# Patient Record
Sex: FEMALE | Race: WHITE | NOT HISPANIC OR LATINO | Employment: OTHER | URBAN - METROPOLITAN AREA
[De-identification: names, ages, dates, MRNs, and addresses within clinical notes are randomized per-mention and may not be internally consistent; named-entity substitution may affect disease eponyms.]

---

## 2017-01-06 DIAGNOSIS — E78.5 HYPERLIPIDEMIA, UNSPECIFIED HYPERLIPIDEMIA TYPE: ICD-10-CM

## 2017-01-06 RX ORDER — ATORVASTATIN CALCIUM 40 MG/1
TABLET, FILM COATED ORAL
Qty: 90 TABLET | Refills: 0 | Status: SHIPPED | OUTPATIENT
Start: 2017-01-06 | End: 2017-04-12 | Stop reason: SDUPTHER

## 2017-01-23 ENCOUNTER — OFFICE VISIT (OUTPATIENT)
Dept: DERMATOLOGY | Facility: CLINIC | Age: 62
End: 2017-01-23
Payer: COMMERCIAL

## 2017-01-23 DIAGNOSIS — L81.4 LENTIGO: ICD-10-CM

## 2017-01-23 DIAGNOSIS — D22.9 NEVUS: ICD-10-CM

## 2017-01-23 DIAGNOSIS — L71.9 ROSACEA: ICD-10-CM

## 2017-01-23 DIAGNOSIS — L30.4 INTERTRIGO: ICD-10-CM

## 2017-01-23 DIAGNOSIS — D48.9 NEOPLASM OF UNCERTAIN BEHAVIOR: Primary | ICD-10-CM

## 2017-01-23 DIAGNOSIS — L82.1 SK (SEBORRHEIC KERATOSIS): ICD-10-CM

## 2017-01-23 PROCEDURE — 88305 TISSUE EXAM BY PATHOLOGIST: CPT | Performed by: PATHOLOGY

## 2017-01-23 PROCEDURE — 11101 PR BIOPSY, EACH ADDED LESION: CPT | Mod: S$GLB,,, | Performed by: DERMATOLOGY

## 2017-01-23 PROCEDURE — 99999 PR PBB SHADOW E&M-EST. PATIENT-LVL III: CPT | Mod: PBBFAC,,, | Performed by: DERMATOLOGY

## 2017-01-23 PROCEDURE — 11100 PR BIOPSY OF SKIN LESION: CPT | Mod: S$GLB,,, | Performed by: DERMATOLOGY

## 2017-01-23 PROCEDURE — 99202 OFFICE O/P NEW SF 15 MIN: CPT | Mod: 25,S$GLB,, | Performed by: DERMATOLOGY

## 2017-01-23 PROCEDURE — 1159F MED LIST DOCD IN RCRD: CPT | Mod: S$GLB,,, | Performed by: DERMATOLOGY

## 2017-01-23 NOTE — PROGRESS NOTES
Subjective:       Patient ID:  Verenice iMrza is a 61 y.o. female who presents for   Chief Complaint   Patient presents with    Lesion     HPI Comments: Pt c/o rash under both breast x  Tx with antifungal powder and ketoconazole cream. Tx helps.  Pt also c/o rosacea x many years. Currently using metrogel. Tx not helping.     Lesion  - Initial  Affected locations: forehead  Duration: 1 year  Signs / symptoms: redness  Severity: mild  Timing: constant  Aggravated by: nothing  Relieving factors/Treatments tried: nothing        Review of Systems   Skin: Positive for daily sunscreen use and activity-related sunscreen use. Negative for tendency to form keloidal scars.   Hematologic/Lymphatic: Does not bruise/bleed easily.        Objective:    Physical Exam   Constitutional: She appears well-developed and well-nourished. No distress.   Neurological: She is alert and oriented to person, place, and time. She is not disoriented.   Psychiatric: She has a normal mood and affect.   Skin:   Areas Examined (abnormalities noted in diagram):   Scalp / Hair Palpated and Inspected  Head / Face Inspection Performed  Neck Inspection Performed  Chest / Axilla Inspection Performed  Abdomen Inspection Performed  Back Inspection Performed  RUE Inspected  LUE Inspection Performed  Nails and Digits Inspection Performed                           Diagram Legend     Erythematous scaling macule/papule c/w actinic keratosis       Vascular papule c/w angioma      Pigmented verrucoid papule/plaque c/w seborrheic keratosis      Yellow umbilicated papule c/w sebaceous hyperplasia      Irregularly shaped tan macule c/w lentigo     1-2 mm smooth white papules consistent with Milia      Movable subcutaneous cyst with punctum c/w epidermal inclusion cyst      Subcutaneous movable cyst c/w pilar cyst      Firm pink to brown papule c/w dermatofibroma      Pedunculated fleshy papule(s) c/w skin tag(s)      Evenly pigmented macule c/w junctional nevus      Mildly variegated pigmented, slightly irregular-bordered macule c/w mildly atypical nevus      Flesh colored to evenly pigmented papule c/w intradermal nevus       Pink pearly papule/plaque c/w basal cell carcinoma      Erythematous hyperkeratotic cursted plaque c/w SCC      Surgical scar with no sign of skin cancer recurrence      Open and closed comedones      Inflammatory papules and pustules      Verrucoid papule consistent consistent with wart     Erythematous eczematous patches and plaques     Dystrophic onycholytic nail with subungual debris c/w onychomycosis     Umbilicated papule    Erythematous-base heme-crusted tan verrucoid plaque consistent with inflamed seborrheic keratosis     Erythematous Silvery Scaling Plaque c/w Psoriasis     See annotation      Assessment / Plan:      Pathology Orders:      Normal Orders This Visit    Tissue Specimen To Pathology, Dermatology     Questions:    Directional Terms:  Other(comment)    Clinical information:  r/o basal cell carcinoma    Specific Site:  right  forehead    Tissue Specimen To Pathology, Dermatology     Questions:    Directional Terms:  Other(comment)    Clinical information:  r/o atypical nevus    Specific Site:  mid chest        Neoplasm of uncertain behavior  Shave biopsy procedure note: x 2     Shave biopsy performed after verbal consent including risk of infection, scar, recurrence, need for additional treatment of site. Area prepped with alcohol, anesthetized with approximately 1.0cc of 1% lidocaine with epinephrine. Lesional tissue shaved with razor blade. Hemostasis achieved with application of aluminum chloride followed by hyfrecation. No complications. Dressing applied. Wound care explained.    Right forehead- If biopsy positive for malignancy, refer to Dr. Pride for Mohs surgery consultation.  -     Tissue Specimen To Pathology, Dermatology  -     Tissue Specimen To Pathology, Dermatology    Rosacea  -     azelaic acid (FINACEA) 15 % Foam; Apply  thin film to face qhs  Dispense: 50 g; Refill: 6  cerave am and pm   Refer to dr price or dr chopra for laser evaluation    Intertrigo  Continue zeasorb af powder otc  Recommend white vinegar: water 1:1 compresses 2x/day followed by cool blow dry and then application of prescription medication.     Cool blow dry after showering. Once clear, use Zeasorb AF powder for maintenance.    Lentigo  This is a benign hyperpigmented sun induced lesion. Daily sun protection will reduce the number of new lesions. Treatment of these benign lesions are considered cosmetic.  The nature of sun-induced photo-aging and skin cancers is discussed.  Sun avoidance, protective clothing, and the use of 30-SPF sunscreens is advised. Observe closely for skin damage/changes, and call if such occurs.    Nevus  Discussed ABCDE's of nevi.  Monitor for new mole or moles that are becoming bigger, darker, irritated, or developing irregular borders. Brochure provided.    SK (seborrheic keratosis)  These are benign inherited growths without a malignant potential. Reassurance given to patient. No treatment is necessary.       Upper body skin examination performed today including at least 6 points as noted in physical examination. Suspicious lesions noted.           Return for prn bx report.

## 2017-03-06 ENCOUNTER — PROCEDURE VISIT (OUTPATIENT)
Dept: DERMATOLOGY | Facility: CLINIC | Age: 62
End: 2017-03-06
Payer: COMMERCIAL

## 2017-03-06 DIAGNOSIS — D23.5 DYSPLASTIC NEVUS OF TRUNK: Primary | ICD-10-CM

## 2017-03-06 PROCEDURE — 88305 TISSUE EXAM BY PATHOLOGIST: CPT | Performed by: PATHOLOGY

## 2017-03-06 PROCEDURE — 11402 EXC TR-EXT B9+MARG 1.1-2 CM: CPT | Mod: 51,S$GLB,, | Performed by: DERMATOLOGY

## 2017-03-06 PROCEDURE — 99499 UNLISTED E&M SERVICE: CPT | Mod: S$GLB,,, | Performed by: DERMATOLOGY

## 2017-03-06 PROCEDURE — 12032 INTMD RPR S/A/T/EXT 2.6-7.5: CPT | Mod: S$GLB,,, | Performed by: DERMATOLOGY

## 2017-03-06 NOTE — PROGRESS NOTES
PROCEDURE: Elliptical excision with intermediate layered repair in order to decrease tension.    ANESTHETIC: 6.0 cc 1% Xylocaine with Epinephrine 1:100,000, buffered    SURGEON: Genevieve Nascimento M.D.    ASSISTANTS: Jennifer Crespo MA    PREOPERATIVE DIAGNOSIS:  Moderately Atypical Nevus    POSTOPERATIVE DIAGNOSIS:  Same as preoperative diagnosis    PATHOLOGIC DIAGNOSIS: Pending    LOCATION: mid chest       INITIAL LESION SIZE: 0.8 cm    EXCISED DIAMETER: 1.4 cm    PREPARATION: The diagnosis, procedure, alternatives, benefits and risks, including but not limited to: infection, bleeding/bruising, drug reactions, pain, scar or cosmetic defect, local sensation disturbances, wound dehiscence (separation of wound edges after sutures removed) and/or recurrence of present condition were explained to the patient. The patient elected to proceed.  Patient's identity was verified using 2 patient identifiers and the side and site was verified.  Time out period with surgeon, assistant and patient in surgical suite was taken.    PROCEDURE: The location noted above was prepped, draped, and anesthetized in the usual sterile fashion per Ema Pena LPN. Lesional tissue was carefully marked with at least 2 mm margins of clinically normal skin in all directions. A fusiform elliptical excision was done with #15 blade carried down completely through the dermis into the deep subcutaneous tissues to the level of the non-muscle fascia, and dissection was carried out in that plane.  Electrocoagulation was used to obtain hemostasis. Blood loss was minimal. The wound was then approximated in a layered fashion with subcutaneous and intradermal sutures of 4.0 Monocryl, approximately 2 in number, and the wound was then superficially closed with simple interrupted sutures of 4.0 Prolene.    The patient tolerated the procedure well.    The area was cleaned and dressed appropriately and the patient was given wound care instructions, as well as an  appointment for follow-up evaluation.    LENGTH OF REPAIR: 3.0 cm

## 2017-03-13 ENCOUNTER — PATIENT MESSAGE (OUTPATIENT)
Dept: DERMATOLOGY | Facility: CLINIC | Age: 62
End: 2017-03-13

## 2017-03-16 ENCOUNTER — PATIENT MESSAGE (OUTPATIENT)
Dept: INTERNAL MEDICINE | Facility: CLINIC | Age: 62
End: 2017-03-16

## 2017-03-21 ENCOUNTER — PATIENT MESSAGE (OUTPATIENT)
Dept: INTERNAL MEDICINE | Facility: CLINIC | Age: 62
End: 2017-03-21

## 2017-03-29 ENCOUNTER — OFFICE VISIT (OUTPATIENT)
Dept: DERMATOLOGY | Facility: CLINIC | Age: 62
End: 2017-03-29
Payer: COMMERCIAL

## 2017-03-29 DIAGNOSIS — L91.0 HYPERTROPHIC SCAR: Primary | ICD-10-CM

## 2017-03-29 PROCEDURE — 99999 PR PBB SHADOW E&M-EST. PATIENT-LVL II: CPT | Mod: PBBFAC,,, | Performed by: DERMATOLOGY

## 2017-03-29 PROCEDURE — 99499 UNLISTED E&M SERVICE: CPT | Mod: S$GLB,,, | Performed by: DERMATOLOGY

## 2017-03-29 PROCEDURE — 11900 INJECT SKIN LESIONS </W 7: CPT | Mod: S$GLB,,, | Performed by: DERMATOLOGY

## 2017-03-29 NOTE — MR AVS SNAPSHOT
[place] [facility]   [facility]  [place] [address]  Phone: [phone]  Fax: [phone]                  Verenice Mirza   3/29/2017 1:00 PM   Office Visit    Description:  Female : 1955   Provider:  Genevieve Nascimento MD   Department:  [place] [facility]           Reason for Visit     Scar           Diagnoses this Visit        Comments    Hypertrophic scar    -  Primary            To Do List           Future Appointments        Provider Department Dept Phone    2017 1:40 PM Areli Crespo MD [facility] [phone]      Goals (5 Years of Data)     None      Follow-Up and Disposition     Return in about 6 months (around 2017).    Follow-up and Disposition History      [facility]     [facility] On Call Nurse Care Line -  Assistance  Registered nurses in the [facility] Call Center provide clinical advisement, health education, appointment booking, and other advisory services.  Call for this free service at [phone].             Medications           Message regarding Medications     Verify the changes and/or additions to your medication regime listed below are the same as discussed with your clinician today.  If any of these changes or additions are incorrect, please notify your healthcare provider.             Verify that the below list of medications is an accurate representation of the medications you are currently taking.  If none reported, the list may be blank. If incorrect, please contact your healthcare provider. Carry this list with you in case of emergency.           Current Medications     alpha lipoic acid 200 mg Cap Take by mouth once daily.     aspirin (ECOTRIN) 81 MG EC tablet Take 81 mg by mouth once daily.    atorvastatin (LIPITOR) 40 MG tablet TAKE ONE TABLET BY MOUTH ONCE DAILY    azelaic acid (FINACEA) 15 % Foam Apply thin film to face qhs    b complex vitamins tablet Take 1 tablet by mouth once daily.    biotin 5 mg  Cap Take by mouth.    bromelains 500 mg Tab Take by mouth once daily.    CINNAMON BARK (CINNAMON ORAL) Take 350 mg by mouth.    co-enzyme Q-10 30 mg capsule Take 30 mg by mouth 3 (three) times daily.    GLUCOSAMINE HCL/CHONDR TYLER A NA (OSTEO BI-FLEX ORAL) Take by mouth 2 (two) times daily.    LACTOBAC CMB #3/FOS/PANTETHINE (PROBIOTIC & ACIDOPHILUS ORAL) Take by mouth.    levothyroxine (SYNTHROID) 88 MCG tablet Take 1 tablet (88 mcg total) by mouth before breakfast.    LUTEIN ORAL Take by mouth.    magnesium oxide (MAG-OX) 400 mg tablet Take 400 mg by mouth once daily.    meloxicam (MOBIC) 7.5 MG tablet Take 1 tablet (7.5 mg total) by mouth daily as needed for Pain.    metoprolol succinate (TOPROL-XL) 50 MG 24 hr tablet Take 1 tablet (50 mg total) by mouth once daily.    MILK THISTLE ORAL Take by mouth.    omega-3 acid ethyl esters (LOVAZA) 1 gram capsule Take 2 capsules (2 g total) by mouth 2 (two) times daily.    psyllium (METAMUCIL) powder Take 1 packet by mouth once daily.     ramipril (ALTACE) 10 MG capsule Take 1 capsule (10 mg total) by mouth once daily.    tizanidine (ZANAFLEX) 4 MG tablet Take 1 tablet (4 mg total) by mouth every 8 (eight) hours as needed.    TURMERIC, BULK, MISC by Misc.(Non-Drug; Combo Route) route once daily. OTC    vitamin D 1000 units Tab Take 185 mg by mouth once daily.           Clinical Reference Information           Allergies as of 3/29/2017     No Known Allergies      Immunizations Administered on Date of Encounter - 3/29/2017     None      Language Assistance Services     ATTENTION: Language assistance services are available, free of charge. Please call [phone].      ATENCIÓN: Si kylie flood, tiene a tyler disposición servicios gratuitos de asistencia lingüística. Llame al [phone].     CHÚ Ý: N?u b?n nói Ti?ng Vi?t, có các d?ch v? h? tr? ngôn ng? mi?n phí dành cho b?n. G?i s? [phone].         [facility] complies with applicable Federal civil rights  laws and does not discriminate on the basis of race, color, national origin, age, disability, or sex.

## 2017-03-29 NOTE — PROGRESS NOTES
Subjective:       Patient ID:  Verenice Mirza is a 61 y.o. female who presents for   Chief Complaint   Patient presents with    Scar     HPI Comments: Pt presents for scar mid chest s/p surgery for dysplastic nevus 3/6/17.  Pt concerned it is becoming hypertrophic.  tx with biocorneum bid and vaseline once nightly.  Denies itching or pain.     Scar         Review of Systems   Skin: Negative for tendency to form keloidal scars.   Hematologic/Lymphatic: Does not bruise/bleed easily.        Objective:    Physical Exam   Constitutional: She appears well-developed and well-nourished. No distress.   Neurological: She is alert and oriented to person, place, and time. She is not disoriented.   Psychiatric: She has a normal mood and affect.   Skin:   Areas Examined (abnormalities noted in diagram):   Chest / Axilla Inspection Performed              Diagram Legend     Erythematous scaling macule/papule c/w actinic keratosis       Vascular papule c/w angioma      Pigmented verrucoid papule/plaque c/w seborrheic keratosis      Yellow umbilicated papule c/w sebaceous hyperplasia      Irregularly shaped tan macule c/w lentigo     1-2 mm smooth white papules consistent with Milia      Movable subcutaneous cyst with punctum c/w epidermal inclusion cyst      Subcutaneous movable cyst c/w pilar cyst      Firm pink to brown papule c/w dermatofibroma      Pedunculated fleshy papule(s) c/w skin tag(s)      Evenly pigmented macule c/w junctional nevus     Mildly variegated pigmented, slightly irregular-bordered macule c/w mildly atypical nevus      Flesh colored to evenly pigmented papule c/w intradermal nevus       Pink pearly papule/plaque c/w basal cell carcinoma      Erythematous hyperkeratotic cursted plaque c/w SCC      Surgical scar with no sign of skin cancer recurrence      Open and closed comedones      Inflammatory papules and pustules      Verrucoid papule consistent consistent with wart     Erythematous eczematous patches  and plaques     Dystrophic onycholytic nail with subungual debris c/w onychomycosis     Umbilicated papule    Erythematous-base heme-crusted tan verrucoid plaque consistent with inflamed seborrheic keratosis     Erythematous Silvery Scaling Plaque c/w Psoriasis     See annotation      Assessment / Plan:        Hypertrophic scar  Intralesional Kenalog 5.0mg/cc (0.5 cc total) injected into 1 lesions on the mid chest today after obtaining verbal consent including risk of surrounding hypopigmentation. Patient tolerated procedure well.  Continue biocorneum         Return in about 6 months (around 9/29/2017).

## 2017-04-05 LAB
ALBUMIN SERPL-MCNC: 4.1 G/DL (ref 3.6–5.1)
ALBUMIN/GLOB SERPL: 1.8 (CALC) (ref 1–2.5)
ALP SERPL-CCNC: 84 U/L (ref 33–130)
ALT SERPL-CCNC: 26 U/L (ref 6–29)
AST SERPL-CCNC: 17 U/L (ref 10–35)
BILIRUB SERPL-MCNC: 0.3 MG/DL (ref 0.2–1.2)
BUN SERPL-MCNC: 17 MG/DL (ref 7–25)
BUN/CREAT SERPL: ABNORMAL (CALC) (ref 6–22)
CALCIUM SERPL-MCNC: 9.4 MG/DL (ref 8.6–10.4)
CHLORIDE SERPL-SCNC: 107 MMOL/L (ref 98–110)
CHOLEST SERPL-MCNC: 124 MG/DL (ref 125–200)
CHOLEST/HDLC SERPL: 3 (CALC)
CO2 SERPL-SCNC: 27 MMOL/L (ref 20–31)
CREAT SERPL-MCNC: 0.69 MG/DL (ref 0.5–0.99)
GFR SERPL CREATININE-BSD FRML MDRD: 93 ML/MIN/1.73M2
GLOBULIN SER CALC-MCNC: 2.3 G/DL (CALC) (ref 1.9–3.7)
GLUCOSE SERPL-MCNC: 113 MG/DL (ref 65–99)
HDLC SERPL-MCNC: 41 MG/DL
LDLC SERPL CALC-MCNC: 67 MG/DL (CALC)
NONHDLC SERPL-MCNC: 83 MG/DL (CALC)
POTASSIUM SERPL-SCNC: 4.3 MMOL/L (ref 3.5–5.3)
PROT SERPL-MCNC: 6.4 G/DL (ref 6.1–8.1)
SODIUM SERPL-SCNC: 141 MMOL/L (ref 135–146)
TRIGL SERPL-MCNC: 80 MG/DL
TSH SERPL-ACNC: 1.21 MIU/L (ref 0.4–4.5)

## 2017-04-12 ENCOUNTER — OFFICE VISIT (OUTPATIENT)
Dept: INTERNAL MEDICINE | Facility: CLINIC | Age: 62
End: 2017-04-12
Payer: COMMERCIAL

## 2017-04-12 VITALS
SYSTOLIC BLOOD PRESSURE: 120 MMHG | WEIGHT: 227.94 LBS | HEIGHT: 62 IN | BODY MASS INDEX: 41.94 KG/M2 | DIASTOLIC BLOOD PRESSURE: 80 MMHG | HEART RATE: 72 BPM

## 2017-04-12 DIAGNOSIS — K21.9 GASTROESOPHAGEAL REFLUX DISEASE, ESOPHAGITIS PRESENCE NOT SPECIFIED: Primary | ICD-10-CM

## 2017-04-12 DIAGNOSIS — E88.819 INSULIN RESISTANCE: ICD-10-CM

## 2017-04-12 DIAGNOSIS — E03.4 HYPOTHYROIDISM DUE TO ACQUIRED ATROPHY OF THYROID: ICD-10-CM

## 2017-04-12 DIAGNOSIS — I10 ESSENTIAL HYPERTENSION: ICD-10-CM

## 2017-04-12 DIAGNOSIS — E03.9 HYPOTHYROIDISM, UNSPECIFIED TYPE: ICD-10-CM

## 2017-04-12 DIAGNOSIS — E78.5 HYPERLIPIDEMIA, UNSPECIFIED HYPERLIPIDEMIA TYPE: ICD-10-CM

## 2017-04-12 DIAGNOSIS — R92.8 ABNORMAL MAMMOGRAM: ICD-10-CM

## 2017-04-12 DIAGNOSIS — M62.838 MUSCLE SPASM: ICD-10-CM

## 2017-04-12 DIAGNOSIS — E66.01 MORBID OBESITY WITH BMI OF 40.0-44.9, ADULT: ICD-10-CM

## 2017-04-12 DIAGNOSIS — I73.9 PAD (PERIPHERAL ARTERY DISEASE): ICD-10-CM

## 2017-04-12 PROCEDURE — 99214 OFFICE O/P EST MOD 30 MIN: CPT | Mod: S$GLB,,, | Performed by: INTERNAL MEDICINE

## 2017-04-12 PROCEDURE — 3079F DIAST BP 80-89 MM HG: CPT | Mod: S$GLB,,, | Performed by: INTERNAL MEDICINE

## 2017-04-12 PROCEDURE — 1160F RVW MEDS BY RX/DR IN RCRD: CPT | Mod: S$GLB,,, | Performed by: INTERNAL MEDICINE

## 2017-04-12 PROCEDURE — 99999 PR PBB SHADOW E&M-EST. PATIENT-LVL III: CPT | Mod: PBBFAC,,, | Performed by: INTERNAL MEDICINE

## 2017-04-12 PROCEDURE — 3074F SYST BP LT 130 MM HG: CPT | Mod: S$GLB,,, | Performed by: INTERNAL MEDICINE

## 2017-04-12 RX ORDER — MELOXICAM 7.5 MG/1
7.5 TABLET ORAL DAILY PRN
Qty: 30 TABLET | Refills: 3 | Status: SHIPPED | OUTPATIENT
Start: 2017-04-12 | End: 2018-10-22 | Stop reason: SDUPTHER

## 2017-04-12 RX ORDER — METOPROLOL SUCCINATE 50 MG/1
50 TABLET, EXTENDED RELEASE ORAL DAILY
Qty: 90 TABLET | Refills: 1 | Status: SHIPPED | OUTPATIENT
Start: 2017-04-12 | End: 2017-12-28 | Stop reason: SDUPTHER

## 2017-04-12 RX ORDER — LEVOTHYROXINE SODIUM 88 UG/1
88 TABLET ORAL
Qty: 90 TABLET | Refills: 1 | Status: SHIPPED | OUTPATIENT
Start: 2017-04-12 | End: 2018-04-18 | Stop reason: SDUPTHER

## 2017-04-12 RX ORDER — RAMIPRIL 10 MG/1
10 CAPSULE ORAL DAILY
Qty: 90 CAPSULE | Refills: 3 | Status: SHIPPED | OUTPATIENT
Start: 2017-04-12 | End: 2018-03-24 | Stop reason: SDUPTHER

## 2017-04-12 RX ORDER — ATORVASTATIN CALCIUM 40 MG/1
40 TABLET, FILM COATED ORAL DAILY
Qty: 90 TABLET | Refills: 1 | Status: SHIPPED | OUTPATIENT
Start: 2017-04-12 | End: 2018-04-09

## 2017-04-12 RX ORDER — OMEGA-3-ACID ETHYL ESTERS 1 G/1
2 CAPSULE, LIQUID FILLED ORAL 2 TIMES DAILY
Qty: 360 CAPSULE | Refills: 1 | Status: SHIPPED | OUTPATIENT
Start: 2017-04-12 | End: 2018-04-09 | Stop reason: SDUPTHER

## 2017-04-12 NOTE — PROGRESS NOTES
Subjective:       Patient ID: Verenice Mirza is a 61 y.o. female.    Chief Complaint: Follow-up    HPI   61-year-old female presents to clinic today for follow-up of hypertension dyslipidemia hypothyroidism insulin resistance and PAD.  She is compliant with medication she's been on a diet recently lost around 20 pounds very motivated.  She reports having indigestion and heartburn really since she's been on this diet and drinking shakes.  She states that she would like to try to get an EGD due to refractory reflux.  She has history of using PPI therapy she stopped taking it recently due to concerns about long-term effects on the kidney.  She's been using Tums instead.  She has been having symptoms fairly frequently.  She is also due for colonoscopy.  Review of Systems  otherwise negative  Objective:      Physical Exam  General: Well-appearing, well-nourished.  No distress  HEENT: conjunctivae are normal.  Pupils are equal and reative to light.  TM's are clear and intact bilaterally.  Hearing is grossly normal.  Nasopharynx is clear.  Oropharynx is clear.  Neck: Supple.  No thyroid megaly.  No bruits.  Lymph: No cervical or supraclavicular adenopathy.  Heart: Regular rate and rhythm, without murmur, rub or gallop.  Lungs: Clear to auscultation; respiratory effort normal.  Abdomen: Soft, nontender, nondistended.  Normoactive bowel sounds.  No hepatomegaly.  No masses.  Extremities: Good distal pulses.  No edema.  Psych: Oriented to time person place.  Judgment and insight seem unimpaired.  Mood and affect are appropriate.  Assessment:       1. Gastroesophageal reflux disease, esophagitis presence not specified    2. Abnormal mammogram    3. Morbid obesity with BMI of 40.0-44.9, adult    4. Essential hypertension    5. Hyperlipidemia, unspecified hyperlipidemia type    6. Hypothyroidism, unspecified type    7. PAD (peripheral artery disease)    8. Insulin resistance    9. Hypothyroidism due to acquired atrophy of  thyroid    10. Muscle spasm        Plan:       Verenice was seen today for follow-up.    Diagnoses and all orders for this visit:    Gastroesophageal reflux disease, esophagitis presence not specified  -     Ambulatory consult to Gastroenterology  Refer to GI to get arrange for EGD and colonoscopy.  Abnormal mammogram  -     Mammo Digital Diagnostic Bilat with Tomosynthesis_CAD; Future    Morbid obesity with BMI of 40.0-44.9, adult    Essential hypertension  -     metoprolol succinate (TOPROL-XL) 50 MG 24 hr tablet; Take 1 tablet (50 mg total) by mouth once daily.  -     ramipril (ALTACE) 10 MG capsule; Take 1 capsule (10 mg total) by mouth once daily.  Controlled.  Continue current medical regimen.  Prescription refills addressed.  Followup advised. See after visit summary.  Hyperlipidemia, unspecified hyperlipidemia type  -     atorvastatin (LIPITOR) 40 MG tablet; Take 1 tablet (40 mg total) by mouth once daily.  -     omega-3 acid ethyl esters (LOVAZA) 1 gram capsule; Take 2 capsules (2 g total) by mouth 2 (two) times daily.  Controlled.  Continue current medical regimen.  Prescription refills addressed.  Followup advised. See after visit summary.  Hypothyroidism, unspecified type  Controlled.  Continue current medical regimen.  Prescription refills addressed.  Followup advised. See after visit summary.  PAD (peripheral artery disease)  Continue cholesterol medicine LDL at goal.  Insulin resistance  Continued efforts at weight loss  Hypothyroidism due to acquired atrophy of thyroid  -     levothyroxine (SYNTHROID) 88 MCG tablet; Take 1 tablet (88 mcg total) by mouth before breakfast.    Muscle spasm  -     meloxicam (MOBIC) 7.5 MG tablet; Take 1 tablet (7.5 mg total) by mouth daily as needed for Pain.

## 2017-04-12 NOTE — MR AVS SNAPSHOT
[facility]   [place]  [place] [address]  Phone: [phone]  Fax: [phone]                  Verenice Mirza   2017 1:40 PM   Office Visit    Description:  Female : 1955   Provider:  Areli Crespo MD   Department:  [facility]           Reason for Visit     Follow-up           Diagnoses this Visit        Comments    Gastroesophageal reflux disease, esophagitis presence not specified    -  Primary     Abnormal mammogram         Morbid obesity with BMI of 40.0-44.9, adult         Essential hypertension         Hyperlipidemia, unspecified hyperlipidemia type         Hypothyroidism, unspecified type         PAD (peripheral artery disease)         Insulin resistance         Hypothyroidism due to acquired atrophy of thyroid         Muscle spasm                To Do List           Future Appointments        Provider Department Dept Phone    2017 10:20 AM ZORAN Alvarado - Gastroenterology [phone]    2017 10:00 AM [facility] DX [facility] [phone]    10/18/2017 2:00 PM Areli Crespo MD [facility] [phone]      Goals (5 Years of Data)     None      Follow-Up and Disposition     Return in about 6 months (around 10/12/2017).       These Medications        Disp Refills Start End    atorvastatin (LIPITOR) 40 MG tablet 90 tablet 1 2017     Take 1 tablet (40 mg total) by mouth once daily. - Oral    Pharmacy: [address] Ph #: [phone]       levothyroxine (SYNTHROID) 88 MCG tablet 90 tablet 1 2017     Take 1 tablet (88 mcg total) by mouth before breakfast. - Oral    Pharmacy: [address] Ph #: [phone]       meloxicam (MOBIC) 7.5 MG tablet 30 tablet 3 2017     Take 1 tablet (7.5 mg total) by mouth daily as needed for Pain. - Oral    Pharmacy: [address]  [address] #: [phone]       metoprolol succinate (TOPROL-XL) 50 MG 24 hr tablet 90 tablet 1 4/12/2017     Take 1 tablet (50 mg total) by mouth once daily. - Oral    Pharmacy: [address] #: [phone]       omega-3 acid ethyl esters (LOVAZA) 1 gram capsule 360 capsule 1 4/12/2017 4/12/2018    Take 2 capsules (2 g total) by mouth 2 (two) times daily. - Oral    Pharmacy: [address] Ph #: [phone]       ramipril (ALTACE) 10 MG capsule 90 capsule 3 4/12/2017     Take 1 capsule (10 mg total) by mouth once daily. - Oral    Pharmacy: [address] Ph #: [phone]         [facility]     [facility] Nurse Care Line - 24/7 Assistance  Unless otherwise directed by your provider, please contact [facility], our nurse care line that is available for 24/7 assistance.     Registered nurses in the [facility] provide: appointment scheduling, clinical advisement, health education, and other advisory services.  Call: [phone] (toll free)               Medications           Message regarding Medications     Verify the changes and/or additions to your medication regime listed below are the same as discussed with your clinician today.  If any of these changes or additions are incorrect, please notify your healthcare provider.        CHANGE how you are taking these medications     Start Taking Instead of    atorvastatin (LIPITOR) 40 MG tablet atorvastatin (LIPITOR) 40 MG tablet    Dosage:  Take 1 tablet (40 mg total) by mouth once daily. Dosage:  TAKE ONE TABLET BY MOUTH ONCE DAILY    Reason for Change:  Reorder            Verify that the below list of medications is an accurate representation of the medications you are currently taking.  If none reported, the list may be blank. If incorrect, please contact your healthcare provider.  "Carry this list with you in case of emergency.           Current Medications     alpha lipoic acid 200 mg Cap Take by mouth once daily.     aspirin (ECOTRIN) 81 MG EC tablet Take 81 mg by mouth once daily.    atorvastatin (LIPITOR) 40 MG tablet Take 1 tablet (40 mg total) by mouth once daily.    azelaic acid (FINACEA) 15 % Foam Apply thin film to face qhs    b complex vitamins tablet Take 1 tablet by mouth once daily.    biotin 5 mg Cap Take by mouth.    bromelains 500 mg Tab Take by mouth once daily.    CINNAMON BARK (CINNAMON ORAL) Take 350 mg by mouth.    co-enzyme Q-10 30 mg capsule Take 30 mg by mouth 3 (three) times daily.    GLUCOSAMINE HCL/CHONDR BRASWELL A NA (OSTEO BI-FLEX ORAL) Take by mouth 2 (two) times daily.    LACTOBAC CMB #3/FOS/PANTETHINE (PROBIOTIC & ACIDOPHILUS ORAL) Take by mouth.    levothyroxine (SYNTHROID) 88 MCG tablet Take 1 tablet (88 mcg total) by mouth before breakfast.    LUTEIN ORAL Take by mouth.    magnesium oxide (MAG-OX) 400 mg tablet Take 400 mg by mouth once daily.    meloxicam (MOBIC) 7.5 MG tablet Take 1 tablet (7.5 mg total) by mouth daily as needed for Pain.    metoprolol succinate (TOPROL-XL) 50 MG 24 hr tablet Take 1 tablet (50 mg total) by mouth once daily.    MILK THISTLE ORAL Take by mouth.    omega-3 acid ethyl esters (LOVAZA) 1 gram capsule Take 2 capsules (2 g total) by mouth 2 (two) times daily.    psyllium (METAMUCIL) powder Take 1 packet by mouth once daily.     ramipril (ALTACE) 10 MG capsule Take 1 capsule (10 mg total) by mouth once daily.    tizanidine (ZANAFLEX) 4 MG tablet Take 1 tablet (4 mg total) by mouth every 8 (eight) hours as needed.    TURMERIC, BULK, MISC by Misc.(Non-Drug; Combo Route) route once daily. OTC    vitamin D 1000 units Tab Take 185 mg by mouth once daily.           Clinical Reference Information           Your Vitals Were     BP Pulse Height Weight BMI    120/80 72 5' 2" (1.575 m) 103.4 kg (227 lb 15.3 oz) 41.69 kg/m2      Blood Pressure       "    Most Recent Value    BP  120/80      Allergies as of 4/12/2017     No Known Allergies      Immunizations Administered on Date of Encounter - 4/12/2017     None      Orders Placed During Today's Visit      Normal Orders This Visit    Ambulatory consult to Gastroenterology     Future Labs/Procedures Expected by Expires    Mammo Digital Diagnostic Bilat with Tomosynthesis_CAD  4/12/2017 6/12/2018      Language Assistance Services     ATTENTION: Language assistance services are available, free of charge. Please call [phone].      ATENCIÓN: Si habla remigio, tiene a tyler disposición servicios gratuitos de asistencia lingüística. Llame al [phone].     CHÚ Ý: N?u b?n nói Ti?ng Vi?t, có các d?ch v? h? tr? ngôn ng? mi?n phí dành cho b?n. G?i s? [phone].         [facility] complies with applicable Federal civil rights laws and does not discriminate on the basis of race, color, national origin, age, disability, or sex.

## 2017-04-13 ENCOUNTER — INITIAL CONSULT (OUTPATIENT)
Dept: GASTROENTEROLOGY | Facility: CLINIC | Age: 62
End: 2017-04-13
Payer: COMMERCIAL

## 2017-04-13 VITALS — WEIGHT: 227.94 LBS | BODY MASS INDEX: 41.69 KG/M2

## 2017-04-13 DIAGNOSIS — Z12.11 COLON CANCER SCREENING: ICD-10-CM

## 2017-04-13 DIAGNOSIS — Z86.010 HISTORY OF COLON POLYPS: ICD-10-CM

## 2017-04-13 DIAGNOSIS — R13.10 DYSPHAGIA, UNSPECIFIED TYPE: ICD-10-CM

## 2017-04-13 DIAGNOSIS — R12 HEARTBURN: Primary | ICD-10-CM

## 2017-04-13 PROCEDURE — 99203 OFFICE O/P NEW LOW 30 MIN: CPT | Mod: S$GLB,,, | Performed by: NURSE PRACTITIONER

## 2017-04-13 PROCEDURE — 99999 PR PBB SHADOW E&M-EST. PATIENT-LVL II: CPT | Mod: PBBFAC,,, | Performed by: NURSE PRACTITIONER

## 2017-04-13 PROCEDURE — 1160F RVW MEDS BY RX/DR IN RCRD: CPT | Mod: S$GLB,,, | Performed by: NURSE PRACTITIONER

## 2017-04-13 NOTE — LETTER
April 13, 2017      Areli Crespo MD  [address] [facility]  [place] [address]           [facility]  [address] [place] [address]  [address]  [place] [address]  Phone: [phone]          Patient: Verenice Mirza   MR Number: [id number]   YOB: 1955   Date of Visit: 4/13/2017       Dear Dr. Areli Crespo:    Thank you for referring Verenice Mirza to me for evaluation. Attached you will find relevant portions of my assessment and plan of care.    If you have questions, please do not hesitate to call me. I look forward to following Verenice Mirza along with you.    Sincerely,    Farzaneh Ng, [facility]    Enclosure  CC:  No Recipients    If you would like to receive this communication electronically, please contact [email or web address] or [phone] to request more information on [employer] access.    For providers and/or their staff who would like to refer a patient to [facility], please contact us through our one-stop-shop provider referral line, [facility], at [phone].    If you feel you have received this communication in error or would no longer like to receive these types of communications, please e-mail [email or web address]

## 2017-04-13 NOTE — PROGRESS NOTES
Subjective:       Patient ID: Verenice Mirza is a 61 y.o. female.    Chief Complaint: Colonoscopy    HPI  Reports chronic heartburn for years , which had been controlled with prilosec.  She discontinued prilosec 6 months ago due to concerns for side effects with long term use.    She reports return of heartburn that has continued and become more persistent despite diet changes.  She reports that over the last 3 months she has been on a medical diet with shakes and has lost weight and continued with complaints.  Denies abdominal pain, nausea or vomiting.  Reports dysphagia at times with painful swallowing and the sensation that food lodges in the esophagus.  She reports alternating bowel habits with episodic loose stool with fecal urgency.  She has had primarily constipation in the past.  She denies blood with bowel movements or black stools.     She has a history of colon polyps and is due next month for 5 year colonoscopy.  She had EGD 3 years ago:    - Hiatus hernia.                        - Non-obstructing Schatzki ring.                        - Gastritis. Biopsied.                        - Normal examined duodenum.    Review of Systems   Constitutional: Negative.  Negative for activity change, appetite change, fatigue, fever and unexpected weight change.   HENT: Positive for trouble swallowing. Negative for sore throat.    Respiratory: Negative.  Negative for cough, choking and shortness of breath.    Cardiovascular: Negative.  Negative for chest pain.   Gastrointestinal: Positive for constipation and diarrhea. Negative for abdominal distention, abdominal pain, blood in stool, nausea and vomiting.   Genitourinary: Negative.  Negative for difficulty urinating, dysuria and hematuria.   Musculoskeletal: Positive for arthralgias and joint swelling. Negative for neck pain and neck stiffness.   Skin: Negative.    Neurological: Negative.  Negative for dizziness, syncope, weakness and light-headedness.    Psychiatric/Behavioral: Negative.        Objective:      Physical Exam   Constitutional: She is oriented to person, place, and time. She appears well-developed and well-nourished. No distress.   HENT:   Head: Normocephalic.   Eyes: No scleral icterus.   Cardiovascular: Normal rate.    Pulmonary/Chest: Effort normal. No respiratory distress.   Abdominal: Soft. Normal appearance.   Musculoskeletal: Normal range of motion.   Neurological: She is alert and oriented to person, place, and time.   Skin: Skin is warm and dry. She is not diaphoretic.   Psychiatric: She has a normal mood and affect. Her behavior is normal. Judgment and thought content normal.   Vitals reviewed.      Assessment:       1. Heartburn    2. Dysphagia, unspecified type    3. History of colon polyps    4. Colon cancer screening        Plan:         Verenice was seen today for colonoscopy.    Diagnoses and all orders for this visit:    Heartburn    Dysphagia, unspecified type    History of colon polyps    Colon cancer screening    Other orders  -     ranitidine (ZANTAC) 150 MG tablet; Take 1 tablet (150 mg total) by mouth 2 (two) times daily.  -     Case request GI: ESOPHAGOGASTRODUODENOSCOPY (EGD), COLONOSCOPY       I have explained the planned procedures to the patient.The risks, benefits and alternatives of the procedure were also explained in detail. Patient verbalized understanding, all questions were answered. The patient agrees to proceed as planned    Add fiber supplement.  Ranitidine.    Will schedule EGD and colonoscopy.

## 2017-05-17 ENCOUNTER — PATIENT MESSAGE (OUTPATIENT)
Dept: GASTROENTEROLOGY | Facility: CLINIC | Age: 62
End: 2017-05-17

## 2017-05-17 RX ORDER — SODIUM, POTASSIUM,MAG SULFATES 17.5-3.13G
1 SOLUTION, RECONSTITUTED, ORAL ORAL ONCE
Qty: 2 BOTTLE | Refills: 0 | Status: SHIPPED | OUTPATIENT
Start: 2017-05-17 | End: 2017-05-17

## 2017-05-22 ENCOUNTER — PATIENT MESSAGE (OUTPATIENT)
Dept: GASTROENTEROLOGY | Facility: CLINIC | Age: 62
End: 2017-05-22

## 2017-05-23 ENCOUNTER — PATIENT OUTREACH (OUTPATIENT)
Dept: ADMINISTRATIVE | Facility: HOSPITAL | Age: 62
End: 2017-05-23

## 2017-05-23 ENCOUNTER — PATIENT MESSAGE (OUTPATIENT)
Dept: GASTROENTEROLOGY | Facility: CLINIC | Age: 62
End: 2017-05-23

## 2017-05-24 ENCOUNTER — HOSPITAL ENCOUNTER (OUTPATIENT)
Facility: HOSPITAL | Age: 62
Discharge: HOME | End: 2017-05-24
Attending: INTERNAL MEDICINE | Admitting: INTERNAL MEDICINE
Payer: COMMERCIAL

## 2017-05-24 ENCOUNTER — ANESTHESIA (OUTPATIENT)
Dept: ENDOSCOPY | Facility: HOSPITAL | Age: 62
End: 2017-05-24
Payer: COMMERCIAL

## 2017-05-24 ENCOUNTER — ANESTHESIA EVENT (OUTPATIENT)
Dept: ENDOSCOPY | Facility: HOSPITAL | Age: 62
End: 2017-05-24
Payer: COMMERCIAL

## 2017-05-24 DIAGNOSIS — R13.10 DYSPHAGIA: ICD-10-CM

## 2017-05-24 DIAGNOSIS — R13.10 DYSPHAGIA, UNSPECIFIED TYPE: Primary | ICD-10-CM

## 2017-05-24 DIAGNOSIS — K63.5 COLON POLYP: ICD-10-CM

## 2017-05-24 PROCEDURE — 45380 COLONOSCOPY AND BIOPSY: CPT | Mod: 33,,, | Performed by: INTERNAL MEDICINE

## 2017-05-24 PROCEDURE — 25000003 PHARM REV CODE 250: Performed by: INTERNAL MEDICINE

## 2017-05-24 PROCEDURE — 37000008 HC ANESTHESIA 1ST 15 MINUTES: Performed by: INTERNAL MEDICINE

## 2017-05-24 PROCEDURE — 88305 TISSUE EXAM BY PATHOLOGIST: CPT | Performed by: PATHOLOGY

## 2017-05-24 PROCEDURE — 25000003 PHARM REV CODE 250: Performed by: NURSE ANESTHETIST, CERTIFIED REGISTERED

## 2017-05-24 PROCEDURE — 63600175 PHARM REV CODE 636 W HCPCS: Performed by: NURSE ANESTHETIST, CERTIFIED REGISTERED

## 2017-05-24 PROCEDURE — 88305 TISSUE EXAM BY PATHOLOGIST: CPT | Mod: 26,,, | Performed by: PATHOLOGY

## 2017-05-24 PROCEDURE — 43248 EGD GUIDE WIRE INSERTION: CPT | Mod: 51,,, | Performed by: INTERNAL MEDICINE

## 2017-05-24 PROCEDURE — 27201012 HC FORCEPS, HOT/COLD, DISP: Performed by: INTERNAL MEDICINE

## 2017-05-24 PROCEDURE — 43450 DILATE ESOPHAGUS 1/MULT PASS: CPT | Performed by: INTERNAL MEDICINE

## 2017-05-24 PROCEDURE — 45380 COLONOSCOPY AND BIOPSY: CPT | Performed by: INTERNAL MEDICINE

## 2017-05-24 PROCEDURE — 43248 EGD GUIDE WIRE INSERTION: CPT | Performed by: INTERNAL MEDICINE

## 2017-05-24 PROCEDURE — 37000009 HC ANESTHESIA EA ADD 15 MINS: Performed by: INTERNAL MEDICINE

## 2017-05-24 RX ORDER — PROPOFOL 10 MG/ML
VIAL (ML) INTRAVENOUS
Status: DISCONTINUED | OUTPATIENT
Start: 2017-05-24 | End: 2017-05-24

## 2017-05-24 RX ORDER — SODIUM CHLORIDE 9 MG/ML
INJECTION, SOLUTION INTRAVENOUS CONTINUOUS
Status: DISCONTINUED | OUTPATIENT
Start: 2017-05-24 | End: 2017-05-24 | Stop reason: HOSPADM

## 2017-05-24 RX ORDER — LIDOCAINE HCL/PF 100 MG/5ML
SYRINGE (ML) INTRAVENOUS
Status: DISCONTINUED | OUTPATIENT
Start: 2017-05-24 | End: 2017-05-24

## 2017-05-24 RX ORDER — PROPOFOL 10 MG/ML
VIAL (ML) INTRAVENOUS CONTINUOUS PRN
Status: DISCONTINUED | OUTPATIENT
Start: 2017-05-24 | End: 2017-05-24

## 2017-05-24 RX ADMIN — PROPOFOL 50 MG: 10 INJECTION, EMULSION INTRAVENOUS at 07:05

## 2017-05-24 RX ADMIN — LIDOCAINE HYDROCHLORIDE 75 MG: 20 INJECTION, SOLUTION INTRAVENOUS at 07:05

## 2017-05-24 RX ADMIN — SODIUM CHLORIDE: 0.9 INJECTION, SOLUTION INTRAVENOUS at 07:05

## 2017-05-24 RX ADMIN — PROPOFOL 150 MCG/KG/MIN: 10 INJECTION, EMULSION INTRAVENOUS at 07:05

## 2017-05-24 RX ADMIN — TOPICAL ANESTHETIC 1 EACH: 200 SPRAY DENTAL; PERIODONTAL at 07:05

## 2017-05-24 NOTE — TRANSFER OF CARE
"Anesthesia Transfer of Care Note    Patient: Verenice Mirza    Procedure(s) Performed: Procedure(s) (LRB):  ESOPHAGOGASTRODUODENOSCOPY (EGD) (N/A)  COLONOSCOPY (N/A)    Patient location: GI    Anesthesia Type: MAC    Transport from OR: Transported from OR on room air with adequate spontaneous ventilation    Post pain: adequate analgesia    Post assessment: no apparent anesthetic complications    Post vital signs: stable    Level of consciousness: awake    Nausea/Vomiting: no nausea/vomiting    Complications: none          Last vitals:   Visit Vitals  BP (!) 142/63 (BP Method: Automatic)   Pulse (!) 52   Temp 36.4 °C (97.5 °F) (Oral)   Resp 16   Ht 5' 2" (1.575 m)   Wt 103 kg (227 lb)   SpO2 95%   Breastfeeding? No   BMI 41.52 kg/m²     "

## 2017-05-24 NOTE — PLAN OF CARE
PT SLEEPING, EASILY AROUSED.  NO C/O PAIN, DISCOMFORT, OR N/V.  PT TO EKG MONITOR.  ALARMS ON.  PT CARE ASSUMED.

## 2017-05-24 NOTE — H&P
Chief complaints:Dysphagia    History of Presenting Illness   Patient has a known history of GERD. She is complaining of intermittent dysphagia. There is no melena or weight loss.  She also has a history of colon polyps and is due for follow up colonoscopy.    Review of Systems   Constitutional: Negative for fever and appetite change.   HENT: Negative for sore throat, trouble swallowing and neck pain.   Eyes: Negative for photophobia and visual disturbance.   Respiratory: Negative for wheezing.   Cardiovascular: Negative for chest pain and palpitations.   Gastrointestinal: See HPI for details   Musculoskeletal: Negative for joint swelling and arthralgias.   Skin: Negative for rash and wound.   Neurological: Negative for dizziness, tremors and weakness.   Hematological: Negative.   Psychiatric/Behavioral: Negative for suicidal ideas and behavioral problems.     Past Medical History:   Diagnosis Date    Dysplastic nevus of trunk 03/2017    moderately atypical    Fatty liver disease, nonalcoholic     Hyperlipidemia     Hypertension     Hypothyroid     IGT (impaired glucose tolerance)     Kidney stones     Morbid obesity     Nicotine use disorder     JORDANA on CPAP     PAD (peripheral artery disease)     PVD (peripheral vascular disease)     Renal angiomyolipoma     Rosacea     Venous insufficiency     Vitamin D deficiency disease        Past Surgical History:   Procedure Laterality Date    BLADDER SUSPENSION      CYSTOSCOPY      HYSTERECTOMY      LITHOTRIPSY      OOPHORECTOMY         Family History   Problem Relation Age of Onset    Alzheimer's disease Mother     Breast cancer Mother     Skin cancer Sister     Diabetes type II Maternal Grandfather     Melanoma Neg Hx        Social History     Social History    Marital status: Single     Spouse name: N/A    Number of children: N/A    Years of education: N/A     Occupational History    retired teacher      Retired     Social History Main  Topics    Smoking status: Former Smoker     Packs/day: 0.50     Years: 30.00     Types: Cigarettes     Quit date: 9/6/2008    Smokeless tobacco: Former User    Alcohol use No    Drug use: No    Sexual activity: No     Other Topics Concern    None     Social History Narrative    None       Current Facility-Administered Medications   Medication Dose Route Frequency Provider Last Rate Last Dose    0.9%  NaCl infusion   Intravenous Continuous Georgina Bunch MD 20 mL/hr at 05/24/17 0700         Review of patient's allergies indicates:  No Known Allergies  Objective:      Vitals:    05/24/17 0652   BP: (!) 142/63   Pulse: (!) 52   Resp: 16   Temp: 97.5 °F (36.4 °C)     Physical Exam   Constitutional: Patient is oriented to person, place, and time. Appears well-nourished.   HENT:   Mouth/Throat: Oropharynx is clear and moist.   Eyes: Pupils are equal, round, and reactive to light.   Neck: Neck supple.   Cardiovascular: Normal heart sounds.   Pulmonary/Chest: Effort normal and breath sounds normal.   Abdominal: Soft. Exhibits no mass. There is no tenderness. There is no guarding.   Musculoskeletal: Normal range of motion.   Lymphadenopathy: Has no cervical adenopathy.   Neurological:Alert and oriented to person, place, and time.   Skin: Skin is warm. No rash noted.   Psychiatric: Has a normal mood and affect.     Assessment:  Dysphagia  History of colon polyp    Plan:  EGD  Colonoscopy       I have reviewed the patient's medical history in detail and updated the computerized patient record

## 2017-05-24 NOTE — DISCHARGE INSTRUCTIONS
Discharge Summary/Instructions for EGD and Colonoscopy  Verenice Mirza  5/24/2017  Georgina Bunch MD    Restrictions on Activity:    - Do not drive car or operate machinery until the day after the procedure.  - The following day: return to full activity including work.  - For 3 days: No heavy lifting, straining or running.  - Diet: Eat and drink normally unless instructed otherwise.    Treatment for Common Side Effects:  - Mild abdominal pain and bloating or excessive gas: rest, eat lightly and use a heating pad.   -Sore Throat - treat with throat lozenges, gargle with warm salt water.    Symptoms to watch for and report to your physician:  1. Severe abdominal pain.  2. Fever within 24 hours after a procedure.  3. A large amount of rectal bleeding. (A small amount of blood from the rectum is not serious, especially if hemorrhoids are present.)  4. Because air was put into your colon during the procedure, expelling large amount of air from your rectum is normal.  5. You may not have a bowel movement for 1-3 days because of the colonoscopy prep. This is normal.  6. Go directly to the emergency room if you notice any of the following:     Chills and/orfever over 101   Persistent vomiting   Severe abdominal pain, other than gas cramps   Severe chest pain   Black, tarry stools   Any bleeding - exceeding one tablespoon    If you have any questions or problems, please call your Physician:    Georgina Bunch MD    Lab Results: Contact Physician's Office    If a complication or emergency situation arises and you are unable to reach your Physician - GO TO THE EMERGENCY ROOM.

## 2017-05-24 NOTE — ANESTHESIA PREPROCEDURE EVALUATION
05/24/2017  Verenice Mirza is a 61 y.o., female here for egd/colonoscopy for dysphaia and cancer screening.    Past Medical History:   Diagnosis Date    Dysplastic nevus of trunk 03/2017    moderately atypical    Fatty liver disease, nonalcoholic     Hyperlipidemia     Hypertension     Hypothyroid     IGT (impaired glucose tolerance)     Kidney stones     Morbid obesity     Nicotine use disorder     JORDANA on CPAP     PAD (peripheral artery disease)     PVD (peripheral vascular disease)     Renal angiomyolipoma     Rosacea     Venous insufficiency     Vitamin D deficiency disease      Past Surgical History:   Procedure Laterality Date    BLADDER SUSPENSION      CYSTOSCOPY      HYSTERECTOMY      LITHOTRIPSY      OOPHORECTOMY           Pre-op Assessment         Review of Systems  Cardiovascular:   Hypertension    Renal/:   Chronic Renal Disease    Endocrine:   Hypothyroidism        Physical Exam  General:  Obesity    Airway/Jaw/Neck:  AIRWAY FINDINGS: Normal      Eyes/Ears/Nose:  EYES/EARS/NOSE FINDINGS: Normal   Dental:  DENTAL FINDINGS: Normal   Chest/Lungs:  Chest/Lungs Clear    Heart/Vascular:  Heart Findings: Normal Heart murmur: negative Vascular Findings: Normal    Abdomen:  Abdomen Findings: Normal    Musculoskeletal:  Musculoskeletal Findings: Normal   Skin:  Skin Findings: Normal    Mental Status:  Mental Status Findings: Normal        Anesthesia Plan  Type of Anesthesia, risks & benefits discussed:  Anesthesia Type:  MAC  Patient's Preference:   Intra-op Monitoring Plan:   Intra-op Monitoring Plan Comments:   Post Op Pain Control Plan:   Post Op Pain Control Plan Comments:   Induction:   IV  Beta Blocker:         Informed Consent: Patient understands risks and agrees with Anesthesia plan.  Questions answered. Anesthesia consent signed with patient.  ASA Score: 3     Day of Surgery  Review of History & Physical:    H&P update referred to the surgeon.         Ready For Surgery From Anesthesia Perspective.

## 2017-05-24 NOTE — ANESTHESIA POSTPROCEDURE EVALUATION
"Anesthesia Post Evaluation    Patient: Verenice Mirza    Procedure(s) Performed: Procedure(s) (LRB):  ESOPHAGOGASTRODUODENOSCOPY (EGD) (N/A)  COLONOSCOPY (N/A)    Final Anesthesia Type: MAC  Patient location during evaluation: GI PACU  Patient participation: Yes- Able to Participate  Level of consciousness: awake and alert  Post-procedure vital signs: reviewed and stable  Pain management: adequate  Airway patency: patent  PONV status at discharge: No PONV  Anesthetic complications: no      Cardiovascular status: blood pressure returned to baseline and hemodynamically stable  Respiratory status: unassisted, spontaneous ventilation and room air  Hydration status: euvolemic  Follow-up not needed.        Visit Vitals  BP (!) 142/63 (BP Method: Automatic)   Pulse (!) 52   Temp 36.4 °C (97.5 °F) (Oral)   Resp 16   Ht 5' 2" (1.575 m)   Wt 103 kg (227 lb)   SpO2 95%   Breastfeeding? No   BMI 41.52 kg/m²       Pain/Marva Score: Pain Assessment Performed: Yes (5/24/2017  6:55 AM)  Presence of Pain: denies (5/24/2017  6:55 AM)      "

## 2017-05-25 VITALS
SYSTOLIC BLOOD PRESSURE: 131 MMHG | HEART RATE: 48 BPM | OXYGEN SATURATION: 97 % | RESPIRATION RATE: 17 BRPM | WEIGHT: 227 LBS | DIASTOLIC BLOOD PRESSURE: 79 MMHG | TEMPERATURE: 98 F | HEIGHT: 62 IN | BODY MASS INDEX: 41.77 KG/M2

## 2017-05-29 ENCOUNTER — PATIENT MESSAGE (OUTPATIENT)
Dept: HEMATOLOGY/ONCOLOGY | Facility: CLINIC | Age: 62
End: 2017-05-29

## 2017-06-26 ENCOUNTER — PATIENT MESSAGE (OUTPATIENT)
Dept: GASTROENTEROLOGY | Facility: CLINIC | Age: 62
End: 2017-06-26

## 2017-06-26 ENCOUNTER — HOSPITAL ENCOUNTER (OUTPATIENT)
Dept: RADIOLOGY | Facility: HOSPITAL | Age: 62
Discharge: HOME | End: 2017-06-26
Attending: INTERNAL MEDICINE
Payer: COMMERCIAL

## 2017-06-26 VITALS — WEIGHT: 227 LBS | HEIGHT: 62 IN | BODY MASS INDEX: 41.77 KG/M2

## 2017-06-26 DIAGNOSIS — R92.8 ABNORMAL MAMMOGRAM: ICD-10-CM

## 2017-06-26 PROCEDURE — 77066 DX MAMMO INCL CAD BI: CPT | Mod: 26,,, | Performed by: RADIOLOGY

## 2017-06-26 PROCEDURE — 77062 BREAST TOMOSYNTHESIS BI: CPT | Mod: 26,,, | Performed by: RADIOLOGY

## 2017-06-26 PROCEDURE — 77062 BREAST TOMOSYNTHESIS BI: CPT | Mod: TC

## 2017-07-17 ENCOUNTER — OFFICE VISIT (OUTPATIENT)
Dept: URGENT CARE | Facility: CLINIC | Age: 62
End: 2017-07-17
Payer: COMMERCIAL

## 2017-07-17 VITALS
OXYGEN SATURATION: 98 % | HEIGHT: 62 IN | RESPIRATION RATE: 14 BRPM | WEIGHT: 225 LBS | TEMPERATURE: 99 F | BODY MASS INDEX: 41.41 KG/M2 | HEART RATE: 74 BPM

## 2017-07-17 DIAGNOSIS — R05.9 COUGH: Primary | ICD-10-CM

## 2017-07-17 DIAGNOSIS — J30.2 ACUTE SEASONAL ALLERGIC RHINITIS, UNSPECIFIED TRIGGER: ICD-10-CM

## 2017-07-17 DIAGNOSIS — R06.2 WHEEZING: ICD-10-CM

## 2017-07-17 PROCEDURE — 94640 AIRWAY INHALATION TREATMENT: CPT | Mod: 59,S$GLB,, | Performed by: FAMILY MEDICINE

## 2017-07-17 PROCEDURE — 99214 OFFICE O/P EST MOD 30 MIN: CPT | Mod: 25,S$GLB,, | Performed by: FAMILY MEDICINE

## 2017-07-17 PROCEDURE — 96372 THER/PROPH/DIAG INJ SC/IM: CPT | Mod: S$GLB,,, | Performed by: FAMILY MEDICINE

## 2017-07-17 RX ORDER — BETAMETHASONE SODIUM PHOSPHATE AND BETAMETHASONE ACETATE 3; 3 MG/ML; MG/ML
6 INJECTION, SUSPENSION INTRA-ARTICULAR; INTRALESIONAL; INTRAMUSCULAR; SOFT TISSUE
Status: COMPLETED | OUTPATIENT
Start: 2017-07-17 | End: 2017-07-17

## 2017-07-17 RX ORDER — FLUTICASONE PROPIONATE 50 MCG
2 SPRAY, SUSPENSION (ML) NASAL DAILY
Qty: 1 BOTTLE | Refills: 0 | Status: SHIPPED | OUTPATIENT
Start: 2017-07-17 | End: 2018-07-17

## 2017-07-17 RX ORDER — ALBUTEROL SULFATE 0.83 MG/ML
2.5 SOLUTION RESPIRATORY (INHALATION)
Status: COMPLETED | OUTPATIENT
Start: 2017-07-17 | End: 2017-07-17

## 2017-07-17 RX ORDER — ALBUTEROL SULFATE 90 UG/1
2 AEROSOL, METERED RESPIRATORY (INHALATION) EVERY 6 HOURS PRN
Qty: 1 INHALER | Refills: 0 | Status: SHIPPED | OUTPATIENT
Start: 2017-07-17 | End: 2017-10-30

## 2017-07-17 RX ADMIN — BETAMETHASONE SODIUM PHOSPHATE AND BETAMETHASONE ACETATE 6 MG: 3; 3 INJECTION, SUSPENSION INTRA-ARTICULAR; INTRALESIONAL; INTRAMUSCULAR; SOFT TISSUE at 09:07

## 2017-07-17 RX ADMIN — ALBUTEROL SULFATE 2.5 MG: 0.83 SOLUTION RESPIRATORY (INHALATION) at 09:07

## 2017-07-17 NOTE — PATIENT INSTRUCTIONS
Allergic Rhinitis  Allergic rhinitis is an allergic reaction that affects the nose, and often the eyes. Its often known as nasal allergies. Nasal allergies are often due to things in the environment that are breathed in. Depending what you are sensitive to, nasal allergies may occur only during certain seasons. Or they may occur year round. Common indoor allergens include house dust mites, mold, cockroaches, and pet dander. Outdoor allergens include pollen from trees, grasses, and weeds.   Symptoms include a drippy, stuffy, and itchy nose. They also include sneezing and red and itchy eyes. You may feel tired more often. Severe allergies may also affect your breathing and trigger a condition called asthma.   Tests can be done to see what allergens are affecting you. You may be referred to an allergy specialist for testing and further evaluation.  Home care  The healthcare provider may prescribe medicines to help relieve allergy symptoms.   Ask the provider for advice on how to avoid substances that you are allergic to. Below are a few tips for each type of allergen.  Pet dander:  · Do not have pets with fur and feathers.  · If you cannot avoid having a pet, keep it out of your bedroom and off upholstered furniture.  Pollen:  · When pollen counts are high, keep windows of your car and home closed. If possible, use an air conditioner instead.  · Wear a filter mask when mowing or doing yard work.  House dust mites:  · Wash bedding every week in warm water and detergent and dry on a hot setting.  · Cover the mattress, box spring, and pillows with allergy covers.   · If possible, sleep in a room with no carpet, curtains, or upholstered furniture.  Cockroaches:  · Store food in sealed containers.  · Remove garbage from the home promptly.  · Fix water leaks  Mold:  · Keep humidity low by using a dehumidifier or air conditioner. Keep the dehumidifier and air conditioner clean and free of mold.  · Clean moldy areas with  bleach and water.  In general:  · Vacuum once or twice a week. If possible, use a vacuum with a high-efficiency particulate air (HEPA) filter.  · Do not smoke. Avoid cigarette smoke. Cigarette smoke is an irritant that can make symptoms worse.  Follow-up care  Follow up as advised by the health care provider or our staff. If you were referred to an allergy specialist, make this appointment promptly.  When to seek medical advice  Call your healthcare provider right away if the following occur:  · Coughing or wheezing  · Fever greater than 100.4°F (38°C)  · Continuing symptoms, new symptoms, or worsening symptoms  Call 911 right away if you have:  · Trouble breathing  · Hives (raised red bumps)  · Severe swelling of the face or severe itching of the eyes or mouth  Date Last Reviewed: 4/26/2015 © 2000-2016 [employer]. [address] [place] [address]. All rights reserved. This information is not intended as a substitute for professional medical care. Always follow your healthcare professional's instructions.        Cough, Chronic, Uncertain Cause (Adult)    Everyone has had a cough as part of the common cold, flu, or bronchitis. This kind of cough occurs along with an achy feeling, low-grade fever, nasal and sinus congestion, and a scratchy or sore throat. This usually gets better in 2 to 3 weeks. A cough that lasts longer than 3 weeks may be due to other causes.  If your cough does not improve over the next 2 weeks, further testing may be needed. Follow up with your healthcare provider as advised. Cough suppressants may be recommended. Based on your exam today, the exact cause of your cough is not certain. Below are some common causes for persistent cough.  Smokers cough  Smokers cough doesnt go away. If you continue to smoke, it only gets worse. The cough is from irritation in the air passages. Talk to your healthcare provider about quitting. Medicines or nicotine-replacement products, like  gum or the patch, may make quitting easier.  Postnasal drip  A cough that is worse at night may be due to postnasal drip. Excess mucus in the nose drains from the back of your nose to your throat. This triggers the cough reflex. Postnasal drip may be due to a sinus infection or allergy. Common allergens include dust, tobacco smoke (both inhaled and secondhand smoke), environmental pollutants, pollen, mold, pets, cleaning agents, room deodorizers, and chemical fumes. Over-the-counter antihistamines or decongestants may be helpful for allergies. A sinus infection may requires antibiotic treatment. See your healthcare provider if symptoms continue.  Medicines  Certain prescribed medicines can cause a chronic cough in some people:  · ACE inhibitors for high blood pressure. These include benazepril, captopril, enalapril, fosinopril, lisinopril, quinapril, ramipril, and others.  · Beta-blockers for high blood pressure and other conditions. These include propranolol, atenolol, metoprolol, nadolol, and others.  Let your healthcare provider know if you are taking any of these.  Asthma  Cough may be the only sign of mild asthma. You may have tests to find out if asthma is causing your cough. You may also take asthma medicine on a trial basis.  Acid reflux (heartburn, GERD)  The esophagus is the tube that carries food from the mouth to the stomach. A valve at its lower end prevents stomach acids from flowing upward. If this valve does not work properly, acid from the stomach enters the esophagus. This may cause a burning pain in the upper abdomen or lower chest, belching, or cough. Symptoms are often worse when lying flat. Avoid eating or drinking before bedtime. Try using extra pillows to raise your upper body, or place 4-inch blocks under the head of your bed. You may try an over-the-counter antacid or an acid-blocking medicine such as famotidine, cimetidine, ranitidine, esomeprazole, lansoprazole, or omeprazole. Stronger  medicines for this condition can be prescribed by your healthcare provider.  Follow-up care  Follow up with your healthcare provider, or as advised, if your cough does not improve. Further testing may be needed.  Note: If an X-ray was taken, a specialist will review it. You will be notified of any new findings that may affect your care.  When to seek medical advice  Call your healthcare provider right away if any of these occur:  · Mild wheezing or difficulty breathing  · Fever of 100.4ºF (38ºC) or higher, or as directed by your healthcare provider  · Unexpected weight loss  · Coughing up large amounts of colored sputum  · Night sweats (sheets and pajamas get soaking wet)  Call 911, or get immediate medical care  Contact emergency services right away if any of these occur:  · Coughing up blood  · Moderate to severe trouble breathing or wheezing  Date Last Reviewed: 9/13/2015  © 2720-7210 [employer]. [address] [place] [address]. All rights reserved. This information is not intended as a substitute for professional medical care. Always follow your healthcare professional's instructions.      Verenice was seen today for cough.    Diagnoses and all orders for this visit:    Cough  -     betamethasone acetate-betamethasone sodium phosphate injection 6 mg; Inject 1 mL (6 mg total) into the muscle one time.  -     albuterol nebulizer solution 2.5 mg; Take 3 mLs (2.5 mg total) by nebulization one time.  -     X-Ray Chest PA And Lateral; Future  -     albuterol 90 mcg/actuation inhaler; Inhale 2 puffs into the lungs every 6 (six) hours as needed for Wheezing or Shortness of Breath. Rescue  -     fluticasone (FLONASE) 50 mcg/actuation nasal spray; 2 sprays by Each Nare route once daily.  -     inhalation spacing device; Use as directed for inhalation.    Acute seasonal allergic rhinitis, unspecified trigger  -     albuterol 90 mcg/actuation inhaler; Inhale 2 puffs into the lungs every 6 (six) hours  as needed for Wheezing or Shortness of Breath. Rescue  -     fluticasone (FLONASE) 50 mcg/actuation nasal spray; 2 sprays by Each Nare route once daily.  -     inhalation spacing device; Use as directed for inhalation.    Wheezing  -     albuterol 90 mcg/actuation inhaler; Inhale 2 puffs into the lungs every 6 (six) hours as needed for Wheezing or Shortness of Breath. Rescue  -     fluticasone (FLONASE) 50 mcg/actuation nasal spray; 2 sprays by Each Nare route once daily.  -     inhalation spacing device; Use as directed for inhalation.            Follow Up Comments   Make sure that you follow up with your primary care doctor in the next 2-5 days if needed .  Return to the Urgent Care if signs or symptoms change and certainly if you have worsening symptoms go to the nearest emergency department for further evaluation.     Dorys Tompkins MD

## 2017-07-17 NOTE — PROGRESS NOTES
Subjective:       Patient ID: Verenice Mirza is a 62 y.o. female.    Chief Complaint: Cough    Cough   This is a new problem. The current episode started 1 to 4 weeks ago. The problem has been gradually worsening. The problem occurs every few minutes. The cough is productive of sputum. Associated symptoms include nasal congestion, postnasal drip and wheezing. Nothing aggravates the symptoms. She has tried OTC cough suppressant for the symptoms. The treatment provided no relief. Her past medical history is significant for bronchitis.     Review of Systems   HENT: Positive for congestion, hoarse voice and postnasal drip.    Respiratory: Positive for cough, sputum production and wheezing.        Objective:      Physical Exam   Constitutional: She is oriented to person, place, and time. She appears well-developed and well-nourished. She is cooperative.  Non-toxic appearance. She does not appear ill. No distress.   HENT:   Head: Normocephalic and atraumatic.   Right Ear: Hearing, tympanic membrane, external ear and ear canal normal.   Left Ear: Hearing, tympanic membrane, external ear and ear canal normal.   Nose: Nose normal. No mucosal edema, rhinorrhea or nasal deformity. No epistaxis. Right sinus exhibits no maxillary sinus tenderness and no frontal sinus tenderness. Left sinus exhibits no maxillary sinus tenderness and no frontal sinus tenderness.   Mouth/Throat: Uvula is midline, oropharynx is clear and moist and mucous membranes are normal. No trismus in the jaw. Normal dentition. No uvula swelling. No posterior oropharyngeal erythema.   Eyes: Conjunctivae and lids are normal. No scleral icterus.   Sclera clear bilat   Neck: Trachea normal, full passive range of motion without pain and phonation normal. Neck supple.   Cardiovascular: Normal rate, regular rhythm, normal heart sounds, intact distal pulses and normal pulses.    Pulmonary/Chest: Effort normal and breath sounds normal. No respiratory distress.    Abdominal: Soft. Normal appearance and bowel sounds are normal. She exhibits no distension (obese abdomen). There is no tenderness.   Musculoskeletal: Normal range of motion. She exhibits no edema or deformity.   Neurological: She is alert and oriented to person, place, and time. She exhibits normal muscle tone. Coordination normal.   Skin: Skin is warm, dry and intact. She is not diaphoretic (Patient is overweight). No pallor.   Psychiatric: She has a normal mood and affect. Her speech is normal and behavior is normal. Judgment and thought content normal. Cognition and memory are normal.   Nursing note and vitals reviewed.    Patient has dry irritated cough without wheezes, rales or rhonchi  Type of Interpretation: ED Physician (Independently Interpreted).  Interpretation: CXR shows no cardiopulmonary abnormality        .xr  Assessment:       1. Cough    2. Acute seasonal allergic rhinitis, unspecified trigger    3. Wheezing        Plan:         Cough  -     betamethasone acetate-betamethasone sodium phosphate injection 6 mg; Inject 1 mL (6 mg total) into the muscle one time.  -     albuterol nebulizer solution 2.5 mg; Take 3 mLs (2.5 mg total) by nebulization one time.  -     X-Ray Chest PA And Lateral; Future; Expected date: 07/17/2017  -     albuterol 90 mcg/actuation inhaler; Inhale 2 puffs into the lungs every 6 (six) hours as needed for Wheezing or Shortness of Breath. Rescue  Dispense: 1 Inhaler; Refill: 0  -     fluticasone (FLONASE) 50 mcg/actuation nasal spray; 2 sprays by Each Nare route once daily.  Dispense: 1 Bottle; Refill: 0  -     inhalation spacing device; Use as directed for inhalation.  Dispense: 1 Device; Refill: 0    Acute seasonal allergic rhinitis, unspecified trigger  -     albuterol 90 mcg/actuation inhaler; Inhale 2 puffs into the lungs every 6 (six) hours as needed for Wheezing or Shortness of Breath. Rescue  Dispense: 1 Inhaler; Refill: 0  -     fluticasone (FLONASE) 50 mcg/actuation  nasal spray; 2 sprays by Each Nare route once daily.  Dispense: 1 Bottle; Refill: 0  -     inhalation spacing device; Use as directed for inhalation.  Dispense: 1 Device; Refill: 0    Wheezing  -     albuterol 90 mcg/actuation inhaler; Inhale 2 puffs into the lungs every 6 (six) hours as needed for Wheezing or Shortness of Breath. Rescue  Dispense: 1 Inhaler; Refill: 0  -     fluticasone (FLONASE) 50 mcg/actuation nasal spray; 2 sprays by Each Nare route once daily.  Dispense: 1 Bottle; Refill: 0  -     inhalation spacing device; Use as directed for inhalation.  Dispense: 1 Device; Refill: 0      Verenice was seen today for cough.    Diagnoses and all orders for this visit:    Cough  -     betamethasone acetate-betamethasone sodium phosphate injection 6 mg; Inject 1 mL (6 mg total) into the muscle one time.  -     albuterol nebulizer solution 2.5 mg; Take 3 mLs (2.5 mg total) by nebulization one time.  -     X-Ray Chest PA And Lateral; Future  -     albuterol 90 mcg/actuation inhaler; Inhale 2 puffs into the lungs every 6 (six) hours as needed for Wheezing or Shortness of Breath. Rescue  -     fluticasone (FLONASE) 50 mcg/actuation nasal spray; 2 sprays by Each Nare route once daily.  -     inhalation spacing device; Use as directed for inhalation.    Acute seasonal allergic rhinitis, unspecified trigger  -     albuterol 90 mcg/actuation inhaler; Inhale 2 puffs into the lungs every 6 (six) hours as needed for Wheezing or Shortness of Breath. Rescue  -     fluticasone (FLONASE) 50 mcg/actuation nasal spray; 2 sprays by Each Nare route once daily.  -     inhalation spacing device; Use as directed for inhalation.    Wheezing  -     albuterol 90 mcg/actuation inhaler; Inhale 2 puffs into the lungs every 6 (six) hours as needed for Wheezing or Shortness of Breath. Rescue  -     fluticasone (FLONASE) 50 mcg/actuation nasal spray; 2 sprays by Each Nare route once daily.  -     inhalation spacing device; Use as directed for  inhalation.            Follow Up Comments   Make sure that you follow up with your primary care doctor in the next 2-5 days if needed .  Return to the Urgent Care if signs or symptoms change and certainly if you have worsening symptoms go to the nearest emergency department for further evaluation.     Dorys Tompkins MD

## 2017-09-23 ENCOUNTER — PATIENT MESSAGE (OUTPATIENT)
Dept: INTERNAL MEDICINE | Facility: CLINIC | Age: 62
End: 2017-09-23

## 2017-09-25 ENCOUNTER — TELEPHONE (OUTPATIENT)
Dept: FAMILY MEDICINE | Facility: CLINIC | Age: 62
End: 2017-09-25

## 2017-09-27 DIAGNOSIS — Z00.00 ROUTINE GENERAL MEDICAL EXAMINATION AT A HEALTH CARE FACILITY: Primary | ICD-10-CM

## 2017-10-05 ENCOUNTER — OFFICE VISIT (OUTPATIENT)
Dept: DERMATOLOGY | Facility: CLINIC | Age: 62
End: 2017-10-05
Payer: COMMERCIAL

## 2017-10-05 ENCOUNTER — TELEPHONE (OUTPATIENT)
Dept: INTERNAL MEDICINE | Facility: CLINIC | Age: 62
End: 2017-10-05

## 2017-10-05 DIAGNOSIS — L65.9 ALOPECIA: Primary | ICD-10-CM

## 2017-10-05 DIAGNOSIS — L60.8 SUBUNGUAL HEMORRHAGE: ICD-10-CM

## 2017-10-05 DIAGNOSIS — L71.9 ROSACEA: ICD-10-CM

## 2017-10-05 DIAGNOSIS — D69.2 PURPURA: ICD-10-CM

## 2017-10-05 PROCEDURE — 99213 OFFICE O/P EST LOW 20 MIN: CPT | Mod: S$GLB,,, | Performed by: DERMATOLOGY

## 2017-10-05 PROCEDURE — 99999 PR PBB SHADOW E&M-EST. PATIENT-LVL II: CPT | Mod: PBBFAC,,, | Performed by: DERMATOLOGY

## 2017-10-05 RX ORDER — KETOCONAZOLE 20 MG/ML
SHAMPOO, SUSPENSION TOPICAL
Qty: 120 ML | Refills: 5 | Status: SHIPPED | OUTPATIENT
Start: 2017-10-05 | End: 2018-11-29

## 2017-10-05 NOTE — PROGRESS NOTES
Subjective:       Patient ID:  Verenice Mirza is a 62 y.o. female who presents for   Chief Complaint   Patient presents with    Hair Loss    Mole     Pt presents for a couple of issues.  Has hx dysplastic nevus on chest.  Has new mole left anterior shoulder.  X unsure.  Noticed 3 weeks ago.  Denies pain or bleeding. No tx.  Pt also has hair loss scalp.  X 1 year.  tx with Biotin 5000mcg daily.  No change.    Also stubbed right great toe 3 weeks ago.  Very tender and bruised whole nail.  No tx.       Hair Loss     Mole         Review of Systems   Constitutional: Negative for fever, chills, weight loss, fatigue and malaise.   Skin: Positive for daily sunscreen use.   Hematologic/Lymphatic: Bruises/bleeds easily.        Objective:    Physical Exam   Constitutional: She appears well-developed and well-nourished. No distress.   Neurological: She is alert and oriented to person, place, and time. She is not disoriented.   Psychiatric: She has a normal mood and affect.   Skin:   Areas Examined (abnormalities noted in diagram):   Scalp / Hair Palpated and Inspected  Head / Face Inspection Performed  LUE Inspection Performed  Nails and Digits Inspection Performed                       Diagram Legend     Erythematous scaling macule/papule c/w actinic keratosis       Vascular papule c/w angioma      Pigmented verrucoid papule/plaque c/w seborrheic keratosis      Yellow umbilicated papule c/w sebaceous hyperplasia      Irregularly shaped tan macule c/w lentigo     1-2 mm smooth white papules consistent with Milia      Movable subcutaneous cyst with punctum c/w epidermal inclusion cyst      Subcutaneous movable cyst c/w pilar cyst      Firm pink to brown papule c/w dermatofibroma      Pedunculated fleshy papule(s) c/w skin tag(s)      Evenly pigmented macule c/w junctional nevus     Mildly variegated pigmented, slightly irregular-bordered macule c/w mildly atypical nevus      Flesh colored to evenly pigmented papule c/w  intradermal nevus       Pink pearly papule/plaque c/w basal cell carcinoma      Erythematous hyperkeratotic cursted plaque c/w SCC      Surgical scar with no sign of skin cancer recurrence      Open and closed comedones      Inflammatory papules and pustules      Verrucoid papule consistent consistent with wart     Erythematous eczematous patches and plaques     Dystrophic onycholytic nail with subungual debris c/w onychomycosis     Umbilicated papule    Erythematous-base heme-crusted tan verrucoid plaque consistent with inflamed seborrheic keratosis     Erythematous Silvery Scaling Plaque c/w Psoriasis     See annotation      Assessment / Plan:        Alopecia  Ketoconazole 2% shampoo  Can resume sabiha   Refer to dr diego  MVI with biotin    Purpura  Reassurance    Rosacea  finacea gel qhs    Subungual hemorrhage  Reassurance   can f/u with podiatry of needed    Other orders  -     azelaic acid (FINACEA) 15 % Foam; Apply thin film to face qhs  Dispense: 50 g; Refill: 6    Refer to Dr. Juan in plastics            Return in about 6 months (around 4/5/2018). hx of moderately atypical nevus chest

## 2017-10-05 NOTE — TELEPHONE ENCOUNTER
----- Message from Larissa Barajas sent at 10/4/2017  7:56 AM CDT -----  No. [phone]    Patient would like to  lab orders.  Please call her when they are ready to be picked up.

## 2017-10-12 LAB
ALBUMIN SERPL-MCNC: 4.3 G/DL (ref 3.6–5.1)
ALBUMIN/GLOB SERPL: 1.7 (CALC) (ref 1–2.5)
ALP SERPL-CCNC: 83 U/L (ref 33–130)
ALT SERPL-CCNC: 34 U/L (ref 6–29)
AST SERPL-CCNC: 20 U/L (ref 10–35)
BASOPHILS # BLD AUTO: 68 CELLS/UL (ref 0–200)
BASOPHILS NFR BLD AUTO: 1.1 %
BILIRUB SERPL-MCNC: 0.5 MG/DL (ref 0.2–1.2)
BUN SERPL-MCNC: 14 MG/DL (ref 7–25)
BUN/CREAT SERPL: ABNORMAL (CALC) (ref 6–22)
CALCIUM SERPL-MCNC: 9.7 MG/DL (ref 8.6–10.4)
CHLORIDE SERPL-SCNC: 110 MMOL/L (ref 98–110)
CHOLEST SERPL-MCNC: 153 MG/DL
CHOLEST/HDLC SERPL: 3.2 (CALC)
CO2 SERPL-SCNC: 23 MMOL/L (ref 20–31)
CREAT SERPL-MCNC: 0.71 MG/DL (ref 0.5–0.99)
EOSINOPHIL # BLD AUTO: 651 CELLS/UL (ref 15–500)
EOSINOPHIL NFR BLD AUTO: 10.5 %
ERYTHROCYTE [DISTWIDTH] IN BLOOD BY AUTOMATED COUNT: 13.4 % (ref 11–15)
GFR SERPL CREATININE-BSD FRML MDRD: 91 ML/MIN/1.73M2
GLOBULIN SER CALC-MCNC: 2.5 G/DL (CALC) (ref 1.9–3.7)
GLUCOSE SERPL-MCNC: 111 MG/DL (ref 65–99)
HCT VFR BLD AUTO: 42.7 % (ref 35–45)
HDLC SERPL-MCNC: 48 MG/DL
HGB BLD-MCNC: 14.1 G/DL (ref 11.7–15.5)
LDLC SERPL CALC-MCNC: 82 MG/DL (CALC)
LYMPHOCYTES # BLD AUTO: 1717 CELLS/UL (ref 850–3900)
LYMPHOCYTES NFR BLD AUTO: 27.7 %
MCH RBC QN AUTO: 28.7 PG (ref 27–33)
MCHC RBC AUTO-ENTMCNC: 33 G/DL (ref 32–36)
MCV RBC AUTO: 87 FL (ref 80–100)
MONOCYTES # BLD AUTO: 397 CELLS/UL (ref 200–950)
MONOCYTES NFR BLD AUTO: 6.4 %
NEUTROPHILS # BLD AUTO: 3367 CELLS/UL (ref 1500–7800)
NEUTROPHILS NFR BLD AUTO: 54.3 %
NONHDLC SERPL-MCNC: 105 MG/DL (CALC)
PLATELET # BLD AUTO: 268 THOUSAND/UL (ref 140–400)
PMV BLD REES-ECKER: 11 FL (ref 7.5–12.5)
POTASSIUM SERPL-SCNC: 4.2 MMOL/L (ref 3.5–5.3)
PROT SERPL-MCNC: 6.8 G/DL (ref 6.1–8.1)
RBC # BLD AUTO: 4.91 MILLION/UL (ref 3.8–5.1)
SODIUM SERPL-SCNC: 142 MMOL/L (ref 135–146)
TRIGL SERPL-MCNC: 124 MG/DL
TSH SERPL-ACNC: 0.83 MIU/L (ref 0.4–4.5)
WBC # BLD AUTO: 6.2 THOUSAND/UL (ref 3.8–10.8)

## 2017-10-17 ENCOUNTER — PATIENT MESSAGE (OUTPATIENT)
Dept: ADMINISTRATIVE | Facility: OTHER | Age: 62
End: 2017-10-17

## 2017-10-18 ENCOUNTER — OFFICE VISIT (OUTPATIENT)
Dept: INTERNAL MEDICINE | Facility: CLINIC | Age: 62
End: 2017-10-18
Payer: COMMERCIAL

## 2017-10-18 VITALS
DIASTOLIC BLOOD PRESSURE: 80 MMHG | WEIGHT: 238.13 LBS | HEART RATE: 72 BPM | HEIGHT: 62 IN | BODY MASS INDEX: 43.82 KG/M2 | SYSTOLIC BLOOD PRESSURE: 132 MMHG

## 2017-10-18 DIAGNOSIS — Z23 NEED FOR IMMUNIZATION AGAINST INFLUENZA: ICD-10-CM

## 2017-10-18 DIAGNOSIS — E78.5 HYPERLIPIDEMIA, UNSPECIFIED HYPERLIPIDEMIA TYPE: ICD-10-CM

## 2017-10-18 DIAGNOSIS — R92.30 DENSE BREAST TISSUE ON MAMMOGRAM: ICD-10-CM

## 2017-10-18 DIAGNOSIS — E66.01 MORBID OBESITY WITH BMI OF 40.0-44.9, ADULT: ICD-10-CM

## 2017-10-18 DIAGNOSIS — Z00.00 PREVENTATIVE HEALTH CARE: Primary | ICD-10-CM

## 2017-10-18 DIAGNOSIS — R06.2 WHEEZING: ICD-10-CM

## 2017-10-18 DIAGNOSIS — I10 ESSENTIAL HYPERTENSION: ICD-10-CM

## 2017-10-18 PROCEDURE — 90471 IMMUNIZATION ADMIN: CPT | Mod: S$GLB,,, | Performed by: INTERNAL MEDICINE

## 2017-10-18 PROCEDURE — 90688 IIV4 VACCINE SPLT 0.5 ML IM: CPT | Mod: S$GLB,,, | Performed by: INTERNAL MEDICINE

## 2017-10-18 PROCEDURE — 99396 PREV VISIT EST AGE 40-64: CPT | Mod: 25,S$GLB,, | Performed by: INTERNAL MEDICINE

## 2017-10-18 PROCEDURE — 99999 PR PBB SHADOW E&M-EST. PATIENT-LVL III: CPT | Mod: PBBFAC,,, | Performed by: INTERNAL MEDICINE

## 2017-10-19 ENCOUNTER — HOSPITAL ENCOUNTER (OUTPATIENT)
Dept: PULMONOLOGY | Facility: CLINIC | Age: 62
Discharge: HOME | End: 2017-10-19
Payer: COMMERCIAL

## 2017-10-19 DIAGNOSIS — R06.2 WHEEZING: ICD-10-CM

## 2017-10-19 LAB
POST FEV1 FVC: 0.77
POST FEV1: 1.75
POST FVC: 2.27
PRE FEV1 FVC: 78
PRE FEV1: 1.55
PRE FVC: 2
PREDICTED FEV1 FVC: 81
PREDICTED FEV1: 2.2
PREDICTED FVC: 2.75

## 2017-10-19 PROCEDURE — 94060 EVALUATION OF WHEEZING: CPT | Mod: S$GLB,,, | Performed by: INTERNAL MEDICINE

## 2017-10-19 PROCEDURE — 94729 DIFFUSING CAPACITY: CPT | Mod: S$GLB,,, | Performed by: INTERNAL MEDICINE

## 2017-10-19 PROCEDURE — 94727 GAS DIL/WSHOT DETER LNG VOL: CPT | Mod: 51,S$GLB,, | Performed by: INTERNAL MEDICINE

## 2017-10-30 ENCOUNTER — PATIENT MESSAGE (OUTPATIENT)
Dept: INTERNAL MEDICINE | Facility: CLINIC | Age: 62
End: 2017-10-30

## 2017-10-30 RX ORDER — ALBUTEROL SULFATE 90 UG/1
2 AEROSOL, METERED RESPIRATORY (INHALATION) EVERY 6 HOURS PRN
Qty: 1 EACH | Refills: 11 | Status: SHIPPED | OUTPATIENT
Start: 2017-10-30 | End: 2019-05-10 | Stop reason: SDUPTHER

## 2017-11-03 ENCOUNTER — HOSPITAL ENCOUNTER (OUTPATIENT)
Dept: RADIOLOGY | Facility: HOSPITAL | Age: 62
Discharge: HOME | End: 2017-11-03
Attending: INTERNAL MEDICINE
Payer: COMMERCIAL

## 2017-11-03 ENCOUNTER — PATIENT MESSAGE (OUTPATIENT)
Dept: INTERNAL MEDICINE | Facility: CLINIC | Age: 62
End: 2017-11-03

## 2017-11-03 VITALS — BODY MASS INDEX: 43.79 KG/M2 | WEIGHT: 238 LBS | HEIGHT: 62 IN

## 2017-11-03 DIAGNOSIS — R92.30 DENSE BREAST TISSUE ON MAMMOGRAM: ICD-10-CM

## 2017-11-03 PROCEDURE — 76641 ULTRASOUND BREAST COMPLETE: CPT | Mod: 26,50,, | Performed by: RADIOLOGY

## 2017-11-03 PROCEDURE — 76641 ULTRASOUND BREAST COMPLETE: CPT | Mod: TC,50

## 2017-12-07 ENCOUNTER — OFFICE VISIT (OUTPATIENT)
Dept: DERMATOLOGY | Facility: CLINIC | Age: 62
End: 2017-12-07
Payer: COMMERCIAL

## 2017-12-07 DIAGNOSIS — L71.9 ROSACEA: ICD-10-CM

## 2017-12-07 DIAGNOSIS — D48.5 NEOPLASM OF UNCERTAIN BEHAVIOR OF SKIN: Primary | ICD-10-CM

## 2017-12-07 DIAGNOSIS — L01.00 IMPETIGO: ICD-10-CM

## 2017-12-07 PROCEDURE — 99213 OFFICE O/P EST LOW 20 MIN: CPT | Mod: 25,S$GLB,, | Performed by: DERMATOLOGY

## 2017-12-07 PROCEDURE — 99999 PR PBB SHADOW E&M-EST. PATIENT-LVL II: CPT | Mod: PBBFAC,,, | Performed by: DERMATOLOGY

## 2017-12-07 PROCEDURE — 88305 TISSUE EXAM BY PATHOLOGIST: CPT | Performed by: PATHOLOGY

## 2017-12-07 PROCEDURE — 11100 PR BIOPSY OF SKIN LESION: CPT | Mod: S$GLB,,, | Performed by: DERMATOLOGY

## 2017-12-07 RX ORDER — MUPIROCIN 20 MG/G
OINTMENT TOPICAL
Qty: 30 G | Refills: 2 | Status: SHIPPED | OUTPATIENT
Start: 2017-12-07 | End: 2018-11-29

## 2017-12-07 NOTE — PROGRESS NOTES
Subjective:       Patient ID:  Verenice Mirza is a 62 y.o. female who presents for   Chief Complaint   Patient presents with    Lesion     Pt c/o lesion on scalp x a few months. No bleeding or pain. No prev tx.   Pt c/o rough and dry patch on right eyelid x a few weeks. No prev tx.       Lesion         Review of Systems   Skin: Positive for daily sunscreen use.   Hematologic/Lymphatic: Does not bruise/bleed easily.        Objective:    Physical Exam   Constitutional: She appears well-developed and well-nourished. No distress.   Neurological: She is alert and oriented to person, place, and time. She is not disoriented.   Psychiatric: She has a normal mood and affect.   Skin:   Areas Examined (abnormalities noted in diagram):   Scalp / Hair Palpated and Inspected  Head / Face Inspection Performed              Diagram Legend     Erythematous scaling macule/papule c/w actinic keratosis       Vascular papule c/w angioma      Pigmented verrucoid papule/plaque c/w seborrheic keratosis      Yellow umbilicated papule c/w sebaceous hyperplasia      Irregularly shaped tan macule c/w lentigo     1-2 mm smooth white papules consistent with Milia      Movable subcutaneous cyst with punctum c/w epidermal inclusion cyst      Subcutaneous movable cyst c/w pilar cyst      Firm pink to brown papule c/w dermatofibroma      Pedunculated fleshy papule(s) c/w skin tag(s)      Evenly pigmented macule c/w junctional nevus     Mildly variegated pigmented, slightly irregular-bordered macule c/w mildly atypical nevus      Flesh colored to evenly pigmented papule c/w intradermal nevus       Pink pearly papule/plaque c/w basal cell carcinoma      Erythematous hyperkeratotic cursted plaque c/w SCC      Surgical scar with no sign of skin cancer recurrence      Open and closed comedones      Inflammatory papules and pustules      Verrucoid papule consistent consistent with wart     Erythematous eczematous patches and plaques     Dystrophic  onycholytic nail with subungual debris c/w onychomycosis     Umbilicated papule    Erythematous-base heme-crusted tan verrucoid plaque consistent with inflamed seborrheic keratosis     Erythematous Silvery Scaling Plaque c/w Psoriasis     See annotation              Assessment / Plan:      Pathology Orders:      Normal Orders This Visit    Tissue Specimen To Pathology, Dermatology     Questions:    Directional Terms:  Other(comment)    Clinical information:  r/o BCC    Specific Site:  mid scalp        Neoplasm of uncertain behavior of skin  Shave biopsy procedure note:    Shave biopsy performed after verbal consent including risk of infection, scar, recurrence, need for additional treatment of site. Area prepped with alcohol, anesthetized with approximately 1.0cc of 1% lidocaine with epinephrine. Lesional tissue shaved with razor blade. Hemostasis achieved with application of aluminum chloride followed by hyfrecation. No complications. Dressing applied. Wound care explained.    If biopsy positive for malignancy, refer to Dr. Pride for Mohs surgery consultation.  -     Tissue Specimen To Pathology, Dermatology    Impetigo- right eyelid excoriated papule  -     mupirocin (BACTROBAN) 2 % ointment; AAA bid  Dispense: 30 g; Refill: 2    Rosacea  -     azelaic acid (FINACEA) 15 % Foam; Apply thin film to face qhs  Dispense: 50 g; Refill: 6             Return for prn bx report.

## 2017-12-14 ENCOUNTER — PATIENT MESSAGE (OUTPATIENT)
Dept: INTERNAL MEDICINE | Facility: CLINIC | Age: 62
End: 2017-12-14

## 2017-12-14 ENCOUNTER — PATIENT MESSAGE (OUTPATIENT)
Dept: DERMATOLOGY | Facility: CLINIC | Age: 62
End: 2017-12-14

## 2017-12-20 ENCOUNTER — INITIAL CONSULT (OUTPATIENT)
Dept: DERMATOLOGY | Facility: CLINIC | Age: 62
End: 2017-12-20
Payer: COMMERCIAL

## 2017-12-20 VITALS
HEART RATE: 64 BPM | DIASTOLIC BLOOD PRESSURE: 80 MMHG | BODY MASS INDEX: 43.79 KG/M2 | HEIGHT: 62 IN | WEIGHT: 238 LBS | SYSTOLIC BLOOD PRESSURE: 160 MMHG

## 2017-12-20 DIAGNOSIS — D04.4 SQUAMOUS CELL CARCINOMA IN SITU OF SCALP: Primary | ICD-10-CM

## 2017-12-20 PROCEDURE — 99214 OFFICE O/P EST MOD 30 MIN: CPT | Mod: S$GLB,,, | Performed by: DERMATOLOGY

## 2017-12-20 PROCEDURE — 99999 PR PBB SHADOW E&M-EST. PATIENT-LVL III: CPT | Mod: PBBFAC,,, | Performed by: DERMATOLOGY

## 2017-12-20 RX ORDER — FLUOROURACIL 50 MG/G
CREAM TOPICAL 2 TIMES DAILY
Qty: 60 G | Refills: 1 | Status: SHIPPED | OUTPATIENT
Start: 2017-12-20 | End: 2018-01-10

## 2017-12-20 NOTE — PROGRESS NOTES
REFERRING MD:  Genevieve Nascimento M.D.    CHIEF COMPLAINT:  New patient being consulted for Mohs' surgery evaluation.    HISTORY OF PRESENT ILLNESS:  62 y.o. female presents with a 2-3 month(s) history of growth on the mid scalp. Pt states a hair loss specialist noticed the lesion - she went to see Dr Ruiz for concerns about hair thinning and she had mentioned that she saw this area of concern so patient went back to Dr Nascimento for exam.    Positive for scabbing.  Negative for crusting.  Negative for bleeding.  Negative for itching.    Biopsy consistent with actinic keratosis with follicular involvement and focal transition to squamous cell carcinoma in situ.    No prior treatment.    Pacemaker: No  Defibrillator: No  Artificial joints: No  Artificial heart valves: No    PAST MEDICAL HISTORY:  Past Medical History:   Diagnosis Date    Dysplastic nevus of trunk 03/2017    moderately atypical    Fatty liver disease, nonalcoholic     Hyperlipidemia     Hypertension     Hypothyroid     IGT (impaired glucose tolerance)     Kidney stones     Morbid obesity     Nicotine use disorder     JORDANA on CPAP     PAD (peripheral artery disease)     PVD (peripheral vascular disease)     Renal angiomyolipoma     Rosacea     Venous insufficiency     Vitamin D deficiency disease        PAST SURGICAL HISTORY:  Past Surgical History:   Procedure Laterality Date    BLADDER SUSPENSION      COLONOSCOPY N/A 5/24/2017    Procedure: COLONOSCOPY;  Surgeon: Georgina Bunch MD;  Location: [facility];  Service: Endoscopy;  Laterality: N/A;    CYSTOSCOPY      HYSTERECTOMY      LITHOTRIPSY      OOPHORECTOMY          SOCIAL HISTORY:  Dependencies:  former smoker    PERTINENT MEDICATIONS:  See medications list.  aspirin    ALLERGIES:  Patient has no known allergies.    ROS:  Skin: See HPI  Constitutional: No fatigue, fever, malaise, weight loss, or night sweats.  Cardiovascular: No chest pain, palpitations, or edema.  Respiratory:  No coughing, wheezing, SOB, or sputum production.    Physical Exam   HENT:   Head:             General: Mood and affect normal. Alert and orient X3. Normal appearance.  Scalp: Mid scalp with a 5 x 5 mm crusted pink bx site with surrounding hair thinning located 14.5 cm superiorly from the left superior ear attachment. No nodularity.  Eyelids:  no suspicious lesions  Head/Face:  no suspicious lesions    IMPRESSION:  Biopsy proven actinic keratosis with follicular involvement and focal transition to squamous cell carcinoma in situ, Mid scalp, path# [id number].    PLAN:  The diagnosis and the pathology report were discussed in detail with the patient. Treatment options were reviewed, including Mohs Micrographic Surgery, radiation, topical therapy, and standard excision.  After careful review of patient's history and physical exam, and after discussion of treatment options, the decision was made to initially treat with 5% 5-fluorouracil cream.    Will treat the mid scalp with 5% 5-fluorouracil cream BID x 3 weeks. Discussed redness, crusting, and scabbing. May use Aquaphor as needed. Will have patient follow up in 8 weeks for re-check of biopsy site and consider Mohs pending progression. Risks, benefits, and alternatives of Mohs' surgery discussed with the patient. Discussed repair options including complex closure, skin flap, skin graft and second intention healing with the patient.      Spent 30 minutes in coordination of care and/or consultation with patient discussing diagnosis, treatment options, risks and benefits of each. All questions answered.

## 2017-12-28 DIAGNOSIS — I10 ESSENTIAL HYPERTENSION: ICD-10-CM

## 2017-12-28 RX ORDER — METOPROLOL SUCCINATE 50 MG/1
TABLET, EXTENDED RELEASE ORAL
Qty: 90 TABLET | Refills: 0 | Status: SHIPPED | OUTPATIENT
Start: 2017-12-28 | End: 2018-04-09 | Stop reason: SDUPTHER

## 2018-02-08 ENCOUNTER — OFFICE VISIT (OUTPATIENT)
Dept: DERMATOLOGY | Facility: CLINIC | Age: 63
End: 2018-02-08
Payer: COMMERCIAL

## 2018-02-08 DIAGNOSIS — L57.0 ACTINIC KERATOSIS: Primary | ICD-10-CM

## 2018-02-08 DIAGNOSIS — D48.5 NEOPLASM OF UNCERTAIN BEHAVIOR OF SKIN: ICD-10-CM

## 2018-02-08 PROCEDURE — 99213 OFFICE O/P EST LOW 20 MIN: CPT | Mod: S$GLB,,, | Performed by: DERMATOLOGY

## 2018-02-08 PROCEDURE — 3008F BODY MASS INDEX DOCD: CPT | Mod: S$GLB,,, | Performed by: DERMATOLOGY

## 2018-02-08 PROCEDURE — 99999 PR PBB SHADOW E&M-EST. PATIENT-LVL III: CPT | Mod: PBBFAC,,, | Performed by: DERMATOLOGY

## 2018-02-08 PROCEDURE — 88305 TISSUE EXAM BY PATHOLOGIST: CPT | Performed by: PATHOLOGY

## 2018-02-08 PROCEDURE — 99213 OFFICE O/P EST LOW 20 MIN: CPT | Performed by: DERMATOLOGY

## 2018-02-08 PROCEDURE — 88305 TISSUE EXAM BY PATHOLOGIST: CPT | Mod: 26,,, | Performed by: PATHOLOGY

## 2018-02-08 NOTE — PROGRESS NOTES
CHIEF COMPLAINT:   Patient came in for efudex tx..    PAIN SCALE:   {Pain:67280}; {Pain Scale:28831}.    HISTORY OF PRESENT ILLNESS: 62 y.o. female patient.  LOCATION: ***  DURATION: ***  SYMPTOMS: {Sx:62499}  SEVERITY: {DESC; MILD/MOD/SEVERE:10081}  MOD FACTOR: Problem {DESC; BETTER/WORSE:50920} with ***.  PRIOR TREATMENT: Attempts {ACTIONS; HAVE/HAVE NOT:07925} helped.    SKIN TYPE: {Guy Skin Type:00056}    PAST MEDICAL HISTORY:  Past Medical History:   Diagnosis Date    Dysplastic nevus of trunk 03/2017    moderately atypical    Fatty liver disease, nonalcoholic     Hyperlipidemia     Hypertension     Hypothyroid     IGT (impaired glucose tolerance)     Kidney stones     Morbid obesity     Nicotine use disorder     JORDANA on CPAP     PAD (peripheral artery disease)     PVD (peripheral vascular disease)     Renal angiomyolipoma     Rosacea     Venous insufficiency     Vitamin D deficiency disease        PAST SURGICAL HISTORY:  Past Surgical History:   Procedure Laterality Date    BLADDER SUSPENSION      COLONOSCOPY N/A 5/24/2017    Procedure: COLONOSCOPY;  Surgeon: Georgina Bunch MD;  Location: [facility];  Service: Endoscopy;  Laterality: N/A;    CYSTOSCOPY      HYSTERECTOMY      LITHOTRIPSY      OOPHORECTOMY         MEDICATIONS/ALLERGIES:  See Medcard/Allergy card    SOCIAL HISTORY:  Dependencies:  {Dependencies:40854}    PE:  See PE drawing(s).  Physical Exam    REVIEW OF SYSTEMS: ( ) represents system reviewed.  Positive for: {BREONNA ABEL ROS:92602}  Negative for: {BREONNA ABEL ROS:42920}    LABS:  {BREONNA ABEL LABS:87778}        IMPRESSION/PLAN:  {Derm Impression/Plan:08577}    PATIENT EDUCATION:  {Derm Patient Education:53274}    RTC:  In *** {days/wks/mos/yrs:149799} {RTC:41128}.

## 2018-02-08 NOTE — PROGRESS NOTES
CHIEF COMPLAINT:   Patient came in for follow up efudex.    PAIN SCALE:   None    HISTORY OF PRESENT ILLNESS: 62 y.o. female patient was last seen on 12/20/18 for AK with focal transition to SCCIS on mid scalp. Pt treated the are with Efudex Cream 5% BID x 3 weeks. Pt stated she received a good response with no complications.  States scab never really went away.      PAST MEDICAL HISTORY:  Past Medical History:   Diagnosis Date    Dysplastic nevus of trunk 03/2017    moderately atypical    Fatty liver disease, nonalcoholic     Hyperlipidemia     Hypertension     Hypothyroid     IGT (impaired glucose tolerance)     Kidney stones     Morbid obesity     Nicotine use disorder     JORDANA on CPAP     PAD (peripheral artery disease)     PVD (peripheral vascular disease)     Renal angiomyolipoma     Rosacea     Venous insufficiency     Vitamin D deficiency disease        PAST SURGICAL HISTORY:  Past Surgical History:   Procedure Laterality Date    BLADDER SUSPENSION      COLONOSCOPY N/A 5/24/2017    Procedure: COLONOSCOPY;  Surgeon: Georgina Bunch MD;  Location: [facility];  Service: Endoscopy;  Laterality: N/A;    CYSTOSCOPY      HYSTERECTOMY      LITHOTRIPSY      OOPHORECTOMY         MEDICATIONS/ALLERGIES:  See Medcard/Allergy card    PE:  See PE drawing(s).  Physical Exam   HENT:   Head:         Mid scalp with 7x7 mm crusted scab located 14.5 cm superiorly from left superior ear attachment.       Pathology Orders:     Normal Orders This Visit    Tissue Specimen To Pathology, Dermatology     Questions:    Directional Terms:  Other(comment)    Clinical information:  rule out residual SCC s/p Efudex treatment    Specific Site:  mid scalp          IMPRESSION/PLAN:  AK with focal transition to SCCIS mid scalp s/p Efudex treatment  Still with residual scab.  Repeat bx done today.  Shave biopsy after verbal consent. Etoh prep. 1% lidocaine with epinephrine 1:100,000, shave, light hyfrecation, alcl,  pressure bandage, no complications. Disc wound care. If (+), schedule Mohs.       RTC:  Prn pending bx results..

## 2018-02-09 ENCOUNTER — PATIENT MESSAGE (OUTPATIENT)
Dept: DERMATOLOGY | Facility: CLINIC | Age: 63
End: 2018-02-09

## 2018-02-21 ENCOUNTER — PATIENT MESSAGE (OUTPATIENT)
Dept: DERMATOLOGY | Facility: CLINIC | Age: 63
End: 2018-02-21

## 2018-03-12 ENCOUNTER — OFFICE VISIT (OUTPATIENT)
Dept: DERMATOLOGY | Facility: CLINIC | Age: 63
End: 2018-03-12
Payer: COMMERCIAL

## 2018-03-12 DIAGNOSIS — L81.4 LENTIGO: ICD-10-CM

## 2018-03-12 DIAGNOSIS — D18.00 ANGIOMA: Primary | ICD-10-CM

## 2018-03-12 DIAGNOSIS — L82.1 SK (SEBORRHEIC KERATOSIS): ICD-10-CM

## 2018-03-12 DIAGNOSIS — Z85.828 PERSONAL HISTORY OF SKIN CANCER: ICD-10-CM

## 2018-03-12 DIAGNOSIS — S90.211A SUBUNGUAL HEMATOMA OF GREAT TOE OF RIGHT FOOT, INITIAL ENCOUNTER: ICD-10-CM

## 2018-03-12 DIAGNOSIS — D22.9 NEVUS: ICD-10-CM

## 2018-03-12 DIAGNOSIS — L90.5 SCAR: ICD-10-CM

## 2018-03-12 PROCEDURE — 99999 PR PBB SHADOW E&M-EST. PATIENT-LVL II: CPT | Mod: PBBFAC,,, | Performed by: DERMATOLOGY

## 2018-03-12 PROCEDURE — 99214 OFFICE O/P EST MOD 30 MIN: CPT | Mod: S$GLB,,, | Performed by: DERMATOLOGY

## 2018-03-12 NOTE — PROGRESS NOTES
Subjective:       Patient ID:  Verenice Mirza is a 62 y.o. female who presents for   Chief Complaint   Patient presents with    Skin Check     Pt here today for a TBSE. Pt c/o discoloration on right great toe x a few months. Pt hit her toe on a door frame.  Pt has hx of ak with focal transition to scc in situ. tx with efudex cream and repeat by dr bowers indicated all clear.           Review of Systems   Skin: Positive for activity-related sunscreen use. Negative for daily sunscreen use and recent sunburn.   Hematologic/Lymphatic: Does not bruise/bleed easily.        Objective:    Physical Exam   Constitutional: She appears well-developed and well-nourished. No distress.   Neurological: She is alert and oriented to person, place, and time. She is not disoriented.   Psychiatric: She has a normal mood and affect.   Skin:   Areas Examined (abnormalities noted in diagram):   Scalp / Hair Palpated and Inspected  Head / Face Inspection Performed  Neck Inspection Performed  Chest / Axilla Inspection Performed  Abdomen Inspection Performed  Back Inspection Performed  RUE Inspected  LUE Inspection Performed  Nails and Digits Inspection Performed                       Diagram Legend     Erythematous scaling macule/papule c/w actinic keratosis       Vascular papule c/w angioma      Pigmented verrucoid papule/plaque c/w seborrheic keratosis      Yellow umbilicated papule c/w sebaceous hyperplasia      Irregularly shaped tan macule c/w lentigo     1-2 mm smooth white papules consistent with Milia      Movable subcutaneous cyst with punctum c/w epidermal inclusion cyst      Subcutaneous movable cyst c/w pilar cyst      Firm pink to brown papule c/w dermatofibroma      Pedunculated fleshy papule(s) c/w skin tag(s)      Evenly pigmented macule c/w junctional nevus     Mildly variegated pigmented, slightly irregular-bordered macule c/w mildly atypical nevus      Flesh colored to evenly pigmented papule c/w intradermal nevus        Pink pearly papule/plaque c/w basal cell carcinoma      Erythematous hyperkeratotic cursted plaque c/w SCC      Surgical scar with no sign of skin cancer recurrence      Open and closed comedones      Inflammatory papules and pustules      Verrucoid papule consistent consistent with wart     Erythematous eczematous patches and plaques     Dystrophic onycholytic nail with subungual debris c/w onychomycosis     Umbilicated papule    Erythematous-base heme-crusted tan verrucoid plaque consistent with inflamed seborrheic keratosis     Erythematous Silvery Scaling Plaque c/w Psoriasis     See annotation      Assessment / Plan:        Angioma  These are benign vascular lesions that are inherited.  Treatment is not necessary.    Nevus  Discussed ABCDE's of nevi.  Monitor for new mole or moles that are becoming bigger, darker, irritated, or developing irregular borders. Brochure provided.    Lentigo  This is a benign hyperpigmented sun induced lesion. Daily sun protection will reduce the number of new lesions. Treatment of these benign lesions are considered cosmetic.  The nature of sun-induced photo-aging and skin cancers is discussed.  Sun avoidance, protective clothing, and the use of 30-SPF sunscreens is advised. Observe closely for skin damage/changes, and call if such occurs.    SK (seborrheic keratosis)  These are benign inherited growths without a malignant potential. Reassurance given to patient. No treatment is necessary.     Personal history of skin cancer  Scar  Pt with history of non melanoma skin cancer  Total body skin examination performed today including at least 12 points as noted in physical examination. No suspicious lesions noted.    Sebaceous hyperplasia  This is a common condition representing benign enlargement of the sebaceous lobule. It typically occurs in adulthood. Reassurance given to patient.      Subungual hematoma of great toe of right foot, initial encounter             Follow-up in about 6  months (around 9/12/2018) for recheck scalp .

## 2018-03-24 DIAGNOSIS — I10 ESSENTIAL HYPERTENSION: ICD-10-CM

## 2018-03-26 RX ORDER — RAMIPRIL 10 MG/1
CAPSULE ORAL
Qty: 90 CAPSULE | Refills: 3 | Status: SHIPPED | OUTPATIENT
Start: 2018-03-26 | End: 2018-04-18 | Stop reason: SDUPTHER

## 2018-04-09 DIAGNOSIS — I10 ESSENTIAL HYPERTENSION: ICD-10-CM

## 2018-04-09 DIAGNOSIS — E78.5 HYPERLIPIDEMIA, UNSPECIFIED HYPERLIPIDEMIA TYPE: ICD-10-CM

## 2018-04-09 RX ORDER — ATORVASTATIN CALCIUM 40 MG/1
TABLET, FILM COATED ORAL
Qty: 90 TABLET | Refills: 0 | Status: SHIPPED | OUTPATIENT
Start: 2018-04-09 | End: 2018-04-18 | Stop reason: SDUPTHER

## 2018-04-09 RX ORDER — OMEGA-3-ACID ETHYL ESTERS 1 G/1
CAPSULE, LIQUID FILLED ORAL
Qty: 360 CAPSULE | Refills: 1 | Status: SHIPPED | OUTPATIENT
Start: 2018-04-09 | End: 2018-10-07 | Stop reason: SDUPTHER

## 2018-04-09 RX ORDER — METOPROLOL SUCCINATE 50 MG/1
TABLET, EXTENDED RELEASE ORAL
Qty: 90 TABLET | Refills: 0 | Status: SHIPPED | OUTPATIENT
Start: 2018-04-09 | End: 2018-04-18 | Stop reason: SDUPTHER

## 2018-04-12 LAB
ALBUMIN SERPL-MCNC: 4.3 G/DL (ref 3.6–5.1)
ALBUMIN/GLOB SERPL: 1.7 (CALC) (ref 1–2.5)
ALP SERPL-CCNC: 85 U/L (ref 33–130)
ALT SERPL-CCNC: 35 U/L (ref 6–29)
AST SERPL-CCNC: 17 U/L (ref 10–35)
BILIRUB SERPL-MCNC: 0.4 MG/DL (ref 0.2–1.2)
BUN SERPL-MCNC: 17 MG/DL (ref 7–25)
BUN/CREAT SERPL: ABNORMAL (CALC) (ref 6–22)
CALCIUM SERPL-MCNC: 9.8 MG/DL (ref 8.6–10.4)
CHLORIDE SERPL-SCNC: 107 MMOL/L (ref 98–110)
CHOLEST SERPL-MCNC: 144 MG/DL
CHOLEST/HDLC SERPL: 3 (CALC)
CO2 SERPL-SCNC: 28 MMOL/L (ref 20–31)
CREAT SERPL-MCNC: 0.81 MG/DL (ref 0.5–0.99)
GFR SERPL CREATININE-BSD FRML MDRD: 77 ML/MIN/1.73M2
GLOBULIN SER CALC-MCNC: 2.5 G/DL (CALC) (ref 1.9–3.7)
GLUCOSE SERPL-MCNC: 118 MG/DL (ref 65–99)
HDLC SERPL-MCNC: 48 MG/DL
LDLC SERPL CALC-MCNC: 74 MG/DL (CALC)
NONHDLC SERPL-MCNC: 96 MG/DL (CALC)
POTASSIUM SERPL-SCNC: 4.5 MMOL/L (ref 3.5–5.3)
PROT SERPL-MCNC: 6.8 G/DL (ref 6.1–8.1)
SODIUM SERPL-SCNC: 142 MMOL/L (ref 135–146)
TRIGL SERPL-MCNC: 138 MG/DL
TSH SERPL-ACNC: 1.47 MIU/L (ref 0.4–4.5)

## 2018-04-18 ENCOUNTER — OFFICE VISIT (OUTPATIENT)
Dept: INTERNAL MEDICINE | Facility: CLINIC | Age: 63
End: 2018-04-18
Payer: COMMERCIAL

## 2018-04-18 VITALS
SYSTOLIC BLOOD PRESSURE: 131 MMHG | HEART RATE: 60 BPM | HEIGHT: 62 IN | BODY MASS INDEX: 44.22 KG/M2 | WEIGHT: 240.31 LBS | DIASTOLIC BLOOD PRESSURE: 74 MMHG

## 2018-04-18 DIAGNOSIS — L65.8 FEMALE PATTERN BALDNESS: Primary | ICD-10-CM

## 2018-04-18 DIAGNOSIS — E03.4 HYPOTHYROIDISM DUE TO ACQUIRED ATROPHY OF THYROID: ICD-10-CM

## 2018-04-18 DIAGNOSIS — I10 ESSENTIAL HYPERTENSION: ICD-10-CM

## 2018-04-18 DIAGNOSIS — Z12.39 BREAST CANCER SCREENING: ICD-10-CM

## 2018-04-18 DIAGNOSIS — R73.9 HYPERGLYCEMIA: ICD-10-CM

## 2018-04-18 DIAGNOSIS — E78.5 HYPERLIPIDEMIA, UNSPECIFIED HYPERLIPIDEMIA TYPE: ICD-10-CM

## 2018-04-18 PROBLEM — R05.9 COUGH: Status: RESOLVED | Noted: 2017-07-17 | Resolved: 2018-04-18

## 2018-04-18 PROBLEM — R06.2 WHEEZING: Status: RESOLVED | Noted: 2017-07-17 | Resolved: 2018-04-18

## 2018-04-18 PROCEDURE — 3078F DIAST BP <80 MM HG: CPT | Mod: CPTII,S$GLB,, | Performed by: INTERNAL MEDICINE

## 2018-04-18 PROCEDURE — 3075F SYST BP GE 130 - 139MM HG: CPT | Mod: CPTII,S$GLB,, | Performed by: INTERNAL MEDICINE

## 2018-04-18 PROCEDURE — 99999 PR PBB SHADOW E&M-EST. PATIENT-LVL III: CPT | Mod: PBBFAC,,, | Performed by: INTERNAL MEDICINE

## 2018-04-18 PROCEDURE — 99214 OFFICE O/P EST MOD 30 MIN: CPT | Mod: S$GLB,,, | Performed by: INTERNAL MEDICINE

## 2018-04-18 RX ORDER — LEVOTHYROXINE SODIUM 88 UG/1
88 TABLET ORAL
Qty: 90 TABLET | Refills: 3 | Status: SHIPPED | OUTPATIENT
Start: 2018-04-18 | End: 2018-10-22 | Stop reason: SDUPTHER

## 2018-04-18 RX ORDER — METOPROLOL SUCCINATE 50 MG/1
50 TABLET, EXTENDED RELEASE ORAL DAILY
Qty: 90 TABLET | Refills: 3 | Status: SHIPPED | OUTPATIENT
Start: 2018-04-18 | End: 2018-10-22 | Stop reason: SDUPTHER

## 2018-04-18 RX ORDER — FINASTERIDE 5 MG/1
5 TABLET, FILM COATED ORAL DAILY
Qty: 90 TABLET | Refills: 3 | Status: SHIPPED | OUTPATIENT
Start: 2018-04-18 | End: 2018-10-22 | Stop reason: SDUPTHER

## 2018-04-18 RX ORDER — RAMIPRIL 10 MG/1
10 CAPSULE ORAL DAILY
Qty: 90 CAPSULE | Refills: 3 | Status: SHIPPED | OUTPATIENT
Start: 2018-04-18 | End: 2018-10-22 | Stop reason: SDUPTHER

## 2018-04-18 RX ORDER — SPIRONOLACTONE 50 MG/1
50 TABLET, FILM COATED ORAL DAILY
Qty: 90 TABLET | Refills: 3 | Status: SHIPPED | OUTPATIENT
Start: 2018-04-18 | End: 2018-10-22 | Stop reason: SDUPTHER

## 2018-04-18 RX ORDER — ATORVASTATIN CALCIUM 40 MG/1
40 TABLET, FILM COATED ORAL DAILY
Qty: 90 TABLET | Refills: 3 | Status: SHIPPED | OUTPATIENT
Start: 2018-04-18 | End: 2018-10-22 | Stop reason: SDUPTHER

## 2018-04-19 NOTE — PROGRESS NOTES
Subjective:       Patient ID: Verenice Mirza is a 62 y.o. female.    Chief Complaint: Follow-up (blood work results)    HPI62 yo female presents to clinic for follow-up.  Patient with HTN, hyperlipidemia, Hypothyroidism.  Patient compliant with meds and no side effects. Due for mammogram. Patient has borderline blood sugar. Patient interested in genetic testing per Dr. House. Will research and get back with patient.  Review of Systems  Otherwise negative  Objective:      Physical Exam   Constitutional: She is oriented to person, place, and time. She appears well-developed and well-nourished.   HENT:   Head: Normocephalic.   Right Ear: External ear normal.   Left Ear: External ear normal.   Nose: Nose normal.   Mouth/Throat: Oropharynx is clear and moist.   Eyes: Conjunctivae and EOM are normal. Pupils are equal, round, and reactive to light.   Neck: Normal range of motion. Neck supple.   Cardiovascular: Normal rate, regular rhythm, normal heart sounds and intact distal pulses.    No murmur heard.  Pulmonary/Chest: Effort normal and breath sounds normal.   Abdominal: Soft. Bowel sounds are normal.   Musculoskeletal: She exhibits no edema.   Neurological: She is alert and oriented to person, place, and time.       Assessment:       1. 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    2. Essential hypertension    3. Hypothyroidism due to acquired atrophy of thyroid    4. Hyperlipidemia, unspecified hyperlipidemia type    5. Hyperglycemia    6. Breast cancer screening        Plan:       Verenice was seen today for follow-up.    Diagnoses and all orders for this visit:    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  -     spironolactone (ALDACTONE) 50 MG tablet; Take 1 tablet (50 mg total) by mouth once daily.  -     finasteride (PROSCAR) 5 mg tablet; Take 1 tablet (5 mg total) by mouth once daily.  Taking over her derm regimen.  Doing well with improvement.  Derm Sierra Ruiz  Essential hypertension  -     ramipril (ALTACE) 10 MG capsule; Take 1 capsule (10 mg total)  by mouth once daily.  -     metoprolol succinate (TOPROL-XL) 50 MG 24 hr tablet; Take 1 tablet (50 mg total) by mouth once daily.  Controlled continue regimen  Hypothyroidism due to acquired atrophy of thyroid  -     levothyroxine (SYNTHROID) 88 MCG tablet; Take 1 tablet (88 mcg total) by mouth before breakfast.  Controlled continue regimen  Hyperlipidemia, unspecified hyperlipidemia type  -     atorvastatin (LIPITOR) 40 MG tablet; Take 1 tablet (40 mg total) by mouth once daily.  Controlled continue regimen  Hyperglycemia   recommend goal short term of 15 pounds weight loss low sugar and starch  Breast cancer screening  -     Mammo Digital Screening Bilat with CAD; Future    Other orders  -     ranitidine (ZANTAC) 150 MG tablet; Take 1 tablet (150 mg total) by mouth 2 (two) times daily.

## 2018-04-20 ENCOUNTER — PATIENT MESSAGE (OUTPATIENT)
Dept: INTERNAL MEDICINE | Facility: CLINIC | Age: 63
End: 2018-04-20

## 2018-04-20 LAB — HBA1C MFR BLD: 6.3 % OF TOTAL HGB

## 2018-04-27 ENCOUNTER — PATIENT MESSAGE (OUTPATIENT)
Dept: INTERNAL MEDICINE | Facility: CLINIC | Age: 63
End: 2018-04-27

## 2018-04-30 DIAGNOSIS — Z12.39 BREAST CANCER SCREENING: Primary | ICD-10-CM

## 2018-06-28 ENCOUNTER — HOSPITAL ENCOUNTER (OUTPATIENT)
Dept: RADIOLOGY | Facility: HOSPITAL | Age: 63
Discharge: HOME | End: 2018-06-28
Attending: INTERNAL MEDICINE
Payer: COMMERCIAL

## 2018-06-28 VITALS — HEIGHT: 62 IN | WEIGHT: 240 LBS | BODY MASS INDEX: 44.16 KG/M2

## 2018-06-28 DIAGNOSIS — Z87.898 HX OF ABNORMAL MAMMOGRAM: ICD-10-CM

## 2018-06-28 DIAGNOSIS — Z12.39 BREAST CANCER SCREENING: ICD-10-CM

## 2018-06-28 PROCEDURE — 77066 DX MAMMO INCL CAD BI: CPT | Mod: 26,,, | Performed by: RADIOLOGY

## 2018-06-28 PROCEDURE — 77066 DX MAMMO INCL CAD BI: CPT | Mod: TC,PO

## 2018-06-28 PROCEDURE — 77062 BREAST TOMOSYNTHESIS BI: CPT | Mod: 26,,, | Performed by: RADIOLOGY

## 2018-09-20 ENCOUNTER — PATIENT MESSAGE (OUTPATIENT)
Dept: INTERNAL MEDICINE | Facility: CLINIC | Age: 63
End: 2018-09-20

## 2018-09-27 DIAGNOSIS — Z00.00 ROUTINE GENERAL MEDICAL EXAMINATION AT A HEALTH CARE FACILITY: Primary | ICD-10-CM

## 2018-09-30 ENCOUNTER — PATIENT MESSAGE (OUTPATIENT)
Dept: INTERNAL MEDICINE | Facility: CLINIC | Age: 63
End: 2018-09-30

## 2018-10-07 DIAGNOSIS — E78.5 HYPERLIPIDEMIA, UNSPECIFIED HYPERLIPIDEMIA TYPE: ICD-10-CM

## 2018-10-08 RX ORDER — OMEGA-3-ACID ETHYL ESTERS 1 G/1
CAPSULE, LIQUID FILLED ORAL
Qty: 360 CAPSULE | Refills: 0 | Status: SHIPPED | OUTPATIENT
Start: 2018-10-08 | End: 2018-10-22 | Stop reason: SDUPTHER

## 2018-10-17 LAB
ALBUMIN SERPL-MCNC: 4.4 G/DL (ref 3.6–5.1)
ALBUMIN/GLOB SERPL: 1.8 (CALC) (ref 1–2.5)
ALBUMIN/GLOB SERPL: 1.8 (CALC) (ref 1–2.5)
ALP SERPL-CCNC: 93 U/L (ref 33–130)
ALP SERPL-CCNC: 93 U/L (ref 33–130)
ALT SERPL-CCNC: 58 U/L (ref 6–29)
ALT SERPL-CCNC: 58 U/L (ref 6–29)
AST SERPL-CCNC: 30 U/L (ref 10–35)
AST SERPL-CCNC: 30 U/L (ref 10–35)
BASOPHILS # BLD AUTO: 39 CELLS/UL (ref 0–200)
BASOPHILS NFR BLD AUTO: 0.8 %
BILIRUB DIRECT SERPL-MCNC: 0.1 MG/DL
BILIRUB INDIRECT SERPL-MCNC: 0.2 MG/DL (CALC) (ref 0.2–1.2)
BILIRUB SERPL-MCNC: 0.3 MG/DL (ref 0.2–1.2)
BILIRUB SERPL-MCNC: 0.3 MG/DL (ref 0.2–1.2)
BUN SERPL-MCNC: 19 MG/DL (ref 7–25)
BUN SERPL-MCNC: 19 MG/DL (ref 7–25)
BUN/CREAT SERPL: ABNORMAL (CALC) (ref 6–22)
BUN/CREAT SERPL: ABNORMAL (CALC) (ref 6–22)
CALCIUM SERPL-MCNC: 9.6 MG/DL (ref 8.6–10.4)
CALCIUM SERPL-MCNC: 9.6 MG/DL (ref 8.6–10.4)
CHLORIDE SERPL-SCNC: 104 MMOL/L (ref 98–110)
CHLORIDE SERPL-SCNC: 104 MMOL/L (ref 98–110)
CHOLEST SERPL-MCNC: 95 MG/DL
CHOLEST/HDLC SERPL: 2.9 (CALC)
CO2 SERPL-SCNC: 24 MMOL/L (ref 20–32)
CO2 SERPL-SCNC: 24 MMOL/L (ref 20–32)
CREAT SERPL-MCNC: 0.71 MG/DL (ref 0.5–0.99)
CREAT SERPL-MCNC: 0.71 MG/DL (ref 0.5–0.99)
EOSINOPHIL # BLD AUTO: 299 CELLS/UL (ref 15–500)
EOSINOPHIL NFR BLD AUTO: 6.1 %
ERYTHROCYTE [DISTWIDTH] IN BLOOD BY AUTOMATED COUNT: 13.3 % (ref 11–15)
GFR SERPL CREATININE-BSD FRML MDRD: 90 ML/MIN/1.73M2
GFR SERPL CREATININE-BSD FRML MDRD: 90 ML/MIN/1.73M2
GLOBULIN SER CALC-MCNC: 2.5 G/DL (CALC) (ref 1.9–3.7)
GLOBULIN SER CALC-MCNC: 2.5 G/DL (CALC) (ref 1.9–3.7)
GLUCOSE SERPL-MCNC: 101 MG/DL (ref 65–99)
GLUCOSE SERPL-MCNC: 101 MG/DL (ref 65–99)
HBA1C MFR BLD: 6.4 % OF TOTAL HGB
HCT VFR BLD AUTO: 43.5 % (ref 35–45)
HDLC SERPL-MCNC: 33 MG/DL
HGB BLD-MCNC: 14.4 G/DL (ref 11.7–15.5)
LDLC SERPL CALC-MCNC: 46 MG/DL (CALC)
LYMPHOCYTES # BLD AUTO: 1465 CELLS/UL (ref 850–3900)
LYMPHOCYTES NFR BLD AUTO: 29.9 %
MCH RBC QN AUTO: 29.7 PG (ref 27–33)
MCHC RBC AUTO-ENTMCNC: 33.1 G/DL (ref 32–36)
MCV RBC AUTO: 89.7 FL (ref 80–100)
MONOCYTES # BLD AUTO: 402 CELLS/UL (ref 200–950)
MONOCYTES NFR BLD AUTO: 8.2 %
NEUTROPHILS # BLD AUTO: 2695 CELLS/UL (ref 1500–7800)
NEUTROPHILS NFR BLD AUTO: 55 %
NONHDLC SERPL-MCNC: 62 MG/DL (CALC)
PHOSPHATE SERPL-MCNC: 3.3 MG/DL (ref 2.5–4.5)
PLATELET # BLD AUTO: 225 THOUSAND/UL (ref 140–400)
PMV BLD REES-ECKER: 11.5 FL (ref 7.5–12.5)
POTASSIUM SERPL-SCNC: 4.7 MMOL/L (ref 3.5–5.3)
POTASSIUM SERPL-SCNC: 4.7 MMOL/L (ref 3.5–5.3)
PROT SERPL-MCNC: 6.9 G/DL (ref 6.1–8.1)
PROT SERPL-MCNC: 6.9 G/DL (ref 6.1–8.1)
RBC # BLD AUTO: 4.85 MILLION/UL (ref 3.8–5.1)
SODIUM SERPL-SCNC: 140 MMOL/L (ref 135–146)
SODIUM SERPL-SCNC: 140 MMOL/L (ref 135–146)
TRIGL SERPL-MCNC: 81 MG/DL
TSH SERPL-ACNC: 0.47 MIU/L (ref 0.4–4.5)
WBC # BLD AUTO: 4.9 THOUSAND/UL (ref 3.8–10.8)

## 2018-10-22 ENCOUNTER — OFFICE VISIT (OUTPATIENT)
Dept: INTERNAL MEDICINE | Facility: CLINIC | Age: 63
End: 2018-10-22
Payer: COMMERCIAL

## 2018-10-22 ENCOUNTER — HOSPITAL ENCOUNTER (OUTPATIENT)
Dept: RADIOLOGY | Facility: HOSPITAL | Age: 63
Discharge: HOME | End: 2018-10-22
Attending: INTERNAL MEDICINE
Payer: COMMERCIAL

## 2018-10-22 VITALS
BODY MASS INDEX: 43.94 KG/M2 | HEIGHT: 62 IN | HEART RATE: 64 BPM | SYSTOLIC BLOOD PRESSURE: 130 MMHG | DIASTOLIC BLOOD PRESSURE: 60 MMHG | WEIGHT: 238.75 LBS

## 2018-10-22 DIAGNOSIS — E66.01 MORBID OBESITY WITH BMI OF 40.0-44.9, ADULT: ICD-10-CM

## 2018-10-22 DIAGNOSIS — M54.50 CHRONIC RIGHT-SIDED LOW BACK PAIN WITHOUT SCIATICA: ICD-10-CM

## 2018-10-22 DIAGNOSIS — L65.8 FEMALE PATTERN BALDNESS: ICD-10-CM

## 2018-10-22 DIAGNOSIS — R74.01 TRANSAMINASEMIA: ICD-10-CM

## 2018-10-22 DIAGNOSIS — G89.29 CHRONIC RIGHT-SIDED LOW BACK PAIN WITHOUT SCIATICA: ICD-10-CM

## 2018-10-22 DIAGNOSIS — K21.9 GASTROESOPHAGEAL REFLUX DISEASE WITHOUT ESOPHAGITIS: ICD-10-CM

## 2018-10-22 DIAGNOSIS — K44.9 HIATAL HERNIA: ICD-10-CM

## 2018-10-22 DIAGNOSIS — E03.4 HYPOTHYROIDISM DUE TO ACQUIRED ATROPHY OF THYROID: ICD-10-CM

## 2018-10-22 DIAGNOSIS — M62.838 MUSCLE SPASM: ICD-10-CM

## 2018-10-22 DIAGNOSIS — L71.9 ROSACEA: ICD-10-CM

## 2018-10-22 DIAGNOSIS — Z91.89 INCREASED RISK OF BREAST CANCER: ICD-10-CM

## 2018-10-22 DIAGNOSIS — I10 ESSENTIAL HYPERTENSION: ICD-10-CM

## 2018-10-22 DIAGNOSIS — Z00.00 PREVENTATIVE HEALTH CARE: Primary | ICD-10-CM

## 2018-10-22 DIAGNOSIS — E78.5 HYPERLIPIDEMIA, UNSPECIFIED HYPERLIPIDEMIA TYPE: ICD-10-CM

## 2018-10-22 PROCEDURE — 3078F DIAST BP <80 MM HG: CPT | Mod: CPTII,S$GLB,, | Performed by: INTERNAL MEDICINE

## 2018-10-22 PROCEDURE — 72100 X-RAY EXAM L-S SPINE 2/3 VWS: CPT | Mod: TC,FY,PO

## 2018-10-22 PROCEDURE — 99396 PREV VISIT EST AGE 40-64: CPT | Mod: S$GLB,,, | Performed by: INTERNAL MEDICINE

## 2018-10-22 PROCEDURE — 99999 PR PBB SHADOW E&M-EST. PATIENT-LVL V: CPT | Mod: PBBFAC,,, | Performed by: INTERNAL MEDICINE

## 2018-10-22 PROCEDURE — 99212 OFFICE O/P EST SF 10 MIN: CPT | Mod: 25,S$GLB,, | Performed by: INTERNAL MEDICINE

## 2018-10-22 PROCEDURE — 72100 X-RAY EXAM L-S SPINE 2/3 VWS: CPT | Mod: 26,,, | Performed by: RADIOLOGY

## 2018-10-22 PROCEDURE — 3008F BODY MASS INDEX DOCD: CPT | Mod: CPTII,S$GLB,, | Performed by: INTERNAL MEDICINE

## 2018-10-22 PROCEDURE — 3075F SYST BP GE 130 - 139MM HG: CPT | Mod: CPTII,S$GLB,, | Performed by: INTERNAL MEDICINE

## 2018-10-22 RX ORDER — SPIRONOLACTONE 50 MG/1
50 TABLET, FILM COATED ORAL DAILY
Qty: 90 TABLET | Refills: 3 | Status: SHIPPED | OUTPATIENT
Start: 2018-10-22 | End: 2019-05-10 | Stop reason: SDUPTHER

## 2018-10-22 RX ORDER — METOPROLOL SUCCINATE 50 MG/1
50 TABLET, EXTENDED RELEASE ORAL DAILY
Qty: 90 TABLET | Refills: 3 | Status: SHIPPED | OUTPATIENT
Start: 2018-10-22 | End: 2019-05-10 | Stop reason: SDUPTHER

## 2018-10-22 RX ORDER — ATORVASTATIN CALCIUM 40 MG/1
40 TABLET, FILM COATED ORAL DAILY
Qty: 90 TABLET | Refills: 3 | Status: SHIPPED | OUTPATIENT
Start: 2018-10-22 | End: 2019-05-10 | Stop reason: SDUPTHER

## 2018-10-22 RX ORDER — RAMIPRIL 10 MG/1
10 CAPSULE ORAL DAILY
Qty: 90 CAPSULE | Refills: 3 | Status: SHIPPED | OUTPATIENT
Start: 2018-10-22 | End: 2019-05-10 | Stop reason: SDUPTHER

## 2018-10-22 RX ORDER — LEVOTHYROXINE SODIUM 88 UG/1
88 TABLET ORAL
Qty: 90 TABLET | Refills: 3 | Status: SHIPPED | OUTPATIENT
Start: 2018-10-22 | End: 2019-05-10 | Stop reason: SDUPTHER

## 2018-10-22 RX ORDER — FINASTERIDE 5 MG/1
5 TABLET, FILM COATED ORAL DAILY
Qty: 90 TABLET | Refills: 3 | Status: SHIPPED | OUTPATIENT
Start: 2018-10-22 | End: 2019-05-10 | Stop reason: SDUPTHER

## 2018-10-22 RX ORDER — OMEGA-3-ACID ETHYL ESTERS 1 G/1
2 CAPSULE, LIQUID FILLED ORAL 2 TIMES DAILY
Qty: 360 CAPSULE | Refills: 1 | Status: SHIPPED | OUTPATIENT
Start: 2018-10-22 | End: 2019-05-10 | Stop reason: SDUPTHER

## 2018-10-22 RX ORDER — MELOXICAM 7.5 MG/1
7.5 TABLET ORAL DAILY PRN
Qty: 30 TABLET | Refills: 3 | Status: SHIPPED | OUTPATIENT
Start: 2018-10-22 | End: 2019-05-10 | Stop reason: SDUPTHER

## 2018-10-23 ENCOUNTER — PATIENT MESSAGE (OUTPATIENT)
Dept: INTERNAL MEDICINE | Facility: CLINIC | Age: 63
End: 2018-10-23

## 2018-10-23 ENCOUNTER — TELEPHONE (OUTPATIENT)
Dept: INTERNAL MEDICINE | Facility: CLINIC | Age: 63
End: 2018-10-23

## 2018-10-23 NOTE — TELEPHONE ENCOUNTER
----- Message from Bruce Reid sent at 10/23/2018  8:18 AM CDT -----  Contact: self/[phone]  Patient called to speak with you about her flu shot that was forgotten on yesterday.    Please call and advise.

## 2018-10-24 ENCOUNTER — PATIENT MESSAGE (OUTPATIENT)
Dept: INTERNAL MEDICINE | Facility: CLINIC | Age: 63
End: 2018-10-24

## 2018-10-24 NOTE — PROGRESS NOTES
Subjective:       Patient ID: Verenice Mirza is a 63 y.o. female.    Chief Complaint:  Low back pain liver enzyme elevation    HPI 63-year-old female presents to clinic today she has had a history of back arthritis she is having separate problems and pain in the right lower aspect of her back without sciatica it is really chronic in nature previous x-rays have determined arthritis their dated.  She is also has a mild elevation or transaminase level this has been intermittent really over the last 2-3 years.  She feels it may be related to medication.  Newer medications would be related to hair loss treatments  Review of Systems  see separate progress note same date of service  Objective:      Physical Exam  see separate progress note same date of service  Assessment:                               7. Muscle spasm    Verenice was seen today for annual exam.    Diagnoses and all orders for this visit:    Preventative health care    Increased risk of breast cancer  -     MRI Breast Bilateral W WO Contrast; Future    Hyperlipidemia, unspecified hyperlipidemia type  -     atorvastatin (LIPITOR) 40 MG tablet; Take 1 tablet (40 mg total) by mouth once daily.  -     omega-3 acid ethyl esters (LOVAZA) 1 gram capsule; Take 2 capsules (2 g total) by mouth 2 (two) times daily.  -     Comprehensive metabolic panel; Future  -     Lipid panel; Future  -     Comprehensive metabolic panel  -     Lipid panel    Rosacea  -     azelaic acid (FINACEA) 15 % Foam; Apply thin film to face qhs    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  -     finasteride (PROSCAR) 5 mg tablet; Take 1 tablet (5 mg total) by mouth once daily.  -     spironolactone (ALDACTONE) 50 MG tablet; Take 1 tablet (50 mg total) by mouth once daily.    Hypothyroidism due to acquired atrophy of thyroid  -     levothyroxine (SYNTHROID) 88 MCG tablet; Take 1 tablet (88 mcg total) by mouth before breakfast.  -     TSH; Future  -     TSH    Muscle spasm  -     meloxicam (MOBIC) 7.5 MG tablet;  Take 1 tablet (7.5 mg total) by mouth daily as needed for Pain.    Essential hypertension  -     metoprolol succinate (TOPROL-XL) 50 MG 24 hr tablet; Take 1 tablet (50 mg total) by mouth once daily.  -     ramipril (ALTACE) 10 MG capsule; Take 1 capsule (10 mg total) by mouth once daily.    Gastroesophageal reflux disease without esophagitis  -     ranitidine (ZANTAC) 150 MG tablet; Take 1 tablet (150 mg total) by mouth 2 (two) times daily.    Morbid obesity with BMI of 40.0-44.9, adult    Hiatal hernia  -     Ambulatory referral to General Surgery    Chronic right-sided low back pain without sciatica  -     X-Ray Lumbar Spine AP And Lateral; Future  -     Ambulatory consult to Physical Therapy    Transaminasemia                    12. Chronic right-sided low back pain without sciatica    13. Transaminasemia        Plan:       Verenice was seen today  Chronic right-sided low back pain without sciatica  -     X-Ray Lumbar Spine AP And Lateral; Future  -     Ambulatory consult to Physical Therapy  Discussed conservative treatment options of above if protracted consider seeing pain management or spine specialist.  Transaminasemia  Consider possible medication relation given there has been an intermittent up and down.  If progressive after the next follow-up consider further workup and ultrasound.  Discussed differential diagnosis ranging from fatty liver to medication-related affects to other.

## 2018-10-24 NOTE — PROGRESS NOTES
Subjective:       Patient ID: Verenice Mirza is a 63 y.o. female.    Chief Complaint: Annual Exam    HPI 63-year-old female presents to clinic today for annual physical exam and follow-up of dyslipidemia hypertension hypothyroidism.  Patient is in the process of seeing an ENT doctor and Sleep Medicine.  Patient has had persistent pain in her right lower back with known history of arthritis it is bothersome for her at this time.  This will be addressed separately  Review of Systems  otherwise negative  Objective:      Physical Exam  General: Well-appearing, well-nourished.  No distress  HEENT: conjunctivae are normal.  Pupils are equal and reative to light.  TM's are clear and intact bilaterally.  Hearing is grossly normal.  Nasopharynx is clear.  Oropharynx is clear.  Neck: Supple.  No thyroid megaly.  No bruits.  Lymph: No cervical or supraclavicular adenopathy.  Heart: Regular rate and rhythm, without murmur, rub or gallop.  Lungs: Clear to auscultation; respiratory effort normal.  Abdomen: Soft, nontender, nondistended.  Normoactive bowel sounds.  No hepatomegaly.  No masses.  Extremities: Good distal pulses.  No edema.  Psych: Oriented to time person place.  Judgment and insight seem unimpaired.  Mood and affect are appropriate.  Breasts symmetric nontender no masses or discharge  Assessment:       1. Preventative health care    2. Increased risk of breast cancer    3. Hyperlipidemia, unspecified hyperlipidemia type    4. Rosacea    5. 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 Hypothyroidism due to acquired atrophy of thyroid    7. Muscle spasm    8. Essential hypertension    9. Gastroesophageal reflux disease without esophagitis    10. Morbid obesity with BMI of 40.0-44.9, adult    11. Hiatal hernia    12. Chronic right-sided low back pain without sciatica    13. Transaminasemia        Plan:       Verenice was seen today for annual exam.    Diagnoses and all orders for this visit:    Preventative health care  Performed  reviewed and updated today  Increased risk of breast cancer  -     MRI Breast Bilateral W WO Contrast; Future    Hyperlipidemia, unspecified hyperlipidemia type  -     atorvastatin (LIPITOR) 40 MG tablet; Take 1 tablet (40 mg total) by mouth once daily.  -     omega-3 acid ethyl esters (LOVAZA) 1 gram capsule; Take 2 capsules (2 g total) by mouth 2 (two) times daily.  -     Comprehensive metabolic panel; Future  -     Lipid panel; Future  -     Comprehensive metabolic panel  -     Lipid panel  Controlled.  Continue current medical regimen.  Prescription refills addressed.  Followup advised. See after visit summary.  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  -     spironolactone (ALDACTONE) 50 MG tablet; Take 1 tablet (50 mg total) by mouth once daily.  Originally prescribed by cysts dermatologist hair specialist  Hypothyroidism due to acquired atrophy of thyroid  -     levothyroxine (SYNTHROID) 88 MCG tablet; Take 1 tablet (88 mcg total) by mouth before breakfast.  -     TSH; Future  -     TSH  Controlled.  Continue current medical regimen.  Prescription refills addressed.  Followup advised. See after visit summary.  Muscle spasm  -     meloxicam (MOBIC) 7.5 MG tablet; Take 1 tablet (7.5 mg total) by mouth daily as needed for Pain.    Essential hypertension  -     metoprolol succinate (TOPROL-XL) 50 MG 24 hr tablet; Take 1 tablet (50 mg total) by mouth once daily.  -     ramipril (ALTACE) 10 MG capsule; Take 1 capsule (10 mg total) by mouth once daily.    Gastroesophageal reflux disease without esophagitis  -     ranitidine (ZANTAC) 150 MG tablet; Take 1 tablet (150 mg total) by mouth 2 (two) times daily.    Morbid obesity with BMI of 40.0-44.9, adult  Continued efforts at weight loss  Hiatal hernia  -     Ambulatory referral to General Surgery    Chronic  right-sided low back pain without sciatica  -     X-Ray Lumbar Spine AP And Lateral; Future  -     Ambulatory consult to Physical Therapy    Transaminasemia  Very slight intermittent elevation.  Discuss associated possibly with medication versus weight.  Will continue to clinically monitor if there is any persistent elevation of progression proceed with further workup.

## 2018-10-25 ENCOUNTER — IMMUNIZATION (OUTPATIENT)
Dept: FAMILY MEDICINE | Facility: CLINIC | Age: 63
End: 2018-10-25
Payer: COMMERCIAL

## 2018-10-25 DIAGNOSIS — Z23 FLU VACCINE NEED: Primary | ICD-10-CM

## 2018-10-25 PROCEDURE — 90471 IMMUNIZATION ADMIN: CPT | Mod: S$GLB,,, | Performed by: FAMILY MEDICINE

## 2018-10-25 PROCEDURE — 90686 IIV4 VACC NO PRSV 0.5 ML IM: CPT | Mod: S$GLB,,, | Performed by: FAMILY MEDICINE

## 2018-11-05 ENCOUNTER — HOSPITAL ENCOUNTER (OUTPATIENT)
Dept: RADIOLOGY | Facility: HOSPITAL | Age: 63
Discharge: HOME | End: 2018-11-05
Attending: INTERNAL MEDICINE
Payer: COMMERCIAL

## 2018-11-05 DIAGNOSIS — Z91.89 INCREASED RISK OF BREAST CANCER: ICD-10-CM

## 2018-11-06 ENCOUNTER — PATIENT MESSAGE (OUTPATIENT)
Dept: GASTROENTEROLOGY | Facility: CLINIC | Age: 63
End: 2018-11-06

## 2018-11-06 ENCOUNTER — PATIENT MESSAGE (OUTPATIENT)
Dept: INTERNAL MEDICINE | Facility: CLINIC | Age: 63
End: 2018-11-06

## 2018-11-09 ENCOUNTER — PATIENT MESSAGE (OUTPATIENT)
Dept: INTERNAL MEDICINE | Facility: CLINIC | Age: 63
End: 2018-11-09

## 2018-11-13 ENCOUNTER — OFFICE VISIT (OUTPATIENT)
Dept: GASTROENTEROLOGY | Facility: CLINIC | Age: 63
End: 2018-11-13
Payer: COMMERCIAL

## 2018-11-13 VITALS
SYSTOLIC BLOOD PRESSURE: 130 MMHG | DIASTOLIC BLOOD PRESSURE: 69 MMHG | BODY MASS INDEX: 44.59 KG/M2 | WEIGHT: 242.31 LBS | HEIGHT: 62 IN

## 2018-11-13 DIAGNOSIS — K21.9 GASTROESOPHAGEAL REFLUX DISEASE, ESOPHAGITIS PRESENCE NOT SPECIFIED: ICD-10-CM

## 2018-11-13 DIAGNOSIS — R13.10 DYSPHAGIA, UNSPECIFIED TYPE: Primary | ICD-10-CM

## 2018-11-13 PROCEDURE — 3008F BODY MASS INDEX DOCD: CPT | Mod: CPTII,S$GLB,, | Performed by: NURSE PRACTITIONER

## 2018-11-13 PROCEDURE — 99999 PR PBB SHADOW E&M-EST. PATIENT-LVL III: CPT | Mod: PBBFAC,,, | Performed by: NURSE PRACTITIONER

## 2018-11-13 PROCEDURE — 3075F SYST BP GE 130 - 139MM HG: CPT | Mod: CPTII,S$GLB,, | Performed by: NURSE PRACTITIONER

## 2018-11-13 PROCEDURE — 99213 OFFICE O/P EST LOW 20 MIN: CPT | Mod: S$GLB,,, | Performed by: NURSE PRACTITIONER

## 2018-11-13 PROCEDURE — 3078F DIAST BP <80 MM HG: CPT | Mod: CPTII,S$GLB,, | Performed by: NURSE PRACTITIONER

## 2018-11-13 NOTE — PATIENT INSTRUCTIONS
EGD Prep Instructions    [facility] [address] Clinic Office [phone]  Endoscopy Lab [phone]    You are scheduled for an EGD with Dr. Bunch on 12-5-18 at [facility].  You will check in at Admit on the first floor of the hospital.    Nothing to eat of drink after midnight before the procedure.  You MAY brush your teeth.    You MAY take your blood pressure, heart, and seizure medication on the morning of the procedure, with a SIP of water.  Hold ALL other medications until after the procedure.    If you are on blood thinners THAT YOU HAVE BEEN INSTRUCTED TO HOLD BY YOUR DOCTOR FOR THIS PROCEDURE, then do NOT take this the morning of your EGD.  Do NOT stop these medications on your own, they must be approved to be held by your doctor.  Your EGD can NOT be done if you are on these medications.  Examples of blood thinners include: Coumadin, Aggrenox, Plavix, Pradaxa, Reapro, Pletal, Xarelto, Ticagrelor, Brilinta, Eliquis, and high dose aspirin (325 mg).  You do not have to stop baby aspirin 81 mg.    You will receive a call 2 days before your EGD to tell you the time to arrive.  If you have not received a call by the day before your procedure, call the Endoscopy Lab at [phone].

## 2018-11-13 NOTE — H&P (VIEW-ONLY)
Subjective:       Patient ID: Verenice Mirza is a 63 y.o. female.    Chief Complaint: Dysphagia    HPI  Presents with return of dysphagia.    She had EGD with dilation in 5/2017:  A hiatal hernia was present.       One mild benign-appearing, intrinsic stenosis was found at the        gastroesophageal junction. This measured less than one cm (in        length) and was traversed. A guidewire was placed and the scope was        withdrawn. Dilation was performed with a Savary dilator with mild        resistance at 16 mm.       The entire examined stomach was normal.       The examined duodenum was normal.    She had improvement in dysphagia until the last month.  She reports food will lodge in the esophagus but eventually pass though this is painful.    Using ranitidine BID with control of reflux but will have return of complaints if she does not use this.  Denies abdominal pain, nausea, vomiting, bowel changes, blood with bowel movements or black stools.     She had colonoscopy last year also.    She reports today that she is considering bariatric surgery.     Review of Systems   Constitutional: Negative for activity change, fatigue, fever and unexpected weight change.   HENT: Positive for trouble swallowing.    Respiratory: Negative for shortness of breath.    Gastrointestinal: Negative for abdominal pain, blood in stool, constipation, diarrhea, nausea and vomiting.   Skin: Negative.    Neurological: Negative.    Psychiatric/Behavioral: Negative.        Objective:      Physical Exam   Constitutional: She is oriented to person, place, and time. She appears well-developed and well-nourished. No distress.   Eyes: No scleral icterus.   Cardiovascular: Normal rate.   Pulmonary/Chest: Effort normal. No respiratory distress.   Abdominal: Soft.   Neurological: She is alert and oriented to person, place, and time.   Skin: Skin is warm and dry. She is not diaphoretic.   Psychiatric: She has a normal mood and affect. Her  behavior is normal. Judgment and thought content normal.   Vitals reviewed.      Assessment:       1. Dysphagia, unspecified type    2. Gastroesophageal reflux disease, esophagitis presence not specified        Plan:         Verenice was seen today for dysphagia.    Diagnoses and all orders for this visit:    Dysphagia, unspecified type  -     Case request GI: ESOPHAGOGASTRODUODENOSCOPY (EGD)    Gastroesophageal reflux disease, esophagitis presence not specified    I have explained the planned procedures to the patient.The risks, benefits and alternatives of the procedure were also explained in detail. Patient verbalized understanding, all questions were answered. The patient agrees to proceed as planned

## 2018-11-19 ENCOUNTER — CLINICAL SUPPORT (OUTPATIENT)
Dept: REHABILITATION | Facility: HOSPITAL | Age: 63
End: 2018-11-19
Payer: COMMERCIAL

## 2018-11-19 DIAGNOSIS — M53.86 DECREASED ROM OF LUMBAR SPINE: ICD-10-CM

## 2018-11-19 DIAGNOSIS — M54.50 CHRONIC RIGHT-SIDED LOW BACK PAIN WITHOUT SCIATICA: Primary | ICD-10-CM

## 2018-11-19 DIAGNOSIS — G89.29 CHRONIC RIGHT-SIDED LOW BACK PAIN WITHOUT SCIATICA: Primary | ICD-10-CM

## 2018-11-19 DIAGNOSIS — Z74.09 IMPAIRED MOBILITY: ICD-10-CM

## 2018-11-19 PROCEDURE — 97110 THERAPEUTIC EXERCISES: CPT | Mod: PN

## 2018-11-19 PROCEDURE — 97162 PT EVAL MOD COMPLEX 30 MIN: CPT | Mod: PN

## 2018-11-19 NOTE — PLAN OF CARE
TIME RECORD    Date: 11/19/2018    Start Time:  1417  Stop Time:  1500    PROCEDURES:    TIMED  Procedure Min.   TE 10                     UNTIMED  Procedure Min.   IE 33         Total Timed Minutes:  10  Total Timed Units:  1  Total Untimed Units:  1  Charges Billed/# of units:  MCE-1, TE-1    [facility]    PHYSICAL THERAPY EVALUATION  Onset Date: >1 year  Medical Diagnosis: M54.5,G89.29 (ICD-10-CM) - Chronic right-sided low back pain without sciatica  Treatment Diagnosis:   1. Chronic right-sided low back pain without sciatica     2. Impaired mobility     3. Decreased ROM of lumbar spine       Physician: Areli Crespo MD  Past Medical History:   Diagnosis Date    Breast cyst     Dysplastic nevus of trunk 03/2017    moderately atypical    Fatty liver disease, nonalcoholic     Fibrocystic breast     Hyperlipidemia     Hypertension     Hypothyroid     IGT (impaired glucose tolerance)     Kidney stones     Morbid obesity     Nicotine use disorder     JORDANA on CPAP     PAD (peripheral artery disease)     PVD (peripheral vascular disease)     Renal angiomyolipoma     Rosacea     Squamous cell carcinoma 12/2017    in situ mid scalp    Venous insufficiency     Vitamin D deficiency disease      Precautions: Standard  Prior Therapy: Yes  Medications: Verenice NATE Mirza has a current medication list which includes the following prescription(s): albuterol, alpha lipoic acid, aspirin, atorvastatin, azelaic acid, b complex vitamins, biotin, bromelains, cinnamon bark, co-enzyme q-10, finasteride, glucosamine/chondr tyler a sod, inhalation spacing device, ketoconazole, lactobacillus 3/fos/pantethine, levothyroxine, lutein, magnesium oxide, meloxicam, metoprolol succinate, milk thistle, mupirocin, omega-3 acid ethyl esters, psyllium, ramipril, ranitidine, spironolactone, turmeric, vitamin d, and tamsulosin.  Nutrition:  Obese  History of Present Illness: Chronic LBP  Prior Level of Function:  Independent  Social History: Retired  Place of Residence (Steps/Adaptations): No barriers  Functional Deficits Leading to Referral/Nature of Injury: Pain with standing and walking; pain with erect sitting; pain with sit>stands after prolonged sitting; slight bending when performing household tasks  Patient Therapy Goals: Decrease pain, improve walking distances    Subjective     Verenice Mirza reports chronic midline and R sided mid to low back pain. X-rays performed, revealing mild-moderate herniated disc in lower back and arthritis in lower back. Pt denies pain, numbness or tingling in either LE. Pt denies saddle paresthesia or bowel or bladder incontinence. Pt reports history of PAD which results in calf pain R>L with increased walking distances.    Pain:  Location: R side of lower back   Description: Stabbing  Activities Which Increase Pain: Slight lumbar flexion, lumbar rotation; standing > 15 minutes, walking > 15 minutes; rolling in bed; sitting erect without support  Activities Which Decrease Pain: Sitting with lumbar support; immobility  Pain Scale: 0/10 at best 0/10 now  7/10 at worst    Objective     Posture: Standing: R shoulder elevation; R scapular abduction; Dowager's hump  Palpation: TTP to B latissimus dorsi and quadratus lumborum; TTP to B lower thoracic and lumbar paraspinals; TTP to spinous processes of lumbar vertebrae  Range of Motion/Strength:      Lumbar AROM: Pain/Dysfunction with Movement:   Flexion Moderate loss, pain in midline and on both sides of lower thoracic spine   Extension Moderate loss, pain in midline and on both sides of lower thoracic spine   Right side bending Major loss, pain in R QL   Left side bending Moderate loss, no pain   Right rotation 25%, pain in R latissimus dorsi   Left rotation 50%, no pain     LE MMTs  Hip flexion: 4/5 B  Hip extension: 3+/5 B  Hip abudction: 4/5 B  Knee flexion: 5/5 B  Knee extension: 4+/5 R, 5/5 L  Ankle DF: 4+/5 B    Flexibility:  Prone knee  "bend: (-) B  9090 HS length: -10 degrees knee extension B  Impaired quadratus lumborum and latissimus dorsi flexibility B  Gait: no AD  Analysis: Decreased matt, wide RAY; B hip ER and toe out  Bed Mobility:Independent  Transfers: Independent  Special Tests: Slump (-) for concordant midline or R sided mid to lower back pain  SLR (-) for concordant midline or R sided mid to lower back pain    CMS Impairment/Limitation/Restriction for FOTO Lumbar Survey    Therapist reviewed FOTO scores for Verenice Mirza on 11/19/2018.   FOTO documents entered into [employer] - see Media section.    Limitation Score: 60%  Category: Mobility    Current : CL = least 60% but < 80% impaired, limited or restricted  Goal: CK = at least 40% but < 60% impaired, limited or restricted       TREATMENT     Time In: 1450  Time Out: 1500    PT Evaluation Completed? Yes  Discussed Plan of Care with patient: Yes    Verenice received 10 minutes of therapeutic exercise & instruction including:    Date 11/19/18   Visit  Exp 12/31/18 1   POC 1/19/19    Gcode 1/10   FOTO 1/5   Cap visit  Cap total 113.2  113.2       Stretches:    Supine QL with arms overhead 5x30" B   Seated lat x30" B   Seated trunk flexion and sidebend 5"x3 ea       Supine:    TAs Next   March Next   Bridge        Prone:    Anterior pelvic tilt with 2 pillows under lower stomach Next       Standing:    Lats Next   Pallof Press    Leg Press Next         Initials CC     Written Home Exercises Provided: See EMR in Patient Instructions for HEP given: Yes  Verenice demo good understanding of the education provided. Patient demo good return demo of skill of exercises.    Assessment     Initial Assessment (Pertinent finding, problem list and factors affecting outcome): Pt is a 64 yo female presenting to PT with signs and symptoms of mechanical stresses to musculature surrounding thoracoscapular and thoracolumbar area; possibly related to lumbar joint dysfunction. Pt would benefit from skilled PT to " address limitations and increase functional mobility.    History  Co-morbidities and personal factors that may impact the plan of care Co-morbidities:   Arthritis, Back pain, BMI over 30, Gastrointestinal Disease, High Blood Pressure,  Peripheral Vascular Disease, Sleep dysfunction    Personal Factors:   no deficits     high   Examination  Body Structures and Functions, activity limitations and participation restrictions that may impact the plan of care Body Regions:   back  lower extremities    Body Systems:    gross symmetry  ROM  strength  gross coordinated movement  gait  transfers  transitions  motor control    Participation Restrictions:   Community ambulation    Activity limitations:   Learning and applying knowledge  no deficits    General Tasks and Commands  no deficits    Communication  no deficits    Mobility  walking    Self care  no deficits    Domestic Life  shopping  cooking  doing house work (cleaning house, washing dishes, laundry)  assisting others    Interactions/Relationships  no deficits    Life Areas  no deficits    Community and Social Life  community life  recreation and leisure         high   Clinical Presentation evolving clinical presentation with changing clinical characteristics moderate   Decision Making/ Complexity Score: moderate     Rehab Potiential: good  Spiritual/Cultural Needs: none  Barriers to Rehab: comorbidities, chronicity of low back pain  Short Term Goals (3-4 Weeks):   1. Pt will be compliant with HEP to supplement PT in decreasing pain with functional mobility.  2. Pt will perform tail wags seated on Swiss ball for 2' unsupported to demonstrate improved core strength  3. Pt will improve lumbar ROM to </=mod loss in all planes to improve functional mobility.  4. Pt will improve impaired LE MMTs to >/=4/5 to improve strength for functional tasks.  4. Pt will report pain </=4/10 with walking 30 minutes to promote community ambulation.  5. Pt will report pain </=4/10 during  lumbar ROM to promote functional mobility.  Long Term Goals (8 Weeks):   1. Pt will improve FOTO score to </= 47% limited to decrease perceived limitation with maintaining/changing body position  2. Pt will perform pallof press with rotation without difficulty to demonstrate improved core strength.  3. Pt will improve impaired LE MMTs to >/=4+/5 to improve strength for functional tasks.  4. Pt will improve lumbar ROM to </=min loss in all planes to improve functional mobility.  5. Pt will report no pain with walking 30 minutes to promote community ambulation.  6. Pt will report no pain during lumbar ROM to promote functional mobility.    Plan     Certification Period: 11/19/18 to 1/19/19  Recommended Treatment Plan: 2 times per week for 8 weeks: Gait Training, Manual Therapy, Moist Heat/ Ice, Neuromuscular Re-ed, Patient Education, Therapeutic Activites and Therapeutic Exercise  Other Recommendations: modalities prn, ASTYM prn, kinesiotape prn, Functional Dry Needling prn       Kerline Hsu, PT  11/19/2018      I CERTIFY THE NEED FOR THESE SERVICES FURNISHED UNDER THIS PLAN OF TREATMENT AND WHILE UNDER MY CARE    Physician's comments: ________________________________________________________________________________________________________________________________________________      Physician's Name: ___________________________________

## 2018-11-27 ENCOUNTER — CLINICAL SUPPORT (OUTPATIENT)
Dept: REHABILITATION | Facility: HOSPITAL | Age: 63
End: 2018-11-27
Payer: COMMERCIAL

## 2018-11-27 DIAGNOSIS — Z74.09 IMPAIRED MOBILITY: ICD-10-CM

## 2018-11-27 DIAGNOSIS — G89.29 CHRONIC RIGHT-SIDED LOW BACK PAIN WITHOUT SCIATICA: ICD-10-CM

## 2018-11-27 DIAGNOSIS — M54.50 CHRONIC RIGHT-SIDED LOW BACK PAIN WITHOUT SCIATICA: ICD-10-CM

## 2018-11-27 DIAGNOSIS — M53.86 DECREASED ROM OF LUMBAR SPINE: ICD-10-CM

## 2018-11-27 PROCEDURE — 97110 THERAPEUTIC EXERCISES: CPT | Mod: PN

## 2018-11-27 NOTE — PROGRESS NOTES
"  Physical Therapy Daily Treatment Note     Name: Verenice Mirza  Clinic Number: [id number]    Therapy Diagnosis:   Encounter Diagnoses   Name Primary?    Chronic right-sided low back pain without sciatica     Impaired mobility     Decreased ROM of lumbar spine      Physician: Areli Crespo MD    Visit Date: 11/27/2018    Physician Orders: Pt eval and treat  Medical Diagnosis: M54.5,G89.29 (ICD-10-CM) - Chronic right-sided low back pain without sciatica  Evaluation Date: 11/19/18  Authorization Period Expiration: 12/31/18  Plan of Care Certification Period: 11/19/18 to 1/19/19  Visit #/Visits authorized: 2/50  FOTO: 2/5    Time In: 2:00  Time Out: 3:05  Total Billable Time: 55 minutes (TE-4)    Precautions: Standard    Subjective     Pt reports: "my whole back is sore from doing the exercises" . Pt stating it is more a sore muscle feeling then back pain.   She was compliant with home exercise program.  Response to previous treatment: musclular soreness  Functional change: none    Pain: sore, no pain stated  Location: B lumbar     Objective     Verenice received therapeutic exercises to develop strength and ROM, core stabilization, posture for 55 minutes including:  Log below     Date 11/27/18 11/19/18   Visit  Exp 12/31/18 2 1   POC 1/19/19      Gcode 2/10 1/10   FOTO 2/5 1/5   Cap visit  Cap total 121.28  234.48 113.2  113.2          Stretches:      Supine QL with arms overhead 5x30" B 5x30" B   Seated lat 3x30" B x30" B   Seated trunk flexion and sidebend 5" x 10 ea 5"x3 ea          Supine:      TAs 5" x 20 Next   March 2x10 B Next   Bridge 2x10 DL            Prone:      Anterior pelvic tilt with 2 pillows under lower stomach 2x10  Next          Standing:      Lats 2x10 YTB Next   Pallof Press      Leg Press 3.0 2x10 DL Next            Initials AC 1/6 CC     HP to lumbar spine for 10'     Home Exercises Provided and Patient Education Provided     Education provided:   - Continue HEP    Written Home Exercises " Provided: Patient instructed to cont prior HEP, addition of bridges, TA's and marching added.   Exercises were reviewed and Verenice was able to demonstrate them prior to the end of the session.  Verenice demonstrated good  understanding of the education provided.     See EMR under Patient Instructions for exercises provided prior visit.    Assessment     Pt able to perform session with addition of new there-ex per log above with no pain reported. Pt having some difficulty performing prone anterior pelvic tilts correctly, improving slightly with verbal and tactile cueing.  Pt reported decreased tightness following session.   Verenice is progressing well towards her goals.   Pt prognosis is Excellent.     Pt will continue to benefit from skilled outpatient physical therapy to address the deficits listed in the problem list box on initial evaluation, provide pt/family education and to maximize pt's level of independence in the home and community environment.   Pt's spiritual, cultural and educational needs considered and pt agreeable to plan of care and goals.    Anticipated barriers to physical therapy: none    Short Term Goals (3-4 Weeks):   1. Pt will be compliant with HEP to supplement PT in decreasing pain with functional mobility.  2. Pt will perform tail wags seated on Swiss ball for 2' unsupported to demonstrate improved core strength  3. Pt will improve lumbar ROM to </=mod loss in all planes to improve functional mobility.  4. Pt will improve impaired LE MMTs to >/=4/5 to improve strength for functional tasks.  4. Pt will report pain </=4/10 with walking 30 minutes to promote community ambulation.  5. Pt will report pain </=4/10 during lumbar ROM to promote functional mobility.  Long Term Goals (8 Weeks):   1. Pt will improve FOTO score to </= 47% limited to decrease perceived limitation with maintaining/changing body position  2. Pt will perform pallof press with rotation without difficulty to demonstrate improved  core strength.  3. Pt will improve impaired LE MMTs to >/=4+/5 to improve strength for functional tasks.  4. Pt will improve lumbar ROM to </=min loss in all planes to improve functional mobility.  5. Pt will report no pain with walking 30 minutes to promote community ambulation.  6. Pt will report no pain during lumbar ROM to promote functional mobility.       Plan     Continue POC. Progress as tolerated.    Elyssa Tovar, PTA

## 2018-11-29 ENCOUNTER — OFFICE VISIT (OUTPATIENT)
Dept: SURGERY | Facility: CLINIC | Age: 63
End: 2018-11-29
Payer: COMMERCIAL

## 2018-11-29 VITALS
DIASTOLIC BLOOD PRESSURE: 74 MMHG | HEIGHT: 62 IN | HEART RATE: 61 BPM | BODY MASS INDEX: 44.85 KG/M2 | WEIGHT: 243.75 LBS | SYSTOLIC BLOOD PRESSURE: 139 MMHG

## 2018-11-29 DIAGNOSIS — E66.01 MORBID OBESITY WITH BMI OF 40.0-44.9, ADULT: Primary | ICD-10-CM

## 2018-11-29 DIAGNOSIS — K44.9 HIATAL HERNIA: ICD-10-CM

## 2018-11-29 PROCEDURE — 99203 OFFICE O/P NEW LOW 30 MIN: CPT | Mod: S$GLB,,, | Performed by: SURGERY

## 2018-11-29 PROCEDURE — 3078F DIAST BP <80 MM HG: CPT | Mod: CPTII,S$GLB,, | Performed by: SURGERY

## 2018-11-29 PROCEDURE — 3008F BODY MASS INDEX DOCD: CPT | Mod: CPTII,S$GLB,, | Performed by: SURGERY

## 2018-11-29 PROCEDURE — 99999 PR PBB SHADOW E&M-EST. PATIENT-LVL III: CPT | Mod: PBBFAC,,, | Performed by: SURGERY

## 2018-11-29 PROCEDURE — 3075F SYST BP GE 130 - 139MM HG: CPT | Mod: CPTII,S$GLB,, | Performed by: SURGERY

## 2018-11-29 NOTE — PROGRESS NOTES
History & Physical    SUBJECTIVE:     History of Present Illness:  Patient is a 63 y.o. female presents with BMI 44.58 with gerd with hiatal hernia, pvd (legs), insulin resistance, arthritis back, clarence on cpap, essential htn and hld.  She is on bid h2 blockers without relief of heartburn.  She also has an esophageal stricture which has been dilated.    No chief complaint on file.      Review of patient's allergies indicates:  No Known Allergies    Current Outpatient Medications   Medication Sig Dispense Refill    albuterol 90 mcg/actuation inhaler Inhale 2 puffs into the lungs every 6 (six) hours as needed for Wheezing. 1 each 11    aspirin (ECOTRIN) 81 MG EC tablet Take 81 mg by mouth once daily.      atorvastatin (LIPITOR) 40 MG tablet Take 1 tablet (40 mg total) by mouth once daily. 90 tablet 3    b complex vitamins tablet Take 1 tablet by mouth once daily.      biotin 5 mg Cap Take by mouth.      co-enzyme Q-10 30 mg capsule Take 30 mg by mouth 3 (three) times daily.      finasteride (PROSCAR) 5 mg tablet Take 1 tablet (5 mg total) by mouth once daily. 90 tablet 3    GLUCOSAMINE HCL/CHONDR BRASWELL A NA (OSTEO BI-FLEX ORAL) Take by mouth 2 (two) times daily.      inhalation spacing device Use as directed for inhalation. 1 Device 0    LACTOBAC CMB #3/FOS/PANTETHINE (PROBIOTIC & ACIDOPHILUS ORAL) Take by mouth.      levothyroxine (SYNTHROID) 88 MCG tablet Take 1 tablet (88 mcg total) by mouth before breakfast. 90 tablet 3    LUTEIN ORAL Take by mouth.      magnesium oxide (MAG-OX) 400 mg tablet Take 400 mg by mouth once daily.      meloxicam (MOBIC) 7.5 MG tablet Take 1 tablet (7.5 mg total) by mouth daily as needed for Pain. 30 tablet 3    metoprolol succinate (TOPROL-XL) 50 MG 24 hr tablet Take 1 tablet (50 mg total) by mouth once daily. 90 tablet 3    omega-3 acid ethyl esters (LOVAZA) 1 gram capsule Take 2 capsules (2 g total) by mouth 2 (two) times daily. 360 capsule 1    ramipril (ALTACE) 10 MG  capsule Take 1 capsule (10 mg total) by mouth once daily. 90 capsule 3    ranitidine (ZANTAC) 150 MG tablet Take 1 tablet (150 mg total) by mouth 2 (two) times daily. 180 tablet 3    spironolactone (ALDACTONE) 50 MG tablet Take 1 tablet (50 mg total) by mouth once daily. 90 tablet 3    vitamin D 1000 units Tab Take 185 mg by mouth once daily.      azelaic acid (FINACEA) 15 % Foam Apply thin film to face qhs 50 g 6    CINNAMON BARK (CINNAMON ORAL) Take 350 mg by mouth.      psyllium (METAMUCIL) powder Take 1 packet by mouth once daily.        No current facility-administered medications for this visit.        Past Medical History:   Diagnosis Date    Breast cyst     Dysplastic nevus of trunk 03/2017    moderately atypical    Fatty liver disease, nonalcoholic     Fibrocystic breast     Hyperlipidemia     Hypertension     Hypothyroid     IGT (impaired glucose tolerance)     Kidney stones     Morbid obesity     Nicotine use disorder     JORDANA on CPAP     PAD (peripheral artery disease)     PVD (peripheral vascular disease)     Renal angiomyolipoma     Rosacea     Squamous cell carcinoma 12/2017    in situ mid scalp    Venous insufficiency     Vitamin D deficiency disease      Past Surgical History:   Procedure Laterality Date    BLADDER SUSPENSION      COLONOSCOPY N/A 5/24/2017    Procedure: COLONOSCOPY;  Surgeon: Georgina Bunch MD;  Location: [facility];  Service: Endoscopy;  Laterality: N/A;    COLONOSCOPY N/A 5/24/2017    Performed by [facility]unch [facility] at [facility] ENDO    CYSTOSCOPY      CYSTOSCOPY N/A 7/2/2013    [facility]    ESOPHAGOGASTRODUODENOSCOPY (EGD) N/A 5/24/2017    [facility] by [facility] at [facility] ENDO    ESOPHAGOGASTRODUODENOSCOPY (EGD) N/A 5/21/2014    Performed by [facility] at [facility] ENDO    HYSTERECTOMY      LITHOTRIPSY      LITHOTRIPSY, ESWL Right 7/8/2013    Performed by Vania BERNARD  "MD Chandra at [facility]    OOPHORECTOMY      PLACEMENT-STENT Right 7/2/2013    Performed by Daxa Moreira MD at [facility]    PYELOGRAM, RETROGRADE Right 7/2/2013    [facility] at [facility]    URETEROSCOPY Right 7/2/2013    Per[facility] at [facility]     Family History   Problem Relation Age of Onset    Alzheimer's disease Mother     Breast cancer Mother     Skin cancer Sister     Diabetes type II Maternal Grandfather     Breast cancer Paternal Aunt     Melanoma Neg Hx      Social History     Tobacco Use    Smoking status: Former Smoker     Packs/day: 0.50     Years: 30.00     Pack years: 15.00     Types: Cigarettes     Last attempt to quit: 9/6/2008     Years since quitting: 10.2    Smokeless tobacco: Former User   Substance Use Topics    Alcohol use: No    Drug use: No        Review of Systems:  Review of Systems    OBJECTIVE:     Vital Signs (Most Recent)  Pulse: 61 (11/29/18 1306)  BP: 139/74 (11/29/18 1306)  5' 2" (1.575 m)  110.6 kg (243 lb 11.5 oz)     Physical Exam:  Physical Exam    Laboratory  CBC: Reviewed  CMP: Reviewed    Diagnostic Results:  EGD: Reviewed    ASSESSMENT/PLAN:     Good candidate for bariatric surgery.  She is also considering antireflux surgery.    PLAN:       Will have see support group.    30 minutes discussion with patient.         "

## 2018-11-29 NOTE — LETTER
November 29, 2018      Areli Crespo MD  [address] [address]           [facility]  [address]  [place] [address]  Phone: [phone]          Patient: Verenice Mirza   MR Number: [id number]   YOB: 1955   Date of Visit: 11/29/2018       Dear Dr. Areli Crespo:    Thank you for referring Verenice Mirza to me for evaluation. Attached you will find relevant portions of my assessment and plan of care.    If you have questions, please do not hesitate to call me. I look forward to following Verenice Mirza along with you.    Sincerely,    Emory Power MD    Enclosure  CC:  No Recipients    If you would like to receive this communication electronically, please contact [email or web address] or [phone] to request more information on [employer] access.    For providers and/or their staff who would like to refer a patient to [facility], please contact us through our one-stop-shop provider referral line, Daniella Hughes, at [phone].    If you feel you have received this communication in error or would no longer like to receive these types of communications, please e-mail [email or web address]

## 2018-12-03 ENCOUNTER — TELEPHONE (OUTPATIENT)
Dept: ENDOSCOPY | Facility: HOSPITAL | Age: 63
End: 2018-12-03

## 2018-12-03 NOTE — TELEPHONE ENCOUNTER
Spoke with patient about 1000 am arrival time. Clear liquids past 7pm the day before the procedure. NPO past midnight. Pt doesn not take diabetic/BP/cardiac meds.   Leave all valuables at home. Please have a ride available the day of the procedure.

## 2018-12-04 ENCOUNTER — CLINICAL SUPPORT (OUTPATIENT)
Dept: REHABILITATION | Facility: HOSPITAL | Age: 63
End: 2018-12-04
Payer: COMMERCIAL

## 2018-12-04 DIAGNOSIS — G89.29 CHRONIC RIGHT-SIDED LOW BACK PAIN WITHOUT SCIATICA: ICD-10-CM

## 2018-12-04 DIAGNOSIS — Z74.09 IMPAIRED MOBILITY: ICD-10-CM

## 2018-12-04 DIAGNOSIS — M53.86 DECREASED ROM OF LUMBAR SPINE: ICD-10-CM

## 2018-12-04 DIAGNOSIS — M54.50 CHRONIC RIGHT-SIDED LOW BACK PAIN WITHOUT SCIATICA: ICD-10-CM

## 2018-12-04 PROCEDURE — 97110 THERAPEUTIC EXERCISES: CPT | Mod: PN

## 2018-12-04 NOTE — PROGRESS NOTES
"  Physical Therapy Daily Treatment Note     Name: Verenice Mirza  Clinic Number: [id number]    Therapy Diagnosis:   Encounter Diagnoses   Name Primary?    Chronic right-sided low back pain without sciatica     Impaired mobility     Decreased ROM of lumbar spine      Physician: Areli Crespo MD    Visit Date: 12/4/2018    Physician Orders: Pt eval and treat  Medical Diagnosis: M54.5,G89.29 (ICD-10-CM) - Chronic right-sided low back pain without sciatica  Evaluation Date: 11/19/18  Authorization Period Expiration: 12/31/18  Plan of Care Certification Period: 11/19/18 to 1/19/19  Visit #/Visits authorized: 2/50  FOTO: 3/5    Time In: 1:00  Time Out: 2:00  Total Billable Time: 30 minutes (TE-2)    Precautions: Standard    Subjective     Pt reports:    She was compliant with home exercise program.  Response to previous treatment: musclular soreness  Functional change: none    Pain: sore, no pain stated  Location: B lumbar     Objective     Verenice received therapeutic exercises to develop strength and ROM, core stabilization, posture for 55 minutes including:  Log below     Date 12/4/18 11/27/18 11/19/18   Visit  Exp 12/31/18  2 1   POC 1/19/19       Gcode 3/10 2/10 1/10   FOTO 3/5 2/5 1/5   Cap visit  Cap total 60.64  295.12 121.28  234.48 113.2  113.2           Stretches:       Supine QL with arms overhead 5x30" B 5x30" B 5x30" B   Seated lat 3x30" B 3x30" B x30" B   Seated trunk flexion and sidebend 5"x10 5" x 10 ea 5"x3 ea           Supine:       TAs 5"x20 5" x 20 Next   March 2' B 2x10 B Next   Bridge 2x10 DL w/ball 2x10 DL     LTR 2' W/Grn SB     Reverse crunches 2' w/Grn SB     Hip Iso add +Kegle's  5"x10 w/ball      Prone:       Anterior pelvic tilt with 2 pillows under lower stomach 2x10  2x10  Next           Standing:       Lats 2x10 YTB 2x10 YTB Next   Pallof Press       Leg Press 3.0 2x10 DL 3.0 2x10 DL Next             Initials MB 2/6 AC 1/6 CC     HP to lumbar spine for 10'     Home Exercises Provided and " Patient Education Provided     Education provided:   - Continue HEP    Written Home Exercises Provided: Patient instructed to cont prior HEP, addition of bridges, TA's and marching added.   Exercises were reviewed and Verenice was able to demonstrate them prior to the end of the session.  Verenice demonstrated good  understanding of the education provided.     See EMR under Patient Instructions for exercises provided prior visit.    Assessment     Verenice tolerated session w/o reporting increased pain. Pt having some difficulty performing prone anterior pelvic tilts correctly, improving slightly with verbal and tactile cueing.  Pt reported no pain and increased flexibility following.  Verenice is progressing well towards her goals.   Pt prognosis is Excellent.     Pt will continue to benefit from skilled outpatient physical therapy to address the deficits listed in the problem list box on initial evaluation, provide pt/family education and to maximize pt's level of independence in the home and community environment.   Pt's spiritual, cultural and educational needs considered and pt agreeable to plan of care and goals.    Anticipated barriers to physical therapy: none    Short Term Goals (3-4 Weeks):   1. Pt will be compliant with HEP to supplement PT in decreasing pain with functional mobility.  2. Pt will perform tail wags seated on Swiss ball for 2' unsupported to demonstrate improved core strength  3. Pt will improve lumbar ROM to </=mod loss in all planes to improve functional mobility.  4. Pt will improve impaired LE MMTs to >/=4/5 to improve strength for functional tasks.  4. Pt will report pain </=4/10 with walking 30 minutes to promote community ambulation.  5. Pt will report pain </=4/10 during lumbar ROM to promote functional mobility.  Long Term Goals (8 Weeks):   1. Pt will improve FOTO score to </= 47% limited to decrease perceived limitation with maintaining/changing body position  2. Pt will perform pallof press  with rotation without difficulty to demonstrate improved core strength.  3. Pt will improve impaired LE MMTs to >/=4+/5 to improve strength for functional tasks.  4. Pt will improve lumbar ROM to </=min loss in all planes to improve functional mobility.  5. Pt will report no pain with walking 30 minutes to promote community ambulation.  6. Pt will report no pain during lumbar ROM to promote functional mobility.       Plan     Continue POC. Progress as tolerated.    Jeancarlos Ahuja, PTA

## 2018-12-05 ENCOUNTER — ANESTHESIA EVENT (OUTPATIENT)
Dept: ENDOSCOPY | Facility: HOSPITAL | Age: 63
End: 2018-12-05
Payer: COMMERCIAL

## 2018-12-05 ENCOUNTER — ANESTHESIA (OUTPATIENT)
Dept: ENDOSCOPY | Facility: HOSPITAL | Age: 63
End: 2018-12-05
Payer: COMMERCIAL

## 2018-12-05 ENCOUNTER — HOSPITAL ENCOUNTER (OUTPATIENT)
Facility: HOSPITAL | Age: 63
Discharge: HOME | End: 2018-12-05
Attending: INTERNAL MEDICINE | Admitting: INTERNAL MEDICINE
Payer: COMMERCIAL

## 2018-12-05 VITALS
HEIGHT: 62 IN | RESPIRATION RATE: 18 BRPM | BODY MASS INDEX: 44.72 KG/M2 | OXYGEN SATURATION: 97 % | WEIGHT: 243 LBS | DIASTOLIC BLOOD PRESSURE: 76 MMHG | TEMPERATURE: 98 F | HEART RATE: 50 BPM | SYSTOLIC BLOOD PRESSURE: 136 MMHG

## 2018-12-05 DIAGNOSIS — R13.10 DYSPHAGIA: ICD-10-CM

## 2018-12-05 PROCEDURE — 43249 ESOPH EGD DILATION <30 MM: CPT | Mod: ,,, | Performed by: INTERNAL MEDICINE

## 2018-12-05 PROCEDURE — 37000009 HC ANESTHESIA EA ADD 15 MINS: Performed by: INTERNAL MEDICINE

## 2018-12-05 PROCEDURE — 88305 TISSUE EXAM BY PATHOLOGIST: CPT | Mod: 26,,, | Performed by: PATHOLOGY

## 2018-12-05 PROCEDURE — 25000003 PHARM REV CODE 250: Performed by: INTERNAL MEDICINE

## 2018-12-05 PROCEDURE — 63600175 PHARM REV CODE 636 W HCPCS: Performed by: NURSE ANESTHETIST, CERTIFIED REGISTERED

## 2018-12-05 PROCEDURE — 43239 EGD BIOPSY SINGLE/MULTIPLE: CPT | Performed by: INTERNAL MEDICINE

## 2018-12-05 PROCEDURE — C1726 CATH, BAL DIL, NON-VASCULAR: HCPCS | Performed by: INTERNAL MEDICINE

## 2018-12-05 PROCEDURE — 88305 TISSUE EXAM BY PATHOLOGIST: CPT

## 2018-12-05 PROCEDURE — 25000003 PHARM REV CODE 250: Performed by: NURSE ANESTHETIST, CERTIFIED REGISTERED

## 2018-12-05 PROCEDURE — 37000008 HC ANESTHESIA 1ST 15 MINUTES: Performed by: INTERNAL MEDICINE

## 2018-12-05 PROCEDURE — 43249 ESOPH EGD DILATION <30 MM: CPT | Performed by: INTERNAL MEDICINE

## 2018-12-05 PROCEDURE — 27201012 HC FORCEPS, HOT/COLD, DISP: Performed by: INTERNAL MEDICINE

## 2018-12-05 RX ORDER — PROPOFOL 10 MG/ML
VIAL (ML) INTRAVENOUS
Status: DISCONTINUED | OUTPATIENT
Start: 2018-12-05 | End: 2018-12-05

## 2018-12-05 RX ORDER — SODIUM CHLORIDE 9 MG/ML
INJECTION, SOLUTION INTRAVENOUS CONTINUOUS
Status: DISCONTINUED | OUTPATIENT
Start: 2018-12-05 | End: 2018-12-05 | Stop reason: HOSPADM

## 2018-12-05 RX ORDER — LIDOCAINE HCL/PF 100 MG/5ML
SYRINGE (ML) INTRAVENOUS
Status: DISCONTINUED | OUTPATIENT
Start: 2018-12-05 | End: 2018-12-05

## 2018-12-05 RX ORDER — SODIUM CHLORIDE 0.9 % (FLUSH) 0.9 %
3 SYRINGE (ML) INJECTION
Status: DISCONTINUED | OUTPATIENT
Start: 2018-12-05 | End: 2018-12-05 | Stop reason: HOSPADM

## 2018-12-05 RX ORDER — GLYCOPYRROLATE 0.2 MG/ML
INJECTION INTRAMUSCULAR; INTRAVENOUS
Status: DISCONTINUED | OUTPATIENT
Start: 2018-12-05 | End: 2018-12-05

## 2018-12-05 RX ADMIN — PROPOFOL 20 MG: 10 INJECTION, EMULSION INTRAVENOUS at 11:12

## 2018-12-05 RX ADMIN — GLYCOPYRROLATE 0.2 MG: 0.2 INJECTION, SOLUTION INTRAMUSCULAR; INTRAVENOUS at 11:12

## 2018-12-05 RX ADMIN — TOPICAL ANESTHETIC 1 EACH: 200 SPRAY DENTAL; PERIODONTAL at 11:12

## 2018-12-05 RX ADMIN — LIDOCAINE HYDROCHLORIDE 100 MG: 20 INJECTION, SOLUTION INTRAVENOUS at 11:12

## 2018-12-05 RX ADMIN — PROPOFOL 50 MG: 10 INJECTION, EMULSION INTRAVENOUS at 11:12

## 2018-12-05 RX ADMIN — SODIUM CHLORIDE: 0.9 INJECTION, SOLUTION INTRAVENOUS at 10:12

## 2018-12-05 NOTE — ANESTHESIA PREPROCEDURE EVALUATION
12/05/2018  Verenice Mirza is a 63 y.o., female here for egd/colonoscopy for dysphaia and cancer screening.    Past Medical History:   Diagnosis Date    Breast cyst     Dysplastic nevus of trunk 03/2017    moderately atypical    Fatty liver disease, nonalcoholic     Fibrocystic breast     Hyperlipidemia     Hypertension     Hypothyroid     IGT (impaired glucose tolerance)     Kidney stones     Morbid obesity     Nicotine use disorder     JORDANA on CPAP     PAD (peripheral artery disease)     PVD (peripheral vascular disease)     Renal angiomyolipoma     Rosacea     Squamous cell carcinoma 12/2017    in situ mid scalp    Venous insufficiency     Vitamin D deficiency disease      Past Surgical History:   Procedure Laterality Date    BLADDER SUSPENSION      COLONOSCOPY N/A 5/24/2017    Procedure: COLONOSCOPY;  Surgeon: Georgina Bunch MD;  Location: [facility];  Service: Endoscopy;  Laterality: N/A;    COLONOSCOPY N/A 5/24/2017    Performed by Georgina Bunch MD at [facility] ENDO    CYSTOSCOPY      CYSTOSCOPY N/A 7/2/2013    Performed by Daxa Moreira MD at [facility] OR [facility]LR    ESOPHAGOGASTRODUODENOSCOPY (EGD) N/A 5/24/2017    Performed by [facility]ina Bunch MD at [facility] ENDO    ESOPHAGOGASTRODUODENOSCOPY (EGD) N/A 5/21/2014    Performed by [facility]unch [facility] at [facility]    HYSTERECTOMY      LITHOTRIPSY      LITHOTRIPSY, ESWL Right 7/8/2013    Performed by Vania Artis MD at [facility] OR [facility] FLR    OOPHORECTOMY      PLACEMENT-STENT Right 7/2/2013    Performed by Daxa Moreira MD at [facility] OR [facility] FLR    PYELOGRAM, RETROGRADE Right 7/2/2013    Performed by Daxa Moreira MD at [facility] OR [facility] FLR    URETEROSCOPY Right 7/2/2013    Performed by Daxa Moreira MD at [facility] OR [address] [place] [address]         Pre-op Assessment         Review of Systems  Cardiovascular:    Hypertension    Renal/:   Chronic Renal Disease    Endocrine:   Hypothyroidism        Physical Exam  General:  Obesity    Airway/Jaw/Neck:  AIRWAY FINDINGS: Normal      Eyes/Ears/Nose:  EYES/EARS/NOSE FINDINGS: Normal   Dental:  DENTAL FINDINGS: Normal   Chest/Lungs:  Chest/Lungs Clear    Heart/Vascular:  Heart Findings: Normal Heart murmur: negative Vascular Findings: Normal    Abdomen:  Abdomen Findings: Normal    Musculoskeletal:  Musculoskeletal Findings: Normal   Skin:  Skin Findings: Normal    Mental Status:  Mental Status Findings: Normal        Anesthesia Plan  Type of Anesthesia, risks & benefits discussed:  Anesthesia Type:  MAC  Patient's Preference:   Intra-op Monitoring Plan:   Intra-op Monitoring Plan Comments:   Post Op Pain Control Plan:   Post Op Pain Control Plan Comments:   Induction:   IV  Beta Blocker:         Informed Consent: Patient understands risks and agrees with Anesthesia plan.  Questions answered. Anesthesia consent signed with patient.  ASA Score: 3     Day of Surgery Review of History & Physical:    H&P update referred to the surgeon.         Ready For Surgery From Anesthesia Perspective.

## 2018-12-05 NOTE — [employer] PATIENT INSTRUCTIONS
Discharge Summary/Instructions after an Endoscopic Procedure  Patient Name: Verenice Mirza  Patient MRN: [id number]  Patient YOB: 1955 Wednesday, December 05, 2018  Georgina Bunch MD  RESTRICTIONS:  During your procedure today, you received medications for sedation.  These   medications may affect your judgment, balance and coordination.  Therefore,   for 24 hours, you have the following restrictions:   - DO NOT drive a car, operate machinery, make legal/financial decisions,   sign important papers or drink alcohol.    ACTIVITY:  Today: no heavy lifting, straining or running due to procedural   sedation/anesthesia.  The following day: return to full activity including work.  DIET:  Eat and drink normally unless instructed otherwise.     TREATMENT FOR COMMON SIDE EFFECTS:  - Mild abdominal pain, nausea, belching, bloating or excessive gas:  rest,   eat lightly and use a heating pad.  - Sore Throat: treat with throat lozenges and/or gargle with warm salt   water.  - Because air was used during the procedure, expelling large amounts of air   from your rectum or belching is normal.  - If a bowel prep was taken, you may not have a bowel movement for 1-3 days.    This is normal.  SYMPTOMS TO WATCH FOR AND REPORT TO YOUR PHYSICIAN:  1. Abdominal pain or bloating, other than gas cramps.  2. Chest pain.  3. Back pain.  4. Signs of infection such as: chills or fever occurring within 24 hours   after the procedure.  5. Rectal bleeding, which would show as bright red, maroon, or black stools.   (A tablespoon of blood from the rectum is not serious, especially if   hemorrhoids are present.)  6. Vomiting.  7. Weakness or dizziness.  GO DIRECTLY TO THE NEAREST EMERGENCY ROOM IF YOU HAVE ANY OF THE FOLLOWING:      Difficulty breathing              Chills and/or fever over 101 F   Persistent vomiting and/or vomiting blood   Severe abdominal pain   Severe chest pain   Black, tarry stools   Bleeding- more than one  tablespoon   Any other symptom or condition that you feel may need urgent attention  Your doctor recommends these additional instructions:  If any biopsies were taken, your doctors clinic will contact you in 1 to 2   weeks with any results.  - Discharge patient to home.  For questions, problems or results please call your physician - Georgina Bunch MD at Work:  [phone].  EMERGENCY PHONE NUMBER: [phone],  LAB RESULTS: [phone]  IF A COMPLICATION OR EMERGENCY SITUATION ARISES AND YOU ARE UNABLE TO REACH   YOUR PHYSICIAN - GO DIRECTLY TO THE EMERGENCY ROOM.  Georgina Bunch MD  12/5/2018 11:33:20 AM  This report has been verified and signed electronically.  [employer]

## 2018-12-05 NOTE — INTERVAL H&P NOTE
The patient has been examined and the H&P has been reviewed:    I concur with the findings and no changes have occurred since H&P was written.    Anesthesia/Surgery risks, benefits and alternative options discussed and understood by patient/family.          Active Hospital Problems    Diagnosis  POA    Dysphagia [R13.10]  Yes      Resolved Hospital Problems   No resolved problems to display.

## 2018-12-05 NOTE — TRANSFER OF CARE
"Anesthesia Transfer of Care Note    Patient: Verenice Mirza    Procedure(s) Performed: Procedure(s) (LRB):  ESOPHAGOGASTRODUODENOSCOPY (EGD) (N/A)    Patient location: GI    Anesthesia Type: MAC    Transport from OR: Transported from OR on room air with adequate spontaneous ventilation    Post pain: adequate analgesia    Post assessment: no apparent anesthetic complications and tolerated procedure well    Post vital signs: stable    Level of consciousness: awake, alert and oriented    Nausea/Vomiting: no nausea/vomiting    Complications: none    Transfer of care protocol was followed      Last vitals:   Visit Vitals  BP (!) 177/78 (BP Location: Left arm, Patient Position: Lying)   Pulse (!) 55   Temp 36.5 °C (97.7 °F) (Temporal)   Resp 20   Ht 5' 2" (1.575 m)   Wt 110.2 kg (243 lb)   SpO2 98%   Breastfeeding? No   BMI 44.45 kg/m²     "

## 2018-12-05 NOTE — PLAN OF CARE
Past Medical History:   Diagnosis Date    Breast cyst     Dysplastic nevus of trunk 03/2017    moderately atypical    Fatty liver disease, nonalcoholic     Fibrocystic breast     Hyperlipidemia     Hypertension     Hypothyroid     IGT (impaired glucose tolerance)     Kidney stones     Morbid obesity     Nicotine use disorder     JORDANA on CPAP     PAD (peripheral artery disease)     PVD (peripheral vascular disease)     Renal angiomyolipoma     Rosacea     Squamous cell carcinoma 12/2017    in situ mid scalp    Venous insufficiency     Vitamin D deficiency disease      Admitted today for EGD with Dr. Bunch, alert and talkative. NPO status appropriate for exam. Comfort measures provided, side rails up x2. Call button left at side.

## 2018-12-05 NOTE — ANESTHESIA POSTPROCEDURE EVALUATION
"Anesthesia Post Evaluation    Patient: Verenice Mirza    Procedure(s) Performed: Procedure(s) (LRB):  ESOPHAGOGASTRODUODENOSCOPY (EGD) (N/A)    Final Anesthesia Type: MAC  Patient location during evaluation: GI PACU  Patient participation: Yes- Able to Participate  Level of consciousness: awake and alert and oriented  Post-procedure vital signs: reviewed and stable  Pain management: adequate  Airway patency: patent  PONV status at discharge: No PONV  Anesthetic complications: no      Cardiovascular status: blood pressure returned to baseline, hemodynamically stable and stable  Respiratory status: unassisted, spontaneous ventilation and room air  Hydration status: euvolemic  Follow-up not needed.        Visit Vitals  BP (!) 177/78 (BP Location: Left arm, Patient Position: Lying)   Pulse (!) 55   Temp 36.5 °C (97.7 °F) (Temporal)   Resp 20   Ht 5' 2" (1.575 m)   Wt 110.2 kg (243 lb)   SpO2 98%   Breastfeeding? No   BMI 44.45 kg/m²       Pain/Marva Score: Pain Assessment Performed: Yes (12/5/2018 10:03 AM)  Presence of Pain: denies (12/5/2018 10:03 AM)        "

## 2018-12-05 NOTE — PLAN OF CARE
Recovery complete. Patient recovered to baseline. Discharge instructions reviewed with patient. VSS. Waiting for Dr. Bunch to come speak to the patient about outcome of the procedure.

## 2018-12-06 ENCOUNTER — CLINICAL SUPPORT (OUTPATIENT)
Dept: REHABILITATION | Facility: HOSPITAL | Age: 63
End: 2018-12-06
Payer: COMMERCIAL

## 2018-12-06 DIAGNOSIS — M54.50 CHRONIC RIGHT-SIDED LOW BACK PAIN WITHOUT SCIATICA: ICD-10-CM

## 2018-12-06 DIAGNOSIS — M53.86 DECREASED ROM OF LUMBAR SPINE: ICD-10-CM

## 2018-12-06 DIAGNOSIS — G89.29 CHRONIC RIGHT-SIDED LOW BACK PAIN WITHOUT SCIATICA: ICD-10-CM

## 2018-12-06 DIAGNOSIS — Z74.09 IMPAIRED MOBILITY: ICD-10-CM

## 2018-12-06 PROCEDURE — 97110 THERAPEUTIC EXERCISES: CPT | Mod: PN

## 2018-12-06 NOTE — PROGRESS NOTES
"  Physical Therapy Daily Treatment Note     Name: Verenice Mirza  Clinic Number: [id number]    Therapy Diagnosis:   Encounter Diagnoses   Name Primary?    Chronic right-sided low back pain without sciatica     Impaired mobility     Decreased ROM of lumbar spine      Physician: Areli Crespo MD    Visit Date: 12/6/2018    Physician Orders: Pt eval and treat  Medical Diagnosis: M54.5,G89.29 (ICD-10-CM) - Chronic right-sided low back pain without sciatica  Evaluation Date: 11/19/18  Authorization Period Expiration: 12/31/18  Plan of Care Certification Period: 11/19/18 to 1/19/19  Visit #/Visits authorized: 3/50  FOTO: 3/5    Time In: 1:00  Time Out: 2:00  Total Billable Time: 30 minutes (TE-2)    Precautions: Standard    Subjective     Pt reports: Reports pain abolished following PT   She was compliant with home exercise program.  Response to previous treatment: musclular soreness  Functional change: none    Pain:  LB and mid back ; Reports pain abolished following PT  Location: 5/10    Objective     Verenice received therapeutic exercises to develop strength and ROM, core stabilization, posture for 30 minutes including: including 20 minutes supervised  Log below     Date 12/6/18 12/4/18 11/27/18 11/19/18   Visit  Exp 12/31/18 4  2 1   POC 1/19/19        Gcode 4/10 3/10 2/10 1/10   FOTO 4/5 3/5 2/5 1/5   Cap visit  Cap total 60.64  355.76 60.64  295.12 121.28  234.48 113.2  113.2            Stretches:        Supine QL with arms overhead 10x30" hold B 5x30" B 5x30" B 5x30" B   Seated lat  3x30" B 3x30" B x30" B   Seated trunk flexion and sidebend 5"x10 5"x10 5" x 10 ea 5"x3 ea            Supine:        TAs 5"x20 5"x20 5" x 20 Next   March 2' B 2' B 2x10 B Next   Bridge 2x10 DL  w/ball 2x10 DL w/ball 2x10 DL     LTR 2' w/Grn SB 2' W/Grn SB     Reverse crunches 2' w/Grn SB 2' w/Grn SB     Hip Iso add +Kegle's  5"x10 w/ball 5"x10 w/ball      Prone:        Anterior pelvic tilt with 2 pillows under lower stomach  2x10  2x10 "  Next            Standing:        Rows 2x10 GTB      Lats 2x10 GTB 2x10 YTB 2x10 YTB Next   Pallof Press        Leg Press 4.0 3x10 DL 3.0 2x10 DL 3.0 2x10 DL Next              Initials MB 3/6 MB 2/6 AC 1/6 CC     HP to lumbar spine for 10'     Home Exercises Provided and Patient Education Provided     Education provided:   - Continue HEP    Written Home Exercises Provided: Patient instructed to cont prior HEP, addition of bridges, TA's and marching added.   Exercises were reviewed and Verenice was able to demonstrate them prior to the end of the session.  Verenice demonstrated good  understanding of the education provided.     See EMR under Patient Instructions for exercises provided prior visit.    Assessment     Verenice tolerated session w/o reporting increased pain. Pain abolished following PT  Verenice is progressing well towards her goals.   Pt prognosis is Excellent.     Pt will continue to benefit from skilled outpatient physical therapy to address the deficits listed in the problem list box on initial evaluation, provide pt/family education and to maximize pt's level of independence in the home and community environment.   Pt's spiritual, cultural and educational needs considered and pt agreeable to plan of care and goals.    Anticipated barriers to physical therapy: none    Short Term Goals (3-4 Weeks):   1. Pt will be compliant with HEP to supplement PT in decreasing pain with functional mobility.  2. Pt will perform tail wags seated on Swiss ball for 2' unsupported to demonstrate improved core strength  3. Pt will improve lumbar ROM to </=mod loss in all planes to improve functional mobility.  4. Pt will improve impaired LE MMTs to >/=4/5 to improve strength for functional tasks.  4. Pt will report pain </=4/10 with walking 30 minutes to promote community ambulation.  5. Pt will report pain </=4/10 during lumbar ROM to promote functional mobility.  Long Term Goals (8 Weeks):   1. Pt will improve FOTO score to </=  47% limited to decrease perceived limitation with maintaining/changing body position  2. Pt will perform pallof press with rotation without difficulty to demonstrate improved core strength.  3. Pt will improve impaired LE MMTs to >/=4+/5 to improve strength for functional tasks.  4. Pt will improve lumbar ROM to </=min loss in all planes to improve functional mobility.  5. Pt will report no pain with walking 30 minutes to promote community ambulation.  6. Pt will report no pain during lumbar ROM to promote functional mobility.       Plan     Continue POC. Progress as tolerated.    Jeancarlos Ahuja, PTA

## 2018-12-10 ENCOUNTER — CLINICAL SUPPORT (OUTPATIENT)
Dept: REHABILITATION | Facility: HOSPITAL | Age: 63
End: 2018-12-10
Payer: COMMERCIAL

## 2018-12-10 DIAGNOSIS — G89.29 CHRONIC RIGHT-SIDED LOW BACK PAIN WITHOUT SCIATICA: ICD-10-CM

## 2018-12-10 DIAGNOSIS — M54.50 CHRONIC RIGHT-SIDED LOW BACK PAIN WITHOUT SCIATICA: ICD-10-CM

## 2018-12-10 DIAGNOSIS — Z74.09 IMPAIRED MOBILITY: ICD-10-CM

## 2018-12-10 DIAGNOSIS — M53.86 DECREASED ROM OF LUMBAR SPINE: ICD-10-CM

## 2018-12-10 PROCEDURE — 97110 THERAPEUTIC EXERCISES: CPT | Mod: PN

## 2018-12-10 PROCEDURE — 97140 MANUAL THERAPY 1/> REGIONS: CPT | Mod: PN

## 2018-12-10 NOTE — PROGRESS NOTES
Physical Therapy Daily Treatment Note     Name: Verenice Mirza  Clinic Number: [id number]    Therapy Diagnosis:   Encounter Diagnoses   Name Primary?    Chronic right-sided low back pain without sciatica     Impaired mobility     Decreased ROM of lumbar spine      Physician: Areli Crespo MD    Visit Date: 12/10/2018    Physician Orders: Pt eval and treat  Medical Diagnosis: M54.5,G89.29 (ICD-10-CM) - Chronic right-sided low back pain without sciatica  Evaluation Date: 11/19/18  Authorization Period Expiration: 12/31/18  Plan of Care Certification Period: 11/19/18 to 1/19/19  Visit #/Visits authorized: 5/50    Time In: 1304  Time Out: 1400  Total Billable Time: 54 minutes (MT-2, TE-2)    Precautions: Standard    Subjective     Pt reports: improving pain with PT. Pain increases with standing and walking, unsure if change in time/distance has occurred since start of care as pt has avoided activity.   She was compliant with home exercise program. Pt reports she was able to pick object from floor with hands rather than grabber device, was surprised by this.   Response to previous treatment: improved pain and tightness  Functional change: trunk rotation for functional tasks    Pain: low and mid back  Location: 0/10    Objective     Verenice received therapeutic exercises to develop strength and ROM, core stabilization, posture for 29 minutes including:    Posture: Standing: R scapular abduction; Dowager's hump  Palpation: TTP to B quadratus lumborum; TTP to B lower thoracic and lumbar paraspinals  FRSR (flexion rotation sidebent right) dysfunction to T7-10   Other: Poor TA activation with static hooklying contraction    Lumbar AROM Pain/Dysfunction with Movement:   Flexion Minimal loss Stiffness   Extension Moderate loss Stiffness   Right side bending Moderate loss Stiffness   Left side bending Moderate loss Stiffness   Right rotation Moderate loss Discomfort and stiffness   Left rotation Minimal loss Discomfort and  "stiffness     Log below:     Date 12/10/18 12/6/18 12/4/18 11/27/18 11/19/18   Visit  Exp 12/31/18 5 4  2 1   POC 1/19/19         FOTO 5/5 done 4/5 3/5 2/5 1/5             Stretches:         Supine QL with arms overhead 3x30" B 10x30" hold B 5x30" B 5x30" B 5x30" B   Seated lat OOT next  3x30" B 3x30" B x30" B   Seated trunk flexion and sidebend 5"x10 ea 5"x10 5"x10 5" x 10 ea 5"x3 ea             Supine:         DKC c/ SB OOT next       TAs 5" 2x10 c/ BP cuff 5"x20 5"x20 5" x 20 Next   March OOT next c/ BP cuff 2' B 2' B 2x10 B Next   Bridge 3x10 DL 2x10 DL  w/ball 2x10 DL w/ball 2x10 DL             Seated        TA  Next       March on SB Next       Tail wags on SB Next       Ant/post pelvic tilt on SB Next                 Standing:         Lats OOT next 2x10 GTB 2x10 YTB 2x10 YTB Next   Pallof Press         Leg Press OOT next at >/=4.0 4.0 3x10 DL 3.0 2x10 DL 3.0 2x10 DL Next               Initials CC MB 3/6 MB 2/6 AC 1/6 CC     Verenice received manual therapy 25 minutes including:  MET to correct FRSR dysfunction to T7-10 in sitting; 8" holds x 3 reps each  STM and MFR to B QL in alternate sidelying    Education provided:    Patient instructed to cont prior HEP    Written Home Exercises Provided: See EMR under Patient Instructions for exercises provided prior visit.  Exercises were reviewed and Verenice was able to demonstrate them prior to the end of the session.  Verenice demonstrated good  understanding of the education provided.     Assessment     R FRSR dysfunction to lower thoracic spine indicating barrier of movement in extension and L rotation and sidebending, likely contributing to current pain and stiffness. Initial TA activation poor, improved to poor-fair with use of BP cuff.    Verenice is progressing well towards her goals.   Pt prognosis is Excellent.   Pt will continue to benefit from skilled outpatient physical therapy to address the deficits listed in the problem list box on initial evaluation, provide " pt/family education and to maximize pt's level of independence in the home and community environment.     Pt's spiritual, cultural and educational needs considered and pt agreeable to plan of care and goals.  Anticipated barriers to physical therapy: none    Short Term Goals (3-4 Weeks):   1. Pt will be compliant with HEP to supplement PT in decreasing pain with functional mobility.  2. Pt will perform tail wags seated on Swiss ball for 2' unsupported to demonstrate improved core strength  3. Pt will improve lumbar ROM to </=mod loss in all planes to improve functional mobility. MET   4. Pt will improve impaired LE MMTs to >/=4/5 to improve strength for functional tasks.  4. Pt will report pain </=4/10 with walking 30 minutes to promote community ambulation.  5. Pt will report pain </=4/10 during lumbar ROM to promote functional mobility. MET  Long Term Goals (8 Weeks):   1. Pt will improve FOTO score to </= 47% limited to decrease perceived limitation with maintaining/changing body position  2. Pt will perform pallof press with rotation without difficulty to demonstrate improved core strength.  3. Pt will improve impaired LE MMTs to >/=4+/5 to improve strength for functional tasks.  4. Pt will improve lumbar ROM to </=min loss in all planes to improve functional mobility.  5. Pt will report no pain with walking 30 minutes to promote community ambulation.  6. Pt will report no pain during lumbar ROM to promote functional mobility.     Plan     Continue POC. Assess spinal symmetry next in sitting. Progress supine core strengthening to sitting.    Kerline Hsu, PT

## 2018-12-13 ENCOUNTER — CLINICAL SUPPORT (OUTPATIENT)
Dept: REHABILITATION | Facility: HOSPITAL | Age: 63
End: 2018-12-13
Payer: COMMERCIAL

## 2018-12-13 DIAGNOSIS — G89.29 CHRONIC RIGHT-SIDED LOW BACK PAIN WITHOUT SCIATICA: ICD-10-CM

## 2018-12-13 DIAGNOSIS — Z74.09 IMPAIRED MOBILITY: ICD-10-CM

## 2018-12-13 DIAGNOSIS — M53.86 DECREASED ROM OF LUMBAR SPINE: ICD-10-CM

## 2018-12-13 DIAGNOSIS — M54.50 CHRONIC RIGHT-SIDED LOW BACK PAIN WITHOUT SCIATICA: ICD-10-CM

## 2018-12-13 PROCEDURE — 97110 THERAPEUTIC EXERCISES: CPT | Mod: PN

## 2018-12-13 NOTE — PROGRESS NOTES
"  Physical Therapy Daily Treatment Note     Name: Verenice Mirza  Clinic Number: [id number]    Therapy Diagnosis:   Encounter Diagnoses   Name Primary?    Chronic right-sided low back pain without sciatica     Impaired mobility     Decreased ROM of lumbar spine      Physician: Areli Crespo MD    Visit Date: 12/13/2018    Physician Orders: Pt eval and treat  Medical Diagnosis: M54.5,G89.29 (ICD-10-CM) - Chronic right-sided low back pain without sciatica  Evaluation Date: 11/19/18  Authorization Period Expiration: 12/31/18  Plan of Care Certification Period: 11/19/18 to 1/19/19  Visit #/Visits authorized: 6/50    Time In: 1:00 pm  Time Out: 2:00 pm  Total Billable Time: 60 (TE-4)    Precautions: Standard    Subjective     Pt reports: doing well today with no pain currently. Pt reports pain in her mid to upper back while laying on the mat stating "it must be the hard surface because it doesn't hurt when I lay back in bed". Pt reports tenderness in her lower back while laying on BP cuff.  She was compliant with home exercise program.   Response to previous treatment: no adverse effects  Functional change: none    Pain: low and mid back  Location: 0/10    Objective     Verenice received therapeutic exercises to develop strength and ROM, core stabilization, posture for 60 minutes including: Prone quad stretch and supine butterfly stretch.      Date 12/13/18 12/10/18 12/6/18 12/4/18 11/27/18 11/19/18   Visit  Exp 12/31/18 6 5 4  2 1   POC 1/19/19          FOTO 6/10 5/5 done 4/5 3/5 2/5 1/5              Stretches:          Supine QL with arms overhead 3x30" B 3x30" B 10x30" hold B 5x30" B 5x30" B 5x30" B   Seated lat 3x30" B OOT next  3x30" B 3x30" B x30" B   Seated trunk flexion and sidebend 5" x 10 ea 5"x10 ea 5"x10 5"x10 5" x 10 ea 5"x3 ea              Supine:          DKC c/ SB 5" x 20 OOT next       TAs 5" 2x10 c/ BP cuff 5" 2x10 c/ BP cuff 5"x20 5"x20 5" x 20 Next   March 2' B c/ BP cuff OOT next c/ BP cuff 2' B 2' " "B 2x10 B Next   Bridge 3x10 DL 3x10 DL 2x10 DL  w/ball 2x10 DL w/ball 2x10 DL              Seated         TA  5" x 15 Next       March on SB x20 B Next       Tail wags on SB x20 B Next       Ant/post pelvic tilt on SB x20 B Next                  Standing:          Lats 2x10 GTB OOT next 2x10 GTB 2x10 YTB 2x10 YTB Next   Pallof Press          Leg Press 4.0 DL 3x10 OOT next at >/=4.0 4.0 3x10 DL 3.0 2x10 DL 3.0 2x10 DL Next                Initials AC 1/6 CC MB 3/6 MB 2/6 AC 1/6 CC       Education provided:    Patient instructed to cont prior HEP    Written Home Exercises Provided: See EMR under Patient Instructions for exercises provided prior visit.  Exercises were reviewed and Verenice was able to demonstrate them prior to the end of the session.  Verenice demonstrated good  understanding of the education provided.     Assessment     Pt with trouble initially performing TA's correctly, eventually able to complete correctly with BP cuff use and VCs. Pt able to complete seated core strengthening on ball with VC and TC.   Verenice is progressing well towards her goals.   Pt prognosis is Excellent.   Pt will continue to benefit from skilled outpatient physical therapy to address the deficits listed in the problem list box on initial evaluation, provide pt/family education and to maximize pt's level of independence in the home and community environment.     Pt's spiritual, cultural and educational needs considered and pt agreeable to plan of care and goals.  Anticipated barriers to physical therapy: none    Short Term Goals (3-4 Weeks):   1. Pt will be compliant with HEP to supplement PT in decreasing pain with functional mobility.  2. Pt will perform tail wags seated on Swiss ball for 2' unsupported to demonstrate improved core strength  3. Pt will improve lumbar ROM to </=mod loss in all planes to improve functional mobility. MET   4. Pt will improve impaired LE MMTs to >/=4/5 to improve strength for functional tasks.  4. Pt " will report pain </=4/10 with walking 30 minutes to promote community ambulation.  5. Pt will report pain </=4/10 during lumbar ROM to promote functional mobility. MET  Long Term Goals (8 Weeks):   1. Pt will improve FOTO score to </= 47% limited to decrease perceived limitation with maintaining/changing body position  2. Pt will perform pallof press with rotation without difficulty to demonstrate improved core strength.  3. Pt will improve impaired LE MMTs to >/=4+/5 to improve strength for functional tasks.  4. Pt will improve lumbar ROM to </=min loss in all planes to improve functional mobility.  5. Pt will report no pain with walking 30 minutes to promote community ambulation.  6. Pt will report no pain during lumbar ROM to promote functional mobility.     Plan     Continue POC. Assess spinal symmetry next in sitting. Progress supine core strengthening to sitting.    Elyssa Tovar, PTA

## 2018-12-16 ENCOUNTER — PATIENT MESSAGE (OUTPATIENT)
Dept: GASTROENTEROLOGY | Facility: HOSPITAL | Age: 63
End: 2018-12-16

## 2018-12-17 ENCOUNTER — PATIENT MESSAGE (OUTPATIENT)
Dept: SURGERY | Facility: CLINIC | Age: 63
End: 2018-12-17

## 2018-12-17 ENCOUNTER — CLINICAL SUPPORT (OUTPATIENT)
Dept: REHABILITATION | Facility: HOSPITAL | Age: 63
End: 2018-12-17
Payer: COMMERCIAL

## 2018-12-17 DIAGNOSIS — G89.29 CHRONIC RIGHT-SIDED LOW BACK PAIN WITHOUT SCIATICA: ICD-10-CM

## 2018-12-17 DIAGNOSIS — M53.86 DECREASED ROM OF LUMBAR SPINE: ICD-10-CM

## 2018-12-17 DIAGNOSIS — M54.50 CHRONIC RIGHT-SIDED LOW BACK PAIN WITHOUT SCIATICA: ICD-10-CM

## 2018-12-17 DIAGNOSIS — Z74.09 IMPAIRED MOBILITY: ICD-10-CM

## 2018-12-17 PROCEDURE — 97110 THERAPEUTIC EXERCISES: CPT | Mod: PN

## 2018-12-17 NOTE — PROGRESS NOTES
"  Physical Therapy Daily Treatment Note     Name: Verenice Mirza  Clinic Number: [id number]    Therapy Diagnosis:   No diagnosis found.  Physician: Areli Crespo MD    Visit Date: 12/17/2018    Physician Orders: Pt eval and treat  Medical Diagnosis: M54.5,G89.29 (ICD-10-CM) - Chronic right-sided low back pain without sciatica  Evaluation Date: 11/19/18  Authorization Period Expiration: 12/31/18  Plan of Care Certification Period: 11/19/18 to 1/19/19  Visit #/Visits authorized: 7/50    Time In:   Time Out:   Total Billable Time:     Precautions: Standard    Subjective     Pt reports:   She was compliant with home exercise program.   Response to previous treatment: no adverse effects  Functional change: none    Pain: low and mid back  Location: 0/10    Objective     Verenice received therapeutic exercises to develop strength and ROM, core stabilization, posture for 60 minutes including: Prone quad stretch and supine butterfly stretch.      Date 12/17/18 12/13/18 12/10/18 12/6/18 12/4/18 11/27/18 11/19/18   Visit  Exp 12/31/18  6 5 4  2 1   POC 1/19/19           FOTO  6/10 5/5 done 4/5 3/5 2/5 1/5               Stretches:           Supine QL with arms overhead  3x30" B 3x30" B 10x30" hold B 5x30" B 5x30" B 5x30" B   Seated lat  3x30" B OOT next  3x30" B 3x30" B x30" B   Seated trunk flexion and sidebend  5" x 10 ea 5"x10 ea 5"x10 5"x10 5" x 10 ea 5"x3 ea               Supine:           DKC c/ SB  5" x 20 OOT next       TAs  5" 2x10 c/ BP cuff 5" 2x10 c/ BP cuff 5"x20 5"x20 5" x 20 Next   March  2' B c/ BP cuff OOT next c/ BP cuff 2' B 2' B 2x10 B Next   Bridge  3x10 DL 3x10 DL 2x10 DL  w/ball 2x10 DL w/ball 2x10 DL               Seated          TA   5" x 15 Next       March on SB  x20 B Next       Tail wags on SB  x20 B Next       Ant/post pelvic tilt on SB  x20 B Next                   Standing:           Lats  2x10 GTB OOT next 2x10 GTB 2x10 YTB 2x10 YTB Next   Pallof Press           Leg Press  4.0 DL 3x10 OOT next at " >/=4.0 4.0 3x10 DL 3.0 2x10 DL 3.0 2x10 DL Next                 Initials  AC 1/6 CC MB 3/6 MB 2/6 AC 1/6 CC       Education provided:    Patient instructed to cont prior HEP    Written Home Exercises Provided: See EMR under Patient Instructions for exercises provided prior visit.  Exercises were reviewed and Verenice was able to demonstrate them prior to the end of the session.  Verenice demonstrated good  understanding of the education provided.     Assessment       Verenice is progressing well towards her goals.   Pt prognosis is Excellent.   Pt will continue to benefit from skilled outpatient physical therapy to address the deficits listed in the problem list box on initial evaluation, provide pt/family education and to maximize pt's level of independence in the home and community environment.     Pt's spiritual, cultural and educational needs considered and pt agreeable to plan of care and goals.  Anticipated barriers to physical therapy: none    Short Term Goals (3-4 Weeks):   1. Pt will be compliant with HEP to supplement PT in decreasing pain with functional mobility.  2. Pt will perform tail wags seated on Swiss ball for 2' unsupported to demonstrate improved core strength  3. Pt will improve lumbar ROM to </=mod loss in all planes to improve functional mobility. MET   4. Pt will improve impaired LE MMTs to >/=4/5 to improve strength for functional tasks.  4. Pt will report pain </=4/10 with walking 30 minutes to promote community ambulation.  5. Pt will report pain </=4/10 during lumbar ROM to promote functional mobility. MET  Long Term Goals (8 Weeks):   1. Pt will improve FOTO score to </= 47% limited to decrease perceived limitation with maintaining/changing body position  2. Pt will perform pallof press with rotation without difficulty to demonstrate improved core strength.  3. Pt will improve impaired LE MMTs to >/=4+/5 to improve strength for functional tasks.  4. Pt will improve lumbar ROM to </=min loss in  all planes to improve functional mobility.  5. Pt will report no pain with walking 30 minutes to promote community ambulation.  6. Pt will report no pain during lumbar ROM to promote functional mobility.     Plan     Continue POC. Assess spinal symmetry next in sitting. Progress supine core strengthening to sitting.    Elyssa Tovar, PTA

## 2018-12-19 ENCOUNTER — DOCUMENTATION ONLY (OUTPATIENT)
Dept: REHABILITATION | Facility: HOSPITAL | Age: 63
End: 2018-12-19

## 2018-12-19 DIAGNOSIS — Z74.09 IMPAIRED MOBILITY: ICD-10-CM

## 2018-12-19 DIAGNOSIS — G89.29 CHRONIC RIGHT-SIDED LOW BACK PAIN WITHOUT SCIATICA: ICD-10-CM

## 2018-12-19 DIAGNOSIS — M53.86 DECREASED ROM OF LUMBAR SPINE: ICD-10-CM

## 2018-12-19 DIAGNOSIS — M54.50 CHRONIC RIGHT-SIDED LOW BACK PAIN WITHOUT SCIATICA: ICD-10-CM

## 2018-12-19 NOTE — PROGRESS NOTES
Face to Face PTA Conference performed with Elyssa Tovar PTA, Jazmyne Morales PTA, Robin Alejandre PTA Jeancarlos Ahuja PTA regarding patient's current status, overall progress, and plan of care    Kerline Hsu, PT     Jeancarlos Ahuja PTA    Face to face meeting completed with Kerline Hsu PT regarding current status and progress of   Verenice SULLIVAN Hermes .  Jazmyne Morales PTA    Elyssa Tovar PTA  Robin Alejandre PTA

## 2018-12-20 ENCOUNTER — OFFICE VISIT (OUTPATIENT)
Dept: SURGERY | Facility: CLINIC | Age: 63
End: 2018-12-20
Payer: COMMERCIAL

## 2018-12-20 VITALS
TEMPERATURE: 98 F | HEART RATE: 58 BPM | DIASTOLIC BLOOD PRESSURE: 72 MMHG | SYSTOLIC BLOOD PRESSURE: 144 MMHG | WEIGHT: 244.81 LBS | BODY MASS INDEX: 45.05 KG/M2 | HEIGHT: 62 IN

## 2018-12-20 DIAGNOSIS — Z80.3 FAMILY HISTORY OF BREAST CANCER: Primary | ICD-10-CM

## 2018-12-20 PROCEDURE — 3077F SYST BP >= 140 MM HG: CPT | Mod: CPTII,S$GLB,, | Performed by: SURGERY

## 2018-12-20 PROCEDURE — 3078F DIAST BP <80 MM HG: CPT | Mod: CPTII,S$GLB,, | Performed by: SURGERY

## 2018-12-20 PROCEDURE — 99213 OFFICE O/P EST LOW 20 MIN: CPT | Mod: S$GLB,,, | Performed by: SURGERY

## 2018-12-20 PROCEDURE — 99999 PR PBB SHADOW E&M-EST. PATIENT-LVL III: CPT | Mod: PBBFAC,,, | Performed by: SURGERY

## 2018-12-20 PROCEDURE — 3008F BODY MASS INDEX DOCD: CPT | Mod: CPTII,S$GLB,, | Performed by: SURGERY

## 2018-12-20 NOTE — LETTER
December 25, 2018      Areli Crespo MD  [address]  [place] [address]           [facility] Breast Surgery  [address]  [facility] [address]  Phone: [phone]  Fax: [phone]          Patient: Verenice Mirza   MR Number: [id number]   YOB: 1955   Date of Visit: 12/20/2018       Dear Dr. Areli Crespo:    Thank you for referring Verenice Mirza to me for evaluation. Attached you will find relevant portions of my assessment and plan of care.    If you have questions, please do not hesitate to call me. I look forward to following Verenice Mirza along with you.    Sincerely,    Cristina Abraham MD    Enclosure  CC:  No Recipients    If you would like to receive this communication electronically, please contact [email or web address] or [phone] to request more information on [facility] access.    For providers and/or their staff who would like to refer a patient to [facility], please contact us through our one-stop-shop provider referral line, [facility], at [phone].    If you feel you have received this communication in error or would no longer like to receive these types of communications, please e-mail [email or web address]

## 2018-12-20 NOTE — PROGRESS NOTES
BREAST HIGH RISK CLINIC  [facility]  Breast Surgery      Date of Visit:  2018    Referring provider:  Areli Crespo MD  [address]  [place] [address]    PCP:  Areli Crespo MD      Verenice Mirza is a 63 y.o. postmenopausal female referred for evaluation of increased risk of breast cancer based on self-reported dense breast tissue.  Here today to discussed options of high risk screening and risk reduction. She attempted to undergo MRI screening earlier this fall, but could not tolerate the exam secondary to lower back pain with positioning.     Other breast cancer risk factors include mom with breast CA and nulliparous.     Age of menarche was unknown.  Age of menopause was 49.   Patient denies hormonal therapy.     Patient is .       Family History is significant for the following:  Mother:  Breast cancer (diagnosed age 82,  age 89 from alzheimers)  Father:  none  Sister(s):  none  Maternal aunts:  none  Paternal aunts:  Breast cancer (diagnosed in 60s)      Social History:   Smoking: smoked <1pack per week for 25 years, quit   Drinking:  Does not drink secondary to her fatty liver    PMH:   HTN, HLD, heartburn, prediabetic, hypothyroid  Uterine fibroids, endometriosis. Hysterectomy around age 49.   Colon polyps, routinely undergoes colonoscopies.   Esophageal dilation this year, biopsy benign.  Obesity - met with Dr. Power and is considering bariatric surgery next year.    PSH:   hysterectomy, bladder sling    The following portions of the patient's history were reviewed and updated as appropriate: allergies, current medications, past family history, past medical history, past social history, past surgical history and problem list.    Review of Systems  A comprehensive review of systems was negative except for: Constitutional: positive for overweight  Gastrointestinal: positive for constipation, diarrhea, dysphagia and reflux symptoms  Musculoskeletal: positive for  "arthralgias and back pain  Endocrine: positive for diabetic symptoms including prediabetes and temperature intolerance      Objective:   BP (!) 144/72 (BP Location: Right arm, Patient Position: Sitting, BP Method: Large (Automatic))   Pulse (!) 58   Temp 97.7 °F (36.5 °C) (Oral)   Ht 5' 2" (1.575 m)   Wt 111 kg (244 lb 13.1 oz)   BMI 44.78 kg/m²     Physical exam declined by patient.       Imaging  Mammogram 6/28/18:  The breasts have scattered areas of fibroglandular density. There is no evidence of suspicious masses, microcalcifications or architectural distortion.     Impression:  No mammographic evidence of malignancy.     BI-RADS Category 1: Negative        Assessment:     This is a 63 y.o. female that has a TC score of 19%, and is therefore not high risk.      Plan:   Discussed risk models can vary based on the model used.  There are several models available and we currently use TC model for our patients with family history.      We also discussed risk reduction options. Discussed that we recommend a healthy lifestyle.  Her calculated risk of developing breast cancer would be dramatically decreased if the patient were to be able to achieve a normal BMI. In addition to her meeting with Dr. Power, recommended meeting with Dr. Hernandez (bariatrician) for weight loss assistance/strategies.     Nutrition we recommend fresh fruits and vegetables and lean meats and avoidance of processed foods.    Exercise I recommend at least 30 minutes of cardiovascular exercise 4-5 times per week, even walking would have benefit.  Discussed that exercise can lower the relative risk of breast cancer by about 18-20%.  Also discussed that obesity is linked to higher risk of breast cancer, therefore exercise in important.    Discussed alcohol use and some studies suggest that increase alcohol intake may increase breast cancer risk.  Therefore, I do recommend limited alcohol intake.    Also discussed risk factors that are not " modifiable, such as age at menarche, age at menopause, age at first pregnancy, and family history.     We did discuss hormone replacement therapy as well.  In patients at increased risk, I usually do not recommend HRT for long periods of time.      The patient will follow up on a PRN basis.

## 2018-12-30 ENCOUNTER — PATIENT MESSAGE (OUTPATIENT)
Dept: SURGERY | Facility: CLINIC | Age: 63
End: 2018-12-30

## 2018-12-31 ENCOUNTER — CLINICAL SUPPORT (OUTPATIENT)
Dept: REHABILITATION | Facility: HOSPITAL | Age: 63
End: 2018-12-31
Payer: COMMERCIAL

## 2018-12-31 DIAGNOSIS — G89.29 CHRONIC RIGHT-SIDED LOW BACK PAIN WITHOUT SCIATICA: ICD-10-CM

## 2018-12-31 DIAGNOSIS — M53.86 DECREASED ROM OF LUMBAR SPINE: ICD-10-CM

## 2018-12-31 DIAGNOSIS — Z74.09 IMPAIRED MOBILITY: ICD-10-CM

## 2018-12-31 DIAGNOSIS — M54.50 CHRONIC RIGHT-SIDED LOW BACK PAIN WITHOUT SCIATICA: ICD-10-CM

## 2018-12-31 PROCEDURE — 97110 THERAPEUTIC EXERCISES: CPT | Mod: PN

## 2018-12-31 PROCEDURE — 97140 MANUAL THERAPY 1/> REGIONS: CPT | Mod: PN

## 2018-12-31 NOTE — PROGRESS NOTES
"  Physical Therapy Daily Treatment Note     Name: Verenice Mirza  Clinic Number: [id number]    Therapy Diagnosis:   Encounter Diagnoses   Name Primary?    Chronic right-sided low back pain without sciatica     Impaired mobility     Decreased ROM of lumbar spine      Physician: Areli Crespo MD    Visit Date: 12/31/2018    Physician Orders: Pt eval and treat  Medical Diagnosis: M54.5,G89.29 (ICD-10-CM) - Chronic right-sided low back pain without sciatica  Evaluation Date: 11/19/18  Authorization Period Expiration: 12/31/18  Plan of Care Certification Period: 11/19/18 to 1/19/19  Visit #/Visits authorized: 8/50    Time In: 1300  Time Out: 1604  Total Billable Time: 64 (TE-3, MT-1)    Precautions: Standard    Subjective     Pt reports: "it's [middle and lower back] always uncomfortable." Pt reports she feels it when she coughs and bumps a shoulder she will feel it in her middle and lower back.   She was compliant with home exercise program.   Response to previous treatment: no adverse effects  Functional change: none    Pain: low and mid back  Location: 0/10    Objective     Verenice received therapeutic exercises to develop strength and ROM, core stabilization, posture for 52 minutes with PT 1:1 including:   Log below     Date 12/31/18   Visit  Exp 12/31/18 8   POC 1/19/19    FOTO 8/10        Stretches:    Supine QL with arms overhead 3x30" B   Seated lat 3x30" B   Seated trunk flexion and sidebend 5"x10 ea       Supine:    DKC c/ SB 5"x20 c/ SB   TAs 10"x10   TA with LE extension 2x10   Bridge 3" 3x10 DL       Seated    TA  10"x10 on mat  10"x10 on SB   March on SB 3x10 B   Tail wags on SB 2x1'   Ant/post pelvic tilt on SB 2x1'        Standing:    Lats BTB 2x10   Pallof Press RTB x15 B   Leg Press 5.0 3x10 DL         Initials CC     Verenice received manual therapy including soft tissue mobilizations and myofascial release for 12 minutes including: static and dynamic cupping to middle and lower thoracic paraspinals, " emphasis on R side.    Education provided:    Patient instructed to cont prior HEP    Written Home Exercises Provided: See EMR under Patient Instructions for exercises provided prior visit.  Exercises were reviewed and Verenice was able to demonstrate them prior to the end of the session.  Verenice demonstrated good  understanding of the education provided.     Assessment     Significant improvement in TA activation and hold statically and dynamically. Increased soft tissue restrictions to R middle to lower thoracic paraspinals.      Verenice is progressing well towards her goals.   Pt prognosis is Excellent.   Pt will continue to benefit from skilled outpatient physical therapy to address the deficits listed in the problem list box on initial evaluation, provide pt/family education and to maximize pt's level of independence in the home and community environment.     Pt's spiritual, cultural and educational needs considered and pt agreeable to plan of care and goals.  Anticipated barriers to physical therapy: none    Short Term Goals (3-4 Weeks):   1. Pt will be compliant with HEP to supplement PT in decreasing pain with functional mobility.  2. Pt will perform tail wags seated on Swiss ball for 2' unsupported to demonstrate improved core strength  3. Pt will improve lumbar ROM to </=mod loss in all planes to improve functional mobility. MET   4. Pt will improve impaired LE MMTs to >/=4/5 to improve strength for functional tasks.  4. Pt will report pain </=4/10 with walking 30 minutes to promote community ambulation.  5. Pt will report pain </=4/10 during lumbar ROM to promote functional mobility. MET  Long Term Goals (8 Weeks):   1. Pt will improve FOTO score to </= 47% limited to decrease perceived limitation with maintaining/changing body position  2. Pt will perform pallof press with rotation without difficulty to demonstrate improved core strength.  3. Pt will improve impaired LE MMTs to >/=4+/5 to improve strength for  functional tasks.  4. Pt will improve lumbar ROM to </=min loss in all planes to improve functional mobility.  5. Pt will report no pain with walking 30 minutes to promote community ambulation.  6. Pt will report no pain during lumbar ROM to promote functional mobility.     Plan     Continue POC. Progress supine core strengthening in standing.     Kerline Hsu, PT

## 2019-01-03 ENCOUNTER — CLINICAL SUPPORT (OUTPATIENT)
Dept: REHABILITATION | Facility: HOSPITAL | Age: 64
End: 2019-01-03
Payer: COMMERCIAL

## 2019-01-03 DIAGNOSIS — M54.50 CHRONIC RIGHT-SIDED LOW BACK PAIN WITHOUT SCIATICA: ICD-10-CM

## 2019-01-03 DIAGNOSIS — Z74.09 IMPAIRED MOBILITY: ICD-10-CM

## 2019-01-03 DIAGNOSIS — M53.86 DECREASED ROM OF LUMBAR SPINE: ICD-10-CM

## 2019-01-03 DIAGNOSIS — G89.29 CHRONIC RIGHT-SIDED LOW BACK PAIN WITHOUT SCIATICA: ICD-10-CM

## 2019-01-03 PROCEDURE — 97110 THERAPEUTIC EXERCISES: CPT | Mod: PN

## 2019-01-03 NOTE — PROGRESS NOTES
"  Physical Therapy Daily Treatment Note     Name: Verenice Mirza  Clinic Number: [id number]    Therapy Diagnosis:   Encounter Diagnoses   Name Primary?    Chronic right-sided low back pain without sciatica     Impaired mobility     Decreased ROM of lumbar spine      Physician: Areli Crespo MD    Visit Date: 1/3/2019    Physician Orders: Pt eval and treat  Medical Diagnosis: M54.5,G89.29 (ICD-10-CM) - Chronic right-sided low back pain without sciatica  Evaluation Date: 11/19/18  Authorization Period Expiration: 12/31/18  Plan of Care Certification Period: 11/19/18 to 1/19/19  Visit #/Visits authorized: 9/50    Time In: 1300  Time Out: 1355  Total Billable Time: 55 (TE-4)    Precautions: Standard    Subjective     Pt reports: "I'm really doing better.    She was compliant with home exercise program.   Response to previous treatment: no adverse effects  Functional change: none    Pain: low and mid back  Location: 0/10    Objective     Verenice received therapeutic exercises to develop strength and ROM, core stabilization, posture for 55 minutes with PTA1:1 including:   Log below     Date 1//3/19 12/31/18   Visit  Exp 12/31/18 9 8   POC 1/19/19     FOTO 9/10 8/10         Stretches:     Supine QL with arms overhead 3x30" B 3x30" B   Seated lat 3x30" B 3x30" B   Seated trunk flexion and sidebend 5"10 ea. 5"x10 ea        Supine:     DKC c/ SB 2' w/SB 5"x20 c/ SB   TAs 10"x10 10"x10   TA with LE extension 2x10 2x10   Bridge 3" 3x10 DL 3" 3x10 DL        Seated     TA  10x10" on SB 10"x10 on mat  10"x10 on SB   LAQ on SB 2x10 B    March on SB 3x10 B 3x10 B   Tail wags on SB 2x1' 2x1'   Ant/post pelvic tilt on SB 2x1' 2x1'         Standing:     Lats BTB 2x10 BTB 2x10   Pallof Press RTB x 20  RTB x15 B   Leg Press 5.5 3x10 DL 5.0 3x10 DL   Calf stretch 3x30"       Initials MB 1/6 CC     Verenice received manual therapy including soft tissue mobilizations and myofascial release for 12 minutes including: static and dynamic cupping " to middle and lower thoracic paraspinals, emphasis on R side. NP today due to pain free    Education provided:    Patient instructed to cont prior HEP    Written Home Exercises Provided: See EMR under Patient Instructions for exercises provided prior visit.  Exercises were reviewed and Verenice was able to demonstrate them prior to the end of the session.  Verenice demonstrated good  understanding of the education provided.     Assessment     Significant improvement in TA activation and hold statically and dynamically and breathe normally. MT deferred due to patient pain free.      Verenice is progressing well towards her goals.   Pt prognosis is Excellent.   Pt will continue to benefit from skilled outpatient physical therapy to address the deficits listed in the problem list box on initial evaluation, provide pt/family education and to maximize pt's level of independence in the home and community environment.     Pt's spiritual, cultural and educational needs considered and pt agreeable to plan of care and goals.  Anticipated barriers to physical therapy: none    Short Term Goals (3-4 Weeks):   1. Pt will be compliant with HEP to supplement PT in decreasing pain with functional mobility.  2. Pt will perform tail wags seated on Swiss ball for 2' unsupported to demonstrate improved core strength  3. Pt will improve lumbar ROM to </=mod loss in all planes to improve functional mobility. MET   4. Pt will improve impaired LE MMTs to >/=4/5 to improve strength for functional tasks.  4. Pt will report pain </=4/10 with walking 30 minutes to promote community ambulation.  5. Pt will report pain </=4/10 during lumbar ROM to promote functional mobility. MET  Long Term Goals (8 Weeks):   1. Pt will improve FOTO score to </= 47% limited to decrease perceived limitation with maintaining/changing body position  2. Pt will perform pallof press with rotation without difficulty to demonstrate improved core strength.  3. Pt will improve  impaired LE MMTs to >/=4+/5 to improve strength for functional tasks.  4. Pt will improve lumbar ROM to </=min loss in all planes to improve functional mobility.  5. Pt will report no pain with walking 30 minutes to promote community ambulation.  6. Pt will report no pain during lumbar ROM to promote functional mobility.     Plan     Continue POC. Progress supine core strengthening in standing.     Jeancarlos Ahuja, PTA

## 2019-01-08 ENCOUNTER — CLINICAL SUPPORT (OUTPATIENT)
Dept: REHABILITATION | Facility: HOSPITAL | Age: 64
End: 2019-01-08
Payer: COMMERCIAL

## 2019-01-08 DIAGNOSIS — M54.50 CHRONIC RIGHT-SIDED LOW BACK PAIN WITHOUT SCIATICA: ICD-10-CM

## 2019-01-08 DIAGNOSIS — G89.29 CHRONIC RIGHT-SIDED LOW BACK PAIN WITHOUT SCIATICA: ICD-10-CM

## 2019-01-08 DIAGNOSIS — M53.86 DECREASED ROM OF LUMBAR SPINE: ICD-10-CM

## 2019-01-08 DIAGNOSIS — Z74.09 IMPAIRED MOBILITY: ICD-10-CM

## 2019-01-08 PROCEDURE — 97110 THERAPEUTIC EXERCISES: CPT | Mod: PN

## 2019-01-08 NOTE — PROGRESS NOTES
"  Physical Therapy Daily Treatment Note     Name: Verenice Mirza  Clinic Number: [id number]    Therapy Diagnosis:   Encounter Diagnoses   Name Primary?    Chronic right-sided low back pain without sciatica     Impaired mobility     Decreased ROM of lumbar spine      Physician: Areli Crespo MD    Visit Date: 1/8/2019    Physician Orders: Pt eval and treat  Medical Diagnosis: M54.5,G89.29 (ICD-10-CM) - Chronic right-sided low back pain without sciatica  Evaluation Date: 11/19/18  Authorization Period Expiration: 12/31/18  Plan of Care Certification Period: 11/19/18 to 1/19/19  Visit #/Visits authorized: 10/50  FOTO 10/10     Time In: 1:06 pm   Time Out: 1:53 pm   Total Billable Time: 47 minutes      Subjective     Pt reports: that she was feeling good today.   She was compliant with home exercise program.  Response to previous treatment: no adverse effects  Functional change: pt report of no pain    Pain: 0/10       Objective     Verenice received therapeutic exercises to develop strength and ROM, core stabilization, posture for 47 minutes with PT1:1 including:   Log below     Date 1/8/19 1//3/19 12/31/18   Visit  Exp 12/31/18 10 9 8   POC 1/19/19      FOTO 10/10 done 9/10 8/10          Stretches:      Supine QL with arms overhead 3x30" B 3x30" B 3x30" B   Seated lat 3x30" B 3x30" B 3x30" B   Seated trunk flexion and sidebend 5"x10 ea 5"10 ea. 5"x10 ea         Supine:      DKC c/ SB 2' w/SB 2' w/SB 5"x20 c/ SB   TAs 10"x15 10"x10 10"x10   TA with LE extension 2x12 B 2x10 2x10   Bridge 3" 3x10 DL  3" 3x10 DL 3" 3x10 DL         Seated      TA  10"x10 SB 10x10" on SB 10"x10 on mat  10"x10 on SB   LAQ on SB 2x10 B 2x10 B    March on SB  3x10 B 3x10 B   Tail wags on SB 2x1'  2x1' 2x1'   Ant/post pelvic tilt on SB 2x1'  2x1' 2x1'          Standing:      Lats BTB 2x12 BTB 2x10 BTB 2x10   Pallof Press RTB 2x10 B RTB x 20  RTB x15 B   Leg Press 5.5 3x10 DL  5.5 3x10 DL 5.0 3x10 DL   Calf stretch - 3x30"       Initials CK MB " 1/6 CC       Home Exercises Provided and Patient Education Provided     Education provided:   - Continue with HEP    Written Home Exercises Provided: Patient instructed to cont prior HEP.  Exercises were reviewed and Verenice was able to demonstrate them prior to the end of the session.  Verenice demonstrated good  understanding of the education provided.         Assessment     Pt tolerated all therex without c/o increased pain. Pt required Min A from PT to maintain balance when performing therex sitting on SB.   Verenice is progressing well towards her goals.   Pt prognosis is Good.     Pt will continue to benefit from skilled outpatient physical therapy to address the deficits listed in the problem list box on initial evaluation, provide pt/family education and to maximize pt's level of independence in the home and community environment.     Pt's spiritual, cultural and educational needs considered and pt agreeable to plan of care and goals.    Anticipated barriers to physical therapy: none    Goals:     Short Term Goals (3-4 Weeks):   1. Pt will be compliant with HEP to supplement PT in decreasing pain with functional mobility.  2. Pt will perform tail wags seated on Swiss ball for 2' unsupported to demonstrate improved core strength  3. Pt will improve lumbar ROM to </=mod loss in all planes to improve functional mobility. MET   4. Pt will improve impaired LE MMTs to >/=4/5 to improve strength for functional tasks.  4. Pt will report pain </=4/10 with walking 30 minutes to promote community ambulation.  5. Pt will report pain </=4/10 during lumbar ROM to promote functional mobility. MET  Long Term Goals (8 Weeks):   1. Pt will improve FOTO score to </= 47% limited to decrease perceived limitation with maintaining/changing body position  2. Pt will perform pallof press with rotation without difficulty to demonstrate improved core strength.  3. Pt will improve impaired LE MMTs to >/=4+/5 to improve strength for functional  tasks.  4. Pt will improve lumbar ROM to </=min loss in all planes to improve functional mobility.  5. Pt will report no pain with walking 30 minutes to promote community ambulation.  6. Pt will report no pain during lumbar ROM to promote functional mobility.       Plan     Continue per POC, progress as tolerated    Mayra Solorio, PT

## 2019-01-08 NOTE — PROGRESS NOTES
"  Physical Therapy Daily Treatment Note     Name: Verenice Mirza  Clinic Number: [id number]    Therapy Diagnosis: No diagnosis found.  Physician: No ref. provider found    Visit Date: 1/8/2019    Physician Orders: Pt eval and treat  Medical Diagnosis: M54.5,G89.29 (ICD-10-CM) - Chronic right-sided low back pain without sciatica  Evaluation Date: 11/19/18  Authorization Period Expiration: 12/31/18  Plan of Care Certification Period: 11/19/18 to 1/19/19  Visit #/Visits authorized: 10/50  FOTO: 10/10     Time In: 1300  Time Out: 1355  Total Billable Time: 55 (TE-4)     Precautions: Standard      Subjective     Pt reports: ***.  She {Actions; was/was not:36406} compliant with home exercise program.  Response to previous treatment: ***  Functional change: ***    Pain: {0-10:70954::"0"}/10  Location: {RIGHT/LEFT/BILATERAL:37320} {LOCATION ON BODY:04100}     Objective         Verenice received therapeutic exercises to develop {AMB PT PROGRESS OBJECTIVE:68249} for *** minutes including:  ***  Date 1//3/19 12/31/18   Visit  Exp 12/31/18 9 8   POC 1/19/19       FOTO 9/10 8/10           Stretches:       Supine QL with arms overhead 3x30" B 3x30" B   Seated lat 3x30" B 3x30" B   Seated trunk flexion and sidebend 5"10 ea. 5"x10 ea           Supine:       DKC c/ SB 2' w/SB 5"x20 c/ SB   TAs 10"x10 10"x10   TA with LE extension 2x10 2x10   Bridge 3" 3x10 DL 3" 3x10 DL           Seated       TA  10x10" on SB 10"x10 on mat  10"x10 on SB   LAQ on SB 2x10 B     March on SB 3x10 B 3x10 B   Tail wags on SB 2x1' 2x1'   Ant/post pelvic tilt on SB 2x1' 2x1'           Standing:       Lats BTB 2x10 BTB 2x10   Pallof Press RTB x 20  RTB x15 B   Leg Press 5.5 3x10 DL 5.0 3x10 DL   Calf stretch 3x30"        Initials MB 1/6 CC          Home Exercises Provided and Patient Education Provided     Education provided:   - ***    Written Home Exercises Provided: {Blank single:26535::"yes","Patient instructed to cont prior HEP"}.  Exercises were reviewed and " "Verenice was able to demonstrate them prior to the end of the session.  Verenice demonstrated {Desc; good/fair/poor:04463} understanding of the education provided.     See EMR under {Alexandra single:79288::"Media","Patient Instructions"} for exercises provided {Alexandra single:69301::"1/8/2019","prior visit"}.    Assessment     ***  Verenice {IS/IS NOT:19222} progressing well towards her goals.   Pt prognosis is {REHAB PROGNOSIS OHS:34111}.     Pt will continue to benefit from skilled outpatient physical therapy to address the deficits listed in the problem list box on initial evaluation, provide pt/family education and to maximize pt's level of independence in the home and community environment.     Pt's spiritual, cultural and educational needs considered and pt agreeable to plan of care and goals.    Anticipated barriers to physical therapy: none    Goals:     Short Term Goals (3-4 Weeks):   1. Pt will be compliant with HEP to supplement PT in decreasing pain with functional mobility.  2. Pt will perform tail wags seated on Swiss ball for 2' unsupported to demonstrate improved core strength  3. Pt will improve lumbar ROM to </=mod loss in all planes to improve functional mobility. MET   4. Pt will improve impaired LE MMTs to >/=4/5 to improve strength for functional tasks.  4. Pt will report pain </=4/10 with walking 30 minutes to promote community ambulation.  5. Pt will report pain </=4/10 during lumbar ROM to promote functional mobility. MET  Long Term Goals (8 Weeks):   1. Pt will improve FOTO score to </= 47% limited to decrease perceived limitation with maintaining/changing body position  2. Pt will perform pallof press with rotation without difficulty to demonstrate improved core strength.  3. Pt will improve impaired LE MMTs to >/=4+/5 to improve strength for functional tasks.  4. Pt will improve lumbar ROM to </=min loss in all planes to improve functional mobility.  5. Pt will report no pain with walking 30 minutes to " promote community ambulation.  6. Pt will report no pain during lumbar ROM to promote functional mobility.  Plan     ***    Corinne Quiles, PTA

## 2019-01-10 ENCOUNTER — CLINICAL SUPPORT (OUTPATIENT)
Dept: REHABILITATION | Facility: HOSPITAL | Age: 64
End: 2019-01-10
Payer: COMMERCIAL

## 2019-01-10 DIAGNOSIS — G89.29 CHRONIC RIGHT-SIDED LOW BACK PAIN WITHOUT SCIATICA: ICD-10-CM

## 2019-01-10 DIAGNOSIS — M53.86 DECREASED ROM OF LUMBAR SPINE: ICD-10-CM

## 2019-01-10 DIAGNOSIS — M54.50 CHRONIC RIGHT-SIDED LOW BACK PAIN WITHOUT SCIATICA: ICD-10-CM

## 2019-01-10 DIAGNOSIS — Z74.09 IMPAIRED MOBILITY: ICD-10-CM

## 2019-01-10 PROCEDURE — 97110 THERAPEUTIC EXERCISES: CPT | Mod: PN

## 2019-01-10 NOTE — PROGRESS NOTES
"  Physical Therapy Daily Treatment Note     Name: Verenice Mirza  Clinic Number: [id number]    Therapy Diagnosis:   Encounter Diagnoses   Name Primary?    Chronic right-sided low back pain without sciatica     Impaired mobility     Decreased ROM of lumbar spine      Physician: Areli Crespo MD    Visit Date: 1/10/2019    Physician Orders: Pt eval and treat  Medical Diagnosis: M54.5,G89.29 (ICD-10-CM) - Chronic right-sided low back pain without sciatica  Evaluation Date: 11/19/18  Authorization Period Expiration: 12/31/18  Plan of Care Certification Period: 11/19/18 to 1/19/19  Visit #/Visits authorized: 11/50  FOTO 11/20     Time In: 1:00  pm   Time Out: 1:55 pm   Total Billable Time: 30 minutes      Subjective     Pt reports: that she was feeling good today.   She was compliant with home exercise program.  Response to previous treatment: no adverse effects  Functional change: pt report of no pain    Pain: 0/10       Objective     Verenice received therapeutic exercises to develop strength and ROM, core stabilization, posture for 47 minutes with PT1:1 including:   Log below     Date 1/10/19 1/8/19 1//3/19 12/31/18   Visit  Exp 12/31/18 11 10 9 8   POC 1/19/19       FOTO 11/20 10/10 done 9/10 8/10           Stretches:       Supine QL with arms overhead 3x30" B 3x30" B 3x30" B 3x30" B   Seated lat  3x30" B 3x30" B 3x30" B   Seated trunk flexion and sidebend 5"x10 ea 3 way 5"x10 ea 5"10 ea. 5"x10 ea          Supine:       DKC c/ SB 2' w/SB 2' w/SB 2' w/SB 5"x20 c/ SB   TAs 10"x15 10"x15 10"x10 10"x10   TA with LE extension 2x12 B 2x12 B 2x10 2x10   Bridge 3" 3x10 DL 3" 3x10 DL  3" 3x10 DL 3" 3x10 DL   HSS 2x30" w/strap      Seated       TA  10"x10 SB 10"x10 SB 10x10" on SB 10"x10 on mat  10"x10 on SB   LAQ on SB 2x10 B  2x10 B 2x10 B    March on SB 2x10 B  3x10 B 3x10 B   Tail wags on SB 2x1' 2x1'  2x1' 2x1'   Ant/post pelvic tilt on SB 2x1' 2x1'  2x1' 2x1'           Standing:       Lats BTB 2x12 BTB 2x12 BTB 2x10 BTB " "2x10   Pallof Press RTB 2x10 B RTB 2x10 B RTB x 20  RTB x15 B   Leg Press 6.0# 3x10 DL 5.5 3x10 DL  5.5 3x10 DL 5.0 3x10 DL   Calf stretch  - 3x30"       Initials MB 1/6 CK MB 1/6 CC       Home Exercises Provided and Patient Education Provided     Education provided:   - Continue with HEP    Written Home Exercises Provided: Patient instructed to cont prior HEP.  Exercises were reviewed and Verenice was able to demonstrate them prior to the end of the session.  Verenice demonstrated good  understanding of the education provided.         Assessment     Pt tolerated all therex without c/o increased pain. Pt required no assistance but benefited from demonstration to maintain balance when performing therex sitting on SB.   Verenice is progressing well towards her goals.   Pt prognosis is Good.     Pt will continue to benefit from skilled outpatient physical therapy to address the deficits listed in the problem list box on initial evaluation, provide pt/family education and to maximize pt's level of independence in the home and community environment.     Pt's spiritual, cultural and educational needs considered and pt agreeable to plan of care and goals.    Anticipated barriers to physical therapy: none    Goals:     Short Term Goals (3-4 Weeks):   1. Pt will be compliant with HEP to supplement PT in decreasing pain with functional mobility.  2. Pt will perform tail wags seated on Swiss ball for 2' unsupported to demonstrate improved core strength  3. Pt will improve lumbar ROM to </=mod loss in all planes to improve functional mobility. MET   4. Pt will improve impaired LE MMTs to >/=4/5 to improve strength for functional tasks.  4. Pt will report pain </=4/10 with walking 30 minutes to promote community ambulation.  5. Pt will report pain </=4/10 during lumbar ROM to promote functional mobility. MET  Long Term Goals (8 Weeks):   1. Pt will improve FOTO score to </= 47% limited to decrease perceived limitation with " maintaining/changing body position  2. Pt will perform pallof press with rotation without difficulty to demonstrate improved core strength.  3. Pt will improve impaired LE MMTs to >/=4+/5 to improve strength for functional tasks.  4. Pt will improve lumbar ROM to </=min loss in all planes to improve functional mobility.  5. Pt will report no pain with walking 30 minutes to promote community ambulation.  6. Pt will report no pain during lumbar ROM to promote functional mobility.       Plan     Continue per POC, progress as tolerated    Jeancarlos Ahuja, JODIE

## 2019-01-15 ENCOUNTER — CLINICAL SUPPORT (OUTPATIENT)
Dept: REHABILITATION | Facility: HOSPITAL | Age: 64
End: 2019-01-15
Payer: COMMERCIAL

## 2019-01-15 DIAGNOSIS — M54.50 CHRONIC RIGHT-SIDED LOW BACK PAIN WITHOUT SCIATICA: ICD-10-CM

## 2019-01-15 DIAGNOSIS — M53.86 DECREASED ROM OF LUMBAR SPINE: ICD-10-CM

## 2019-01-15 DIAGNOSIS — Z74.09 IMPAIRED MOBILITY: ICD-10-CM

## 2019-01-15 DIAGNOSIS — G89.29 CHRONIC RIGHT-SIDED LOW BACK PAIN WITHOUT SCIATICA: ICD-10-CM

## 2019-01-15 PROCEDURE — 97110 THERAPEUTIC EXERCISES: CPT | Mod: PN

## 2019-01-15 NOTE — PROGRESS NOTES
"  Physical Therapy Daily Treatment Note     Name: Verenice Mirza  Clinic Number: [id number]    Therapy Diagnosis:   Encounter Diagnoses   Name Primary?    Chronic right-sided low back pain without sciatica     Impaired mobility     Decreased ROM of lumbar spine      Physician: Areli Crespo MD    Visit Date: 1/15/2019    Physician Orders: Pt eval and treat  Medical Diagnosis: M54.5,G89.29 (ICD-10-CM) - Chronic right-sided low back pain without sciatica  Evaluation Date: 18  Authorization Period Expiration: 18  Plan of Care Certification Period: 18 to 19  Visit #/Visits authorized:   FOTO      Time In: 2:00  pm   Time Out: 2:55 pm   Total Billable Time: 30 minutes      Subjective     Pt reports: no pain no c/o. Has been 2 weeks since she has experienced any pain  She was compliant with home exercise program.  Response to previous treatment: no adverse effects  Functional change: pt report of no pain currently.   Pain: 0/10       Objective     Verenice received therapeutic exercises to develop strength and ROM, core stabilization, posture for 30 minutes with PTA1:1 includin minutes supervised  Log below     Date 1/15/19 1/10/19 1/8/19 1//3/19 12/31/18   Visit  Exp 18 12 11 10 9 8   POC 19        FOTO  10/10 done 9/10 8/10            Stretches:        Supine QL with arms overhead 3x30" B 3x30" B 3x30" B 3x30" B 3x30" B   Seated lat   3x30" B 3x30" B 3x30" B   Seated trunk flexion and sidebend 5"x 10 ea.  3 way 5"x10 ea 3 way 5"x10 ea 5"10 ea. 5"x10 ea           Supine:        DKC c/ SB 2' w/Grn SB 2' w/SB 2' w/SB 2' w/SB 5"x20 c/ SB   TAs 10"x15 10"x15 10"x15 10"x10 10"x10   TA with LE extension 2x12 B 2x12 B 2x12 B 2x10 2x10   Bridge 3x10 DL w/3" hold 3" 3x10 DL 3" 3x10 DL  3" 3x10 DL 3" 3x10 DL   HSS 3x30" w/strap 2x30" w/strap      Seated        TA  10"x10 10"x10 SB 10"x10 SB 10x10" on SB 10"x10 on mat  10"x10 on SB   LAQ on SB 2x10 B 2x10 B  2x10 B 2x10 " "B    March on SB 2x10 B 2x10 B  3x10 B 3x10 B   Tail wags on SB 2x 1' 2x1' 2x1'  2x1' 2x1'   Ant/post pelvic tilt on SB 2x1' 2x1' 2x1'  2x1' 2x1'            Standing:        Lats BTB 2x15 BTB 2x12 BTB 2x12 BTB 2x10 BTB 2x10   Pallof Press GTB 2x10 B RTB 2x10 B RTB 2x10 B RTB x 20  RTB x15 B   Leg Press 6.0 3x10 DL  4.0 2x10 SL B 6.0# 3x10 DL 5.5 3x10 DL  5.5 3x10 DL 5.0 3x10 DL   Calf stretch   - 3x30"       Initials MB 2/6 MB 1/6 CK MB 1/6 CC       Home Exercises Provided and Patient Education Provided     Education provided:   - Continue with HEP    Written Home Exercises Provided: Patient instructed to cont prior HEP.  Exercises were reviewed and Verenice was able to demonstrate them prior to the end of the session.  Verenice demonstrated good  understanding of the education provided.         Assessment     Pt tolerated all therex without c/o increased pain. Pt required no assistance but needed verbal cues for balance improvement and technique when performing therex sitting on SB. Progressed per log  Verenice is progressing well towards her goals.   Pt prognosis is Good.     Pt will continue to benefit from skilled outpatient physical therapy to address the deficits listed in the problem list box on initial evaluation, provide pt/family education and to maximize pt's level of independence in the home and community environment.     Pt's spiritual, cultural and educational needs considered and pt agreeable to plan of care and goals.    Anticipated barriers to physical therapy: none    Goals:     Short Term Goals (3-4 Weeks):   1. Pt will be compliant with HEP to supplement PT in decreasing pain with functional mobility.  2. Pt will perform tail wags seated on Swiss ball for 2' unsupported to demonstrate improved core strength  3. Pt will improve lumbar ROM to </=mod loss in all planes to improve functional mobility. MET   4. Pt will improve impaired LE MMTs to >/=4/5 to improve strength for functional tasks.  4. Pt will " report pain </=4/10 with walking 30 minutes to promote community ambulation.  5. Pt will report pain </=4/10 during lumbar ROM to promote functional mobility. MET  Long Term Goals (8 Weeks):   1. Pt will improve FOTO score to </= 47% limited to decrease perceived limitation with maintaining/changing body position  2. Pt will perform pallof press with rotation without difficulty to demonstrate improved core strength.  3. Pt will improve impaired LE MMTs to >/=4+/5 to improve strength for functional tasks.  4. Pt will improve lumbar ROM to </=min loss in all planes to improve functional mobility.  5. Pt will report no pain with walking 30 minutes to promote community ambulation.  6. Pt will report no pain during lumbar ROM to promote functional mobility.       Plan     Continue per POC, progress as tolerated    Jeancarlos Ahuja PTA

## 2019-01-17 ENCOUNTER — CLINICAL SUPPORT (OUTPATIENT)
Dept: REHABILITATION | Facility: HOSPITAL | Age: 64
End: 2019-01-17
Payer: COMMERCIAL

## 2019-01-17 DIAGNOSIS — Z74.09 IMPAIRED MOBILITY: ICD-10-CM

## 2019-01-17 DIAGNOSIS — G89.29 CHRONIC RIGHT-SIDED LOW BACK PAIN WITHOUT SCIATICA: ICD-10-CM

## 2019-01-17 DIAGNOSIS — M53.86 DECREASED ROM OF LUMBAR SPINE: ICD-10-CM

## 2019-01-17 DIAGNOSIS — M54.50 CHRONIC RIGHT-SIDED LOW BACK PAIN WITHOUT SCIATICA: ICD-10-CM

## 2019-01-17 PROCEDURE — 97110 THERAPEUTIC EXERCISES: CPT | Mod: PN

## 2019-01-17 NOTE — PROGRESS NOTES
Physical Therapy Daily Treatment Note     Name: Verenice Mirza  Clinic Number: [id number]    Therapy Diagnosis:   Encounter Diagnoses   Name Primary?    Chronic right-sided low back pain without sciatica     Impaired mobility     Decreased ROM of lumbar spine      Physician: Areli Crespo MD    Visit Date: 1/17/2019    Physician Orders: Pt eval and treat  Medical Diagnosis: M54.5,G89.29 (ICD-10-CM) - Chronic right-sided low back pain without sciatica  Evaluation Date: 11/19/18  Authorization Period Expiration: 12/31/18  Plan of Care Certification Period: 11/19/18 to 1/19/19  Visit #/Visits authorized: 13/50  FOTO: at d/c     Time In: 1400  Time Out: 1440  Total Billable Time: 40 minutes    Subjective     Pt reports: Pt reports 75-80% improvement in low back pain and flexibility since start of care. Lumbar ROM improved, especially flexion and rotation. As a result pt able to pick things up and bathe and groom without issues. Pt reports she is also able to walk on treadmill for longer distances. Pt had intermittent pains under R shoulder blade which has resolved. Pt agreeable to d/c at this time with HEP update and list of recommended gyms.   She was compliant with home exercise program.    Pain: 0/10 currently; 4-5/10 at it's highest in the last week with increased lumbar ROM  Location: B low back     Objective     Verenice received therapeutic exercises to develop strength and ROM, core stabilization, posture for 40 minutes including:    Posture: Standing: R shoulder elevation; Dowager's hump  Palpation: TTP to B latissimus dorsi and L quadratus lumborum  Range of Motion/Strength:       Lumbar AROM: Pain/Dysfunction with Movement:   Flexion Minimal loss, stretching to B HS   Extension Normal, no pain   Right side bending Moderate loss, stretch to L QL   Left side bending Minimal loss, stretch to R QL   Right rotation 80%, pain in R latissimus dorsi   Left rotation 75%, no pain      LE MMTs  Hip flexion: 4+/5 R,  4/5 L  Hip extension: 4/5  Hip abudction: 4+/5 B  Knee flexion: 5/5 B  Knee extension: 4+/5 B   Ankle DF: 4+/5 B     Flexibility: Impaired HS, quadratus lumborum and latissimus dorsi flexibility B  Gait: no AD  Analysis: R toe out  Bed Mobility:Independent  Transfers: Independent     CMS Impairment/Limitation/Restriction for FOTO Lumbar Survey     Therapist reviewed FOTO scores for Verenice Mirza on 11/19/2018.   FOTO documents entered into [employer] - see Media section.     Limitation Score: 50%      Stretches:    Standing QL HEP   Standing lat HEP   Standing HS HEP   Lumbar flexion progression Reviewed for HEP       Supine:    TAs HEP   Reverse march Demonstrated for HEP   Dead bug LE only Demonstrated for HEP   Bridge HEP        Standing:    Lats Demonstrated for HEP   Pallof Press Demonstrated for HEP   4-way hip Demonstrated for HEP     Home Exercises Provided and Patient Education Provided     Education provided:   - Pt given updated HEP including: log above    Written Home Exercises Provided: Yes  Exercises were reviewed and Verenice was able to demonstrate them prior to the end of the session.  Verenice demonstrated good  understanding of the education provided.     See EMR under Patient Instructions for exercises provided 1/17/2019.    Assessment     Since beginning PT, pt has been seen 13 times since initial evaluation on 11/19/18. Overall, she has made good, steady progress with her PT treatments and has worked hard towards all of her PT goals as evidenced by subjective and objective improvements. Since attending PT she has reached most of her PT goals. She will be discharged with an updated HEP and was instructed to contact us with any other questions or concerns. Pt agreeable to d/c.    Goals:   Short Term Goals (3-4 Weeks):   1. Pt will be compliant with HEP to supplement PT in decreasing pain with functional mobility. MET  2. Pt will perform tail wags seated on Swiss ball for 2' unsupported to demonstrate  improved core strength MET  3. Pt will improve lumbar ROM to </=mod loss in all planes to improve functional mobility. MET   4. Pt will improve impaired LE MMTs to >/=4/5 to improve strength for functional tasks. MET  4. Pt will report pain </=4/10 with walking 30 minutes to promote community ambulation. MET  5. Pt will report pain </=4/10 during lumbar ROM to promote functional mobility. MET  Long Term Goals (8 Weeks):   1. Pt will improve FOTO score to </= 47% limited to decrease perceived limitation with maintaining/changing body position NOT MET  2. Pt will perform pallof press with rotation without difficulty to demonstrate improved core strength. NOT PERFORMED  3. Pt will improve impaired LE MMTs to >/=4+/5 to improve strength for functional tasks. PARTIALLY MET  4. Pt will improve lumbar ROM to </=min loss in all planes to improve functional mobility. PARTIALLY MET  5. Pt will report no pain with walking 30 minutes to promote community ambulation. NOT MET  6. Pt will report no pain during lumbar ROM to promote functional mobility. MET    Plan     D/c.     Kerline Hsu, PT

## 2019-02-25 ENCOUNTER — OFFICE VISIT (OUTPATIENT)
Dept: DERMATOLOGY | Facility: CLINIC | Age: 64
End: 2019-02-25
Payer: COMMERCIAL

## 2019-02-25 DIAGNOSIS — D18.00 ANGIOMA: ICD-10-CM

## 2019-02-25 DIAGNOSIS — L81.4 LENTIGO: ICD-10-CM

## 2019-02-25 DIAGNOSIS — L30.4 INTERTRIGO: ICD-10-CM

## 2019-02-25 DIAGNOSIS — R23.8 PIEZOGENIC PEDAL PAPULE: ICD-10-CM

## 2019-02-25 DIAGNOSIS — L73.8 SEBACEOUS GLAND HYPERPLASIA: Primary | ICD-10-CM

## 2019-02-25 DIAGNOSIS — D22.9 NEVUS: ICD-10-CM

## 2019-02-25 DIAGNOSIS — Z85.828 PERSONAL HISTORY OF SKIN CANCER: ICD-10-CM

## 2019-02-25 DIAGNOSIS — L71.9 ROSACEA: ICD-10-CM

## 2019-02-25 DIAGNOSIS — Z86.018 HISTORY OF DYSPLASTIC NEVUS: ICD-10-CM

## 2019-02-25 DIAGNOSIS — L82.1 SK (SEBORRHEIC KERATOSIS): ICD-10-CM

## 2019-02-25 PROCEDURE — 99214 OFFICE O/P EST MOD 30 MIN: CPT | Mod: S$GLB,,, | Performed by: DERMATOLOGY

## 2019-02-25 PROCEDURE — 99214 PR OFFICE/OUTPT VISIT, EST, LEVL IV, 30-39 MIN: ICD-10-PCS | Mod: S$GLB,,, | Performed by: DERMATOLOGY

## 2019-02-25 PROCEDURE — 99999 PR PBB SHADOW E&M-EST. PATIENT-LVL IV: CPT | Mod: PBBFAC,,, | Performed by: DERMATOLOGY

## 2019-02-25 PROCEDURE — 99999 PR PBB SHADOW E&M-EST. PATIENT-LVL IV: ICD-10-PCS | Mod: PBBFAC,,, | Performed by: DERMATOLOGY

## 2019-02-25 RX ORDER — NYSTATIN 100000 [USP'U]/G
POWDER TOPICAL
Qty: 120 G | Refills: 6 | Status: SHIPPED | OUTPATIENT
Start: 2019-02-25 | End: 2020-01-15 | Stop reason: SDUPTHER

## 2019-02-25 RX ORDER — KETOCONAZOLE 20 MG/G
CREAM TOPICAL
Qty: 60 G | Refills: 3 | Status: SHIPPED | OUTPATIENT
Start: 2019-02-25 | End: 2019-12-18 | Stop reason: SDUPTHER

## 2019-02-25 NOTE — PROGRESS NOTES
"  Subjective:       Patient ID:  Verenice Mirza is a 63 y.o. female who presents for   Chief Complaint   Patient presents with    Skin Check    Lesion    Rash     Pt here today for a TBSE. Pt c/o small bump on right eyelid x 2 months. No bleeding, pain or prev tx.  Pt c/o rash under both breast x many years. Tx with water and vinegar and water soaks and antifungal powder. Tx helps.  C/o raised lump on left inner foot x 1 year. Not painful. Now getting a second smaller lesion. No tx.   This is a high risk patient here to check for the development of new lesions.      Patient is here today for a "mole" check.   Pt has a history of normal sun exposure in the past.   Pt recalls several blistering sunburns in the past- yes  Pt has history of tanning bed use- yes  Pt has had moles removed in the past- yes, benign per pt  Pt has history of melanoma in first degree relatives- no          Review of Systems   Skin: Positive for daily sunscreen use and activity-related sunscreen use. Negative for tendency to form keloidal scars and recent sunburn.   Hematologic/Lymphatic: Bruises/bleeds easily.        Objective:    Physical Exam   Constitutional: She appears well-developed and well-nourished. No distress.   Neurological: She is alert and oriented to person, place, and time. She is not disoriented.   Psychiatric: She has a normal mood and affect.   Skin:   Areas Examined (abnormalities noted in diagram):   Scalp / Hair Palpated and Inspected  Head / Face Inspection Performed  Neck Inspection Performed  Chest / Axilla Inspection Performed  Abdomen Inspection Performed  Genitals / Buttocks / Groin Inspection Performed  Back Inspection Performed  RUE Inspected  LUE Inspection Performed  RLE Inspected  LLE Inspection Performed  Nails and Digits Inspection Performed                       Diagram Legend     Erythematous scaling macule/papule c/w actinic keratosis       Vascular papule c/w angioma      Pigmented verrucoid " papule/plaque c/w seborrheic keratosis      Yellow umbilicated papule c/w sebaceous hyperplasia      Irregularly shaped tan macule c/w lentigo     1-2 mm smooth white papules consistent with Milia      Movable subcutaneous cyst with punctum c/w epidermal inclusion cyst      Subcutaneous movable cyst c/w pilar cyst      Firm pink to brown papule c/w dermatofibroma      Pedunculated fleshy papule(s) c/w skin tag(s)      Evenly pigmented macule c/w junctional nevus     Mildly variegated pigmented, slightly irregular-bordered macule c/w mildly atypical nevus      Flesh colored to evenly pigmented papule c/w intradermal nevus       Pink pearly papule/plaque c/w basal cell carcinoma      Erythematous hyperkeratotic cursted plaque c/w SCC      Surgical scar with no sign of skin cancer recurrence      Open and closed comedones      Inflammatory papules and pustules      Verrucoid papule consistent consistent with wart     Erythematous eczematous patches and plaques     Dystrophic onycholytic nail with subungual debris c/w onychomycosis     Umbilicated papule    Erythematous-base heme-crusted tan verrucoid plaque consistent with inflamed seborrheic keratosis     Erythematous Silvery Scaling Plaque c/w Psoriasis     See annotation      Assessment / Plan:        Sebaceous gland hyperplasia  Reassurance given to patient. No treatment is necessary.   Treatment of benign, asymptomatic lesions may be considered cosmetic.    Personal history of skin cancer  History of dysplastic nevus  Area of previous atypical nevus/nevi  And NMSC examined. Site well healed with no signs of recurrence.    Total body skin examination performed today including at least 12 points as noted in physical examination. No lesions suspicious for atypical nevi noted.    Lentigo  This is a benign hyperpigmented sun induced lesion. Daily sun protection will reduce the number of new lesions. Treatment of these benign lesions are considered cosmetic.  The nature  of sun-induced photo-aging and skin cancers is discussed.  Sun avoidance, protective clothing, and the use of 30-SPF sunscreens is advised. Observe closely for skin damage/changes, and call if such occurs.    Angioma  These are benign vascular lesions that are inherited.  Treatment is not necessary.    Nevus  Discussed ABCDE's of nevi.  Monitor for new mole or moles that are becoming bigger, darker, irritated, or developing irregular borders. Brochure provided.    SK (seborrheic keratosis)  These are benign inherited growths without a malignant potential. Reassurance given to patient. No treatment is necessary.     Intertrigo  Recommend white vinegar: water 1:1 compresses 2x/day followed by cool blow dry and then application of prescription medication.     Cool blow dry after showering. Once clear, use below powder for maintenance.    -     ketoconazole (NIZORAL) 2 % cream; AAA qhs under breasts prn flare  Dispense: 60 g; Refill: 3  -     nystatin (MYCOSTATIN) powder; aaa qam prn flare  Dispense: 120 g; Refill: 6    Piezogenic pedal papule  Reassurance   referral to foot ortho if become painful or much larger'    Rosacea  Sunscreen qam   -     azelaic acid (FINACEA) 15 % Foam; Apply thin film to face qhs  Dispense: 50 g; Refill: 6             Follow-up in about 1 year (around 2/25/2020).

## 2019-02-25 NOTE — PATIENT INSTRUCTIONS
Recommend white vinegar: water 1:1 compresses 2x/day followed by cool blow dry and then application of prescription medication to use nightly     Cool blow dry after showering. Once clear, use nystatin  powder for maintenance qam .

## 2019-04-23 ENCOUNTER — HOSPITAL ENCOUNTER (OUTPATIENT)
Dept: RADIOLOGY | Facility: HOSPITAL | Age: 64
Discharge: HOME | End: 2019-04-23
Attending: NURSE PRACTITIONER
Payer: COMMERCIAL

## 2019-04-23 ENCOUNTER — OFFICE VISIT (OUTPATIENT)
Dept: GASTROENTEROLOGY | Facility: CLINIC | Age: 64
End: 2019-04-23
Payer: COMMERCIAL

## 2019-04-23 VITALS
HEIGHT: 62 IN | WEIGHT: 245.38 LBS | DIASTOLIC BLOOD PRESSURE: 67 MMHG | SYSTOLIC BLOOD PRESSURE: 139 MMHG | BODY MASS INDEX: 45.15 KG/M2

## 2019-04-23 DIAGNOSIS — R10.32 LLQ PAIN: ICD-10-CM

## 2019-04-23 DIAGNOSIS — K59.00 CONSTIPATION, UNSPECIFIED CONSTIPATION TYPE: ICD-10-CM

## 2019-04-23 DIAGNOSIS — R10.32 LLQ ABDOMINAL PAIN: Primary | ICD-10-CM

## 2019-04-23 PROCEDURE — 99999 PR PBB SHADOW E&M-EST. PATIENT-LVL III: ICD-10-PCS | Mod: PBBFAC,,, | Performed by: NURSE PRACTITIONER

## 2019-04-23 PROCEDURE — 3075F SYST BP GE 130 - 139MM HG: CPT | Mod: CPTII,S$GLB,, | Performed by: NURSE PRACTITIONER

## 2019-04-23 PROCEDURE — 3078F PR MOST RECENT DIASTOLIC BLOOD PRESSURE < 80 MM HG: ICD-10-PCS | Mod: CPTII,S$GLB,, | Performed by: NURSE PRACTITIONER

## 2019-04-23 PROCEDURE — 3078F DIAST BP <80 MM HG: CPT | Mod: CPTII,S$GLB,, | Performed by: NURSE PRACTITIONER

## 2019-04-23 PROCEDURE — 25500020 PHARM REV CODE 255: Performed by: NURSE PRACTITIONER

## 2019-04-23 PROCEDURE — 74178 CT ABDOMEN PELVIS W WO CONTRAST: ICD-10-PCS | Mod: 26,,, | Performed by: RADIOLOGY

## 2019-04-23 PROCEDURE — 3075F PR MOST RECENT SYSTOLIC BLOOD PRESS GE 130-139MM HG: ICD-10-PCS | Mod: CPTII,S$GLB,, | Performed by: NURSE PRACTITIONER

## 2019-04-23 PROCEDURE — 74178 CT ABD&PLV WO CNTR FLWD CNTR: CPT | Mod: TC

## 2019-04-23 PROCEDURE — 99999 PR PBB SHADOW E&M-EST. PATIENT-LVL III: CPT | Mod: PBBFAC,,, | Performed by: NURSE PRACTITIONER

## 2019-04-23 PROCEDURE — 3008F PR BODY MASS INDEX (BMI) DOCUMENTED: ICD-10-PCS | Mod: CPTII,S$GLB,, | Performed by: NURSE PRACTITIONER

## 2019-04-23 PROCEDURE — 99213 PR OFFICE/OUTPT VISIT, EST, LEVL III, 20-29 MIN: ICD-10-PCS | Mod: S$GLB,,, | Performed by: NURSE PRACTITIONER

## 2019-04-23 PROCEDURE — 3008F BODY MASS INDEX DOCD: CPT | Mod: CPTII,S$GLB,, | Performed by: NURSE PRACTITIONER

## 2019-04-23 PROCEDURE — 99213 OFFICE O/P EST LOW 20 MIN: CPT | Mod: S$GLB,,, | Performed by: NURSE PRACTITIONER

## 2019-04-23 PROCEDURE — 74178 CT ABD&PLV WO CNTR FLWD CNTR: CPT | Mod: 26,,, | Performed by: RADIOLOGY

## 2019-04-23 RX ADMIN — IOHEXOL 100 ML: 350 INJECTION, SOLUTION INTRAVENOUS at 05:04

## 2019-04-23 RX ADMIN — IOHEXOL 30 ML: 350 INJECTION, SOLUTION INTRAVENOUS at 03:04

## 2019-04-23 NOTE — PROGRESS NOTES
"Subjective:       Patient ID: Verenice Mirza is a 63 y.o. female.    Chief Complaint: Diverticulitis; Abdominal Pain; and Constipation    HPI  Woke with severe LLQ abdominal pain two weeks ago that felt like severe "gas pains".  She tried adding gas-x and after a few days the pain resolved.  However, similar pain returned two days ago with pain in the LLQ to palpation and with walking.  She has had constipation chronically.  No blood with bowel movements.  No fever.  Colonoscopy last in 2017.  She recall being told she had diverticulitis in her 30s but no other episodes that she recalls.      Review of Systems   Constitutional: Negative.  Negative for activity change, appetite change, fatigue, fever and unexpected weight change.   Respiratory: Negative for shortness of breath.    Cardiovascular: Negative for chest pain.   Gastrointestinal: Positive for abdominal pain and constipation. Negative for blood in stool, diarrhea, nausea and vomiting.   Genitourinary: Negative.    Skin: Negative.    Neurological: Negative.    Psychiatric/Behavioral: Negative.        Objective:      Physical Exam   Constitutional: She is oriented to person, place, and time. She appears well-developed and well-nourished. No distress.   Eyes: No scleral icterus.   Cardiovascular: Normal rate.   Pulmonary/Chest: Effort normal. No respiratory distress.   Abdominal: Soft. Bowel sounds are normal. She exhibits no distension and no mass. There is tenderness in the left lower quadrant. There is no guarding.   Neurological: She is alert and oriented to person, place, and time.   Skin: Skin is warm and dry. She is not diaphoretic.   Psychiatric: She has a normal mood and affect. Her behavior is normal. Thought content normal.   Vitals reviewed.      Assessment:       1. LLQ abdominal pain    2. LLQ pain    3. Constipation, unspecified constipation type        Plan:         Verenice was seen today for diverticulitis, abdominal pain and " constipation.    Diagnoses and all orders for this visit:    LLQ abdominal pain  -     CBC auto differential; Future  -     Comprehensive metabolic panel; Future    LLQ pain  -     CT Abdomen Pelvis W Wo Contrast; Future    Constipation, unspecified constipation type      Force fluids, low residue diet.    After acute complaints of pain are resolved:    -   High fiber diet  -   Fiber supplement  -   [place] fluid intake   -   Exercise

## 2019-04-24 ENCOUNTER — TELEPHONE (OUTPATIENT)
Dept: GASTROENTEROLOGY | Facility: CLINIC | Age: 64
End: 2019-04-24

## 2019-04-24 ENCOUNTER — PATIENT MESSAGE (OUTPATIENT)
Dept: GASTROENTEROLOGY | Facility: CLINIC | Age: 64
End: 2019-04-24

## 2019-04-24 RX ORDER — METRONIDAZOLE 250 MG/1
250 TABLET ORAL 3 TIMES DAILY
Qty: 30 TABLET | Refills: 0 | Status: SHIPPED | OUTPATIENT
Start: 2019-04-24 | End: 2019-05-04

## 2019-04-24 RX ORDER — CIPROFLOXACIN 500 MG/1
500 TABLET ORAL 2 TIMES DAILY
Qty: 20 TABLET | Refills: 0 | Status: SHIPPED | OUTPATIENT
Start: 2019-04-24 | End: 2019-05-04

## 2019-04-24 NOTE — TELEPHONE ENCOUNTER
Diverticulitis on CT scan.  Reviewed by phone message and email to patient.      Abx sent to pharmacy.  FU in one month or sooner if needed.

## 2019-04-25 ENCOUNTER — PATIENT MESSAGE (OUTPATIENT)
Dept: INTERNAL MEDICINE | Facility: CLINIC | Age: 64
End: 2019-04-25

## 2019-04-25 DIAGNOSIS — I10 ESSENTIAL HYPERTENSION: ICD-10-CM

## 2019-04-25 DIAGNOSIS — E03.4 HYPOTHYROIDISM DUE TO ACQUIRED ATROPHY OF THYROID: ICD-10-CM

## 2019-04-25 DIAGNOSIS — R73.9 HYPERGLYCEMIA: ICD-10-CM

## 2019-04-25 DIAGNOSIS — E78.5 HYPERLIPIDEMIA, UNSPECIFIED HYPERLIPIDEMIA TYPE: ICD-10-CM

## 2019-05-02 ENCOUNTER — PATIENT MESSAGE (OUTPATIENT)
Dept: DERMATOLOGY | Facility: CLINIC | Age: 64
End: 2019-05-02

## 2019-05-02 DIAGNOSIS — L30.4 INTERTRIGO: Primary | ICD-10-CM

## 2019-05-04 LAB
CHOLEST SERPL-MCNC: 123 MG/DL
CHOLEST/HDLC SERPL: 2.5 (CALC)
HBA1C MFR BLD: 7.1 % OF TOTAL HGB
HDLC SERPL-MCNC: 49 MG/DL
LDLC SERPL CALC-MCNC: 56 MG/DL (CALC)
NONHDLC SERPL-MCNC: 74 MG/DL (CALC)
TRIGL SERPL-MCNC: 97 MG/DL
TSH SERPL-ACNC: 2.41 MIU/L (ref 0.4–4.5)

## 2019-05-08 ENCOUNTER — PATIENT MESSAGE (OUTPATIENT)
Dept: DERMATOLOGY | Facility: CLINIC | Age: 64
End: 2019-05-08

## 2019-05-08 RX ORDER — FLUCONAZOLE 200 MG/1
TABLET ORAL
Qty: 6 TABLET | Refills: 0 | Status: SHIPPED | OUTPATIENT
Start: 2019-05-08 | End: 2019-07-16

## 2019-05-10 ENCOUNTER — OFFICE VISIT (OUTPATIENT)
Dept: INTERNAL MEDICINE | Facility: CLINIC | Age: 64
End: 2019-05-10
Payer: MEDICARE

## 2019-05-10 VITALS
BODY MASS INDEX: 45.56 KG/M2 | SYSTOLIC BLOOD PRESSURE: 124 MMHG | HEIGHT: 62 IN | WEIGHT: 247.56 LBS | OXYGEN SATURATION: 98 % | HEART RATE: 64 BPM | DIASTOLIC BLOOD PRESSURE: 84 MMHG

## 2019-05-10 DIAGNOSIS — E66.01 MORBID OBESITY WITH BMI OF 40.0-44.9, ADULT: ICD-10-CM

## 2019-05-10 DIAGNOSIS — L65.8 FEMALE PATTERN BALDNESS: ICD-10-CM

## 2019-05-10 DIAGNOSIS — K21.9 GASTROESOPHAGEAL REFLUX DISEASE WITHOUT ESOPHAGITIS: ICD-10-CM

## 2019-05-10 DIAGNOSIS — Z91.89 AT HIGH RISK FOR BREAST CANCER: ICD-10-CM

## 2019-05-10 DIAGNOSIS — E78.5 DYSLIPIDEMIA ASSOCIATED WITH TYPE 2 DIABETES MELLITUS: ICD-10-CM

## 2019-05-10 DIAGNOSIS — I10 ESSENTIAL HYPERTENSION: ICD-10-CM

## 2019-05-10 DIAGNOSIS — G47.33 OSA ON CPAP: ICD-10-CM

## 2019-05-10 DIAGNOSIS — E03.4 HYPOTHYROIDISM DUE TO ACQUIRED ATROPHY OF THYROID: ICD-10-CM

## 2019-05-10 DIAGNOSIS — M62.838 MUSCLE SPASM: ICD-10-CM

## 2019-05-10 DIAGNOSIS — K44.9 HIATAL HERNIA: ICD-10-CM

## 2019-05-10 DIAGNOSIS — Z87.09 HISTORY OF BRONCHIOLITIS: ICD-10-CM

## 2019-05-10 DIAGNOSIS — E78.5 HYPERLIPIDEMIA, UNSPECIFIED HYPERLIPIDEMIA TYPE: ICD-10-CM

## 2019-05-10 DIAGNOSIS — E11.9 NEW ONSET TYPE 2 DIABETES MELLITUS: ICD-10-CM

## 2019-05-10 DIAGNOSIS — M25.561 PAIN IN BOTH KNEES, UNSPECIFIED CHRONICITY: ICD-10-CM

## 2019-05-10 DIAGNOSIS — I15.2 HYPERTENSION ASSOCIATED WITH DIABETES: ICD-10-CM

## 2019-05-10 DIAGNOSIS — M25.562 PAIN IN BOTH KNEES, UNSPECIFIED CHRONICITY: ICD-10-CM

## 2019-05-10 DIAGNOSIS — E11.69 DYSLIPIDEMIA ASSOCIATED WITH TYPE 2 DIABETES MELLITUS: ICD-10-CM

## 2019-05-10 DIAGNOSIS — Z12.39 BREAST CANCER SCREENING: ICD-10-CM

## 2019-05-10 DIAGNOSIS — E03.9 HYPOTHYROIDISM, UNSPECIFIED TYPE: ICD-10-CM

## 2019-05-10 DIAGNOSIS — E11.59 HYPERTENSION ASSOCIATED WITH DIABETES: ICD-10-CM

## 2019-05-10 DIAGNOSIS — E88.819 INSULIN RESISTANCE: Primary | ICD-10-CM

## 2019-05-10 PROCEDURE — 99214 PR OFFICE/OUTPT VISIT, EST, LEVL IV, 30-39 MIN: ICD-10-PCS | Mod: S$PBB,,, | Performed by: INTERNAL MEDICINE

## 2019-05-10 PROCEDURE — 99214 OFFICE O/P EST MOD 30 MIN: CPT | Mod: S$PBB,,, | Performed by: INTERNAL MEDICINE

## 2019-05-10 PROCEDURE — 99999 PR PBB SHADOW E&M-EST. PATIENT-LVL V: ICD-10-PCS | Mod: PBBFAC,,, | Performed by: INTERNAL MEDICINE

## 2019-05-10 PROCEDURE — 99215 OFFICE O/P EST HI 40 MIN: CPT | Mod: PBBFAC,PO | Performed by: INTERNAL MEDICINE

## 2019-05-10 PROCEDURE — 99999 PR PBB SHADOW E&M-EST. PATIENT-LVL V: CPT | Mod: PBBFAC,,, | Performed by: INTERNAL MEDICINE

## 2019-05-10 RX ORDER — MELOXICAM 7.5 MG/1
7.5 TABLET ORAL DAILY PRN
Qty: 30 TABLET | Refills: 3 | Status: SHIPPED | OUTPATIENT
Start: 2019-05-10 | End: 2020-05-20 | Stop reason: SDUPTHER

## 2019-05-10 RX ORDER — METFORMIN HYDROCHLORIDE 500 MG/1
500 TABLET ORAL DAILY
Qty: 90 TABLET | Refills: 1 | Status: SHIPPED | OUTPATIENT
Start: 2019-05-10 | End: 2019-11-04 | Stop reason: SDUPTHER

## 2019-05-10 RX ORDER — LEVOTHYROXINE SODIUM 88 UG/1
88 TABLET ORAL
Qty: 90 TABLET | Refills: 3 | Status: SHIPPED | OUTPATIENT
Start: 2019-05-10 | End: 2020-01-15 | Stop reason: SDUPTHER

## 2019-05-10 RX ORDER — FINASTERIDE 5 MG/1
5 TABLET, FILM COATED ORAL DAILY
Qty: 90 TABLET | Refills: 3 | Status: SHIPPED | OUTPATIENT
Start: 2019-05-10 | End: 2020-01-15 | Stop reason: SDUPTHER

## 2019-05-10 RX ORDER — ATORVASTATIN CALCIUM 40 MG/1
40 TABLET, FILM COATED ORAL DAILY
Qty: 90 TABLET | Refills: 3 | Status: SHIPPED | OUTPATIENT
Start: 2019-05-10 | End: 2020-01-15 | Stop reason: SDUPTHER

## 2019-05-10 RX ORDER — ALBUTEROL SULFATE 90 UG/1
2 AEROSOL, METERED RESPIRATORY (INHALATION) EVERY 6 HOURS PRN
Qty: 1 EACH | Refills: 11 | Status: SHIPPED | OUTPATIENT
Start: 2019-05-10 | End: 2020-05-20 | Stop reason: SDUPTHER

## 2019-05-10 RX ORDER — SPIRONOLACTONE 50 MG/1
50 TABLET, FILM COATED ORAL DAILY
Qty: 90 TABLET | Refills: 3 | Status: SHIPPED | OUTPATIENT
Start: 2019-05-10 | End: 2019-09-27

## 2019-05-10 RX ORDER — OMEGA-3-ACID ETHYL ESTERS 1 G/1
2 CAPSULE, LIQUID FILLED ORAL 2 TIMES DAILY
Qty: 360 CAPSULE | Refills: 1 | Status: SHIPPED | OUTPATIENT
Start: 2019-05-10 | End: 2020-01-15 | Stop reason: SDUPTHER

## 2019-05-10 RX ORDER — RAMIPRIL 10 MG/1
10 CAPSULE ORAL DAILY
Qty: 90 CAPSULE | Refills: 3 | Status: SHIPPED | OUTPATIENT
Start: 2019-05-10 | End: 2019-08-12

## 2019-05-10 RX ORDER — METOPROLOL SUCCINATE 50 MG/1
50 TABLET, EXTENDED RELEASE ORAL DAILY
Qty: 90 TABLET | Refills: 3 | Status: SHIPPED | OUTPATIENT
Start: 2019-05-10 | End: 2020-01-15 | Stop reason: SDUPTHER

## 2019-05-10 NOTE — PROGRESS NOTES
Subjective:       Patient ID: Verenice Mirza is a 63 y.o. female.    Chief Complaint: Follow-up (x 6 months); Hypertension; and Back Pain (on and off)    HPI 63-year-old female presents to clinic today for follow-up of chronic medical conditions and has multiple things she wants discussed.  She requests printed out prescription refills of all of her medications.  She is undergone bariatric surgery seminar and consultation and is considering proceeding.  Blood work reviewed with patient does that is more consistent with new onset diabetes.  She feels that this is going to move her long.  Recently she had a flare-up of diverticulitis and followed a low residue diet and this provided symptomatic relief.  She is requesting orders for diagnostic mammogram due to higher risk of breast cancer based on TC score.  She is also having problems with knee pain.  She would like to see a specialist for possible injection therapy.  Review of Systems  otherwise negative  Objective:      Physical Exam  General: Well-appearing, well-nourished.  No distress  HEENT: conjunctivae are normal.  Pupils are equal and reative to light.  TM's are clear and intact bilaterally.  Hearing is grossly normal.  Nasopharynx is clear.  Oropharynx is clear.  Neck: Supple.  No thyroid megaly.  No bruits.  Lymph: No cervical or supraclavicular adenopathy.  Heart: Regular rate and rhythm, without murmur, rub or gallop.  Lungs: Clear to auscultation; respiratory effort normal.  Abdomen: Soft, nontender, nondistended.  Normoactive bowel sounds.  No hepatomegaly.  No masses.  Extremities: Good distal pulses.  No edema.  Psych: Oriented to time person place.  Judgment and insight seem unimpaired.  Mood and affect are appropriate.  Assessment:       1. Insulin resistance    2. Hyperlipidemia, unspecified hyperlipidemia type    3. Essential hypertension    4. 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 Gastroesophageal reflux disease without esophagitis    6. Muscle spasm     7. Hypothyroidism due to acquired atrophy of thyroid    8. Morbid obesity with BMI of 40.0-44.9, adult    9. Hypothyroidism, unspecified type    10. JORDANA on CPAP    11. Hiatal hernia    12. Breast cancer screening    13. At high risk for breast cancer    14. Hypertension associated with diabetes    15. Dyslipidemia associated with type 2 diabetes mellitus    16. History of bronchiolitis    17. Pain in both knees, unspecified chronicity        Plan:       Verenice was seen today for follow-up, hypertension and back pain.    Diagnoses and all orders for this visit:    Insulin resistance    Hyperlipidemia, unspecified hyperlipidemia type  -     omega-3 acid ethyl esters (LOVAZA) 1 gram capsule; Take 2 capsules (2 g total) by mouth 2 (two) times daily.  -     atorvastatin (LIPITOR) 40 MG tablet; Take 1 tablet (40 mg total) by mouth once daily.    Essential hypertension  -     ramipril (ALTACE) 10 MG capsule; Take 1 capsule (10 mg total) by mouth once daily.  -     metoprolol succinate (TOPROL-XL) 50 MG 24 hr tablet; Take 1 tablet (50 mg total) by mouth once daily.  Controlled.  Continue current medical regimen.  Prescription refills addressed.  Followup advised. See after visit summary.  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  -     finasteride (PROSCAR) 5 mg tablet; Take 1 tablet (5 mg total) by mouth once daily.  Controlled.  Continue current medical regimen.  Prescription refills addressed.  Followup advised. See after visit summary.  Gastroesophageal reflux disease without esophagitis  -     ranitidine (ZANTAC) 150 MG tablet; Take 1 tablet (150 mg total) by mouth 2 (two) times daily.    Muscle spasm  -     meloxicam (MOBIC) 7.5 MG tablet; Take 1 tablet (7.5 mg total) by mouth daily as needed for Pain.    Hypothyroidism due to acquired atrophy of thyroid  -     levothyroxine (SYNTHROID) 88 MCG tablet; Take 1 tablet (88 mcg total) by mouth  before breakfast.  Controlled.  Continue current medical regimen.  Prescription refills addressed.  Followup advised. See after visit summary.  Morbid obesity with BMI of 40.0-44.9, adult  See plans below for follow-up with Bariatric Medicine  Hypothyroidism, unspecified type    JORDANA on CPAP    Hiatal hernia  Continue treatment for reflux.  If she undergoes Bariatric surgery she will discuss further with the surgeon if this should be surgically corrected at the same time.  She uses H2 blockers for symptomatic relief.  Breast cancer screening  -     Mammo Digital Diagnostic Bilat w/ Chas; Future    At high risk for breast cancer  -     Mammo Digital Diagnostic Bilat w/ Chas; Future    Hypertension associated with diabetes  -     Ambulatory consult to Optometry  -     Ambulatory consult to Diabetic Education  -     metFORMIN (GLUCOPHAGE) 500 MG tablet; Take 1 tablet (500 mg total) by mouth once daily.  -     Hemoglobin A1c; Standing  -     Comprehensive metabolic panel; Standing  -     Lipid panel; Standing  -     Microalbumin/creatinine urine ratio; Standing    Dyslipidemia associated with type 2 diabetes mellitus  -     Ambulatory consult to Optometry  -     Ambulatory consult to Diabetic Education  -     metFORMIN (GLUCOPHAGE) 500 MG tablet; Take 1 tablet (500 mg total) by mouth once daily.  -     Hemoglobin A1c; Standing  -     Comprehensive metabolic panel; Standing  -     Lipid panel; Standing  -     Microalbumin/creatinine urine ratio; Standing  New onset diabetes diagnosis made referred for diabetic education initiate metformin 500 mg daily.  Recommend she continue to pursue weight loss and she will see [facility] own Bariatric surgery to see if there is a pharmacologic option to pursue possibly before surgery.  History of bronchiolitis  -     AEROCHAMBER PLUS FLOW-VU FOR HOME USE  -     albuterol (PROVENTIL/VENTOLIN HFA) 90 mcg/actuation inhaler; Inhale 2 puffs into the lungs every 6 (six) hours as needed  for Wheezing.  Patient requested refill and a chamber device to help with better efficacy utilization of the inhaler when she needs it.  Pain in both knees, unspecified chronicity  -     Ambulatory referral to Orthopedics       total visit time 1 hour. counseling time 55 min

## 2019-05-31 ENCOUNTER — OFFICE VISIT (OUTPATIENT)
Dept: OPTOMETRY | Facility: CLINIC | Age: 64
End: 2019-05-31
Payer: MEDICARE

## 2019-05-31 DIAGNOSIS — H52.4 PRESBYOPIA: ICD-10-CM

## 2019-05-31 DIAGNOSIS — E11.9 TYPE 2 DIABETES MELLITUS WITHOUT RETINOPATHY: Primary | ICD-10-CM

## 2019-05-31 DIAGNOSIS — H25.13 NUCLEAR SCLEROSIS, BILATERAL: ICD-10-CM

## 2019-05-31 DIAGNOSIS — Z13.5 GLAUCOMA SCREENING: ICD-10-CM

## 2019-05-31 PROCEDURE — 92004 PR EYE EXAM, NEW PATIENT,COMPREHESV: ICD-10-PCS | Mod: S$PBB,,, | Performed by: OPTOMETRIST

## 2019-05-31 PROCEDURE — 99999 PR PBB SHADOW E&M-EST. PATIENT-LVL II: CPT | Mod: PBBFAC,,, | Performed by: OPTOMETRIST

## 2019-05-31 PROCEDURE — 92015 PR REFRACTION: ICD-10-PCS | Mod: ,,, | Performed by: OPTOMETRIST

## 2019-05-31 PROCEDURE — 99212 OFFICE O/P EST SF 10 MIN: CPT | Mod: PBBFAC,PO | Performed by: OPTOMETRIST

## 2019-05-31 PROCEDURE — 92004 COMPRE OPH EXAM NEW PT 1/>: CPT | Mod: S$PBB,,, | Performed by: OPTOMETRIST

## 2019-05-31 PROCEDURE — 99999 PR PBB SHADOW E&M-EST. PATIENT-LVL II: ICD-10-PCS | Mod: PBBFAC,,, | Performed by: OPTOMETRIST

## 2019-05-31 PROCEDURE — 92015 DETERMINE REFRACTIVE STATE: CPT | Mod: ,,, | Performed by: OPTOMETRIST

## 2019-05-31 NOTE — LETTER
May 31, 2019      Areli Crespo MD  [address] [address]           [place] [facility]  [address] [facility]  [place] [address]  Phone: [phone]  Fax: [phone]          Patient: Verenice Mirza   MR Number: [id number]   YOB: 1955   Date of Visit: 5/31/2019       Dear Dr. Areli Crespo:    Thank you for referring Verenice Mirza to me for evaluation. Attached you will find relevant portions of my assessment and plan of care.    If you have questions, please do not hesitate to call me. I look forward to following Verenice Mirza along with you.    Sincerely,    Michael RIVERAEran Mirza, OD    Enclosure  CC:  No Recipients    If you would like to receive this communication electronically, please contact [email or web address] or [phone] to request more information on [employer] Link access.    For providers and/or their staff who would like to refer a patient to [facility], please contact us through our one-stop-shop provider referral line, [facility], at [phone].    If you feel you have received this communication in error or would no longer like to receive these types of communications, please e-mail [email or web address]

## 2019-05-31 NOTE — PROGRESS NOTES
HPI     Verenice Mirza is a/an 64 y.o. female who comes in  to establish eye care  Verenice last eye exam was with Dr Cuellar in 2016  She has essential hypertension which is well controlled with medication,   and diabetes 3 weeks ago and uses metformin since them.    Hemoglobin A1C       Date                     Value               Ref Range             Status                05/03/2019               7.1 (H)             <5.7 % of tota*       Final                  (--)blurred vision  (--)Headaches  (--)diplopia  (--)flashes  (--)floaters  (--)pain  (--)Itching  (+)tearing  (--)burning  (+)Dryness  (+) OTC Drops (Gentyl )  (+)Photophobia      Last edited by Michael Mirza, OD on 5/31/2019  2:11 PM. (History)        ROS     Positive for: Endocrine (DM)    Negative for: Constitutional, Gastrointestinal, Neurological, Skin,   Genitourinary, Musculoskeletal, HENT, Cardiovascular, Eyes, Respiratory,   Psychiatric, Allergic/Imm, Heme/Lymph    Last edited by Michael Mirza, OD on 5/31/2019  2:15 PM. (History)        Assessment /Plan     For exam results, see Encounter Report.    Type 2 diabetes mellitus without retinopathy    Nuclear sclerosis, bilateral    Glaucoma screening    Presbyopia      1. Small Cats OU--pt happy w otc readers  2. DM- WITHOUT RETINOPATHY.  Advised yearly DFE  3. Dry eyes--pt to cont w ATs    PLAN:    rtc 1 yr

## 2019-06-05 ENCOUNTER — CLINICAL SUPPORT (OUTPATIENT)
Dept: DIABETES | Facility: CLINIC | Age: 64
End: 2019-06-05
Payer: MEDICARE

## 2019-06-05 DIAGNOSIS — E11.9 TYPE 2 DIABETES MELLITUS WITHOUT COMPLICATION, WITHOUT LONG-TERM CURRENT USE OF INSULIN: ICD-10-CM

## 2019-06-05 PROCEDURE — 99999 PR PBB SHADOW E&M-EST. PATIENT-LVL I: CPT | Mod: PBBFAC,,,

## 2019-06-05 PROCEDURE — 99211 OFF/OP EST MAY X REQ PHY/QHP: CPT | Mod: PBBFAC,PO | Performed by: REGISTERED NURSE

## 2019-06-05 PROCEDURE — G0108 DIAB MANAGE TRN  PER INDIV: HCPCS | Mod: PBBFAC,PO | Performed by: REGISTERED NURSE

## 2019-06-05 PROCEDURE — 99999 PR PBB SHADOW E&M-EST. PATIENT-LVL I: ICD-10-PCS | Mod: PBBFAC,,,

## 2019-06-06 VITALS — WEIGHT: 242 LBS | BODY MASS INDEX: 44.26 KG/M2

## 2019-06-06 NOTE — PROGRESS NOTES
Diabetes Education  Author: Diamond Dhaliwal RN  Date: 6/6/2019  Diabetes Care Management Summary  Diabetes Education Record Assessment/Progress: Initial  Current Diabetes Risk Level: Moderate   Last A1c:   Lab Results   Component Value Date    HGBA1C 7.1 (H) 05/03/2019     Last visit with Diabetes Educator: : 06/05/2019    Diabetes Type  Diabetes Type : Type II  Diabetes History  Diabetes Diagnosis: 0-1 year  Current Treatment: Oral Medication, Diet, Exercise  Health Maintenance was reviewed today with patient. Discussed with patient importance of routine eye exams, foot exams/foot care, blood work (i.e.: A1c, microalbumin, and lipid), dental visits, yearly flu vaccine, and pneumonia vaccine as indicated by PCP. Patient verbalized understanding.     Health Maintenance Topics with due status: Not Due       Topic Last Completion Date    TETANUS VACCINE 10/09/2015    Colonoscopy 05/24/2017    Influenza Vaccine 10/25/2018    Lipid Panel 05/03/2019    Hemoglobin A1c 05/03/2019    Foot Exam 05/10/2019    Aspirin/Antiplatelet Therapy 05/31/2019    Eye Exam 05/31/2019     Health Maintenance Due   Topic Date Due    DEXA SCAN  05/25/1994    Pneumococcal Vaccine (65+ Low/Medium Risk) (2 of 2 - PPSV23) 05/25/2019    Mammogram  06/28/2019    Nutrition  Meal Planning: skipping meals, water, diet drinks, artificial sweeteners, eats out often, snacks between meal  What type of sweetener do you use?: Stevia/Truvia  What type of beverages do you drink?: water, diet soda/tea  Meal Plan 24 Hour Recall - Breakfast: nothing or a protein shake  Meal Plan 24 Hour Recall - Lunch: baked chicken, green beans, cabbage, fried chicken wing, waldorf salad, shrimp salad  Meal Plan 24 Hour Recall - Dinner: grilled chicken breast  Meal Plan 24 Hour Recall - Snack: pork rinds, turkey bites, crackers  Monitoring   Self Monitoring : pt was given a true metrix meter and instructed in its use. instructed to test once a day fasting in the am.  instructed pt to keep a record of her bs's and to bring the record to her md visits. Pt will fu with md to get testing supplies ordered  Blood Glucose Logs: No  Do you use a personal continuous glucose monitor?: No  In the last month, how often have you had a low blood sugar reaction?: never  Can you tell when your blood sugar is too high?: no  Exercise   Exercise Type: none  Current Diabetes Treatment   Current Treatment: Oral Medication, Diet, Exercise  Social History  Preferred Learning Method: Face to Face, Group Education, Demonstration, Reading Materials  Primary Support: Self  Educational Level: College Graduate  Occupation: retired  Smoking Status: Never a Smoker  Alcohol Use: Never  DDS-2 Score  ( > 3 = SIGNIFICANT DISTRESS): 2     Barriers to Change  Barriers to Change: None  Learning Challenges : None  Readiness to Learn   Readiness to Learn : Acceptance  Cultural Influences  Cultural Influences: None  Diabetes Education Assessment/Progress  Diabetes Disease Process (diabetes disease process and treatment options): Discussion, Instructed, Demonstrates Understanding/Competency(verbalizes/demonstrates), Written Materials Provided  Nutrition (Incorporating nutritional management into one's lifestyle): Discussion, Demonstration, Instructed, Demonstrates Understanding/Competency (verbalizes/demonstrates), Individual Session, Written Materials Provided  Physical Activity (incorporating physical activity into one's lifestyle): Discussion, Instructed, Demonstrates Understanding/Competency (verbalizes/demonstrates), Individual Session, Written Materials Provided  Medications (states correct name, dose, onset, peak, duration, side effects & timing of meds): Discussion, Instructed, Demonstrates Understanding/Competency(verbalizes/demonstrates), Individual Session, Written Materials Provided  Monitoring (monitoring blood glucose/other parameters & using results): Discussion, Instructed, Demonstrates  Understanding/Competency (verbalizes/demonstrates), Individual Session, Written Materials Provided  Acute Complications (preventing, detecting, and treating acute complications): Discussion, Instructed, Demonstrates Understanding/Competency (verbalizes/demonstrates), Individual Session, Written Materials Provided  Chronic Complications (preventing, detecting, and treating chronic complications): Discussion, Instructed, Demonstrates Understanding/Competency (verbalizes/demonstrates), Individual Session, Written Materials Provided  Clinical (diabetes, other pertinent medical history, and relevant comorbidities reviewed during visit): Discussion, Instructed, Demonstrates Understanding/Competency (verbalizes/demonstrates), Individual Session  Cognitive (knowledge of self-management skills, functional health literacy): Discussion, Instructed, Demonstrates Understanding/Competency (verbalizes/demonstrates)  Psychosocial (emotional response to diabetes): Discussion, Instructed, Demonstrates Understanding/Competency (verbalizes/demonstrates), Individual Session  Diabetes Distress and Support Systems: Discussion, Instructed, Demonstrates Understanding/Competency (verbalizes/demonstrates), Individual Session  Behavioral (readiness for change, lifestyle practices, self-care behaviors): Discussion, Instructed, Demonstrates Understanding/Competency (verbalizes/demonstrates), Individual Session  Goals  Patient has selected/evaluated goals during today's session: Yes, selected  Healthy Eating: Set  Start Date: 06/05/19  Target Date: 09/06/19  Monitoring: Set  Start Date: 06/05/19  Target Date: 09/06/19  Healthy Coping: Set  Start Date: 06/05/19  Target Date: 06/06/19  Diabetes Care Plan/Intervention  Education Plan/Intervention: Group Follow-Up DSMT, Foot Exam  Diabetes Meal Plan  Restrictions: Restricted Carbohydrate  Calories: 1800  Carbohydrate Per Meal: 30-45g  Carbohydrate Per Snack : 15-20g  Instructed pt on the food groups,  how to read labels and count carbs. Pt was given sample menus and meal plans as examples. Pt does not cook and either skips a meal or eats most of her meals out.Pt frequents buffets. Instructed pt on the importance of eating 3 balanced and portioned meals per day. Discussed with pt foods that she likes that she could include in her meal plan. Discussed with pt how to make better choices when eating out. Discussed the option of pt cooking to eat healthier. Discussed options in the grocery store for quick meal prep, use of crock pot or insta pot or sandwiches with additional foods. Gave pt several options on putting her meals together and eating healthier. Pt favors protein shakes due to easiness and favors protein at her meals. She has a large investment in protein shakes. Discussed with pt how to make a protein shake that would be compliant with her meal plan. Pt had good understanding of meal plan. Pt realizes that she needs to make numerous changes and she is reluctant to have to deal with cooking. Pt states that she will work on making some changes. Pt was advised to call for any problems or questions.  Today's Self-Management Care Plan was developed with the patient's input and is based on barriers identified during today's assessment.    The long and short-term goals in the care plan were written with the patient/caregiver's input. The patient has agreed to work toward these goals to improve her overall diabetes control.      The patient received a copy of today's self-management plan and verbalized understanding of the care plan, goals, and all of today's instructions.      The patient was encouraged to communicate with her physician and care team regarding her condition(s) and treatment.  I provided the patient with my contact information today and encouraged her to contact me via phone or patient portal as needed.     Education Units of Time   Time Spent: 90 min

## 2019-06-11 ENCOUNTER — PATIENT MESSAGE (OUTPATIENT)
Dept: INTERNAL MEDICINE | Facility: CLINIC | Age: 64
End: 2019-06-11

## 2019-06-14 ENCOUNTER — CLINICAL SUPPORT (OUTPATIENT)
Dept: DIABETES | Facility: CLINIC | Age: 64
End: 2019-06-14
Payer: MEDICARE

## 2019-06-14 DIAGNOSIS — E88.819 INSULIN RESISTANCE: ICD-10-CM

## 2019-06-14 PROCEDURE — G0109 DIAB MANAGE TRN IND/GROUP: HCPCS | Mod: PBBFAC,PO | Performed by: REGISTERED NURSE

## 2019-06-14 PROCEDURE — 99211 OFF/OP EST MAY X REQ PHY/QHP: CPT | Mod: PBBFAC,PO

## 2019-06-14 PROCEDURE — 99999 PR PBB SHADOW E&M-EST. PATIENT-LVL I: CPT | Mod: PBBFAC,,,

## 2019-06-14 PROCEDURE — 99999 PR PBB SHADOW E&M-EST. PATIENT-LVL I: ICD-10-PCS | Mod: PBBFAC,,,

## 2019-06-14 RX ORDER — LANCETS
1 EACH MISCELLANEOUS DAILY
Qty: 100 EACH | Refills: 3 | Status: SHIPPED | OUTPATIENT
Start: 2019-06-14 | End: 2019-10-10 | Stop reason: SDUPTHER

## 2019-06-14 NOTE — PROGRESS NOTES
Diabetes Education  Author: Diamond Dhaliwal RN  Date: 6/14/2019  Diabetes Care Management Summary  Diabetes Education Record Assessment/Progress: Comprehensive/Group   Last A1c:   Lab Results   Component Value Date    HGBA1C 7.1 (H) 05/03/2019     Last visit with Diabetes Educator: : 06/14/2019  Diabetes Type  Diabetes Type : Type II  Health Maintenance was reviewed today with patient. Discussed with patient importance of routine eye exams, foot exams/foot care, blood work (i.e.: A1c, microalbumin, and lipid), dental visits, yearly flu vaccine, and pneumonia vaccine as indicated by PCP. Patient verbalized understanding.     Health Maintenance Topics with due status: Not Due       Topic Last Completion Date    TETANUS VACCINE 10/09/2015    Colonoscopy 05/24/2017    Influenza Vaccine 10/25/2018    Lipid Panel 05/03/2019    Hemoglobin A1c 05/03/2019    Foot Exam 05/10/2019    Aspirin/Antiplatelet Therapy 05/31/2019    Eye Exam 05/31/2019     Health Maintenance Due   Topic Date Due    DEXA SCAN  05/25/1994    Pneumococcal Vaccine (65+ Low/Medium Risk) (2 of 2 - PPSV23) 05/25/2019    Mammogram  06/28/2019     Social History  Preferred Learning Method: Group Education  Diabetes Education Assessment/Progress  Diabetes Disease Process (diabetes disease process and treatment options): Discussion, Lecture, Instructed, Class, Demonstrates Understanding/Competency(verbalizes/demonstrates)  Nutrition (Incorporating nutritional management into one's lifestyle): Discussion, Demonstration, Lecture, Instructed, Class, Demonstrates Understanding/Competency (verbalizes/demonstrates), Written Materials Provided  Physical Activity (incorporating physical activity into one's lifestyle): Not Covered/Deferred  Medications (states correct name, dose, onset, peak, duration, side effects & timing of meds): Not Covered/Deferred  Monitoring (monitoring blood glucose/other parameters & using results): Not Covered/Deferred  Acute  Complications (preventing, detecting, and treating acute complications): Not Covered/Deferred  Chronic Complications (preventing, detecting, and treating chronic complications): Not Covered/Deferred  Clinical (diabetes, other pertinent medical history, and relevant comorbidities reviewed during visit): Not Covered/Deferred  Cognitive (knowledge of self-management skills, functional health literacy): Discussion, Lecture, Instructed, Class, Demonstrates Understanding/Competency (verbalizes/demonstrates)  Psychosocial (emotional response to diabetes): Discussion, Lecture, Instructed, Class, Demonstrates Understanding/Competency (verbalizes/demonstrates)  Diabetes Distress and Support Systems: Discussion, Lecture, Instructed, Class, Demonstrates Understanding/Competency (verbalizes/demonstrates)  Behavioral (readiness for change, lifestyle practices, self-care behaviors): Discussion, Lecture, Instructed, Class, Demonstrates Understanding/Competency (verbalizes/demonstrates)  Post class post test score was 104.  Today's Self-Management Care Plan was developed with the patient's input and is based on barriers identified during today's assessment.    The long and short-term goals in the care plan were written with the patient/caregiver's input. The patient has agreed to work toward these goals to improve her overall diabetes control.      The patient received a copy of today's self-management plan and verbalized understanding of the care plan, goals, and all of today's instructions.      The patient was encouraged to communicate with her physician and care team regarding her condition(s) and treatment.  I provided the patient with my contact information today and encouraged her to contact me via phone or patient portal as needed.     Education Units of Time   Time Spent: 180 min

## 2019-07-01 ENCOUNTER — HOSPITAL ENCOUNTER (OUTPATIENT)
Dept: RADIOLOGY | Facility: HOSPITAL | Age: 64
Discharge: HOME | End: 2019-07-01
Attending: INTERNAL MEDICINE
Payer: MEDICARE

## 2019-07-01 DIAGNOSIS — Z91.89 AT HIGH RISK FOR BREAST CANCER: ICD-10-CM

## 2019-07-01 DIAGNOSIS — Z12.39 BREAST CANCER SCREENING: ICD-10-CM

## 2019-07-01 PROCEDURE — 77066 MAMMO DIGITAL DIAGNOSTIC BILAT WITH TOMOSYNTHESIS_CAD: ICD-10-PCS | Mod: 26,,, | Performed by: RADIOLOGY

## 2019-07-01 PROCEDURE — 76642 ULTRASOUND BREAST LIMITED: CPT | Mod: 26,LT,, | Performed by: RADIOLOGY

## 2019-07-01 PROCEDURE — 76642 US BREAST LEFT LIMITED: ICD-10-PCS | Mod: 26,LT,, | Performed by: RADIOLOGY

## 2019-07-01 PROCEDURE — 77062 MAMMO DIGITAL DIAGNOSTIC BILAT WITH TOMOSYNTHESIS_CAD: ICD-10-PCS | Mod: 26,,, | Performed by: RADIOLOGY

## 2019-07-01 PROCEDURE — 77066 DX MAMMO INCL CAD BI: CPT | Mod: 26,,, | Performed by: RADIOLOGY

## 2019-07-01 PROCEDURE — 77066 DX MAMMO INCL CAD BI: CPT | Mod: TC,PO

## 2019-07-01 PROCEDURE — 76642 ULTRASOUND BREAST LIMITED: CPT | Mod: TC,PO,LT

## 2019-07-01 PROCEDURE — 77062 BREAST TOMOSYNTHESIS BI: CPT | Mod: 26,,, | Performed by: RADIOLOGY

## 2019-07-02 ENCOUNTER — PATIENT MESSAGE (OUTPATIENT)
Dept: INTERNAL MEDICINE | Facility: CLINIC | Age: 64
End: 2019-07-02

## 2019-07-02 ENCOUNTER — TELEPHONE (OUTPATIENT)
Dept: RADIOLOGY | Facility: HOSPITAL | Age: 64
End: 2019-07-02

## 2019-07-02 NOTE — TELEPHONE ENCOUNTER
Spoke with patient. Reviewed breast biopsy procedure and reviewed instructions for breast biopsy. Patient expressed understanding and all questions were answered. Provided patient with my phone number to call for any further concerns or questions.   Patient scheduled breast biopsy at the [facility] on 7/8/2019.

## 2019-07-03 RX ORDER — TIZANIDINE 4 MG/1
4 TABLET ORAL EVERY 12 HOURS PRN
Qty: 10 TABLET | Refills: 0 | Status: SHIPPED | OUTPATIENT
Start: 2019-07-03 | End: 2019-07-13

## 2019-07-03 NOTE — TELEPHONE ENCOUNTER
rx sent to pharmacy.  Please let her know that I am hoping for the best in terms of her biopsy.  Prayers.

## 2019-07-08 ENCOUNTER — RESEARCH ENCOUNTER (OUTPATIENT)
Dept: RESEARCH | Facility: HOSPITAL | Age: 64
End: 2019-07-08

## 2019-07-08 ENCOUNTER — HOSPITAL ENCOUNTER (OUTPATIENT)
Dept: RADIOLOGY | Facility: HOSPITAL | Age: 64
Discharge: HOME | End: 2019-07-08
Attending: INTERNAL MEDICINE
Payer: MEDICARE

## 2019-07-08 DIAGNOSIS — R92.8 ABNORMAL MAMMOGRAM: ICD-10-CM

## 2019-07-08 PROCEDURE — 19081 BX BREAST 1ST LESION STRTCTC: CPT | Mod: PO,LT

## 2019-07-08 PROCEDURE — 19081 MAMMO BREAST STEREOTACTIC BREAST BIOPSY LEFT: ICD-10-PCS | Mod: LT,,, | Performed by: RADIOLOGY

## 2019-07-08 PROCEDURE — 19081 BX BREAST 1ST LESION STRTCTC: CPT | Mod: LT,,, | Performed by: RADIOLOGY

## 2019-07-08 PROCEDURE — 25000003 PHARM REV CODE 250: Mod: PO | Performed by: INTERNAL MEDICINE

## 2019-07-08 PROCEDURE — 27201044 MAMMO BREAST STEREOTACTIC BREAST BIOPSY LEFT: Mod: PO

## 2019-07-08 PROCEDURE — 88305 TISSUE EXAM BY PATHOLOGIST: CPT | Performed by: PATHOLOGY

## 2019-07-08 PROCEDURE — 88305 TISSUE EXAM BY PATHOLOGIST: CPT | Mod: 26,,, | Performed by: PATHOLOGY

## 2019-07-08 PROCEDURE — 88305 TISSUE SPECIMEN TO PATHOLOGY, RADIOLOGY: ICD-10-PCS | Mod: 26,,, | Performed by: PATHOLOGY

## 2019-07-08 RX ORDER — LIDOCAINE HYDROCHLORIDE 10 MG/ML
1.5 INJECTION, SOLUTION EPIDURAL; INFILTRATION; INTRACAUDAL; PERINEURAL ONCE
Status: COMPLETED | OUTPATIENT
Start: 2019-07-08 | End: 2019-07-08

## 2019-07-08 RX ORDER — LIDOCAINE HYDROCHLORIDE AND EPINEPHRINE 20; 10 MG/ML; UG/ML
8.5 INJECTION, SOLUTION INFILTRATION; PERINEURAL ONCE
Status: COMPLETED | OUTPATIENT
Start: 2019-07-08 | End: 2019-07-08

## 2019-07-08 RX ADMIN — LIDOCAINE HYDROCHLORIDE 1.5 ML: 10 INJECTION, SOLUTION EPIDURAL; INFILTRATION; INTRACAUDAL; PERINEURAL at 10:07

## 2019-07-08 RX ADMIN — LIDOCAINE HYDROCHLORIDE AND EPINEPHRINE 8.5 ML: 20; 10 INJECTION, SOLUTION INFILTRATION; PERINEURAL at 10:07

## 2019-07-08 NOTE — PROGRESS NOTES
Pt approached in Mammography clinic regarding participation in IRB protocol #2018.314, [facility] Breast Biopsy study. Pt was agreeable.      The Informed Consent Form (ICF) was reviewed with pt. The discussion included:   - participation is voluntary  - pt can change her mind about participating  - pt was informed that participation in this study would not preclude her from participating in any other research if offered  - specimens may be used by [facility] researchers or community researchers  - specimens collected include only those discussed with the patient at the time of consent and are indicated on the ICF   - specimens may be used for DNA, RNA or protein studies investigating biomarkers for diagnostic, prognostic or treatment purposes  - breast biopsy will be collected from [facility] after their approval  - all specimens released to researchers will be stripped of identifiers  - no personal medical information will be released to any parties outside of this research study  - there will be no other physical risks outside of those involved in standard of care procedure      Dr. Griggs approved of patient's participation in the study.  Pt did not have any questions. Pt willingly and independently signed ICF.      A copy of signed ICF was given to pt with instructions to call with any questions that may arise or if she should change her mind regarding participation in study

## 2019-07-10 ENCOUNTER — TELEPHONE (OUTPATIENT)
Dept: SURGERY | Facility: CLINIC | Age: 64
End: 2019-07-10

## 2019-07-10 NOTE — TELEPHONE ENCOUNTER
Called patient to discuss biopsy results from 7/8/19. Explained to patient that biopsy showed columnar cell change and apocrine metaplasia, no atypia/benign. However, Dr. Griggs is concerned that this does not adequately describe what she saw in the mammogram images. Therefore, she is recommending a surgical consult to further evaluate the area. Patient and Dr. Griggs had already discussed this possibility, patient states Dr. Bass was recommended to her. Scheduled for Tuesday the 16th at 10:30am. Reviewed the location of the breast center, all questions answered. Verbalized understanding to all information and plan of care.

## 2019-07-16 ENCOUNTER — PATIENT MESSAGE (OUTPATIENT)
Dept: INTERNAL MEDICINE | Facility: CLINIC | Age: 64
End: 2019-07-16

## 2019-07-16 ENCOUNTER — PATIENT MESSAGE (OUTPATIENT)
Dept: SURGERY | Facility: HOSPITAL | Age: 64
End: 2019-07-16

## 2019-07-16 ENCOUNTER — OFFICE VISIT (OUTPATIENT)
Dept: SURGERY | Facility: CLINIC | Age: 64
End: 2019-07-16
Payer: MEDICARE

## 2019-07-16 VITALS
BODY MASS INDEX: 44.57 KG/M2 | SYSTOLIC BLOOD PRESSURE: 115 MMHG | HEIGHT: 62 IN | WEIGHT: 242.19 LBS | HEART RATE: 53 BPM | TEMPERATURE: 98 F | DIASTOLIC BLOOD PRESSURE: 71 MMHG

## 2019-07-16 DIAGNOSIS — E11.9 NEW ONSET TYPE 2 DIABETES MELLITUS: ICD-10-CM

## 2019-07-16 DIAGNOSIS — N63.20 BREAST MASS, LEFT: Primary | ICD-10-CM

## 2019-07-16 DIAGNOSIS — E78.5 DYSLIPIDEMIA ASSOCIATED WITH TYPE 2 DIABETES MELLITUS: ICD-10-CM

## 2019-07-16 DIAGNOSIS — I15.2 HYPERTENSION ASSOCIATED WITH DIABETES: ICD-10-CM

## 2019-07-16 DIAGNOSIS — E11.59 HYPERTENSION ASSOCIATED WITH DIABETES: ICD-10-CM

## 2019-07-16 DIAGNOSIS — E88.819 INSULIN RESISTANCE: ICD-10-CM

## 2019-07-16 DIAGNOSIS — E78.5 HYPERLIPIDEMIA, UNSPECIFIED HYPERLIPIDEMIA TYPE: Primary | ICD-10-CM

## 2019-07-16 DIAGNOSIS — Z01.818 PRE-OP TESTING: ICD-10-CM

## 2019-07-16 DIAGNOSIS — Z91.89 AT HIGH RISK FOR BREAST CANCER: ICD-10-CM

## 2019-07-16 DIAGNOSIS — R92.8 ABNORMAL MAMMOGRAM OF LEFT BREAST: ICD-10-CM

## 2019-07-16 DIAGNOSIS — E11.69 DYSLIPIDEMIA ASSOCIATED WITH TYPE 2 DIABETES MELLITUS: ICD-10-CM

## 2019-07-16 DIAGNOSIS — I10 ESSENTIAL HYPERTENSION: ICD-10-CM

## 2019-07-16 PROCEDURE — 99214 OFFICE O/P EST MOD 30 MIN: CPT | Mod: S$PBB,,, | Performed by: SURGERY

## 2019-07-16 PROCEDURE — 99999 PR PBB SHADOW E&M-EST. PATIENT-LVL IV: CPT | Mod: PBBFAC,,, | Performed by: SURGERY

## 2019-07-16 PROCEDURE — 99214 PR OFFICE/OUTPT VISIT, EST, LEVL IV, 30-39 MIN: ICD-10-PCS | Mod: S$PBB,,, | Performed by: SURGERY

## 2019-07-16 PROCEDURE — 99214 OFFICE O/P EST MOD 30 MIN: CPT | Mod: PBBFAC | Performed by: SURGERY

## 2019-07-16 PROCEDURE — 99999 PR PBB SHADOW E&M-EST. PATIENT-LVL IV: ICD-10-PCS | Mod: PBBFAC,,, | Performed by: SURGERY

## 2019-07-16 NOTE — LETTER
July 17, 2019      Areli Crespo MD  [address] [facility] [address]           [facility]  [address]  [facility] [address]  Phone: [phone]  Fax: [phone]          Patient: Verenice Mirza   MR Number: [id number]   YOB: 1955   Date of Visit: 7/16/2019       Dear Dr. Areli Crespo:    Thank you for referring Verenice Mirza to me for evaluation. Attached you will find relevant portions of my assessment and plan of care.    If you have questions, please do not hesitate to call me. I look forward to following Verenice Mirza along with you.    Sincerely,    Ramon Bass MD    Enclosure  CC:  No Recipients    If you would like to receive this communication electronically, please contact [email or web address] or [phone] to request more information on [employer] Link access.    For providers and/or their staff who would like to refer a patient to [facility], please contact us through our one-stop-shop provider referral line, [facility] [place], at [phone].    If you feel you have received this communication in error or would no longer like to receive these types of communications, please e-mail [email or web address]
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
## 2019-07-17 PROBLEM — N63.20 BREAST MASS, LEFT: Status: ACTIVE | Noted: 2019-07-17

## 2019-07-17 PROBLEM — Z01.818 PRE-OP TESTING: Status: ACTIVE | Noted: 2019-07-17

## 2019-07-17 PROBLEM — Z91.89 AT HIGH RISK FOR BREAST CANCER: Status: ACTIVE | Noted: 2019-07-17

## 2019-07-18 RX ORDER — OXYCODONE AND ACETAMINOPHEN 5; 325 MG/1; MG/1
1 TABLET ORAL EVERY 6 HOURS PRN
Qty: 20 TABLET | Refills: 0 | Status: SHIPPED | OUTPATIENT
Start: 2019-07-18 | End: 2019-08-12

## 2019-07-18 NOTE — PROGRESS NOTES
Patient requested her post-op pain script be filled pre-operatively, so she can already have her medication at home after her surgery.

## 2019-07-29 ENCOUNTER — TELEPHONE (OUTPATIENT)
Dept: GASTROENTEROLOGY | Facility: CLINIC | Age: 64
End: 2019-07-29

## 2019-07-29 NOTE — TELEPHONE ENCOUNTER
----- Message from Farzaneh Lantigua sent at 7/29/2019  7:36 AM CDT -----  Contact: Self [phone]  Patient is calling to see if she can be seen today or tomorrow due to a flare up on her diverticulitis. Please advice

## 2019-08-06 ENCOUNTER — PATIENT MESSAGE (OUTPATIENT)
Dept: OPTOMETRY | Facility: CLINIC | Age: 64
End: 2019-08-06

## 2019-08-06 LAB
ALBUMIN SERPL-MCNC: 4.1 G/DL (ref 3.6–5.1)
ALBUMIN SERPL-MCNC: 4.3 G/DL (ref 3.6–5.1)
ALBUMIN/GLOB SERPL: 1.6 (CALC) (ref 1–2.5)
ALBUMIN/GLOB SERPL: 1.9 (CALC) (ref 1–2.5)
ALP SERPL-CCNC: 85 U/L (ref 33–130)
ALP SERPL-CCNC: 86 U/L (ref 33–130)
ALT SERPL-CCNC: 42 U/L (ref 6–29)
ALT SERPL-CCNC: 43 U/L (ref 6–29)
AST SERPL-CCNC: 24 U/L (ref 10–35)
AST SERPL-CCNC: 24 U/L (ref 10–35)
BASOPHILS # BLD AUTO: 59 CELLS/UL (ref 0–200)
BASOPHILS NFR BLD AUTO: 1.1 %
BILIRUB SERPL-MCNC: 0.4 MG/DL (ref 0.2–1.2)
BILIRUB SERPL-MCNC: 0.4 MG/DL (ref 0.2–1.2)
BUN SERPL-MCNC: 17 MG/DL (ref 7–25)
BUN SERPL-MCNC: 17 MG/DL (ref 7–25)
BUN/CREAT SERPL: ABNORMAL (CALC) (ref 6–22)
BUN/CREAT SERPL: ABNORMAL (CALC) (ref 6–22)
CALCIUM SERPL-MCNC: 9.5 MG/DL (ref 8.6–10.4)
CALCIUM SERPL-MCNC: 9.6 MG/DL (ref 8.6–10.4)
CHLORIDE SERPL-SCNC: 105 MMOL/L (ref 98–110)
CHLORIDE SERPL-SCNC: 107 MMOL/L (ref 98–110)
CHOLEST SERPL-MCNC: 114 MG/DL
CHOLEST/HDLC SERPL: 2.8 (CALC)
CO2 SERPL-SCNC: 26 MMOL/L (ref 20–32)
CO2 SERPL-SCNC: 28 MMOL/L (ref 20–32)
CREAT SERPL-MCNC: 0.76 MG/DL (ref 0.5–0.99)
CREAT SERPL-MCNC: 0.79 MG/DL (ref 0.5–0.99)
EOSINOPHIL # BLD AUTO: 162 CELLS/UL (ref 15–500)
EOSINOPHIL NFR BLD AUTO: 3 %
ERYTHROCYTE [DISTWIDTH] IN BLOOD BY AUTOMATED COUNT: 13.5 % (ref 11–15)
GFRSERPLBLD MDRD-ARVRAT: 79 ML/MIN/1.73M2
GFRSERPLBLD MDRD-ARVRAT: 82 ML/MIN/1.73M2
GLOBULIN SER CALC-MCNC: 2.3 G/DL (CALC) (ref 1.9–3.7)
GLOBULIN SER CALC-MCNC: 2.5 G/DL (CALC) (ref 1.9–3.7)
GLUCOSE SERPL-MCNC: 109 MG/DL (ref 65–99)
GLUCOSE SERPL-MCNC: 113 MG/DL (ref 65–99)
HBA1C MFR BLD: 6.6 % OF TOTAL HGB
HCT VFR BLD AUTO: 42.7 % (ref 35–45)
HDLC SERPL-MCNC: 41 MG/DL
HGB BLD-MCNC: 14 G/DL (ref 11.7–15.5)
LDLC SERPL CALC-MCNC: 54 MG/DL (CALC)
LYMPHOCYTES # BLD AUTO: 1625 CELLS/UL (ref 850–3900)
LYMPHOCYTES NFR BLD AUTO: 30.1 %
MCH RBC QN AUTO: 29.5 PG (ref 27–33)
MCHC RBC AUTO-ENTMCNC: 32.8 G/DL (ref 32–36)
MCV RBC AUTO: 90.1 FL (ref 80–100)
MONOCYTES # BLD AUTO: 427 CELLS/UL (ref 200–950)
MONOCYTES NFR BLD AUTO: 7.9 %
NEUTROPHILS # BLD AUTO: 3127 CELLS/UL (ref 1500–7800)
NEUTROPHILS NFR BLD AUTO: 57.9 %
NONHDLC SERPL-MCNC: 73 MG/DL (CALC)
PLATELET # BLD AUTO: 266 THOUSAND/UL (ref 140–400)
PMV BLD REES-ECKER: 11 FL (ref 7.5–12.5)
POTASSIUM SERPL-SCNC: 4.4 MMOL/L (ref 3.5–5.3)
POTASSIUM SERPL-SCNC: 4.5 MMOL/L (ref 3.5–5.3)
PROT SERPL-MCNC: 6.6 G/DL (ref 6.1–8.1)
PROT SERPL-MCNC: 6.6 G/DL (ref 6.1–8.1)
RBC # BLD AUTO: 4.74 MILLION/UL (ref 3.8–5.1)
SODIUM SERPL-SCNC: 141 MMOL/L (ref 135–146)
SODIUM SERPL-SCNC: 142 MMOL/L (ref 135–146)
TRIGL SERPL-MCNC: 103 MG/DL
WBC # BLD AUTO: 5.4 THOUSAND/UL (ref 3.8–10.8)

## 2019-08-12 ENCOUNTER — OFFICE VISIT (OUTPATIENT)
Dept: INTERNAL MEDICINE | Facility: CLINIC | Age: 64
End: 2019-08-12
Payer: MEDICARE

## 2019-08-12 VITALS
DIASTOLIC BLOOD PRESSURE: 60 MMHG | SYSTOLIC BLOOD PRESSURE: 120 MMHG | HEIGHT: 62 IN | BODY MASS INDEX: 44.3 KG/M2 | HEART RATE: 53 BPM | WEIGHT: 240.75 LBS | OXYGEN SATURATION: 97 %

## 2019-08-12 DIAGNOSIS — E03.4 HYPOTHYROIDISM DUE TO ACQUIRED ATROPHY OF THYROID: ICD-10-CM

## 2019-08-12 DIAGNOSIS — E11.9 NEW ONSET TYPE 2 DIABETES MELLITUS: ICD-10-CM

## 2019-08-12 DIAGNOSIS — E78.5 HYPERLIPIDEMIA, UNSPECIFIED HYPERLIPIDEMIA TYPE: ICD-10-CM

## 2019-08-12 DIAGNOSIS — R74.01 TRANSAMINASEMIA: ICD-10-CM

## 2019-08-12 DIAGNOSIS — I73.9 PAD (PERIPHERAL ARTERY DISEASE): ICD-10-CM

## 2019-08-12 DIAGNOSIS — I10 ESSENTIAL HYPERTENSION: Primary | ICD-10-CM

## 2019-08-12 DIAGNOSIS — Z00.00 PREVENTATIVE HEALTH CARE: ICD-10-CM

## 2019-08-12 DIAGNOSIS — E88.819 INSULIN RESISTANCE: ICD-10-CM

## 2019-08-12 PROCEDURE — 99213 OFFICE O/P EST LOW 20 MIN: CPT | Mod: PBBFAC,PO | Performed by: INTERNAL MEDICINE

## 2019-08-12 PROCEDURE — 99214 OFFICE O/P EST MOD 30 MIN: CPT | Mod: S$PBB,,, | Performed by: INTERNAL MEDICINE

## 2019-08-12 PROCEDURE — 99999 PR PBB SHADOW E&M-EST. PATIENT-LVL III: ICD-10-PCS | Mod: PBBFAC,,, | Performed by: INTERNAL MEDICINE

## 2019-08-12 PROCEDURE — 99999 PR PBB SHADOW E&M-EST. PATIENT-LVL III: CPT | Mod: PBBFAC,,, | Performed by: INTERNAL MEDICINE

## 2019-08-12 PROCEDURE — 99214 PR OFFICE/OUTPT VISIT, EST, LEVL IV, 30-39 MIN: ICD-10-PCS | Mod: S$PBB,,, | Performed by: INTERNAL MEDICINE

## 2019-08-12 RX ORDER — LOSARTAN POTASSIUM 50 MG/1
50 TABLET ORAL DAILY
Qty: 90 TABLET | Refills: 1 | Status: SHIPPED | OUTPATIENT
Start: 2019-08-12 | End: 2019-09-27

## 2019-08-12 NOTE — PROGRESS NOTES
Subjective:       Patient ID: Verenice Mirza is a 64 y.o. female.    Chief Complaint: Follow-up (3 mos); Diabetes; Hypertension; and Hyperlipidemia    HPI 64-year-old female presents to clinic today for follow-up insulin resistance hypertension dyslipidemia hypothyroidism and new onset diagnosis of diabetes patient recent went to diabetic education she states it was difficult for her to follow the advice as they started to advise her regarding carbohydrate intake and this was really confusing for her.  She would like to continue her education process but we discussed some concerns regarding the information.  Patient is without any acute complaints today.  Review of Systems  otherwise negative  Objective:      Physical Exam  General: Well-appearing, well-nourished.  No distress  HEENT: conjunctivae are normal.  Pupils are equal and reative to light.  TM's are clear and intact bilaterally.  Hearing is grossly normal.  Nasopharynx is clear.  Oropharynx is clear.  Neck: Supple.  No thyroid megaly.  No bruits.  Lymph: No cervical or supraclavicular adenopathy.  Heart: Regular rate and rhythm, without murmur, rub or gallop.  Lungs: Clear to auscultation; respiratory effort normal.  Abdomen: Soft, nontender, nondistended.  Normoactive bowel sounds.  No hepatomegaly.  No masses.  Extremities: Good distal pulses.  No edema.  Psych: Oriented to time person place.  Judgment and insight seem unimpaired.  Mood and affect are appropriate.  Assessment:       1. Essential hypertension    2. Hyperlipidemia, unspecified hyperlipidemia type    3. Insulin resistance    4. Hypothyroidism due to acquired atrophy of thyroid    5. Preventative health care    6. New onset type 2 diabetes mellitus    7. Transaminasemia        Plan:     Verenice was seen today for follow-up, diabetes, hypertension and hyperlipidemia.    Diagnoses and all orders for this visit:    Essential hypertension  -     losartan (COZAAR) 50 MG tablet; Take 1 tablet (50 mg  total) by mouth once daily.  -     Lipid panel; Future  -     Comprehensive metabolic panel; Future  -     Hemoglobin A1c; Future  -     Lipid panel  -     Comprehensive metabolic panel  -     Hemoglobin A1c    Hyperlipidemia, unspecified hyperlipidemia type  -     Lipid panel; Future  -     Comprehensive metabolic panel; Future  -     Hemoglobin A1c; Future  -     Lipid panel  -     Comprehensive metabolic panel  -     Hemoglobin A1c  Controlled.  Continue current medical regimen.  Prescription refills addressed.  Followup advised. See after visit summary.  Discuss use benefit as well as potential adverse side effect medication patient would prefer to lower her dose of possible given her LDL is less than 70 she will go ahead alternate 40 mg with 20 mg she would still benefit from statin medication due to history of peripheral artery disease  Insulin resistance  -     Lipid panel; Future  -     Comprehensive metabolic panel; Future  -     Hemoglobin A1c; Future  -     Lipid panel  -     Comprehensive metabolic panel  -     Hemoglobin A1c  Continued efforts at weight loss.  Continue diabetic education discussed different counselor  Hypothyroidism due to acquired atrophy of thyroid  -     TSH; Future  -     TSH  Controlled.  Continue current medical regimen.  Prescription refills addressed.  Followup advised. See after visit summary.  Preventative health care  -     CBC auto differential; Future  -     CBC auto differential    New onset type 2 diabetes mellitus  -     Hemoglobin A1c; Future  -     Hemoglobin A1c  Recommend and encourage weight loss continue current regimen  Transaminasemia  -     CBC auto differential; Future  -     CBC auto differential  .  Consistent with fatty liver continued efforts at weight loss

## 2019-08-14 ENCOUNTER — HOSPITAL ENCOUNTER (OUTPATIENT)
Dept: RADIOLOGY | Facility: HOSPITAL | Age: 64
Discharge: HOME | End: 2019-08-14
Attending: SURGERY
Payer: MEDICARE

## 2019-08-14 ENCOUNTER — PATIENT MESSAGE (OUTPATIENT)
Dept: SURGERY | Facility: HOSPITAL | Age: 64
End: 2019-08-14

## 2019-08-14 ENCOUNTER — HOSPITAL ENCOUNTER (OUTPATIENT)
Dept: CARDIOLOGY | Facility: CLINIC | Age: 64
Discharge: HOME | End: 2019-08-14
Payer: MEDICARE

## 2019-08-14 DIAGNOSIS — N63.20 LEFT BREAST MASS: ICD-10-CM

## 2019-08-14 DIAGNOSIS — Z01.818 PRE-OP TESTING: ICD-10-CM

## 2019-08-14 PROCEDURE — 19281 PERQ DEVICE BREAST 1ST IMAG: CPT | Mod: LT,,, | Performed by: RADIOLOGY

## 2019-08-14 PROCEDURE — 93005 ELECTROCARDIOGRAM TRACING: CPT | Mod: PBBFAC | Performed by: INTERNAL MEDICINE

## 2019-08-14 PROCEDURE — 93010 EKG 12-LEAD: ICD-10-PCS | Mod: S$PBB,,, | Performed by: INTERNAL MEDICINE

## 2019-08-14 PROCEDURE — A4648 IMPLANTABLE TISSUE MARKER: HCPCS | Mod: PO

## 2019-08-14 PROCEDURE — 19281 MAMMO LEFT MAGSEED LOCALIZATION: ICD-10-PCS | Mod: LT,,, | Performed by: RADIOLOGY

## 2019-08-14 PROCEDURE — 93010 ELECTROCARDIOGRAM REPORT: CPT | Mod: S$PBB,,, | Performed by: INTERNAL MEDICINE

## 2019-08-14 PROCEDURE — 25000003 PHARM REV CODE 250: Mod: PO | Performed by: SURGERY

## 2019-08-14 RX ORDER — LIDOCAINE HYDROCHLORIDE 20 MG/ML
1 INJECTION, SOLUTION INFILTRATION; PERINEURAL ONCE
Status: COMPLETED | OUTPATIENT
Start: 2019-08-14 | End: 2019-08-14

## 2019-08-14 RX ADMIN — LIDOCAINE HYDROCHLORIDE 5 ML: 20 INJECTION, SOLUTION INFILTRATION; PERINEURAL at 09:08

## 2019-08-15 ENCOUNTER — PATIENT MESSAGE (OUTPATIENT)
Dept: SURGERY | Facility: HOSPITAL | Age: 64
End: 2019-08-15

## 2019-08-16 ENCOUNTER — ANESTHESIA EVENT (OUTPATIENT)
Dept: SURGERY | Facility: HOSPITAL | Age: 64
End: 2019-08-16
Payer: MEDICARE

## 2019-08-16 ENCOUNTER — TELEPHONE (OUTPATIENT)
Dept: SURGERY | Facility: CLINIC | Age: 64
End: 2019-08-16

## 2019-08-16 ENCOUNTER — PATIENT MESSAGE (OUTPATIENT)
Dept: SURGERY | Facility: CLINIC | Age: 64
End: 2019-08-16

## 2019-08-16 NOTE — TELEPHONE ENCOUNTER
Left VM with my direct contact information, patient advised to give a return call with any questions or concerns regarding surgery arrival time, pt to arrive to [facility] at 0530 on Monday 8-19-19 for surgery at 0700

## 2019-08-16 NOTE — TELEPHONE ENCOUNTER
Spoke with pt regarding surgery, pt advised to arrive to [facility] at 0530 for 0700 surgery, pt verbalized understanding, pre-op education reinforced, all questions answered at this time, pt given reassurance

## 2019-08-16 NOTE — TELEPHONE ENCOUNTER
Tried to call the patient with her arrival time for her surgery with Dr. Bass on Monday 8/19/2019. Her procedure is scheduled for 0700 so her arrival time is 0530. Left a reminder stating nothing to eat or drink after midnight and to return my call directly to ensure she has received the message.

## 2019-08-17 NOTE — PRE-PROCEDURE INSTRUCTIONS
Spoke with Patient.  NPO, medication, and pre-op instructions reviewed.  Denies previous problems with Anesthesia.  Aspirin and Lovaza are on Hold.  Takes the first doses of her twice a day medications after lunch.  Denies symptoms of Hypoglycemia.  Stated that her last HgA1c was 6.6.  Verbalized understanding of instructions.

## 2019-08-19 ENCOUNTER — HOSPITAL ENCOUNTER (OUTPATIENT)
Facility: HOSPITAL | Age: 64
Discharge: HOME | End: 2019-08-19
Attending: SURGERY | Admitting: SURGERY
Payer: MEDICARE

## 2019-08-19 ENCOUNTER — ANESTHESIA (OUTPATIENT)
Dept: SURGERY | Facility: HOSPITAL | Age: 64
End: 2019-08-19
Payer: MEDICARE

## 2019-08-19 ENCOUNTER — HOSPITAL ENCOUNTER (OUTPATIENT)
Dept: RADIOLOGY | Facility: HOSPITAL | Age: 64
Discharge: HOME | End: 2019-08-19
Attending: SURGERY
Payer: MEDICARE

## 2019-08-19 ENCOUNTER — PATIENT MESSAGE (OUTPATIENT)
Dept: INTERNAL MEDICINE | Facility: CLINIC | Age: 64
End: 2019-08-19

## 2019-08-19 VITALS
BODY MASS INDEX: 43.79 KG/M2 | HEIGHT: 62 IN | WEIGHT: 238 LBS | HEART RATE: 50 BPM | RESPIRATION RATE: 16 BRPM | DIASTOLIC BLOOD PRESSURE: 60 MMHG | TEMPERATURE: 98 F | OXYGEN SATURATION: 97 % | SYSTOLIC BLOOD PRESSURE: 120 MMHG

## 2019-08-19 DIAGNOSIS — N63.0 BREAST MASS: ICD-10-CM

## 2019-08-19 DIAGNOSIS — N63.20 LEFT BREAST MASS: ICD-10-CM

## 2019-08-19 LAB
POCT GLUCOSE: 122 MG/DL (ref 70–110)
POCT GLUCOSE: 126 MG/DL (ref 70–110)

## 2019-08-19 PROCEDURE — 82962 GLUCOSE BLOOD TEST: CPT | Performed by: SURGERY

## 2019-08-19 PROCEDURE — 63600175 PHARM REV CODE 636 W HCPCS: Performed by: STUDENT IN AN ORGANIZED HEALTH CARE EDUCATION/TRAINING PROGRAM

## 2019-08-19 PROCEDURE — D9220A PRA ANESTHESIA: Mod: ANES,,, | Performed by: ANESTHESIOLOGY

## 2019-08-19 PROCEDURE — 76098 MAMMO BREAST SPECIMEN: ICD-10-PCS | Mod: 26,,, | Performed by: RADIOLOGY

## 2019-08-19 PROCEDURE — 76098 X-RAY EXAM SURGICAL SPECIMEN: CPT | Mod: TC,PO

## 2019-08-19 PROCEDURE — 88307 TISSUE SPECIMEN TO PATHOLOGY - SURGERY: ICD-10-PCS | Mod: 26,,, | Performed by: PATHOLOGY

## 2019-08-19 PROCEDURE — 88307 TISSUE EXAM BY PATHOLOGIST: CPT | Performed by: PATHOLOGY

## 2019-08-19 PROCEDURE — S0020 INJECTION, BUPIVICAINE HYDRO: HCPCS | Performed by: SURGERY

## 2019-08-19 PROCEDURE — 19125 EXCISION BREAST LESION: CPT | Mod: LT,GC,, | Performed by: SURGERY

## 2019-08-19 PROCEDURE — 63600175 PHARM REV CODE 636 W HCPCS: Performed by: ANESTHESIOLOGY

## 2019-08-19 PROCEDURE — D9220A PRA ANESTHESIA: ICD-10-PCS | Mod: ANES,,, | Performed by: ANESTHESIOLOGY

## 2019-08-19 PROCEDURE — D9220A PRA ANESTHESIA: Mod: CRNA,,, | Performed by: NURSE ANESTHETIST, CERTIFIED REGISTERED

## 2019-08-19 PROCEDURE — 36000706: Performed by: SURGERY

## 2019-08-19 PROCEDURE — 88307 TISSUE EXAM BY PATHOLOGIST: CPT | Mod: 26,,, | Performed by: PATHOLOGY

## 2019-08-19 PROCEDURE — 76098 X-RAY EXAM SURGICAL SPECIMEN: CPT | Mod: 26,,, | Performed by: RADIOLOGY

## 2019-08-19 PROCEDURE — 37000009 HC ANESTHESIA EA ADD 15 MINS: Performed by: SURGERY

## 2019-08-19 PROCEDURE — 19125 PR EXCISE BREAST LES W XRAY MARKER: ICD-10-PCS | Mod: LT,GC,, | Performed by: SURGERY

## 2019-08-19 PROCEDURE — 63600175 PHARM REV CODE 636 W HCPCS: Performed by: NURSE ANESTHETIST, CERTIFIED REGISTERED

## 2019-08-19 PROCEDURE — 37000008 HC ANESTHESIA 1ST 15 MINUTES: Performed by: SURGERY

## 2019-08-19 PROCEDURE — 71000015 HC POSTOP RECOV 1ST HR: Performed by: SURGERY

## 2019-08-19 PROCEDURE — D9220A PRA ANESTHESIA: ICD-10-PCS | Mod: CRNA,,, | Performed by: NURSE ANESTHETIST, CERTIFIED REGISTERED

## 2019-08-19 PROCEDURE — 25000003 PHARM REV CODE 250: Performed by: NURSE ANESTHETIST, CERTIFIED REGISTERED

## 2019-08-19 PROCEDURE — 25000003 PHARM REV CODE 250: Performed by: SURGERY

## 2019-08-19 PROCEDURE — 36000707: Performed by: SURGERY

## 2019-08-19 RX ORDER — BUPIVACAINE HYDROCHLORIDE 5 MG/ML
INJECTION, SOLUTION EPIDURAL; INTRACAUDAL
Status: DISCONTINUED | OUTPATIENT
Start: 2019-08-19 | End: 2019-08-19 | Stop reason: HOSPADM

## 2019-08-19 RX ORDER — LIDOCAINE HYDROCHLORIDE 10 MG/ML
1 INJECTION, SOLUTION EPIDURAL; INFILTRATION; INTRACAUDAL; PERINEURAL ONCE
Status: DISCONTINUED | OUTPATIENT
Start: 2019-08-19 | End: 2019-08-19 | Stop reason: HOSPADM

## 2019-08-19 RX ORDER — HYDROCODONE BITARTRATE AND ACETAMINOPHEN 5; 325 MG/1; MG/1
1 TABLET ORAL EVERY 4 HOURS PRN
Status: DISCONTINUED | OUTPATIENT
Start: 2019-08-19 | End: 2019-08-19 | Stop reason: HOSPADM

## 2019-08-19 RX ORDER — FENTANYL CITRATE 50 UG/ML
25 INJECTION, SOLUTION INTRAMUSCULAR; INTRAVENOUS EVERY 5 MIN PRN
Status: DISCONTINUED | OUTPATIENT
Start: 2019-08-19 | End: 2019-08-19 | Stop reason: HOSPADM

## 2019-08-19 RX ORDER — MIDAZOLAM HYDROCHLORIDE 1 MG/ML
INJECTION, SOLUTION INTRAMUSCULAR; INTRAVENOUS
Status: DISCONTINUED | OUTPATIENT
Start: 2019-08-19 | End: 2019-08-19

## 2019-08-19 RX ORDER — PROPOFOL 10 MG/ML
VIAL (ML) INTRAVENOUS CONTINUOUS PRN
Status: DISCONTINUED | OUTPATIENT
Start: 2019-08-19 | End: 2019-08-19

## 2019-08-19 RX ORDER — CEFAZOLIN SODIUM 1 G/3ML
2 INJECTION, POWDER, FOR SOLUTION INTRAMUSCULAR; INTRAVENOUS
Status: COMPLETED | OUTPATIENT
Start: 2019-08-19 | End: 2019-08-19

## 2019-08-19 RX ORDER — SODIUM CHLORIDE 9 MG/ML
INJECTION, SOLUTION INTRAVENOUS CONTINUOUS
Status: DISCONTINUED | OUTPATIENT
Start: 2019-08-19 | End: 2019-08-19 | Stop reason: HOSPADM

## 2019-08-19 RX ORDER — ONDANSETRON 2 MG/ML
4 INJECTION INTRAMUSCULAR; INTRAVENOUS ONCE AS NEEDED
Status: DISCONTINUED | OUTPATIENT
Start: 2019-08-19 | End: 2019-08-19 | Stop reason: HOSPADM

## 2019-08-19 RX ORDER — PROPOFOL 10 MG/ML
VIAL (ML) INTRAVENOUS
Status: DISCONTINUED | OUTPATIENT
Start: 2019-08-19 | End: 2019-08-19

## 2019-08-19 RX ORDER — FENTANYL CITRATE 50 UG/ML
INJECTION, SOLUTION INTRAMUSCULAR; INTRAVENOUS
Status: DISCONTINUED | OUTPATIENT
Start: 2019-08-19 | End: 2019-08-19

## 2019-08-19 RX ORDER — OXYCODONE AND ACETAMINOPHEN 5; 325 MG/1; MG/1
1 TABLET ORAL EVERY 6 HOURS PRN
Qty: 20 TABLET | Refills: 0 | Status: SHIPPED | OUTPATIENT
Start: 2019-08-19 | End: 2019-09-05

## 2019-08-19 RX ORDER — SODIUM CHLORIDE 0.9 % (FLUSH) 0.9 %
10 SYRINGE (ML) INJECTION
Status: DISCONTINUED | OUTPATIENT
Start: 2019-08-19 | End: 2019-08-19 | Stop reason: HOSPADM

## 2019-08-19 RX ADMIN — CEFAZOLIN 2 G: 330 INJECTION, POWDER, FOR SOLUTION INTRAMUSCULAR; INTRAVENOUS at 07:08

## 2019-08-19 RX ADMIN — PROPOFOL 200 MCG/KG/MIN: 10 INJECTION, EMULSION INTRAVENOUS at 07:08

## 2019-08-19 RX ADMIN — MIDAZOLAM HYDROCHLORIDE 2 MG: 1 INJECTION, SOLUTION INTRAMUSCULAR; INTRAVENOUS at 07:08

## 2019-08-19 RX ADMIN — PROPOFOL 50 MG: 10 INJECTION, EMULSION INTRAVENOUS at 07:08

## 2019-08-19 RX ADMIN — FENTANYL CITRATE 25 MCG: 50 INJECTION INTRAMUSCULAR; INTRAVENOUS at 08:08

## 2019-08-19 RX ADMIN — SODIUM CHLORIDE, SODIUM GLUCONATE, SODIUM ACETATE, POTASSIUM CHLORIDE, MAGNESIUM CHLORIDE, SODIUM PHOSPHATE, DIBASIC, AND POTASSIUM PHOSPHATE: .53; .5; .37; .037; .03; .012; .00082 INJECTION, SOLUTION INTRAVENOUS at 08:08

## 2019-08-19 RX ADMIN — SODIUM CHLORIDE: 0.9 INJECTION, SOLUTION INTRAVENOUS at 06:08

## 2019-08-19 RX ADMIN — FENTANYL CITRATE 25 MCG: 50 INJECTION, SOLUTION INTRAMUSCULAR; INTRAVENOUS at 07:08

## 2019-08-19 NOTE — ANESTHESIA POSTPROCEDURE EVALUATION
Anesthesia Post Evaluation    Patient: Verenice Mirza    Procedure(s) Performed: Procedure(s) (LRB):  EXCISIONAL BIOPSY LEFT BREAST with SEED LOCALIZATION (Left)    Final Anesthesia Type: general  Patient location during evaluation: PACU  Patient participation: Yes- Able to Participate  Level of consciousness: awake and alert and oriented  Post-procedure vital signs: reviewed and stable  Pain management: adequate  Airway patency: patent  PONV status at discharge: No PONV  Anesthetic complications: no      Cardiovascular status: blood pressure returned to baseline and hemodynamically stable  Respiratory status: unassisted, spontaneous ventilation and room air  Hydration status: euvolemic  Follow-up not needed.          Vitals Value Taken Time   /60 8/19/2019  9:00 AM   Temp 36.6 °C (97.8 °F) 8/19/2019  9:00 AM   Pulse 50 8/19/2019  9:00 AM   Resp 16 8/19/2019  9:00 AM   SpO2 97 % 8/19/2019  9:00 AM         No case tracking events are documented in the log.      Pain/Marva Score: Pain Rating Prior to Med Admin: 6 (8/19/2019  8:35 AM)  Pain Rating Post Med Admin: 2 (8/19/2019  9:00 AM)  Marva Score: 9 (8/19/2019  8:35 AM)

## 2019-08-19 NOTE — TRANSFER OF CARE
"Anesthesia Transfer of Care Note    Patient: Verenice Mirza    Procedure(s) Performed: Procedure(s) (LRB):  EXCISIONAL BIOPSY LEFT BREAST with SEED LOCALIZATION (Left)    Patient location: PACU    Anesthesia Type: general    Transport from OR: Transported from OR on 6-10 L/min O2 by face mask with adequate spontaneous ventilation    Post pain: adequate analgesia    Post assessment: no apparent anesthetic complications    Post vital signs: stable    Level of consciousness: awake and alert    Nausea/Vomiting: no nausea/vomiting    Complications: none          Last vitals:   Visit Vitals  BP (!) 141/63 (BP Location: Right arm, Patient Position: Lying)   Pulse (!) 52   Temp 36.6 °C (97.8 °F) (Oral)   Resp 16   Ht 5' 2" (1.575 m)   Wt 108 kg (238 lb)   SpO2 95%   BMI 43.53 kg/m²     "

## 2019-08-19 NOTE — BRIEF OP NOTE
[facility]  Brief Operative Note     SUMMARY     Surgery Date: 8/19/2019     Surgeon(s) and Role:     * Ramon Bass MD - Primary     * Adriel Mariano MD - Resident - Assisting        Pre-op Diagnosis:  Breast mass, left [N63.20]    Post-op Diagnosis:  Post-Op Diagnosis Codes:     * Breast mass, left [N63.20]    Procedure(s) (LRB):  EXCISIONAL BIOPSY LEFT BREAST with SEED LOCALIZATION (Left)    Anesthesia: Local MAC    Description of the findings of the procedure: Left breast mass removed with magseed localization.    Findings/Key Components: Same    Estimated Blood Loss: Minimal    Specimens:   Specimen (12h ago, onward)    Start     Ordered    08/19/19 0758  Specimen to Pathology - Surgery  Once     Comments:  Pre-op Diagnosis: Breast mass, left [N63.20]Procedure(s):EXCISIONAL BIOPSY LEFT BREAST with SEED Number of specimens: 1Name of specimens: Left breast excisional biopsy WITH SEED LOCALIZATION short stitch superior and long stitch lateral     Start Status     08/19/19 0758 Collected (08/19/19 0802) Order ID: [id number]       08/19/19 0802          Discharge Note    SUMMARY     Admit Date: 8/19/2019    Discharge Date and Time:  08/19/2019 8:31 AM    Hospital Course (synopsis of major diagnoses, care, treatment, and services provided during the course of the hospital stay): Patient arrived for scheduled procedure. Patient tolerated the procedure well. Patient was discharged after recovery.        Final Diagnosis: Post-Op Diagnosis Codes:     * Breast mass, left [N63.20]    Disposition: Home or Self Care    Follow Up/Patient Instructions:     Medications:  Reconciled Home Medications:      Medication List      START taking these medications    oxyCODONE-acetaminophen 5-325 mg per tablet  Commonly known as:  PERCOCET  Take 1 tablet by mouth every 6 (six) hours as needed for Pain.        CHANGE how you take these medications    atorvastatin 40 MG tablet  Commonly known as:   LIPITOR  Take 1 tablet (40 mg total) by mouth once daily.  What changed:  when to take this     finasteride 5 mg tablet  Commonly known as:  PROSCAR  Take 1 tablet (5 mg total) by mouth once daily.  What changed:  when to take this     losartan 50 MG tablet  Commonly known as:  COZAAR  Take 1 tablet (50 mg total) by mouth once daily.  What changed:  when to take this     metFORMIN 500 MG tablet  Commonly known as:  GLUCOPHAGE  Take 1 tablet (500 mg total) by mouth once daily.  What changed:  when to take this     metoprolol succinate 50 MG 24 hr tablet  Commonly known as:  TOPROL-XL  Take 1 tablet (50 mg total) by mouth once daily.  What changed:  when to take this     omega-3 acid ethyl esters 1 gram capsule  Commonly known as:  LOVAZA  Take 2 capsules (2 g total) by mouth 2 (two) times daily.  What changed:  additional instructions     ranitidine 150 MG tablet  Commonly known as:  ZANTAC  Take 1 tablet (150 mg total) by mouth 2 (two) times daily.  What changed:  additional instructions     spironolactone 50 MG tablet  Commonly known as:  ALDACTONE  Take 1 tablet (50 mg total) by mouth once daily.  What changed:  when to take this        CONTINUE taking these medications    albuterol 90 mcg/actuation inhaler  Commonly known as:  PROVENTIL/VENTOLIN HFA  Inhale 2 puffs into the lungs every 6 (six) hours as needed for Wheezing.     aspirin 81 MG EC tablet  Commonly known as:  ECOTRIN  Take 81 mg by mouth 2 (two) times daily. Takes the first dose after lunch     azelaic acid 15 % Foam  Commonly known as:  FINACEA  Apply thin film to face qhs     b complex vitamins tablet  Take 1 tablet by mouth after lunch.     biotin 5 mg Cap  Take 1 capsule by mouth after lunch.     blood sugar diagnostic Strp  Commonly known as:  TRUE METRIX GLUCOSE TEST STRIP  1 strip by Misc.(Non-Drug; Combo Route) route once daily.     co-enzyme Q-10 30 mg capsule  Take 30 mg by mouth 2 (two) times daily.     inhalation spacing device  Commonly  known as:  MICROSPACER  Use as directed for inhalation.     ketoconazole 2 % cream  Commonly known as:  NIZORAL  AAA qhs under breasts prn flare     lancets Misc  Commonly known as:  ONETOUCH ULTRASOFT LANCETS  1 application by Misc.(Non-Drug; Combo Route) route once daily.     levothyroxine 88 MCG tablet  Commonly known as:  SYNTHROID  Take 1 tablet (88 mcg total) by mouth before breakfast.     LUTEIN ORAL  Take 1 capsule by mouth after lunch.     magnesium oxide 400 mg (241.3 mg magnesium) tablet  Commonly known as:  MAG-OX  Take 400 mg by mouth after lunch.     meloxicam 7.5 MG tablet  Commonly known as:  MOBIC  Take 1 tablet (7.5 mg total) by mouth daily as needed for Pain.     nystatin powder  Commonly known as:  MYCOSTATIN  aaa qam prn flare     OSTEO BI-FLEX ORAL  Take by mouth 2 (two) times daily.     PROBIOTIC & ACIDOPHILUS ORAL  Take 1 tablet by mouth after lunch.     vitamin D 1000 units Tab  Commonly known as:  VITAMIN D3  Take 185 mg by mouth after lunch.          Discharge Procedure Orders   Diet general     Lifting restrictions   Order Comments: Do not lift more than 10 lbs for 2 weeks     Call MD for:  temperature >100.4     Call MD for:  persistent nausea and vomiting     Call MD for:  severe uncontrolled pain     Call MD for:  redness, tenderness, or signs of infection (pain, swelling, redness, odor or green/yellow discharge around incision site)     No dressing needed   Order Comments: No dressing needed  Ok to shower with warm soap and water in 2 days  Do not submerge in bath, pool, lake etc for 2 weeks  Must wear tight fitting bra at all times for 1 week, unless in shower  Ok to switch to sports bra     Follow-up Information     Ramon Bass MD In 2 weeks.    Specialty:  General Surgery  Contact information:  [address] [facility] [address]  [phone]
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
## 2019-08-19 NOTE — OP NOTE
DATE OF PROCEDURE:  08/19/2019.    PRIMARY SURGEON:  Ramon Bass M.D.    ASSISTANT SURGEON:  Adriel Mariano M.D. (surgery resident).    PREOPERATIVE DIAGNOSIS:  Discordant core needle biopsy of the left breast upper   outer quadrant, status post core needle biopsy for a mammographic abnormality.    POSTOPERATIVE DIAGNOSIS:  Discordant core needle biopsy of the left breast upper   outer quadrant, status post core needle biopsy for a mammographic abnormality.    PROCEDURE:  Left breast Magseed seed localization excisional breast biopsy.    ANESTHESIA:  Monitored anesthesia care with IV sedation and local anesthesia.    COMPLICATIONS:  None.    ESTIMATED BLOOD LOSS:  Minimal.    INDICATIONS:  The patient underwent informed consent.  The left breast was   marked.  The history and physical examination was reviewed and updated.  The   Magseed seed localization mammographic films were reviewed and the prior biopsy   marker and Magseed were in the middle depth of the left breast upper outer   quadrant.    PROCEDURE IN DETAIL:  The patient was brought to the Operating Room.  She was in   a supine position under monitored anesthesia care and IV sedation, the left   breast was prepped and draped in a sterile fashion.  Using the Magseed probe, we   identified the area of greatest activity in the upper outer quadrant of the   left breast.  A curvilinear periareolar oblique incision was made and marked in   the upper outer quadrant of the left breast.  Local anesthesia was infiltrated   in the skin.  The skin was incised sharply.  The subcutaneous tissue was   dissected with cautery.  We entered the breast parenchyma and circumferentially   dissected around the Magseed activity intermittently using the Magseed probe to   identify the area of interest.  We dissected superiorly, inferiorly, medially   and laterally and posterior to the Magseed activity.  The specimen was removed   and oriented with a short stitch  superiorly and a long stitch laterally and   submitted to Radiology for specimen radiograph, which confirmed the Magseed   marker and the prior biopsy clip within the specimen.  The specimen was then   sent to Pathology for permanent sectioning.  The excisional biopsy cavity was   irrigated.  It was made hemostatic throughout with cautery.  The subcutaneous   tissue was reapproximated with Vicryl suture as was the deep dermal layer.  The   skin was then closed with a running 4-0 Monocryl subcuticular skin closure.    Sterile skin Dermabond was applied to the incision site followed by sterile   fluff gauze and post-procedure bra.  Estimated blood loss was minimal.  All   needle, instrument and sponge counts were correct.  No drains were placed.    There were no complications.  She was turned over to Anesthesia for transfer to   the recovery area in a satisfactory condition.      ASHLEY/TODD  dd: 08/19/2019 09:49:29 (CDT)  td: 08/19/2019 12:24:35 (CDT)  Doc ID   #[id number]  Job ID #[id number]    CC:

## 2019-08-19 NOTE — ANESTHESIA PREPROCEDURE EVALUATION
08/19/2019  Verenice Mirza is a 64 y.o., female   Pre-operative evaluation for Procedure(s) (LRB):  EXCISIONAL BIOPSY LEFT BREAST with SEED (Left)    Verenice Mirza is a 64 y.o. female     Patient Active Problem List   Diagnosis    Atherosclerosis of leg with intermittent claudication    HTN (hypertension)    Hyperlipidemia    Ureteral stone    Kidney stone    Pes anserinus bursitis of both knees    Hypothyroid    PAD (peripheral artery disease)    Lower extremity weakness    JORDANA on CPAP    Lumbar disc disease    Venous insufficiency of both lower extremities    Knee joint disorder    Morbid obesity with BMI of 40.0-44.9, adult    Insulin resistance    Dysphagia    Acute seasonal allergic rhinitis    Hiatal hernia    Personal history of skin cancer    History of dysplastic nevus    At high risk for breast cancer    Pre-op testing    Breast mass, left    Breast mass       Review of patient's allergies indicates:  No Known Allergies    No current facility-administered medications on file prior to encounter.      Current Outpatient Medications on File Prior to Encounter   Medication Sig Dispense Refill    aspirin (ECOTRIN) 81 MG EC tablet Take 81 mg by mouth 2 (two) times daily. Takes the first dose after lunch      atorvastatin (LIPITOR) 40 MG tablet Take 1 tablet (40 mg total) by mouth once daily. (Patient taking differently: Take 40 mg by mouth nightly. ) 90 tablet 3    azelaic acid (FINACEA) 15 % Foam Apply thin film to face qhs 50 g 6    b complex vitamins tablet Take 1 tablet by mouth after lunch.       biotin 5 mg Cap Take 1 capsule by mouth after lunch.       co-enzyme Q-10 30 mg capsule Take 30 mg by mouth 2 (two) times daily.       finasteride (PROSCAR) 5 mg tablet Take 1 tablet (5 mg total) by mouth once daily. (Patient taking differently: Take 5 mg by mouth nightly. )  90 tablet 3    LACTOBAC CMB #3/FOS/PANTETHINE (PROBIOTIC & ACIDOPHILUS ORAL) Take 1 tablet by mouth after lunch.       levothyroxine (SYNTHROID) 88 MCG tablet Take 1 tablet (88 mcg total) by mouth before breakfast. 90 tablet 3    LUTEIN ORAL Take 1 capsule by mouth after lunch.       magnesium oxide (MAG-OX) 400 mg tablet Take 400 mg by mouth after lunch.       metFORMIN (GLUCOPHAGE) 500 MG tablet Take 1 tablet (500 mg total) by mouth once daily. (Patient taking differently: Take 500 mg by mouth after lunch. ) 90 tablet 1    metoprolol succinate (TOPROL-XL) 50 MG 24 hr tablet Take 1 tablet (50 mg total) by mouth once daily. (Patient taking differently: Take 50 mg by mouth nightly. ) 90 tablet 3    omega-3 acid ethyl esters (LOVAZA) 1 gram capsule Take 2 capsules (2 g total) by mouth 2 (two) times daily. (Patient taking differently: Take 2 capsules by mouth 2 (two) times daily. Takes her first dose after lunch.) 360 capsule 1    ranitidine (ZANTAC) 150 MG tablet Take 1 tablet (150 mg total) by mouth 2 (two) times daily. (Patient taking differently: Take 150 mg by mouth 2 (two) times daily. Takes her first dose after lunch) 180 tablet 3    spironolactone (ALDACTONE) 50 MG tablet Take 1 tablet (50 mg total) by mouth once daily. (Patient taking differently: Take 50 mg by mouth after lunch. ) 90 tablet 3    vitamin D 1000 units Tab Take 185 mg by mouth after lunch.       albuterol (PROVENTIL/VENTOLIN HFA) 90 mcg/actuation inhaler Inhale 2 puffs into the lungs every 6 (six) hours as needed for Wheezing. 1 each 11    blood sugar diagnostic (TRUE METRIX GLUCOSE TEST STRIP) Strp 1 strip by Misc.(Non-Drug; Combo Route) route once daily. 100 strip 3    GLUCOSAMINE HCL/CHONDR BRASWELL A NA (OSTEO BI-FLEX ORAL) Take by mouth 2 (two) times daily.      inhalation spacing device (MICROSPACER) Use as directed for inhalation. 1 Device 0    ketoconazole (NIZORAL) 2 % cream AAA qhs under breasts prn flare 60 g 3    lancets  (ONETOUCH ULTRASOFT LANCETS) Misc 1 application by Misc.(Non-Drug; Combo Route) route once daily. 100 each 3    meloxicam (MOBIC) 7.5 MG tablet Take 1 tablet (7.5 mg total) by mouth daily as needed for Pain. 30 tablet 3    nystatin (MYCOSTATIN) powder aaa qam prn flare 120 g 6    [DISCONTINUED] tamsulosin (FLOMAX) 0.4 mg Cp24 Take 1 capsule (0.4 mg total) by mouth once daily. 30 capsule 11       Past Surgical History:   Procedure Laterality Date    BLADDER SUSPENSION      COLONOSCOPY N/A 5/24/2017    [facility]    CYSTOSCOPY      CYSTOSCOPY N/A 7/2/2013    [facility]    ESOPHAGOGASTRODUODENOSCOPY (EGD) N/A 12/5/2018    Per[facility] by [facility] at [facility] END[facility]    ESOPHAGOGASTRODUODENOSCOPY (EGD) N/A 5/24/2017    Per[facility] by [facility] at [facility] END[facility]    ESOPHAGOGASTRODUODENOSCOPY (EGD) N/A 5/21/2014    Per[facility] by [facility] at [facility] END[facility]    HYSTERECTOMY      LITHOTRIPSY      LITHOTRIPSY, ESWL Right 7/8/2013    Performed by Vania Artis [facility] at [facility] OR [facility]    OOPHORECTOMY      PLACEMENT-STENT Right 7/2/2013    [facility]    PYELOGRAM, RETROGRADE Right 7/2/2013    Performed by [facility]a [facility] at [facility] OR [facility]    URETEROSCOPY Right 7/2/2013    Performed by D[facility]a [facility]ira MD at [facility] OR [address] [place] [address]       Lab Results   Component Value Date    WBC 5.4 08/05/2019    HGB 14.0 08/05/2019    HCT 42.7 08/05/2019    MCV 90.1 08/05/2019     08/05/2019       Anesthesia Evaluation    I have reviewed the Patient Summary Reports.     I have reviewed the Medications.     Review of Systems  Anesthesia Hx:  No problems with previous Anesthesia Denies Hx of Anesthetic complications  History of prior surgery of interest to airway management or planning: Denies Family Hx of Anesthesia complications.   Denies Personal Hx of Anesthesia  complications.   Social:  No Alcohol Use, Non-Smoker    Hematology/Oncology:  Hematology Normal   Oncology Normal     EENT/Dental:EENT/Dental Normal   Cardiovascular:   Exercise tolerance: good Hypertension    Pulmonary:   Sleep Apnea, CPAP    Renal/:   Chronic Renal Disease renal calculi    Hepatic/GI:   Liver Disease,    Neurological:  Neurology Normal    Endocrine:   Hypothyroidism    Psych:  Psychiatric Normal           Physical Exam  General:  Well nourished    Airway/Jaw/Neck:  Airway Findings: Mouth Opening: Normal Tongue: Normal  General Airway Assessment: Adult, Average  Mallampati: III  TM Distance: Normal, at least 6 cm  Jaw/Neck Findings:  Neck ROM: Normal ROM      Dental:  Dental Findings: In tact   Chest/Lungs:  Chest/Lungs Findings: Normal Respiratory Rate, Clear to auscultation     Heart/Vascular:  Heart Findings: Rate: Normal  Rhythm: Regular Rhythm        Mental Status:  Mental Status Findings:  Alert and Oriented, Cooperative         Anesthesia Plan  Type of Anesthesia, risks & benefits discussed:  Anesthesia Type:  general, MAC  Patient's Preference:   Intra-op Monitoring Plan: standard ASA monitors  Intra-op Monitoring Plan Comments:   Post Op Pain Control Plan:   Post Op Pain Control Plan Comments:   Induction:   IV  Beta Blocker:  Patient is not currently on a Beta-Blocker (No further documentation required).       Informed Consent: Patient understands risks and agrees with Anesthesia plan.  Questions answered. Anesthesia consent signed with patient.  ASA Score: 3     Day of Surgery Review of History & Physical:    H&P update referred to the surgeon.         Ready For Surgery From Anesthesia Perspective.

## 2019-08-20 ENCOUNTER — PATIENT MESSAGE (OUTPATIENT)
Dept: SURGERY | Facility: CLINIC | Age: 64
End: 2019-08-20

## 2019-08-21 ENCOUNTER — PATIENT MESSAGE (OUTPATIENT)
Dept: SURGERY | Facility: CLINIC | Age: 64
End: 2019-08-21

## 2019-08-27 ENCOUNTER — PATIENT MESSAGE (OUTPATIENT)
Dept: INTERNAL MEDICINE | Facility: CLINIC | Age: 64
End: 2019-08-27

## 2019-08-28 ENCOUNTER — PATIENT MESSAGE (OUTPATIENT)
Dept: SURGERY | Facility: CLINIC | Age: 64
End: 2019-08-28

## 2019-08-28 NOTE — TELEPHONE ENCOUNTER
Please reschedule patient's diabetic education appointment with the new  diabetic educator and cancel appointment as she is scheduled.  Please contact patient.

## 2019-09-05 ENCOUNTER — OFFICE VISIT (OUTPATIENT)
Dept: SURGERY | Facility: CLINIC | Age: 64
End: 2019-09-05
Payer: MEDICARE

## 2019-09-05 VITALS
HEIGHT: 62 IN | HEART RATE: 53 BPM | BODY MASS INDEX: 43.82 KG/M2 | DIASTOLIC BLOOD PRESSURE: 74 MMHG | SYSTOLIC BLOOD PRESSURE: 151 MMHG | WEIGHT: 238.13 LBS

## 2019-09-05 DIAGNOSIS — Z91.89 AT HIGH RISK FOR BREAST CANCER: Primary | ICD-10-CM

## 2019-09-05 PROCEDURE — 99024 PR POST-OP FOLLOW-UP VISIT: ICD-10-PCS | Mod: POP,,, | Performed by: SURGERY

## 2019-09-05 PROCEDURE — 99999 PR PBB SHADOW E&M-EST. PATIENT-LVL III: CPT | Mod: PBBFAC,,, | Performed by: SURGERY

## 2019-09-05 PROCEDURE — 99024 POSTOP FOLLOW-UP VISIT: CPT | Mod: POP,,, | Performed by: SURGERY

## 2019-09-05 PROCEDURE — 99999 PR PBB SHADOW E&M-EST. PATIENT-LVL III: ICD-10-PCS | Mod: PBBFAC,,, | Performed by: SURGERY

## 2019-09-05 PROCEDURE — 99213 OFFICE O/P EST LOW 20 MIN: CPT | Mod: PBBFAC | Performed by: SURGERY

## 2019-09-06 ENCOUNTER — PATIENT MESSAGE (OUTPATIENT)
Dept: SURGERY | Facility: CLINIC | Age: 64
End: 2019-09-06

## 2019-09-06 ENCOUNTER — PATIENT MESSAGE (OUTPATIENT)
Dept: DIABETES | Facility: CLINIC | Age: 64
End: 2019-09-06

## 2019-09-08 NOTE — PROGRESS NOTES
The patient presents for 1st initial postop visit status post a left breast seed localization excisional breast biopsy of the left breast upper outer quadrant.  The surgery was on 08/19/2019 and revealed a focal area of atypical ductal hyperplasia with a 5 mm margin to the closest posterior margin.  There was no evidence of malignancy.  The original core needle biopsy was performed on July 8th 2019 for an area of architectural distortion in the 2:00 a.m. region of the left breast for which that core needle biopsy was felt to be discordant after the benign results.    The with the results of atypical ductal hyperplasia on the excisional biopsy and a Tyrer-Cuzick lifetime risk score of greater than 20% we will refer the patient to Medical Oncology for chemoprevention consultation the patient has been scheduled to see Dr. Rashel Zamorano.    The patient was unable to do her breast MRI prior to the excisional biopsy due to issues with positioning because of her body habitus and she may require sedation the future for a breast MRI.  She would like to do a breast MRI in the future because of the elevated lifetime risk score and we will set her up for a breast MRI in January of 2020 with anticipated prescription for oral sedation just prior to the MRI.    Postoperatively she has an excellent symmetrical cosmetic result.  The incision site in the left breast upper outer quadrant is healing appropriately without signs of infection hematoma or seroma.  The incision is approximately midway between the nipple-areolar complex and the axilla.    For only complaint is that she has developed left nipple inversion since the surgery. The nipple inversion was not present prior to surgery and her nipple-areolar complex was normal prior to that.  This may represent retraction from the acute and chronic inflammation and postsurgical changes and may fever back to normal on its own over the next several months.    Certainly if it does not freida  back, this will likely be due to postoperative changes since it was not present before surgery, but we will need to monitor this closely and we will take a close look at this area when she gets her breast MRI in January of 2020 since there is a family history of breast cancer in her mother.  Her mother had breast cancer in her 80s    All of this was discussed with the patient postoperatively.  She is pending consultation with medical oncology for chemoprevention.  I will have her follow up with me approximately 1 week after the MRI to reassess the left breast nipple-areolar complex inversion and review the MRI results in January of 2020.  She will likely need an order for oral sedation prior to the MRI.

## 2019-09-24 ENCOUNTER — CLINICAL SUPPORT (OUTPATIENT)
Dept: DIABETES | Facility: CLINIC | Age: 64
End: 2019-09-24
Payer: MEDICARE

## 2019-09-24 ENCOUNTER — PATIENT OUTREACH (OUTPATIENT)
Dept: ADMINISTRATIVE | Facility: OTHER | Age: 64
End: 2019-09-24

## 2019-09-24 DIAGNOSIS — E11.65 UNCONTROLLED TYPE 2 DIABETES MELLITUS WITH HYPERGLYCEMIA: ICD-10-CM

## 2019-09-24 DIAGNOSIS — E11.65 UNCONTROLLED TYPE 2 DIABETES MELLITUS WITH HYPERGLYCEMIA: Primary | ICD-10-CM

## 2019-09-24 PROCEDURE — 99213 OFFICE O/P EST LOW 20 MIN: CPT | Mod: PBBFAC,PO

## 2019-09-24 PROCEDURE — G0108 DIAB MANAGE TRN  PER INDIV: HCPCS | Mod: PBBFAC,PO | Performed by: DIETITIAN, REGISTERED

## 2019-09-24 PROCEDURE — 99999 PR PBB SHADOW E&M-EST. PATIENT-LVL III: CPT | Mod: PBBFAC,,,

## 2019-09-24 PROCEDURE — 99999 PR PBB SHADOW E&M-EST. PATIENT-LVL III: ICD-10-PCS | Mod: PBBFAC,,,

## 2019-09-24 NOTE — LETTER
September 25, 2019      Areil Crespo MD  [address] [facility] [address]         Patient: Verenice Mirza   MR Number: [id number]   YOB: 1955   Date of Visit: 9/24/2019       Dear Dr. Crespo:    Thank you for referring Verenice for diabetes self-management education and support. She has completed all components of our Diabetes Management Program and her Self-Management Support Plan. Below is a summary of her clinical outcomes and goal progress.    Patient Outcomes:    A1c Status:   Lab Results   Component Value Date    HGBA1C 6.6 (H) 08/05/2019    HGBA1C 7.1 (H) 05/03/2019     Goals  Patient has selected/evaluated goals during today's session: Yes, evaluated  Healthy Eating: % Met  Met Percentage : 100%  Monitoring: % Met  Met Percentage : 100%  Healthy Coping: % Met  Met Percentage : 100%    Follow up:   · Verenice to follow diabetes support plan indicated above  · Verenice to attend medical appointments as scheduled  · Verenice to update you on her DM education progress as needed      If you have questions, please do not hesitate to call me. I look forward to providing additional education and support as needed.    Sincerely,    Erika Emmanuel RD, CDE

## 2019-09-24 NOTE — LETTER
September 24, 2019      Areli Crespo MD  [address] [facility] [address]         Patient: Verenice Mirza   MR Number: [id number]   YOB: 1955   Date of Visit: 9/24/2019       Dear Dr. Crespo:    Thank you for referring Verenice for diabetes self-management education and support. She has completed all components of our Diabetes Management Program and her Self-Management Support Plan. Below is a summary of her clinical outcomes and goal progress.    Patient Outcomes:    A1c Status:   Lab Results   Component Value Date    HGBA1C 6.6 (H) 08/05/2019    HGBA1C 7.1 (H) 05/03/2019     Goals  Patient has selected/evaluated goals during today's session: Yes, evaluated  Healthy Eating: % Met  Met Percentage : 100%  Monitoring: % Met  Met Percentage : 100%  Healthy Coping: % Met  Met Percentage : 100%    Follow up:   · Verenice to follow diabetes support plan indicated above  · Verenice to attend medical appointments as scheduled  · Verenice to update you on her DM education progress as needed      If you have questions, please do not hesitate to call me. I look forward to providing additional education and support as needed.    Sincerely,    Erika Emmanuel RD, CDE

## 2019-09-24 NOTE — PROGRESS NOTES
Diabetes Education  Author: Erika Emmanuel RD, CDE  Date: 9/24/2019    Diabetes Care Management Summary  Diabetes Education Record Assessment/Progress: Post Program/Follow-up     Last A1c:   Lab Results   Component Value Date    HGBA1C 6.6 (H) 08/05/2019     Last visit with Diabetes Educator: : 09/24/2019    Diabetes Type  Diabetes Type : Type II    Diabetes History  Diabetes Diagnosis: 0-1 year  Current Treatment: Oral Medication    Health Maintenance was reviewed today with patient. Discussed with patient importance of routine eye exams, foot exams/foot care, blood work (i.e.: A1c, microalbumin, and lipid), dental visits, yearly flu vaccine, and pneumonia vaccine as indicated by PCP. Patient verbalized understanding.     Health Maintenance Topics with due status: Not Due       Topic Last Completion Date    TETANUS VACCINE 10/09/2015    Colonoscopy 05/24/2017    Foot Exam 05/10/2019    Eye Exam 05/31/2019    Mammogram 07/01/2019    Lipid Panel 08/05/2019    Hemoglobin A1c 08/05/2019    Aspirin/Antiplatelet Therapy 09/08/2019     Health Maintenance Due   Topic Date Due    DEXA SCAN  05/25/1994     Nutrition  Meal Planning: water, artificial sweeteners, eats out often, 3 meals per day, snacks between meal  What type of sweetener do you use?: Stevia/Truvia  What type of beverages do you drink?: diet soda/tea, water  Meal Plan 24 Hour Recall - Breakfast: (Protein Shake)  Meal Plan 24 Hour Recall - Lunch: (Salads w/ protein)  Meal Plan 24 Hour Recall - Dinner: (Protein/veggies)  Meal Plan 24 Hour Recall - Snack: (SF jello, SF pudding, string cheese)    Monitoring   Self Monitoring : (Checks once a day)  Blood Glucose Logs: Yes(Majority of readings are between )  Do you use a personal continuous glucose monitor?: No  In the last month, how often have you had a low blood sugar reaction?: never  What are your symptoms of low blood sugar?: (N/A)  How do you treat low blood sugar?: (N/A)  Can you tell when your blood  sugar is too high?: no  How do you treat high blood sugar?: (None)    Exercise   Exercise Type: none    Current Diabetes Treatment   Current Treatment: Oral Medication    Social History  Preferred Learning Method: Face to Face  Primary Support: Self  Smoking Status: Ex Smoker  Alcohol Use: Never    Barriers to Change  Barriers to Change: None  Learning Challenges : None    Readiness to Learn   Readiness to Learn : Acceptance    Cultural Influences  Cultural Influences: No    Diabetes Education Assessment/Progress  Diabetes Disease Process (diabetes disease process and treatment options): Instructed, Discussion, Individual Session, Written Materials Provided, Demonstrates Understanding/Competency(verbalizes/demonstrates)  Nutrition (Incorporating nutritional management into one's lifestyle): Instructed, Discussion, Individual Session, Written Materials Provided, Demonstrates Understanding/Competency (verbalizes/demonstrates)(Reviewed CHO counting, label reading and addt'l resources to assist w/ CHO counting; plate method reviewed)  Physical Activity (incorporating physical activity into one's lifestyle): Instructed, Discussion, Individual Session, Written Materials Provided, Demonstrates Understanding/Competency (verbalizes/demonstrates)(Reviewed goals and benefits)  Medications (states correct name, dose, onset, peak, duration, side effects & timing of meds): Instructed, Discussion, Individual Session, Written Materials Provided, Demonstrates Understanding/Competency(verbalizes/demonstrates)(Reviewed medication regimen)  Monitoring (monitoring blood glucose/other parameters & using results): Instructed, Discussion, Individual Session, Written Materials Provided, Demonstrates Understanding/Competency (verbalizes/demonstrates)(Reviewed SMBG schedule and BG goals)  Acute Complications (preventing, detecting, and treating acute complications): Instructed, Discussion, Individual Session, Written Materials Provided,  Demonstrates Understanding/Competency (verbalizes/demonstrates)(Reviewed s/s and treatment of hypoglycemia)  Chronic Complications (preventing, detecting, and treating chronic complications): Instructed, Discussion, Individual Session, Written Materials Provided, Demonstrates Understanding/Competency (verbalizes/demonstrates)(Reviewed standards of care)  Clinical (diabetes, other pertinent medical history, and relevant comorbidities reviewed during visit): Discussion, Comprehends Key Points  Cognitive (knowledge of self-management skills, functional health literacy): Discussion, Comprehends Key Points  Psychosocial (emotional response to diabetes): Discussion, Comprehends Key Points  Diabetes Distress and Support Systems: Discussion, Comprehends Key Points(DDS completed at initial visit)  Behavioral (readiness for change, lifestyle practices, self-care behaviors): Discussion, Comprehends Key Points    Goals  Patient has selected/evaluated goals during today's session: Yes, evaluated  Healthy Eating: % Met  Met Percentage : 100%  Monitoring: % Met  Met Percentage : 100%  Healthy Coping: % Met  Met Percentage : 100%    Diabetes Self-Management Support Plan  Diabetes Learning: DM websites  Review Status: Patient has selected and agrees to support plan., Patient was provided Diabetes Self-Management Support Plan document that includes support options.    Diabetes Care Plan/Intervention  Education Plan/Intervention: (Patient to call or send message through portal with questions or concerns; will send recall letter for 1 year follow up)    Diabetes Meal Plan  Carbohydrate Per Meal: 20-30g  Carbohydrate Per Snack : 7-15g    Today's Self-Management Care Plan was developed with the patient's input and is based on barriers identified during today's assessment.    The long and short-term goals in the care plan were written with the patient/caregiver's input. The patient has agreed to work toward these goals to improve her  overall diabetes control.      The patient received a copy of today's self-management plan and verbalized understanding of the care plan, goals, and all of today's instructions.      The patient was encouraged to communicate with her physician and care team regarding her condition(s) and treatment.  I provided the patient with my contact information today and encouraged her to contact me via phone or patient portal as needed.     Education Units of Time   Time Spent: 75 min

## 2019-09-24 NOTE — LETTER
September 25, 2019      Areli Crespo MD  [address] [facility] [address]         Patient: Verenice Mirza   MR Number: [id number]   YOB: 1955   Date of Visit: 9/24/2019       Dear Dr. Crespo:    Thank you for referring Verenice for diabetes self-management education and support. She has completed all components of our Diabetes Management Program and her Self-Management Support Plan. Below is a summary of her clinical outcomes and goal progress.    Patient Outcomes:    A1c Status:   Lab Results   Component Value Date    HGBA1C 6.6 (H) 08/05/2019    HGBA1C 7.1 (H) 05/03/2019     Goals  Patient has selected/evaluated goals during today's session: Yes, evaluated  Healthy Eating: % Met  Met Percentage : 100%  Monitoring: % Met  Met Percentage : 100%  Healthy Coping: % Met  Met Percentage : 100%    Diabetes Self-Management Support Plan  Diabetes Learning: DM websites  Review Status: Patient has selected and agrees to support plan., Patient was provided Diabetes Self-Management Support Plan document that includes support options.    Follow up:   · Verenice to follow diabetes support plan indicated above  · Verenice to attend medical appointments as scheduled  · Verenice to update you on her DM education progress as needed      If you have questions, please do not hesitate to call me. I look forward to providing additional education and support as needed.    Sincerely,    Erika Emmanuel RD, CDE

## 2019-09-25 PROBLEM — N60.92 ATYPICAL DUCTAL HYPERPLASIA OF LEFT BREAST: Status: ACTIVE | Noted: 2019-09-25

## 2019-09-26 ENCOUNTER — PATIENT MESSAGE (OUTPATIENT)
Dept: INTERNAL MEDICINE | Facility: CLINIC | Age: 64
End: 2019-09-26

## 2019-09-26 ENCOUNTER — OFFICE VISIT (OUTPATIENT)
Dept: SURGERY | Facility: CLINIC | Age: 64
End: 2019-09-26
Payer: MEDICARE

## 2019-09-26 VITALS
HEART RATE: 52 BPM | BODY MASS INDEX: 43.29 KG/M2 | WEIGHT: 235.25 LBS | RESPIRATION RATE: 18 BRPM | HEIGHT: 62 IN | OXYGEN SATURATION: 94 % | TEMPERATURE: 97 F | DIASTOLIC BLOOD PRESSURE: 65 MMHG | SYSTOLIC BLOOD PRESSURE: 134 MMHG

## 2019-09-26 DIAGNOSIS — N60.92 ATYPICAL DUCTAL HYPERPLASIA OF LEFT BREAST: ICD-10-CM

## 2019-09-26 DIAGNOSIS — M81.8 OTHER OSTEOPOROSIS WITHOUT CURRENT PATHOLOGICAL FRACTURE: ICD-10-CM

## 2019-09-26 DIAGNOSIS — Z13.820 ENCOUNTER FOR SCREENING FOR OSTEOPOROSIS: Primary | ICD-10-CM

## 2019-09-26 PROCEDURE — 99999 PR PBB SHADOW E&M-EST. PATIENT-LVL V: CPT | Mod: PBBFAC,,, | Performed by: INTERNAL MEDICINE

## 2019-09-26 PROCEDURE — 99204 OFFICE O/P NEW MOD 45 MIN: CPT | Mod: S$PBB,,, | Performed by: INTERNAL MEDICINE

## 2019-09-26 PROCEDURE — 99215 OFFICE O/P EST HI 40 MIN: CPT | Mod: PBBFAC | Performed by: INTERNAL MEDICINE

## 2019-09-26 PROCEDURE — 99204 PR OFFICE/OUTPT VISIT, NEW, LEVL IV, 45-59 MIN: ICD-10-PCS | Mod: S$PBB,,, | Performed by: INTERNAL MEDICINE

## 2019-09-26 PROCEDURE — 99999 PR PBB SHADOW E&M-EST. PATIENT-LVL V: ICD-10-PCS | Mod: PBBFAC,,, | Performed by: INTERNAL MEDICINE

## 2019-09-27 ENCOUNTER — TELEPHONE (OUTPATIENT)
Dept: GASTROENTEROLOGY | Facility: CLINIC | Age: 64
End: 2019-09-27

## 2019-09-27 ENCOUNTER — OFFICE VISIT (OUTPATIENT)
Dept: INTERNAL MEDICINE | Facility: CLINIC | Age: 64
End: 2019-09-27
Payer: MEDICARE

## 2019-09-27 VITALS
HEIGHT: 62 IN | WEIGHT: 235.25 LBS | SYSTOLIC BLOOD PRESSURE: 116 MMHG | OXYGEN SATURATION: 95 % | BODY MASS INDEX: 43.29 KG/M2 | DIASTOLIC BLOOD PRESSURE: 76 MMHG | HEART RATE: 52 BPM

## 2019-09-27 DIAGNOSIS — K57.92 DIVERTICULITIS: Primary | ICD-10-CM

## 2019-09-27 DIAGNOSIS — B37.31 YEAST VAGINITIS: ICD-10-CM

## 2019-09-27 DIAGNOSIS — L65.8 FEMALE PATTERN BALDNESS: ICD-10-CM

## 2019-09-27 DIAGNOSIS — I10 ESSENTIAL HYPERTENSION: ICD-10-CM

## 2019-09-27 PROCEDURE — 99999 PR PBB SHADOW E&M-EST. PATIENT-LVL III: CPT | Mod: PBBFAC,,, | Performed by: INTERNAL MEDICINE

## 2019-09-27 PROCEDURE — 99213 OFFICE O/P EST LOW 20 MIN: CPT | Mod: PBBFAC,PO | Performed by: INTERNAL MEDICINE

## 2019-09-27 PROCEDURE — 99214 PR OFFICE/OUTPT VISIT, EST, LEVL IV, 30-39 MIN: ICD-10-PCS | Mod: S$PBB,,, | Performed by: INTERNAL MEDICINE

## 2019-09-27 PROCEDURE — 99214 OFFICE O/P EST MOD 30 MIN: CPT | Mod: S$PBB,,, | Performed by: INTERNAL MEDICINE

## 2019-09-27 PROCEDURE — 99999 PR PBB SHADOW E&M-EST. PATIENT-LVL III: ICD-10-PCS | Mod: PBBFAC,,, | Performed by: INTERNAL MEDICINE

## 2019-09-27 RX ORDER — METRONIDAZOLE 500 MG/1
500 TABLET ORAL 3 TIMES DAILY
Qty: 30 TABLET | Refills: 0 | Status: SHIPPED | OUTPATIENT
Start: 2019-09-27 | End: 2019-10-07

## 2019-09-27 RX ORDER — FLUCONAZOLE 150 MG/1
150 TABLET ORAL WEEKLY
Qty: 1 TABLET | Refills: 0 | Status: SHIPPED | OUTPATIENT
Start: 2019-09-27 | End: 2019-09-28

## 2019-09-27 RX ORDER — CIPROFLOXACIN 500 MG/1
500 TABLET ORAL 2 TIMES DAILY
Qty: 20 TABLET | Refills: 0 | Status: SHIPPED | OUTPATIENT
Start: 2019-09-27 | End: 2019-10-07

## 2019-09-27 RX ORDER — SPIRONOLACTONE 100 MG/1
100 TABLET, FILM COATED ORAL DAILY
Qty: 90 TABLET | Refills: 1 | Status: SHIPPED | OUTPATIENT
Start: 2019-09-27 | End: 2020-01-15

## 2019-09-27 NOTE — TELEPHONE ENCOUNTER
Patient stated that she is having diverticulitis pain and would like to be seen today. Informed pt that we do not have anything available today in the GI clinic. Patient stated that she is going on Vacation today and would need to be seen Urgently. Offered appt with Urgent Care and ED she declined. Number given to call and schedule with Primary Care to be seen today. Patient was offered a first available appt on 10/1/19 in [place], but declined. Patient stated that she would like to see Dr. Bui. Follow up appointment scheduled with Dr. Sims on 10/3/19 at 3:30pm. Verbal Understanding.

## 2019-09-27 NOTE — PROGRESS NOTES
Subjective:       Patient ID: Verenice Mirza is a 64 y.o. female.    Chief Complaint: Divirticulitis    HPI 64-year-old female presents to clinic today she is experiencing left lower quadrant pain she states it feels exactly like diverticulitis that she has had previously.  She denies any abdominal swelling states symptoms are the same as when she has had them previously she has had a twinge of pain in between.  She has responded to antibiotics in the past.  Denies any fever chills.  Review of Systems  otherwise negative  Objective:      Physical Exam  General: Well-appearing, well-nourished.  No distress  HEENT: conjunctivae are normal.  Pupils are equal and reative to light.  TM's are clear and intact bilaterally.  Hearing is grossly normal.  Nasopharynx is clear.  Oropharynx is clear.  Neck: Supple.  No thyroid megaly.  No bruits.  Lymph: No cervical or supraclavicular adenopathy.  Heart: Regular rate and rhythm, without murmur, rub or gallop.  Lungs: Clear to auscultation; respiratory effort normal.  Abdomen: Soft, mild tenderness to palpation the left lower quadrant no rebound no guarding, nondistended.  Normoactive bowel sounds.  No hepatomegaly.  No masses.  Extremities: Good distal pulses.  No edema.  Psych: Oriented to time person place.  Judgment and insight seem unimpaired.  Mood and affect are appropriate.  Assessment:       1. 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  Diverticulitis  Plan:       Verenice was seen today for divirticulitis.    Diagnoses and all orders for this visit:    Diverticulitis  -     ciprofloxacin HCl (CIPRO) 500 MG tablet; Take 1 tablet (500 mg total) by mouth 2 (two) times daily. for 10 days  -     metroNIDAZOLE (FLAGYL) 500 MG tablet; Take 1 tablet (500 mg total) by mouth 3 (three) times daily. 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    Yeast vaginitis  -     fluconazole (DIFLUCAN) 150 MG Tab; Take 1 tablet (150 mg total) by mouth once a week. for 1 dose  To be taken after course of antibiotics printed prescription given to patient request.  Do not combine with Diflucan with Cipro.  Essential hypertension  -    discontinue losartan is patient is having difficulty getting it at the pharmacy and since she is having female pattern balding currently on low-dose spironolactone will go ahead and maximize the spironolactone this time to see she had better clinical benefit.  Plan a follow-up BMP in 2 weeks.  -     Basic metabolic panel; Future  -     Basic metabolic panel

## 2019-09-27 NOTE — TELEPHONE ENCOUNTER
----- Message from Larissa Barajas sent at 9/27/2019  7:37 AM CDT -----  New patient no. [phone]    Patient has diverticulitis.  She is having pain.   She would like an appointment this morning.    Please call.

## 2019-09-27 NOTE — PATIENT INSTRUCTIONS
Diverticulitis    Some people get pouches along the wall of the colon as they get older. The pouches, called diverticuli, usually cause no symptoms. If the pouches become blocked, you can get an infection. This infection is called diverticulitis. It causes pain in your lower abdomen and fever. If not treated, it can become a serious condition, causing an abscess to form inside the pouch. The abscess may block the intestinal tract even or rupture, spreading infection throughout the abdomen.  When treatment is started early, oral antibiotics alone may be enough to cure diverticulitis. This method is tried first. But, if you don't improve or if your condition gets worse while using oral antibiotics, you may need to be admitted to the hospital for IV antibiotics. Severe cases may require surgery.  Home care  The following guidelines will help you care for yourself at home:  · During the acute illness, rest and follow your healthcare provider's instructions about diet. Sometimes you will need to follow a clear liquid diet to rest your bowel. Once your symptoms are better, you may be told to follow a low-fiber diet for some time. Include foods like:  ¨ Flake cereal, mashed potatoes, pancakes, waffles, pasta, white bread, rice, applesauce, bananas, eggs, fish, poultry, tofu, and cooked soft vegetables  · Take antibiotics exactly as instructed. Don't miss any doses or stop taking the medication, even if you feel better.  · Monitor your temperature and tell your healthcare provider if you have rising temperatures.  Preventing future attacks  Once you have an episode of diverticulitis, you are at risk for having it again. After you have recovered from this episode, you may be able to lower your risk by eating a high-fiber diet (20 gm/day to 35 gm/day of fiber). This cleans out the colon pouches that already exist and may prevent new ones from forming. Foods high in fiber include fresh fruits and edible peelings, raw or  lightly cooked vegetables, whole grain cereals and breads, dried beans and peas, and bran.  Other steps that can help prevent future attacks include:  · Take your medicines, such as antibiotics, as your healthcare provider says.  · Drink 6 to 8 glasses of water every day, unless told otherwise.  · Use a heating pad or hot water bottle to help abdominal cramping or pain.  · Begin an exercise program. Ask your healthcare provider how to get started. You can benefit from simple activities such as walking or gardening.  · Treat diarrhea with a bland diet. Start with liquids only; then slowly add fiber over time.  · Watch for changes in your bowel movements (constipation to diarrhea). Avoid constipation by eating a high fiber diet and taking a stool softener if needed.  · Get plenty of rest and sleep.  Follow-up care  Follow up with your healthcare provider as advised or sooner if you are not getting better in the next 2 days.  When to seek medical advice  Call your healthcare provider right away if any of these occur:  · Fever of 100.4°F (38°C) or higher, or as directed by your healthcare provider  · Repeated vomiting or swelling of the abdomen  · Weakness, dizziness, light-headedness  · Pain in your abdomen that gets worse, severe, or spreads to your back  · Pain that moves to the right lower abdomen  · Rectal bleeding (stools that are red, black or maroon color)  · Unexpected vaginal bleeding  Date Last Reviewed: 9/1/2016  © 9674-2954 [employer]. [address] [place] [address], [place] [address]. All rights reserved. This information is not intended as a substitute for professional medical care. Always follow your healthcare professional's instructions.      DR Blake genetics

## 2019-10-03 ENCOUNTER — OFFICE VISIT (OUTPATIENT)
Dept: GASTROENTEROLOGY | Facility: CLINIC | Age: 64
End: 2019-10-03
Payer: MEDICARE

## 2019-10-03 VITALS — WEIGHT: 233 LBS | BODY MASS INDEX: 42.62 KG/M2

## 2019-10-03 DIAGNOSIS — Z87.19 HISTORY OF DIVERTICULITIS: ICD-10-CM

## 2019-10-03 DIAGNOSIS — K57.90 DIVERTICULOSIS: Primary | ICD-10-CM

## 2019-10-03 PROCEDURE — 99213 OFFICE O/P EST LOW 20 MIN: CPT | Mod: S$PBB,,, | Performed by: INTERNAL MEDICINE

## 2019-10-03 PROCEDURE — 99999 PR PBB SHADOW E&M-EST. PATIENT-LVL II: CPT | Mod: PBBFAC,,, | Performed by: INTERNAL MEDICINE

## 2019-10-03 PROCEDURE — 99212 OFFICE O/P EST SF 10 MIN: CPT | Mod: PBBFAC,PO | Performed by: INTERNAL MEDICINE

## 2019-10-03 PROCEDURE — 99213 PR OFFICE/OUTPT VISIT, EST, LEVL III, 20-29 MIN: ICD-10-PCS | Mod: S$PBB,,, | Performed by: INTERNAL MEDICINE

## 2019-10-03 PROCEDURE — 99999 PR PBB SHADOW E&M-EST. PATIENT-LVL II: ICD-10-PCS | Mod: PBBFAC,,, | Performed by: INTERNAL MEDICINE

## 2019-10-03 NOTE — PROGRESS NOTES
Subjective:       Patient ID: Verenice Mirza is a 64 y.o. female.    Chief Complaint: Diverticulitis    This is a 64-year-old female here for a follow-up visit regarding history of diverticulitis.  She had a previously documented episode of acute, uncomplicated diverticulitis involving the distal descending/sigmoid colon in April of this year when she presented with acute, sharp, left lower quadrant and nonradiating pain. At that time she was effectively treated with antibiotics and did not experience symptom recurrence until the past 2 weeks when she had several days of intermittent, similar pain.  The 1st resolved on its own, the 2nd episode was more persistent and she presented to her PCP with empiric treatment with Cipro and Flagyl.  She does feel better at this time.  No fevers, chills. No bleeding, diarrhea, constipation. No unintentional weight loss.  Her last colonoscopy was in 2017.    The following portions of the patient's history were reviewed and updated as appropriate: allergies, current medications, past family history, past medical history, past social history, past surgical history and problem list.    (Portions of this note were dictated using voice recognition software and may contain dictation related errors in spelling/grammar/syntax not found on text review)  HPI  Review of Systems   Constitutional: Negative for chills and unexpected weight change.   Respiratory: Negative for apnea and chest tightness.    Cardiovascular: Negative for chest pain and palpitations.   Gastrointestinal: Negative for abdominal distention and abdominal pain.         Objective:      Physical Exam   Constitutional: She is oriented to person, place, and time. She appears well-developed and well-nourished. No distress.   HENT:   Head: Normocephalic and atraumatic.   Eyes: Conjunctivae are normal. No scleral icterus.   Neck: Normal range of motion. Neck supple. No tracheal deviation present. No thyromegaly present.    Cardiovascular: Normal rate and regular rhythm. Exam reveals no gallop and no friction rub.   Pulmonary/Chest: Effort normal and breath sounds normal. No respiratory distress. She has no wheezes.   Abdominal: Soft. Bowel sounds are normal. She exhibits no distension. There is no tenderness.   Neurological: She is alert and oriented to person, place, and time.   Skin: Skin is warm and dry. No rash noted. She is not diaphoretic. No erythema.   Psychiatric: She has a normal mood and affect. Her behavior is normal.   Nursing note and vitals reviewed.        Labs/imaging; reviewed  Assessment:       1. Diverticulosis    2. History of diverticulitis        Plan:   1. Cont abx  2.  Discussed diverticulitis/diverticular disease in detail regarding dietary recommendations  3.  Offered surgical referral to discuss resection, though with two, relatively mild and uncomplicated episodes, conservative management may be the more prudent option at this time

## 2019-10-10 ENCOUNTER — PATIENT MESSAGE (OUTPATIENT)
Dept: SURGERY | Facility: CLINIC | Age: 64
End: 2019-10-10

## 2019-10-10 ENCOUNTER — PATIENT MESSAGE (OUTPATIENT)
Dept: GASTROENTEROLOGY | Facility: CLINIC | Age: 64
End: 2019-10-10

## 2019-10-10 ENCOUNTER — PATIENT MESSAGE (OUTPATIENT)
Dept: INTERNAL MEDICINE | Facility: CLINIC | Age: 64
End: 2019-10-10

## 2019-10-10 DIAGNOSIS — R10.32 LLQ ABDOMINAL PAIN: Primary | ICD-10-CM

## 2019-10-11 RX ORDER — LANCETS
1 EACH MISCELLANEOUS DAILY
Qty: 100 EACH | Refills: 3 | Status: SHIPPED | OUTPATIENT
Start: 2019-10-11 | End: 2019-10-17 | Stop reason: SDUPTHER

## 2019-10-14 ENCOUNTER — TELEPHONE (OUTPATIENT)
Dept: INTERNAL MEDICINE | Facility: CLINIC | Age: 64
End: 2019-10-14

## 2019-10-14 DIAGNOSIS — E88.819 INSULIN RESISTANCE: Primary | ICD-10-CM

## 2019-10-15 LAB
ALBUMIN/CREAT UR: 4 MCG/MG CREAT
BUN SERPL-MCNC: 19 MG/DL (ref 7–25)
BUN/CREAT SERPL: ABNORMAL (CALC) (ref 6–22)
CALCIUM SERPL-MCNC: 10.1 MG/DL (ref 8.6–10.4)
CHLORIDE SERPL-SCNC: 102 MMOL/L (ref 98–110)
CO2 SERPL-SCNC: 25 MMOL/L (ref 20–32)
CREAT SERPL-MCNC: 0.89 MG/DL (ref 0.5–0.99)
CREAT UR-MCNC: 164 MG/DL (ref 20–275)
GFRSERPLBLD MDRD-ARVRAT: 68 ML/MIN/1.73M2
GLUCOSE SERPL-MCNC: 127 MG/DL (ref 65–99)
MICROALBUMIN UR-MCNC: 0.6 MG/DL
POTASSIUM SERPL-SCNC: 4.7 MMOL/L (ref 3.5–5.3)
SODIUM SERPL-SCNC: 139 MMOL/L (ref 135–146)

## 2019-10-17 ENCOUNTER — HOSPITAL ENCOUNTER (OUTPATIENT)
Dept: RADIOLOGY | Facility: HOSPITAL | Age: 64
Discharge: HOME | End: 2019-10-17
Attending: INTERNAL MEDICINE
Payer: MEDICARE

## 2019-10-17 DIAGNOSIS — R10.32 LLQ ABDOMINAL PAIN: ICD-10-CM

## 2019-10-17 DIAGNOSIS — E11.9 NEW ONSET TYPE 2 DIABETES MELLITUS: Primary | ICD-10-CM

## 2019-10-17 PROCEDURE — 74177 CT ABD & PELVIS W/CONTRAST: CPT | Mod: 26,,, | Performed by: RADIOLOGY

## 2019-10-17 PROCEDURE — 74177 CT ABDOMEN PELVIS WITH CONTRAST: ICD-10-PCS | Mod: 26,,, | Performed by: RADIOLOGY

## 2019-10-17 PROCEDURE — 25500020 PHARM REV CODE 255: Performed by: INTERNAL MEDICINE

## 2019-10-17 PROCEDURE — 74177 CT ABD & PELVIS W/CONTRAST: CPT | Mod: TC

## 2019-10-17 RX ORDER — LANCETS
1 EACH MISCELLANEOUS DAILY
Qty: 100 EACH | Refills: 3 | Status: SHIPPED | OUTPATIENT
Start: 2019-10-17 | End: 2020-01-15 | Stop reason: SDUPTHER

## 2019-10-17 RX ADMIN — IOHEXOL 100 ML: 350 INJECTION, SOLUTION INTRAVENOUS at 08:10

## 2019-11-03 ENCOUNTER — PATIENT MESSAGE (OUTPATIENT)
Dept: INTERNAL MEDICINE | Facility: CLINIC | Age: 64
End: 2019-11-03

## 2019-11-03 DIAGNOSIS — E78.5 DYSLIPIDEMIA ASSOCIATED WITH TYPE 2 DIABETES MELLITUS: ICD-10-CM

## 2019-11-03 DIAGNOSIS — E11.59 HYPERTENSION ASSOCIATED WITH DIABETES: ICD-10-CM

## 2019-11-03 DIAGNOSIS — E11.69 DYSLIPIDEMIA ASSOCIATED WITH TYPE 2 DIABETES MELLITUS: ICD-10-CM

## 2019-11-03 DIAGNOSIS — I15.2 HYPERTENSION ASSOCIATED WITH DIABETES: ICD-10-CM

## 2019-11-04 RX ORDER — METFORMIN HYDROCHLORIDE 500 MG/1
500 TABLET ORAL DAILY
Qty: 90 TABLET | Refills: 1 | Status: SHIPPED | OUTPATIENT
Start: 2019-11-04 | End: 2020-01-15 | Stop reason: SDUPTHER

## 2019-11-17 ENCOUNTER — PATIENT MESSAGE (OUTPATIENT)
Dept: GASTROENTEROLOGY | Facility: CLINIC | Age: 64
End: 2019-11-17

## 2019-11-18 ENCOUNTER — TELEPHONE (OUTPATIENT)
Dept: HEMATOLOGY/ONCOLOGY | Facility: CLINIC | Age: 64
End: 2019-11-18

## 2019-11-18 ENCOUNTER — PATIENT MESSAGE (OUTPATIENT)
Dept: INTERNAL MEDICINE | Facility: CLINIC | Age: 64
End: 2019-11-18

## 2019-11-18 NOTE — TELEPHONE ENCOUNTER
----- Message from Mariah Braxton MA sent at 11/18/2019  2:52 PM CST -----  Contact: Verenice Mirza  tel:    [phone]       ----- Message -----  From: Nitza Ohara  Sent: 11/18/2019   2:47 PM CST  To: [facility]    Caller says she wants to come to see you in November or December for a cons.   Dr. Rashel Zamorano referring .  Also, has some questions about your specialties and about you.   Says Dr. Rashel Zamorano told her to call you.      ---    Called patient regarding the above.  Questions were encouraged and answered to patient's satisfaction.  Appointment scheduled for patient to see me for a genetics visit on Monday, 11/25/2019, at 2:00PM at the [facility].  Appointment date, time, and location reviewed with patient.  Patient verbalized understanding of information and agreement with the plan.

## 2019-11-19 NOTE — PROGRESS NOTES
Patient ID: Verenice Mirza is a 64 y.o. female.    Chief Complaint: Genetic Evaluation            HPI:  New patient presents today for a genetic evaluation as it pertains to hereditary cancer risk.      Pertinent Personal History:    Oncologic history:  No cancer; +hx of ADH of the breast    Genetic testing history:  denies    Uterus and ovaries:  S/p total hysterectomy     Pertinent Family History:     Oncologic history:  See pedigree (which will be scanned into [employer])    Ashkenazi Sikh ancestry:  denies    Race/ethnicity:   (English)      Genetic testing history:  denies      See pedigree (which will be scanned into Epic) for full personal pertinent history and full family oncologic history.      Review of Systems - See HPI.  Objective:   Physical Exam   Constitutional: She appears well-developed and well-nourished. No distress.   Pulmonary/Chest: Effort normal.   Neurological: She is alert.   Psychiatric: She has a normal mood and affect. Her speech is normal and behavior is normal. Thought content normal.   Assessment:       1. Encounter for nonprocreative genetic counseling    2. Family history of breast cancer          Counseling:     The following was discussed with the patient in clinic today:    Based on the information provided to me by patient today, patient meets clinical recommendations for hereditary cancer syndromes based on the breast cancer in her paternal aunt (diagnosed in her her early 50s, unknown to patient if unilateral versus bilateral, receptor statuses unknown to patient, and affected individual ) and abdominal cancer which patient believes was stomach cancer in another paternal aunt, as well as patient's father dying at age 41 in a motor vehicle accident and patient's not knowing her paternal grandparents' medical history.  Patient is independently strongly motivated to undergo genetic testing for hereditary cancer syndromes.    A key role of tumor suppressor genes is to  suppress a cancer.  When a tumor suppressor gene has a clinically significant mutation, it affects the functioning of the gene, and the individual may be more likely to develop cancer in certain organs.    Only some cancers are hereditary; when a gene mutation is not identified, it does not completely rule out the possibility of hereditary cancers but does make them less likely to occur.  It is also possible to see familial clustering of related cancer amongst family members.  Cancers can also be sporadic.    Multigene/panel/comprehensive genetic testing consists of testing multiple genes known to be related to hereditary cancers.       Possible results of genetic testing include positive, negative, and variant of uncertain significance (VUS) (or an unclear result).  A positive result would indicate the presence of a pathogenic mutation, increasing the patients risk for certain cancers.  Management of a pathogenic mutation in the BRCA1 or BRCA2 gene may include increased surveillance, risk-reducing surgery, chemoprevention, and lifestyle modifications; other recommendations and/or referrals to other specialists may be made depending upon the specific genetic mutation identified and the risks associated with such mutation.  A negative result with multisite testing, wherein no genetic mutation had been previously identified in the patients family, would indicate that the patients cancer risk is not fully defined; management would then be based on personal and family history and personal risk factors.  A result of a variant of uncertain significance (VUS), meaning that it is currently unknown whether the change identified in the patients DNA causes cancer, would also indicate that the patients cancer risk is not fully defined; management would then be based on personal and family history and personal risk factors.      Specific cancer risks vary depending upon the tumor suppressor gene in which there is a  mutation.      If patient tests positive for a mutation, patient's first-degree relatives each have a 50% chance of having the same mutation.      The Genetic Information Nondiscrimination Act (ALMA) prohibits health insurance agencies--in most but not all instances--and employers with 16 or more employees from discriminating against an individual based on genetic test results; however, the ALMA does not protect individuals with regard to other types of policies (including but not limited to life insurance, disability, long-term care insurance,  benefits, and [facility] benefits).     Genetic testing tends to be costly, and there is a potential for the patient to incur out-of-pocket costs.      An outside laboratory would perform the testing, with various labs being available for this purpose, after a blood sample is collected at [facility]s lab.      Patient's relatives affected with cancer and their close blood-relatives should speak with a professional regarding genetic counseling/testing.     Offered patient testing today versus deferring testing at this time versus declining testing altogether.  Patient desires to proceed with testing today and has provided informed consent to do so.  Patient agrees to pay [employer]'s patient-pay price of $250 if her out-of-pocket cost with her insurance is greater than $250.  Recommended the Common Hereditary Cancers Panel through [facility], but patient elected to proceed with the Multi-Cancer Panel through [facility] instead.    Informed patient that results can take several weeks.  Post-test genetic counseling will be conducted once genetic testing results are available.      Questions were encouraged and answered to patient's satisfaction, and patient verbalized understanding of information and agreement with the plan.     Plan:       Sample collected today for the Multi-Cancer Panel through [facility].  Results expected in approximately 3 weeks.  Patient will then  be contacted for post-test counseling.     Approximately 55 minutes were spent on this encounter, of which >50% was spent in coordination of care and/or face-to-face counseling.

## 2019-11-22 ENCOUNTER — PATIENT MESSAGE (OUTPATIENT)
Dept: HEMATOLOGY/ONCOLOGY | Facility: CLINIC | Age: 64
End: 2019-11-22

## 2019-11-22 NOTE — TELEPHONE ENCOUNTER
----- Message from Jenniffer Rodrigues sent at 10/14/2019 12:01 PM CDT -----  Contact: [phone]  Pharmacy would like to speak with you about getting Diagnostic codes for lancets and diabetic supply.  Please call.   Initial / Assessment/Plan of Care Note     Baseline Assessment  83 year old admitted 11/21/2019 as Observation with a diagnosis of acute on chronic diastolic CHF, hypoxia on exertion, and SOB. Hx paroxysmal A fib and geriatric anxiety.  Prior to admission patient was living Alone and residing at [facility]. Patient does  have a Power of  for Healthcare. Document is not activated. Agent is her son, Collins Keith (p: [phone]). Patient’s Primary Care Provider is Rachelle Talbot MD.     Medical History  Past Medical History:   Diagnosis Date   • Acquired hypothyroidism 9/24/2018   • Anxiety    • Arthritis     Left shoulder   • Atrial fibrillation (CMS/[facility])    • Esophagitis 12/11/2018    Dr. Hoskins   • Esophagitis 03/29/2019    Dr. Chisholm   • Essential (primary) hypertension    • Gastritis 12/11/2018    Dr. Chisholm   • Gastritis 03/29/2019    Dr. Chisholm   • Gastroesophageal reflux disease    • High cholesterol    • Intestinal metaplasia of gastric cardia 03/29/2019    Dr. Chisholm   • Malignant neoplasm (CMS/[facility]) 2005    Colon cancer   • Osteoporosis    • Paroxysmal A-fib (CMS/[facility])    • Schatzki's ring, Dilated     Dr. Chisholm   • Schatzki's ring, not dilated due to being on Coumadin 12/11/2018    Dr. Chisholm   • Sinusitis, chronic      Prior to Admission Status  Functional Status  Meal Preparation: Independent/Self, Facility Staff(pt cooks and eats some of the facility's meals)  Medication Preparation: Family  Transportation: Family([employer] transports for appts)    Agency/Support  Type of Services Prior to Hospitalization: None  Support Systems: Children, Family members  Home Devices/Equipment: Sensory assist device  Mobility Assist Devices: None  Sensory Support Devices: Eyeglasses, Dentures    Current Status  Current Mental Status: Cooperative, Pleasant    Insurance  Primary: MEDICARE  Secondary: MEDICO    Barriers to Discharge  Identified Barriers to  Discharge/Transition Planning: Assessment/stabilization in progress    Progress Note  Consult received for d/c planning. Chart review completed. Power of  for Healthcare, created 9/13/2019, is valid and on file. Document is NOT activated. SW and Dr. Santizo met with pt - introduced selves and roles. Pt agreeable to the visit. Pt appeared to be A/O2-3.    Pt lives alone in a senior apartment. No current home therapy. She has not driven for 15 years - her daughter-in-law Latasha transports for medical appts. Pt reported being independent with her ADLs prior to admission. She ambulates without an assistive device. Pt cooks some of her meals and pays for other meals at the senior apartment building. Pt plays cards on M, W, and F evenings. Latasha manages her medications and schedules appts. Pt has a metal state DNR bracelet. Pt reported her memory has gotten worse. She also reported some anxiety and was willing to try an antianxiety medication. Throughout the conversation, pt did appear to have some issues with word-finding (ex: when asked for today's day of the week, pt stated \"the day before Saturday,\" but could not remember the word \"Friday\").     PT and OT have been consulted to evaluate. Unknown if pt will work with therapy, however - during her last admission, she was hesitant because she had been admitted under Observation status. Pt is classified as Observation during this admit as well. SW will continue to follow for discharge planning as appropriate.    15:34: OT evaluated pt and recommended home. Anticipate pt will NOT be home bound upon d/c. Therefore, plan for pt to return home without any additional services. No further social service related needs identified. Private-pay home care resources were added to AVS. Please re-consult SW if pt's status changes and/or further needs arise.    Plan  SW/CM - Recommendations for Discharge: Home  Anticipate patient will need post-hospital services. Necessary services  are available.    Refer to / Flowsheet for Goals and objective data.

## 2019-11-25 ENCOUNTER — OFFICE VISIT (OUTPATIENT)
Dept: HEMATOLOGY/ONCOLOGY | Facility: CLINIC | Age: 64
End: 2019-11-25
Payer: MEDICARE

## 2019-11-25 DIAGNOSIS — Z80.3 FAMILY HISTORY OF BREAST CANCER: ICD-10-CM

## 2019-11-25 DIAGNOSIS — Z82.0 FAMILY HISTORY OF ALZHEIMER'S DISEASE: Primary | ICD-10-CM

## 2019-11-25 DIAGNOSIS — Z71.83 ENCOUNTER FOR NONPROCREATIVE GENETIC COUNSELING: Primary | ICD-10-CM

## 2019-11-25 PROCEDURE — 99215 OFFICE O/P EST HI 40 MIN: CPT | Mod: S$PBB,,, | Performed by: NURSE PRACTITIONER

## 2019-11-25 PROCEDURE — 99999 PR PBB SHADOW E&M-EST. PATIENT-LVL I: ICD-10-PCS | Mod: PBBFAC,,, | Performed by: NURSE PRACTITIONER

## 2019-11-25 PROCEDURE — 99211 OFF/OP EST MAY X REQ PHY/QHP: CPT | Mod: PBBFAC | Performed by: NURSE PRACTITIONER

## 2019-11-25 PROCEDURE — 99999 PR PBB SHADOW E&M-EST. PATIENT-LVL I: CPT | Mod: PBBFAC,,, | Performed by: NURSE PRACTITIONER

## 2019-11-25 PROCEDURE — 99215 PR OFFICE/OUTPT VISIT, EST, LEVL V, 40-54 MIN: ICD-10-PCS | Mod: S$PBB,,, | Performed by: NURSE PRACTITIONER

## 2019-11-26 NOTE — PROGRESS NOTES
Phoned patient.  Confirmed her mother's diagnosis of Alzheimer's.  Patient requested yesterday to see a provider in the Neuro[facility]Memory Clinic and a genetic counselor for genetic counseling pertaining to familial neurological problems.  Placed referrals to both, and provided patient with names of those providers and their contact information for patient to call to schedule her appointments.  Patient expressed gratitude.

## 2019-12-12 ENCOUNTER — DOCUMENTATION ONLY (OUTPATIENT)
Dept: HEMATOLOGY/ONCOLOGY | Facility: CLINIC | Age: 64
End: 2019-12-12

## 2019-12-12 NOTE — PROGRESS NOTES
I reviewed the patient's genetic testing results and phoned the patient to conduct post-test genetic counseling.  Called two numbers; no answer on either; LVM x 2.  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      A VUS indicates that amino acids within the gene are lining up in a way different from usual, but there is not presently enough data for laboratories to make a determination as to whether the specific variant is benign or pathogenic.  If [employer] reclassifies any of the VUSs as benign or pathogenic, at that time, [employer] will provide notification; therefore, it is important for the patient to keep her demographics up to date with [facility] and with my office at all times so she can be contacted.      Patient's results do not completely rule out the possibility of a hereditary cancer.  Patient is to continue following up with all providers as they have indicated to patient and should follow up with their primary care provider to ensure all appropriate cancer screenings are in place  based on age, family and personal histories, and other factors.  A copy of her genetic testing results packet (which includes not only results report but also information on the Patient Insights Network and patient's [employer] registration code for her online access**) will be mailed to patient, and patient should contact us if not received soon.  Patient's relatives affected with cancer and those relatives' close blood-relatives should speak with a provider regarding genetic counseling/testing.  Patient should update me with with any changes to patient's personal or family history of cancer and with any personal or relative's genetic testing results and check in with me annually to determine if updated genetic testing is indicated for patient.  **    -------    Familial VUS testing is not recommended for patient's MLH1 and RAD51D VUSs, but details on [employer]'s VUS Resolution Program can be found at [email or web address].  [employer] does not offer familial VUS testing for patient's VHL VUS, but details on [employer]'s VUS Resolution and Family Variant Testing Programs can be found at [email or web address].  Patient can visit [employer] Patient Insights Network by visiting [email or web address].

## 2019-12-13 ENCOUNTER — TELEPHONE (OUTPATIENT)
Dept: HEMATOLOGY/ONCOLOGY | Facility: CLINIC | Age: 64
End: 2019-12-13

## 2019-12-13 NOTE — TELEPHONE ENCOUNTER
Patient returned my call; however, she was at a [employer] show, which she stated was loud, and our connection was poor.  I discussed with her briefly that her genetic testing was negative for clinically significant mutations but did indicate three variants of uncertain significance (VUSs), and we discussed which three genes are affected.  She requested that I call her back for the remainder of post-test discussion in the coming weeks, and she understands that I will be out of the office all next week.  Questions were answered to patient's satisfaction, and patient verbalized understanding of information and agreement with the plan.

## 2019-12-16 ENCOUNTER — PATIENT MESSAGE (OUTPATIENT)
Dept: SURGERY | Facility: CLINIC | Age: 64
End: 2019-12-16

## 2019-12-17 NOTE — PROGRESS NOTES
Subjective:       Patient ID: Verenice Mirza is a 64 y.o. female.    Chief Complaint: No chief complaint on file.    HPI Ms. Mirza 64-year-old female referred by Dr. Bass for recent diagnosis of atypical ductal hyperplasia.    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      Mammogram from July 1, 2019 showed architectural distortion left breast 2:00 position.  There was nothing seen by ultrasound.    On July 8, 2019 a needle biopsy was performed which showed benign breast tissue with apocrine adenosis, calcifications but no atypia.    Excisional biopsy on August 19, 2019 showed atypical ductal hyperplasia.  Review of Systems   Constitutional: Negative for unexpected weight change (She has lost 10 lb on a diet).   Psychiatric/Behavioral: The patient is not nervous/anxious.        Objective:      Physical Exam   Constitutional: She is oriented to person, place, and time. She appears well-developed and well-nourished. No distress.   Neurological: She is alert and oriented to person, place, and time.   Psychiatric: She has a normal mood and affect. Her behavior is normal. Thought content normal.   Vitals reviewed.      Assessment:     bone density shows normal density the spine and mild osteopenia in the hip.  1. Atypical ductal hyperplasia of left breast        Plan:       I again reviewed the options for therapy.      Will start therapy with raloxifene and I will see her in 3 months.

## 2019-12-18 ENCOUNTER — OFFICE VISIT (OUTPATIENT)
Dept: HEMATOLOGY/ONCOLOGY | Facility: CLINIC | Age: 64
End: 2019-12-18
Payer: MEDICARE

## 2019-12-18 ENCOUNTER — HOSPITAL ENCOUNTER (OUTPATIENT)
Dept: RADIOLOGY | Facility: CLINIC | Age: 64
Discharge: HOME | End: 2019-12-18
Attending: INTERNAL MEDICINE
Payer: MEDICARE

## 2019-12-18 VITALS
RESPIRATION RATE: 16 BRPM | SYSTOLIC BLOOD PRESSURE: 128 MMHG | OXYGEN SATURATION: 95 % | DIASTOLIC BLOOD PRESSURE: 89 MMHG | TEMPERATURE: 98 F | HEART RATE: 55 BPM | BODY MASS INDEX: 43.32 KG/M2 | WEIGHT: 235.44 LBS | HEIGHT: 62 IN

## 2019-12-18 DIAGNOSIS — N60.92 ATYPICAL DUCTAL HYPERPLASIA OF LEFT BREAST: Primary | ICD-10-CM

## 2019-12-18 DIAGNOSIS — N60.92 ATYPICAL DUCTAL HYPERPLASIA OF LEFT BREAST: ICD-10-CM

## 2019-12-18 DIAGNOSIS — M81.8 OTHER OSTEOPOROSIS WITHOUT CURRENT PATHOLOGICAL FRACTURE: ICD-10-CM

## 2019-12-18 DIAGNOSIS — Z13.820 ENCOUNTER FOR SCREENING FOR OSTEOPOROSIS: ICD-10-CM

## 2019-12-18 DIAGNOSIS — L30.4 INTERTRIGO: ICD-10-CM

## 2019-12-18 PROCEDURE — 99999 PR PBB SHADOW E&M-EST. PATIENT-LVL III: ICD-10-PCS | Mod: PBBFAC,,, | Performed by: INTERNAL MEDICINE

## 2019-12-18 PROCEDURE — 77080 DXA BONE DENSITY AXIAL: CPT | Mod: 26,,, | Performed by: INTERNAL MEDICINE

## 2019-12-18 PROCEDURE — 77080 DEXA BONE DENSITY SPINE HIP: ICD-10-PCS | Mod: 26,,, | Performed by: INTERNAL MEDICINE

## 2019-12-18 PROCEDURE — 77080 DXA BONE DENSITY AXIAL: CPT | Mod: TC

## 2019-12-18 PROCEDURE — 99999 PR PBB SHADOW E&M-EST. PATIENT-LVL III: CPT | Mod: PBBFAC,,, | Performed by: INTERNAL MEDICINE

## 2019-12-18 PROCEDURE — 99213 PR OFFICE/OUTPT VISIT, EST, LEVL III, 20-29 MIN: ICD-10-PCS | Mod: S$PBB,,, | Performed by: INTERNAL MEDICINE

## 2019-12-18 PROCEDURE — 99213 OFFICE O/P EST LOW 20 MIN: CPT | Mod: S$PBB,,, | Performed by: INTERNAL MEDICINE

## 2019-12-18 PROCEDURE — 99213 OFFICE O/P EST LOW 20 MIN: CPT | Mod: PBBFAC,25 | Performed by: INTERNAL MEDICINE

## 2019-12-18 RX ORDER — KETOCONAZOLE 20 MG/G
CREAM TOPICAL
Qty: 60 G | Refills: 0 | Status: SHIPPED | OUTPATIENT
Start: 2019-12-18 | End: 2020-01-15 | Stop reason: SDUPTHER

## 2019-12-18 RX ORDER — RALOXIFENE HYDROCHLORIDE 60 MG/1
60 TABLET, FILM COATED ORAL DAILY
Qty: 90 TABLET | Refills: 3 | Status: SHIPPED | OUTPATIENT
Start: 2019-12-18 | End: 2021-02-18

## 2019-12-24 ENCOUNTER — TELEPHONE (OUTPATIENT)
Dept: HEMATOLOGY/ONCOLOGY | Facility: CLINIC | Age: 64
End: 2019-12-24

## 2019-12-24 NOTE — TELEPHONE ENCOUNTER
I reviewed the patient's genetic testing results and phoned the patient to conduct post-test genetic counseling.  Called two numbers; no answer on either; LVM x 2.  When patient and I do speak, the following will be discussed:     Patient's results were negative for any clinically significant mutation in the [facility] Multi-Cancer Panel (collected on 11/25/2019, with a report date of 12/6/2019), meaning that no clinically significant mutation was identified in any gene tested; however, a variant of uncertain significance (VUS) was identified in the following gene(s):                   MLH1 (c.1633A>G [p.Rdm584Grb], heterozygous), which of note per [facility] is suggested to likely be disruptive though predictions have not been confirmed by published functional studies and their clinical significance is uncertain, so in  summary available evidence is currently insufficient to determine role of variant indisease, and it is therefore a VUS; of note, I did also confirm with [employer] (Daja Young) that [employer] also classifies this variant as a VUS;                 RAD51D (c.911G>A [p.Ema602Utj], heterozygous);                 and VHL (c.3G>T [p.Met1?], heterozygous).       A VUS indicates that amino acids within the gene are lining up in a way different from usual, but there is not presently enough data for laboratories to make a determination as to whether the specific variant is benign or pathogenic.      If [employer] reclassifies any of the VUSs as benign or pathogenic, at that time, [employer] will provide notification; therefore, it is important for the patient to keep her demographics up to date with [facility] and with my office at all times so she can be contacted.       Patient's results do not completely rule out the possibility of a hereditary cancer.      Patient is to continue following up with all providers as they have indicated to patient and should follow up with their primary care provider to ensure all  appropriate cancer screenings are in place based in part on family and personal histories.  Patient's family history includes several breast cancers and skin cancer which patient believed to be melanoma.  For these, I recommend patient be seen by the Breast Surgery Clinic and the Dermatology Clinic.    A copy of her genetic testing results packet (which includes not only results report but also information on the Patient Insights Network) will be mailed to patient.    Patient's [employer] registration code at [employer]/patients is [id number], for her online access of her results report.    Patient's relatives affected with cancer and those relatives' close blood-relatives should speak with a provider regarding genetic counseling/testing.  Patient should update me with with any changes to patient's personal or family history of cancer and with any personal or relative's genetic testing results and check in with me annually to determine if updated genetic testing is indicated for patient.       -------     Familial VUS testing is not recommended for patient's MLH1 and RAD51D VUSs, but details on [employer]'s VUS Resolution Program can be found at [email or web address].  [employer] does not offer familial VUS testing for patient's VHL VUS, but details on [employer]'s VUS Resolution and Family Variant Testing Programs can be found at [email or web address].  Patient can visit [employer]'s Patient Insights Network by visiting [email or web address].

## 2019-12-30 ENCOUNTER — TELEPHONE (OUTPATIENT)
Dept: NEUROLOGY | Facility: CLINIC | Age: 64
End: 2019-12-30

## 2019-12-30 NOTE — TELEPHONE ENCOUNTER
----- Message from Jada Escoto sent at 12/30/2019 11:48 AM CST -----  Contact: [phone]  Emma meadows-Lela missed your call.  Please try again.

## 2020-01-02 ENCOUNTER — PATIENT MESSAGE (OUTPATIENT)
Dept: HEMATOLOGY/ONCOLOGY | Facility: CLINIC | Age: 65
End: 2020-01-02

## 2020-01-04 ENCOUNTER — PATIENT MESSAGE (OUTPATIENT)
Dept: INTERNAL MEDICINE | Facility: CLINIC | Age: 65
End: 2020-01-04

## 2020-01-04 DIAGNOSIS — Z00.00 PREVENTATIVE HEALTH CARE: ICD-10-CM

## 2020-01-04 DIAGNOSIS — E88.819 INSULIN RESISTANCE: ICD-10-CM

## 2020-01-04 DIAGNOSIS — E11.59 HYPERTENSION ASSOCIATED WITH DIABETES: Primary | ICD-10-CM

## 2020-01-04 DIAGNOSIS — E78.5 DYSLIPIDEMIA ASSOCIATED WITH TYPE 2 DIABETES MELLITUS: ICD-10-CM

## 2020-01-04 DIAGNOSIS — E03.4 HYPOTHYROIDISM DUE TO ACQUIRED ATROPHY OF THYROID: ICD-10-CM

## 2020-01-04 DIAGNOSIS — I10 ESSENTIAL HYPERTENSION: ICD-10-CM

## 2020-01-04 DIAGNOSIS — E11.69 DYSLIPIDEMIA ASSOCIATED WITH TYPE 2 DIABETES MELLITUS: ICD-10-CM

## 2020-01-04 DIAGNOSIS — I15.2 HYPERTENSION ASSOCIATED WITH DIABETES: Primary | ICD-10-CM

## 2020-01-06 ENCOUNTER — PATIENT MESSAGE (OUTPATIENT)
Dept: HEMATOLOGY/ONCOLOGY | Facility: CLINIC | Age: 65
End: 2020-01-06

## 2020-01-06 ENCOUNTER — HOSPITAL ENCOUNTER (OUTPATIENT)
Dept: RADIOLOGY | Facility: HOSPITAL | Age: 65
Discharge: HOME | End: 2020-01-06
Attending: SURGERY
Payer: MEDICARE

## 2020-01-06 ENCOUNTER — TELEPHONE (OUTPATIENT)
Dept: HEMATOLOGY/ONCOLOGY | Facility: CLINIC | Age: 65
End: 2020-01-06

## 2020-01-06 ENCOUNTER — DOCUMENTATION ONLY (OUTPATIENT)
Dept: HEMATOLOGY/ONCOLOGY | Facility: CLINIC | Age: 65
End: 2020-01-06

## 2020-01-06 DIAGNOSIS — Z91.89 AT HIGH RISK FOR BREAST CANCER: ICD-10-CM

## 2020-01-06 PROCEDURE — C8937 CAD BREAST MRI: HCPCS | Mod: TC

## 2020-01-06 PROCEDURE — A9577 INJ MULTIHANCE: HCPCS | Performed by: SURGERY

## 2020-01-06 PROCEDURE — 77049 MRI BREAST W/WO CONTRAST, W/CAD, BILATERAL: ICD-10-PCS | Mod: 26,,, | Performed by: RADIOLOGY

## 2020-01-06 PROCEDURE — 77049 MRI BREAST C-+ W/CAD BI: CPT | Mod: 26,,, | Performed by: RADIOLOGY

## 2020-01-06 PROCEDURE — 25500020 PHARM REV CODE 255: Performed by: SURGERY

## 2020-01-06 RX ADMIN — GADOBENATE DIMEGLUMINE 19 ML: 529 INJECTION, SOLUTION INTRAVENOUS at 11:01

## 2020-01-06 NOTE — PROGRESS NOTES
Hereditary cancer-related genetic testing results folder/envelope placed in outgoing-mail bin to be mailed to patient.

## 2020-01-06 NOTE — TELEPHONE ENCOUNTER
Patient and I discussed the below:       Patient's results were negative for any clinically significant mutation in the [facility] Multi-Cancer Panel (collected on 11/25/2019, with a report date of 12/6/2019), meaning that no clinically significant mutation was identified in any gene tested; however, a variant of uncertain significance (VUS) was identified in the following gene(s):                   MLH1 (c.1633A>G [p.Zzu756Vtq], heterozygous) (which of note per [facility] is suggested to likely be disruptive though predictions have not been confirmed by published functional studies and their clinical significance is uncertain, so in summary available evidence is currently insufficient to determine role of variant indisease, and it is therefore a VUS; of note, I did also confirm with [employer] [Daja Young] that [employer] also classifies this variant as a VUS);                 RAD51D (c.911G>A [p.Xoq572Wuv], heterozygous);                 and VHL (c.3G>T [p.Met1?], heterozygous).       A VUS indicates that amino acids within the gene are lining up in a way different from usual, but there is not presently enough data for laboratories to make a determination as to whether the specific variant is benign or pathogenic.       If [employer] reclassifies any of the VUSs as benign or pathogenic, at that time, [employer] will provide notification; therefore, it is important for the patient to keep her demographics up to date with [facility] and with my office at all times so she can be contacted.        Patient's results do not completely rule out the possibility of a hereditary cancer.       Patient should follow up with their primary care provider to ensure all appropriate cancer screenings are in place based in part on family and personal histories.  Patient's family history includes several breast cancers and skin cancer which patient believed to be melanoma.  For these, I recommend patient be seen by the Breast Surgery Clinic  and the Dermatology Clinic.  Patient is already established with Dr. Ramon Bass, Dr. Rashel Zamorano, and Dr. Genevieve Nascimento and therefore does not need referrals to these providers.  Patient should ensure they are aware of her family history.     A copy of her genetic testing results packet (which includes not only results report but also information on the Patient Insights Network) will be mailed to patient.     Patient's [employer] registration code at [employer]/patients is [id number], for her online access of her results report.     Familial VUS testing is not recommended for patient's MLH1 and RAD51D VUSs, but details on [employer]'s VUS Resolution Program can be found at [email or web address].  [employer] does not offer familial VUS testing for patient's VHL VUS, but details on [employer] VUS Resolution and Family Variant Testing Programs can be found at [email or web address].  Patient can visit [employer]'s Patient Insights Network by visiting [email or web address].       Patient's relatives affected with cancer and those relatives' close blood-relatives should speak with a provider regarding genetic counseling/testing.  Patient should update me with with any changes to patient's personal or family history of cancer and with any personal or relative's genetic testing results and check in with me annually to determine if updated genetic testing is indicated for patient.      Patient requested a recall for a 1-year follow-up visit with me, which will be due around 11/26/2020.     Questions were encouraged and answered to patient's satisfaction, and patient verbalized understanding of information and agreement with the plan.

## 2020-01-07 ENCOUNTER — PATIENT MESSAGE (OUTPATIENT)
Dept: INTERNAL MEDICINE | Facility: CLINIC | Age: 65
End: 2020-01-07

## 2020-01-07 DIAGNOSIS — E11.59 HYPERTENSION ASSOCIATED WITH DIABETES: Primary | ICD-10-CM

## 2020-01-07 DIAGNOSIS — I15.2 HYPERTENSION ASSOCIATED WITH DIABETES: Primary | ICD-10-CM

## 2020-01-07 DIAGNOSIS — E78.5 DYSLIPIDEMIA ASSOCIATED WITH TYPE 2 DIABETES MELLITUS: ICD-10-CM

## 2020-01-07 DIAGNOSIS — E11.69 DYSLIPIDEMIA ASSOCIATED WITH TYPE 2 DIABETES MELLITUS: ICD-10-CM

## 2020-01-07 DIAGNOSIS — E03.4 HYPOTHYROIDISM DUE TO ACQUIRED ATROPHY OF THYROID: ICD-10-CM

## 2020-01-07 DIAGNOSIS — E88.819 INSULIN RESISTANCE: ICD-10-CM

## 2020-01-07 DIAGNOSIS — I10 ESSENTIAL HYPERTENSION: ICD-10-CM

## 2020-01-08 LAB
ALBUMIN SERPL-MCNC: 4.4 G/DL (ref 3.6–5.1)
ALBUMIN/GLOB SERPL: 1.7 (CALC) (ref 1–2.5)
ALP SERPL-CCNC: 88 U/L (ref 33–130)
ALT SERPL-CCNC: 49 U/L (ref 6–29)
AST SERPL-CCNC: 23 U/L (ref 10–35)
BASOPHILS # BLD AUTO: 52 CELLS/UL (ref 0–200)
BASOPHILS NFR BLD AUTO: 0.9 %
BILIRUB SERPL-MCNC: 0.5 MG/DL (ref 0.2–1.2)
BUN SERPL-MCNC: 14 MG/DL (ref 7–25)
BUN/CREAT SERPL: ABNORMAL (CALC) (ref 6–22)
CALCIUM SERPL-MCNC: 9.6 MG/DL (ref 8.6–10.4)
CHLORIDE SERPL-SCNC: 102 MMOL/L (ref 98–110)
CHOLEST SERPL-MCNC: 149 MG/DL
CHOLEST/HDLC SERPL: 3.2 (CALC)
CO2 SERPL-SCNC: 25 MMOL/L (ref 20–32)
CREAT SERPL-MCNC: 0.82 MG/DL (ref 0.5–0.99)
EOSINOPHIL # BLD AUTO: 180 CELLS/UL (ref 15–500)
EOSINOPHIL NFR BLD AUTO: 3.1 %
ERYTHROCYTE [DISTWIDTH] IN BLOOD BY AUTOMATED COUNT: 13.3 % (ref 11–15)
GFRSERPLBLD MDRD-ARVRAT: 75 ML/MIN/1.73M2
GLOBULIN SER CALC-MCNC: 2.6 G/DL (CALC) (ref 1.9–3.7)
GLUCOSE SERPL-MCNC: 134 MG/DL (ref 65–99)
HBA1C MFR BLD: 7.1 % OF TOTAL HGB
HCT VFR BLD AUTO: 43.9 % (ref 35–45)
HDLC SERPL-MCNC: 46 MG/DL
HGB BLD-MCNC: 14.7 G/DL (ref 11.7–15.5)
LDLC SERPL CALC-MCNC: 80 MG/DL (CALC)
LYMPHOCYTES # BLD AUTO: 1647 CELLS/UL (ref 850–3900)
LYMPHOCYTES NFR BLD AUTO: 28.4 %
MCH RBC QN AUTO: 29.7 PG (ref 27–33)
MCHC RBC AUTO-ENTMCNC: 33.5 G/DL (ref 32–36)
MCV RBC AUTO: 88.7 FL (ref 80–100)
MONOCYTES # BLD AUTO: 412 CELLS/UL (ref 200–950)
MONOCYTES NFR BLD AUTO: 7.1 %
NEUTROPHILS # BLD AUTO: 3509 CELLS/UL (ref 1500–7800)
NEUTROPHILS NFR BLD AUTO: 60.5 %
NONHDLC SERPL-MCNC: 103 MG/DL (CALC)
PLATELET # BLD AUTO: 272 THOUSAND/UL (ref 140–400)
PMV BLD REES-ECKER: 10.9 FL (ref 7.5–12.5)
POTASSIUM SERPL-SCNC: 4.4 MMOL/L (ref 3.5–5.3)
PROT SERPL-MCNC: 7 G/DL (ref 6.1–8.1)
RBC # BLD AUTO: 4.95 MILLION/UL (ref 3.8–5.1)
SODIUM SERPL-SCNC: 139 MMOL/L (ref 135–146)
TRIGL SERPL-MCNC: 125 MG/DL
TSH SERPL-ACNC: 1.38 MIU/L (ref 0.4–4.5)
WBC # BLD AUTO: 5.8 THOUSAND/UL (ref 3.8–10.8)

## 2020-01-09 LAB
CREAT SERPL-MCNC: 0.8 MG/DL (ref 0.5–1.4)
SAMPLE: NORMAL

## 2020-01-14 ENCOUNTER — OFFICE VISIT (OUTPATIENT)
Dept: SURGERY | Facility: CLINIC | Age: 65
End: 2020-01-14
Payer: MEDICARE

## 2020-01-14 VITALS — HEIGHT: 62 IN | WEIGHT: 235.44 LBS | BODY MASS INDEX: 43.32 KG/M2

## 2020-01-14 DIAGNOSIS — N60.92 ATYPICAL DUCTAL HYPERPLASIA OF LEFT BREAST: Primary | ICD-10-CM

## 2020-01-14 DIAGNOSIS — Z12.31 SCREENING MAMMOGRAM, ENCOUNTER FOR: ICD-10-CM

## 2020-01-14 DIAGNOSIS — Z91.89 AT HIGH RISK FOR BREAST CANCER: ICD-10-CM

## 2020-01-14 PROCEDURE — 99213 OFFICE O/P EST LOW 20 MIN: CPT | Mod: PBBFAC | Performed by: SURGERY

## 2020-01-14 PROCEDURE — 99999 PR PBB SHADOW E&M-EST. PATIENT-LVL III: ICD-10-PCS | Mod: PBBFAC,,, | Performed by: SURGERY

## 2020-01-14 PROCEDURE — 99999 PR PBB SHADOW E&M-EST. PATIENT-LVL III: CPT | Mod: PBBFAC,,, | Performed by: SURGERY

## 2020-01-14 PROCEDURE — 99213 OFFICE O/P EST LOW 20 MIN: CPT | Mod: S$PBB,,, | Performed by: SURGERY

## 2020-01-14 PROCEDURE — 99213 PR OFFICE/OUTPT VISIT, EST, LEVL III, 20-29 MIN: ICD-10-PCS | Mod: S$PBB,,, | Performed by: SURGERY

## 2020-01-14 NOTE — PROGRESS NOTES
Subjective:       Patient ID: Verenice Mirza is a 64 y.o. female.    Chief Complaint: Follow-up    Follow-up   Associated symptoms include abdominal pain and arthralgias (knees).    Ms. Mirza 64-year-old female who presents for follow up of atypical ductal hyperplasia of the left breast. Mammogram from July 1, 2019 showed architectural distortion left breast 2:00 position.  There was nothing seen by ultrasound. On July 8, 2019 a needle biopsy was performed which showed benign breast tissue with apocrine adenosis, calcifications but no atypia. Subsequent excisional biopsy on August 19, 2019 showed atypical ductal hyperplasia.    She has done well since that time period; she has healed well and started endocrine therapy with raloxifine with Dr. Zamorano which she has been tolerating thus far. Given her high risk propensity she is being followed with biannual MRIs and mammograms. She denies any breast changes since her last clinic visit. No nipple retraction since immediately post-op which resolved. No discharge or skin changes.     Menstrual History:    Menarche -11    G - 0  P - 0  ,BCP - 20 Y    Menopause -  Early 50s     HRT - no    Family History -                                 Breast - mother                                Ovarian -  no                                    Other - paternal aunt - ?    Social History :    Smoking - stopped 2009        ETOH - stopped 10 Y ago  -never heavy  Retired teacher    PMH:HBP, HLP, Hypothyroid, fatty liver, PVD, diverticular disease, plantar fasciitis, obesity  Review of Systems   Constitutional: Negative.    HENT: Negative.    Respiratory: Negative.    Cardiovascular: Negative.    Gastrointestinal: Positive for abdominal pain.        Diverticulitis   Genitourinary: Negative for frequency.        Stress incontenence   Musculoskeletal: Positive for arthralgias (knees) and back pain.   Neurological: Negative.    Psychiatric/Behavioral: Negative.        Objective:      Physical  Exam   Constitutional: She is oriented to person, place, and time. She appears well-developed and well-nourished. No distress.   HENT:   Head: Normocephalic.   Eyes: Pupils are equal, round, and reactive to light. No scleral icterus.   Cardiovascular: Normal rate and regular rhythm.   Pulmonary/Chest: Effort normal and breath sounds normal. She has no wheezes. She has no rales. Right breast exhibits no mass, no nipple discharge and no skin change. Left breast exhibits no mass, no nipple discharge and no skin change.       Abdominal: Soft. She exhibits no mass. There is no tenderness.   Lymphadenopathy:     She has no cervical adenopathy.     She has no axillary adenopathy.        Right: No supraclavicular adenopathy present.        Left: No supraclavicular adenopathy present.   Neurological: She is alert and oriented to person, place, and time.   Psychiatric: She has a normal mood and affect. Her behavior is normal. Thought content normal.   Vitals reviewed.        01/06/2020 Breast MRI:  Result:   MRI Breast w/wo Contrast, w/CAD, Bilateral     History:  Patient is 64 y.o. and is seen for at high risk for breast cancer.       Films Compared:  Compared to: 07/01/2019 Mammo Digital Diagnostic Bilat w/ Chas, 06/28/2018 Mammo Digital Diagnostic Bilat with Tomosynthesis_CAD, and 06/26/2017 Mammo Digital Diagnostic Bilat with Tomosynthesis_CAD     Technique:  A routine breast MRI was performed with a dedicated breast coil. Pre-contrast STIR were acquired. Then, pre and post contrast T1 weighted fat saturated images were acquired and subtracted with MIP reconstruction. 19 ml of intravenous gadolinium contrast was administered. The study was reviewed with [employer] software.     Findings:  The breasts have scattered fibroglandular tissue. The background parenchymal enhancement is minimal and symmetric.      Left  There are post-surgical findings from a previous excisional biopsy seen in the left breast. There has been no  interval development of a suspicious mass, microcalcification, or architectural distortion.      Right  There is no evidence of suspicious masses, abnormal enhancement, or other abnormal findings in the right breast.     Impression:  Bilateral  There is no MR evidence of malignancy.     BI-RADS Category:   Overall: 2 - Benign     Recommendation:  Return to annual screening mammogram schedule is recommended  Assessment:       65yo F s/p left breast excisional biopsy for ADH 8/19/2019    Plan:       Continue endocrine therapy with Dr. Zamorano as tolerated to reduce risk of breast cancer development  Can return for CBE in 6 months with MEERA  Annual mammogram in July 2020  If patient is to continue with alternating MRIs in additional to yearly mammograms she requests pain medication to take prior to procedure; reports previously trying valium and muscle relaxants without success.    I have personally taken the history and examined this patient and agree with the resident's note as stated above.  The patient has a history of atypical ductal hyperplasia and has been currently placed on Evista 60 mg daily by Dr. Rashel Zamorano without any significant side effects.  Her recent breast MRI was within normal limits, benign BI-RADS category 2.  Her bilateral clinical breast exam is within normal limits with no suspicious masses nodule density skin changes or lymphadenopathy in either breast.  RUBI  We will have her follow up again at the [facility] in 6 months in July of 2020 with a mid-level provider for bilateral screening digital mammogram with tomosynthesis and clinical breast exam as part of her ongoing high risk screening program

## 2020-01-15 ENCOUNTER — OFFICE VISIT (OUTPATIENT)
Dept: INTERNAL MEDICINE | Facility: CLINIC | Age: 65
End: 2020-01-15
Payer: MEDICARE

## 2020-01-15 VITALS
DIASTOLIC BLOOD PRESSURE: 80 MMHG | WEIGHT: 235.25 LBS | HEIGHT: 62 IN | SYSTOLIC BLOOD PRESSURE: 124 MMHG | OXYGEN SATURATION: 95 % | HEART RATE: 63 BPM | BODY MASS INDEX: 43.29 KG/M2

## 2020-01-15 DIAGNOSIS — I10 ESSENTIAL HYPERTENSION: ICD-10-CM

## 2020-01-15 DIAGNOSIS — E66.01 MORBID OBESITY WITH BMI OF 40.0-44.9, ADULT: ICD-10-CM

## 2020-01-15 DIAGNOSIS — E11.59 HYPERTENSION ASSOCIATED WITH DIABETES: ICD-10-CM

## 2020-01-15 DIAGNOSIS — E78.5 HYPERLIPIDEMIA, UNSPECIFIED HYPERLIPIDEMIA TYPE: ICD-10-CM

## 2020-01-15 DIAGNOSIS — L30.4 INTERTRIGO: ICD-10-CM

## 2020-01-15 DIAGNOSIS — I15.2 HYPERTENSION ASSOCIATED WITH DIABETES: ICD-10-CM

## 2020-01-15 DIAGNOSIS — E11.69 DYSLIPIDEMIA ASSOCIATED WITH TYPE 2 DIABETES MELLITUS: ICD-10-CM

## 2020-01-15 DIAGNOSIS — M25.569 KNEE PAIN, UNSPECIFIED CHRONICITY, UNSPECIFIED LATERALITY: ICD-10-CM

## 2020-01-15 DIAGNOSIS — E11.9 NEW ONSET TYPE 2 DIABETES MELLITUS: ICD-10-CM

## 2020-01-15 DIAGNOSIS — K21.9 GASTROESOPHAGEAL REFLUX DISEASE, ESOPHAGITIS PRESENCE NOT SPECIFIED: ICD-10-CM

## 2020-01-15 DIAGNOSIS — L65.8 FEMALE PATTERN BALDNESS: ICD-10-CM

## 2020-01-15 DIAGNOSIS — E03.4 HYPOTHYROIDISM DUE TO ACQUIRED ATROPHY OF THYROID: ICD-10-CM

## 2020-01-15 DIAGNOSIS — E78.5 DYSLIPIDEMIA ASSOCIATED WITH TYPE 2 DIABETES MELLITUS: ICD-10-CM

## 2020-01-15 PROBLEM — J30.2 ACUTE SEASONAL ALLERGIC RHINITIS: Status: RESOLVED | Noted: 2017-07-17 | Resolved: 2020-01-15

## 2020-01-15 PROBLEM — N63.0 BREAST MASS: Status: RESOLVED | Noted: 2019-08-19 | Resolved: 2020-01-15

## 2020-01-15 PROBLEM — Z01.818 PRE-OP TESTING: Status: RESOLVED | Noted: 2019-07-17 | Resolved: 2020-01-15

## 2020-01-15 PROCEDURE — 99999 PR PBB SHADOW E&M-EST. PATIENT-LVL IV: CPT | Mod: PBBFAC,,, | Performed by: INTERNAL MEDICINE

## 2020-01-15 PROCEDURE — 99999 PR PBB SHADOW E&M-EST. PATIENT-LVL IV: ICD-10-PCS | Mod: PBBFAC,,, | Performed by: INTERNAL MEDICINE

## 2020-01-15 PROCEDURE — 99214 OFFICE O/P EST MOD 30 MIN: CPT | Mod: S$PBB,,, | Performed by: INTERNAL MEDICINE

## 2020-01-15 PROCEDURE — 99214 PR OFFICE/OUTPT VISIT, EST, LEVL IV, 30-39 MIN: ICD-10-PCS | Mod: S$PBB,,, | Performed by: INTERNAL MEDICINE

## 2020-01-15 PROCEDURE — 99214 OFFICE O/P EST MOD 30 MIN: CPT | Mod: PBBFAC,PO | Performed by: INTERNAL MEDICINE

## 2020-01-15 RX ORDER — SPIRONOLACTONE 100 MG/1
TABLET, FILM COATED ORAL
Qty: 90 TABLET | Refills: 3 | Status: SHIPPED | OUTPATIENT
Start: 2020-01-15 | End: 2020-01-15 | Stop reason: SDUPTHER

## 2020-01-15 RX ORDER — FAMOTIDINE 40 MG/1
40 TABLET, FILM COATED ORAL DAILY PRN
Qty: 90 TABLET | Refills: 1 | Status: SHIPPED | OUTPATIENT
Start: 2020-01-15 | End: 2021-03-30

## 2020-01-15 RX ORDER — FINASTERIDE 5 MG/1
5 TABLET, FILM COATED ORAL NIGHTLY
Qty: 90 TABLET | Refills: 3 | Status: SHIPPED | OUTPATIENT
Start: 2020-01-15 | End: 2020-09-23 | Stop reason: SDUPTHER

## 2020-01-15 RX ORDER — ATORVASTATIN CALCIUM 40 MG/1
40 TABLET, FILM COATED ORAL DAILY
Qty: 90 TABLET | Refills: 3 | Status: SHIPPED | OUTPATIENT
Start: 2020-01-15 | End: 2020-06-22

## 2020-01-15 RX ORDER — METFORMIN HYDROCHLORIDE 500 MG/1
500 TABLET ORAL DAILY
Qty: 90 TABLET | Refills: 3 | Status: SHIPPED | OUTPATIENT
Start: 2020-01-15 | End: 2020-09-23

## 2020-01-15 RX ORDER — OMEGA-3-ACID ETHYL ESTERS 1 G/1
2 CAPSULE, LIQUID FILLED ORAL 2 TIMES DAILY
Qty: 360 CAPSULE | Refills: 3 | Status: SHIPPED | OUTPATIENT
Start: 2020-01-15 | End: 2020-09-23 | Stop reason: SDUPTHER

## 2020-01-15 RX ORDER — METOPROLOL SUCCINATE 50 MG/1
50 TABLET, EXTENDED RELEASE ORAL NIGHTLY
Qty: 90 TABLET | Refills: 3 | Status: SHIPPED | OUTPATIENT
Start: 2020-01-15 | End: 2020-09-23 | Stop reason: SDUPTHER

## 2020-01-15 RX ORDER — LEVOTHYROXINE SODIUM 88 UG/1
88 TABLET ORAL
Qty: 90 TABLET | Refills: 3 | Status: SHIPPED | OUTPATIENT
Start: 2020-01-15 | End: 2020-09-23 | Stop reason: SDUPTHER

## 2020-01-15 RX ORDER — LANCETS
1 EACH MISCELLANEOUS DAILY
Qty: 100 EACH | Refills: 3 | Status: SHIPPED | OUTPATIENT
Start: 2020-01-15 | End: 2020-09-23 | Stop reason: SDUPTHER

## 2020-01-15 RX ORDER — KETOCONAZOLE 20 MG/G
CREAM TOPICAL
Qty: 60 G | Refills: 0 | Status: SHIPPED | OUTPATIENT
Start: 2020-01-15 | End: 2020-03-09

## 2020-01-15 RX ORDER — SPIRONOLACTONE 100 MG/1
100 TABLET, FILM COATED ORAL DAILY
Qty: 90 TABLET | Refills: 3 | Status: SHIPPED | OUTPATIENT
Start: 2020-01-15 | End: 2020-09-23 | Stop reason: SDUPTHER

## 2020-01-15 RX ORDER — NYSTATIN 100000 [USP'U]/G
POWDER TOPICAL
Qty: 120 G | Refills: 6 | Status: SHIPPED | OUTPATIENT
Start: 2020-01-15 | End: 2020-05-20 | Stop reason: SDUPTHER

## 2020-01-15 NOTE — PROGRESS NOTES
Subjective:       Patient ID: Verenice Mirza is a 64 y.o. female.    Chief Complaint: Annual Exam    HPI 64 year old female presents to clinic today for annual physical exam follow-up of hypertension and dyslipidemia associated diabetes hypothyroidism  Review of Systems  otherwise negative  Objective:      Physical Exam  General: Well-appearing, well-nourished.  No distress  HEENT: conjunctivae are normal.  Pupils are equal and reative to light.  TM's are clear and intact bilaterally.  Hearing is grossly normal.  Nasopharynx is clear.  Oropharynx is clear.  Neck: Supple.  No thyroid megaly.  No bruits.  Lymph: No cervical or supraclavicular adenopathy.  Heart: Regular rate and rhythm, without murmur, rub or gallop.  Lungs: Clear to auscultation; respiratory effort normal.  Abdomen: Soft, nontender, nondistended.  Normoactive bowel sounds.  No hepatomegaly.  No masses.  Extremities: Good distal pulses.  No edema.  Psych: Oriented to time person place.  Judgment and insight seem unimpaired.  Mood and affect are appropriate.  Assessment:       1. Knee pain, unspecified chronicity, unspecified laterality    2. Gastroesophageal reflux disease, esophagitis presence not specified    3. Essential hypertension    4. Hypertension associated with diabetes    5. Dyslipidemia associated with type 2 diabetes mellitus    6. Hypothyroidism due to acquired atrophy of thyroid    7. 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    8. Intertrigo    9. Hyperlipidemia, unspecified hyperlipidemia type    10. New onset type 2 diabetes mellitus    11. Morbid obesity with BMI of 40.0-44.9, adult        Plan:       Verenice was seen today for annual exam.    Diagnoses and all orders for this visit:    Knee pain, unspecified chronicity, unspecified laterality  -     Ambulatory referral/consult to Orthopedics; Future  Responded to an injection in the past  Gastroesophageal reflux disease, esophagitis presence not specified  -     famotidine (PEPCID) 40 MG tablet; Take  1 tablet (40 mg total) by mouth daily as needed for Heartburn.    Essential hypertension  -     metoprolol succinate (TOPROL-XL) 50 MG 24 hr tablet; Take 1 tablet (50 mg total) by mouth nightly.  -     Comprehensive metabolic panel; Future  -     Lipid panel; Future    Hypertension associated with diabetes  -     metFORMIN (GLUCOPHAGE) 500 MG tablet; Take 1 tablet (500 mg total) by mouth once daily.  -     Comprehensive metabolic panel; Standing  -     Hemoglobin A1c; Standing  -     Lipid panel; Standing  -     Comprehensive metabolic panel; Future  -     Lipid panel; Future    Dyslipidemia associated with type 2 diabetes mellitus  -     metFORMIN (GLUCOPHAGE) 500 MG tablet; Take 1 tablet (500 mg total) by mouth once daily.  -     Comprehensive metabolic panel; Standing  -     Hemoglobin A1c; Standing  -     Lipid panel; Standing  -     Comprehensive metabolic panel; Future  -     Lipid panel; Future    Hypothyroidism due to acquired atrophy of thyroid  -     levothyroxine (SYNTHROID) 88 MCG tablet; Take 1 tablet (88 mcg total) by mouth before breakfast.    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  -     finasteride (PROSCAR) 5 mg tablet; Take 1 tablet (5 mg total) by mouth nightly.  -     spironolactone (ALDACTONE) 100 MG tablet; Take 1 tablet (100 mg total) by mouth once daily.    Intertrigo  -     nystatin (MYCOSTATIN) powder; aaa qam prn flare  -     ketoconazole (NIZORAL) 2 % cream; APPLY  CREAM TOPICALLY TO AFFECTED AREA IN THE EVENING    Hyperlipidemia, unspecified hyperlipidemia type  -     omega-3 acid ethyl esters (LOVAZA) 1 gram capsule; Take 2 capsules (2 g total) by mouth 2 (two) times daily.  -     atorvastatin (LIPITOR) 40 MG tablet; Take 1 tablet (40 mg total) by mouth once daily.    New onset type 2 diabetes mellitus  -     lancets (ONETOUCH ULTRASOFT LANCETS) Misc; 1 lancet by Misc.(Non-Drug; Combo Route) route once daily.  -     blood sugar diagnostic (TRUE METRIX GLUCOSE TEST STRIP) Strp; 1 strip by  Misc.(Non-Drug; Combo Route) route once daily.  -     Comprehensive metabolic panel; Future  -     Lipid panel; Future  -     Hemoglobin A1c; Future    Morbid obesity with BMI of 40.0-44.9, adult  Continued recommendations for weight loss

## 2020-01-24 ENCOUNTER — PATIENT MESSAGE (OUTPATIENT)
Dept: HEMATOLOGY/ONCOLOGY | Facility: CLINIC | Age: 65
End: 2020-01-24

## 2020-01-29 ENCOUNTER — OFFICE VISIT (OUTPATIENT)
Dept: SPORTS MEDICINE | Facility: CLINIC | Age: 65
End: 2020-01-29
Payer: MEDICARE

## 2020-01-29 ENCOUNTER — HOSPITAL ENCOUNTER (OUTPATIENT)
Dept: RADIOLOGY | Facility: HOSPITAL | Age: 65
Discharge: HOME | End: 2020-01-29
Attending: FAMILY MEDICINE
Payer: MEDICARE

## 2020-01-29 ENCOUNTER — CLINICAL SUPPORT (OUTPATIENT)
Dept: CARDIOLOGY | Facility: CLINIC | Age: 65
End: 2020-01-29
Attending: FAMILY MEDICINE
Payer: MEDICARE

## 2020-01-29 VITALS — HEIGHT: 62 IN | BODY MASS INDEX: 43.24 KG/M2 | TEMPERATURE: 98 F | WEIGHT: 235 LBS

## 2020-01-29 DIAGNOSIS — M79.661 BILATERAL CALF PAIN: ICD-10-CM

## 2020-01-29 DIAGNOSIS — M25.561 PAIN IN BOTH KNEES, UNSPECIFIED CHRONICITY: ICD-10-CM

## 2020-01-29 DIAGNOSIS — M17.0 PRIMARY OSTEOARTHRITIS OF BOTH KNEES: Primary | ICD-10-CM

## 2020-01-29 DIAGNOSIS — M25.562 PAIN IN BOTH KNEES, UNSPECIFIED CHRONICITY: ICD-10-CM

## 2020-01-29 DIAGNOSIS — M25.569 KNEE PAIN, UNSPECIFIED CHRONICITY, UNSPECIFIED LATERALITY: ICD-10-CM

## 2020-01-29 DIAGNOSIS — M79.662 BILATERAL CALF PAIN: ICD-10-CM

## 2020-01-29 PROCEDURE — 73564 XR KNEE ORTHO BILAT WITH FLEXION: ICD-10-PCS | Mod: 26,50,, | Performed by: RADIOLOGY

## 2020-01-29 PROCEDURE — 20611 DRAIN/INJ JOINT/BURSA W/US: CPT | Mod: 50,PBBFAC | Performed by: FAMILY MEDICINE

## 2020-01-29 PROCEDURE — 93970 CV US DOPPLER VENOUS LEGS BILATERAL (CUPID ONLY): ICD-10-PCS | Mod: 26,S$PBB,, | Performed by: INTERNAL MEDICINE

## 2020-01-29 PROCEDURE — 20611 LARGE JOINT ASPIRATION/INJECTION: R KNEE, L KNEE: ICD-10-PCS | Mod: 50,S$PBB,, | Performed by: FAMILY MEDICINE

## 2020-01-29 PROCEDURE — 73564 X-RAY EXAM KNEE 4 OR MORE: CPT | Mod: TC,50

## 2020-01-29 PROCEDURE — 73564 X-RAY EXAM KNEE 4 OR MORE: CPT | Mod: 26,50,, | Performed by: RADIOLOGY

## 2020-01-29 PROCEDURE — 99213 OFFICE O/P EST LOW 20 MIN: CPT | Mod: PBBFAC,25 | Performed by: FAMILY MEDICINE

## 2020-01-29 PROCEDURE — 99999 PR PBB SHADOW E&M-EST. PATIENT-LVL III: ICD-10-PCS | Mod: PBBFAC,,, | Performed by: FAMILY MEDICINE

## 2020-01-29 PROCEDURE — 99204 PR OFFICE/OUTPT VISIT, NEW, LEVL IV, 45-59 MIN: ICD-10-PCS | Mod: 25,S$PBB,, | Performed by: FAMILY MEDICINE

## 2020-01-29 PROCEDURE — 93970 EXTREMITY STUDY: CPT | Mod: PBBFAC,PO | Performed by: INTERNAL MEDICINE

## 2020-01-29 PROCEDURE — 99999 PR PBB SHADOW E&M-EST. PATIENT-LVL III: CPT | Mod: PBBFAC,,, | Performed by: FAMILY MEDICINE

## 2020-01-29 PROCEDURE — 99204 OFFICE O/P NEW MOD 45 MIN: CPT | Mod: 25,S$PBB,, | Performed by: FAMILY MEDICINE

## 2020-01-29 RX ORDER — TRIAMCINOLONE ACETONIDE 40 MG/ML
40 INJECTION, SUSPENSION INTRA-ARTICULAR; INTRAMUSCULAR
Status: DISCONTINUED | OUTPATIENT
Start: 2020-01-29 | End: 2020-01-29 | Stop reason: HOSPADM

## 2020-01-29 RX ADMIN — TRIAMCINOLONE ACETONIDE 40 MG: 40 INJECTION, SUSPENSION INTRA-ARTICULAR; INTRAMUSCULAR at 01:01

## 2020-01-29 NOTE — LETTER
January 29, 2020      Areli Crespo MD  [address] [facility]  [place] [address]           [address]  [facility] [address]  Phone: [phone]          Patient: Verenice Mirza   MR Number: [id number]   YOB: 1955   Date of Visit: 1/29/2020       Dear Dr. Areli Crespo:    Thank you for referring Verenice Mirza to me for evaluation. Attached you will find relevant portions of my assessment and plan of care.    If you have questions, please do not hesitate to call me. I look forward to following Verenice Mirza along with you.    Sincerely,    Mark Reeves MD    Enclosure  CC:  No Recipients    If you would like to receive this communication electronically, please contact [email or web address] or [phone] to request more information on [employer] Link access.    For providers and/or their staff who would like to refer a patient to [facility], please contact us through our one-stop-shop provider referral line, [facility], at [phone].    If you feel you have received this communication in error or would no longer like to receive these types of communications, please e-mail [email or web address]

## 2020-01-29 NOTE — PROGRESS NOTES
STAT Bilateral lower venous u/s to rule out DVT complete. Reading physician notified. Time is 1559.

## 2020-01-29 NOTE — PROCEDURES
"Large Joint Aspiration/Injection: R knee, L knee  Date/Time: 1/29/2020 1:30 PM  Performed by: Mark Reeves MD  Authorized by: Mark Reeves MD     Consent Done?:  Yes (Verbal)  Indications:  Pain  Procedure site marked: Yes    Timeout: Prior to procedure the correct patient, procedure, and site was verified      Location:  Knee  Site:  R knee and L knee  Prep: Patient was prepped and draped in usual sterile fashion    Needle size:  20 G  Ultrasonic Guidance for needle placement: Yes  Images are saved and documented.  Approach:  Lateral  Medications:  40 mg triamcinolone acetonide 40 mg/mL; 40 mg triamcinolone acetonide 40 mg/mL  Patient tolerance:  Patient tolerated the procedure well with no immediate complications    Additional Comments: Description of ultrasound utilization for needle guidance:   Ultrasound guidance used for needle localization. Images saved and stored for documentation. The knee joint was visualized. Dynamic visualization of the 20g x 3.5" needle was continuous throughout the procedure.      "

## 2020-01-29 NOTE — PROGRESS NOTES
Verenice Mirza, a 64 y.o. female, presents today for evaluation of her Left and Right knee.      History of Present Illness (HPI)  Location: anterior knee, Left and Right  Onset: Chronic since 2014, most recently   Palliative:    Relative rest   Oral analgesics  Provocative:    direct contact  Prior: No hx of Orthopaedic surgery  Progression: worsening discomfort  Quality:    sharp pain  Radiation: none  Severity: per nursing documentation  Timing: intermittent w/ use  Trauma: none recently    Review of Systems (ROS)  A 10+ review of systems was performed with pertinent positives and negatives noted above in the history of present illness. Other systems were negative unless otherwise specified.    Physical Examination (PE)  General:  The patient is alert and oriented x 3. Mood is pleasant. Observation of ears, eyes and nose reveal no gross abnormalities. HEENT: NCAT, sclera anicteric.   Lungs: Respirations are equal and unlabored.  Gait is coordinated. Patient can toe walk and heel walk without difficulty.    LEFT and RIGHT KNEE EXAMINATION    Observation/Inspection  Gait:   Nonantalgic   Alignment:  Neutral   Scars:   None   Muscle atrophy: Mild  Effusion:  None   Warmth:  None   Discoloration:   none     Tenderness / Crepitus (T / C):         T / C      T / C  Patella   - / -   Lateral joint line   - / -     Peripatellar medial  -  Medial joint line    ++ / -  Peripatellar lateral -  Medial plica   - / -  Patellar tendon -   Popliteal fossa   - / -  Quad tendon   -   Gastrocnemius   -  Prepatellar Bursa - / -   Quadricep   -  Tibial tubercle  -  Thigh/hamstring  -  Pes anserine/HS -  Fibula    -  ITB   - / -  Tibia     -  Tib/fib joint  - / -  LCL    -    MFC   - / -   MCL: Proximal  -    LFC   - / -   Distal    -          ROM: (* = pain)  PASSIVE   ACTIVE    Left :   5 / 0 / 145   5 / 0 / 145     Right :    5 / 0 / 145   5 / 0 / 145    Patellofemoral examination:  See above noted areas of tenderness.   Patella  position    Subluxation / dislocation: Centered        Sup. / Inf;   Normal   Crepitus (PF):    Absent   Patellar Mobility:       Medial-lateral:   Normal    Superior-inferior:  Normal    Inferior tilt   Normal    Patellar tendon:  Normal   Lateral tilt:    Normal   J-sign:     None   Patellofemoral grind:   No pain     Meniscal Signs:     Pain on terminal extension:  +  Pain on terminal flexion:  +  Bernards maneuver:  +*  Squat     NT  Thessaly    NT    Ligament Examination:  ACL / Lachman:  WNL  PCL-Post.  drawer: normal 0 to 2mm  MCL- Valgus:  normal 0 to 2mm  LCL- Varus:    normal 0 to 2mm  Pivot shift:  guarding   Dial Test:   difference c/w other side   At 30° flexion: normal (< 5°)    At 90° flexion: normal (< 5°)   Reverse Pivot Shift:   normal (Equal)     Strength: (* = with pain) Painful Side  Quadriceps   5/5  Hamstrin/5    Extremity Neuro-vascular Examination:   Sensation:  Grossly intact to light touch all dermatomal regions.   Motor Function:  Fully intact motor function at hip, knee, foot and ankle    DTRs;  quadriceps and  achilles 2+.  No clonus and downgoing Babinski.    Vascular status:  DP and PT pulses 2+, brisk capillary refill, symmetric.     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, felicita med+ant compartments, bilat  #2 calf pain, right   -->ultrasound to assess blood flow ordered and scheduled    No evidence of neurologic pathology    Imaging studies reviewed:   X-ray knee, bilateral 20.01    Plan:   We discussed the importance of appropriate diet, weight, and regular exercise    We discussed options including:    Watchful waiting / relative rest    Physical therapy x   Injection therapy csi iaknee b   Consultation    The patient chooses:  As above   x = prescribed  CSI = corticosteroid injection  VSI = viscosupplement injection  PRPI = platelet rich plasma injection  ia = intra articular  R = right  L = left  B =  bilateral     Physical Therapy        Formal (fPT), @ [facility] facility b   Formal (fPT), @ [facility] facility        Homegoing (hgPT), per concurrent fPT recommendations    Homegoing (hgPT), per prior fPT recommendations    Homegoing (hgPT), handout provided        w/  (atPT)    [blank] = not prescribed  x = prescribed  b = prescribed, and begin as indicated  t = continue as indicated  r = prescribed, and restart as indicated  p = completed prior as indicated  hs = prescribed, and with high school   col = prescribed, and with [facility] or university   nf = physical therapy was recommended, but patient is not interested in PT at this time    Activity (e.g. sports, work) restrictions    [blank] = as tolerated  pt = per physical therapist  at = per     Bracing    [blank] = not prescribed  r = recommended, but not fit with at todays visit  f = prescribed and fit with at todays visit  t = continue as indicated    Pain management    [blank] = No prescription necessary. A handout detailing dosing of appropriate over-the-counter musculoskeletal analgesics was made available to the patient.   m = meloxicam x 14 days  mp = 14 day course of meloxicam prescribed prior    Follow up in 12 weeks  Should symptoms worsen or fail to resolve, consider:    Revisiting the above options and / or:  emerita keller       Vocation:   retired

## 2020-02-03 ENCOUNTER — PATIENT MESSAGE (OUTPATIENT)
Dept: SPORTS MEDICINE | Facility: CLINIC | Age: 65
End: 2020-02-03

## 2020-02-10 ENCOUNTER — OFFICE VISIT (OUTPATIENT)
Dept: DERMATOLOGY | Facility: CLINIC | Age: 65
End: 2020-02-10
Payer: MEDICARE

## 2020-02-10 DIAGNOSIS — Z85.828 PERSONAL HISTORY OF SKIN CANCER: ICD-10-CM

## 2020-02-10 DIAGNOSIS — D18.01 CHERRY ANGIOMA: ICD-10-CM

## 2020-02-10 DIAGNOSIS — L57.0 AK (ACTINIC KERATOSIS): Primary | ICD-10-CM

## 2020-02-10 DIAGNOSIS — L90.5 SCAR: ICD-10-CM

## 2020-02-10 DIAGNOSIS — D22.9 NEVUS: ICD-10-CM

## 2020-02-10 DIAGNOSIS — D48.5 NEOPLASM OF UNCERTAIN BEHAVIOR OF SKIN: ICD-10-CM

## 2020-02-10 DIAGNOSIS — L82.1 SK (SEBORRHEIC KERATOSIS): ICD-10-CM

## 2020-02-10 DIAGNOSIS — L73.8 SEBACEOUS GLAND HYPERPLASIA: ICD-10-CM

## 2020-02-10 DIAGNOSIS — L30.4 INTERTRIGO: ICD-10-CM

## 2020-02-10 PROCEDURE — 88305 TISSUE EXAM BY PATHOLOGIST: CPT | Performed by: PATHOLOGY

## 2020-02-10 PROCEDURE — 88342 IMHCHEM/IMCYTCHM 1ST ANTB: CPT | Performed by: PATHOLOGY

## 2020-02-10 PROCEDURE — 88342 IMHCHEM/IMCYTCHM 1ST ANTB: CPT | Mod: 26,,, | Performed by: PATHOLOGY

## 2020-02-10 PROCEDURE — 11102 TANGNTL BX SKIN SINGLE LES: CPT | Mod: S$PBB,,, | Performed by: DERMATOLOGY

## 2020-02-10 PROCEDURE — 99214 OFFICE O/P EST MOD 30 MIN: CPT | Mod: 25,S$PBB,, | Performed by: DERMATOLOGY

## 2020-02-10 PROCEDURE — 17000 DESTRUCT PREMALG LESION: CPT | Mod: 59,S$PBB,, | Performed by: DERMATOLOGY

## 2020-02-10 PROCEDURE — 17000 DESTRUCT PREMALG LESION: CPT | Mod: 59,PBBFAC,PO | Performed by: DERMATOLOGY

## 2020-02-10 PROCEDURE — 99214 PR OFFICE/OUTPT VISIT, EST, LEVL IV, 30-39 MIN: ICD-10-PCS | Mod: 25,S$PBB,, | Performed by: DERMATOLOGY

## 2020-02-10 PROCEDURE — 11102 TANGNTL BX SKIN SINGLE LES: CPT | Mod: PBBFAC,PO | Performed by: DERMATOLOGY

## 2020-02-10 PROCEDURE — 11102 PR TANGENTIAL BIOPSY, SKIN, SINGLE LESION: ICD-10-PCS | Mod: S$PBB,,, | Performed by: DERMATOLOGY

## 2020-02-10 PROCEDURE — 88342 CHG IMMUNOCYTOCHEMISTRY: ICD-10-PCS | Mod: 26,,, | Performed by: PATHOLOGY

## 2020-02-10 PROCEDURE — 88305 TISSUE EXAM BY PATHOLOGIST: CPT | Mod: 26,,, | Performed by: PATHOLOGY

## 2020-02-10 PROCEDURE — 88305 TISSUE EXAM BY PATHOLOGIST: ICD-10-PCS | Mod: 26,,, | Performed by: PATHOLOGY

## 2020-02-10 PROCEDURE — 99999 PR PBB SHADOW E&M-EST. PATIENT-LVL III: ICD-10-PCS | Mod: PBBFAC,,, | Performed by: DERMATOLOGY

## 2020-02-10 PROCEDURE — 17000 PR DESTRUCTION(LASER SURGERY,CRYOSURGERY,CHEMOSURGERY),PREMALIGNANT LESIONS,FIRST LESION: ICD-10-PCS | Mod: 59,S$PBB,, | Performed by: DERMATOLOGY

## 2020-02-10 PROCEDURE — 99213 OFFICE O/P EST LOW 20 MIN: CPT | Mod: PBBFAC,PO | Performed by: DERMATOLOGY

## 2020-02-10 PROCEDURE — 99999 PR PBB SHADOW E&M-EST. PATIENT-LVL III: CPT | Mod: PBBFAC,,, | Performed by: DERMATOLOGY

## 2020-02-10 NOTE — PROGRESS NOTES
Subjective:       Patient ID:  Verenice Mirza is a 64 y.o. female who presents for   Chief Complaint   Patient presents with    Skin Check    Lesion     Pt here today for a TBSE. Pt c/o pink lesion on left neck x many years. No bleeding, pain or prev tx.   This is a high risk patient here to check for the development of new lesions.  Pt started evista and is concerned about it affecting her skin as it blocks estrogen.          Review of Systems   Skin: Positive for daily sunscreen use and activity-related sunscreen use. Negative for tendency to form keloidal scars and recent sunburn.   Hematologic/Lymphatic: Does not bruise/bleed easily.        Objective:    Physical Exam   Constitutional: She appears well-developed and well-nourished. No distress.   Neurological: She is alert and oriented to person, place, and time. She is not disoriented.   Psychiatric: She has a normal mood and affect.   Skin:   Areas Examined (abnormalities noted in diagram):   Scalp / Hair Palpated and Inspected  Head / Face Inspection Performed  Neck Inspection Performed  Chest / Axilla Inspection Performed  Abdomen Inspection Performed  Genitals / Buttocks / Groin Inspection Performed  Back Inspection Performed  RUE Inspected  LUE Inspection Performed  RLE Inspected  LLE Inspection Performed  Nails and Digits Inspection Performed                       Diagram Legend     Erythematous scaling macule/papule c/w actinic keratosis       Vascular papule c/w angioma      Pigmented verrucoid papule/plaque c/w seborrheic keratosis      Yellow umbilicated papule c/w sebaceous hyperplasia      Irregularly shaped tan macule c/w lentigo     1-2 mm smooth white papules consistent with Milia      Movable subcutaneous cyst with punctum c/w epidermal inclusion cyst      Subcutaneous movable cyst c/w pilar cyst      Firm pink to brown papule c/w dermatofibroma      Pedunculated fleshy papule(s) c/w skin tag(s)      Evenly pigmented macule c/w junctional  nevus     Mildly variegated pigmented, slightly irregular-bordered macule c/w mildly atypical nevus      Flesh colored to evenly pigmented papule c/w intradermal nevus       Pink pearly papule/plaque c/w basal cell carcinoma      Erythematous hyperkeratotic cursted plaque c/w SCC      Surgical scar with no sign of skin cancer recurrence      Open and closed comedones      Inflammatory papules and pustules      Verrucoid papule consistent consistent with wart     Erythematous eczematous patches and plaques     Dystrophic onycholytic nail with subungual debris c/w onychomycosis     Umbilicated papule    Erythematous-base heme-crusted tan verrucoid plaque consistent with inflamed seborrheic keratosis     Erythematous Silvery Scaling Plaque c/w Psoriasis     See annotation      Assessment / Plan:      Pathology Orders:     Normal Orders This Visit    Specimen to Pathology, Dermatology     Comments:    Number of Specimens:->1  ------------------------->-------------------------  Spec 1 Procedure:->Biopsy  Spec 1 Clinical Impression:->r/o bcc  Spec 1 Source:->left upper neck    Questions:    Procedure Type:  Dermatology and skin neoplasms    Number of Specimens:  1    ------------------------:  -------------------------    Spec 1 Procedure:  Biopsy    Spec 1 Clinical Impression:  r/o bcc    Spec 1 Source:  left upper neck        AK (actinic keratosis)  Cryosurgery Procedure Note    Verbal consent from the patient is obtained including, but not limited to, risk of hypopigmentation/hyperpigmentation, scar, recurrence of lesion. The patient is aware of the precancerous quality and need for treatment of these lesions. Liquid nitrogen cryosurgery is applied to the 1 actinic keratoses, as detailed in the physical exam, to produce a freeze injury. The patient is aware that blisters may form and is instructed on wound care with gentle cleansing and use of vaseline ointment to keep moist until healed. The patient is supplied a  handout on cryosurgery and is instructed to call if lesions do not completely resolve.    Neoplasm of uncertain behavior of skin  Shave biopsy procedure note:    Shave biopsy performed after verbal consent including risk of infection, scar, recurrence, need for additional treatment of site. Area prepped with alcohol, anesthetized with approximately 1.0cc of 1% lidocaine with epinephrine. Lesional tissue shaved with razor blade. Hemostasis achieved with application of aluminum chloride followed by hyfrecation. No complications. Dressing applied. Wound care explained.    If biopsy positive, schedule procedure for definitive excision.   -     Specimen to Pathology, Dermatology    Sebaceous gland hyperplasia  This is a common condition representing benign enlargement of the sebaceous lobule. It typically occurs in adulthood. Reassurance given to patient.     Nevus  Discussed ABCDE's of nevi.  Monitor for new mole or moles that are becoming bigger, darker, irritated, or developing irregular borders. Brochure provided.    SK (seborrheic keratosis)  These are benign inherited growths without a malignant potential. Reassurance given to patient. No treatment is necessary.     Cherry angioma  These are benign vascular lesions that are inherited.  Treatment is not necessary.    Personal history of skin cancer  Scar  Patient with a history of non melanoma skin cancer.   Total body skin examination performed today including at least 12 points as noted in physical examination. Suspicious lesions noted.    Intertrigo  Recommend white vinegar: water 1:1 compresses  nightly after bath/shower followed by cool blow dry and then application of prescription medication.     Use Nystatin powder for maintenance in the morning.     Wash affected areas 2x per week with Hibiclens wash which can be purchased over the counter             Follow up for prn bx report.

## 2020-02-17 ENCOUNTER — CLINICAL SUPPORT (OUTPATIENT)
Dept: REHABILITATION | Facility: HOSPITAL | Age: 65
End: 2020-02-17
Attending: FAMILY MEDICINE
Payer: MEDICARE

## 2020-02-17 DIAGNOSIS — M25.561 ACUTE PAIN OF RIGHT KNEE: ICD-10-CM

## 2020-02-17 DIAGNOSIS — M25.562 ACUTE PAIN OF LEFT KNEE: ICD-10-CM

## 2020-02-17 PROCEDURE — 97161 PT EVAL LOW COMPLEX 20 MIN: CPT | Mod: PN

## 2020-02-17 PROCEDURE — 97110 THERAPEUTIC EXERCISES: CPT | Mod: PN

## 2020-02-17 NOTE — PLAN OF CARE
[facility]  Physical Therapy Initial Evaluation    Name: Verenice Mirza  Clinic Number: [id number]    Therapy Diagnosis:   Encounter Diagnoses   Name Primary?    Acute pain of right knee     Acute pain of left knee      Physician: Mark Reeves, *    Physician Orders: PT Eval and Treat  Medical Diagnosis:   M25.569 (ICD-10-CM) - Knee pain, unspecified chronicity, unspecified laterality   M17.0 (ICD-10-CM) - Primary osteoarthritis of both knees       Evaluation Date: 2/17/2020  Authorization Period Expiration: 12/31/2020  Plan of Care Certification Period: 2/17/2020 to 04/10/2020  Visit # / Visits authorized: 1/ 50    Time In: 1310  Time Out: 1355  Total Billable Time: 45 minutes (1 LCE, 1 TE)  Precautions: Standard, HTN, PAD, thyroid disease, breast mass.     Subjective   Verenice reports to PT today with primary complaint of B knee pain.  She first noticed that her pain was associated with swelling around the inside of her knee/leg.  Recent injections B knees; mild-to-moderate improvement in her knee/inner leg swelling.  Reports only experiencing knee pain when performing household chores, kneel, and stair negotiation. Pain alleviated by injections, activity modification, and rest.  Denies morning pain.  Has to rock to get in/out of chair. Walks on the treadmill 4-5 days/week; denies knee pain but does voice calf cramping Does report occasional giving away sensation mostly in her R knee when walking.        Past Medical History:   Diagnosis Date    BMI 40.0-44.9, adult     Breast cyst     Dysphagia     Dysplastic nevus of trunk 03/2017    moderately atypical    Fatty liver disease, nonalcoholic     Fibrocystic breast     Hiatal hernia     Hyperlipidemia     Hypertension     Hypothyroid     IGT (impaired glucose tolerance)     Kidney stones     Left breast mass     Lumbar disc disease     Morbid obesity     Nicotine use disorder     JORDANA on CPAP     PAD (peripheral  artery disease)     PVD (peripheral vascular disease)     Renal angiomyolipoma     Rosacea     Squamous cell carcinoma 12/2017    in situ mid scalp    Venous insufficiency     Vitamin D deficiency disease      Verenice Mirza  has a past surgical history that includes Hysterectomy; Bladder suspension; Lithotripsy; Cystoscopy; Oophorectomy; Colonoscopy (N/A, 5/24/2017); Esophagogastroduodenoscopy (N/A, 12/5/2018); and Excisional biopsy (Left, 8/19/2019).    Verenice has a current medication list which includes the following prescription(s): albuterol, aspirin, atorvastatin, azelaic acid, b complex vitamins, biotin, blood sugar diagnostic, co-enzyme q-10, famotidine, finasteride, fluzone high-dose 2019-20 (pf), glucosamine/chondr tyler a sod, inhalation spacing device, ketoconazole, lactobacillus 3/fos/pantethine, lancets, levothyroxine, lutein, magnesium oxide, meloxicam, metformin, metoprolol succinate, nystatin, omega-3 acid ethyl esters, raloxifene, spironolactone, vitamin d, and tamsulosin.    Review of patient's allergies indicates:  No Known Allergies     Imaging: recent B knee Xrays; see report in EMR.   Prior Therapy: not for her knees but for her back and plantar fasciits.   Social History: Lives at home with family.    Occupation: retired .   Prior Level of Function: progressive right knee swelling with pain.   Current Level of Function: see above     Pain:  Current 2/10, worst 5/10, best 0/10   Location: bilateral knee   Description: Superficial and Sharp    Pts goals: to avoid knee surgery.     Objective     Palpation: TTP along medial proximal tibia, medial joint line, and medial femoral condyle B  Posture: B genu varus deformity.  Posterior gluteal adipose distribution.  Stands in B hip flexion with anterior shift of upper trunk in relation to lower quarter.     Gross Movement Analysis:  - Gait:  Lack of B hip extension during stance phase.   - Squats: minimal depth  - Single-leg  stance: RLE 5 second hold LLE 8 second hold; no knee pain    Range of Motion:   LE Right Left   Hip flexion 100  100    Hip abduction 30 30   Hip ER 30 30   Hip IR  30 30   Hip extension (-) 5 0   Knee (-) 5 - 115 (-) 5 - 115   Ankle DF 5 with knee extended 5 with knee extended      Lower Extremity Strength                 LE           Right           Left   Hip flexion: 4/5 4/5   Hip abduction 3+/5 3+/5   Hip extension 3+/5 3+/5   Hip ER 3/5 3/5   Knee flexion 4/5 4/5   Knee extension 3+/5 3+/5   Ankle dorsiflexion: 5/5 5/5   Ankle plantarflexion: 2/5 WB 3/5 WB     Special Tests:   Right Left   Valgus Stress Test Mild laxity with end-point -   Varus Stress test - -   Lachman's test - -   Posterior drawer - -   Anterior drawer Mild laxity Mild laxity   Bernard's Test - -   Apley's Compression Not tested Not tested    Apley's Distraction Not tested Not tested    Martin's compression test - -   Thessaly's Test Not tested Not tested   Patellar Grind Test - -     Joint Mobility: B lateral patellar tilting, decreased medial patellar glide B  Sensation: intact light touch sensation to BLE throughout L2-S2 dermatomal pattern  Flexibility:    Ely's test: not tested   Popliteal Angle:  Not tested    Kumar test: (+) B    P/SLR test: <90 degrees     Edema: localized pain and edema B pes anserine area    CMS Impairment/Limitation/Restriction for FOTO Knee Survey    Therapist reviewed FOTO scores for Verenice Mirza on 2/17/2020.   FOTO documents entered into [employer] - see Media section.    Limitation Score: 54%  Predicted Limitation Score: 44%       TREATMENT   Treatment Time In: 1340  Treatment Time Out: 1355  Total Treatment time separate from Evaluation time:15'    Verenice received therapeutic exercises to develop strength, endurance, ROM, flexibility, posture and core stabilization for 15 minutes including:  - SLR, SL hip abduction, SL clamshells, heel raises    Home Exercises and Patient Education Provided  Education provided  re:   - HEP  - PT diagnosis and recommended treatment course.     Written Home Exercises Provided: yes.  Exercises were reviewed and Verenice was able to demonstrate them prior to the end of the session.   Pt received a written copy of exercises to perform at home. Verenice demonstrated good  understanding of the education provided.     See EMR under patient instructions for exercises given 02/17/2020.    Assessment   Verenice is a 64 y.o. female referred to outpatient Physical Therapy with a medical diagnosis of primary osteoarthritis both knees. Pt presents with loss of B knee ROM (into flexion) and patellar mobility, decreased BLE strength, postural habituations, decreased core strength, and impaired balance.  Clinical presentation consistent with medical diagnosis.  Patient seeking to avoid nee surgery at this time.      Pt prognosis is Good.   Pt will benefit from skilled outpatient Physical Therapy to address the deficits stated above and in the chart below, provide pt/family education, and to maximize pt's level of independence.     Plan of care discussed with patient: Yes  Pt's spiritual, cultural and educational needs considered and patient is agreeable to the plan of care and goals as stated below:     Anticipated Barriers for therapy: co-morbidities, chronic nature of B knee pain    Medical Necessity is demonstrated by the following  History  Co-morbidities and personal factors that may impact the plan of care Co-morbidities:   Arthritis, Back pain, BMI over 30, Gastrointestinal Disease, High Blood Pressure,  Peripheral Vascular Disease, Sleep dysfunction    Personal Factors:   age     high   Examination  Body Structures and Functions, activity limitations and participation restrictions that may impact the plan of care Body Regions:   back  lower extremities    Body Systems:    gross symmetry  ROM  strength  balance  gait  transfers  motor control  edema    Participation Restrictions:   See above    Activity  limitations:   Learning and applying knowledge  no deficits    General Tasks and Commands  no deficits    Communication  no deficits    Mobility  lifting and carrying objects  walking  kneeling    Self care  washing oneself (bathing, drying, washing hands)  dressing    Domestic Life  shopping  cooking  doing house work (cleaning house, washing dishes, laundry)  assisting others    Interactions/Relationships  no deficits    Life Areas  no deficits    Community and Social Life  community life  recreation and leisure         moderate   Clinical Presentation stable and uncomplicated moderate   Decision Making/ Complexity Score: low     Goals:  Long-Term Goals: 6 weeks   1.  Patient will demonstrate 1/2 grade improvement in B ankle PF and hip strength throughout for improved standing tolerance.   2.  Patient will demonstrate at least 10 degrees of B hip extension for improved ambulation tolerance.   3.  Patient will report <44% limitation on Knee FOTO survey.   4.  Patient will report ability to negotiate 10 steps using step-through pattern without increased B knee pain.   5.  Patient will demonstrate to PT comprehensive understanding of each HEP component without verbal cueing.     Plan   Certification Period/Plan of care expiration: 2/17/2020 to 04/10/2020.    Outpatient Physical Therapy 2 times weekly for 7 weeks to include the following interventions: Electrical Stimulation IFC, Gait Training, Manual Therapy, Moist Heat/ Ice, Neuromuscular Re-ed, Orthotic Management and Training, Patient Education, Self Care, Therapeutic Activites and Therapeutic Exercise, Dry Needling, IASTM.     Francisco Colin, PT

## 2020-02-20 LAB
FINAL PATHOLOGIC DIAGNOSIS: NORMAL
GROSS: NORMAL

## 2020-02-28 ENCOUNTER — TELEPHONE (OUTPATIENT)
Dept: DERMATOLOGY | Facility: CLINIC | Age: 65
End: 2020-02-28

## 2020-03-02 ENCOUNTER — CLINICAL SUPPORT (OUTPATIENT)
Dept: REHABILITATION | Facility: HOSPITAL | Age: 65
End: 2020-03-02
Attending: FAMILY MEDICINE
Payer: MEDICARE

## 2020-03-02 ENCOUNTER — PATIENT MESSAGE (OUTPATIENT)
Dept: DERMATOLOGY | Facility: CLINIC | Age: 65
End: 2020-03-02

## 2020-03-02 DIAGNOSIS — M25.562 ACUTE PAIN OF LEFT KNEE: ICD-10-CM

## 2020-03-02 DIAGNOSIS — M25.561 ACUTE PAIN OF RIGHT KNEE: ICD-10-CM

## 2020-03-02 PROCEDURE — 97140 MANUAL THERAPY 1/> REGIONS: CPT | Mod: PN

## 2020-03-02 PROCEDURE — 97110 THERAPEUTIC EXERCISES: CPT | Mod: PN

## 2020-03-03 PROBLEM — M25.561 ACUTE PAIN OF RIGHT KNEE: Status: RESOLVED | Noted: 2020-02-17 | Resolved: 2020-03-03

## 2020-03-03 PROBLEM — M25.562 ACUTE PAIN OF LEFT KNEE: Status: RESOLVED | Noted: 2020-02-17 | Resolved: 2020-03-03

## 2020-03-03 NOTE — PROGRESS NOTES
Physical Therapy Daily Treatment Note     Name: Verenice Mirza  Clinic Number: [id number]    Therapy Diagnosis:   Encounter Diagnoses   Name Primary?    Acute pain of right knee     Acute pain of left knee      Physician: Mark Reeves, *    Visit Date: 3/2/2020    Physician Orders: PT Eval and Treat  Medical Diagnosis:   M25.569 (ICD-10-CM) - Knee pain, unspecified chronicity, unspecified laterality   M17.0 (ICD-10-CM) - Primary osteoarthritis of both knees         Evaluation Date: 2/17/2020  Authorization Period Expiration: 12/31/2020  Plan of Care Certification Period: 2/17/2020 to 04/10/2020  Visit # / Visits authorized: 2/ 50  FOTO: 2/5   Visit: 118.42  Total: 231.62     Time In: 1400  Time Out: 1455  Total Billable Time: 55 minutes (1 MT, 3 TE)  Precautions: Standard, HTN, PAD, thyroid disease, breast mass.      Subjective     Pt reports: B knee pain although manageable.  States that she is sore all-over today due to working in her flower beds over the weekend.   She was compliant with home exercise program.  Response to previous treatment: eval and HEP  Functional change: none voiced at this time.     Pain: 4/10  Location: bilateral knee      Objective     Verenice received therapeutic exercises to develop strength, endurance, ROM and flexibility for 40 minutes including:  - quad sets    20 reps  - SLR phase II    2x10 B  - SL hip abduction   2x10 B  - hip add squeezes   20 reps  - leg press    2x15 at 3.5 plates  - eccentric heel lowering  2x10  - Nu-step    7' at L3 for joint health and nutrition    Verenice received the following manual therapy techniques: total time 10 minutes  - grade III/IV patellar mobs mult-directional B  - grade III/IV B knee passive physiological flexion in short sitting.       Home Exercises Provided and Patient Education Provided   Education provided:   - continue HEP  - educated on the benefits of strength training even in light of diagnosis of OA.     Written Home Exercises  Provided: Patient instructed to cont prior HEP.  Exercises were reviewed and Verenice was able to demonstrate them prior to the end of the session.  Verenice demonstrated good  understanding of the education provided.     See EMR under Patient Instructions for exercises provided prior visit.    Assessment   A:  Verenice is a 64 y.o. female referred to outpatient Physical Therapy with a medical diagnosis of primary osteoarthritis both knees.  Wants to avoid surgery.  Tolerated session well; monitor joint loading.     Verenice is progressing well towards her goals.   Pt prognosis is Good.     Pt will continue to benefit from skilled outpatient physical therapy to address the deficits listed in the problem list box on initial evaluation, provide pt/family education and to maximize pt's level of independence in the home and community environment.     Pt's spiritual, cultural and educational needs considered and pt agreeable to plan of care and goals.     Anticipated barriers to physical therapy: co-morbidities, chronic nature of B knee pain    Goals:    Long-Term Goals: 6 weeks   1.  Patient will demonstrate 1/2 grade improvement in B ankle PF and hip strength throughout for improved standing tolerance. progressing towards; not met  2.  Patient will demonstrate at least 10 degrees of B hip extension for improved ambulation tolerance. progressing towards; not met  3.  Patient will report <44% limitation on Knee FOTO survey.   4.  Patient will report ability to negotiate 10 steps using step-through pattern without increased B knee pain. progressing towards; not met  5.  Patient will demonstrate to PT comprehensive understanding of each HEP component without verbal cueing. progressing towards; not met    Plan   Continue plan of care.  Monitor response to interventions.  Adjust intensity accordingly.       Francisco Colin, PT

## 2020-03-04 ENCOUNTER — CLINICAL SUPPORT (OUTPATIENT)
Dept: REHABILITATION | Facility: HOSPITAL | Age: 65
End: 2020-03-04
Attending: FAMILY MEDICINE
Payer: MEDICARE

## 2020-03-04 DIAGNOSIS — M25.561 ACUTE PAIN OF RIGHT KNEE: ICD-10-CM

## 2020-03-04 DIAGNOSIS — M25.562 ACUTE PAIN OF LEFT KNEE: ICD-10-CM

## 2020-03-04 PROCEDURE — 97110 THERAPEUTIC EXERCISES: CPT | Mod: PN,CQ

## 2020-03-04 NOTE — PROGRESS NOTES
"  Physical Therapy Daily Treatment Note     Name: Verenice Mirza  Clinic Number: [id number]    Therapy Diagnosis:   Encounter Diagnoses   Name Primary?    Acute pain of right knee     Acute pain of left knee      Physician: Mark Reeves, *    Visit Date: 3/4/2020    Physician Orders: PT Eval and Treat  Medical Diagnosis:   M25.569 (ICD-10-CM) - Knee pain, unspecified chronicity, unspecified laterality   M17.0 (ICD-10-CM) - Primary osteoarthritis of both knees         Evaluation Date: 2/17/2020  Authorization Period Expiration: 12/31/2020  Plan of Care Certification Period: 2/17/2020 to 04/10/2020  Visit # / Visits authorized: 3/ 50  FOTO: 2/5   Visit: 118.42  Total: 231.62     Time In: 1:00 pm  Time Out: 1:55 pm  Total Billable Time: 25 minutes (1 MT, 3 TE)  Precautions: Standard, HTN, PAD, thyroid disease, breast mass.      Subjective     Pt reports: she really doesn't have pain her knees but is feeling very sore in her calves today after her last visit.   She was compliant with home exercise program.  Response to previous treatment: eval and HEP  Functional change: none voiced at this time.     Pain: 6/10 soreness  Location: bilateral knee      Objective     Verenice received therapeutic exercises to develop strength, endurance, ROM and flexibility for 40 minutes including:  - quad sets    20 reps  - SLR phase II    2x10 B  - SL hip abduction   2x10 B  - hip add squeezes   20 reps  - leg press    2x15 at 3.5 plates  - eccentric heel lowering  2x10  - Nu-step    7' at L3 for joint health and nutrition  - steamboats    2 x 10  - step ups    4" 2 x 10    Verenice received the following manual therapy techniques: total time 10 minutes NOT TODAY  - grade III/IV patellar mobs mult-directional B  - grade III/IV B knee passive physiological flexion in short sitting.       Home Exercises Provided and Patient Education Provided   Education provided:   - continue HEP  - educated on the benefits of strength training even in " light of diagnosis of OA.     Written Home Exercises Provided: Patient instructed to cont prior HEP.  Exercises were reviewed and Verenice was able to demonstrate them prior to the end of the session.  Verenice demonstrated good  understanding of the education provided.     See EMR under Patient Instructions for exercises provided prior visit.    Assessment     Verenice performed above exercise program with verbal cueing for proper technique and had no difficulty with new exercises added today with no increase in symptoms prior to leaving the clinic.    Verenice is progressing well towards her goals.   Pt prognosis is Good.     Pt will continue to benefit from skilled outpatient physical therapy to address the deficits listed in the problem list box on initial evaluation, provide pt/family education and to maximize pt's level of independence in the home and community environment.     Pt's spiritual, cultural and educational needs considered and pt agreeable to plan of care and goals.     Anticipated barriers to physical therapy: co-morbidities, chronic nature of B knee pain    Goals:    Long-Term Goals: 6 weeks   1.  Patient will demonstrate 1/2 grade improvement in B ankle PF and hip strength throughout for improved standing tolerance. progressing towards; not met  2.  Patient will demonstrate at least 10 degrees of B hip extension for improved ambulation tolerance. progressing towards; not met  3.  Patient will report <44% limitation on Knee FOTO survey.   4.  Patient will report ability to negotiate 10 steps using step-through pattern without increased B knee pain. progressing towards; not met  5.  Patient will demonstrate to PT comprehensive understanding of each HEP component without verbal cueing. progressing towards; not met    Plan     Continue plan of care.  Increase reps with SLR and hip abduction next visit.      Anthony Melton, PTA

## 2020-03-06 DIAGNOSIS — L30.4 INTERTRIGO: ICD-10-CM

## 2020-03-09 ENCOUNTER — CLINICAL SUPPORT (OUTPATIENT)
Dept: REHABILITATION | Facility: HOSPITAL | Age: 65
End: 2020-03-09
Attending: FAMILY MEDICINE
Payer: MEDICARE

## 2020-03-09 DIAGNOSIS — M25.561 ACUTE PAIN OF RIGHT KNEE: ICD-10-CM

## 2020-03-09 DIAGNOSIS — M25.562 ACUTE PAIN OF LEFT KNEE: ICD-10-CM

## 2020-03-09 PROCEDURE — 97110 THERAPEUTIC EXERCISES: CPT | Mod: PN

## 2020-03-09 RX ORDER — KETOCONAZOLE 20 MG/G
CREAM TOPICAL
Qty: 60 G | Refills: 0 | Status: SHIPPED | OUTPATIENT
Start: 2020-03-09 | End: 2020-05-20 | Stop reason: SDUPTHER

## 2020-03-09 NOTE — PROGRESS NOTES
Physical Therapy Daily Treatment Note     Name: Verenice Mirza  Clinic Number: [id number]    Therapy Diagnosis:   Encounter Diagnoses   Name Primary?    Acute pain of right knee     Acute pain of left knee      Physician: Mark Reeves, *    Visit Date: 3/9/2020    Physician Orders: PT Eval and Treat  Medical Diagnosis:   M25.569 (ICD-10-CM) - Knee pain, unspecified chronicity, unspecified laterality   M17.0 (ICD-10-CM) - Primary osteoarthritis of both knees         Evaluation Date: 2/17/2020  Authorization Period Expiration: 12/31/2020  Plan of Care Certification Period: 2/17/2020 to 04/10/2020  Visit # / Visits authorized: 3/ 50  FOTO: 3/5    Visit: 60.64  Total: 292.26     Time In: 1500  Time Out: 1545   Total Billable Time: 30 minutes (2 TE)  Precautions: Standard, HTN, PAD, thyroid disease, breast mass.      Subjective     Pt reports: medial knee area pain today but no other complaints.   She was compliant with home exercise program.  Response to previous treatment: mild increase in B knee pain.   Functional change: none voiced at this time.   Pain: 4/10   Location: bilateral knee      Objective   O: Genu varus knee structure B.     Verenice received therapeutic exercises to develop strength, endurance, ROM and flexibility for 30 minutes with PT 1:1 including assessment and 10 minutes under supervision:  - quad sets    20 reps  - SLR phase II    2x10 B  - SL hip abduction   2x10 B  - hip add squeezes   20 reps  - leg press    3x15 at 3.5 plates (pillow support needed)  - eccentric heel lowering  2x15  - Nu-step    7' at L3 for joint health and nutrition      Verenice received the following manual therapy techniques: total time 5 minutes   - grade III/IV patellar mobs mult-directional B  - grade III/IV B knee passive physiological flexion in short sitting.       Home Exercises Provided and Patient Education Provided   Education provided:   - continue HEP  - educated on the benefits of strength training even  in light of diagnosis of OA.     Written Home Exercises Provided: Patient instructed to cont prior HEP.  Exercises were reviewed and Verenice was able to demonstrate them prior to the end of the session.  Verenice demonstrated good  understanding of the education provided.     See EMR under Patient Instructions for exercises provided prior visit.    Assessment   A: B knee OA.  Medial compartmental pain (?).  Tolerated session well.  Slow load progression; monitor knee pain.     Verenice is progressing well towards her goals.   Pt prognosis is Good.     Pt will continue to benefit from skilled outpatient physical therapy to address the deficits listed in the problem list box on initial evaluation, provide pt/family education and to maximize pt's level of independence in the home and community environment.     Pt's spiritual, cultural and educational needs considered and pt agreeable to plan of care and goals.     Anticipated barriers to physical therapy: co-morbidities, chronic nature of B knee pain    Goals:    Long-Term Goals: 6 weeks   1.  Patient will demonstrate 1/2 grade improvement in B ankle PF and hip strength throughout for improved standing tolerance. progressing towards; not met  2.  Patient will demonstrate at least 10 degrees of B hip extension for improved ambulation tolerance. progressing towards; not met  3.  Patient will report <44% limitation on Knee FOTO survey.   4.  Patient will report ability to negotiate 10 steps using step-through pattern without increased B knee pain. progressing towards; not met  5.  Patient will demonstrate to PT comprehensive understanding of each HEP component without verbal cueing. progressing towards; not met    Plan     Continue plan of care. Gradual load progress.  Continue strengthening.       Francisco Colin, PT

## 2020-03-11 ENCOUNTER — CLINICAL SUPPORT (OUTPATIENT)
Dept: REHABILITATION | Facility: HOSPITAL | Age: 65
End: 2020-03-11
Attending: FAMILY MEDICINE
Payer: MEDICARE

## 2020-03-11 DIAGNOSIS — M25.562 ACUTE PAIN OF LEFT KNEE: ICD-10-CM

## 2020-03-11 DIAGNOSIS — M25.561 ACUTE PAIN OF RIGHT KNEE: ICD-10-CM

## 2020-03-11 PROCEDURE — 97140 MANUAL THERAPY 1/> REGIONS: CPT | Mod: PN,CQ

## 2020-03-11 PROCEDURE — 97110 THERAPEUTIC EXERCISES: CPT | Mod: PN,CQ

## 2020-03-11 NOTE — PROGRESS NOTES
"  Physical Therapy Daily Treatment Note     Name: Verenice Mirza  Clinic Number: [id number]    Therapy Diagnosis:   Encounter Diagnoses   Name Primary?    Acute pain of right knee     Acute pain of left knee      Physician: Mark Reeves, *    Visit Date: 3/11/2020    Physician Orders: PT Eval and Treat  Medical Diagnosis:   M25.569 (ICD-10-CM) - Knee pain, unspecified chronicity, unspecified laterality   M17.0 (ICD-10-CM) - Primary osteoarthritis of both knees         Evaluation Date: 2/17/2020  Authorization Period Expiration: 12/31/2020  Plan of Care Certification Period: 2/17/2020 to 04/10/2020  Visit # / Visits authorized: 4/ 50  FOTO: 4/5    Visit: 118.42  Total: 410.68     Time In: 1300  Time Out: 1355     Total Billable Time: 55 minutes (3 TE, 1 MT)  Precautions: Standard, HTN, PAD, thyroid disease, breast mass.      Subjective     Pt reports: having no pain today.   She was compliant with home exercise program.  Response to previous treatment: mild increase in B knee pain.   Functional change: none voiced at this time.   Pain: 0/10   Location: bilateral knee      Objective   O: Genu varus knee structure B.     Verenice received therapeutic exercises to develop strength, endurance, ROM and flexibility for 47 minutes with PTA 1:1   - quad sets    20 reps  - SLR phase II    3x10 B  - SL hip abduction   2x10 B  - hip add squeezes   30 reps  - TA's     10x5"  - bridge w/TA    1x15  - supine march w/TA   1x15  - seated march w/TA   1x15  - leg press    2x15 at 4.5 plates (pillow support needed)  - eccentric heel lowering  2x15  - Nu-step    7' at L3 for joint health and nutrition      Verenice received the following manual therapy techniques: total time 8 minutes   - grade III/IV patellar mobs mult-directional B  - grade III/IV B knee passive physiological flexion in short sitting.       Home Exercises Provided and Patient Education Provided   Education provided:   - continue HEP  - educated on the benefits of " strength training even in light of diagnosis of OA.     Written Home Exercises Provided: Patient instructed to cont prior HEP.  Exercises were reviewed and Verenice was able to demonstrate them prior to the end of the session.  Verenice demonstrated good  understanding of the education provided.     See EMR under Patient Instructions for exercises provided prior visit.    Assessment     Patient performed above exercise program with verbal cueing for proper technique and had no difficulty with new exercises added along with today's progressions.  Core strengthening was added to enhance leg strengthening.     Verenice is progressing well towards her goals.   Pt prognosis is Good.     Pt will continue to benefit from skilled outpatient physical therapy to address the deficits listed in the problem list box on initial evaluation, provide pt/family education and to maximize pt's level of independence in the home and community environment.     Pt's spiritual, cultural and educational needs considered and pt agreeable to plan of care and goals.     Anticipated barriers to physical therapy: co-morbidities, chronic nature of B knee pain    Goals:    Long-Term Goals: 6 weeks   1.  Patient will demonstrate 1/2 grade improvement in B ankle PF and hip strength throughout for improved standing tolerance. progressing towards; not met  2.  Patient will demonstrate at least 10 degrees of B hip extension for improved ambulation tolerance. progressing towards; not met  3.  Patient will report <44% limitation on Knee FOTO survey.   4.  Patient will report ability to negotiate 10 steps using step-through pattern without increased B knee pain. progressing towards; not met  5.  Patient will demonstrate to PT comprehensive understanding of each HEP component without verbal cueing. progressing towards; not met    Plan     Continue plan of care. Gradual load progress.  Continue strengthening.       Anthony Melton, PTA

## 2020-03-13 ENCOUNTER — PATIENT MESSAGE (OUTPATIENT)
Dept: HEMATOLOGY/ONCOLOGY | Facility: CLINIC | Age: 65
End: 2020-03-13

## 2020-03-18 ENCOUNTER — PATIENT MESSAGE (OUTPATIENT)
Dept: INTERNAL MEDICINE | Facility: CLINIC | Age: 65
End: 2020-03-18

## 2020-03-18 DIAGNOSIS — K57.92 DIVERTICULITIS: Primary | ICD-10-CM

## 2020-03-18 RX ORDER — CIPROFLOXACIN 500 MG/1
500 TABLET ORAL 2 TIMES DAILY
Qty: 20 TABLET | Refills: 0 | Status: SHIPPED | OUTPATIENT
Start: 2020-03-18 | End: 2020-03-28

## 2020-03-18 RX ORDER — METRONIDAZOLE 500 MG/1
500 TABLET ORAL 3 TIMES DAILY
Qty: 30 TABLET | Refills: 0 | Status: SHIPPED | OUTPATIENT
Start: 2020-03-18 | End: 2020-03-28

## 2020-03-30 ENCOUNTER — PATIENT MESSAGE (OUTPATIENT)
Dept: HEMATOLOGY/ONCOLOGY | Facility: CLINIC | Age: 65
End: 2020-03-30

## 2020-04-06 PROBLEM — N63.20 BREAST MASS, LEFT: Status: RESOLVED | Noted: 2019-07-17 | Resolved: 2020-04-06

## 2020-04-06 PROBLEM — Z91.89 AT HIGH RISK FOR BREAST CANCER: Status: RESOLVED | Noted: 2019-07-17 | Resolved: 2020-04-06

## 2020-04-06 NOTE — PROGRESS NOTES
Subjective:       Patient ID: Verenice Mirza is a 64 y.o. female.    Chief Complaint: Follow-up and Breast Cancer    The patient location is: her home  The chief complaint leading to consultation is: follow up [facility]  Visit type: Virtual visit with synchronous audio and video  Total time spent with patient: 20 minutes    Each patient to whom he or she provides medical services by telemedicine is:  (1) informed of the relationship between the physician and patient and the respective role of any other health care provider with respect to management of the patient; and (2) notified that he or she may decline to receive medical services by telemedicine and may withdraw from such care at any time.    Started on raloxifene in Jan 2020. She was concerned about hair thinking. Her weight has remained stable. She does note some hot flashes.  Intermittent headaches about 2 months in.   She did have a light tremor in both hands; pain in the neck, right side below the right ear; she is very concerned about having Parkinson's disease. She does not she is a little more fatigued.  Two weeks ago she had a diverticulitis flare that is now resolved.         HPI Ms. Mirza 64-year-old female referred by Dr. Bass for recent diagnosis of atypical ductal hyperplasia.    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      Mammogram from July 1, 2019 showed architectural distortion left breast 2:00 position.  There was nothing seen by ultrasound.    On July 8, 2019 a needle biopsy was performed which showed benign breast tissue with apocrine adenosis, calcifications but no atypia.    Excisional biopsy on August 19, 2019 showed atypical ductal hyperplasia.    Review of Systems   Constitutional: Negative for appetite change and unexpected weight change (She has lost 10 lb on a diet).   Eyes: Negative for visual disturbance.   Respiratory: Negative for cough and shortness of breath.     Cardiovascular: Negative for chest pain.   Gastrointestinal: Positive for constipation and diarrhea. Negative for abdominal pain.        She has IBS   Genitourinary: Positive for frequency.   Musculoskeletal: Positive for back pain and neck pain (right sided neck pain below right ear).   Skin: Negative for rash.   Neurological: Positive for headaches (intermittent).   Hematological: Negative for adenopathy.   Psychiatric/Behavioral: The patient is not nervous/anxious.        Objective:      Physical Exam   Constitutional: She is oriented to person, place, and time. She appears well-developed and well-nourished. No distress.   Neurological: She is alert and oriented to person, place, and time.   Psychiatric: She has a normal mood and affect. Her behavior is normal. Thought content normal.   Vitals reviewed.      Assessment:     bone density shows normal density the spine and mild osteopenia in the hip.  1. Atypical ductal hyperplasia of left breast    2. Essential hypertension    3. Hyperlipidemia, unspecified hyperlipidemia type    4. Hypothyroidism, unspecified type    5. Morbid obesity with BMI of 40.0-44.9, adult        Plan:        1. Started raloxifene December 2019. Due for mammogram in July  2. Continue current medication and follow up with PCP  3. Continue current medication and follow up with PCP  4. Continue current medication and follow up with PCP  5. Diet and exercise    Return to clinic in 3 months with MD appointment and imaging.     Patient is in agreement with the proposed treatment plan. All questions were answered to the patient's satisfaction. Patient knows to call clinic for any new or worsening symptoms and if anything is needed before the next clinic visit.          Flor Stone, ZORAN-C  Hematology & Medical Oncology   [facility] [place] [address]  ph. [phone]  Fax. [phone]    Patient dicussed with collaborating physician, Dr. Vigil.

## 2020-04-13 ENCOUNTER — OFFICE VISIT (OUTPATIENT)
Dept: HEMATOLOGY/ONCOLOGY | Facility: CLINIC | Age: 65
End: 2020-04-13
Payer: MEDICARE

## 2020-04-13 DIAGNOSIS — R92.2 INCONCLUSIVE MAMMOGRAM: ICD-10-CM

## 2020-04-13 DIAGNOSIS — E03.9 HYPOTHYROIDISM, UNSPECIFIED TYPE: ICD-10-CM

## 2020-04-13 DIAGNOSIS — I10 ESSENTIAL HYPERTENSION: ICD-10-CM

## 2020-04-13 DIAGNOSIS — N60.92 ATYPICAL DUCTAL HYPERPLASIA OF LEFT BREAST: Primary | ICD-10-CM

## 2020-04-13 DIAGNOSIS — E66.01 MORBID OBESITY WITH BMI OF 40.0-44.9, ADULT: ICD-10-CM

## 2020-04-13 DIAGNOSIS — E78.5 HYPERLIPIDEMIA, UNSPECIFIED HYPERLIPIDEMIA TYPE: ICD-10-CM

## 2020-04-13 PROCEDURE — 99214 OFFICE O/P EST MOD 30 MIN: CPT | Mod: 95,,, | Performed by: NURSE PRACTITIONER

## 2020-04-13 PROCEDURE — 99214 PR OFFICE/OUTPT VISIT, EST, LEVL IV, 30-39 MIN: ICD-10-PCS | Mod: 95,,, | Performed by: NURSE PRACTITIONER

## 2020-05-10 ENCOUNTER — PATIENT MESSAGE (OUTPATIENT)
Dept: INTERNAL MEDICINE | Facility: CLINIC | Age: 65
End: 2020-05-10

## 2020-05-20 ENCOUNTER — OFFICE VISIT (OUTPATIENT)
Dept: INTERNAL MEDICINE | Facility: CLINIC | Age: 65
End: 2020-05-20
Payer: MEDICARE

## 2020-05-20 VITALS
HEART RATE: 60 BPM | BODY MASS INDEX: 42.62 KG/M2 | SYSTOLIC BLOOD PRESSURE: 131 MMHG | DIASTOLIC BLOOD PRESSURE: 74 MMHG | WEIGHT: 233 LBS

## 2020-05-20 DIAGNOSIS — I10 ESSENTIAL HYPERTENSION: ICD-10-CM

## 2020-05-20 DIAGNOSIS — E88.819 INSULIN RESISTANCE: ICD-10-CM

## 2020-05-20 DIAGNOSIS — E78.5 HYPERLIPIDEMIA, UNSPECIFIED HYPERLIPIDEMIA TYPE: ICD-10-CM

## 2020-05-20 DIAGNOSIS — E78.5 DYSLIPIDEMIA ASSOCIATED WITH TYPE 2 DIABETES MELLITUS: ICD-10-CM

## 2020-05-20 DIAGNOSIS — E03.4 HYPOTHYROIDISM DUE TO ACQUIRED ATROPHY OF THYROID: Primary | ICD-10-CM

## 2020-05-20 DIAGNOSIS — E11.69 DYSLIPIDEMIA ASSOCIATED WITH TYPE 2 DIABETES MELLITUS: ICD-10-CM

## 2020-05-20 DIAGNOSIS — L30.4 INTERTRIGO: ICD-10-CM

## 2020-05-20 DIAGNOSIS — I15.2 HYPERTENSION ASSOCIATED WITH DIABETES: ICD-10-CM

## 2020-05-20 DIAGNOSIS — Z87.09 HISTORY OF BRONCHIOLITIS: ICD-10-CM

## 2020-05-20 DIAGNOSIS — E11.59 HYPERTENSION ASSOCIATED WITH DIABETES: ICD-10-CM

## 2020-05-20 DIAGNOSIS — M62.838 MUSCLE SPASM: ICD-10-CM

## 2020-05-20 PROCEDURE — 99214 OFFICE O/P EST MOD 30 MIN: CPT | Mod: 95,,, | Performed by: INTERNAL MEDICINE

## 2020-05-20 PROCEDURE — 99214 PR OFFICE/OUTPT VISIT, EST, LEVL IV, 30-39 MIN: ICD-10-PCS | Mod: 95,,, | Performed by: INTERNAL MEDICINE

## 2020-05-20 RX ORDER — MELOXICAM 7.5 MG/1
7.5 TABLET ORAL DAILY PRN
Qty: 90 TABLET | Refills: 3 | Status: ON HOLD | OUTPATIENT
Start: 2020-05-20 | End: 2021-12-08 | Stop reason: HOSPADM

## 2020-05-20 RX ORDER — KETOCONAZOLE 20 MG/G
CREAM TOPICAL
Qty: 60 G | Refills: 3 | Status: SHIPPED | OUTPATIENT
Start: 2020-05-20 | End: 2021-10-11

## 2020-05-20 RX ORDER — NYSTATIN 100000 [USP'U]/G
POWDER TOPICAL
Qty: 120 G | Refills: 6 | Status: SHIPPED | OUTPATIENT
Start: 2020-05-20 | End: 2021-12-02 | Stop reason: SDUPTHER

## 2020-05-20 RX ORDER — ALBUTEROL SULFATE 90 UG/1
2 AEROSOL, METERED RESPIRATORY (INHALATION) EVERY 6 HOURS PRN
Qty: 18 G | Refills: 11 | Status: SHIPPED | OUTPATIENT
Start: 2020-05-20

## 2020-05-20 NOTE — PROGRESS NOTES
Subjective:       Patient ID: Verenice Mirza is a 64 y.o. female.    Chief Complaint: No chief complaint on file.   Multiple  HPI 64-year-old female presents to clinic today for virtual video visit follow-up hypertension dyslipidemia associated diabetes hypothyroidism patient is compliant with medications denies side effects or issues.  She has been experiencing some hot flashes and tremor from the raloxifene she is followed for a ductal atypical ductal hyperplasia by oncologist Dr. Zamorano.  She is scheduled for mammogram in July.  Regarding her foot examination self exam she states she is not having any numbness tingling burning she is not experiencing any lesions in her feet.  She would like to postpone her eye appointment or little past the corona pandemic.  Blood sugars been running 126 she opted not to get blood work at this time.  Review of Systems  otherwise negative  Objective:      Physical Exam  General: Well-appearing, well-nourished.  No distress  HEENT: conjunctivae are normal.  .   Hearing is grossly normal.  Nasopharynx yobany congestion  Oropharynx is clear.  Neck: Supple.  Visually No thyroid megaly. Lymph: No cervical or supraclavicular adenopathy.  Heart: Regular rate   Lungs:  respiratory effort normal.  Abdomen:  nontender  Extremities:   No edema.  Psych: Oriented to time person place.  Judgment and insight seem unimpaired.  Mood and affect are appropriate.  Assessment:       1. Hypothyroidism due to acquired atrophy of thyroid    2. Hypertension associated with diabetes    3. Dyslipidemia associated with type 2 diabetes mellitus    4. History of bronchiolitis    5. Insulin resistance    6. Hyperlipidemia, unspecified hyperlipidemia type    7. Essential hypertension    8. Muscle spasm    9. Intertrigo        Plan:       Verenice was seen today for hyperlipidemia, hypertension and thyroid problem.    Diagnoses and all orders for this visit:    Hypothyroidism due to acquired atrophy of thyroid  -      TSH; Standing  Biochemically not assessed at this time due to COVID pandemic patient's twice clinically euthyroid  Hypertension associated with diabetes  -     Comprehensive metabolic panel; Standing  -     Hemoglobin A1C; Standing  -     Lipid Panel; Standing  -     CBC auto differential; Future  Controlled.  Continue current medical regimen.  Prescription refills addressed.  Followup advised. See after visit summary.  Dyslipidemia associated with type 2 diabetes mellitus  -     Comprehensive metabolic panel; Standing  -     Hemoglobin A1C; Standing  -     Lipid Panel; Standing  -     CBC auto differential; Future  Controlled.  Continue current medical regimen.  Prescription refills addressed.  Followup advised. See after visit summary.  History of bronchiolitis  -     albuterol (PROVENTIL/VENTOLIN HFA) 90 mcg/actuation inhaler; Inhale 2 puffs into the lungs every 6 (six) hours as needed for Wheezing.    Insulin resistance    Hyperlipidemia, unspecified hyperlipidemia type    Essential hypertension    Muscle spasm  -     meloxicam (MOBIC) 7.5 MG tablet; Take 1 tablet (7.5 mg total) by mouth daily as needed for Pain.  Refilled medication perhaps patient have available when needed  Intertrigo  -     ketoconazole (NIZORAL) 2 % cream; APPLY  CREAM TOPICALLY UNDER BREAST ONCE DAILY AT BEDTIME AS NEEDED TO  FLARE  -     nystatin (MYCOSTATIN) powder; aaa qam prn flare       The patient location is:  [facility]  The chief complaint leading to consultation is:  Diabetes hypertension hypothyroidism dyslipidemia    Visit type: audiovisual    Face to Face time with patient:  25 minutes  30 minutes minutes of total time spent on the encounter, which includes face to face time and non-face to face time preparing to see the patient (eg, review of tests), Obtaining and/or reviewing separately obtained history, Documenting clinical information in the electronic or other health record, Independently interpreting results  (not separately reported) and communicating results to the patient/family/caregiver, or Care coordination (not separately reported).         Each patient to whom he or she provides medical services by telemedicine is:  (1) informed of the relationship between the physician and patient and the respective role of any other health care provider with respect to management of the patient; and (2) notified that he or she may decline to receive medical services by telemedicine and may withdraw from such care at any time.    Notes:

## 2020-06-16 ENCOUNTER — TELEPHONE (OUTPATIENT)
Dept: HEMATOLOGY/ONCOLOGY | Facility: CLINIC | Age: 65
End: 2020-06-16

## 2020-06-16 NOTE — TELEPHONE ENCOUNTER
Message fwd to .         ----- Message from Virginia Ng sent at 6/16/2020 12:02 PM CDT -----  Regarding: Need to change visit to Audio  Contact: Pt  Name of Caller: Verenice Mirza  Reason for Visit/Symptoms: f/u visit  Best Contact Number or Confirm if Mychart Preferred: [phone]  Preferred Date/Time of Appointment: First available    Interested in Virtual Visit (yes/no):   Additional Information:  Says she will be out of town and need to change her visit to a phone visit

## 2020-06-26 ENCOUNTER — TELEPHONE (OUTPATIENT)
Dept: SPORTS MEDICINE | Facility: CLINIC | Age: 65
End: 2020-06-26

## 2020-06-26 DIAGNOSIS — M17.0 PRIMARY OSTEOARTHRITIS OF KNEES, BILATERAL: Primary | ICD-10-CM

## 2020-06-26 NOTE — TELEPHONE ENCOUNTER
S: pt. reports that she had little improvement with the CSI and would like to f/u for further evaluation.     P:  #1 VSI in August      Ceasar Hunter   Orthopaedic Clinical Assistant to Dr. Mark Reeves  [facility]

## 2020-06-29 PROBLEM — M25.562 ACUTE PAIN OF LEFT KNEE: Status: RESOLVED | Noted: 2020-03-04 | Resolved: 2020-06-29

## 2020-06-29 PROBLEM — M25.561 ACUTE PAIN OF RIGHT KNEE: Status: RESOLVED | Noted: 2020-03-04 | Resolved: 2020-06-29

## 2020-06-29 NOTE — PROGRESS NOTES
Subjective:       Patient ID: Verenice Mirza is a 65 y.o. female.    Chief Complaint: Follow-up and Breast Cancer      HPI Ms. Mirza 64-year-old female referred by Dr. Bass for recent diagnosis of atypical ductal hyperplasia.    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  Started on raloxifene in Jan 2020. She notes she is having tenderness on the back of her heels near th achilles tendons, this is bilateral. She will follow up with orthopedics in August and will mention this to them. She has not taken any medications, but she is doing old physical therapy exercises without any relief. Other than that she is feeling well. Eating and drinking well.  She was seen in breast clinic in July and had her mammogram which was negative, MRI due in Jan.      Her tremors from earlier this year has resolved.       Mammogram from July 1, 2019 showed architectural distortion left breast 2:00 position.  There was nothing seen by ultrasound.    On July 8, 2019 a needle biopsy was performed which showed benign breast tissue with apocrine adenosis, calcifications but no atypia.    Excisional biopsy on August 19, 2019 showed atypical ductal hyperplasia.    Review of Systems   Constitutional: Negative for activity change, appetite change, chills, diaphoresis, fatigue, fever and unexpected weight change (She has lost 10 lb on a diet).   HENT: Negative for mouth sores, nosebleeds, sore throat and trouble swallowing.    Eyes: Negative for visual disturbance.   Respiratory: Negative for cough and shortness of breath.    Cardiovascular: Negative for chest pain, palpitations and leg swelling.   Gastrointestinal: Positive for constipation and diarrhea. Negative for abdominal distention, abdominal pain, blood in stool, nausea and vomiting.        She has IBS   Genitourinary: Positive for frequency. Negative for hematuria and vaginal bleeding.   Musculoskeletal: Positive for back  pain. Negative for arthralgias, myalgias and neck pain.        Achilles tendon tenderness   Skin: Negative for pallor and rash.   Allergic/Immunologic: Negative for immunocompromised state.   Neurological: Positive for headaches (intermittent). Negative for dizziness, weakness, light-headedness and numbness.   Hematological: Negative for adenopathy. Does not bruise/bleed easily.   Psychiatric/Behavioral: Negative for confusion. The patient is not nervous/anxious.        Objective:      Physical Exam  Vitals signs reviewed.   Constitutional:       General: She is not in acute distress.     Appearance: She is well-developed.   Neurological:      Mental Status: She is alert and oriented to person, place, and time.   Psychiatric:         Behavior: Behavior normal.         Thought Content: Thought content normal.         Assessment:     bone density shows normal density the spine and mild osteopenia in the hip.  Mammogram from June negative for malignancy   1. Atypical ductal hyperplasia of left breast    2. Essential hypertension    3. Hyperlipidemia, unspecified hyperlipidemia type        Plan:        1. Started raloxifene December 2019. Mammogram due in 1 year; MRI due in Jan 2021  2. Continue current medication and follow up with PCP  3. Continue current medication and follow up with PCP  4. Continue current medication and follow up with PCP  5. Diet and exercise    Return to clinic in 3 months with MD appointment and imaging.     Patient is in agreement with the proposed treatment plan. All questions were answered to the patient's satisfaction. Patient knows to call clinic for any new or worsening symptoms and if anything is needed before the next clinic visit.          Flro Stone, JOSEP-C  Hematology & Medical Oncology   [facility] [place] [address]  ph. [phone]  Fax. [phone]    Patient dicussed with collaborating physician, Dr. Zamorano.

## 2020-07-02 ENCOUNTER — HOSPITAL ENCOUNTER (OUTPATIENT)
Dept: RADIOLOGY | Facility: HOSPITAL | Age: 65
Discharge: HOME | End: 2020-07-02
Attending: SURGERY
Payer: MEDICARE

## 2020-07-02 ENCOUNTER — OFFICE VISIT (OUTPATIENT)
Dept: SURGERY | Facility: CLINIC | Age: 65
End: 2020-07-02
Payer: MEDICARE

## 2020-07-02 VITALS
HEART RATE: 64 BPM | SYSTOLIC BLOOD PRESSURE: 166 MMHG | WEIGHT: 233 LBS | HEIGHT: 62 IN | BODY MASS INDEX: 42.88 KG/M2 | DIASTOLIC BLOOD PRESSURE: 67 MMHG

## 2020-07-02 DIAGNOSIS — Z91.89 AT HIGH RISK FOR BREAST CANCER: Primary | ICD-10-CM

## 2020-07-02 DIAGNOSIS — Z12.31 SCREENING MAMMOGRAM, ENCOUNTER FOR: ICD-10-CM

## 2020-07-02 DIAGNOSIS — N60.92 ATYPICAL DUCTAL HYPERPLASIA OF LEFT BREAST: ICD-10-CM

## 2020-07-02 PROCEDURE — 77063 BREAST TOMOSYNTHESIS BI: CPT | Mod: 26,,, | Performed by: RADIOLOGY

## 2020-07-02 PROCEDURE — 99213 OFFICE O/P EST LOW 20 MIN: CPT | Mod: S$PBB,,, | Performed by: PHYSICIAN ASSISTANT

## 2020-07-02 PROCEDURE — 77067 MAMMO DIGITAL SCREENING BILAT WITH TOMOSYNTHESIS_CAD: ICD-10-PCS | Mod: 26,,, | Performed by: RADIOLOGY

## 2020-07-02 PROCEDURE — 99213 PR OFFICE/OUTPT VISIT, EST, LEVL III, 20-29 MIN: ICD-10-PCS | Mod: S$PBB,,, | Performed by: PHYSICIAN ASSISTANT

## 2020-07-02 PROCEDURE — 77063 MAMMO DIGITAL SCREENING BILAT WITH TOMOSYNTHESIS_CAD: ICD-10-PCS | Mod: 26,,, | Performed by: RADIOLOGY

## 2020-07-02 PROCEDURE — 99999 PR PBB SHADOW E&M-EST. PATIENT-LVL III: ICD-10-PCS | Mod: PBBFAC,,, | Performed by: PHYSICIAN ASSISTANT

## 2020-07-02 PROCEDURE — 99213 OFFICE O/P EST LOW 20 MIN: CPT | Mod: PBBFAC | Performed by: PHYSICIAN ASSISTANT

## 2020-07-02 PROCEDURE — 99999 PR PBB SHADOW E&M-EST. PATIENT-LVL III: CPT | Mod: PBBFAC,,, | Performed by: PHYSICIAN ASSISTANT

## 2020-07-02 PROCEDURE — 77067 SCR MAMMO BI INCL CAD: CPT | Mod: TC,PO

## 2020-07-02 PROCEDURE — 77067 SCR MAMMO BI INCL CAD: CPT | Mod: 26,,, | Performed by: RADIOLOGY

## 2020-07-02 NOTE — PROGRESS NOTES
BREAST HIGH RISK CLINIC  [facility]  Breast Surgery      Date of Visit:  2020    PCP:  Areli Crespo MD    HIGH RISK    Verenice Mirza is a 65 y.o. postmenopausal female referred for evaluation of an increased risk of breast cancer based on her Tyrer-Cuzick score and history of ADH.  She is here today to discuss options of high risk screening and risk reduction.    Patient was previously seen by Dr. Bass for ADH of the LEFT breast, s/p excisional biopsy on 19. She is currently on endocrine therapy with Raloxifene and seeing Dr. Zamorano.  She had an MRI of the breast on 20 that was read as BIRADS 2, benign.    Other breast cancer risk factors include mom with breast CA, nulliparous and history of ADH.     She denies any nipple discharge or palpating any breast masses bilaterally.      Genetic testing: negative per patient.  Ashkenazi inheritance: no  OB/Gyn history:    Number of pregnancies & age at first gestation:    Age of menarche & menopause: unknown age of menarche; menopause at 50 or 51.    Body mass index is 42.62 kg/m².   Contraceptive Use/ HRT: Denies HRT    Tyrer-Cuzick Score: 22.6% (re-calculated in clinic today).     Family History: Mother was diagnosed with breast cancer in her early 80's and  at 87.     Past Medical History:   Diagnosis Date    BMI 40.0-44.9, adult     Breast cyst     Dysphagia     Dysplastic nevus of trunk 2017    moderately atypical    Fatty liver disease, nonalcoholic     Fibrocystic breast     Hiatal hernia     Hyperlipidemia     Hypertension     Hypothyroid     IGT (impaired glucose tolerance)     Kidney stones     Left breast mass     Lumbar disc disease     Morbid obesity     Nicotine use disorder     JORDANA on CPAP     PAD (peripheral artery disease)     PVD (peripheral vascular disease)     Renal angiomyolipoma     Rosacea     Squamous cell carcinoma 2017    in situ mid scalp    Venous insufficiency     Vitamin D  deficiency disease      Social History     Socioeconomic History    Marital status: Single     Spouse name: Not on file    Number of children: Not on file    Years of education: Not on file    Highest education level: Not on file   Occupational History    Occupation: retired teacher     Employer: Retired   Social Needs    Financial resource strain: Not on file    Food insecurity     Worry: Not on file     Inability: Not on file    Transportation needs     Medical: Not on file     Non-medical: Not on file   Tobacco Use    Smoking status: Former Smoker     Packs/day: 0.50     Years: 30.00     Pack years: 15.00     Types: Cigarettes     Quit date: 2008     Years since quittin.8    Smokeless tobacco: Former User   Substance and Sexual Activity    Alcohol use: No    Drug use: No    Sexual activity: Never   Lifestyle    Physical activity     Days per week: Not on file     Minutes per session: Not on file    Stress: Not on file   Relationships    Social connections     Talks on phone: Not on file     Gets together: Not on file     Attends Bahai service: Not on file     Active member of club or organization: Not on file     Attends meetings of clubs or organizations: Not on file     Relationship status: Not on file   Other Topics Concern    Are you pregnant or think you may be? Not Asked    Breast-feeding Not Asked   Social History Narrative    Not on file     Review of Systems: Denies any fever or chills.  Denies any chest pain or shortness of breath.       Objective:     Vitals:    20 1342   BP: (!) 166/67   Pulse: 64     Physical Exam:  HEENT: Normocephalic, atraumatic.    Gen: alert and oriented; no acute distress.  Breast: No masses or tenderness bilaterally.  No nipple discharge, nipple inversion, or skin changes bilaterally.  Lymph: No adjacent axillary lymphadenopathy bilaterally.     Imaging, 20 Bilateral Screening Mammogram: Results pending.    Assessment:     This is a 65  y.o. female with an increased risk of breast cancer based on Tyrer Cuzick score of 22.6%    Plan:   The patient's estimated lifetime risk of Breast Cancer (to age 85) based on the Tyrer-Cuzick 7 risk assessment model is 22.6 %.  According to the American Cancer Society, patients with a lifetime breast cancer risk of 20% or higher might benefit from supplemental screening tests.    We discussed that she would benefit from annual MRI's in addition to yearly mammograms, alternating imaging every 6 months until age 74.  The pros and cons of MRI screening were presented.  I also recommended two physical exams per year, one can be with her OB/GYN.      I recommended that she continue risk reduction with Raloxifene.    Exercise and healthy diet was also encouraged.    She will need an MRI of the breast in January. Of note, patient gets back pain during these so I advised her to take 800 mg of Ibuprofen and Tylenol before. She can follow up with me in one year with a bilateral diagnostic mammogram. Of note, patient is requesting only to have diagnostic mammograms instead of screening in the future. We discussed that insurance might not cover this but patient still wants to proceed with diagnostic mammograms.    STEFFANY Barreto

## 2020-07-13 ENCOUNTER — OFFICE VISIT (OUTPATIENT)
Dept: HEMATOLOGY/ONCOLOGY | Facility: CLINIC | Age: 65
End: 2020-07-13
Payer: MEDICARE

## 2020-07-13 DIAGNOSIS — E78.5 HYPERLIPIDEMIA, UNSPECIFIED HYPERLIPIDEMIA TYPE: ICD-10-CM

## 2020-07-13 DIAGNOSIS — I10 ESSENTIAL HYPERTENSION: ICD-10-CM

## 2020-07-13 DIAGNOSIS — N60.92 ATYPICAL DUCTAL HYPERPLASIA OF LEFT BREAST: Primary | ICD-10-CM

## 2020-07-13 PROCEDURE — 99214 PR OFFICE/OUTPT VISIT, EST, LEVL IV, 30-39 MIN: ICD-10-PCS | Mod: 95,,, | Performed by: NURSE PRACTITIONER

## 2020-07-13 PROCEDURE — 99214 OFFICE O/P EST MOD 30 MIN: CPT | Mod: 95,,, | Performed by: NURSE PRACTITIONER

## 2020-07-15 DIAGNOSIS — Z71.89 COMPLEX CARE COORDINATION: ICD-10-CM

## 2020-08-04 ENCOUNTER — OFFICE VISIT (OUTPATIENT)
Dept: INTERNAL MEDICINE | Facility: CLINIC | Age: 65
End: 2020-08-04
Payer: MEDICARE

## 2020-08-04 ENCOUNTER — HOSPITAL ENCOUNTER (OUTPATIENT)
Dept: RADIOLOGY | Facility: HOSPITAL | Age: 65
Discharge: HOME | End: 2020-08-04
Attending: INTERNAL MEDICINE
Payer: MEDICARE

## 2020-08-04 VITALS
DIASTOLIC BLOOD PRESSURE: 76 MMHG | HEIGHT: 62 IN | BODY MASS INDEX: 43.61 KG/M2 | WEIGHT: 237 LBS | TEMPERATURE: 98 F | HEART RATE: 59 BPM | OXYGEN SATURATION: 97 % | SYSTOLIC BLOOD PRESSURE: 124 MMHG

## 2020-08-04 DIAGNOSIS — M85.80 OSTEOPENIA, UNSPECIFIED LOCATION: ICD-10-CM
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
## 2020-08-04 PROCEDURE — 99999 PR PBB SHADOW E&M-EST. PATIENT-LVL IV: CPT | Mod: PBBFAC,,, | Performed by: INTERNAL MEDICINE

## 2020-08-04 PROCEDURE — 99214 OFFICE O/P EST MOD 30 MIN: CPT | Mod: PBBFAC,25,PO | Performed by: INTERNAL MEDICINE

## 2020-08-04 PROCEDURE — 73030 X-RAY EXAM OF SHOULDER: CPT | Mod: TC,FY,PO,RT

## 2020-08-04 PROCEDURE — 73600 X-RAY EXAM OF ANKLE: CPT | Mod: TC,FY,PO,RT

## 2020-08-04 PROCEDURE — 99999 PR PBB SHADOW E&M-EST. PATIENT-LVL IV: ICD-10-PCS | Mod: PBBFAC,,, | Performed by: INTERNAL MEDICINE

## 2020-08-04 PROCEDURE — 99214 OFFICE O/P EST MOD 30 MIN: CPT | Mod: S$PBB,,, | Performed by: INTERNAL MEDICINE

## 2020-08-04 PROCEDURE — 73030 XR SHOULDER COMPLETE 2 OR MORE VIEWS RIGHT: ICD-10-PCS | Mod: 26,RT,, | Performed by: RADIOLOGY

## 2020-08-04 PROCEDURE — 99214 PR OFFICE/OUTPT VISIT, EST, LEVL IV, 30-39 MIN: ICD-10-PCS | Mod: S$PBB,,, | Performed by: INTERNAL MEDICINE

## 2020-08-04 PROCEDURE — 73600 X-RAY EXAM OF ANKLE: CPT | Mod: 26,RT,, | Performed by: RADIOLOGY

## 2020-08-04 PROCEDURE — 73560 X-RAY EXAM OF KNEE 1 OR 2: CPT | Mod: 26,RT,, | Performed by: RADIOLOGY

## 2020-08-04 PROCEDURE — 73030 X-RAY EXAM OF SHOULDER: CPT | Mod: 26,RT,, | Performed by: RADIOLOGY

## 2020-08-04 PROCEDURE — 73600 XR ANKLE 2 VIEW RIGHT: ICD-10-PCS | Mod: 26,RT,, | Performed by: RADIOLOGY

## 2020-08-04 PROCEDURE — 73560 XR KNEE 1 OR 2 VIEW RIGHT: ICD-10-PCS | Mod: 26,RT,, | Performed by: RADIOLOGY

## 2020-08-04 PROCEDURE — 73560 X-RAY EXAM OF KNEE 1 OR 2: CPT | Mod: TC,FY,PO,RT

## 2020-08-04 NOTE — PROGRESS NOTES
Greetings    The results of your all / imaging test was all normal . There is no signs of fracture. Please let us know if you have any questions

## 2020-08-04 NOTE — PROGRESS NOTES
"Subjective:       Patient ID: Verneice Mirza is a 65 y.o. female.    Chief Complaint: Fall  this patient is new to me          Fall     Patient reports that she fell last Friday 5 days prior to presentation.  Patient reports falling on the right side catching the edge of a treadmill.  She slipped on a math.  She reports that she did not hit her head.  She reports that she did not lose consciousness.  She reports that she did not have any chest pain or palpitations prior to the fall.  She had started icing the areas of pain which included her right ankle, right knee, right shoulder.  She did not proceed to an urgent care or emergency department.  She reports starting to take ibuprofen and Tylenol as well as meloxicam for 2 days.  Initially she reports some swelling but she reports that the swelling decreased after icing.  She reports that she was able to walk at the scene after the fall.  Although she was limping.          HPI  Review of Systems   All other systems reviewed and are negative.        Objective:       /76 (BP Location: Right arm, Patient Position: Sitting, BP Method: Medium (Manual))   Pulse (!) 59   Temp 97.8 °F (36.6 °C) (Temporal)   Ht 5' 2" (1.575 m)   Wt 107.5 kg (236 lb 15.9 oz)   SpO2 97%   BMI 43.35 kg/m²     Physical Exam  Vitals signs and nursing note reviewed.   Constitutional:       General: She is not in acute distress.     Appearance: She is well-developed. She is not diaphoretic.   HENT:      Head: Normocephalic.      Nose: Nose normal.   Eyes:      General:         Right eye: No discharge.         Left eye: No discharge.      Conjunctiva/sclera: Conjunctivae normal.      Pupils: Pupils are equal, round, and reactive to light.   Neck:      Musculoskeletal: Normal range of motion.   Cardiovascular:      Rate and Rhythm: Normal rate and regular rhythm.      Heart sounds: Normal heart sounds. No murmur. No friction rub. No gallop.    Pulmonary:      Effort: Pulmonary effort is " normal. No respiratory distress.   Abdominal:      General: Bowel sounds are normal. There is no distension.      Palpations: Abdomen is soft.   Musculoskeletal: Normal range of motion.         General: No deformity.      Comments: R ankle  Mild swelling no bruising  Tenderness at distal malleolus    R Knee  No obvious joint swelling . Exam limited by body habitus  Lateral joint line tenderness    R should  No obvious deformity  Tenders and the lateral/posterior humerus   Skin:     General: Skin is warm.   Neurological:      Mental Status: She is alert and oriented to person, place, and time.      Cranial Nerves: No cranial nerve deficit.      Sensory: No sensory deficit.      Motor: No weakness.      Coordination: Coordination normal.      Comments: anatlagic gait           DXA Bone Density Spine And Hip  Order: [id number]  Status:  Final result   Visible to patient:  Yes (Patient Portal) Next appt:  08/11/2020 at 02:30 PM in Sports Medicine (Mark Reeves MD) Dx:  Encounter for screening for osteoporo...  Details    Reading Physician Reading Date Result Priority   Damaris Bowen MD  [phone] 1/22/2020       Narrative & Impression     EXAMINATION:  DEXA BONE DENSITY SPINE HIP     CLINICAL HISTORY:  Other osteoporosis without current pathological fracture.  65 y/o female with a history of fractured foot at 40 y/o.  She had surgical menopause at 50 y/o.  She is taking 1,400 units of Vit D supplements.  She has a history of Kidney Stones.  She does not exercise or smoke.     TECHNIQUE:  DXA specification: [facility] [facility] A ([id number]/[id number])     Bone Mineral Density scanning was performed over the hip and lumbar spine. Review of the images confirms satisfactory positioning and technique.     COMPARISON:  Comparison study done on none.     Lumbar spine:  BMD  g/cm2 and T-score .     Total Hip:        BMD  g/cm2 and T-score .     FINDINGS:  Lumbar spine (L1-L4):   BMD is 0.975 g/cm2, T-score is -0.7,  and Z-score is 1.1. The TBS T-score (L1-L4) is -4.8.     Total hip:                    BMD is 0.948 g/cm2, T-score is 0.0, and Z-score is 1.3.     Femoral neck:             BMD is 0.730 g/cm2, T-score is -1.1, and Z-score is 0.5.     There is a 11.5% risk of a major osteoporotic fracture and a 1.1% risk of hip fracture in the next 10 years (FRAX adjusted for TBS).     Impression:     1. Osteopenia of the femoral neck.  RECOMMENDATIONS of [facility]:     1. Recommend daily calcium intake 6405-4045 mg, dietary sources preferred; Vitamin D 3851-4945 IU daily.  2. Adequate exercise and fall precautions.  3. No additional pharmacologic therapy recommended at this time.  4. Repeat BMD in 2-4 years             Assessment:       1. Fall, initial encounter    2. Osteopenia, unspecified location        Plan:       Verenice was seen today for fall.    Diagnoses and all orders for this visit:    Fall, initial encounter  New uncontrolled    Suspecte pain is from fall uncelar if fracture  Given age and osteopenia rec'd xray   I provided instruction on supportive care measures    reviewed signs and symptoms that should prompt return to provider or evaluation in the ED  -     X-ray Shoulder 2 or More Views Right; Future  -     X-Ray Knee 1 or 2 View Right; Future  -     X-Ray Ankle 2 View Right; Future          Future Appointments   Date Time Provider Department Center   8/11/2020  2:30 PM Mark Reeves MD [facility]   8/18/2020  2:30 PM Mark Reeves MD [facility]   8/25/2020  2:30 PM Mark Reeves MD [facility]   9/18/2020  8:00 AM LAB, [facility] [place]   9/23/2020  1:20 PM Areli Crespo MD [facility]   10/13/2020 10:45 AM Rashel Zamorano MD [facility] HEMONC[facility] [place]   1/7/2021  1:00 PM [facility] OIC-MRI1 [facility]         Medication List with Changes/Refills   Current Medications    ALBUTEROL (PROVENTIL/VENTOLIN HFA)  90 MCG/ACTUATION INHALER    Inhale 2 puffs into the lungs every 6 (six) hours as needed for Wheezing.    ASPIRIN (ECOTRIN) 81 MG EC TABLET    Take 81 mg by mouth 2 (two) times daily. Takes the first dose after lunch    ATORVASTATIN (LIPITOR) 40 MG TABLET    Take 1 tablet by mouth once daily    AZELAIC ACID (FINACEA) 15 % FOAM    Apply thin film to face qhs    B COMPLEX VITAMINS TABLET    Take 1 tablet by mouth after lunch.     BIOTIN 5 MG CAP    Take 1 capsule by mouth after lunch.     BLOOD SUGAR DIAGNOSTIC (TRUE METRIX GLUCOSE TEST STRIP) STRP    1 strip by Misc.(Non-Drug; Combo Route) route once daily.    CO-ENZYME Q-10 30 MG CAPSULE    Take 30 mg by mouth 2 (two) times daily.     FAMOTIDINE (PEPCID) 40 MG TABLET    Take 1 tablet (40 mg total) by mouth daily as needed for Heartburn.    FINASTERIDE (PROSCAR) 5 MG TABLET    Take 1 tablet (5 mg total) by mouth nightly.    FLUZONE HIGH-DOSE 2019-20, PF, 180 MCG/0.5 ML SYRG    PHARMACIST ADMINISTERED IMMUNIZATION ADMINISTERED AT TIME OF DISPENSING    GLUCOSAMINE HCL/CHONDR BRASWELL A NA (OSTEO BI-FLEX ORAL)    Take by mouth 2 (two) times daily.    INHALATION SPACING DEVICE (MICROSPACER)    Use as directed for inhalation.    KETOCONAZOLE (NIZORAL) 2 % CREAM    APPLY  CREAM TOPICALLY UNDER BREAST ONCE DAILY AT BEDTIME AS NEEDED TO  FLARE    LACTOBAC CMB #3/FOS/PANTETHINE (PROBIOTIC & ACIDOPHILUS ORAL)    Take 1 tablet by mouth after lunch.     LANCETS (ONETOUCH ULTRASOFT LANCETS) MISC    1 lancet by Misc.(Non-Drug; Combo Route) route once daily.    LEVOTHYROXINE (SYNTHROID) 88 MCG TABLET    Take 1 tablet (88 mcg total) by mouth before breakfast.    LUTEIN ORAL    Take 1 capsule by mouth after lunch.     MAGNESIUM OXIDE (MAG-OX) 400 MG TABLET    Take 400 mg by mouth after lunch.     MELOXICAM (MOBIC) 7.5 MG TABLET    Take 1 tablet (7.5 mg total) by mouth daily as needed for Pain.    METFORMIN (GLUCOPHAGE) 500 MG TABLET    Take 1 tablet (500 mg total) by mouth once daily.     METOPROLOL SUCCINATE (TOPROL-XL) 50 MG 24 HR TABLET    Take 1 tablet (50 mg total) by mouth nightly.    NYSTATIN (MYCOSTATIN) POWDER    aaa qam prn flare    OMEGA-3 ACID ETHYL ESTERS (LOVAZA) 1 GRAM CAPSULE    Take 2 capsules (2 g total) by mouth 2 (two) times daily.    RALOXIFENE (EVISTA) 60 MG TABLET    Take 1 tablet (60 mg total) by mouth once daily.    SPIRONOLACTONE (ALDACTONE) 100 MG TABLET    Take 1 tablet (100 mg total) by mouth once daily.    VITAMIN D 1000 UNITS TAB    Take 185 mg by mouth after lunch.          Disclaimer:  This note has been generated using voice-recognition software. There may be grammatical or spelling errors that have been missed during proof-reading

## 2020-08-09 ENCOUNTER — PATIENT OUTREACH (OUTPATIENT)
Dept: ADMINISTRATIVE | Facility: OTHER | Age: 65
End: 2020-08-09

## 2020-08-09 NOTE — PROGRESS NOTES
Care Everywhere: updated  Immunization: updated  Health Maintenance: updated  Media Review:   Legacy Review:   Order placed:   Upcoming appts: hemoglobin 9/18/2020

## 2020-08-11 ENCOUNTER — OFFICE VISIT (OUTPATIENT)
Dept: SPORTS MEDICINE | Facility: CLINIC | Age: 65
End: 2020-08-11
Payer: MEDICARE

## 2020-08-11 VITALS — BODY MASS INDEX: 43.43 KG/M2 | TEMPERATURE: 99 F | HEIGHT: 62 IN | WEIGHT: 236 LBS

## 2020-08-11 DIAGNOSIS — M17.0 PRIMARY OSTEOARTHRITIS OF KNEES, BILATERAL: Primary | ICD-10-CM

## 2020-08-11 PROCEDURE — 99215 OFFICE O/P EST HI 40 MIN: CPT | Mod: PBBFAC,25 | Performed by: FAMILY MEDICINE

## 2020-08-11 PROCEDURE — 20611 LARGE JOINT ASPIRATION/INJECTION: BILATERAL KNEE: ICD-10-PCS | Mod: 50,S$PBB,, | Performed by: FAMILY MEDICINE

## 2020-08-11 PROCEDURE — 99999 PR PBB SHADOW E&M-EST. PATIENT-LVL V: CPT | Mod: PBBFAC,,, | Performed by: FAMILY MEDICINE

## 2020-08-11 PROCEDURE — 99499 NO LOS: ICD-10-PCS | Mod: S$PBB,,, | Performed by: FAMILY MEDICINE

## 2020-08-11 PROCEDURE — 20611 DRAIN/INJ JOINT/BURSA W/US: CPT | Mod: 50,PBBFAC | Performed by: FAMILY MEDICINE

## 2020-08-11 PROCEDURE — 99499 UNLISTED E&M SERVICE: CPT | Mod: S$PBB,,, | Performed by: FAMILY MEDICINE

## 2020-08-11 PROCEDURE — 99999 PR PBB SHADOW E&M-EST. PATIENT-LVL V: ICD-10-PCS | Mod: PBBFAC,,, | Performed by: FAMILY MEDICINE

## 2020-08-11 RX ORDER — MELOXICAM 7.5 MG/1
7.5 TABLET ORAL DAILY
Qty: 15 TABLET | Refills: 0 | Status: SHIPPED | OUTPATIENT
Start: 2020-08-11 | End: 2020-09-01

## 2020-08-11 RX ADMIN — Medication 20 MG: at 02:08

## 2020-08-11 NOTE — PROCEDURES
"Large Joint Aspiration/Injection: bilateral knee    Date/Time: 8/11/2020 2:30 PM  Performed by: Mark Reeves MD  Authorized by: Mark Reeves MD     Consent Done?:  Yes (Verbal)  Indications:  Pain  Site marked: the procedure site was marked    Timeout: prior to procedure the correct patient, procedure, and site was verified    Prep: patient was prepped and draped in usual sterile fashion    Local anesthesia used?: No      Details:  Needle Size:  20 G  Ultrasonic Guidance for needle placement?: Yes    Images are saved and documented.  Approach:  Lateral  Location:  Knee  Laterality:  Bilateral  Site:  Bilateral knee  Medications (Right):  20 mg sodium hyaluronate (EUFLEXXA) 10 mg/mL(mw 2.4 -3.6 million)  Medications (Left):  20 mg sodium hyaluronate (EUFLEXXA) 10 mg/mL(mw 2.4 -3.6 million)  Patient tolerance:  Patient tolerated the procedure well with no immediate complications     Description of ultrasound utilization for needle guidance:   Ultrasound guidance used for needle localization. Images saved and stored for documentation. The knee joint was visualized. Dynamic visualization of the 20g x 3.5" needle was continuous throughout the procedure.       "

## 2020-08-18 ENCOUNTER — HOSPITAL ENCOUNTER (OUTPATIENT)
Dept: RADIOLOGY | Facility: HOSPITAL | Age: 65
Discharge: HOME | End: 2020-08-18
Attending: FAMILY MEDICINE
Payer: MEDICARE

## 2020-08-18 ENCOUNTER — OFFICE VISIT (OUTPATIENT)
Dept: SPORTS MEDICINE | Facility: CLINIC | Age: 65
End: 2020-08-18
Payer: MEDICARE

## 2020-08-18 VITALS — BODY MASS INDEX: 43.43 KG/M2 | WEIGHT: 236 LBS | TEMPERATURE: 98 F | HEIGHT: 62 IN

## 2020-08-18 DIAGNOSIS — M79.644 CHRONIC PAIN OF RIGHT THUMB: ICD-10-CM

## 2020-08-18 DIAGNOSIS — G89.29 CHRONIC RIGHT SHOULDER PAIN: ICD-10-CM

## 2020-08-18 DIAGNOSIS — M25.511 CHRONIC RIGHT SHOULDER PAIN: ICD-10-CM

## 2020-08-18 DIAGNOSIS — G89.29 CHRONIC RIGHT SHOULDER PAIN: Primary | ICD-10-CM

## 2020-08-18 DIAGNOSIS — G89.29 CHRONIC PAIN OF RIGHT THUMB: ICD-10-CM

## 2020-08-18 DIAGNOSIS — M17.0 PRIMARY OSTEOARTHRITIS OF KNEES, BILATERAL: Primary | ICD-10-CM

## 2020-08-18 DIAGNOSIS — M25.511 CHRONIC RIGHT SHOULDER PAIN: Primary | ICD-10-CM

## 2020-08-18 PROCEDURE — 73130 X-RAY EXAM OF HAND: CPT | Mod: TC,RT

## 2020-08-18 PROCEDURE — 73130 XR HAND COMPLETE 3 VIEW RIGHT: ICD-10-PCS | Mod: 26,RT,, | Performed by: RADIOLOGY

## 2020-08-18 PROCEDURE — 20611 LARGE JOINT ASPIRATION/INJECTION: BILATERAL KNEE: ICD-10-PCS | Mod: 50,S$PBB,, | Performed by: FAMILY MEDICINE

## 2020-08-18 PROCEDURE — 20611 DRAIN/INJ JOINT/BURSA W/US: CPT | Mod: 50,PBBFAC | Performed by: FAMILY MEDICINE

## 2020-08-18 PROCEDURE — 99999 PR PBB SHADOW E&M-EST. PATIENT-LVL III: CPT | Mod: PBBFAC,,, | Performed by: FAMILY MEDICINE

## 2020-08-18 PROCEDURE — 99499 UNLISTED E&M SERVICE: CPT | Mod: S$PBB,,, | Performed by: FAMILY MEDICINE

## 2020-08-18 PROCEDURE — 73130 X-RAY EXAM OF HAND: CPT | Mod: 26,RT,, | Performed by: RADIOLOGY

## 2020-08-18 PROCEDURE — 99499 NO LOS: ICD-10-PCS | Mod: S$PBB,,, | Performed by: FAMILY MEDICINE

## 2020-08-18 PROCEDURE — 99999 PR PBB SHADOW E&M-EST. PATIENT-LVL III: ICD-10-PCS | Mod: PBBFAC,,, | Performed by: FAMILY MEDICINE

## 2020-08-18 PROCEDURE — 99213 OFFICE O/P EST LOW 20 MIN: CPT | Mod: PBBFAC,25 | Performed by: FAMILY MEDICINE

## 2020-08-18 RX ADMIN — Medication 20 MG: at 02:08

## 2020-08-18 NOTE — PROCEDURES
"Large Joint Aspiration/Injection: bilateral knee    Date/Time: 8/18/2020 2:30 PM  Performed by: Mark Reeves MD  Authorized by: Mark Reeves MD     Consent Done?:  Yes (Verbal)  Indications:  Pain  Site marked: the procedure site was marked    Timeout: prior to procedure the correct patient, procedure, and site was verified    Prep: patient was prepped and draped in usual sterile fashion    Local anesthesia used?: No      Details:  Needle Size:  20 G  Ultrasonic Guidance for needle placement?: Yes    Images are saved and documented.  Approach:  Lateral  Location:  Knee  Laterality:  Bilateral  Site:  Bilateral knee  Medications (Right):  20 mg sodium hyaluronate (EUFLEXXA) 10 mg/mL(mw 2.4 -3.6 million)  Medications (Left):  20 mg sodium hyaluronate (EUFLEXXA) 10 mg/mL(mw 2.4 -3.6 million)  Patient tolerance:  Patient tolerated the procedure well with no immediate complications     Description of ultrasound utilization for needle guidance:   Ultrasound guidance used for needle localization. Images saved and stored for documentation. The knee joint was visualized. Dynamic visualization of the 20g x 3.5" needle was continuous throughout the procedure.       "

## 2020-08-25 ENCOUNTER — OFFICE VISIT (OUTPATIENT)
Dept: SPORTS MEDICINE | Facility: CLINIC | Age: 65
End: 2020-08-25
Payer: MEDICARE

## 2020-08-25 VITALS
HEIGHT: 62 IN | WEIGHT: 232 LBS | TEMPERATURE: 98 F | HEIGHT: 62 IN | BODY MASS INDEX: 42.69 KG/M2 | WEIGHT: 232 LBS | BODY MASS INDEX: 42.69 KG/M2

## 2020-08-25 DIAGNOSIS — M67.911 DYSFUNCTION OF RIGHT ROTATOR CUFF: ICD-10-CM

## 2020-08-25 DIAGNOSIS — M25.511 CHRONIC RIGHT SHOULDER PAIN: Primary | ICD-10-CM

## 2020-08-25 DIAGNOSIS — M75.51 SUBACROMIAL BURSITIS OF RIGHT SHOULDER JOINT: ICD-10-CM

## 2020-08-25 DIAGNOSIS — G89.29 CHRONIC RIGHT SHOULDER PAIN: Primary | ICD-10-CM

## 2020-08-25 DIAGNOSIS — M17.0 PRIMARY OSTEOARTHRITIS OF KNEES, BILATERAL: Primary | ICD-10-CM

## 2020-08-25 PROCEDURE — 20611 LARGE JOINT ASPIRATION/INJECTION: BILATERAL KNEE: ICD-10-PCS | Mod: 50,S$PBB,, | Performed by: FAMILY MEDICINE

## 2020-08-25 PROCEDURE — 99999 PR PBB SHADOW E&M-EST. PATIENT-LVL III: ICD-10-PCS | Mod: PBBFAC,,, | Performed by: FAMILY MEDICINE

## 2020-08-25 PROCEDURE — 99499 NO LOS: ICD-10-PCS | Mod: S$PBB,,, | Performed by: FAMILY MEDICINE

## 2020-08-25 PROCEDURE — 99214 OFFICE O/P EST MOD 30 MIN: CPT | Mod: PBBFAC,25 | Performed by: FAMILY MEDICINE

## 2020-08-25 PROCEDURE — 99999 PR PBB SHADOW E&M-EST. PATIENT-LVL IV: ICD-10-PCS | Mod: PBBFAC,,, | Performed by: FAMILY MEDICINE

## 2020-08-25 PROCEDURE — 99214 OFFICE O/P EST MOD 30 MIN: CPT | Mod: 25,S$PBB,, | Performed by: FAMILY MEDICINE

## 2020-08-25 PROCEDURE — 20611 DRAIN/INJ JOINT/BURSA W/US: CPT | Mod: PBBFAC | Performed by: FAMILY MEDICINE

## 2020-08-25 PROCEDURE — 20611 DRAIN/INJ JOINT/BURSA W/US: CPT | Mod: 50,PBBFAC | Performed by: FAMILY MEDICINE

## 2020-08-25 PROCEDURE — 99999 PR PBB SHADOW E&M-EST. PATIENT-LVL III: CPT | Mod: PBBFAC,,, | Performed by: FAMILY MEDICINE

## 2020-08-25 PROCEDURE — 20611 LARGE JOINT ASPIRATION/INJECTION: R SUBACROMIAL BURSA: ICD-10-PCS | Mod: S$PBB,RT,, | Performed by: FAMILY MEDICINE

## 2020-08-25 PROCEDURE — 99213 OFFICE O/P EST LOW 20 MIN: CPT | Mod: PBBFAC,27,25 | Performed by: FAMILY MEDICINE

## 2020-08-25 PROCEDURE — 99214 PR OFFICE/OUTPT VISIT, EST, LEVL IV, 30-39 MIN: ICD-10-PCS | Mod: 25,S$PBB,, | Performed by: FAMILY MEDICINE

## 2020-08-25 PROCEDURE — 99499 UNLISTED E&M SERVICE: CPT | Mod: S$PBB,,, | Performed by: FAMILY MEDICINE

## 2020-08-25 PROCEDURE — 99999 PR PBB SHADOW E&M-EST. PATIENT-LVL IV: CPT | Mod: PBBFAC,,, | Performed by: FAMILY MEDICINE

## 2020-08-25 RX ORDER — TRIAMCINOLONE ACETONIDE 40 MG/ML
40 INJECTION, SUSPENSION INTRA-ARTICULAR; INTRAMUSCULAR
Status: DISCONTINUED | OUTPATIENT
Start: 2020-08-25 | End: 2020-08-25 | Stop reason: HOSPADM

## 2020-08-25 RX ADMIN — TRIAMCINOLONE ACETONIDE 40 MG: 40 INJECTION, SUSPENSION INTRA-ARTICULAR; INTRAMUSCULAR at 02:08

## 2020-08-25 RX ADMIN — Medication 20 MG: at 02:08

## 2020-08-25 NOTE — PROCEDURES
"Large Joint Aspiration/Injection: bilateral knee    Date/Time: 8/25/2020 2:30 PM  Performed by: Mark Reeves MD  Authorized by: Mark Reeves MD     Consent Done?:  Yes (Verbal)  Indications:  Pain  Site marked: the procedure site was marked    Timeout: prior to procedure the correct patient, procedure, and site was verified    Prep: patient was prepped and draped in usual sterile fashion    Local anesthesia used?: No      Details:  Needle Size:  20 G  Ultrasonic Guidance for needle placement?: Yes    Images are saved and documented.  Approach:  Lateral  Location:  Knee  Laterality:  Bilateral  Site:  Bilateral knee  Medications (Right):  20 mg sodium hyaluronate (EUFLEXXA) 10 mg/mL(mw 2.4 -3.6 million)  Medications (Left):  20 mg sodium hyaluronate (EUFLEXXA) 10 mg/mL(mw 2.4 -3.6 million)  Patient tolerance:  Patient tolerated the procedure well with no immediate complications     Description of ultrasound utilization for needle guidance:   Ultrasound guidance used for needle localization. Images saved and stored for documentation. The knee joint was visualized. Dynamic visualization of the 20g x 3.5" needle was continuous throughout the procedure.       "

## 2020-08-25 NOTE — PROGRESS NOTES
Verenice Mirza, a 65 y.o. female, is here for evaluation of Right shoulder pain.     HISTORY OF PRESENT ILLNESS  Hand dominance, right    Location:  anterior and lateral, right  Onset:  acute, 07.31.2020   Palliative:     Relative rest   Activity modification   Oral analgesics   Bracing: shoulder sling d/c on 8.3.2020  Provocative:    ADLs  gh abd/flx  Prior:  No hx of Orthopaedic surgery  Progression:  worsening discomfort  Quality:    sharp  wakes her up at night  Radiation:  none  Severity:  per nursing documentation  Timing:  intermittent with use  Trauma:  07.31.2020 - Pt. reports falling on her right side as she was falling off of the treadmill.     Review of systems (ROS):  A 10+ review of systems was performed with pertinent positives and negatives noted above in the history of present illness. Other systems were negative unless otherwise specified.      PHYSICAL EXAMINATION  General:  The patient is alert and oriented x 3. Mood is pleasant. Observation of ears, eyes and nose reveal no gross abnormalities. HEENT: NCAT, sclera anicteric. Lungs: Respirations are equal and unlabored.     RIGHT SHOULDER EXAMINATION     OBSERVATION:     Swelling  none  Deformity  none   Discoloration  none   Scapular winging none   Scars   none  Atrophy  none    TENDERNESS / CREPITUS (T/C):          T/C      T/C   Clavicle   -/-  SUPRAspinatus    +/-     AC Jt.    +/-  INFRAspinatus  +/-    SC Jt.    -/-  Deltoid    -/-      G. Tuberosity  -/-  LH BICEP groove  +/-   Acromion:  -/-  Midline Neck   -/-     Scapular Spine -/-  Trapezium   -/-   SMA Scapula  -/-  GH jt. line - post  +/-     Scapulothoracic  -/-         ROM:     Right shoulder   Left shoulder        AROM (PROM)   AROM (PROM)   FE    115° (175°) *     170° (175°)     ER at 0°    60°  (65°) *    60°  (65°)   ER at 90° ABD  NA°  (45°) *    90°  (90°)   IR at 90°  ABD   NA  (15°) *     NA  (40°)      IR (spine level)   T1     T10    STRENGTH: (* = with pain) RIGHT  SHOULDER  LEFT SHOULDER   SCAPTION   3+/5 *    5/5    IR    3+/5*    5/5   ER    4/5    5/5   BICEPS   4/5    5/5   Deltoid    4/5    5/5     SIGNS:  Painful side       NEER   -   OSIMS        +    BUSTAMANTE   -   SPEEDS        +   DROP ARM   -   BELLY PRESS       -    X-Body ADD    -   LIFT-OFF        +   HORNBLOWERS      -              STABILITY TESTING   RIGHT SHOULDER  LEFT SHOULDER     Translation     Anterior up face    up face    Posterior up face   up face    Sulcus  < 10mm   < 10 mm     Signs   Apprehension   neg     neg       Relocation   no change    no change      Jerk test  neg    neg    EXTREMITY NEURO-VASCULAR EXAM    Sensation grossly intact to light touch all dermatomal regions.    DTR 2+ Biceps, Triceps, BR and Negative Nakuls sign   Grossly intact motor function at Elbow, Wrist and Hand   Distal pulses radial and ulnar 2+, brisk cap refill, symmetric.      NECK:  Painless FROM and spinous processes non-tender. Negative Spurlings sign.       Other Findings:    ASSESSMENT & PLAN   Assessment  #1 supraspinatus tendinosis, right /d    No evidence of osseous pathology  No evidence of neurologic pathology  No evidence of vascular pathology    Imaging studies reviewed:   X-ray shoulder, right 20.08    Plan  We discussed the importance of appropriate diet, weight, and regular exercise    We discussed options including    Watchful waiting / relative rest    Physical therapy x   Injection therapy csi sab r   Consultation    The patient chooses As above   x = prescribed  CSI = corticosteroid injection  VSI = viscosupplement injection  PRPI = platelet rich plasma injection  ia = intra articular  R = right  L = left  B = bilateral   nfSx = surgical consultation was recommended, but patient is not interested in consultation at this time    Physical Therapy        Formal (fPT), @ [facility] facility b   Formal (fPT), @ [facility] facility        Homegoing (hgPT), per concurrent fPT recommendations    Homegoing  (hgPT), per prior fPT recommendations    Homegoing (hgPT), handout provided        w/  (atPT)    [blank] = not prescribed  x = prescribed  b = prescribed, and begin as indicated  t = continue as indicated  r = prescribed, and restart as indicated  p = completed prior as indicated  hs = prescribed, and with high school   col = prescribed, and with college or university   nfPT = physical therapy was recommended, but patient is not interested in PT at this time    Activity (e.g. sports, work) restrictions    [blank] = as tolerated  pt = per physical therapist  at = per     Bracing    [blank] = not prescribed  r = recommended, but not fit with at todays visit  f = prescribed and fit with at todays visit  t = continue as indicated  p = prn use on rare, as-needed basis; advised against chronic use    Pain management    [blank] = No prescription necessary. A handout detailing dosing of appropriate   over-the-counter musculoskeletal analgesics was made available to the patient.   m = meloxicam x 14 days  mp = 14 day course of meloxicam prescribed prior    Follow up 12   [blank] = as needed  [number] = in [number] weeks  CSI = for corticosteroid injection  VSI = for viscosupplement injection or injection series  PRP = for platelet rich plasma injection or injection series  MRI = after MRI imaging  ns = should surgical options be deferred (no surgery)  o = appointment offered, deferred by patient    Should symptoms worsen or fail to resolve, consider    Revisiting the above options and / or Mri shoulder       Vocation:   retired

## 2020-08-25 NOTE — PROCEDURES
"Large Joint Aspiration/Injection: R subacromial bursa    Date/Time: 8/25/2020 2:45 PM  Performed by: Mark Reeves MD  Authorized by: Mark Reeves MD     Consent Done?:  Yes (Verbal)  Indications:  Pain  Site marked: the procedure site was marked    Timeout: prior to procedure the correct patient, procedure, and site was verified    Prep: patient was prepped and draped in usual sterile fashion    Local anesthesia used?: No      Details:  Needle Size:  20 G  Ultrasonic Guidance for needle placement?: Yes    Images are saved and documented.  Approach:  Posterior  Location:  Shoulder  Site:  R subacromial bursa  Medications:  40 mg triamcinolone acetonide 40 mg/mL  Patient tolerance:  Patient tolerated the procedure well with no immediate complications     Description of ultrasound utilization for needle guidance:   Ultrasound guidance used for needle localization. Images saved and stored for documentation. The subacromial / subdeltoid bursa was visualized. Dynamic visualization of the 20g x 1.5" needle was continuous throughout the procedure.       "

## 2020-08-31 DIAGNOSIS — M25.571 RIGHT ANKLE PAIN, UNSPECIFIED CHRONICITY: Primary | ICD-10-CM

## 2020-09-01 ENCOUNTER — OFFICE VISIT (OUTPATIENT)
Dept: ORTHOPEDICS | Facility: CLINIC | Age: 65
End: 2020-09-01
Payer: MEDICARE

## 2020-09-01 DIAGNOSIS — S99.911D RIGHT ANKLE INJURY, SUBSEQUENT ENCOUNTER: Primary | ICD-10-CM

## 2020-09-01 DIAGNOSIS — M76.61 RIGHT ACHILLES TENDINITIS: ICD-10-CM

## 2020-09-01 PROCEDURE — 99999 PR PBB SHADOW E&M-EST. PATIENT-LVL III: ICD-10-PCS | Mod: PBBFAC,,, | Performed by: ORTHOPAEDIC SURGERY

## 2020-09-01 PROCEDURE — 99204 PR OFFICE/OUTPT VISIT, NEW, LEVL IV, 45-59 MIN: ICD-10-PCS | Mod: S$PBB,,, | Performed by: ORTHOPAEDIC SURGERY

## 2020-09-01 PROCEDURE — 99999 PR PBB SHADOW E&M-EST. PATIENT-LVL III: CPT | Mod: PBBFAC,,, | Performed by: ORTHOPAEDIC SURGERY

## 2020-09-01 PROCEDURE — 99213 OFFICE O/P EST LOW 20 MIN: CPT | Mod: PBBFAC,PO | Performed by: ORTHOPAEDIC SURGERY

## 2020-09-01 PROCEDURE — 99204 OFFICE O/P NEW MOD 45 MIN: CPT | Mod: S$PBB,,, | Performed by: ORTHOPAEDIC SURGERY

## 2020-09-01 NOTE — LETTER
September 1, 2020      Mark Reeves MD  [address]  [address]  [place] [address]           [facility]  [address]  [place] [address]  Phone: [phone]          Patient: Verenice Mirza   MR Number: [id number]   YOB: 1955   Date of Visit: 9/1/2020       Dear Dr. Mark Reeves,    Thank you for your referral of Verenice Mirza for evaluation of their right ankle injury.  Please see attached consultation note for details regarding our visit.    I appreciate the opportunity to participate in the care of our mutual patient.  Please do not hesitate to reach out with questions/concerns.    Sincerely,    Elvia Khalil MD  Foot & Ankle Orthopedic Surgery  09/01/2020  [phone]

## 2020-09-01 NOTE — PATIENT INSTRUCTIONS
Today, you saw Dr. Khalil and were seen for a right ankle sprain.  Your x-rays were negative for fracture.  A review of ankle sprains is provided below.  We discussed that most ankle sprains (even with torn ligaments or tiny pulled off pieces of bone) heal without needing further surgical treatment.  An ankle sprain still needs to be treated and rehabilitated though.  Treatment and rehabilitation progress through phases, which are outlined below.  FIRST, we focus on pain and improving swelling/treating inflammation.  SECOND, we focus on regaining motion.  LASTLY, we work on strengthening, balance and return to activities.  This process can take 2-3 months before you are returning to activities without dysfunction.  In rare cases, progressing through the home exercise program you are provided and returning to activities stalls and a formal referral to physical therapy is needed.    We decided the next step(s) in in treatment is/are   1.  Weight bearing: Weight bearing as tolerated on right lower extremity; increase activities using pain as your guide   2.  Immobilization: Ankle compression sleeve - any over the counter kind is fine   3.  Therapy: Formal physical therapy referral provided with daily achilles exercises   4.  Symptomatic treatment: Over the counter anti-inflammatories as needed; ice and elevation as needed     Thank you for allowing me to participate in your care.  We will see you back in 6 weeks via portal message if not improved and I will order MRI.    WHAT IS AN ANKLE SPRAIN?  An ankle sprain refers to tearing of the ligaments of the ankle. The most common ankle sprain occurs on the lateral (outside) part of the ankle. There's a good chance you may have sprained your ankle at some point while playing sports or stepping on an uneven surface -- some 25,000 people do it every day. It can happen in the setting of an ankle fracture (when the bones of the ankle also break). Most commonly, however, it  occurs in isolation.    Symptoms  Patients report pain after having twisted an ankle. This usually occurs due to an inversion injury, which means the foot rolls underneath the ankle or leg. It commonly occurs during sports. Patients will complain of pain on the outside of their ankle and various degrees of swelling and bruising. Depending on the severity of the sprain, a person may or may not be able to put weight on the foot.    Causes    As noted above, these injuries occur when the ankle is twisted underneath the leg, called inversion. Risk factors are activities, such as jumping/cutting sports like basketball and soccer, in which an athlete can come down on and turn the ankle or step on an opponent's foot.     Some people are predisposed to ankle sprains. These injuries are more common in people with a high arched foot. This is because it is easier to turn on the ankle.    In those who have had a severe sprain in the past, it is also easier to turn the ankle and sustain a new sprain. Therefore, one of the risk factors of spraining the ankle is a history of a previous sprain or instability (looseness in the ankle). Those who have weak muscles, especially the peroneals that run along the outside of the ankle that provide muscular support to the ankle, may be more predisposed.    Anatomy    There are multiple ligaments in the ankle. Ligaments in general are the structures that connect bone to bone. Tendons, on the other hand, connect muscle to bone and allow the muscles to exert force on their associated bones. In the case of an ankle sprain, there are several commonly sprained ligaments. The two most important are the following:Ligaments in the ankle    The anterior talofibular ligament (ATFL), which connects the talus to the fibula on the outside of the ankle.    The calcaneal fibular ligament (CFL), which connects the fibula to the calcaneus.        Injuries to the above ligaments must be differentiated from the  high ankle sprain, which is a more severe type of ankle sprain involving ligaments that connect the tibia (long bone on the inside of the leg) to the fibula (bone on the outside of the leg).    Diagnosis  Ankle sprains can be diagnosed fairly easily given that they are common injuries. Pain on the outside of the ankle, tenderness and swelling, and an ankle with an inversion-type injury may indicate a sprain. In these patients, normal X-rays also suggest that the bone has not been broken and instead the ankle ligaments have been torn or sprained.     It is very important, however, not to simply regard any injury as an ankle sprain because other injuries can occur as well. For example, the peroneal tendons can be torn. There also can be fractures in other bones around the ankle, including the fifth metatarsal or the calcaneus (heel bone). See a foot and ankle orthopaedic surgeon in your area for a thorough examination.    In very severe cases, an MRI may be warranted to rule out other problems in the ankle such as damage to the cartilage. An MRI typically is not necessary to diagnose a sprain and is reserved for patients who are slow to recover and do not follow the normal progression of healing.    Treatments  Surgery is not required in the vast majority of ankle sprains. Even in severe sprains, these ligaments will heal without surgery if treated appropriately. The grade of the sprain will dictate treatment. Sprains are traditionally classified into Grade 1 (mild), Grade 2 (moderate), and Grade 3 (severe) injuries. Perhaps more important, however, is the patient's ability to bear weight. Those that can bear weight even after the injury are likely to return very quickly to normal activities. Those who cannot walk may need to be immobilized.     Treating your sprained ankle properly may prevent chronic pain and instability. For treatment of ankle sprains, I recommend you follow the R.I.C.E. guidelines:    Rest your  ankle by not walking on it until you can do it comfortably (this may require a boot brace or lace up brace).  Ice it to keep the swelling down. Don't put ice directly on the skin (use a thin piece of cloth between the ice bag and skin) and don't ice more than 20 minutes at a time to avoid frostbite.  Compressive bandages immobilize and support your injury.  Elevate your ankle above your heart level for 48 hours. The swelling usually goes down within a few days.    Severe ligament injuries often require rehabilitation. The goals of therapy are to allow for optimal healing of the ligaments, return to sport/work as quickly as possible, and prevent re-injury.  In some cases, a referral to physical therapy is needed.    Recovery  There are 3 phases of recovery:    Phase 1 includes resting, protecting, and reducing swelling of your injured ankle.  Phase 2 includes restoring your ankle's flexibility, range of motion, and strength.  Phase 3 includes gradually returning to straight-ahead activity and doing maintenance exercises, followed later by sport-specific exercises (e.g., sprinting and cutting).    Once you can stand on your ankle again, your doctor will prescribe exercise routines to strengthen your muscles and ligaments, and increase your flexibility, balance, and coordination. Later, you may walk, jog, and run figure-eights with your ankle taped or in a supportive ankle brace.    It's important to complete the rehabilitation program because it makes it less likely that you'll hurt the same ankle again. If you don't complete rehabilitation or if your ligament heals in a stretched-out position and cannot perform its normal function, you could suffer chronic pain, instability, and arthritis in your ankle. If your ankle still hurts, it could mean that the sprained ligament or ligaments have not healed right, or that some other injury occurred at the time of the ankle sprain (e.g., cartilage damage or tendon  "injury).    To prevent future ankle sprains, pay attention to your body's warning signs to slow down when you feel pain or fatigue, and stay in shape with good muscle balance, flexibility, and strength.    FAQs  What is a high ankle sprain and is that different from a regular ankle sprain?  A high ankle sprain refers to tearing of the ligaments that connect the tibia to the fibula (this connection is also called the syndesmosis). These are different and much less common than the standard lateral ankle sprains, meaning those that occur on the side of the ankle.     Do ankle sprains need to be treated with surgery?  Ankle sprains rarely, if ever, needed to be treated with surgery. The vast majority simply need to be treated with rest, ice, compression, and elevation followed by physical therapy and temporary bracing.     I have sprained my ankle many times. Should I be concerned?  The more you sprain an ankle, the greater the chance that problems will develop. For example, turning the ankle can lead to damage to the cartilage inside the ankle joint. You should see your foot and ankle orthopaedic surgeon if this is occurring.       The [employer] (AOFAS) offers information on this site as an educational service. The content of [facility], including text, images, and graphics, is for informational purposes only. The content is not intended to substitute for professional medical advice, diagnoses or treatments. If you need medical advice, use the "Find a Surgeon" search to locate a foot and ankle orthopaedic surgeon in your area.    3 ways to stretch your ACHILLES TENDON AND CALF    1.) Heel cord stretch using a stair  Repetitions 1 set doing both legs and progress to 2 sets doing one leg each  Days per week 3    Equipment needed: Find a suitable step where you can stand upright (you should have no bend at your waist) with heels over the edge of the step (you should be on the balls of your " feet) and lower yourself down over the step (you are allowed to use your hands to balance).    Step-by-step directions    Each stretch should last 15 to 20 seconds. Tightness should be felt in calf not Achilles tendon. As a general rule I would encourage you to stretch irrespective of the pain that occurs DURING the stretch.    Number of Stretches    You do 6 stretches with knees straight and then 3 stretches with knees slightly bent.    This is 1 set. Weight will either be on both legs (Figures A and B) or on one leg (Figures C and D).  You should progress to one legged stretching as you get more comfortable.        2.) Heel cord stretch using a wall    Repetitions 2 sets of 10  Days per week 6 to 7    You should feel this stretch in your calf and into your heel    Equipment needed: None    Step-by-step directions    Stand facing a wall with your unaffected leg forward with a slight bend at the knee. Your affected leg is straight and behind you, with the heel flat and the toes pointed in slightly.  Keep both heels flat on the floor and press your hips forward toward the wall.  Hold this stretch for 30 seconds and then relax for 30 seconds. Repeat.    After completing that, you should repeat with your back leg slightly bent.    3.) Heel cord stretch using a towel    Repetitions 2 sets of 10  Days per week 6 to 7    You should feel this stretch in your calf and into your heel    Equipment needed: Hand towel    Step-by-step directions    Sit on the floor with both legs out in front of you.  Loop a towel around the ball of your affected foot and grasp the ends of the towel in your hands.  Keep your affected leg straight and pull the towel toward you.  Hold for 30 seconds and then relax for 30 seconds. Repeat 3 times.  Tip Sit up tall and keep your legs straight.

## 2020-09-01 NOTE — PROGRESS NOTES
"  Subjective:   Chief complaint:   Chief Complaint   Patient presents with    Right Ankle - Pain     Injury: Fall, late July/early Aug 2020    HPI:   Verenice Mirza is a 65 y.o. female referred to me today by Dr. Mark Reeves for evaluation of right ankle pain.  Rates pain as 6/10.  Pain has been ongoing for 1-2 months.  Inciting event: injury.  Treatments tried: x-rays (negative), mobic PRN.    Sustained a fall while in [place].  X-rays workup was negative but has had right sided pain.  She has since had right shoulder and knee injection with improvement in those symptoms.  She presents today with continued right ankle pain and swelling.  She is wearing sandals with "good arch supports" and reports always wearing good shoes.  She hasn't tried immobilization or a brace yet.  She hasn't had recurrent injury.  Pain is localized to 1.) lateral joint line 2.) medial joint line and 3.) achilles/retrocalc/peroneals.  Denies numbness/tingling.    She is starting PT tomorrow though.    Answers for HPI/ROS submitted by the patient on 8/25/2020   Leg pain  Pain Chronicity: new  History of trauma: No  Onset: 1 to 4 weeks ago  Frequency: daily  Progression since onset: unchanged  Injury mechanism: falling  injury location: at home  pain- numeric: 7/10  pain location: right fingers  pain quality: aching, sharp, tightness  Radiating Pain: No  If your pain is radiating, to what part of the body?: right hand  Aggravating factors: bending, contact  fever: No  inability to bear weight: Yes  itching: No  joint locking: Yes  limited range of motion: Yes  stiffness: Yes  tingling: No  Treatments tried: cold  physical therapy: not tried  Improvement on treatment: no relief      ROS:  Musculoskeletal: per HPI  Constitutional: Negative for fever  Cardiovascular: Negative for chest pain  Respiratory: Negative for shortness of breath  Skin: Negative for ulcers or lesions  Neurological: Negative for burning, tingling and " numbness  Vascular: Negative for peripheral edema  Heme: Negative for chronic anticoagulation; Negative for history of blood clot  Endocrine: Positive for diabetes  Immune: Negative for inflammatory arthritis  /Nephrology: Negative for ESRD-on hemodialysis       Objective:   Exam:  There were no vitals filed for this visit.  General: No acute distress, well-appearing  Neurologic: Alert and oriented x3  Psychiatric: Appropriate mood and affect, cooperative  Cardiovascular: Regular pulse  Respiratory: Breathing on room air  Skin: No rashes or ulcers  Vascular:  Right foot with palpable dorsalis pedis and posterior tibial pulses  Musculoskeletal:  Standing examination demonstrates swelling on the right compared left.  There is symmetric ankle and hindfoot alignment with slight pes planovalgus.    Focused exam on the right demonstrates not irritable ankle range of motion.  She does lack slight dorsiflexion with a gastroc contracture present that improves with the knee flexed.  Ankle is stable to drawer and tilt testing.  There is fullness over the anterolateral ankle as well as posterolateral hindfoot.  Achilles palpates in continuity with insertional tenderness to palpation.  No enlarged calcaneal tuberosity appreciated.  Nontender over the plantar fascia origin.  Fires tibialis anterior, gastrocsoleus, posterior tibialis, peroneals.  Sensation intact to light touch in able to localize throughout superficial peroneal, deep peroneal, tibial nerve distributions.    Imaging:  Prior radiographs were personally reviewed by me.    Nonweightbearing three views right ankle demonstrate no acute osseous abnormalities.  Examination for stability somewhat limited by this being and nonweightbearing study.    Additional records/labs reviewed:  None      Assessment:     1. Right ankle injury, subsequent encounter    2. Right Achilles tendinitis         I reviewed imaging, injury history, and diagnosis as above with the patient. I  attempted to use layman's terms to educate the patient as well as utilize foot models and/or pictures.   I personally went through imaging with the patient and reviewed that radiographs are negative for fracture.  As such, I discussed the role for non-operative treatment.  Non-operative treatment for this injury involves: Anti-inflammatory medications or creams, RICE modalities, Physical therapy and Stretching program.  I anticipate symptoms should improve with expectant treatment and there is <10% risk of long term symptoms from this injury.       Notably for ankle sprains, discussed the possibility of recurrent instability.  I reviewed that rehabilitation focuses on range of motion and edema control first followed by strengthening and balance work followed by slowly returning to activities.  In rare cases formal physical therapy is needed if difficulty progressing with home exercise program.  Advanced imaging modalities and possible surgical interventions are reserved for refractory symptoms lasting greater than 3 months.       Plan:       1.  Therapy: Formal physical therapy referral provided - will update PT with ankle sprain orders  2.  Symptomatic treatment: Continue mobic and RICE modalities recommended with emphasis on ice and elevation as needed  3.  Restrictions: Advance activity as tolerated, use pain as guide  4.  Brace/orthotics/etc: Recommended slide on compression sleeve  5.  Follow-up: 6 weeks if not improved via in-basket and will order MRI to evaluate further      No orders of the defined types were placed in this encounter.      Past Medical History:   Diagnosis Date    BMI 40.0-44.9, adult     Breast cyst     Dysphagia     Dysplastic nevus of trunk 03/2017    moderately atypical    Fatty liver disease, nonalcoholic     Fibrocystic breast     Hiatal hernia     Hyperlipidemia     Hypertension     Hypothyroid     IGT (impaired glucose tolerance)     Kidney stones     Left breast mass      Lumbar disc disease     Morbid obesity     Nicotine use disorder     JORDANA on CPAP     PAD (peripheral artery disease)     PVD (peripheral vascular disease)     Renal angiomyolipoma     Rosacea     Squamous cell carcinoma 12/2017    in situ mid scalp    Venous insufficiency     Vitamin D deficiency disease        Past Surgical History:   Procedure Laterality Date    BLADDER SUSPENSION      BREAST BIOPSY      COLONOSCOPY N/A 5/24/2017    Procedure: COLONOSCOPY;  Surgeon: Georgina Bunch MD;  Location: [facility] END[facility];  Service: Endoscopy;  Laterality: N/A;    CYSTOSCOPY      ESOPHAGOGASTRODUODENOSCOPY N/A 12/5/2018    Procedure: ESOPHAGOGASTRODUODENOSCOPY (EGD);  Surgeon: Georgina Bunch MD;  Location: [facility];  Service: Endoscopy;  Laterality: N/A;  1000 am arrival time, has transportation set up    EXCISIONAL BIOPSY Left 8/19/2019    Procedure: EXCISIONAL BIOPSY LEFT BREAST with SEED LOCALIZATION;  Surgeon: Ramon Bass MD;  Location: [facility] OR [address] [place] [address];  Service: General;  Laterality: Left;    HYSTERECTOMY      LITHOTRIPSY      OOPHORECTOMY         Family History   Problem Relation Age of Onset    Alzheimer's disease Mother     Breast cancer Mother     Skin cancer Sister     Diabetes type II Maternal Grandfather     Depression Maternal Grandfather     Breast cancer Paternal Aunt     Melanoma Neg Hx        Social History     Socioeconomic History    Marital status: Single     Spouse name: Not on file    Number of children: Not on file    Years of education: Not on file    Highest education level: Not on file   Occupational History    Occupation: retired teacher     Employer: Retired   Social Needs    Financial resource strain: Not on file    Food insecurity     Worry: Not on file     Inability: Not on file    Transportation needs     Medical: Not on file     Non-medical: Not on file   Tobacco Use    Smoking status: Former Smoker     Packs/day: 0.50     Years: 30.00      Pack years: 15.00     Types: Cigarettes     Quit date: 2008     Years since quittin.9    Smokeless tobacco: Former User   Substance and Sexual Activity    Alcohol use: No    Drug use: No    Sexual activity: Never   Lifestyle    Physical activity     Days per week: Not on file     Minutes per session: Not on file    Stress: Not on file   Relationships    Social connections     Talks on phone: Not on file     Gets together: Not on file     Attends Zoroastrian service: Not on file     Active member of club or organization: Not on file     Attends meetings of clubs or organizations: Not on file     Relationship status: Not on file   Other Topics Concern    Are you pregnant or think you may be? Not Asked    Breast-feeding Not Asked   Social History Narrative    Not on file

## 2020-09-02 ENCOUNTER — CLINICAL SUPPORT (OUTPATIENT)
Dept: REHABILITATION | Facility: HOSPITAL | Age: 65
End: 2020-09-02
Payer: MEDICARE

## 2020-09-02 DIAGNOSIS — M76.60 ACHILLES TENDINITIS, UNSPECIFIED LATERALITY: ICD-10-CM

## 2020-09-02 DIAGNOSIS — M25.571 ACUTE RIGHT ANKLE PAIN: ICD-10-CM

## 2020-09-02 DIAGNOSIS — M25.671 DECREASED RANGE OF MOTION OF RIGHT ANKLE: ICD-10-CM

## 2020-09-02 PROCEDURE — 97140 MANUAL THERAPY 1/> REGIONS: CPT | Mod: PN | Performed by: PHYSICAL THERAPIST

## 2020-09-02 PROCEDURE — 97110 THERAPEUTIC EXERCISES: CPT | Mod: PN | Performed by: PHYSICAL THERAPIST

## 2020-09-02 PROCEDURE — 97162 PT EVAL MOD COMPLEX 30 MIN: CPT | Mod: PN | Performed by: PHYSICAL THERAPIST

## 2020-09-02 NOTE — PATIENT INSTRUCTIONS
Ankle Pump    With right leg elevated, gently flex and extend ankle. Move through full range of motion. Avoid pain. Perform more frequently if having increased swelling.  Repeat 15 times right side per set. Do 2 sets per session. Do 1 sessions per day.      Ankle Alphabet    Sit with leg straight out in front of you and heel off edge of bed or propped up on towel roll. Using ankle and foot only, trace the letters of the alphabet. Perform A to Z. Do not let the knee move too much side to side.  Repeat 2 times right side per set. Do 1 sessions per day.    Towel Scrunches    Place right foot flat on towel, knee pointed forward. Use forefoot and toes to pull towel backward.  Do not allow heel or knee to move. Repetitions should be slow and controlled.  Repeat 30 times right side per set. Do 1 sets per session. Do 1 sessions per day.      Gastroc, Sitting (Passive)    Sit with leg straight out in front of you and a towel under your heel and around ball of foot. Gently pull toward body. Hold 30 seconds.   Repeat 3 times right side per set. Do 1 sets per session. Do 1 sessions per day.    Toe Lift    Sit with your foot flat on the floor and your finger under your big toe. Without rolling in/out or lifting your heel/toes, push down into your finger with your big toe joint. Maintain the pressure of the ball of your foot on your finger, keep the four little toes relaxed and in contact with the ground, then slowly lift the big toe up and down again. Do slowly and take breaks as needed.  Repeat 20 times right side per set. Do 2 sets per session. Do 1 sessions per day.

## 2020-09-02 NOTE — PLAN OF CARE
[facility]  Physical Therapy Initial Evaluation    Name: Verenice Mirza  Clinic Number: [id number]    Therapy Diagnosis:   Encounter Diagnosis   Name Primary?    Achilles tendinitis, unspecified laterality      Physician: Areli Crespo MD    Physician Orders: PT Eval and Treat   Medical Diagnosis from Referral: M76.60 (ICD-10-CM) - Achilles tendinitis, unspecified laterality  Evaluation Date: 9/2/2020  Authorization Period Expiration: 12/31/2020  Plan of Care Expiration: 10/1/2020  Visit # / Visits authorized: 1/ 50  FOTO: 1/10    Visit:  140.66  Total: 140.66    Time In: 145  Time Out: 230  Total Billable Time: 45 minutes     Precautions: Standard, Recent Fall    Subjective   Date of onset: Late July  History of current condition - Verenice reports: slipping on matted rug while dog sitting and falling on right side of body. Pt reports hurting shoulder, knee and foot/ankle in the process. Pt did get injection in left shoulder which has helped the pain, but got injections in both knees which has not helped. Pt reports right ankle swelling and pain, especially in weight bearing. Pt wearing tennis shoes to appt but reports sandals feel much better. Pt reports pain on both sides of ankle joint but especially along lateral side. Pt also reports foot pain further down toward toes. At this time pt would like to focus on foot/ankle and get shoulder/knee evaluated at later date.      Medical History:   Past Medical History:   Diagnosis Date    BMI 40.0-44.9, adult     Breast cyst     Dysphagia     Dysplastic nevus of trunk 03/2017    moderately atypical    Fatty liver disease, nonalcoholic     Fibrocystic breast     Hiatal hernia     Hyperlipidemia     Hypertension     Hypothyroid     IGT (impaired glucose tolerance)     Kidney stones     Left breast mass     Lumbar disc disease     Morbid obesity     Nicotine use disorder     JORDANA on CPAP     PAD (peripheral artery disease)      PVD (peripheral vascular disease)     Renal angiomyolipoma     Rosacea     Squamous cell carcinoma 12/2017    in situ mid scalp    Venous insufficiency     Vitamin D deficiency disease        Surgical History:   Verenice Mirza  has a past surgical history that includes Hysterectomy; Bladder suspension; Lithotripsy; Cystoscopy; Oophorectomy; Colonoscopy (N/A, 5/24/2017); Esophagogastroduodenoscopy (N/A, 12/5/2018); Excisional biopsy (Left, 8/19/2019); and Breast biopsy.    Medications:   Verenice has a current medication list which includes the following prescription(s): albuterol, aspirin, atorvastatin, b complex vitamins, biotin, blood sugar diagnostic, co-enzyme q-10, famotidine, finasteride, fluzone high-dose 2019-20 (pf), glucosamine/chondr tyler a sod, inhalation spacing device, ketoconazole, lactobacillus 3/fos/pantethine, lancets, levothyroxine, lutein, magnesium oxide, meloxicam, metformin, metoprolol succinate, nystatin, omega-3 acid ethyl esters, raloxifene, spironolactone, vitamin d, and tamsulosin.    Allergies:   Review of patient's allergies indicates:  No Known Allergies     Imaging, shows no fractures in knee, shoulder or foot    Prior Therapy: yes  Social History:  lives with their spouse  Occupation: Retired  Prior Level of Function: able to walk with no pain  Current Level of Function: unable to walk without pain    Pain:   Current 4/10, worst 7/10, best 2/10   Location: right ankles and feet   Description: Aching, Dull and Throbbing  Aggravating Factors: Standing, Walking, Lifting and Getting out of bed/chair  Easing Factors: rest    Pts goals: to walk without pain    Objective     Posture: Externally rotated feet, internally rotated tibia's  Palpation: tenderness noted along lateral side of right LE    Range of Motion/Strength:       Ankle Right Left Pain/Dysfunction with Movement   AROM      dorsiflexion Neutral 5    plantarflexion 30 35    inversion 25 35 Pain   eversion 17 22 Pain     L/E MMT  Right Left Pain/Dysfunction with Movement   Hip Flexion 4/5 4/5    Hip Abduction 4/5 4/5    Hip Adduction 5/5 5/5    Knee Flexion 4+/5 5/5    Knee Extension 4-/5 4/5    Ankle DF 4-/5 4/5    Ankle PF 3-/5 4/5    Ankle Inversion 3/5 3/5    Ankle Eversion 4/5 4/5    Big Toe Extension 5/5 5/5        Special Tests: 51 cm in figure 8 measures - right foot, 50.25 cm on left. Anterior drawer (+) on right.    Gait Analysis: Pt presents with minimal antalgic gait along right LE. Pt continues to have pain with tennis shoes but feels better in sandals and educated on wearing sandals if needed. Pt also will get right ankle sleeve for swelling and overall comfort.     Single Leg Stance: R 3 seconds, L 10 seconds    Balance: fair bilaterally    CMS Impairment/Limitation/Restriction for FOTO foot/ankle Survey    Therapist reviewed FOTO scores for Verenice Mirza on 9/2/2020.   FOTO documents entered into [employer] - see Media section.    Limitation Score: 52%         TREATMENT   Treatment Time In: 205  Treatment Time Out: 230  Total Treatment time separate from Evaluation: 25 minutes    Verenice received the following manual therapy techniques: Myofacial release and Soft tissue Mobilization were applied to the: right foot/ankle for 15 minutes, including:  ASTYM to anterior and posterior ankle complex on right side.    Verenice received therapeutic exercises to decrease pain, improve stiffness, improve strength  -- HEP to improve intrinsic foot strengthening. Improve ankle stiffness.       Home Exercises Provided and Patient Education Provided     Education provided:   - HEP given  - Home modalities prn      Written Home Exercises Provided: Yes  Exercises were reviewed and Verenice was able to demonstrate them prior to the end of the session.  Verenice demonstrated good  understanding of the education provided.     See EMR under Patient Instructions for exercises provided on evaluation.      Assessment   Verenice is a 65 y.o. female referred to  outpatient Physical Therapy with a medical diagnosis of right achilles tendonitis. Pt presents with swelling and stiffness along right ankle and foot especially during weight bearing. Pt responds well to soft tissue treatment and ankle flexibility therex and would benefit from further progressions of therex.     Pt prognosis is Good.   Pt will benefit from skilled outpatient Physical Therapy to address the deficits stated above and in the chart below, provide pt/family education, and to maximize pt's level of independence.     Plan of care discussed with patient: Yes  Pt's spiritual, cultural and educational needs considered and patient is agreeable to the plan of care and goals as stated below:     Anticipated Barriers for therapy: Multiple joint affected, High BMI    Medical Necessity is demonstrated by the following  History  Co-morbidities and personal factors that may impact the plan of care Co-morbidities:   advanced age and high BMI    Personal Factors:   age     high   Examination  Body Structures and Functions, activity limitations and participation restrictions that may impact the plan of care Body Regions:   lower extremities    Body Systems:    gross symmetry  ROM  strength  gross coordinated movement  balance  gait  transfers    Participation Restrictions:   walking    Activity limitations:   Learning and applying knowledge  no deficits    General Tasks and Commands  undertaking a single task    Communication  no deficits    Mobility  lifting and carrying objects  walking    Self care  no deficits    Domestic Life  shopping  cooking  doing house work (cleaning house, washing dishes, laundry)    Interactions/Relationships  no deficits    Life Areas  no deficits    Community and Social Life  community life  recreation and leisure         high   Clinical Presentation evolving clinical presentation with changing clinical characteristics moderate   Decision Making/ Complexity Score: moderate        Goals:  Long Term Goals (4 Weeks):   1. Pt to be (I) with HEP  2. Pt will demonstrate decreased R ankle pain at worst to 0/10 while performing functional activities  3. Pt will demonstrate increased R ankle AROM to WFL in order to ambulate with normalized gait pattern  4. Pt will demonstrate increased R ankle strength to 5/5 grossly  5. Pt will be able to negotiate L3 step x 20 reps without UE (A) and no LOBs.      Plan   Plan of care Certification: 9/2/2020 to 10/1/2020.    Outpatient Physical Therapy 2 times weekly for 4 weeks to include the following interventions: Electrical Stimulation IFC, Gait Training, Manual Therapy, Moist Heat/ Ice, Neuromuscular Re-ed, Orthotic Management and Training, Patient Education, Self Care, Therapeutic Activites and Therapeutic Exercise, ASTYM, Kinesiotaping PRN, Functional Dry Needling    Adrian Griffin, PT, DPT

## 2020-09-03 PROBLEM — M25.671 DECREASED RANGE OF MOTION OF RIGHT ANKLE: Status: ACTIVE | Noted: 2020-09-03

## 2020-09-03 PROBLEM — M25.571 ACUTE RIGHT ANKLE PAIN: Status: ACTIVE | Noted: 2020-09-03

## 2020-09-18 ENCOUNTER — CLINICAL SUPPORT (OUTPATIENT)
Dept: REHABILITATION | Facility: HOSPITAL | Age: 65
End: 2020-09-18
Payer: MEDICARE

## 2020-09-18 ENCOUNTER — LAB VISIT (OUTPATIENT)
Dept: LAB | Facility: HOSPITAL | Age: 65
End: 2020-09-18
Attending: INTERNAL MEDICINE
Payer: MEDICARE

## 2020-09-18 DIAGNOSIS — E11.59 HYPERTENSION ASSOCIATED WITH DIABETES: ICD-10-CM

## 2020-09-18 DIAGNOSIS — I15.2 HYPERTENSION ASSOCIATED WITH DIABETES: ICD-10-CM

## 2020-09-18 DIAGNOSIS — M25.571 ACUTE RIGHT ANKLE PAIN: ICD-10-CM

## 2020-09-18 DIAGNOSIS — E11.69 DYSLIPIDEMIA ASSOCIATED WITH TYPE 2 DIABETES MELLITUS: ICD-10-CM

## 2020-09-18 DIAGNOSIS — M25.671 DECREASED RANGE OF MOTION OF RIGHT ANKLE: ICD-10-CM

## 2020-09-18 DIAGNOSIS — E78.5 DYSLIPIDEMIA ASSOCIATED WITH TYPE 2 DIABETES MELLITUS: ICD-10-CM

## 2020-09-18 LAB
ALBUMIN SERPL BCP-MCNC: 3.6 G/DL (ref 3.5–5.2)
ALP SERPL-CCNC: 86 U/L (ref 55–135)
ALT SERPL W/O P-5'-P-CCNC: 126 U/L (ref 10–44)
ANION GAP SERPL CALC-SCNC: 10 MMOL/L (ref 8–16)
AST SERPL-CCNC: 59 U/L (ref 10–40)
BILIRUB SERPL-MCNC: 0.5 MG/DL (ref 0.1–1)
BUN SERPL-MCNC: 18 MG/DL (ref 8–23)
CALCIUM SERPL-MCNC: 9.5 MG/DL (ref 8.7–10.5)
CHLORIDE SERPL-SCNC: 101 MMOL/L (ref 95–110)
CHOLEST SERPL-MCNC: 134 MG/DL (ref 120–199)
CHOLEST/HDLC SERPL: 3 {RATIO} (ref 2–5)
CO2 SERPL-SCNC: 25 MMOL/L (ref 23–29)
CREAT SERPL-MCNC: 0.9 MG/DL (ref 0.5–1.4)
EST. GFR  (AFRICAN AMERICAN): >60 ML/MIN/1.73 M^2
EST. GFR  (NON AFRICAN AMERICAN): >60 ML/MIN/1.73 M^2
ESTIMATED AVG GLUCOSE: 186 MG/DL (ref 68–131)
GLUCOSE SERPL-MCNC: 163 MG/DL (ref 70–110)
HBA1C MFR BLD HPLC: 8.1 % (ref 4–5.6)
HDLC SERPL-MCNC: 44 MG/DL (ref 40–75)
HDLC SERPL: 32.8 % (ref 20–50)
LDLC SERPL CALC-MCNC: 66.8 MG/DL (ref 63–159)
NONHDLC SERPL-MCNC: 90 MG/DL
POTASSIUM SERPL-SCNC: 4.5 MMOL/L (ref 3.5–5.1)
PROT SERPL-MCNC: 6.9 G/DL (ref 6–8.4)
SODIUM SERPL-SCNC: 136 MMOL/L (ref 136–145)
TRIGL SERPL-MCNC: 116 MG/DL (ref 30–150)

## 2020-09-18 PROCEDURE — 97110 THERAPEUTIC EXERCISES: CPT | Mod: PN,CQ

## 2020-09-18 PROCEDURE — 36415 COLL VENOUS BLD VENIPUNCTURE: CPT | Mod: PO

## 2020-09-18 PROCEDURE — 80053 COMPREHEN METABOLIC PANEL: CPT

## 2020-09-18 PROCEDURE — 80061 LIPID PANEL: CPT

## 2020-09-18 PROCEDURE — 83036 HEMOGLOBIN GLYCOSYLATED A1C: CPT

## 2020-09-18 NOTE — PATIENT INSTRUCTIONS
Stretching: Calf - Towel        Sit with knee straight and towel looped around left foot. Gently pull on towel until stretch is felt in calf. Hold 10 seconds.  Repeat 10 times per set. Do 1 sets per session. Do 2 sessions per day.     [email or web address]       Ankle Pump        With left leg elevated, gently flex and extend ankle. Move through full range of motion. Avoid pain.  Repeat 10 times per set. Do 1 sets per session. Do 2 sessions per day.     [email or web address]         Ankle Alphabet        Using left ankle and foot only, trace the letters of the alphabet. Perform A to Z.  Repeat 1 times per set. Do 1 sets per session. Do 2 sessions per day.     [email or web address]     Copyright © [employer]. All rights reserved.        Plantar Flexion: Resisted        [place] behind, tubing around left foot, press down.  Repeat 10 times per set. Do 1 sets per session. Do 2 sessions per day.     [email or web address]       Dorsiflexion: Resisted        Facing anchor, tubing around left foot, pull toward face.   Repeat 10 times per set. Do 1 sets per session. Do 2 sessions per day.     [email or web address]         Eversion: Resisted        With right foot in tubing loop, hold tubing around other foot to resist and turn foot out.  Repeat 10 times per set. Do 1 sets per session. Do 2 sessions per day.     [email or web address]       Inversion: Resisted        Cross legs with right leg underneath, foot in tubing loop. Hold tubing around other foot to resist and turn foot in.  Repeat 10 times per set. Do 1 sets per session. Do 2 sessions per day.     [email or web address]        Towel Scrunches    Place both foot flat on towel, knee pointed forward. Use forefoot and toes to pull towel backward.  Do not allow heel or knee to move. Repetitions should be slow and controlled.  Repeat 10 times each side per set. Do 2 sets per session. Do 2 sessions per day.    Copyright © [employer]. All rights reserved.       Toe  Lift    Sit with your foot flat on the floor and your finger under your big toe. Without rolling in/out or lifting your heel/toes, push down into your finger with your big toe joint. Maintain the pressure of the ball of your foot on your finger, keep the four little toes relaxed and in contact with the ground, then slowly lift the big toe up and down again. Do slowly and take breaks as needed.  Repeat 10 times each side per set. Do 2 sets per session. Do 2 sessions per day.    Copyright © [employer]. All rights reserved.

## 2020-09-18 NOTE — PROGRESS NOTES
"  Physical Therapy Treatment Note     Name: Verenice Mirza  Clinic Number: [id number]    Therapy Diagnosis:   Encounter Diagnoses   Name Primary?    Acute right ankle pain     Decreased range of motion of right ankle      Physician: Areli Crespo MD    Visit Date: 9/18/2020    Physician Orders: PT Eval and Treat   Medical Diagnosis from Referral: M76.60 (ICD-10-CM) - Achilles tendinitis, unspecified laterality  Evaluation Date: 9/2/2020  Authorization Period Expiration: 12/31/2020  Plan of Care Expiration: 10/1/2020  Visit # / Visits authorized: 1/ 50  FOTO: 1/10     Visit:  90.96  Total: 231.62    Time In: 9:30 am  Time Out: 10:13 am  Total Billable Time: 43 minutes    Precautions: Standard, Recent Fall    Subjective     Pt reports: having pain in both ankles but the right one hurts more.  She was compliant with home exercise program.  Response to previous treatment: a little sore.  Functional change: not at this time.    Pain: 6/10  Location: right ankle     Objective     Verenice received therapeutic exercises to develop strength, ROM and flexibility for 43 minutes including:    - GSS w/  Strap  3x30" B  - ankle pumps  1x20 B  - ankle alphabet  1 set  - theraband 4 way  1x10 RTB B  - towel crunches  1x20  - toe lifts   1x20  - seated toe raises  Next visit  - seated heel raises  Next visit      Home Exercises Provided and Patient Education Provided     Education provided:   - applying ice to decrease swelling    Written Home Exercises Provided: yes.  Exercises were reviewed and Verenice was able to demonstrate them prior to the end of the session.  Verenice demonstrated good  understanding of the education provided.     See EMR under Patient Instructions for exercises provided 9/18/2020.    Assessment     Patient requires cues for proper execution of theraband exercises.  Patient requires cues for big toe isolation with toe lifts.  Verenice is progressing well towards her goals.   Pt prognosis is Excellent.     Pt will " continue to benefit from skilled outpatient physical therapy to address the deficits listed in the problem list box on initial evaluation, provide pt/family education and to maximize pt's level of independence in the home and community environment.     Pt's spiritual, cultural and educational needs considered and pt agreeable to plan of care and goals.     Anticipated barriers to physical therapy:  Multiple joint affected, High BMI    Goals:  Long Term Goals (4 Weeks):   1. Pt to be (I) with HEP  2. Pt will demonstrate decreased R ankle pain at worst to 0/10 while performing functional activities  3. Pt will demonstrate increased R ankle AROM to WFL in order to ambulate with normalized gait pattern  4. Pt will demonstrate increased R ankle strength to 5/5 grossly  5. Pt will be able to negotiate L3 step x 20 reps without UE (A) and no LOBs.    Plan     Continue with Plan Of Care and progress toward PT goals.    Anthony Melton, PTA

## 2020-09-21 ENCOUNTER — CLINICAL SUPPORT (OUTPATIENT)
Dept: REHABILITATION | Facility: HOSPITAL | Age: 65
End: 2020-09-21
Payer: MEDICARE

## 2020-09-21 DIAGNOSIS — M25.671 DECREASED RANGE OF MOTION OF RIGHT ANKLE: ICD-10-CM

## 2020-09-21 DIAGNOSIS — M25.571 ACUTE RIGHT ANKLE PAIN: ICD-10-CM

## 2020-09-21 PROCEDURE — 97110 THERAPEUTIC EXERCISES: CPT | Mod: PN,CQ

## 2020-09-21 NOTE — PATIENT INSTRUCTIONS
Heel Raise (Sitting)        Raise heels, keeping toes on floor.  Repeat 10 times per set. Do 2 sets per session. Do 2 sessions per day.     Copyright © [employer]. All rights reserved.   https://[employer]linetours.[employer]/44       Toe Raise (Sitting)        Raise toes, keeping heels on floor.  Repeat 10 times per set. Do 2 sets per session. Do 2 sessions per day.     Copyright © [employer]. All rights reserved.   https://[employer]/46       Heel Raise: Bilateral (Standing)        Rise on balls of feet.  Repeat 10 times per set. Do 1 sets per session. Do 2 sessions per day.     Copyright © [employer]. All rights reserved.   https://onlinetours.us/38               ARCH LIFTS  Start with your foot on the floor. Raise up the  arch of your foot while maintaining your big  toe, ball of your foot and heel on the floor the  entire time.  Copyright © 1201-0075 [employer]ROM: .

## 2020-09-21 NOTE — PROGRESS NOTES
"  Physical Therapy Treatment Note     Name: Verenice Mirza  Clinic Number: [id number]    Therapy Diagnosis:   Encounter Diagnoses   Name Primary?    Acute right ankle pain     Decreased range of motion of right ankle      Physician: Areli Crespo MD    Visit Date: 9/21/2020    Physician Orders: PT Eval and Treat   Medical Diagnosis from Referral: M76.60 (ICD-10-CM) - Achilles tendinitis, unspecified laterality  Evaluation Date: 9/2/2020  Authorization Period Expiration: 12/31/2020  Plan of Care Expiration: 10/1/2020  Visit # / Visits authorized: 3/ 50  FOTO: 3/10     Visit:  90.96  Total: 322.58    Time In: 1:45 pm  Time Out: 2:25 pm  Total Billable Time: 40 minutes    Precautions: Standard, Recent Fall    Subjective     Pt reports: her ankle pain is about the same although today her ankles feel achy as well.  She was compliant with home exercise program.  Response to previous treatment: a little sore.  Functional change: not at this time.    Pain: 6/10  Location: right ankle     Objective     Verenice received therapeutic exercises to develop strength, ROM and flexibility for 40 minutes including:    - GSS w/  Strap  3x30" B  - ankle pumps   1x20 B  - ankle alphabet  1 set  - theraband 4 way  3x10 RTB B  - towel crunches  1x20  - toe lifts   1x20  - seated toe raises  1x20  - seated heel raises  1x20  - isometric ankle inv. 15x5"  - arch lifts   1x10  - standing heel raises 1x10  - calf stretch - fitter  3x30"      Home Exercises Provided and Patient Education Provided     Education provided:   - applying ice to decrease swelling    Written Home Exercises Provided: yes.  Exercises were reviewed and Verenice was able to demonstrate them prior to the end of the session.  Verenice demonstrated good  understanding of the education provided.     See EMR under Patient Instructions for exercises provided 9/18/2020.    Assessment     Patient requires cues for proper execution of theraband inversion and eversion exercises.  " Patient had no difficulty with new exercises added along with today's progressions with no increase in symptoms prior to leaving the clinic.  Verenice is progressing well towards her goals.   Pt prognosis is Excellent.     Pt will continue to benefit from skilled outpatient physical therapy to address the deficits listed in the problem list box on initial evaluation, provide pt/family education and to maximize pt's level of independence in the home and community environment.     Pt's spiritual, cultural and educational needs considered and pt agreeable to plan of care and goals.     Anticipated barriers to physical therapy:  Multiple joint affected, High BMI    Goals:  Long Term Goals (4 Weeks):   1. Pt to be (I) with HEP  2. Pt will demonstrate decreased R ankle pain at worst to 0/10 while performing functional activities  3. Pt will demonstrate increased R ankle AROM to WFL in order to ambulate with normalized gait pattern  4. Pt will demonstrate increased R ankle strength to 5/5 grossly  5. Pt will be able to negotiate L3 step x 20 reps without UE (A) and no LOBs.    Plan     Continue with Plan Of Care and progress toward PT goals.    Anthony Melton, PTA

## 2020-09-22 ENCOUNTER — PATIENT MESSAGE (OUTPATIENT)
Dept: INTERNAL MEDICINE | Facility: CLINIC | Age: 65
End: 2020-09-22

## 2020-09-23 ENCOUNTER — OFFICE VISIT (OUTPATIENT)
Dept: INTERNAL MEDICINE | Facility: CLINIC | Age: 65
End: 2020-09-23
Payer: MEDICARE

## 2020-09-23 ENCOUNTER — CLINICAL SUPPORT (OUTPATIENT)
Dept: REHABILITATION | Facility: HOSPITAL | Age: 65
End: 2020-09-23
Payer: MEDICARE

## 2020-09-23 VITALS
WEIGHT: 230 LBS | HEART RATE: 60 BPM | BODY MASS INDEX: 42.07 KG/M2 | DIASTOLIC BLOOD PRESSURE: 76 MMHG | SYSTOLIC BLOOD PRESSURE: 124 MMHG

## 2020-09-23 DIAGNOSIS — E11.9 NEW ONSET TYPE 2 DIABETES MELLITUS: ICD-10-CM

## 2020-09-23 DIAGNOSIS — E78.5 DYSLIPIDEMIA ASSOCIATED WITH TYPE 2 DIABETES MELLITUS: ICD-10-CM

## 2020-09-23 DIAGNOSIS — E03.4 HYPOTHYROIDISM DUE TO ACQUIRED ATROPHY OF THYROID: ICD-10-CM

## 2020-09-23 DIAGNOSIS — K76.0 FATTY LIVER: ICD-10-CM

## 2020-09-23 DIAGNOSIS — I10 ESSENTIAL HYPERTENSION: ICD-10-CM

## 2020-09-23 DIAGNOSIS — I15.2 HYPERTENSION ASSOCIATED WITH DIABETES: Primary | ICD-10-CM

## 2020-09-23 DIAGNOSIS — K21.9 GASTROESOPHAGEAL REFLUX DISEASE, ESOPHAGITIS PRESENCE NOT SPECIFIED: ICD-10-CM

## 2020-09-23 DIAGNOSIS — E11.69 DYSLIPIDEMIA ASSOCIATED WITH TYPE 2 DIABETES MELLITUS: ICD-10-CM

## 2020-09-23 DIAGNOSIS — L65.8 FEMALE PATTERN BALDNESS: ICD-10-CM

## 2020-09-23 DIAGNOSIS — M25.671 DECREASED RANGE OF MOTION OF RIGHT ANKLE: ICD-10-CM

## 2020-09-23 DIAGNOSIS — E11.59 HYPERTENSION ASSOCIATED WITH DIABETES: Primary | ICD-10-CM

## 2020-09-23 DIAGNOSIS — E78.5 HYPERLIPIDEMIA, UNSPECIFIED HYPERLIPIDEMIA TYPE: ICD-10-CM

## 2020-09-23 DIAGNOSIS — M25.571 ACUTE RIGHT ANKLE PAIN: ICD-10-CM

## 2020-09-23 PROCEDURE — 99214 PR OFFICE/OUTPT VISIT, EST, LEVL IV, 30-39 MIN: ICD-10-PCS | Mod: 95,,, | Performed by: INTERNAL MEDICINE

## 2020-09-23 PROCEDURE — 97140 MANUAL THERAPY 1/> REGIONS: CPT | Mod: PN

## 2020-09-23 PROCEDURE — 97110 THERAPEUTIC EXERCISES: CPT | Mod: PN

## 2020-09-23 PROCEDURE — 99214 OFFICE O/P EST MOD 30 MIN: CPT | Mod: 95,,, | Performed by: INTERNAL MEDICINE

## 2020-09-23 RX ORDER — LANCETS
1 EACH MISCELLANEOUS DAILY
Qty: 100 EACH | Refills: 3 | Status: SHIPPED | OUTPATIENT
Start: 2020-09-23 | End: 2021-12-02 | Stop reason: SDUPTHER

## 2020-09-23 RX ORDER — SPIRONOLACTONE 100 MG/1
100 TABLET, FILM COATED ORAL DAILY
Qty: 90 TABLET | Refills: 3 | Status: SHIPPED | OUTPATIENT
Start: 2020-09-23 | End: 2021-10-25 | Stop reason: SDUPTHER

## 2020-09-23 RX ORDER — METOPROLOL SUCCINATE 50 MG/1
50 TABLET, EXTENDED RELEASE ORAL NIGHTLY
Qty: 90 TABLET | Refills: 3 | Status: SHIPPED | OUTPATIENT
Start: 2020-09-23 | End: 2021-12-02 | Stop reason: SDUPTHER

## 2020-09-23 RX ORDER — FINASTERIDE 5 MG/1
5 TABLET, FILM COATED ORAL NIGHTLY
Qty: 90 TABLET | Refills: 3 | Status: SHIPPED | OUTPATIENT
Start: 2020-09-23 | End: 2021-10-11

## 2020-09-23 RX ORDER — ATORVASTATIN CALCIUM 40 MG/1
40 TABLET, FILM COATED ORAL DAILY
Qty: 90 TABLET | Refills: 3 | Status: SHIPPED | OUTPATIENT
Start: 2020-09-23 | End: 2021-12-02 | Stop reason: SDUPTHER

## 2020-09-23 RX ORDER — LEVOTHYROXINE SODIUM 88 UG/1
88 TABLET ORAL
Qty: 90 TABLET | Refills: 3 | Status: SHIPPED | OUTPATIENT
Start: 2020-09-23 | End: 2021-05-21

## 2020-09-23 RX ORDER — CALCIUM CITRATE/VITAMIN D3 200MG-6.25
1 TABLET ORAL DAILY
Qty: 100 STRIP | Refills: 3 | Status: SHIPPED | OUTPATIENT
Start: 2020-09-23 | End: 2021-12-02 | Stop reason: SDUPTHER

## 2020-09-23 RX ORDER — OMEGA-3-ACID ETHYL ESTERS 1 G/1
2 CAPSULE, LIQUID FILLED ORAL 2 TIMES DAILY
Qty: 360 CAPSULE | Refills: 3 | Status: SHIPPED | OUTPATIENT
Start: 2020-09-23 | End: 2021-12-02 | Stop reason: SDUPTHER

## 2020-09-23 RX ORDER — METFORMIN HYDROCHLORIDE 500 MG/1
500 TABLET ORAL 2 TIMES DAILY
Qty: 180 TABLET | Refills: 3 | Status: SHIPPED | OUTPATIENT
Start: 2020-09-23 | End: 2021-10-26

## 2020-09-23 NOTE — PROGRESS NOTES
"  Physical Therapy Treatment Note     Name: Verenice Mirza  Clinic Number: [id number]    Therapy Diagnosis:   Encounter Diagnoses   Name Primary?    Acute right ankle pain     Decreased range of motion of right ankle      Physician: Areli Crespo MD    Visit Date: 9/23/2020    Physician Orders: PT Eval and Treat   Medical Diagnosis from Referral: M76.60 (ICD-10-CM) - Achilles tendinitis, unspecified laterality  Evaluation Date: 9/2/2020  Authorization Period Expiration: 12/31/2020  Plan of Care Expiration: 10/1/2020  Visit # / Visits authorized: 4/50  FOTO: 4/10  Cap visit:  88.1  Cap otal: 410.68    Time In: 1216  Time Out: 1256  Total Billable Time: 40 minutes    Precautions: Standard, Recent Fall    Subjective     Pt reports: pain more so with walking long distances. Patient reports this and pain at rest has improved since evaluation  She was compliant with home exercise program.  Response to previous treatment: decreased pain, more so to posterior ankle vs lateral ankle and dorsum of foot  Functional change: improved tolerance to walking    Pain: 6-7/10 upon arrival; 4/10 at end  Location: R lateral ankle    Objective     Verenice received therapeutic exercises to develop strength, ROM and flexibility for 25 minutes including:    Seated:  Long sitting gastrocnemius stretch: 3x30" R  Theraband 4 way: GTB 3x10 R  Phalangeal extension: 2x20 R; L big toe stabilizing R  Arch lifts: 3"x20    Standing  Double leg heel raises: 3x10  Double leg toe raises: x20  Hip abduction: x20 B  Level gait with heel to toe pattern, associated knee extension at heel contact and flexion at toe contact, and equal stance time; 280 feet    Verenice received the following manual therapy techniques to the R foot, ankle and lower leg for 15 minutes, including:  Soft tissue mobilization to dorsum of foot, around lateral malleolus and to tibialis anterior and fibularis group; instrument assist via [employer]   Effleurage to R foot, ankle and " lower leg    Home Exercises Provided and Patient Education Provided:    Education provided:   - Continue HEPs.    Written Home Exercises Provided: Not today.   Exercises were reviewed and Verenice was able to demonstrate them prior to the end of the session.  Verenice demonstrated good  understanding of the education provided.     See EMR under Patient Instructions for exercises provided 9/2/2020, 9/18/2020 and 9/21/2020.    Assessment     Decreased pain following manual therapy with gentler bias. More so with gait training to correct for stiff knee pattern and limp.     Verenice is progressing well towards her goals.   Pt prognosis is Excellent.   Pt will continue to benefit from skilled outpatient physical therapy to address the deficits listed in the problem list box on initial evaluation, provide pt/family education and to maximize pt's level of independence in the home and community environment.     Pt's spiritual, cultural and educational needs considered and pt agreeable to plan of care and goals.  Anticipated barriers to physical therapy:  Multiple joint affected, High BMI    Long Term Goals (4 Weeks):   1. Pt to be (I) with HEP  2. Pt will demonstrate decreased R ankle pain at worst to 0/10 while performing functional activities. PROGRESSING, NOT MET 9/23/2020   3. Pt will demonstrate increased R ankle AROM to WFL in order to ambulate with normalized gait pattern. PROGRESSING, NOT MET 9/23/2020   4. Pt will demonstrate increased R ankle strength to 5/5 grossly. PROGRESSING, NOT MET 9/23/2020   5. Pt will be able to negotiate L3 step x 20 reps without UE (A) and no LOBs. PROGRESSING, NOT MET 9/23/2020     Plan     Progress closed chain strength; initiate step navigation next.     Kerline Gorman, PT

## 2020-09-25 NOTE — PROGRESS NOTES
Subjective:       Patient ID: Verenice Mirza is a 65 y.o. female.    Chief Complaint: Hyperlipidemia, Hypertension, and Diabetes    HPI 65-year-old female presents to clinic today for virtual video visit follow-up hypertension dyslipidemia social diabetes fatty liver patient is also here to follow-up hypothyroidism no acute complaints today.  Review of Systems    otherwise negative  Objective:      Physical Exam  General: Well-appearing, well-nourished.  No distress  HEENT: conjunctivae are normal.   Hearing is grossly normal.  Nasopharynx yobany congestion  Oropharynx is clear.  Neck: Supple.  Visually No thyroid megaly.  Heart: Regular rate   Lungs:  respiratory effort normal.  Abdomen:  nontender  Extremities:   No edema.  Psych: Oriented to time person place.  Judgment and insight seem unimpaired.  Mood and affect are appropriate.  Assessment:       1. Hypertension associated with diabetes    2. Hypothyroidism due to acquired atrophy of thyroid    3. Dyslipidemia associated with type 2 diabetes mellitus    4. Fatty liver    5. Hyperlipidemia, unspecified hyperlipidemia type    6. Gastroesophageal reflux disease, esophagitis presence not specified    7. 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    8. Essential hypertension    9. New onset type 2 diabetes mellitus        Plan:       Verenice was seen today for hyperlipidemia, hypertension and diabetes.    Diagnoses and all orders for this visit:    Hypertension associated with diabetes  -     metFORMIN (GLUCOPHAGE) 500 MG tablet; Take 1 tablet (500 mg total) by mouth 2 (two) times a day.  -     Microalbumin/creatinine urine ratio; Standing  -     Ambulatory referral/consult to Optometry; Future  Controlled.  Continue current medical regimen.  Prescription refills addressed.  Followup advised. See after visit summary.  Hypothyroidism due to acquired atrophy of thyroid  -     levothyroxine (SYNTHROID) 88 MCG tablet; Take 1 tablet (88 mcg total) by mouth before breakfast.  Controlled.   Continue current medical regimen.  Prescription refills addressed.  Followup advised. See after visit summary.  Dyslipidemia associated with type 2 diabetes mellitus  -     metFORMIN (GLUCOPHAGE) 500 MG tablet; Take 1 tablet (500 mg total) by mouth 2 (two) times a day.  -     Microalbumin/creatinine urine ratio; Standing  -     Ambulatory referral/consult to Optometry; Future  Controlled.  Continue current medical regimen.  Prescription refills addressed.  Followup advised. See after visit summary.  Fatty liver  Continued efforts at weight loss  Hyperlipidemia, unspecified hyperlipidemia type  -     atorvastatin (LIPITOR) 40 MG tablet; Take 1 tablet (40 mg total) by mouth once daily.  -     omega-3 acid ethyl esters (LOVAZA) 1 gram capsule; Take 2 capsules (2 g total) by mouth 2 (two) times daily.    Gastroesophageal reflux disease, esophagitis presence not specified    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  -     finasteride (PROSCAR) 5 mg tablet; Take 1 tablet (5 mg total) by mouth nightly.  -     spironolactone (ALDACTONE) 100 MG tablet; Take 1 tablet (100 mg total) by mouth once daily.    Essential hypertension  -     metoprolol succinate (TOPROL-XL) 50 MG 24 hr tablet; Take 1 tablet (50 mg total) by mouth nightly.    New onset type 2 diabetes mellitus  -     lancets (ONETOUCH ULTRASOFT LANCETS) Misc; 1 lancet by Misc.(Non-Drug; Combo Route) route once daily.  -     blood sugar diagnostic (TRUE METRIX GLUCOSE TEST STRIP) Strp; 1 strip by Misc.(Non-Drug; Combo Route) route once daily.       The patient location is:  [facility]  The chief complaint leading to consultation is:  Hypertension diabetes dyslipidemia female pattern baldness fatty liver    Visit type: audiovisual    Face to Face time with patient:  20  Twenty minutes of total time spent on the encounter, which includes face to face time and non-face to face time preparing to see the patient (eg, review of tests), Obtaining and/or reviewing separately obtained  history, Documenting clinical information in the electronic or other health record, Independently interpreting results (not separately reported) and communicating results to the patient/family/caregiver, or Care coordination (not separately reported).         Each patient to whom he or she provides medical services by telemedicine is:  (1) informed of the relationship between the physician and patient and the respective role of any other health care provider with respect to management of the patient; and (2) notified that he or she may decline to receive medical services by telemedicine and may withdraw from such care at any time.    Notes:

## 2020-09-28 ENCOUNTER — CLINICAL SUPPORT (OUTPATIENT)
Dept: REHABILITATION | Facility: HOSPITAL | Age: 65
End: 2020-09-28
Payer: MEDICARE

## 2020-09-28 DIAGNOSIS — M25.671 DECREASED RANGE OF MOTION OF RIGHT ANKLE: ICD-10-CM

## 2020-09-28 DIAGNOSIS — M25.571 ACUTE RIGHT ANKLE PAIN: ICD-10-CM

## 2020-09-28 PROCEDURE — 97110 THERAPEUTIC EXERCISES: CPT | Mod: PN | Performed by: PHYSICAL THERAPIST

## 2020-09-28 PROCEDURE — 97140 MANUAL THERAPY 1/> REGIONS: CPT | Mod: PN | Performed by: PHYSICAL THERAPIST

## 2020-09-28 NOTE — PROGRESS NOTES
"  Physical Therapy Treatment Note     Name: Verenice Mirza  Clinic Number: [id number]    Therapy Diagnosis:   Encounter Diagnoses   Name Primary?    Acute right ankle pain     Decreased range of motion of right ankle      Physician: Areli Crespo MD    Visit Date: 9/28/2020    Physician Orders: PT Eval and Treat   Medical Diagnosis from Referral: M76.60 (ICD-10-CM) - Achilles tendinitis, unspecified laterality  Evaluation Date: 9/2/2020  Authorization Period Expiration: 12/31/2020  Plan of Care Expiration: 10/1/2020  Visit # / Visits authorized: 5/50  FOTO: 5/10  Cap visit:  88.1  Cap otal: 410.68    Time In: 300  Time Out: 345  Total Billable Time: 45 minutes    Precautions: Standard, Recent Fall    Subjective     Pt reports: pain along right ATFL with swelling noted. Pt reports hitting big toe on right foot in garage previous day which ripped nail off. Pt presents with decreased push off due to discomfort.   She was compliant with home exercise program.  Response to previous treatment: decreased pain, more so to posterior ankle vs lateral ankle and dorsum of foot  Functional change: improved tolerance to walking    Pain: 6-7/10 upon arrival; 4/10 at end  Location: R lateral ankle    Objective     Verenice received therapeutic exercises to develop strength, ROM and flexibility for 30 minutes including:    Seated:  Long sitting gastrocnemius stretch: 3x30" R  Theraband 4 way: GTB 3x10 R  Phalangeal extension: 2x20 R; L big toe stabilizing R  Toe yoga 20x  Arch lifts: 3"x20  Step ups on stairs 20x    Standing  Double leg heel raises: 3x10  Up with DL, down with RLE 2x10  Hip abduction: 2x20 B  Level gait with heel to toe pattern, associated knee extension at heel contact and flexion at toe contact, and equal stance time; 280 feet  Standing balance on airex - feet apart - eyes closed 30" hold, 5x.    Verenice received the following manual therapy techniques to the R foot, ankle and lower leg for 15 minutes, " including:  Soft tissue mobilization to dorsum of foot, around lateral malleolus and to tibialis anterior and fibularis group; ASTYM  Posterior talar mobilizations in prone to improve DF.     Home Exercises Provided and Patient Education Provided:    Education provided:   - Continue HEPs.    Written Home Exercises Provided: Not today.   Exercises were reviewed and Verenice was able to demonstrate them prior to the end of the session.  Verenice demonstrated good  understanding of the education provided.     See EMR under Patient Instructions for exercises provided 9/2/2020, 9/18/2020 and 9/21/2020.    Assessment     Pt compensating on right foot due to big toe incident previous day. Pt presents with limited push off, more external rotation overall when walking. Pt performed well with balance activity, did have some pain with eccentric lowering.     Verenice is progressing well towards her goals.   Pt prognosis is Excellent.   Pt will continue to benefit from skilled outpatient physical therapy to address the deficits listed in the problem list box on initial evaluation, provide pt/family education and to maximize pt's level of independence in the home and community environment.     Pt's spiritual, cultural and educational needs considered and pt agreeable to plan of care and goals.  Anticipated barriers to physical therapy:  Multiple joint affected, High BMI    Long Term Goals (4 Weeks):   1. Pt to be (I) with HEP  2. Pt will demonstrate decreased R ankle pain at worst to 0/10 while performing functional activities. PROGRESSING, NOT MET 9/28/2020   3. Pt will demonstrate increased R ankle AROM to WFL in order to ambulate with normalized gait pattern. PROGRESSING, NOT MET 9/28/2020   4. Pt will demonstrate increased R ankle strength to 5/5 grossly. PROGRESSING, NOT MET 9/28/2020   5. Pt will be able to negotiate L3 step x 20 reps without UE (A) and no LOBs. PROGRESSING, NOT MET 9/28/2020     Plan     Progress closed chain  strength; initiate step navigation next.     Adrian Griffin, PT

## 2020-10-01 ENCOUNTER — PATIENT MESSAGE (OUTPATIENT)
Dept: OTHER | Facility: OTHER | Age: 65
End: 2020-10-01

## 2020-10-01 ENCOUNTER — CLINICAL SUPPORT (OUTPATIENT)
Dept: REHABILITATION | Facility: HOSPITAL | Age: 65
End: 2020-10-01
Payer: MEDICARE

## 2020-10-01 DIAGNOSIS — M25.571 ACUTE RIGHT ANKLE PAIN: ICD-10-CM

## 2020-10-01 DIAGNOSIS — M25.671 DECREASED RANGE OF MOTION OF RIGHT ANKLE: ICD-10-CM

## 2020-10-01 PROCEDURE — 97140 MANUAL THERAPY 1/> REGIONS: CPT | Mod: PN | Performed by: PHYSICAL THERAPIST

## 2020-10-01 PROCEDURE — 97110 THERAPEUTIC EXERCISES: CPT | Mod: PN | Performed by: PHYSICAL THERAPIST

## 2020-10-01 NOTE — PROGRESS NOTES
"  Physical Therapy Treatment Note / Discharge note     Name: Verenice Mirza  Clinic Number: [id number]    Therapy Diagnosis:   Encounter Diagnoses   Name Primary?    Acute right ankle pain     Decreased range of motion of right ankle      Physician: Areli Crespo MD    Visit Date: 10/1/2020    Physician Orders: PT Eval and Treat   Medical Diagnosis from Referral: M76.60 (ICD-10-CM) - Achilles tendinitis, unspecified laterality  Evaluation Date: 9/2/2020  Authorization Period Expiration: 12/31/2020  Plan of Care Expiration: 10/1/2020  Visit # / Visits authorized: 6/50  FOTO: 6/10  Cap visit:  88.1  Cap otal: 498.78    Time In: 300  Time Out: 345  Total Billable Time: 45 minutes    Precautions: Standard, Recent Fall    Subjective     Pt reports: pain along right ATFL with swelling noted. Still having difficulty walking at this time due to ankle pain.     She was compliant with home exercise program.  Response to previous treatment: decreased pain, more so to posterior ankle vs lateral ankle and dorsum of foot  Functional change: improved tolerance to walking    Pain: 6-7/10 upon arrival; 4/10 at end  Location: R lateral ankle    Objective     Verenice received therapeutic exercises to develop strength, ROM and flexibility for 30 minutes including:    Seated:  Theraband 4 way: GTB 3x10 R  Toe yoga 20x  Arch lifts: 3"x20  Towel curls 30x    Standing  Double leg heel raises: 3x10  Up with DL, down with RLE 2x10  Hip abduction: 2x20 B  Standing balance on airex - feet apart - eyes closed 30" hold, 4x.  SLS with left hand support 20" x 4 RLE    Verenice received the following manual therapy techniques to the R foot, ankle and lower leg for 15 minutes, including:  Soft tissue mobilization to dorsum of foot, around lateral malleolus and to tibialis anterior and fibularis group; ASTYM  Posterior talar mobilizations in prone to improve DF.     Ankle Right Left Pain/Dysfunction with Movement   AROM         dorsiflexion 3 5   "   plantarflexion 35 35     inversion 32 35 Pain   eversion 21 22 Pain      L/E MMT Right Left Pain/Dysfunction with Movement   Hip Flexion 4/5 4/5     Hip Abduction 4/5 4/5     Hip Adduction 5/5 5/5     Knee Flexion 4+/5 5/5     Knee Extension 4/5 4/5     Ankle DF 4/5 4/5     Ankle PF 3+/5 4/5     Ankle Inversion 3+/5 3/5     Ankle Eversion 4/5 4/5     Big Toe Extension 5/5 5/5           CMS Impairment/Limitation/Restriction for FOTO foot/ankle Survey     Therapist reviewed FOTO scores for Verenice Mirza on 10/1/2020.   FOTO documents entered into [employer] - see Media section.     Limitation Score: 42%               Home Exercises Provided and Patient Education Provided:    Education provided:   - Continue HEPs.    Written Home Exercises Provided: Not today.   Exercises were reviewed and Verenice was able to demonstrate them prior to the end of the session.  Verenice demonstrated good  understanding of the education provided.     See EMR under Patient Instructions for exercises provided 9/2/2020, 9/18/2020 and 9/21/2020.    Assessment     Pt presents with swelling along ATFL of right ankle. Pt educated on further home modalities to improve short term pain. Pt tolerated full session of therex but noted medial knee pain with calf stretching. Pt wants to focus more on right knee at this time and will be DC from ankle.    Verenice is progressing well towards her goals.   Pt prognosis is Excellent.   Pt will continue to benefit from skilled outpatient physical therapy to address the deficits listed in the problem list box on initial evaluation, provide pt/family education and to maximize pt's level of independence in the home and community environment.     Pt's spiritual, cultural and educational needs considered and pt agreeable to plan of care and goals.  Anticipated barriers to physical therapy:  Multiple joint affected, High BMI    Long Term Goals (4 Weeks):   1. Pt to be (I) with HEP  2. Pt will demonstrate decreased R ankle  pain at worst to 0/10 while performing functional activities. NOT MET  3. Pt will demonstrate increased R ankle AROM to WFL in order to ambulate with normalized gait pattern. NOT MET  4. Pt will demonstrate increased R ankle strength to 5/5 grossly.NOT MET   5. Pt will be able to negotiate L3 step x 20 reps without UE (A) and no LOBs. NOT MET    Plan     Dc today from ankle, eval knee and shoulder next visit.    Adrian Griffin, PT

## 2020-10-05 ENCOUNTER — PATIENT MESSAGE (OUTPATIENT)
Dept: ADMINISTRATIVE | Facility: HOSPITAL | Age: 65
End: 2020-10-05

## 2020-10-05 ENCOUNTER — CLINICAL SUPPORT (OUTPATIENT)
Dept: REHABILITATION | Facility: HOSPITAL | Age: 65
End: 2020-10-05
Payer: MEDICARE

## 2020-10-05 DIAGNOSIS — G89.29 CHRONIC PAIN OF RIGHT ANKLE: ICD-10-CM

## 2020-10-05 DIAGNOSIS — G89.29 BILATERAL CHRONIC KNEE PAIN: Primary | ICD-10-CM

## 2020-10-05 DIAGNOSIS — R29.898 DECREASED STRENGTH OF LOWER EXTREMITY: ICD-10-CM

## 2020-10-05 DIAGNOSIS — Z74.09 IMPAIRED MOBILITY: ICD-10-CM

## 2020-10-05 DIAGNOSIS — M25.571 CHRONIC PAIN OF RIGHT ANKLE: ICD-10-CM

## 2020-10-05 DIAGNOSIS — M25.561 BILATERAL CHRONIC KNEE PAIN: Primary | ICD-10-CM

## 2020-10-05 DIAGNOSIS — M25.562 BILATERAL CHRONIC KNEE PAIN: Primary | ICD-10-CM

## 2020-10-05 PROBLEM — M25.671 DECREASED RANGE OF MOTION OF RIGHT ANKLE: Status: RESOLVED | Noted: 2020-09-03 | Resolved: 2020-10-05

## 2020-10-05 PROCEDURE — 97161 PT EVAL LOW COMPLEX 20 MIN: CPT | Mod: PN

## 2020-10-05 PROCEDURE — 97110 THERAPEUTIC EXERCISES: CPT | Mod: PN

## 2020-10-05 NOTE — PLAN OF CARE
[facility] [place] [facility]  Physical Therapy Initial Evaluation    Name: Verenice Mirza  Clinic Number: [id number]    Therapy Diagnosis:   Encounter Diagnoses   Name Primary?    Bilateral chronic knee pain Yes    Chronic pain of right ankle     Impaired mobility     Decreased strength of lower extremity      Physician: Mark Reeves, *    Physician Orders: PT Eval and Treat   Medical Diagnosis from Referral: M17.0 (ICD-10-CM) - Primary osteoarthritis of knees, bilateral  Evaluation Date: 10/5/2020  Authorization Period Expiration: 12/31/2020  Plan of Care Expiration: 10/5/2020 to 11/20/2020  Visit # / Visits authorized: 1/50    Time In: 1420  Time Out: 1500  Total Billable Time: 40 minutes    Precautions: Standard    Subjective     Date of onset: August 2020    History of current condition - Verenice reports: Fall onto R side resulting in R shoulder, knee and ankle pain. Patient was recently discharged from therapy for her ankle and foot. However patient reports she continues to have issues and would like therapy to address it in addition to knee. Patient reports absent shoulder pain after injection on 8/25/2020. Patient received series of 3 gel injections without relief in B knee without relief on R. Patient reports possible MRI to R knee later this month.      Medical History:   Past Medical History:   Diagnosis Date    BMI 40.0-44.9, adult     Breast cyst     Dysphagia     Dysplastic nevus of trunk 03/2017    moderately atypical    Fatty liver disease, nonalcoholic     Fibrocystic breast     Hiatal hernia     Hyperlipidemia     Hypertension     Hypothyroid     IGT (impaired glucose tolerance)     Kidney stones     Left breast mass     Lumbar disc disease     Morbid obesity     Nicotine use disorder     JORDANA on CPAP     PAD (peripheral artery disease)     PVD (peripheral vascular disease)     Renal angiomyolipoma     Rosacea     Squamous cell carcinoma 12/2017    in situ mid  scalp    Venous insufficiency     Vitamin D deficiency disease      Surgical History:   Verenice Mirza  has a past surgical history that includes Hysterectomy; Bladder suspension; Lithotripsy; Cystoscopy; Oophorectomy; Colonoscopy (N/A, 5/24/2017); Esophagogastroduodenoscopy (N/A, 12/5/2018); Excisional biopsy (Left, 8/19/2019); and Breast biopsy.    Medications:   Verenice has a current medication list which includes the following prescription(s): albuterol, aspirin, atorvastatin, b complex vitamins, biotin, true metrix glucose test strip, co-enzyme q-10, famotidine, finasteride, fluzone high-dose 2019-20 (pf), glucosamine/chondr tyler a sod, inhalation spacing device, ketoconazole, lactobacillus 3/fos/pantethine, lancets, levothyroxine, lutein, magnesium oxide, meloxicam, metformin, metoprolol succinate, nystatin, omega-3 acid ethyl esters, raloxifene, spironolactone, vitamin d, and tamsulosin.    Allergies:   Review of patient's allergies indicates:  No Known Allergies     Imaging: X-Ray Ankle 8/2020: Three views: No fracture dislocation bone destruction or OCD seen. No acute trauma seen. There is a spur on the calcaneus.   X-Ray Knee 8/2020: Two views: No fracture dislocation bone destruction or OCD seen.     Prior Therapy: Discharged from therapy last week  Social History: Patient lives alone in single story house. Patient denies barreirs  Occupation: Retired  Prior Level of Function: Independent  Current Level of Function: Independent; pain limiting walking distance and closed chain exercises.    Pain:  Current 0/10, worst 8/10, best 0/10   Location: R medial knee  Description: Throbbing  Aggravating Factors: Walking long distances; nighttime  Easing Factors: Placing pillow between knee and mattress    Current 5/10, worst 7/10, best 0/10   Location: R lateral ankle   Description: Throbbing  Aggravating Factors: Walking, wearing tennis shoes, pressure  Easing Factors: Ice    Pts goals: Eliminate pain    Objective  "    WNL=within normal limits  WFL=within functional limits  NT=not tested  !=pain    Palpation: Tenderness to R patellar retinaculum, R lateral malleolus    Hip Right  Left  Pain/Dysfunction with Movement    AROM PROM AROM PROM    Flexion WFL NT WFL NT    Extension WFL NT WFL NT    Abduction WNL NT WFL NT    Adduction WNL NT WNL NT    Internal rotation WNL! NT WNL NT    External rotation WNL NT WNL NT       Knee Right  Left  Pain/Dysfunction with Movement    AROM PROM AROM PROM    Flexion WFL NT WFL NT    Extension WNL NT WNL NT       Ankle Right  Left  Pain/Dysfunction with Movement    AROM PROM AROM PROM    Dorsiflexion 16 NT WNL NT    Plantarflexion WNL Nt WNL NT    Inversion WNL NT WNL NT    Eversion WNL NT WNL NT      LE MMT Right Left Pain/Dysfunction with Movement   Hip flexion 4/5! 4/5 Pain behind knee   Hip extension 4-/5 3-/5 Pain in low back   Hip abduction 4+/5 4+/5    Hip adduction 3-/5 4-/5    Knee flexion 5/5! 5/5 Pain in medial knee   Knee extension 5/5! 5/5 Pain in medial knee   Ankle dorsiflexion 4+/5! 4+/5 Pain in lateral ankle   Ankle inversion 4/5! 4+/5 Pain in medial knee   Ankle eversion 4+/5 4+/5      Joint Mobility:   -Patellar: Mild hypomobility R  -Talocrural: Comparable B  -Subtalar: Comparable B    Special Tests: Bernard's medial meniscus (+) R for medial knee pain; lateral meniscus (-) R  Anterior drawer (+) R for anterior ankle pain; (-) for laxity    CMS Impairment/Limitation/Restriction for FOTO Knee Survey    Therapist reviewed FOTO scores for Verenice Mirza on 10/5/2020.   FOTO documents entered into [employer] - see Media section.    Limitation Score: 60%  Category: Mobility     TREATMENT     Treatment Time In: 1445  Treatment Time Out: 1500  Total Treatment time separate from Evaluation: 15 minutes    Verenice received therapeutic exercises to develop strength and endurance for 15 minutes including:    Quad set: 5"x10 R  Straight leg raise with hip external rotation: x10 R  Sidelying hip " adduction: x10 R    Home Exercises and Patient Education Provided:    Education provided:   - Findings; prognosis and plan of care (POC)  - Home exercise program (HEP)  - Modality options  - Therapist contact information    Written Home Exercises Provided: yes.  Exercises were reviewed and Verenice was able to demonstrate them prior to the end of the session.  Verenice demonstrated good  understanding of the education provided.     See EMR under Notes-Patient Instructions for exercises provided 10/5/2020.    Assessment     Verenice is a 65 y.o. female referred to outpatient Physical Therapy with a medical diagnosis of primary osteoarthritis of B knee. Pt presents to initial evaluation with chronic tissue irritability after fall. Possible dysfunction to L ATFL however more assessment warranted. Hip weakness present which influences knee and ankle joint loading.     Pt prognosis is Good.   Pt will benefit from skilled outpatient Physical Therapy to address the deficits stated above and in the chart below, provide pt/family education, and to maximize pt's level of independence.     Plan of care discussed with patient: Yes  Pt's spiritual, cultural and educational needs considered and pt agreeable to plan of care and goals as stated below: None    Anticipated Barriers for therapy: Chronicity of pain; traumatic inciting event; sedentary lifestyle; BMI    Medical Necessity is demonstrated by the following  History  Co-morbidities and personal factors that may impact the plan of care Co-morbidities:   Arthritis, Back pain, BMI over 30, Diabetes Type I or II, Gastrointestinal Disease,  Headaches, High Blood Pressure, Peripheral Vascular Disease, Sleep dysfunction    Personal Factors:   lifestyle-exercise seldom or never     high   Examination  Body Structures and Functions, activity limitations and participation restrictions that may impact the plan of care Body Regions:   lower extremities    Body Systems:     ROM  strength  balance  gait  transfers  transitions    Participation Restrictions:   Community ambulation    Activity limitations:   Learning and applying knowledge  no deficits    General Tasks and Commands  no deficits    Communication  no deficits    Mobility  walking    Self care  washing oneself (bathing, drying, washing hands)  dressing    Domestic Life  shopping  cooking  doing house work (cleaning house, washing dishes, laundry)    Interactions/Relationships  no deficits    Life Areas  no deficits    Community and Social Life  community life  recreation and leisure         moderate   Clinical Presentation stable and uncomplicated low   Decision Making/ Complexity Score: low     Short Term Goals (3 Weeks):  1. Pt will be compliant with HEP to supplement PT in restoring pain free function.  2. Pt will improve impaired LE MMTs to >/= 4/5 to improve dynamic knee support for functional tasks.  3. Pt will report 50% improvement in knee and ankle pain while walking to promote community ambulation.  Long Term Goals (6 Weeks):  1. Pt will improve FOTO score to </= 40% limited to decrease perceived limitation with mobility.   2. Pt will improve impaired LE MMTs to >/= 4+/5 to improve dynamic knee support for functional tasks.  3. Pt will report 80% improvement in knee and ankle pain while walking to promote community ambulation.     Plan     Plan of care Certification: 10/5/2020 to 11/20/2020.    Outpatient Physical Therapy 2 times weekly for 7 weeks to include the following interventions: Electrical Stimulation -, Gait Training, Manual Therapy, Moist Heat/ Ice, Neuromuscular Re-ed, Patient Education, Self Care, Therapeutic Activites and Therapeutic Exercise.   Modalities, Kinesiotape, and Functional Dry Needling as needed.    Kerline Gorman, PT, PT, DPT

## 2020-10-07 PROBLEM — M25.571 CHRONIC PAIN OF RIGHT ANKLE: Status: ACTIVE | Noted: 2020-10-07

## 2020-10-07 PROBLEM — G89.29 BILATERAL CHRONIC KNEE PAIN: Status: ACTIVE | Noted: 2020-10-07

## 2020-10-07 PROBLEM — Z74.09 IMPAIRED MOBILITY: Status: ACTIVE | Noted: 2020-10-07

## 2020-10-07 PROBLEM — M25.562 BILATERAL CHRONIC KNEE PAIN: Status: ACTIVE | Noted: 2020-10-07

## 2020-10-07 PROBLEM — R29.898 DECREASED STRENGTH OF LOWER EXTREMITY: Status: ACTIVE | Noted: 2020-10-07

## 2020-10-07 PROBLEM — M25.561 BILATERAL CHRONIC KNEE PAIN: Status: ACTIVE | Noted: 2020-10-07

## 2020-10-07 PROBLEM — G89.29 CHRONIC PAIN OF RIGHT ANKLE: Status: ACTIVE | Noted: 2020-10-07

## 2020-10-07 NOTE — PATIENT INSTRUCTIONS
Quadriceps Set    Tighten muscles on top of thighs by pushing knees down into a rolled towel.  Hold 5 seconds. Repeat 10 times each leg per set. Perform 1 sessions each hour you are awake.          Copyright © 5476-2832 [employer]  Copyright © [employer]. All rights reserved.

## 2020-10-11 ENCOUNTER — PATIENT MESSAGE (OUTPATIENT)
Dept: SPORTS MEDICINE | Facility: CLINIC | Age: 65
End: 2020-10-11

## 2020-10-12 NOTE — PROGRESS NOTES
Subjective:       Patient ID: Verenice Mirza is a 65 y.o. female.    Chief Complaint: No chief complaint on file.    HPI Ms. Mirza 64-year-old female who returns for follow-up with a diagnosis of atypical ductal hyperplasia.    The patient location is:  home.  The chief complaint leading to consultation is:  Atypical ductal hyperplasia.    Visit type: audiovisual    Face to Face time with patient:  10 minutes  .  Fifteen  minutes of total time spent on the encounter, which includes face to face time and non-face to face time preparing to see the patient (eg, review of tests), Obtaining and/or reviewing separately obtained history, Documenting clinical information in the electronic or other health record, Independently interpreting results (not separately reported) and communicating results to the patient/family/caregiver, or Care coordination (not separately reported).         Each patient to whom he or she provides medical services by telemedicine is:  (1) informed of the relationship between the physician and patient and the respective role of any other health care provider with respect to management of the patient; and (2) notified that he or she may decline to receive medical services by telemedicine and may withdraw from such care at any time.    Notes:   Mammogram in July was unremarkable.  She is due to have an MRI in January.    Some recent intermittent headaches.  She fell in August, injured her ankle, knee, and shoulder - on the right. Going to PT.  Has some hot flashes.  No shortness of breath or other symptoms.    Mammogram from July 1, 2019 showed architectural distortion left breast 2:00 position.  There was nothing seen by ultrasound.    On July 8, 2019 a needle biopsy was performed which showed benign breast tissue with apocrine adenosis, calcifications but no atypia.    Excisional biopsy on August 19, 2019 showed atypical ductal hyperplasia.    She started raloxifene in January 2020.    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  Review of Systems   Constitutional: Negative for unexpected weight change.   Respiratory: Negative.    Gastrointestinal: Negative.    Musculoskeletal:        Pain in her right ankle, knee, and shoulder   Psychiatric/Behavioral: The patient is not nervous/anxious.        Objective:      Physical Exam  Constitutional:       General: She is not in acute distress.     Appearance: Normal appearance. She is well-developed.   Neurological:      Mental Status: She is alert and oriented to person, place, and time.   Psychiatric:         Mood and Affect: Mood normal.         Behavior: Behavior normal.         Thought Content: Thought content normal.         Judgment: Judgment normal.         Assessment:       1. Atypical ductal hyperplasia of left breast        Plan:       I instructed her to take a month off from her raloxifene dizzy of her headaches improved.  She will call let me know how she is feeling at that time.  She is due for her MRI in January and I will see her 3 months after that.

## 2020-10-13 ENCOUNTER — OFFICE VISIT (OUTPATIENT)
Dept: HEMATOLOGY/ONCOLOGY | Facility: CLINIC | Age: 65
End: 2020-10-13
Payer: MEDICARE

## 2020-10-13 DIAGNOSIS — N60.92 ATYPICAL DUCTAL HYPERPLASIA OF LEFT BREAST: Primary | ICD-10-CM

## 2020-10-13 PROCEDURE — 99212 OFFICE O/P EST SF 10 MIN: CPT | Mod: 95,,, | Performed by: INTERNAL MEDICINE

## 2020-10-13 PROCEDURE — 99212 PR OFFICE/OUTPT VISIT, EST, LEVL II, 10-19 MIN: ICD-10-PCS | Mod: 95,,, | Performed by: INTERNAL MEDICINE

## 2020-10-15 ENCOUNTER — PATIENT MESSAGE (OUTPATIENT)
Dept: INTERNAL MEDICINE | Facility: CLINIC | Age: 65
End: 2020-10-15

## 2020-10-15 ENCOUNTER — CLINICAL SUPPORT (OUTPATIENT)
Dept: REHABILITATION | Facility: HOSPITAL | Age: 65
End: 2020-10-15
Payer: MEDICARE

## 2020-10-15 DIAGNOSIS — M25.561 BILATERAL CHRONIC KNEE PAIN: ICD-10-CM

## 2020-10-15 DIAGNOSIS — R29.898 DECREASED STRENGTH OF LOWER EXTREMITY: ICD-10-CM

## 2020-10-15 DIAGNOSIS — G89.29 BILATERAL CHRONIC KNEE PAIN: ICD-10-CM

## 2020-10-15 DIAGNOSIS — Z74.09 IMPAIRED MOBILITY: ICD-10-CM

## 2020-10-15 DIAGNOSIS — M25.562 BILATERAL CHRONIC KNEE PAIN: ICD-10-CM

## 2020-10-15 DIAGNOSIS — G89.29 CHRONIC PAIN OF RIGHT ANKLE: ICD-10-CM

## 2020-10-15 DIAGNOSIS — M25.571 CHRONIC PAIN OF RIGHT ANKLE: ICD-10-CM

## 2020-10-15 PROCEDURE — 97110 THERAPEUTIC EXERCISES: CPT | Mod: PN

## 2020-10-15 PROCEDURE — 97140 MANUAL THERAPY 1/> REGIONS: CPT | Mod: PN

## 2020-10-15 NOTE — PROGRESS NOTES
Physical Therapy Daily Treatment Note     Name: Verenice Mirza  Clinic Number: [id number]    Therapy Diagnosis: No diagnosis found.  Physician: Areli Crespo MD    Visit Date: 10/15/2020  Physician Orders: PT Eval and Treat   Medical Diagnosis from Referral: M17.0 (ICD-10-CM) - Primary osteoarthritis of knees, bilateral  Evaluation Date: 10/5/2020  Authorization Period Expiration: 12/31/2020  Plan of Care Expiration: 10/5/2020 to 11/20/2020  Visit # / Visits authorized: 2/50 (do not follow referral on L side barP  FOTO: 2/10    Time In: 1532  Time Out: 1414  Total Billable Time: 42 minutes    Precautions: Standard    Subjective     Pt reports: medial knee and lateral ankle pain is about the same. Patient reports icing it in addition to performing hand and instrument soft tissue mobilization to medial knee without relief. Patient reports she experiences medial knee pain with   She was compliant with home exercise program.  Response to previous treatment:   Functional change: HEP compliance    Pain: 6-7/10  Location: R medial knee and lateral ankle     Objective     Verenice received therapeutic exercises to develop strength and endurance for 27 minutes including:    Endurance training and B knee and ankle AAROM with reciprocal motion of upper and lower limbs on sci-fit x 5' at level 3 at > or equal to 70 spm without rest.     Straight leg raise: 3x10 B  Long arc quads: YTB 2x15 R    Standing:  Double leg heel raises: 2x10  Hip abduction: YTB 2x10 B  Hip extension: YTB 2x10 B  Cybex leg press: 5.0 plates 2x10 double leg  Supported mini squats: x10  Hip hinge c/ hands behind back: x10    Verenice received the following manual therapy techniques to the R knee for 15 minutes, including:  Instrument assisted soft tissue mobilization to R medial knee, spanning between VMO and proximal tibial shaft; desensitization to same area via hand and towel    Home Exercises Provided and Patient Education  Provided:     Education provided:   - Continue HEP  - Desensitization techniques to medial knee; perform 3-5 times a day    Written Home Exercises Provided: Not today.  Exercises were reviewed and Verenicewas able to demonstrate them prior to the end of the session.  Verenice demonstrated good  understanding of the education provided.     See EMR under Notes-Patient Instructions for exercises provided 10/5/2020.    Assessment     Sensitivity to light touch of course textures; seems to contribute to current knee and ankle pain. Exercises with minimal to no knee or ankle pain.     Verenice is progressing well towards her goals.   Pt prognosis is Good.   Pt will continue to benefit from skilled outpatient physical therapy to address the deficits listed in the problem list box on initial evaluation, provide pt/family education and to maximize pt's level of independence in the home and community environment.     Pt's spiritual, cultural and educational needs considered and pt agreeable to plan of care and goals.  Anticipated barriers to physical therapy: Chronicity of pain; traumatic inciting event; sedentary lifestyle; BMI     Short Term Goals (3 Weeks):  1. Pt will be compliant with HEP to supplement PT in restoring pain free function.  2. Pt will improve impaired LE MMTs to >/= 4/5 to improve dynamic knee support for functional tasks.  3. Pt will report 50% improvement in knee and ankle pain while walking to promote community ambulation.  Long Term Goals (6 Weeks):  1. Pt will improve FOTO score to </= 40% limited to decrease perceived limitation with mobility.   2. Pt will improve impaired LE MMTs to >/= 4+/5 to improve dynamic knee support for functional tasks.  3. Pt will report 80% improvement in knee and ankle pain while walking to promote community ambulation.    Plan     Progress closed chain strength to tolerance.    Kerline Gorman, PT, PT, DPT

## 2020-10-16 ENCOUNTER — PATIENT MESSAGE (OUTPATIENT)
Dept: INTERNAL MEDICINE | Facility: CLINIC | Age: 65
End: 2020-10-16

## 2020-10-19 ENCOUNTER — PATIENT MESSAGE (OUTPATIENT)
Dept: INTERNAL MEDICINE | Facility: CLINIC | Age: 65
End: 2020-10-19

## 2020-10-21 ENCOUNTER — CLINICAL SUPPORT (OUTPATIENT)
Dept: REHABILITATION | Facility: HOSPITAL | Age: 65
End: 2020-10-21
Payer: MEDICARE

## 2020-10-21 DIAGNOSIS — G89.29 CHRONIC PAIN OF RIGHT ANKLE: ICD-10-CM

## 2020-10-21 DIAGNOSIS — Z74.09 IMPAIRED MOBILITY: ICD-10-CM

## 2020-10-21 DIAGNOSIS — M25.571 CHRONIC PAIN OF RIGHT ANKLE: ICD-10-CM

## 2020-10-21 DIAGNOSIS — R29.898 DECREASED STRENGTH OF LOWER EXTREMITY: ICD-10-CM

## 2020-10-21 DIAGNOSIS — M25.562 BILATERAL CHRONIC KNEE PAIN: ICD-10-CM

## 2020-10-21 DIAGNOSIS — M25.561 BILATERAL CHRONIC KNEE PAIN: ICD-10-CM

## 2020-10-21 DIAGNOSIS — G89.29 BILATERAL CHRONIC KNEE PAIN: ICD-10-CM

## 2020-10-21 PROCEDURE — 97140 MANUAL THERAPY 1/> REGIONS: CPT | Mod: PN,CQ

## 2020-10-21 PROCEDURE — 97110 THERAPEUTIC EXERCISES: CPT | Mod: PN,CQ

## 2020-10-21 NOTE — PROGRESS NOTES
"                          Physical Therapy Daily Treatment Note     Name: Verenice Mirza  Clinic Number: [id number]    Therapy Diagnosis:   Encounter Diagnoses   Name Primary?    Bilateral chronic knee pain     Chronic pain of right ankle     Impaired mobility     Decreased strength of lower extremity      Physician: Areli Crespo MD    Visit Date: 10/21/2020  Physician Orders: PT Eval and Treat   Medical Diagnosis from Referral: M17.0 (ICD-10-CM) - Primary osteoarthritis of knees, bilateral  Evaluation Date: 10/5/2020  Authorization Period Expiration: 12/31/2020  Plan of Care Expiration: 10/5/2020 to 11/20/2020  Visit # / Visits authorized: 3/50  FOTO: 3/5    Time In: 1430  Time Out: 1515  Total Billable Time: 45 minutes  MTx1, TEx2    Precautions: Standard    Subjective     Pt reports: lateral left hip pain today.  "I don't know what its like to not have pain anymore"  States she feels like the sensitivity is decreasing.  "oh but it is still there."  She was compliant with home exercise program.  Response to previous treatment:   Functional change: HEP compliance    Pain: 6/10  Location: R medial knee and lateral ankle     Objective     Verenice received therapeutic exercises to develop strength and endurance for 30 minutes including:    Endurance training and B knee and ankle AAROM with reciprocal motion of upper and lower limbs on sci-fit x 8' at level 3 at > or equal to 70 spm without rest.     Straight leg raise: 3x10 B  Long arc quads: YTB 2x15 R    Standing:  Double leg heel raises: 2x15  Hip abduction: YTB 2x12 B  Hip extension: YTB 2x12 B  Cybex leg press: 5.0 plates 2x10 double leg  Supported mini squats: 2x10  Hip hinge c/ hands behind back: x10  Out of time    Verenice received the following manual therapy techniques to the R knee for 15 minutes, including:  Instrument assisted soft tissue mobilization to R medial knee, spanning between VMO and proximal tibial shaft; desensitization to same area via hand " "and towel    Home Exercises Provided and Patient Education Provided:     Education provided:   - Continue HEP  - Desensitization techniques to medial knee; perform 3-5 times a day    Written Home Exercises Provided: Not today.  Exercises were reviewed and Verenice was able to demonstrate them prior to the end of the session.  Verenice demonstrated good  understanding of the education provided.     See EMR under Notes-Patient Instructions for exercises provided 10/5/2020.    Assessment     Continued sensitivity to light touch of course textures; seems to be decreasing by patient subjective report. Able to perform exercises including increased reps as bolded with no increased pain.  States "5/10" after treatment.     Verenice is progressing well towards her goals.   Pt prognosis is Good.   Pt will continue to benefit from skilled outpatient physical therapy to address the deficits listed in the problem list box on initial evaluation, provide pt/family education and to maximize pt's level of independence in the home and community environment.     Pt's spiritual, cultural and educational needs considered and pt agreeable to plan of care and goals.  Anticipated barriers to physical therapy: Chronicity of pain; traumatic inciting event; sedentary lifestyle; BMI     Short Term Goals (3 Weeks):  1. Pt will be compliant with HEP to supplement PT in restoring pain free function.  2. Pt will improve impaired LE MMTs to >/= 4/5 to improve dynamic knee support for functional tasks.  3. Pt will report 50% improvement in knee and ankle pain while walking to promote community ambulation.  Long Term Goals (6 Weeks):  1. Pt will improve FOTO score to </= 40% limited to decrease perceived limitation with mobility.   2. Pt will improve impaired LE MMTs to >/= 4+/5 to improve dynamic knee support for functional tasks.  3. Pt will report 80% improvement in knee and ankle pain while walking to promote community ambulation.    Plan     Progress " closed chain strength to tolerance.    Jazmyne Morales, PTA

## 2020-10-22 ENCOUNTER — PATIENT OUTREACH (OUTPATIENT)
Dept: ADMINISTRATIVE | Facility: OTHER | Age: 65
End: 2020-10-22

## 2020-10-22 NOTE — PROGRESS NOTES
Care Everywhere: updated  Immunization: updated(delay in links)   Health Maintenance: updated  Media Review:   Legacy Review:   Order placed:   Upcoming appts:optometry 10/23     immune

## 2020-10-23 ENCOUNTER — OFFICE VISIT (OUTPATIENT)
Dept: OPTOMETRY | Facility: CLINIC | Age: 65
End: 2020-10-23
Payer: MEDICARE

## 2020-10-23 DIAGNOSIS — E11.59 HYPERTENSION ASSOCIATED WITH DIABETES: ICD-10-CM

## 2020-10-23 DIAGNOSIS — Z13.5 GLAUCOMA SCREENING: ICD-10-CM

## 2020-10-23 DIAGNOSIS — E11.69 DYSLIPIDEMIA ASSOCIATED WITH TYPE 2 DIABETES MELLITUS: ICD-10-CM

## 2020-10-23 DIAGNOSIS — H25.13 NUCLEAR SCLEROSIS, BILATERAL: ICD-10-CM

## 2020-10-23 DIAGNOSIS — E78.5 DYSLIPIDEMIA ASSOCIATED WITH TYPE 2 DIABETES MELLITUS: ICD-10-CM

## 2020-10-23 DIAGNOSIS — E11.9 TYPE 2 DIABETES MELLITUS WITHOUT RETINOPATHY: Primary | ICD-10-CM

## 2020-10-23 DIAGNOSIS — I15.2 HYPERTENSION ASSOCIATED WITH DIABETES: ICD-10-CM

## 2020-10-23 PROCEDURE — 99999 PR PBB SHADOW E&M-EST. PATIENT-LVL IV: ICD-10-PCS | Mod: PBBFAC,,, | Performed by: OPTOMETRIST

## 2020-10-23 PROCEDURE — 99999 PR PBB SHADOW E&M-EST. PATIENT-LVL IV: CPT | Mod: PBBFAC,,, | Performed by: OPTOMETRIST

## 2020-10-23 PROCEDURE — 92014 PR EYE EXAM, EST PATIENT,COMPREHESV: ICD-10-PCS | Mod: S$PBB,,, | Performed by: OPTOMETRIST

## 2020-10-23 PROCEDURE — 99214 OFFICE O/P EST MOD 30 MIN: CPT | Mod: PBBFAC,PO | Performed by: OPTOMETRIST

## 2020-10-23 PROCEDURE — 92014 COMPRE OPH EXAM EST PT 1/>: CPT | Mod: S$PBB,,, | Performed by: OPTOMETRIST

## 2020-10-23 NOTE — LETTER
October 23, 2020      Areli Crespo MD  [address]  [place] [address]           [facility]ometry 1st Fl  [address].  [place] [address]  Phone: [phone]  Fax: [phone]          Patient: Verenice Mirza   MR Number: [id number]   YOB: 1955   Date of Visit: 10/23/2020       Dear Dr. Areli Crespo:    Thank you for referring Verenice Mirza to me for evaluation. Attached you will find relevant portions of my assessment and plan of care.    If you have questions, please do not hesitate to call me. I look forward to following Verenice Miraz along with you.    Sincerely,    Michael RIVERAEran Mirza, OD    Enclosure  CC:  No Recipients    If you would like to receive this communication electronically, please contact [email or web address] or [phone] to request more information on [employer] access.    For providers and/or their staff who would like to refer a patient to [facility], please contact us through our one-stop-shop provider referral line, [facility], at [phone].    If you feel you have received this communication in error or would no longer like to receive these types of communications, please e-mail [email or web address]

## 2020-10-23 NOTE — PROGRESS NOTES
HPI     DLS: 5/13/19  Pt states no Va problems wears OTC reader +1.50 does not want refraction.   No f/f  genteal gtts Occ   DM  LBS: didn't check today   Hemoglobin A1C       Date                     Value               Ref Range             Status                09/18/2020               8.1 (H)             4.0 - 5.6 %           Final                    01/07/2020               7.1 (H)             <5.7 % of tota*       Final                         08/05/2019               6.6 (H)             <5.7 % of tota*       Final              Comment:           Last edited by Michael Mirza, OD on 10/23/2020 10:19 AM. (History)        ROS     Positive for: Endocrine (DM)    Negative for: Constitutional, Gastrointestinal, Neurological, Skin,   Genitourinary, Musculoskeletal, HENT, Cardiovascular, Eyes, Respiratory,   Psychiatric, Allergic/Imm, Heme/Lymph    Last edited by Michael Mirza, OD on 10/23/2020 10:19 AM. (History)        Assessment /Plan     For exam results, see Encounter Report.    Type 2 diabetes mellitus without retinopathy    Hypertension associated with diabetes  -     Ambulatory referral/consult to Optometry    Dyslipidemia associated with type 2 diabetes mellitus  -     Ambulatory referral/consult to Optometry    Nuclear sclerosis, bilateral    Glaucoma screening      1. [employer] [place] OU--pt happy w otc readers--wishes to defer refraction  2. DM- WITHOUT RETINOPATHY.  Advised yearly DFE  3. Dry eyes--pt to cont w ATs    PLAN:    rtc 1 yr

## 2020-10-26 ENCOUNTER — PATIENT MESSAGE (OUTPATIENT)
Dept: SPORTS MEDICINE | Facility: CLINIC | Age: 65
End: 2020-10-26

## 2020-10-26 ENCOUNTER — CLINICAL SUPPORT (OUTPATIENT)
Dept: REHABILITATION | Facility: HOSPITAL | Age: 65
End: 2020-10-26
Payer: MEDICARE

## 2020-10-26 DIAGNOSIS — G89.29 CHRONIC PAIN OF RIGHT ANKLE: ICD-10-CM

## 2020-10-26 DIAGNOSIS — M25.571 CHRONIC PAIN OF RIGHT ANKLE: ICD-10-CM

## 2020-10-26 DIAGNOSIS — G89.29 BILATERAL CHRONIC KNEE PAIN: ICD-10-CM

## 2020-10-26 DIAGNOSIS — R29.898 DECREASED STRENGTH OF LOWER EXTREMITY: ICD-10-CM

## 2020-10-26 DIAGNOSIS — Z74.09 IMPAIRED MOBILITY: ICD-10-CM

## 2020-10-26 DIAGNOSIS — M25.562 BILATERAL CHRONIC KNEE PAIN: ICD-10-CM

## 2020-10-26 DIAGNOSIS — M25.561 BILATERAL CHRONIC KNEE PAIN: ICD-10-CM

## 2020-10-26 PROCEDURE — 97110 THERAPEUTIC EXERCISES: CPT | Mod: PN

## 2020-10-26 PROCEDURE — 97140 MANUAL THERAPY 1/> REGIONS: CPT | Mod: PN

## 2020-10-26 NOTE — TELEPHONE ENCOUNTER
Spoke c pt. Scheduled appt per referral from Dr. Reeves staff. Confirmed appt location & time. Pt expressed understanding & was thankful.

## 2020-10-26 NOTE — PROGRESS NOTES
"                          Physical Therapy Daily Treatment Note     Name: Verenice Mirza  Clinic Number: [id number]    Therapy Diagnosis:   Encounter Diagnoses   Name Primary?    Bilateral chronic knee pain     Chronic pain of right ankle     Impaired mobility     Decreased strength of lower extremity      Physician: Areli Crespo MD    Visit Date: 10/26/2020  Physician Orders: PT Eval and Treat   Medical Diagnosis from Referral: M17.0 (ICD-10-CM) - Primary osteoarthritis of knees, bilateral  Evaluation Date: 10/5/2020  Authorization Period Expiration: 12/31/2020  Plan of Care Expiration: 10/5/2020 to 11/20/2020  Visit # / Visits authorized: 4/50  FOTO: 4/5    Time In: 09:15 AM  Time Out: 10:00 AM  Total Billable Time: 45 minutes  MTx1, TEx2    Precautions: Standard    Subjective     Pt reports: an increase in right medial knee pain that hurts all the time without relief. Pt states that her knee is "less sensitive" than is was before.   She was compliant with home exercise program.  Response to previous treatment: desensitization of R knee  Functional change: none noted at this time     Pain: 7/10  Location: R medial knee and lateral ankle     Objective     Verenice received therapeutic exercises to develop strength and endurance for 35 minutes including:  Valgus stress test: (+) R for mild laxity at 30 degrees flexion; (-) R for pain at 0 and 30 degrees flexion    Recumbent Bike: 5 minutes with 0 resistance and without rest to improve endurance and R knee ROM     Modified Supine:   Hip ADD Iso with ball 3x10 with 5" hold   Long arc quads: 2" weight 2x15 R    HSS with towel 3x30" hold   Standing:   Hip abduction: RTB 2x12 B   Hip extension: RTB 2x12 B   Cybex leg press: 5.0 plates 3x10 double leg with RTB ABD Iso      Not today:   Straight leg raise: 3x10 B   Double leg heel raises: 2x15   Supported mini squats: 2x10   Hip hinge c/ hands behind back: x10  Out of time     Verenice received the following manual therapy " "techniques to the R knee for 10 minutes, including:   Manual soft tissue mobilization to R medial knee, spanning between VMO and proximal tibial shaft; desensitization to same area via hand and towel    Home Exercises Provided and Patient Education Provided:     Education provided:   - Pt provided updated HEP  - to continue desensitization techniques to medial knee; perform 3-5 times a day    Written Home Exercises Provided: 10/26/20 found in Clinical Instructions.   Exercises were reviewed and Verenice was able to demonstrate them prior to the end of the session.  Verenice demonstrated good  understanding of the education provided.     See EMR under Notes-Patient Instructions for exercises provided 10/26/2020.    Assessment   Pt was appropriately challenged with interventions to aid in decreasing R knee pain, decreasing R knee sensitivity, improving R LE strengthening, and B hip strengthening. Exercises were adjusted to NWB/ decreased WB to improve activity tolerance, however, strengthening and appropriate muscle activation remained the focus of exercises as pt tolerated this well. Pt demonstrated increased sensitivity to moderate pressure applied to right medial knee during soft tissue massage, however, showed improved tolerance with continuance of intervention. Pt expressed feeling "no pain" in here right knee after the completion of session with ambulation.     Verenice is progressing well towards her goals.   Pt prognosis is Good.   Pt will continue to benefit from skilled outpatient physical therapy to address the deficits listed in the problem list box on initial evaluation, provide pt/family education and to maximize pt's level of independence in the home and community environment.     Pt's spiritual, cultural and educational needs considered and pt agreeable to plan of care and goals.  Anticipated barriers to physical therapy: Chronicity of pain; traumatic inciting event; sedentary lifestyle; BMI     Short Term " Goals (3 Weeks):  1. Pt will be compliant with HEP to supplement PT in restoring pain free function.  2. Pt will improve impaired LE MMTs to >/= 4/5 to improve dynamic knee support for functional tasks.  3. Pt will report 50% improvement in knee and ankle pain while walking to promote community ambulation.  Long Term Goals (6 Weeks):  1. Pt will improve FOTO score to </= 40% limited to decrease perceived limitation with mobility.   2. Pt will improve impaired LE MMTs to >/= 4+/5 to improve dynamic knee support for functional tasks.  3. Pt will report 80% improvement in knee and ankle pain while walking to promote community ambulation.    Plan     Progress closed chain strength to tolerance.    Stephanie Baxter, SPT     I certify that I was present in the room directing the student in service delivery and guided him/her using my skilled judgment. As the co-signing therapist, I have reviewed the student's documentation and am responsible for the treatment, assessment and plan.    Kerline Gorman, PT

## 2020-10-27 ENCOUNTER — OFFICE VISIT (OUTPATIENT)
Dept: ORTHOPEDICS | Facility: CLINIC | Age: 65
End: 2020-10-27
Payer: MEDICARE

## 2020-10-27 VITALS
HEART RATE: 52 BPM | DIASTOLIC BLOOD PRESSURE: 81 MMHG | WEIGHT: 230 LBS | SYSTOLIC BLOOD PRESSURE: 124 MMHG | BODY MASS INDEX: 42.33 KG/M2 | HEIGHT: 62 IN

## 2020-10-27 DIAGNOSIS — M19.041 ARTHRITIS OF HAND, RIGHT: Primary | ICD-10-CM

## 2020-10-27 PROCEDURE — 20600 DRAIN/INJ JOINT/BURSA W/O US: CPT | Mod: PBBFAC,59 | Performed by: ORTHOPAEDIC SURGERY

## 2020-10-27 PROCEDURE — 99999 PR PBB SHADOW E&M-EST. PATIENT-LVL IV: CPT | Mod: PBBFAC,,, | Performed by: ORTHOPAEDIC SURGERY

## 2020-10-27 PROCEDURE — 99203 OFFICE O/P NEW LOW 30 MIN: CPT | Mod: 25,S$PBB,, | Performed by: ORTHOPAEDIC SURGERY

## 2020-10-27 PROCEDURE — 99214 OFFICE O/P EST MOD 30 MIN: CPT | Mod: PBBFAC | Performed by: ORTHOPAEDIC SURGERY

## 2020-10-27 PROCEDURE — 99203 PR OFFICE/OUTPT VISIT, NEW, LEVL III, 30-44 MIN: ICD-10-PCS | Mod: 25,S$PBB,, | Performed by: ORTHOPAEDIC SURGERY

## 2020-10-27 PROCEDURE — 20600 SMALL JOINT ASPIRATION/INJECTION: R THUMB IP: ICD-10-PCS | Mod: S$PBB,RT,, | Performed by: ORTHOPAEDIC SURGERY

## 2020-10-27 PROCEDURE — 99999 PR PBB SHADOW E&M-EST. PATIENT-LVL IV: ICD-10-PCS | Mod: PBBFAC,,, | Performed by: ORTHOPAEDIC SURGERY

## 2020-10-27 PROCEDURE — 20600 DRAIN/INJ JOINT/BURSA W/O US: CPT | Mod: PBBFAC | Performed by: ORTHOPAEDIC SURGERY

## 2020-10-27 RX ADMIN — DEXAMETHASONE SODIUM PHOSPHATE 4 MG: 4 INJECTION INTRA-ARTICULAR; INTRALESIONAL; INTRAMUSCULAR; INTRAVENOUS; SOFT TISSUE at 11:10

## 2020-10-27 NOTE — LETTER
November 10, 2020      Mark Reeves MD  [address]  [address]  [facility] [address]           [address]  [facility] [address]  Phone: [phone]          Patient: Verenice Mirza   MR Number: [id number]   YOB: 1955   Date of Visit: 10/27/2020       Dear Dr. Mark Reeves:    Thank you for referring Verenice Mirza to me for evaluation. Attached you will find relevant portions of my assessment and plan of care.    If you have questions, please do not hesitate to call me. I look forward to following Verenice Mirza along with you.    Sincerely,    Shona Galvez MD    Enclosure  CC:  No Recipients    If you would like to receive this communication electronically, please contact [email or web address] or [phone] to request more information on [employer] Link access.    For providers and/or their staff who would like to refer a patient to [facility], please contact us through our one-stop-shop provider referral line, [facility], at [phone].    If you feel you have received this communication in error or would no longer like to receive these types of communications, please e-mail [email or web address]

## 2020-10-27 NOTE — PROGRESS NOTES
[facility]  History & Physical  Orthopedics    SUBJECTIVE:      Chief Complaint: Pain of the Right Hand      Referring Provider: Mark Reeves, *     History of Present Illness:  Patient is a 65 y.o. right hand dominant female who presents today with complaints of right hand pain. Pain began in August 2020 after a fall & is located at the index MCP & thumb IP joints. She was seen initially by Dr. Reeves, x-rays were negative for fracture. She treated hand with ice & heat which have helped. She reports difficulty holding a pen & witting recently. She reports 5/10 pain today & describes it a sore and stiff. Denies numbness, tingling, burning, radiating pain. She is interested in injections.     Past Medical History:   Diagnosis Date    Acute seasonal allergic rhinitis     BMI 40.0-44.9, adult     Breast cyst     Dysphagia     Dysplastic nevus of trunk 03/2017    moderately atypical    Fatty liver disease, nonalcoholic     Fibrocystic breast     Hiatal hernia     Hyperlipidemia     Hypertension     Hypothyroid     IGT (impaired glucose tolerance)     Kidney stones     Left breast mass     Lumbar disc disease     Morbid obesity     Nicotine use disorder     JORDANA on CPAP     PAD (peripheral artery disease)     PVD (peripheral vascular disease)     Renal angiomyolipoma     Rosacea     Squamous cell carcinoma 12/2017    in situ mid scalp    Venous insufficiency     Vitamin D deficiency disease      Past Surgical History:   Procedure Laterality Date    BLADDER SUSPENSION      BREAST BIOPSY      COLONOSCOPY N/A 5/24/2017    Procedure: COLONOSCOPY;  Surgeon: Georgina Bunch MD;  Location: [facility];  Service: Endoscopy;  Laterality: N/A;    CYSTOSCOPY      ESOPHAGOGASTRODUODENOSCOPY N/A 12/5/2018    Procedure: ESOPHAGOGASTRODUODENOSCOPY (EGD);  Surgeon: Georgina Bunch MD;  Location: [facility];  Service: Endoscopy;  Laterality: N/A;  1000 am arrival  time, has transportation set up    EXCISIONAL BIOPSY Left 8/19/2019    Procedure: EXCISIONAL BIOPSY LEFT BREAST with SEED LOCALIZATION;  Surgeon: Ramon Bass MD;  Location: [facility] OR [address] [place] [address];  Service: General;  Laterality: Left;    HYSTERECTOMY      LITHOTRIPSY      OOPHORECTOMY       Review of patient's allergies indicates:  No Known Allergies  Social History     Social History Narrative    Not on file     Family History   Problem Relation Age of Onset    Alzheimer's disease Mother     Breast cancer Mother     Skin cancer Sister     Diabetes type II Maternal Grandfather     Depression Maternal Grandfather     Breast cancer Paternal Aunt     Melanoma Neg Hx          Current Outpatient Medications:     albuterol (PROVENTIL/VENTOLIN HFA) 90 mcg/actuation inhaler, Inhale 2 puffs into the lungs every 6 (six) hours as needed for Wheezing., Disp: 18 g, Rfl: 11    aspirin (ECOTRIN) 81 MG EC tablet, Take 81 mg by mouth 2 (two) times daily. Takes the first dose after lunch, Disp: , Rfl:     atorvastatin (LIPITOR) 40 MG tablet, Take 1 tablet (40 mg total) by mouth once daily., Disp: 90 tablet, Rfl: 3    b complex vitamins tablet, Take 1 tablet by mouth after lunch. , Disp: , Rfl:     biotin 5 mg Cap, Take 1 capsule by mouth after lunch. , Disp: , Rfl:     blood sugar diagnostic (TRUE METRIX GLUCOSE TEST STRIP) Strp, 1 strip by Misc.(Non-Drug; Combo Route) route once daily., Disp: 100 strip, Rfl: 3    co-enzyme Q-10 30 mg capsule, Take 30 mg by mouth 2 (two) times daily. , Disp: , Rfl:     famotidine (PEPCID) 40 MG tablet, Take 1 tablet (40 mg total) by mouth daily as needed for Heartburn., Disp: 90 tablet, Rfl: 1    finasteride (PROSCAR) 5 mg tablet, Take 1 tablet (5 mg total) by mouth nightly., Disp: 90 tablet, Rfl: 3    FLUZONE HIGH-DOSE 2019-20, PF, 180 mcg/0.5 mL Syrg, PHARMACIST ADMINISTERED IMMUNIZATION ADMINISTERED AT TIME OF DISPENSING, Disp: , Rfl: 0    GLUCOSAMINE HCL/CHONDR BRASWELL A NA  (OSTEO BI-FLEX ORAL), Take by mouth 2 (two) times daily., Disp: , Rfl:     inhalation spacing device (MICROSPACER), Use as directed for inhalation., Disp: 1 Device, Rfl: 0    ketoconazole (NIZORAL) 2 % cream, APPLY  CREAM TOPICALLY UNDER BREAST ONCE DAILY AT BEDTIME AS NEEDED TO  FLARE, Disp: 60 g, Rfl: 3    LACTOBAC CMB #3/FOS/PANTETHINE (PROBIOTIC & ACIDOPHILUS ORAL), Take 1 tablet by mouth after lunch. , Disp: , Rfl:     lancets (ONETOUCH ULTRASOFT LANCETS) Misc, 1 lancet by Misc.(Non-Drug; Combo Route) route once daily., Disp: 100 each, Rfl: 3    levothyroxine (SYNTHROID) 88 MCG tablet, Take 1 tablet (88 mcg total) by mouth before breakfast., Disp: 90 tablet, Rfl: 3    LUTEIN ORAL, Take 1 capsule by mouth after lunch. , Disp: , Rfl:     magnesium oxide (MAG-OX) 400 mg tablet, Take 400 mg by mouth after lunch. , Disp: , Rfl:     meloxicam (MOBIC) 7.5 MG tablet, Take 1 tablet (7.5 mg total) by mouth daily as needed for Pain., Disp: 90 tablet, Rfl: 3    metFORMIN (GLUCOPHAGE) 500 MG tablet, Take 1 tablet (500 mg total) by mouth 2 (two) times a day., Disp: 180 tablet, Rfl: 3    metoprolol succinate (TOPROL-XL) 50 MG 24 hr tablet, Take 1 tablet (50 mg total) by mouth nightly., Disp: 90 tablet, Rfl: 3    nystatin (MYCOSTATIN) powder, aaa qam prn flare, Disp: 120 g, Rfl: 6    omega-3 acid ethyl esters (LOVAZA) 1 gram capsule, Take 2 capsules (2 g total) by mouth 2 (two) times daily., Disp: 360 capsule, Rfl: 3    raloxifene (EVISTA) 60 mg tablet, Take 1 tablet (60 mg total) by mouth once daily., Disp: 90 tablet, Rfl: 3    spironolactone (ALDACTONE) 100 MG tablet, Take 1 tablet (100 mg total) by mouth once daily., Disp: 90 tablet, Rfl: 3    vitamin D 1000 units Tab, Take 185 mg by mouth after lunch. , Disp: , Rfl:       Review of Systems:  Constitutional: no fever or chills  Eyes: no visual changes  ENT: no nasal congestion or sore throat  Respiratory: no cough or shortness of breath  Cardiovascular: no  "chest pain  Gastrointestinal: no nausea or vomiting, tolerating diet  Musculoskeletal: pain, soreness and decreased ROM    OBJECTIVE:      Vital Signs (Most Recent):  Vitals:    10/27/20 1118   BP: 124/81   Pulse: (!) 52   Weight: 104.3 kg (230 lb)   Height: 5' 2" (1.575 m)     Body mass index is 42.07 kg/m².      Physical Exam:  Constitutional: The patient appears well-developed and well-nourished. No distress.   Head: Normocephalic and atraumatic.   Nose: Nose normal.   Eyes: Conjunctivae and EOM are normal.   Neck: No tracheal deviation present.   Cardiovascular: Normal rate and intact distal pulses.    Pulmonary/Chest: Effort normal. No respiratory distress.   Abdominal: There is no guarding.   Neurological: The patient is alert.   Psychiatric: The patient has a normal mood and affect.     Observation/Inspection:  Swelling  none    Deformity  none  Discoloration  none     Scars   none    Atrophy  none    RUE:  Good active ROM of the wrist and fingers. Able to make composite fist. No laxity at index MCP. Stiffness in thumb IP joint AIN/PIN/Radial/Median/Ulnar Nerves assessed in isolation without deficit. Radial & Ulnar arteries palpated 2+. Capillary Refill <3s.    LUE:  Good active ROM of the wrist and fingers. AIN/PIN/Radial/Median/Ulnar Nerves assessed in isolation without deficit. Radial & Ulnar arteries palpated 2+. Capillary Refill <3s.    HAND/WRIST EXAMINATION:  Finkelstein's Test   Neg  WHAT Test    Neg  Snuff box tenderness   Neg  Warren's Test    Neg  Hook of Hamate Tenderness  Neg  CMC grind    Neg  Circumduction test   Neg    Neurovascular Exam:  Digits WWP, brisk CR < 3s throughout  NVI motor/LTS to M/R/U nerves, radial pulse 2+  Tinel's Test - Carpal Tunnel  Neg  Tinel's Test - Cubital Tunnel  Neg  Phalen's Test    Neg  Median Nerve Compression Test Neg      Radiology:  XR R Hand 08/19/20   Mild DJD.  Small calcification noted adjacent to the 1st interphalangeal joint.  No acute fracture or " dislocation.  No bone destruction identified      ASSESSMENT/PLAN:      65 y.o. yo female with     ICD-10-CM ICD-9-CM   1. Arthritis of hand, right  M19.041 716.94       Plan:   - Reviewed XR. Mild degenerative changes of IP, DIP, & PIP joints.   - Reviewed conservative therapies including injections, paraffin, anti-inflammatory diet, & OT/CHT.   - Proceed with right thumb IP & index MCP injection today.   - Order placed for OT/CHT.   - Follow up in 6 weeks.     All questions answered. The patient indicates understanding of these issues and agrees to the plan.    This note has been scribed in part by Flor Howell MS, OT, my Sports Medicine Assistant (SMA). This SMA performed & documented a complete history pre-assessment including the history of present illness, which I, Shona Galvez MD, explored & confirmed personally with the patient. The SMA has scribed portions of this note including my physical exanimation, diagnostic imaging interpretation, procedures performed, my plan of care & diagnosis. I agree that the scribed documentation is accurate & complete.

## 2020-11-03 ENCOUNTER — DOCUMENTATION ONLY (OUTPATIENT)
Dept: REHABILITATION | Facility: HOSPITAL | Age: 65
End: 2020-11-03

## 2020-11-04 ENCOUNTER — CLINICAL SUPPORT (OUTPATIENT)
Dept: REHABILITATION | Facility: HOSPITAL | Age: 65
End: 2020-11-04
Payer: MEDICARE

## 2020-11-04 DIAGNOSIS — G89.29 BILATERAL CHRONIC KNEE PAIN: ICD-10-CM

## 2020-11-04 DIAGNOSIS — M25.561 BILATERAL CHRONIC KNEE PAIN: ICD-10-CM

## 2020-11-04 DIAGNOSIS — M25.571 CHRONIC PAIN OF RIGHT ANKLE: ICD-10-CM

## 2020-11-04 DIAGNOSIS — G89.29 CHRONIC PAIN OF RIGHT ANKLE: ICD-10-CM

## 2020-11-04 DIAGNOSIS — Z74.09 IMPAIRED MOBILITY: ICD-10-CM

## 2020-11-04 DIAGNOSIS — M25.562 BILATERAL CHRONIC KNEE PAIN: ICD-10-CM

## 2020-11-04 DIAGNOSIS — R29.898 DECREASED STRENGTH OF LOWER EXTREMITY: ICD-10-CM

## 2020-11-04 PROCEDURE — 97110 THERAPEUTIC EXERCISES: CPT | Mod: PN

## 2020-11-04 PROCEDURE — 97140 MANUAL THERAPY 1/> REGIONS: CPT | Mod: PN
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
## 2020-11-04 NOTE — PROGRESS NOTES
PT/PTA met face to face to discuss pt's treatment plan and progress towards established goals. Pt will be seen by a physical therapist minimally every 6th visit or every 30 days.    Kerline Gorman, PT    Face to Face PTA Conference performed with Kerline Gorman PT regarding patient's current status, overall progress, and plan of care    Anthony Melton  11/4/2020    Jazmyne Morales, PTA    Robin Alejandre, PTA     Jeancarlos Ahuja, PTA

## 2020-11-10 ENCOUNTER — CLINICAL SUPPORT (OUTPATIENT)
Dept: REHABILITATION | Facility: HOSPITAL | Age: 65
End: 2020-11-10
Payer: MEDICARE

## 2020-11-10 DIAGNOSIS — R29.898 DECREASED STRENGTH OF LOWER EXTREMITY: ICD-10-CM

## 2020-11-10 DIAGNOSIS — M25.571 CHRONIC PAIN OF RIGHT ANKLE: ICD-10-CM

## 2020-11-10 DIAGNOSIS — Z74.09 IMPAIRED MOBILITY: ICD-10-CM

## 2020-11-10 DIAGNOSIS — G89.29 CHRONIC PAIN OF RIGHT ANKLE: ICD-10-CM

## 2020-11-10 DIAGNOSIS — M25.561 BILATERAL CHRONIC KNEE PAIN: ICD-10-CM

## 2020-11-10 DIAGNOSIS — M25.562 BILATERAL CHRONIC KNEE PAIN: ICD-10-CM

## 2020-11-10 DIAGNOSIS — G89.29 BILATERAL CHRONIC KNEE PAIN: ICD-10-CM

## 2020-11-10 PROCEDURE — 97140 MANUAL THERAPY 1/> REGIONS: CPT | Mod: PN

## 2020-11-10 PROCEDURE — 97110 THERAPEUTIC EXERCISES: CPT | Mod: PN

## 2020-11-10 RX ORDER — DEXAMETHASONE SODIUM PHOSPHATE 4 MG/ML
4 INJECTION, SOLUTION INTRA-ARTICULAR; INTRALESIONAL; INTRAMUSCULAR; INTRAVENOUS; SOFT TISSUE
Status: DISCONTINUED | OUTPATIENT
Start: 2020-10-27 | End: 2020-11-10 | Stop reason: HOSPADM

## 2020-11-10 NOTE — PROGRESS NOTES
"                          Physical Therapy Daily Treatment Note     Name: Verenice Mirza  Clinic Number: [id number]    Therapy Diagnosis:   Encounter Diagnoses   Name Primary?    Bilateral chronic knee pain     Chronic pain of right ankle     Impaired mobility     Decreased strength of lower extremity      Physician: Areli Crespo MD    Visit Date: 11/10/2020  Physician Orders: PT Eval and Treat   Medical Diagnosis from Referral: M17.0 (ICD-10-CM) - Primary osteoarthritis of knees, bilateral  Evaluation Date: 10/5/2020  Authorization Period Expiration: 12/31/2020  Plan of Care Expiration: 10/5/2020 to 11/20/2020  Visit # / Visits authorized: 6/50  FOTO: 6/10    Time In: 1219  Time Out: 1301  Total Billable Time: 42 minutes     Precautions: Standard    Subjective     Pt reports: a little twinge of pain in her medial R knee and lateral ankle  She was compliant with home exercise program.  Response to previous treatment: decreased knee and ankle pain  Functional change: decreased pain with walking. Walked throughout [facility] with some throbbing starting 15 minutes in.     Pain: 3/10 R medial knee; 3/10 R lateral ankle upon arrival; 0/10 at end of sesssion    Objective     Verenice received therapeutic exercises to develop strength and endurance for 27 minutes including:  Palpation: Pain to R global anteromedial knee     Quad set with hip ER for VMO bias: 5"x20 R  Straight leg with hip ER for VMO bias: 3x10 B  Sidelying hip adduction: 3x10 R  Sidelying ankle eversion: 3x20 B    Standing:  Cybex leg press: 5.0 plates 3x10 double leg  Squats with B hip ER for adductor strengthening: 2x15 with minimal UE support     Verenice received the following manual therapy techniques to the R LE for 15 minutes, including:  Soft tissue mobilization to medial aspect of knee and lateral aspect of ankle with effleurage    Home Exercises Provided and Patient Education Provided:     Education provided:   - Continue HEP.  - Use it or lose it " principle with regards to strength and endurance and how this translates into function in the community.     Written Home Exercises Provided: Not today.  Exercises were reviewed and Verenice was able to demonstrate them prior to the end of the session.  Verenice demonstrated good  understanding of the education provided.     See EMR under Notes-Patient Instructions for exercises provided 10/26/2020.    Assessment     Widespread tenderness around medial knee. Able to progress rep count of strengthening without adverse effects.     Verenice is progressing well towards her goals.   Pt prognosis is Good.   Pt will continue to benefit from skilled outpatient physical therapy to address the deficits listed in the problem list box on initial evaluation, provide pt/family education and to maximize pt's level of independence in the home and community environment.     Pt's spiritual, cultural and educational needs considered and pt agreeable to plan of care and goals.  Anticipated barriers to physical therapy: Chronicity of pain; traumatic inciting event; sedentary lifestyle; BMI     Short Term Goals (3 Weeks):  1. Pt will be compliant with HEP to supplement PT in restoring pain free function. PROGRESSING, NOT MET 11/10/2020   2. Pt will improve impaired LE MMTs to >/= 4/5 to improve dynamic knee support for functional tasks. PROGRESSING, NOT MET 11/10/2020   3. Pt will report 50% improvement in knee and ankle pain while walking to promote community ambulation. PROGRESSING, NOT MET 11/10/2020   Long Term Goals (6 Weeks):  1. Pt will improve FOTO score to </= 40% limited to decrease perceived limitation with mobility. PROGRESSING, NOT MET 11/10/2020   2. Pt will improve impaired LE MMTs to >/= 4+/5 to improve dynamic knee support for functional tasks. PROGRESSING, NOT MET 11/10/2020   3. Pt will report 80% improvement in knee and ankle pain while walking to promote community ambulation. PROGRESSING, NOT MET 11/10/2020     Plan      Improve medial hip/knee and global ankle strength. Refer to Dr. Brunner if no change in pain by 10-12th visit.    Kerline Gorman, PT

## 2020-11-10 NOTE — PROCEDURES
Small Joint Aspiration/Injection: R thumb IP    Date/Time: 10/27/2020 11:00 AM  Performed by: Shona Galvez MD  Authorized by: Shona Galvez MD     Consent Done?:  Yes (Verbal)  Indications:  Arthritis  Timeout: prior to procedure the correct patient, procedure, and site was verified    Prep: patient was prepped and draped in usual sterile fashion      Local anesthesia used?: Yes    Anesthesia:  Local infiltration  Local anesthetic:  Lidocaine 1% without epinephrine  Location:  Thumb  Site:  R thumb IP  Ultrasonic guidance for needle placement?: No    Needle size:  25 G  Medications:  4 mg dexamethasone 4 mg/mL

## 2020-11-10 NOTE — PROCEDURES
Small Joint Aspiration/Injection: R index MCP    Date/Time: 10/27/2020 11:00 AM  Performed by: Shona Galvez MD  Authorized by: Shona Galvez MD     Consent Done?:  Yes (Verbal)  Indications:  Arthritis  Timeout: prior to procedure the correct patient, procedure, and site was verified    Prep: patient was prepped and draped in usual sterile fashion      Local anesthesia used?: Yes    Local anesthetic:  Lidocaine 1% without epinephrine  Location:  Index finger  Site:  R index MCP  Ultrasonic guidance for needle placement?: Yes    Medications:  4 mg dexamethasone 4 mg/mL

## 2020-11-12 ENCOUNTER — CLINICAL SUPPORT (OUTPATIENT)
Dept: REHABILITATION | Facility: HOSPITAL | Age: 65
End: 2020-11-12
Payer: MEDICARE

## 2020-11-12 ENCOUNTER — PATIENT MESSAGE (OUTPATIENT)
Dept: ORTHOPEDICS | Facility: CLINIC | Age: 65
End: 2020-11-12

## 2020-11-12 DIAGNOSIS — G89.29 CHRONIC PAIN OF RIGHT ANKLE: ICD-10-CM

## 2020-11-12 DIAGNOSIS — G89.29 BILATERAL CHRONIC KNEE PAIN: ICD-10-CM

## 2020-11-12 DIAGNOSIS — R29.898 DECREASED STRENGTH OF LOWER EXTREMITY: ICD-10-CM

## 2020-11-12 DIAGNOSIS — Z74.09 IMPAIRED MOBILITY: ICD-10-CM

## 2020-11-12 DIAGNOSIS — M25.571 CHRONIC PAIN OF RIGHT ANKLE: ICD-10-CM

## 2020-11-12 DIAGNOSIS — M25.561 BILATERAL CHRONIC KNEE PAIN: ICD-10-CM

## 2020-11-12 DIAGNOSIS — M25.562 BILATERAL CHRONIC KNEE PAIN: ICD-10-CM

## 2020-11-12 PROCEDURE — 97140 MANUAL THERAPY 1/> REGIONS: CPT | Mod: PN,CQ

## 2020-11-12 PROCEDURE — 97110 THERAPEUTIC EXERCISES: CPT | Mod: PN,CQ

## 2020-11-12 NOTE — PROGRESS NOTES
"                          Physical Therapy Daily Treatment Note     Name: Verenice Mirza  Clinic Number: [id number]    Therapy Diagnosis:   Encounter Diagnoses   Name Primary?    Bilateral chronic knee pain     Chronic pain of right ankle     Impaired mobility     Decreased strength of lower extremity      Physician: Areli Crespo MD    Visit Date: 11/12/2020  Physician Orders: PT Eval and Treat   Medical Diagnosis from Referral: M17.0 (ICD-10-CM) - Primary osteoarthritis of knees, bilateral  Evaluation Date: 10/5/2020  Authorization Period Expiration: 12/31/2020  Plan of Care Expiration: 10/5/2020 to 11/20/2020  Visit # / Visits authorized: 12/50  FOTO: at discharge    Time In: 3:40 pm  Time Out: 4:20 pm  Total Billable Time: 40 minutes  MTx1, TEx2    Precautions: Standard    Subjective     Pt reports: "I didn't sleep well at all last night so I've not been too great today."  States she stayed in her pajamas until 2 pm.  Describes knee and ankle pain as "throbbing"  She was compliant with home exercise program.  Response to previous treatment: decreased knee and ankle pain  Functional change: decreased pain with walking. Walked throughout [facility] with some throbbing starting 15 minutes in.     Pain: 4-5/10 R medial knee; 5/10 R lateral ankle upon arrival; 3-4/10 at end of sesssion    Objective     Verenice received therapeutic exercises to develop strength and endurance for 25 minutes including:  Palpation: Pain to R global anteromedial knee     Quad set with hip ER for VMO bias: 5"x20 R  Straight leg with hip ER for VMO bias: 3x10 B  Sidelying hip adduction: 3x10 R  Sidelying ankle eversion: 3x20 R    Standing:  Cybex leg press: 5.0 plates 3x10 double leg  Squats with B hip ER for adductor strengthening: 2x15 with minimal UE support     Verenice received the following manual therapy techniques to the R LE for 15 minutes, including:  Soft tissue mobilization to medial aspect of knee and lateral aspect of ankle with " "effleurage    Home Exercises Provided and Patient Education Provided:     Education provided:   - Continue HEP.  - Use it or lose it principle with regards to strength and endurance and how this translates into function in the community.     Written Home Exercises Provided: Not today.  Exercises were reviewed and Verenice was able to demonstrate them prior to the end of the session.  Verenice demonstrated good  understanding of the education provided.     See EMR under Notes-Patient Instructions for exercises provided 10/26/2020.    Assessment     Continued tenderness noted around medial knee with manual therapy.  Required verbal and tactile cues to maintain correct technique for VMO bias with therex.  Performed strengthening without adverse effects. States decreased pain after treatment, rates "3-4/10"    Verenice is progressing well towards her goals.   Pt prognosis is Good.   Pt will continue to benefit from skilled outpatient physical therapy to address the deficits listed in the problem list box on initial evaluation, provide pt/family education and to maximize pt's level of independence in the home and community environment.     Pt's spiritual, cultural and educational needs considered and pt agreeable to plan of care and goals.  Anticipated barriers to physical therapy: Chronicity of pain; traumatic inciting event; sedentary lifestyle; BMI     Short Term Goals (3 Weeks):  1. Pt will be compliant with HEP to supplement PT in restoring pain free function. PROGRESSING, NOT MET 11/12/2020   2. Pt will improve impaired LE MMTs to >/= 4/5 to improve dynamic knee support for functional tasks. PROGRESSING, NOT MET 11/12/2020   3. Pt will report 50% improvement in knee and ankle pain while walking to promote community ambulation. PROGRESSING, NOT MET 11/12/2020   Long Term Goals (6 Weeks):  1. Pt will improve FOTO score to </= 40% limited to decrease perceived limitation with mobility. PROGRESSING, NOT MET 11/12/2020   2. Pt " will improve impaired LE MMTs to >/= 4+/5 to improve dynamic knee support for functional tasks. PROGRESSING, NOT MET 11/12/2020   3. Pt will report 80% improvement in knee and ankle pain while walking to promote community ambulation. PROGRESSING, NOT MET 11/12/2020     Plan     Progress R ankle and knee strengthening as tolerated;  Refer to Dr. Brunner if no change in pain by 10-12th visit.    Jazmyne Morales, PTA

## 2020-11-16 ENCOUNTER — OFFICE VISIT (OUTPATIENT)
Dept: SPORTS MEDICINE | Facility: CLINIC | Age: 65
End: 2020-11-16
Payer: MEDICARE

## 2020-11-16 VITALS — TEMPERATURE: 97 F | BODY MASS INDEX: 43.27 KG/M2 | WEIGHT: 235.13 LBS | HEIGHT: 62 IN

## 2020-11-16 DIAGNOSIS — M25.562 BILATERAL CHRONIC KNEE PAIN: ICD-10-CM

## 2020-11-16 DIAGNOSIS — G89.29 BILATERAL CHRONIC KNEE PAIN: ICD-10-CM

## 2020-11-16 DIAGNOSIS — R26.89 ANTALGIC GAIT: ICD-10-CM

## 2020-11-16 DIAGNOSIS — M25.561 CHRONIC PAIN OF RIGHT KNEE: Primary | ICD-10-CM

## 2020-11-16 DIAGNOSIS — R52 MECHANICAL PAIN: ICD-10-CM

## 2020-11-16 DIAGNOSIS — M25.561 BILATERAL CHRONIC KNEE PAIN: ICD-10-CM

## 2020-11-16 DIAGNOSIS — G89.29 CHRONIC PAIN OF RIGHT KNEE: Primary | ICD-10-CM

## 2020-11-16 PROCEDURE — 99214 PR OFFICE/OUTPT VISIT, EST, LEVL IV, 30-39 MIN: ICD-10-PCS | Mod: S$PBB,,, | Performed by: FAMILY MEDICINE

## 2020-11-16 PROCEDURE — 99999 PR PBB SHADOW E&M-EST. PATIENT-LVL V: ICD-10-PCS | Mod: PBBFAC,,, | Performed by: FAMILY MEDICINE

## 2020-11-16 PROCEDURE — 99999 PR PBB SHADOW E&M-EST. PATIENT-LVL V: CPT | Mod: PBBFAC,,, | Performed by: FAMILY MEDICINE

## 2020-11-16 PROCEDURE — 99215 OFFICE O/P EST HI 40 MIN: CPT | Mod: PBBFAC | Performed by: FAMILY MEDICINE

## 2020-11-16 PROCEDURE — 99214 OFFICE O/P EST MOD 30 MIN: CPT | Mod: S$PBB,,, | Performed by: FAMILY MEDICINE

## 2020-11-16 NOTE — PROGRESS NOTES
Verenice Mirza, a 65 y.o. female, presents today for evaluation of her Left and Right knee. She reports improvement in her left knee pain following VSI but continues with right medial thigh pain. She has been attending PT with good results     History of Present Illness (HPI)  Location: anterior knee, Left and Right  Onset: Chronic since 2014, most recently   Palliative:    Relative rest   Oral analgesics   B knee CSI 2020.01.29   B knee VSI (Euflexxa) series completed 2020.08.25, 100% improvement for 8 weeks   Physical therapy, [place] location x 12 visits  Provocative:    direct contact  Prior: No hx of Orthopaedic surgery  Progression: worsening discomfort  Quality:    sharp pain  Radiation: none  Severity: per nursing documentation  Timing: intermittent w/ use  Trauma: none recently    Review of Systems (ROS)  A 10+ review of systems was performed with pertinent positives and negatives noted above in the history of present illness. Other systems were negative unless otherwise specified.    Physical Examination (PE)  General:  The patient is alert and oriented x 3. Mood is pleasant. Observation of ears, eyes and nose reveal no gross abnormalities. HEENT: NCAT, sclera anicteric.   Lungs: Respirations are equal and unlabored.  Gait is coordinated. Patient can toe walk and heel walk without difficulty.    LEFT and RIGHT KNEE EXAMINATION    Observation/Inspection  Gait:   Nonantalgic   Alignment:  Neutral   Scars:   None   Muscle atrophy: Mild  Effusion:  None   Warmth:  None   Discoloration:   none     Tenderness / Crepitus (T / C):         T / C      T / C  Patella   - / -   Lateral joint line   - / -     Peripatellar medial  -  Medial joint line    ++ / -  Peripatellar lateral -  Medial plica   - / -  Patellar tendon -   Popliteal fossa   - / -  Quad tendon   -   Gastrocnemius   -  Prepatellar Bursa - / -   Quadricep   -  Tibial tubercle  -  Thigh/hamstring  -  Pes anserine/HS -  Fibula    -  ITB   - / -  Tibia      -  Tib/fib joint  - / -  LCL    -    MFC   - / -   MCL: Proximal  -    LFC   - / -   Distal    -          ROM: (* = pain)  PASSIVE   ACTIVE    Left :   5 / 0 / 145   5 / 0 / 145     Right :    5 / 0 / 145   5 / 0 / 145    Patellofemoral examination:  See above noted areas of tenderness.   Patella position    Subluxation / dislocation: Centered        Sup. / Inf;   Normal   Crepitus (PF):    Absent   Patellar Mobility:       Medial-lateral:   Normal    Superior-inferior:  Normal    Inferior tilt   Normal    Patellar tendon:  Normal   Lateral tilt:    Normal   J-sign:     None   Patellofemoral grind:   No pain     Meniscal Signs:     Pain on terminal extension:  +  Pain on terminal flexion:  +  Bernards maneuver:  +*  Squat     NT  Thessaly    NT    Ligament Examination:  ACL / Lachman:  WNL  PCL-Post.  drawer: normal 0 to 2mm  MCL- Valgus:  normal 0 to 2mm  LCL- Varus:    normal 0 to 2mm  Pivot shift:  guarding   Dial Test:   difference c/w other side   At 30° flexion: normal (< 5°)    At 90° flexion: normal (< 5°)   Reverse Pivot Shift:   normal (Equal)     Strength: (* = with pain) Painful Side  Quadriceps   5/5  Hamstrin/5    Extremity Neuro-vascular Examination:   Sensation:  Grossly intact to light touch all dermatomal regions.   Motor Function:  Fully intact motor function at hip, knee, foot and ankle    DTRs;  quadriceps and  achilles 2+.  No clonus and downgoing Babinski.    Vascular status:  DP and PT pulses 2+, brisk capillary refill, symmetric.     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     We discussed the importance of appropriate diet, weight, and regular exercise    We discussed options including    Watchful waiting / relative rest    Physical therapy x   Injection therapy    Consultation    The patient chooses As above   x = prescribed  CSI = corticosteroid injection  VSI = viscosupplement injection  PRPI = platelet rich plasma injection  ia = intra articular  R = right  L = left  B = bilateral   nfSx = surgical consultation was recommended, but patient is not interested in consultation at this time    Physical Therapy        Formal (fPT), @ [facility] facility r   Formal (fPT), @ [facility] facility        Homegoing (hgPT), per concurrent fPT recommendations    Homegoing (hgPT), per prior fPT recommendations t   Homegoing (hgPT), handout provided        w/  (atPT)    [blank] = not prescribed  x = prescribed  b = prescribed, and begin as indicated  t = continue as indicated  r = prescribed, and restart as indicated  p = completed prior as indicated  hs = prescribed, and with high school   col = prescribed, and with college or university   nfPT = physical therapy was recommended, but patient is not interested in PT at this time    Activity (e.g. sports, work) restrictions    [blank] = as tolerated  pt = per physical therapist  at = per     Bracing    [blank] = not prescribed  r = recommended, but not fit with at todays visit  f = prescribed and fit with at todays visit  t = continue as indicated  p = prn use on rare, as-needed basis; advised against chronic use    Pain management    [blank] = No prescription necessary. A handout detailing dosing of appropriate   over-the-counter musculoskeletal analgesics was made available to the patient.   m = meloxicam x 14 days  mp = 14 day course of meloxicam prescribed prior    Follow up mri    [blank] = as needed  [number] = in [number] weeks  CSI = for corticosteroid injection  VSI = for viscosupplement injection or injection series  PRP = for platelet rich plasma injection or injection series  MRI = after MRI imaging  ns = should surgical options be deferred (no surgery)  o = appointment offered, deferred by patient    Should symptoms worsen or fail to resolve, consider    Revisiting the above options and / or Scope vs. tka     Vocation:   retired

## 2020-11-18 ENCOUNTER — CLINICAL SUPPORT (OUTPATIENT)
Dept: REHABILITATION | Facility: HOSPITAL | Age: 65
End: 2020-11-18
Payer: MEDICARE

## 2020-11-18 DIAGNOSIS — Z74.09 IMPAIRED MOBILITY: ICD-10-CM

## 2020-11-18 DIAGNOSIS — G89.29 CHRONIC PAIN OF RIGHT ANKLE: ICD-10-CM

## 2020-11-18 DIAGNOSIS — M25.562 BILATERAL CHRONIC KNEE PAIN: ICD-10-CM

## 2020-11-18 DIAGNOSIS — M25.571 CHRONIC PAIN OF RIGHT ANKLE: ICD-10-CM

## 2020-11-18 DIAGNOSIS — M25.561 BILATERAL CHRONIC KNEE PAIN: ICD-10-CM

## 2020-11-18 DIAGNOSIS — R29.898 DECREASED STRENGTH OF LOWER EXTREMITY: ICD-10-CM

## 2020-11-18 DIAGNOSIS — G89.29 BILATERAL CHRONIC KNEE PAIN: ICD-10-CM

## 2020-11-18 PROCEDURE — 97110 THERAPEUTIC EXERCISES: CPT | Mod: PN

## 2020-11-18 NOTE — PLAN OF CARE
Outpatient Therapy Updated Plan of Care     Visit Date: 11/18/2020  Name: Verenice Mirza  Clinic Number: [id number]    Therapy Diagnosis:   Encounter Diagnoses   Name Primary?    Bilateral chronic knee pain     Chronic pain of right ankle     Impaired mobility     Decreased strength of lower extremity      Physician: Areli Crespo MD    Physician Orders: PT Eval and Treat   Medical Diagnosis from Referral: M17.0 (ICD-10-CM) - Primary osteoarthritis of knees, bilateral  Evaluation Date: 10/5/2020  Total Visits Received: 13  Current Certification Period:  Until 11/20/2020  Precautions:  Standard  Functional Level Prior to Evaluation:  Independent    Subjective     Update: 60% improvement in pain since initial evaluation. Patient reports it is difficult to report as pain comes and goes    Objective     Update:     LE MMT Right Left Pain/Dysfunction with Movement   Hip flexion 4/5 4/5    Hip extension 4-/5 4-/5    Hip abduction 4+/5 4+/5    Hip adduction 4-/5 4/5    Knee flexion 4+/5 4+/5    Knee extension 5/5 4+/5    Ankle dorsiflexion 4+/5 4+/5      CMS Impairment/Limitation/Restriction for FOTO Knee Survey    Therapist reviewed FOTO scores for Verenice Mirza on 11/18/2020.   FOTO documents entered into [employer] - see Media section.    Limitation Score: 53%  Category: Mobility     Assessment     Update: Majority of short term goals met upon plan of care update. Weakness of glutes noted on MMTs. Medial knee pain not specific to medial tibiofemoral joint line. Shortness of breath noted during treadmill walking resulting in decreased speed.     Verenice is progressing well towards her goals.   Pt prognosis is Good.   Pt will continue to benefit from skilled outpatient physical therapy to address the deficits listed in the problem list box on initial evaluation, provide pt/family education and to maximize pt's level of independence in the home and community environment.     Pt's spiritual, cultural and educational needs  considered and pt agreeable to plan of care and goals.  Anticipated barriers to physical therapy: Chronicity of pain; traumatic inciting event; sedentary lifestyle; BMI     Short Term Goals (3 Weeks):  1. Pt will be compliant with HEP to supplement PT in restoring pain free function. MET  2. Pt will improve impaired LE MMTs to >/= 4/5 to improve dynamic knee support for functional tasks. MET excluding hip adduction and extension  3. Pt will report 50% improvement in knee and ankle pain while walking to promote community ambulation. MET   Short Term Goals Status:   Continue goal #2   Long Term Goals (6 Weeks):  1. Pt will improve FOTO score to </= 40% limited to decrease perceived limitation with mobility. Progressing, not met.    2. Pt will improve impaired LE MMTs to >/= 4+/5 to improve dynamic knee support for functional tasks. Progressing, not met.    3. Pt will report 80% improvement in knee and ankle pain while walking to promote community ambulation. Progressing, not met.    Long Term Goal Status:   Continue goals 1-3  Reasons for Recertification of Therapy:  Plan of care expires Friday    Plan     Updated Certification Period: 11/18/2020 to 1/1/2021  Recommended Treatment Plan: 6 times in 7 weeks: Electrical Stimulation -, Gait Training, Manual Therapy, Moist Heat/ Ice, Neuromuscular Re-ed, Patient Education, Self Care, Therapeutic Activites and Therapeutic Exercise  Other Recommendations: Modalities, Kinesiotape, and Functional Dry Needling as needed.    Kerline Gorman, PT  11/18/2020      I CERTIFY THE NEED FOR THESE SERVICES FURNISHED UNDER THIS PLAN OF TREATMENT AND WHILE UNDER MY CARE    Physician's comments:        Physician's Signature: ___________________________________________________

## 2020-11-18 NOTE — PROGRESS NOTES
"                          Physical Therapy Daily Treatment Note     Name: Verenice Mirza  Clinic Number: [id number]    Therapy Diagnosis:   Encounter Diagnoses   Name Primary?    Bilateral chronic knee pain     Chronic pain of right ankle     Impaired mobility     Decreased strength of lower extremity      Physician: Areli Crespo MD    Visit Date: 11/18/2020  Physician Orders: PT Eval and Treat   Medical Diagnosis from Referral: M17.0 (ICD-10-CM) - Primary osteoarthritis of knees, bilateral  Evaluation Date: 10/5/2020  Authorization Period Expiration: 12/31/2020  Plan of Care Expiration: 10/5/2020 to 11/20/2020 change date next, updated plan of care today  Visit # / Visits authorized: 13/50  FOTO: at discharge    Time In: 1430  Time Out: 1520  Total Billable Time: 50 minutes     Precautions: Standard    Subjective     Pt reports: "I didn't sleep well at all last night so I've not been too great today."  States she stayed in her pajamas until 2 pm.  Describes knee and ankle pain as "throbbing"  She was compliant with home exercise program.  Response to previous treatment: decreased knee and ankle pain  Functional change: decreased pain with walking. Walked throughout [facility] with some throbbing starting 15 minutes in.     Pain: 4-5/10 R medial knee; 5/10 R lateral ankle upon arrival; 3-4/10 at end of sesssion    Objective     Verenice received therapeutic exercises to develop strength and endurance for 50 minutes including:  Objective measurements (See Updated POC in Treatment section)  Palpation: Pain to vastus medialis, gracilis, medial retinaculum, and medial tibiofemoral jointline    Standing:  Cybex leg press: 6.0 plates 2x10 double leg; 3.0 plates 3x10 B single leg  Hip flexion: YTB 2x15  Hip adduction: YTB 2x15  Forward step ups: 6" 2x10 L, 4" 2x10 R  Single leg cone taps: x15 B    Treadmill walking for endurance and joint nutrition: 5' at 1-1.6 MPH     Home Exercises Provided and Patient Education " Provided:     Education provided:   - Continue HEP.  - Plan of care update    Written Home Exercises Provided: Not today.  Exercises were reviewed and Verenice was able to demonstrate them prior to the end of the session.  Verenice demonstrated good  understanding of the education provided.     See EMR under Notes-Patient Instructions for exercises provided 10/26/2020.    Assessment     See Updated POC in Treatment section.     Plan     See Updated POC in Treatment section.     Kerline Gorman, PTA

## 2020-11-20 ENCOUNTER — CLINICAL SUPPORT (OUTPATIENT)
Dept: REHABILITATION | Facility: HOSPITAL | Age: 65
End: 2020-11-20
Payer: MEDICARE

## 2020-11-20 DIAGNOSIS — Z74.09 IMPAIRED MOBILITY: ICD-10-CM

## 2020-11-20 DIAGNOSIS — G89.29 BILATERAL CHRONIC KNEE PAIN: ICD-10-CM

## 2020-11-20 DIAGNOSIS — M25.561 BILATERAL CHRONIC KNEE PAIN: ICD-10-CM

## 2020-11-20 DIAGNOSIS — R29.898 DECREASED STRENGTH OF LOWER EXTREMITY: ICD-10-CM

## 2020-11-20 DIAGNOSIS — M25.571 CHRONIC PAIN OF RIGHT ANKLE: ICD-10-CM

## 2020-11-20 DIAGNOSIS — G89.29 CHRONIC PAIN OF RIGHT ANKLE: ICD-10-CM

## 2020-11-20 DIAGNOSIS — M25.562 BILATERAL CHRONIC KNEE PAIN: ICD-10-CM

## 2020-11-20 PROCEDURE — 97110 THERAPEUTIC EXERCISES: CPT | Mod: PN,CQ

## 2020-11-20 NOTE — PROGRESS NOTES
"                          Physical Therapy Daily Treatment Note     Name: Verenice Mirza  Clinic Number: [id number]    Therapy Diagnosis:   Encounter Diagnoses   Name Primary?    Bilateral chronic knee pain     Chronic pain of right ankle     Impaired mobility     Decreased strength of lower extremity      Physician: Areli Crespo MD    Visit Date: 11/20/2020  Physician Orders: PT Eval and Treat   Medical Diagnosis from Referral: M17.0 (ICD-10-CM) - Primary osteoarthritis of knees, bilateral  Evaluation Date: 10/5/2020  Authorization Period Expiration: 12/31/2020  Plan of Care Expiration: 11/18/2020 to 1/1/2021   Visit # / Visits authorized: 13/50  FOTO: at discharge    Time In: 1:45 pm  Time Out: 2:30  Total Billable Time: 45 minutes     Precautions: Standard    Subjective     Pt reports: her ankle is bothering her more than her knee today.  She was compliant with home exercise program.  Response to previous treatment: decreased knee and ankle pain  Functional change: decreased pain with walking. Walked throughout [facility] with some throbbing starting 15 minutes in.     Pain: 1/10 R medial knee; 2/10 R lateral ankle upon arrival    Objective     Verenice received therapeutic exercises to develop strength and endurance for 45 minutes including:    Standing:    Treadmill walking for endurance and joint nutrition: 6 minutes total time 2' at 1.2 mph, 2.5' at 1.6, 1.5 ' at  1.7 mph    Cybex leg press: 6.0 plates 3x10 double leg; 3.0 plates 2x15 B single leg  Hip flexion: YTB 2x15  Hip adduction: YTB 2x15  Hip extension:  YTB 2x15  Forward step ups: 6"  3x10 B  Single leg cone taps: x15 B  Squats with B hip ER for adductor strengthening: 2x15 with minimal UE support     Home Exercises Provided and Patient Education Provided:     Education provided:   - Continue HEP.  - Plan of care update    Written Home Exercises Provided: Not today.  Exercises were reviewed and Verenice was able to demonstrate them prior to the end of " "the session.  Verenice demonstrated good  understanding of the education provided.     See EMR under Notes-Patient Instructions for exercises provided 10/26/2020.    Assessment     Shortness of breath noted towards end of treadmill walking, able to increase speed and time slightly but required short seated rest break after completion.  Able to perform step ups today B LE on 6 inch step. Reports minimal medial R knee pain after treatment.  Not specific to scale.  "Throbbing"     Verenice is progressing well towards her goals.   Pt prognosis is Good.   Pt will continue to benefit from skilled outpatient physical therapy to address the deficits listed in the problem list box on initial evaluation, provide pt/family education and to maximize pt's level of independence in the home and community environment.      Pt's spiritual, cultural and educational needs considered and pt agreeable to plan of care and goals.  Anticipated barriers to physical therapy: Chronicity of pain; traumatic inciting event; sedentary lifestyle; BMI      Short Term Goals (3 Weeks):  1. Pt will be compliant with HEP to supplement PT in restoring pain free function. MET  2. Pt will improve impaired LE MMTs to >/= 4/5 to improve dynamic knee support for functional tasks. MET excluding hip adduction and extension  3. Pt will report 50% improvement in knee and ankle pain while walking to promote community ambulation. MET   Short Term Goals Status:   Continue goal #2   Long Term Goals (6 Weeks):  1. Pt will improve FOTO score to </= 40% limited to decrease perceived limitation with mobility. Progressing, not met.    2. Pt will improve impaired LE MMTs to >/= 4+/5 to improve dynamic knee support for functional tasks. Progressing, not met.    3. Pt will report 80% improvement in knee and ankle pain while walking to promote community ambulation. Progressing, not met.    Long Term Goal Status:   Continue goals 1-3      Plan     Continue PT Per Updated POC, " progress LE strengthening and promote community ambulation.     Jazmyne Morales, PTA

## 2020-11-21 ENCOUNTER — HOSPITAL ENCOUNTER (OUTPATIENT)
Dept: RADIOLOGY | Facility: HOSPITAL | Age: 65
Discharge: HOME | End: 2020-11-21
Attending: FAMILY MEDICINE
Payer: MEDICARE

## 2020-11-21 DIAGNOSIS — M25.561 CHRONIC PAIN OF RIGHT KNEE: ICD-10-CM

## 2020-11-21 DIAGNOSIS — R52 MECHANICAL PAIN: ICD-10-CM

## 2020-11-21 DIAGNOSIS — G89.29 CHRONIC PAIN OF RIGHT KNEE: ICD-10-CM

## 2020-11-21 PROCEDURE — 73721 MRI JNT OF LWR EXTRE W/O DYE: CPT | Mod: TC,RT

## 2020-11-21 PROCEDURE — 73721 MRI JNT OF LWR EXTRE W/O DYE: CPT | Mod: 26,RT,, | Performed by: RADIOLOGY

## 2020-11-21 PROCEDURE — 73721 MRI KNEE WITHOUT CONTRAST RIGHT: ICD-10-PCS | Mod: 26,RT,, | Performed by: RADIOLOGY

## 2020-11-23 ENCOUNTER — PATIENT MESSAGE (OUTPATIENT)
Dept: SPORTS MEDICINE | Facility: CLINIC | Age: 65
End: 2020-11-23

## 2020-11-23 ENCOUNTER — PATIENT MESSAGE (OUTPATIENT)
Dept: ORTHOPEDICS | Facility: CLINIC | Age: 65
End: 2020-11-23

## 2020-11-23 DIAGNOSIS — M79.642 LEFT HAND PAIN: ICD-10-CM

## 2020-11-23 DIAGNOSIS — M19.041 ARTHRITIS OF RIGHT HAND: Primary | ICD-10-CM

## 2020-11-23 DIAGNOSIS — M13.841 ALLERGIC ARTHRITIS OF RIGHT HAND: ICD-10-CM

## 2020-11-30 ENCOUNTER — CLINICAL SUPPORT (OUTPATIENT)
Dept: REHABILITATION | Facility: HOSPITAL | Age: 65
End: 2020-11-30
Payer: MEDICARE

## 2020-11-30 ENCOUNTER — OFFICE VISIT (OUTPATIENT)
Dept: SPORTS MEDICINE | Facility: CLINIC | Age: 65
End: 2020-11-30
Payer: MEDICARE

## 2020-11-30 DIAGNOSIS — G89.29 BILATERAL CHRONIC KNEE PAIN: Primary | ICD-10-CM

## 2020-11-30 DIAGNOSIS — M25.60 STIFFNESS IN JOINT: ICD-10-CM

## 2020-11-30 DIAGNOSIS — M25.542 ARTHRALGIA OF BOTH HANDS: ICD-10-CM

## 2020-11-30 DIAGNOSIS — R26.89 ANTALGIC GAIT: ICD-10-CM

## 2020-11-30 DIAGNOSIS — M25.561 BILATERAL CHRONIC KNEE PAIN: Primary | ICD-10-CM

## 2020-11-30 DIAGNOSIS — M17.0 PRIMARY OSTEOARTHRITIS OF BOTH KNEES: ICD-10-CM

## 2020-11-30 DIAGNOSIS — M79.642 LEFT HAND PAIN: ICD-10-CM

## 2020-11-30 DIAGNOSIS — M19.041 ARTHRITIS OF RIGHT HAND: ICD-10-CM

## 2020-11-30 DIAGNOSIS — M25.541 ARTHRALGIA OF BOTH HANDS: ICD-10-CM

## 2020-11-30 DIAGNOSIS — M25.562 BILATERAL CHRONIC KNEE PAIN: Primary | ICD-10-CM

## 2020-11-30 PROBLEM — M62.81 MUSCLE WEAKNESS: Status: ACTIVE | Noted: 2020-10-07

## 2020-11-30 PROCEDURE — 97165 OT EVAL LOW COMPLEX 30 MIN: CPT | Mod: PN

## 2020-11-30 PROCEDURE — 99214 PR OFFICE/OUTPT VISIT, EST, LEVL IV, 30-39 MIN: ICD-10-PCS | Mod: 95,,, | Performed by: FAMILY MEDICINE

## 2020-11-30 PROCEDURE — 97018 PARAFFIN BATH THERAPY: CPT | Mod: PN

## 2020-11-30 PROCEDURE — 99214 OFFICE O/P EST MOD 30 MIN: CPT | Mod: 95,,, | Performed by: FAMILY MEDICINE

## 2020-11-30 NOTE — PATIENT INSTRUCTIONS
"[facility], OCCUPATIONAL THERAPY  HOME EXERCISE PROGRAM     Complete the following two massages for 2 minutes, 3x/day:                                                       Complete the following exercises for 10 repetitions, 3 x/day:           AROM: Wrist Flexion / Extension               Bend your wrist forward and back as far as possible.          AROM: Wrist Radial / Ulnar Deviation  Bend your wrist from side to side as far as possible.        AROM: Isolated MCP Flexion / Extension ("Wave")   Bend only your large, bottom knuckles. Hold 3 seconds.   Keep the tips of your fingers straight. Straighten fingers.      AROM: Isolated IPJ Flexion / Extension ("Hook")   Bend only your middle and end knuckles. Hold 3 seconds.   Straighten your fingers.       AROM: MCP and PIP Flexion / Extension ("Straight Fist")  Bend your bottom and middle knuckles, keeping the tips of your fingers straight.   Try to touch the pads of your fingers on your palm. Hold 3 seconds.   Straighten your fingers.       AROM: Composite Flexion / Extension ("Full Fist")  Bend every joint in your hand into a fist. Hold 3 seconds.   Straighten your fingers.         AROM: Composte Extension ("Finger Lifts")  Lift your finger off of the table one at a time. Hold 3 seconds.   Relax your finger.      AROM: Abduction / Adduction  With hand flat on table, spread all fingers apart,   then bring them together as close as possible.      AROM: Thumb IP Flexion / Extension  Brace thumb below tip joint. Bend joint as far as   possible then straighten.      AROM: Composite Flexion   Bend both joints of thumb as far as possible.   Try to touch base of little finger.      AROM: Radial Adduction / Abduction  Place your palm flat on the table. Move thumb out   to side. Move back alongside index finger.                                       AROM: Palmar Adduction / Abduction   Rest your small finger on the table. Move thumb   sideways, out and away " "from   palm. Move back to rest along palm.                        AROM: Opposition   Touch tip of thumb to nail tip of each  finger in turn, making an "O" shape.      AROM: Composite Movement Circumduction  Make clockwise circles with thumb. Reverse and make counterclockwise   circles   with thumb.                                    AROM: Composite Flesion ("Pinky Slides")  Touch thumb to tip of small finger. Slide thumb down   small finger into palm.         AROM: MP Extension   With palm on table, lift thumb up.   Hold 3 seconds. Relax and lower thumb.        What is Arthritis?  Arthritis is a disease that affects the joints (the parts where bones meet and move). It can affect any joint in your body. There are many types of arthritis, including osteoarthritis and rheumatoid arthrtitis. If your symptoms are mild, medications may be enough to reduce pain and swelling. For more severe arthritis, surgery may be needed to improve the condition of the joint or replace the joint entirely.                  What causes arthritis?  Cartilage is a smooth substance that protects the ends of your bones and provides cushioning. When you have arthritis, this cartilage breaks down and can no longer protect your bones. The bones rub against each other, causing pain and swelling. Over time, bone spurs (small pieces of rough or splintered bone) may develop, and the joint's range of motion can become limited.  Symptoms  Some of the more common symptoms of arthritis include:  · Joint pain and stiffness. Pain and stiffness get worse with long periods of rest or using a joint too long or too hard.  · Joints that have lost normal shape and motion.  · Tender, inflamed joints. They may look red and feel warm.  · Grinding or popping noise with joint movement.   · Feeling tired all the time.  Reducing symptoms  Following a healthy lifestyle by losing weight and exercising can help reduce symptoms of osteoarthritis. Medicines can be very " helpful for arthritis.     Date Last Reviewed: 9/10/2015  © 3411-5827 [employer]. [address] [place] [address], [place] [address]. All rights reserved. This information is not intended as a substitute for professional medical care. Always follow your healthcare professional's instructions.    Therapist: Erik Mendez OTR/OH

## 2020-11-30 NOTE — PROGRESS NOTES
Telemedicine visit / virtual visit with synchronous audio and video    The patient location is: seated and safe  The chief complaint leading to todays visit is:   Knee pain, right, f/u MRI results    As far as I am able to assess, the patient is not in an unsafe location (e.g. driving a vehicle) for this appointment.    Face-to-face time with patient: 15 minutes  30 minutes of total time were spent on this encounter, including some or all of:  -engaging the patient face-to-face  -preparing to see the patient (e.g. reviewing the patient chart and patient imaging studies)  -documenting clinical information in the electronic or other health record  -coordinating care     History of Present Illness (HPI)  Location: anterior knee, Left and Right  Onset: Chronic since 2014, most recently   Palliative:    Relative rest   Oral analgesics   B knee CSI 2020.01.29   B knee VSI (Euflexxa) series completed 2020.08.25, 100% improvement for 8 weeks   Physical therapy, [place] location x 12 visits  Provocative:    direct contact  Prior: No hx of Orthopaedic surgery  Progression: worsening discomfort  Quality:    sharp pain  Radiation: none  Severity: per nursing documentation  Timing: intermittent w/ use  Trauma: none recently    Review of Systems (ROS)  A 10+ review of systems was performed with pertinent positives and negatives noted above in the history of present illness. Other systems were negative unless otherwise specified.    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, right    w/ med menis tear  #2 Kellgren-Demetrius Grade II osteoarthritis of knee, felicita med+ant compartments, left    No evidence of neurologic pathology  No evidence of vascular pathology    Imaging studies reviewed:   MRI knee right 20.11  X-ray knee, bilateral 20.01    Plan:   We discussed the importance of appropriate diet, weight, and regular exercise    We discussed options including    Watchful waiting / relative rest    Physical therapy x   Injection therapy    Consultation    The patient chooses As above   x = prescribed  CSI = corticosteroid injection  VSI = viscosupplement injection  PRPI = platelet rich plasma injection  ia = intra articular  R = right  L = left  B = bilateral   nfSx = surgical consultation was recommended, but patient is not interested in consultation at this time    Physical Therapy        Formal (fPT), @ [facility] facility p   Formal (fPT), @ [facility] facility        Homegoing (hgPT), per concurrent fPT recommendations    Homegoing (hgPT), per prior fPT recommendations t   Homegoing (hgPT), handout provided        w/  (atPT)    [blank] = not prescribed  x = prescribed  b = prescribed, and begin as indicated  t = continue as indicated  r = prescribed, and restart as indicated  p = completed prior as indicated  hs = prescribed, and with high school   col = prescribed, and with college or university   nfPT = physical therapy was recommended, but patient is not interested in PT at this time    Activity (e.g. sports, work) restrictions    [blank] = as tolerated  pt = per physical therapist  at = per     Bracing    [blank] = not prescribed  r = recommended, but not fit with at todays visit  f = prescribed and fit with at todays visit  t = continue as indicated  p = prn use on rare, as-needed basis; advised against chronic  use    Pain management    [blank] = No prescription necessary. A handout detailing dosing of appropriate   over-the-counter musculoskeletal analgesics was made available to the patient.   m = meloxicam x 14 days  mp = 14 day course of meloxicam prescribed prior    Follow up 12   [blank] = as needed  [number] = in [number] weeks  CSI = for corticosteroid injection  VSI = for viscosupplement injection or injection series  PRP = for platelet rich plasma injection or injection series  MRI = after MRI imaging  ns = should surgical options be deferred (no surgery)  o = appointment offered, deferred by patient    Should symptoms worsen or fail to resolve, consider    Revisiting the above options and / or Inject vs. scope vs. tka     Vocation:   retired

## 2020-11-30 NOTE — PROGRESS NOTES
Telemedicine visit / virtual visit with synchronous audio and video    The patient location is: seated and safe  The chief complaint leading to todays visit is:   Knee pain , right  , MRI results    As far as I am able to assess, the patient is not in an unsafe location (e.g. driving a vehicle) for this appointment.    Face-to-face time with patient: ***15 minutes  ***30 minutes of total time were spent on this encounter, including some or all of:  -engaging the patient face-to-face  -preparing to see the patient (e.g. reviewing the patient chart and patient imaging studies)  -documenting clinical information in the electronic or other health record  -coordinating care      Physical Examination:   General: In no acute distress, well developed, well nourished, no diaphoresis  Eyes: EOM full and smooth, no eye redness or discharge  HENT: normocephalic and atraumatic, neck supple, trachea midline, no nasal discharge, no external ear redness or discharge  Lungs: respirations unlabored, no conversational dyspnea  Neuro: AAOx3, CN2-12 grossly intact  Skin: No obvious rashes on face or neck  Psychiatric: cooperative, pleasant, mood and affect appropriate for age

## 2020-12-01 ENCOUNTER — CLINICAL SUPPORT (OUTPATIENT)
Dept: REHABILITATION | Facility: HOSPITAL | Age: 65
End: 2020-12-01
Payer: MEDICARE

## 2020-12-01 DIAGNOSIS — Z74.09 IMPAIRED MOBILITY: ICD-10-CM

## 2020-12-01 DIAGNOSIS — G89.29 BILATERAL CHRONIC KNEE PAIN: ICD-10-CM

## 2020-12-01 DIAGNOSIS — M25.562 BILATERAL CHRONIC KNEE PAIN: ICD-10-CM

## 2020-12-01 DIAGNOSIS — M25.571 CHRONIC PAIN OF RIGHT ANKLE: ICD-10-CM

## 2020-12-01 DIAGNOSIS — M62.81 MUSCLE WEAKNESS: ICD-10-CM

## 2020-12-01 DIAGNOSIS — M25.561 BILATERAL CHRONIC KNEE PAIN: ICD-10-CM

## 2020-12-01 DIAGNOSIS — G89.29 CHRONIC PAIN OF RIGHT ANKLE: ICD-10-CM

## 2020-12-01 PROCEDURE — 97110 THERAPEUTIC EXERCISES: CPT | Mod: PN,CQ

## 2020-12-01 NOTE — PROGRESS NOTES
"                          Physical Therapy Daily Treatment Note     Name: Verenice Mirza  Clinic Number: [id number]    Therapy Diagnosis:   Encounter Diagnoses   Name Primary?    Bilateral chronic knee pain     Chronic pain of right ankle     Impaired mobility     Muscle weakness      Physician: Areli Crespo MD    Visit Date: 12/1/2020  Physician Orders: PT Eval and Treat   Medical Diagnosis from Referral: M17.0 (ICD-10-CM) - Primary osteoarthritis of knees, bilateral  Evaluation Date: 10/5/2020  Authorization Period Expiration: 12/31/2020  Plan of Care Expiration: 11/18/2020 to 1/1/2021   Visit # / Visits authorized: 13/50  FOTO: at discharge    Time In: 12:30 pm  Time Out:1:15 pm  Total Billable Time: 45 minutes     Precautions: Standard    Subjective     Pt reports: her ankle is still hurting, was worse yesterday.  States she took Meloxicam this morning.  She was compliant with home exercise program.  Response to previous treatment: decreased knee and ankle pain  Functional change: decreased pain with walking. Walked throughout [facility] with some throbbing starting 15 minutes in.     Pain: 2/10 R medial knee; 4/10 R lateral ankle upon arrival    Objective     Verenice received therapeutic exercises to develop strength and endurance for 45 minutes including:    Standing:    Treadmill walking for endurance and joint nutrition: 6 minutes total time 3' at 1.5 mph, 3'  1.7 mph    Cybex leg press: 6.0 plates 3x10 double leg; 3.5 plates 2x10 B single leg  Hip flexion: RTB 2x10 B  Hip adduction: RTB 2x10 B  Hip extension:  RTB 2x10 B  Forward step ups: 6"  3x10 B  Single leg cone taps: x15 B  Squats with B hip ER for adductor strengthening: 2x15 with minimal UE support     Home Exercises Provided and Patient Education Provided:     Education provided:   - Continue HEP.  - Plan of care update    Written Home Exercises Provided: Not today.  Exercises were reviewed and Verenice was able to demonstrate them prior to the end " "of the session.  Verenice demonstrated good  understanding of the education provided.     See EMR under Notes-Patient Instructions for exercises provided 10/26/2020.    Assessment     Shortness of breath noted towards end of treadmill walking, able to increase speed again slightly but required short seated rest break after completion.  Able to perform increased reps and resistance today as bolded. B LE on 6 inch step. Reports minimal medial R knee pain after treatment, "ankle about the same.".  Not specific to scale.  "Throbbing"     Verenice is progressing well towards her goals.   Pt prognosis is Good.   Pt will continue to benefit from skilled outpatient physical therapy to address the deficits listed in the problem list box on initial evaluation, provide pt/family education and to maximize pt's level of independence in the home and community environment.      Pt's spiritual, cultural and educational needs considered and pt agreeable to plan of care and goals.  Anticipated barriers to physical therapy: Chronicity of pain; traumatic inciting event; sedentary lifestyle; BMI      Short Term Goals (3 Weeks):  1. Pt will be compliant with HEP to supplement PT in restoring pain free function. MET  2. Pt will improve impaired LE MMTs to >/= 4/5 to improve dynamic knee support for functional tasks. MET excluding hip adduction and extension  3. Pt will report 50% improvement in knee and ankle pain while walking to promote community ambulation. MET   Short Term Goals Status:   Continue goal #2   Long Term Goals (6 Weeks):  1. Pt will improve FOTO score to </= 40% limited to decrease perceived limitation with mobility. Progressing, not met.    2. Pt will improve impaired LE MMTs to >/= 4+/5 to improve dynamic knee support for functional tasks. Progressing, not met.    3. Pt will report 80% improvement in knee and ankle pain while walking to promote community ambulation. Progressing, not met.    Long Term Goal Status:   Continue " goals 1-3      Plan     Continue PT Per Updated POC, progress LE strengthening and promote community ambulation.     Jazmyne Morales, PTA

## 2020-12-01 NOTE — PLAN OF CARE
[facility] Occupational Therapy  Initial Evaluation     Date: 11/30/2020  Name: Verenice Mirza  Clinic Number: [id number]    Therapy Diagnosis:   Encounter Diagnoses   Name Primary?    Left hand pain     Arthritis of right hand     Arthralgia of both hands     Stiffness in joint      Physician: Shona Galvez, *    Physician Orders: eval and tx   Medical Diagnosis:   M79.642 (ICD-10-CM) - Left hand pain   M19.041 (ICD-10-CM) - Arthritis of right hand     Surgical Procedure and Date: none, / Date of Injury/Onset: Beginning of August she had a fall   Evaluation Date: 11/20/20  Insurance Authorization Period Expiration: 12/31/20  Plan of Care Certification Period: 1/29/21  Date of Return to MD: WALT    Visit # / Visits authorized: 1 / 50    FOTO: initial eval  Medicare Amount:     Time In:2:55 pm   Time Out: 3:45 pm  Total treatment time: 50 minutes  Total Timed minutes 50 minutes    Precautions:  Standard    Subjective     Involved Side: Bilateral   Dominant Side: Right  Date of Onset: August  Mechanism of Injury: Fall on the right side then saw a specialist and had injections in the thumb and IF at MP, gradual onset of the MP joint of the L hand at base of LF and SF  History of Current Condition: Fall on the R but flare up on the L.   Surgical Procedure: None she has had injections in the R hand but not the L.   Imaging: bone scan films x rays with no noted change but arthritis.   Previous Therapy: None for the hand.     Past Medical History/Physical Systems Review:   Verenice Mirza  has a past medical history of Acute seasonal allergic rhinitis, BMI 40.0-44.9, adult, Breast cyst, Dysphagia, Dysplastic nevus of trunk, Fatty liver disease, nonalcoholic, Fibrocystic breast, Hiatal hernia, Hyperlipidemia, Hypertension, Hypothyroid, IGT (impaired glucose tolerance), Kidney stones, Left breast mass, Lumbar disc disease, Morbid obesity, Nicotine use disorder, JORDANA on CPAP, PAD (peripheral artery  disease), PVD (peripheral vascular disease), Renal angiomyolipoma, Rosacea, Squamous cell carcinoma, Venous insufficiency, and Vitamin D deficiency disease.    Verenice Mirza  has a past surgical history that includes Hysterectomy; Bladder suspension; Lithotripsy; Cystoscopy; Oophorectomy; Colonoscopy (N/A, 5/24/2017); Esophagogastroduodenoscopy (N/A, 12/5/2018); Excisional biopsy (Left, 8/19/2019); and Breast biopsy.    Verenice has a current medication list which includes the following prescription(s): albuterol, aspirin, atorvastatin, b complex vitamins, biotin, true metrix glucose test strip, co-enzyme q-10, famotidine, finasteride, fluzone high-dose 2019-20 (pf), glucosamine/chondr tyler a sod, inhalation spacing device, ketoconazole, lactobacillus 3/fos/pantethine, lancets, levothyroxine, lutein, magnesium oxide, meloxicam, metformin, metoprolol succinate, nystatin, omega-3 acid ethyl esters, raloxifene, spironolactone, vitamin d, and tamsulosin.    Review of patient's allergies indicates:  No Known Allergies     Patient's Goals for Therapy: Increase use and reduce pain.     Pain:  Functional Pain Scale Rating 0-10:   6/10 on average  0/10 at best  8/10 at worst  Location: R IF at MP and thumb at IP along with L LF at MP joint  Description: Aching and Dull  Aggravating Factors: Night Time, Extension and Lifting  Easing Factors: relaxation, heating pad and rest    Occupation:  Retired teacher  Working presently: unemployed.   Duties: Household chores    Functional Limitations/Social History:    Previous functional status includes: Independent with all ADLs. IND    Current FunctionalStatus   Home/Living environment : lives alone      Limitation of Functional Status as follows:   ADLs/IADLs:     - Feeding: IND    - Bathing: IND    - Dressing/Grooming: IND    - Driving: IND     Leisure: Gardening, Crafts and painting and household chores.         Objective     Observation/Appearance: noted swelling in the R IF.      Edema. Measured in centimeters.   11/30/2020 11/30/2020    Left Right   Wrist Crease 15.5 15.5   Figure of 8 17.2 18.7   MCPs 18.2 18.2     Edema. Measured in centimeters.   11/30/2020 11/30/2020    Left Right   Index:       P1 6.5 6.6    PIP 6.1 6.2   P2 5.4 5.4    DIP 4.8 5.1   P3 4.6 4.8   Long:       P1 6.1 6.3    PIP 6.1 6.0   P2 5.6 5.7    DIP 5.2 5.2   P3 4.8 5   Ring:       Thumb:     Prox. Phalanx 6.3 6.5   IP 5.6 6.3   Distal Phalanx 5.4 5.8     Elbow and Wrist ROM. Measured in degrees.   11/30/2020 11/30/2020    Left Right   Wrist Ext/Flex 50/55 55/58   Wrist RD/UD 18/35 20/32     Hand ROM. Measured in degrees.   11/30/2020 11/30/2020    Right Left        Index: MP  67 68              PIP     90 100              DIP 52 65              MAYEN 209 233        Long:  MP 66 71              PIP 93 98              DIP 60 62              MAYEN 219 231        Ring:   MP 68 66              PIP 98 92              DIP 56 65              MAYEN 212 223        Small:  MP 78 79               PIP 84 93               DIP 61 42              MAYEN 223 214        Thumb: MP 45 46                IP 52 54       Rad ADD/ABD 60 65       Pal ADD/ABD 50 65          Opposition Tip of SF DPC      Strength (Dynamometer) and Pinch Strength (Pinch Gauge)  Measured in pounds.   11/30/2020 11/30/2020    Left Right   Rung II 15 27   Key Pinch 9 4   3pt Pinch 6 7   2pt Pinch 6 6          CMS Impairment/Limitation/Restriction for FOTO Hand Survey    Therapist reviewed FOTO scores for Verenice Mirza on 11/30/2020.   FOTO documents entered into EPIC - see Media section.    Limitation Score: 52%         Treatment     Treatment Time In: 3:33 am   Treatment Time Out: 3:45 am   Total Treatment time separate from Evaluation time:10 min     Verenice received the following supervised modalities after being cleared for contradictions for 10 minutes:   -Paraffin  to B hand for 10 min, pre-tx to decrease pain & increase tissue extensibility     Verenice  received therapeutic exercises for 2 minutes including:  -HEP in place with pt demonstrating understanding.     Home Exercise Program/Education:  Issued HEP (see patient instructions in EMR) and educated on modality use for pain management . Exercises were reviewed and Verenice was able to demonstrate them prior to the end of the session.   Pt received a written copy of exercises to perform at home. Verenice demonstrated good  understanding of the education provided.  Pt was advised to perform these exercises free of pain, and to stop performing them if pain occurs.    Patient/Family Education: role of OT, goals for OT, scheduling/cancellations - pt verbalized understanding. Discussed insurance limitations with patient.    Additional Education provided: HEP hand out and AE hand out     Assessment     Verenice Mirza is a 65 y.o. female referred to outpatient occupational therapy and presents with a medical diagnosis of Bilateral hand pain , resulting in stiffness, weakness and pain and demonstrates limitations as described in the chart below. Following medical record review it is determined that pt will benefit from occupational therapy services in order to maximize pain free and/or functional use of bilateral hands. The following goals were discussed with the patient and patient is in agreement with them as to be addressed in the treatment plan. The patient's rehab potential is Good.     Anticipated barriers to occupational therapy: Arthritis   Pt has no cultural, educational or language barriers to learning provided.    Profile and History Assessment of Occupational Performance Level of Clinical Decision Making Complexity Score   Occupational Profile:   Verenice Mirza is a 65 y.o. female who lives alone and is currently unemployed . Verenice Mirza has difficulty with  feeding, bathing, grooming and dressing  finance management, driving/transportation management, phone/computer use, housework/household chores and  medication management  affecting his/her daily functional abilities. His/her main goal for therapy is reduce pain and increase use.     Comorbidities:   high BMI and Arthritis    Medical and Therapy History Review:   Brief               Performance Deficits    Physical:  Joint Mobility  Muscle Power/Strength  Edema   Strength  Pinch Strength  Pain    Cognitive:  No Deficits    Psychosocial:    Social Interaction  Habits  Routines  Rituals     Clinical Decision Making:  low    Assessment Process:  Problem-Focused Assessments    Modification/Need for Assistance:  Minimal-Moderate Modifications/Assistance    Intervention Selection:  Several Treatment Options       low  Based on PMHX, co morbidities , data from assessments and functional level of assistance required with task and clinical presentation directly impacting function.         Goals:   The following goals were discussed with the patient and patient is in agreement with them as to be addressed in the treatment plan.   Short term Goals:  1) Initiate HEP  2) Pt will increase AROM of B by 5-10 degrees in order to assist with functional full fist by 4 weeks.  3) Pt will reduce edema by .5-1 cm in affected fingers by 4 weeks.  4) Pt will reduce pain to less than 4/10 by 4 weeks.  5) Pt will increase functional  strength by 5# in order to A in opening containers for med management or home management tasks by 4 weeks.   6) Patient will be able to achieve less than or equal to 45% on the FOTO, demonstrating overall improved functional ability with upper extremity. (Self-care category)    Long Term Goals:  Goals to be met by discharge:  1) Independent with HEP  2) Pt will increase B MAYEN by 30 degrees in order to increase functional fist for grasp with home management or work related tasks by d/c.   3) Pt will decrease edema to trace or none to increase functional ROM by d/c.   4) Pt will decrease pain to trace or none while completing light home management  tasks or work related tasks by d/c.   5) Patient will be able to achieve less than or equal to 20% on the FOTO, demonstrating overall improved functional ability with upper extremity.  (Self-care category)        Plan   Certification Period/Plan of care expiration: 11/30/2020 to 1/29/21.    Outpatient Occupational Therapy 1 times weekly for 8 weeks to include the following interventions: Paraffin, Fluidotherapy, Manual therapy/joint mobilizations, Modalities for pain management, Therapeutic exercises/activities., Strengthening, Edema Control, Joint Protection and Energy Conservation.      Erik Mendez, OT

## 2020-12-07 ENCOUNTER — LAB VISIT (OUTPATIENT)
Dept: LAB | Facility: HOSPITAL | Age: 65
End: 2020-12-07
Attending: INTERNAL MEDICINE
Payer: MEDICARE

## 2020-12-07 DIAGNOSIS — E11.69 DYSLIPIDEMIA ASSOCIATED WITH TYPE 2 DIABETES MELLITUS: ICD-10-CM

## 2020-12-07 DIAGNOSIS — I15.2 HYPERTENSION ASSOCIATED WITH DIABETES: ICD-10-CM

## 2020-12-07 DIAGNOSIS — E11.9 NEW ONSET TYPE 2 DIABETES MELLITUS: ICD-10-CM

## 2020-12-07 DIAGNOSIS — E78.5 DYSLIPIDEMIA ASSOCIATED WITH TYPE 2 DIABETES MELLITUS: ICD-10-CM

## 2020-12-07 DIAGNOSIS — E11.59 HYPERTENSION ASSOCIATED WITH DIABETES: ICD-10-CM

## 2020-12-07 DIAGNOSIS — E03.4 HYPOTHYROIDISM DUE TO ACQUIRED ATROPHY OF THYROID: ICD-10-CM

## 2020-12-07 DIAGNOSIS — I10 ESSENTIAL HYPERTENSION: ICD-10-CM

## 2020-12-07 LAB
ALBUMIN SERPL BCP-MCNC: 3.9 G/DL (ref 3.5–5.2)
ALP SERPL-CCNC: 93 U/L (ref 55–135)
ALT SERPL W/O P-5'-P-CCNC: 98 U/L (ref 10–44)
ANION GAP SERPL CALC-SCNC: 12 MMOL/L (ref 8–16)
AST SERPL-CCNC: 47 U/L (ref 10–40)
BASOPHILS # BLD AUTO: 0.05 K/UL (ref 0–0.2)
BASOPHILS NFR BLD: 0.9 % (ref 0–1.9)
BILIRUB SERPL-MCNC: 0.5 MG/DL (ref 0.1–1)
BUN SERPL-MCNC: 19 MG/DL (ref 8–23)
CALCIUM SERPL-MCNC: 9.8 MG/DL (ref 8.7–10.5)
CHLORIDE SERPL-SCNC: 103 MMOL/L (ref 95–110)
CHOLEST SERPL-MCNC: 142 MG/DL (ref 120–199)
CHOLEST/HDLC SERPL: 3.5 {RATIO} (ref 2–5)
CO2 SERPL-SCNC: 26 MMOL/L (ref 23–29)
CREAT SERPL-MCNC: 0.9 MG/DL (ref 0.5–1.4)
DIFFERENTIAL METHOD: ABNORMAL
EOSINOPHIL # BLD AUTO: 0.1 K/UL (ref 0–0.5)
EOSINOPHIL NFR BLD: 1.7 % (ref 0–8)
ERYTHROCYTE [DISTWIDTH] IN BLOOD BY AUTOMATED COUNT: 13.9 % (ref 11.5–14.5)
EST. GFR  (AFRICAN AMERICAN): >60 ML/MIN/1.73 M^2
EST. GFR  (NON AFRICAN AMERICAN): >60 ML/MIN/1.73 M^2
GLUCOSE SERPL-MCNC: 145 MG/DL (ref 70–110)
HCT VFR BLD AUTO: 46.4 % (ref 37–48.5)
HDLC SERPL-MCNC: 41 MG/DL (ref 40–75)
HDLC SERPL: 28.9 % (ref 20–50)
HGB BLD-MCNC: 14.6 G/DL (ref 12–16)
IMM GRANULOCYTES # BLD AUTO: 0.02 K/UL (ref 0–0.04)
IMM GRANULOCYTES NFR BLD AUTO: 0.3 % (ref 0–0.5)
LDLC SERPL CALC-MCNC: 79.4 MG/DL (ref 63–159)
LYMPHOCYTES # BLD AUTO: 1.5 K/UL (ref 1–4.8)
LYMPHOCYTES NFR BLD: 25.6 % (ref 18–48)
MCH RBC QN AUTO: 29.6 PG (ref 27–31)
MCHC RBC AUTO-ENTMCNC: 31.5 G/DL (ref 32–36)
MCV RBC AUTO: 94 FL (ref 82–98)
MONOCYTES # BLD AUTO: 0.4 K/UL (ref 0.3–1)
MONOCYTES NFR BLD: 7.3 % (ref 4–15)
NEUTROPHILS # BLD AUTO: 3.7 K/UL (ref 1.8–7.7)
NEUTROPHILS NFR BLD: 64.2 % (ref 38–73)
NONHDLC SERPL-MCNC: 101 MG/DL
NRBC BLD-RTO: 0 /100 WBC
PLATELET # BLD AUTO: 288 K/UL (ref 150–350)
PMV BLD AUTO: 11 FL (ref 9.2–12.9)
POTASSIUM SERPL-SCNC: 4.2 MMOL/L (ref 3.5–5.1)
PROT SERPL-MCNC: 7.2 G/DL (ref 6–8.4)
RBC # BLD AUTO: 4.94 M/UL (ref 4–5.4)
SODIUM SERPL-SCNC: 141 MMOL/L (ref 136–145)
TRIGL SERPL-MCNC: 108 MG/DL (ref 30–150)
TSH SERPL DL<=0.005 MIU/L-ACNC: 0.57 UIU/ML (ref 0.4–4)
WBC # BLD AUTO: 5.79 K/UL (ref 3.9–12.7)

## 2020-12-07 PROCEDURE — 83036 HEMOGLOBIN GLYCOSYLATED A1C: CPT

## 2020-12-07 PROCEDURE — 80053 COMPREHEN METABOLIC PANEL: CPT

## 2020-12-07 PROCEDURE — 84443 ASSAY THYROID STIM HORMONE: CPT

## 2020-12-07 PROCEDURE — 80061 LIPID PANEL: CPT

## 2020-12-07 PROCEDURE — 36415 COLL VENOUS BLD VENIPUNCTURE: CPT | Mod: PO

## 2020-12-07 PROCEDURE — 85025 COMPLETE CBC W/AUTO DIFF WBC: CPT

## 2020-12-08 ENCOUNTER — CLINICAL SUPPORT (OUTPATIENT)
Dept: REHABILITATION | Facility: HOSPITAL | Age: 65
End: 2020-12-08
Payer: MEDICARE

## 2020-12-08 ENCOUNTER — PATIENT MESSAGE (OUTPATIENT)
Dept: HEMATOLOGY/ONCOLOGY | Facility: CLINIC | Age: 65
End: 2020-12-08

## 2020-12-08 DIAGNOSIS — M25.541 ARTHRALGIA OF BOTH HANDS: ICD-10-CM

## 2020-12-08 DIAGNOSIS — M25.60 STIFFNESS IN JOINT: ICD-10-CM

## 2020-12-08 DIAGNOSIS — M25.542 ARTHRALGIA OF BOTH HANDS: ICD-10-CM

## 2020-12-08 LAB
ESTIMATED AVG GLUCOSE: 154 MG/DL (ref 68–131)
HBA1C MFR BLD HPLC: 7 % (ref 4–5.6)

## 2020-12-08 PROCEDURE — 97018 PARAFFIN BATH THERAPY: CPT | Mod: PN,59

## 2020-12-08 PROCEDURE — 97140 MANUAL THERAPY 1/> REGIONS: CPT | Mod: PN

## 2020-12-08 PROCEDURE — 97110 THERAPEUTIC EXERCISES: CPT | Mod: PN

## 2020-12-08 NOTE — PROGRESS NOTES
"  Occupational Therapy Treatment Note     Date: 12/8/2020  Name: Verenice Mirza  Clinic Number: [id number]    Therapy Diagnosis:   Encounter Diagnoses   Name Primary?    Arthralgia of both hands     Stiffness in joint      Physician: Shona Galvez, *  Physician Orders: eval and tx   Medical Diagnosis:   M79.642 (ICD-10-CM) - Left hand pain   M19.041 (ICD-10-CM) - Arthritis of right hand      Surgical Procedure and Date: none, / Date of Injury/Onset: Beginning of August she had a fall   Evaluation Date: 11/20/20  Insurance Authorization Period Expiration: 12/31/20  Plan of Care Certification Period: 1/29/21  Date of Return to MD: WALT     Visit # / Visits authorized:  2 / 50     FOTO: initial eval     Time In: 10:15 am   Time Out: 11:00 am  Total treatment time: 45 minutes  Total Timed minutes 45 minutes     Precautions:  Standard    Subjective     Pt reports: fingers feel stiff and throb constantly. She c/o pain with writing, sweeping, cutting and other daily tasks.   she was compliant with home exercise program given last session.   Response to previous treatment: good  Functional change: Pt continues to have difficulty with ADLs    Pain: 5/10  Location: bilateral hands      Objective     Verenice received the following supervised modalities after being cleared for contradictions for 10 minutes:   -Paraffin to B hands for pain management and tissue extensibility    Verenice received the following manual therapy techniques for 10 minutes:   -STM and Retrograde massage to decrease stiffness and edema    Verenice received therapeutic exercises for 25 minutes including:  Wrist and finger AROM 10X   iospheres 2 min each hand   Wrist maze 2 min R hand   Thumb extension off golf ball in "C" position 20X   Pt education on joint protection and activity modification 5 min       Home Exercises and Education Provided     Education provided:   - Pt educated on joint protection and activity modification.   - Progress towards goals "     Written Home Exercises Provided: Patient instructed to cont prior HEP.  Exercises were reviewed and Verenice was able to demonstrate them prior to the end of the session.  Verenice demonstrated good  understanding of the HEP provided.   .   See EMR under Patient Instructions for exercises provided prior visit.        Assessment   Pt tolerated tx with min c/o pain in R hand. Pt c/o pain in L MF MP joint with MP flexion. Tenderness palpated over L dorsal MP joint and in L forearm extensors and flexors. Pain on palpation of L lateral epicondyle.      Verenice is progressing well towards her goals and there are no updates to goals at this time. Pt prognosis is Good.     Pt will continue to benefit from skilled outpatient occupational therapy to address the deficits listed in the problem list on initial evaluation provide pt/family education and to maximize pt's level of independence in the home and community environment.     Anticipated barriers to occupational therapy: none    Pt's spiritual, cultural and educational needs considered and pt agreeable to plan of care and goals.    Goals:  The following goals were discussed with the patient and patient is in agreement with them as to be addressed in the treatment plan.   Short term Goals:  1) Initiate HEP -Met, Ongoing  2) Pt will increase AROM of B by 5-10 degrees in order to assist with functional full fist by 4 weeks. -Not met, progressing  3) Pt will reduce edema by .5-1 cm in affected fingers by 4 weeks. -Not met, progressing    4) Pt will reduce pain to less than 4/10 by 4 weeks. -Not met, progressing    5) Pt will increase functional  strength by 5# in order to A in opening containers for med management or home management tasks by 4 weeks. -Not met, progressing    6) Patient will be able to achieve less than or equal to 45% on the FOTO, demonstrating overall improved functional ability with upper extremity. (Self-care category) -Not met, progressing       Long Term  Goals:  Goals to be met by discharge:  1) Independent with HEP -Not met, progressing    2) Pt will increase B MAYEN by 30 degrees in order to increase functional fist for grasp with home management or work related tasks by d/c. -Not met, progressing    3) Pt will decrease edema to trace or none to increase functional ROM by d/c. -Not met, progressing    4) Pt will decrease pain to trace or none while completing light home management tasks or work related tasks by d/c. -Not met, progressing    5) Patient will be able to achieve less than or equal to 20% on the FOTO, demonstrating overall improved functional ability with upper extremity.  (Self-care category) -Not met, progressing      Plan     Continue with POC.    Updates/Grading for next session: progress as tolerated      Margaret Boasberg, OT

## 2020-12-09 ENCOUNTER — OFFICE VISIT (OUTPATIENT)
Dept: INTERNAL MEDICINE | Facility: CLINIC | Age: 65
End: 2020-12-09
Payer: MEDICARE

## 2020-12-09 VITALS
HEART RATE: 60 BPM | BODY MASS INDEX: 43.24 KG/M2 | DIASTOLIC BLOOD PRESSURE: 64 MMHG | OXYGEN SATURATION: 97 % | TEMPERATURE: 97 F | WEIGHT: 235 LBS | SYSTOLIC BLOOD PRESSURE: 102 MMHG | HEIGHT: 62 IN

## 2020-12-09 DIAGNOSIS — E03.4 HYPOTHYROIDISM DUE TO ACQUIRED ATROPHY OF THYROID: ICD-10-CM

## 2020-12-09 DIAGNOSIS — E78.5 DYSLIPIDEMIA ASSOCIATED WITH TYPE 2 DIABETES MELLITUS: ICD-10-CM

## 2020-12-09 DIAGNOSIS — M25.50 ARTHRALGIA, UNSPECIFIED JOINT: ICD-10-CM

## 2020-12-09 DIAGNOSIS — K76.0 FATTY LIVER: ICD-10-CM

## 2020-12-09 DIAGNOSIS — E11.59 HYPERTENSION ASSOCIATED WITH DIABETES: ICD-10-CM

## 2020-12-09 DIAGNOSIS — G47.33 OSA (OBSTRUCTIVE SLEEP APNEA): ICD-10-CM

## 2020-12-09 DIAGNOSIS — I15.2 HYPERTENSION ASSOCIATED WITH DIABETES: ICD-10-CM

## 2020-12-09 DIAGNOSIS — Z00.00 PREVENTATIVE HEALTH CARE: Primary | ICD-10-CM

## 2020-12-09 DIAGNOSIS — G44.209 TENSION-TYPE HEADACHE, NOT INTRACTABLE, UNSPECIFIED CHRONICITY PATTERN: ICD-10-CM

## 2020-12-09 DIAGNOSIS — E11.69 DYSLIPIDEMIA ASSOCIATED WITH TYPE 2 DIABETES MELLITUS: ICD-10-CM

## 2020-12-09 PROCEDURE — 99397 PER PM REEVAL EST PAT 65+ YR: CPT | Mod: S$PBB,,, | Performed by: INTERNAL MEDICINE

## 2020-12-09 PROCEDURE — 99999 PR PBB SHADOW E&M-EST. PATIENT-LVL V: ICD-10-PCS | Mod: PBBFAC,,, | Performed by: INTERNAL MEDICINE

## 2020-12-09 PROCEDURE — 99215 OFFICE O/P EST HI 40 MIN: CPT | Mod: PBBFAC,PO | Performed by: INTERNAL MEDICINE

## 2020-12-09 PROCEDURE — 99397 PR PREVENTIVE VISIT,EST,65 & OVER: ICD-10-PCS | Mod: S$PBB,,, | Performed by: INTERNAL MEDICINE

## 2020-12-09 PROCEDURE — 99999 PR PBB SHADOW E&M-EST. PATIENT-LVL V: CPT | Mod: PBBFAC,,, | Performed by: INTERNAL MEDICINE

## 2020-12-09 NOTE — PROGRESS NOTES
Subjective:       Patient ID: Verenice Mirza is a 65 y.o. female.    Chief Complaint: Annual Exam    HPI 65-year-old female presents to clinic today for annual physical follow-up hypertension dyslipidemia associated diabetes fatty liver hypothyroidism patient has been experiencing some headache she admits to not using her sleep apnea machine over the last several months this may be contributing.  She has been experiencing some generalized joint arthralgia multiple occasion she would like to see rheumatologist.  Review of Systems    otherwise negative  Objective:      Physical Exam   General: Well-appearing, well-nourished.  No distress  HEENT: conjunctivae are normal.  Pupils are equal and reative to light.  TM's are clear and intact bilaterally.  Hearing is grossly normal.  Nasopharynx is clear.  Oropharynx is clear.  Neck: Supple.  No thyroid megaly.  No bruits.  Lymph: No cervical or supraclavicular adenopathy.  Heart: Regular rate and rhythm, without murmur, rub or gallop.  Lungs: Clear to auscultation; respiratory effort normal.  Abdomen: Soft, nontender, nondistended.  Normoactive bowel sounds.  No hepatomegaly.  No masses.  Extremities: Good distal pulses.  No edema.  Psych: Oriented to time person place.  Judgment and insight seem unimpaired.  Mood and affect are appropriate.  Assessment:       1. Preventative health care    2. Hypertension associated with diabetes    3. Dyslipidemia associated with type 2 diabetes mellitus    4. Hypothyroidism due to acquired atrophy of thyroid    5. Fatty liver    6. Tension-type headache, not intractable, unspecified chronicity pattern    7. JORDANA (obstructive sleep apnea)    8. Arthralgia, unspecified joint        Plan:       Verenice was seen today for annual exam.    Diagnoses and all orders for this visit:    Preventative health care  Performed reviewed and updated today  Hypertension associated with diabetes  Controlled.  Continue current medical regimen.  Prescription  refills addressed.  Followup advised. See after visit summary.  Dyslipidemia associated with type 2 diabetes mellitus  Controlled.  Continue current medical regimen.  Prescription refills addressed.  Followup advised. See after visit summary.  Hypothyroidism due to acquired atrophy of thyroid  Controlled.  Continue current medical regimen.  Prescription refills addressed.  Followup advised. See after visit summary.  Fatty liver  Clinically improving  Tension-type headache, not intractable, unspecified chronicity pattern  Headaches likely due to non adherence to CPAP machine let us see if they improve after resuming CPAP  JORDANA (obstructive sleep apnea)  Recommend compliance with CPAP  Arthralgia, unspecified joint  -     Ambulatory referral/consult to Rheumatology; Future

## 2020-12-10 ENCOUNTER — PATIENT MESSAGE (OUTPATIENT)
Dept: INTERNAL MEDICINE | Facility: CLINIC | Age: 65
End: 2020-12-10

## 2020-12-11 ENCOUNTER — PATIENT MESSAGE (OUTPATIENT)
Dept: OTHER | Facility: OTHER | Age: 65
End: 2020-12-11

## 2020-12-11 ENCOUNTER — CLINICAL SUPPORT (OUTPATIENT)
Dept: REHABILITATION | Facility: HOSPITAL | Age: 65
End: 2020-12-11
Payer: MEDICARE

## 2020-12-11 DIAGNOSIS — M25.561 BILATERAL CHRONIC KNEE PAIN: ICD-10-CM

## 2020-12-11 DIAGNOSIS — G89.29 BILATERAL CHRONIC KNEE PAIN: ICD-10-CM

## 2020-12-11 DIAGNOSIS — M25.60 STIFFNESS IN JOINT: ICD-10-CM

## 2020-12-11 DIAGNOSIS — M25.562 BILATERAL CHRONIC KNEE PAIN: ICD-10-CM

## 2020-12-11 DIAGNOSIS — M62.81 MUSCLE WEAKNESS: ICD-10-CM

## 2020-12-11 DIAGNOSIS — M25.541 ARTHRALGIA OF BOTH HANDS: ICD-10-CM

## 2020-12-11 DIAGNOSIS — G89.29 CHRONIC PAIN OF RIGHT ANKLE: ICD-10-CM

## 2020-12-11 DIAGNOSIS — M25.542 ARTHRALGIA OF BOTH HANDS: ICD-10-CM

## 2020-12-11 DIAGNOSIS — Z74.09 IMPAIRED MOBILITY: ICD-10-CM

## 2020-12-11 DIAGNOSIS — M25.571 CHRONIC PAIN OF RIGHT ANKLE: ICD-10-CM

## 2020-12-11 PROCEDURE — 97140 MANUAL THERAPY 1/> REGIONS: CPT | Mod: PN

## 2020-12-11 PROCEDURE — 97110 THERAPEUTIC EXERCISES: CPT | Mod: PN,CQ

## 2020-12-11 PROCEDURE — 97110 THERAPEUTIC EXERCISES: CPT | Mod: PN

## 2020-12-11 NOTE — PROGRESS NOTES
"  Occupational Therapy Treatment Note     Date: 12/11/2020  Name: Verenice Mirza  Clinic Number: [id number]    Therapy Diagnosis:   No diagnosis found.  Physician: Shona Galvez, *  Physician Orders: eval and tx   Medical Diagnosis:   M79.642 (ICD-10-CM) - Left hand pain   M19.041 (ICD-10-CM) - Arthritis of right hand      Surgical Procedure and Date: none, / Date of Injury/Onset: Beginning of August she had a fall   Evaluation Date: 11/20/20  Insurance Authorization Period Expiration: 12/31/20  Plan of Care Certification Period: 1/29/21  Date of Return to MD: WALT     Visit # / Visits authorized:  3 / 50     FOTO: initial eval     Time In:  3:30 PM  Time Out:  4:15 PM  Total treatment time:  45 minutes  Total Timed minutes  45 minutes     Precautions:  Standard    Subjective     Pt reports:  "When I woke up my thumb was really swollen and stiff"  she was compliant with home exercise program given last session.   Response to previous treatment: good  Functional change: Pt continues to have difficulty with ADLs    Pain: 5/10  Location: bilateral hands      Objective     Verenice received the following supervised modalities after being cleared for contradictions for 10 minutes:   -Paraffin to B hands for pain management and tissue extensibility    Verenice received the following manual therapy techniques for 10 minutes:   -STM and Retrograde massage to decrease stiffness and edema    Verenice received therapeutic exercises for 25 minutes including:  Wrist and finger AROM 10X   iospheres 2 min each hand   Wrist maze 2 min R hand   Thumb extension off golf ball in "C" position 20X           Home Exercises and Education Provided     Education provided:   - Pt educated on joint protection and activity modification.   - Progress towards goals     Written Home Exercises Provided: Patient instructed to cont prior HEP.  Exercises were reviewed and Verenice was able to demonstrate them prior to the end of the session.  Verenice " demonstrated good  understanding of the HEP provided.   .   See EMR under Patient Instructions for exercises provided prior visit.        Assessment     Pt tolerated session well today. Similar soreness and pain in hands and L forearm, but she responded well to MT. Exercises were performed well in pain-free range of motion, and she reports good compliance with HEP. Next session to continue to progress with exercises in order to increase pain free participation in ADLs and IADLs.    Verenice is progressing well towards her goals and there are no updates to goals at this time. Pt prognosis is Good.     Pt will continue to benefit from skilled outpatient occupational therapy to address the deficits listed in the problem list on initial evaluation provide pt/family education and to maximize pt's level of independence in the home and community environment.     Anticipated barriers to occupational therapy: none    Pt's spiritual, cultural and educational needs considered and pt agreeable to plan of care and goals.    Goals:  The following goals were discussed with the patient and patient is in agreement with them as to be addressed in the treatment plan.   Short term Goals:  1) Initiate HEP -Met, Ongoing  2) Pt will increase AROM of B by 5-10 degrees in order to assist with functional full fist by 4 weeks. -Not met, progressing  3) Pt will reduce edema by .5-1 cm in affected fingers by 4 weeks. -Not met, progressing    4) Pt will reduce pain to less than 4/10 by 4 weeks. -Not met, progressing    5) Pt will increase functional  strength by 5# in order to A in opening containers for med management or home management tasks by 4 weeks. -Not met, progressing    6) Patient will be able to achieve less than or equal to 45% on the FOTO, demonstrating overall improved functional ability with upper extremity. (Self-care category) -Not met, progressing       Long Term Goals:  Goals to be met by discharge:  1) Independent with HEP  -Not met, progressing    2) Pt will increase B MAYEN by 30 degrees in order to increase functional fist for grasp with home management or work related tasks by d/c. -Not met, progressing    3) Pt will decrease edema to trace or none to increase functional ROM by d/c. -Not met, progressing    4) Pt will decrease pain to trace or none while completing light home management tasks or work related tasks by d/c. -Not met, progressing    5) Patient will be able to achieve less than or equal to 20% on the FOTO, demonstrating overall improved functional ability with upper extremity.  (Self-care category) -Not met, progressing      Plan     Continue with POC.    Updates/Grading for next session: progress as tolerated      SABINO LYNN, OT

## 2020-12-11 NOTE — PROGRESS NOTES
"                          Physical Therapy Daily Treatment Note     Name: Verenice Mirza  Clinic Number: [id number]    Therapy Diagnosis:   Encounter Diagnoses   Name Primary?    Bilateral chronic knee pain     Chronic pain of right ankle     Impaired mobility     Muscle weakness      Physician: Areli Crespo MD    Visit Date: 12/11/2020  Physician Orders: PT Eval and Treat   Medical Diagnosis from Referral: M17.0 (ICD-10-CM) - Primary osteoarthritis of knees, bilateral  Evaluation Date: 10/5/2020  Authorization Period Expiration: 12/31/2020  Plan of Care Expiration: 11/18/2020 to 1/1/2021   Visit # / Visits authorized: 16/50  FOTO: at discharge    Time In: 2:30 pm  Time Out:3:15 pm  Total Billable Time: 45 minutes TEx3    Precautions: Standard    Subjective     Pt reports: her ankle and her knee are a little less painful today.  She was compliant with home exercise program.  Response to previous treatment: decreased knee and ankle pain  Functional change: decreased pain with walking. Walked throughout [facility] with some throbbing starting 15 minutes in.     Pain: 1-2/10 R medial knee and R lateral ankle upon arrival    Objective     Verenice received therapeutic exercises to develop strength and endurance for 45 minutes including:    Standing:    Treadmill walking for endurance and joint nutrition: 6 minutes total time 2' at 1.5 mph, 4'  1.7 mph    Cybex leg press: 6.0 plates 2x15 double leg; 3.5 plates 2x15 B single leg  Hip flexion: RTB 2x12 B  Hip adduction: RTB 2x12 B  Hip extension:  RTB 2x12 B  Forward step ups: 6"  3x10 B  Single leg cone taps: x15 B  Squats with B hip ER for adductor strengthening: 2x15 with minimal UE support  NuStep for endurance:  L1 5 minutes     Home Exercises Provided and Patient Education Provided:     Education provided:   - Continue HEP.  - Plan of care update    Written Home Exercises Provided: Not today.  Exercises were reviewed and Verenice was able to demonstrate them prior to " "the end of the session.  Verenice demonstrated good  understanding of the education provided.     See EMR under Notes-Patient Instructions for exercises provided 10/26/2020.    Assessment     Demonstrated increased endurance with treadmill, minimal shortness of breath, no rest break needed.  Able to perform increased reps and resistance today as bolded. B  Reports minimal medial R knee pain after treatment, "ankle about the same.".  Not specific to scale.       Verenice is progressing well towards her goals.   Pt prognosis is Good.   Pt will continue to benefit from skilled outpatient physical therapy to address the deficits listed in the problem list box on initial evaluation, provide pt/family education and to maximize pt's level of independence in the home and community environment.      Pt's spiritual, cultural and educational needs considered and pt agreeable to plan of care and goals.  Anticipated barriers to physical therapy: Chronicity of pain; traumatic inciting event; sedentary lifestyle; BMI      Short Term Goals (3 Weeks):  1. Pt will be compliant with HEP to supplement PT in restoring pain free function. MET  2. Pt will improve impaired LE MMTs to >/= 4/5 to improve dynamic knee support for functional tasks. MET excluding hip adduction and extension  3. Pt will report 50% improvement in knee and ankle pain while walking to promote community ambulation. MET   Short Term Goals Status:   Continue goal #2   Long Term Goals (6 Weeks):  1. Pt will improve FOTO score to </= 40% limited to decrease perceived limitation with mobility. Progressing, not met.    2. Pt will improve impaired LE MMTs to >/= 4+/5 to improve dynamic knee support for functional tasks. Progressing, not met.    3. Pt will report 80% improvement in knee and ankle pain while walking to promote community ambulation. Progressing, not met.    Long Term Goal Status:   Continue goals 1-3      Plan     Continue PT Per Updated POC, progress LE " strengthening and promote community ambulation.     Jazmyne Morales, PTA

## 2020-12-14 ENCOUNTER — PATIENT MESSAGE (OUTPATIENT)
Dept: INTERNAL MEDICINE | Facility: CLINIC | Age: 65
End: 2020-12-14

## 2020-12-14 ENCOUNTER — PATIENT MESSAGE (OUTPATIENT)
Dept: HEMATOLOGY/ONCOLOGY | Facility: CLINIC | Age: 65
End: 2020-12-14

## 2020-12-15 ENCOUNTER — CLINICAL SUPPORT (OUTPATIENT)
Dept: REHABILITATION | Facility: HOSPITAL | Age: 65
End: 2020-12-15
Payer: MEDICARE

## 2020-12-15 DIAGNOSIS — M25.542 ARTHRALGIA OF BOTH HANDS: ICD-10-CM

## 2020-12-15 DIAGNOSIS — M25.60 STIFFNESS IN JOINT: ICD-10-CM

## 2020-12-15 DIAGNOSIS — M25.541 ARTHRALGIA OF BOTH HANDS: ICD-10-CM

## 2020-12-15 PROCEDURE — 97140 MANUAL THERAPY 1/> REGIONS: CPT | Mod: PN

## 2020-12-15 PROCEDURE — 97110 THERAPEUTIC EXERCISES: CPT | Mod: PN

## 2020-12-15 PROCEDURE — 97018 PARAFFIN BATH THERAPY: CPT | Mod: PN,59

## 2020-12-15 NOTE — PROGRESS NOTES
"  Occupational Therapy Treatment Note     Date: 12/15/2020  Name: Verenice Mirza  Clinic Number: [id number]    Therapy Diagnosis:   Encounter Diagnoses   Name Primary?    Arthralgia of both hands     Stiffness in joint      Physician: Shona Galvez, *  Physician Orders: eval and tx   Medical Diagnosis:   M79.642 (ICD-10-CM) - Left hand pain   M19.041 (ICD-10-CM) - Arthritis of right hand      Surgical Procedure and Date: none, / Date of Injury/Onset: Beginning of August she had a fall   Evaluation Date: 11/20/20  Insurance Authorization Period Expiration: 12/31/20  Plan of Care Certification Period: 1/29/21  Date of Return to MD: WALT     Visit # / Visits authorized:  4 / 50     FOTO: initial eval     Time In:  10:15 AM  Time Out:  11:05 AM  Total treatment time:  50 minutes  Total Timed minutes  50 minutes     Precautions:  Standard    Subjective     Pt reports: she was very stiff over the weekend. Today, she is still swollen but is not as stiff.  she was compliant with home exercise program given last session.   Response to previous treatment: good  Functional change: Pt continues to have difficulty with ADLs    Pain: 3/10  Location: bilateral hands      Objective     Verenice received the following supervised modalities after being cleared for contradictions for 10 minutes:   -Paraffin to B hands for pain management and tissue extensibility    Verenice received the following manual therapy techniques for 10 minutes:   -IASTM and Retrograde massage to decrease stiffness and edema    Verenice received therapeutic exercises for 25 minutes including:  Wrist and finger AROM 10X   iospheres 2 min each hand   Wrist maze 2 min R hand   Thumb extension off golf ball in "C" position 20X   1# wrist isotonics 1X15       Home Exercises and Education Provided     Education provided:   - Pt educated on joint protection and activity modification.   - Progress towards goals     Written Home Exercises Provided: Patient instructed to " cont prior HEP.  Exercises were reviewed and Verenice was able to demonstrate them prior to the end of the session.  Verenice demonstrated good  understanding of the HEP provided.   .   See EMR under Patient Instructions for exercises provided prior visit.        Assessment     Pt tolerated without difficulty. Trigger points palpated in L forearm extensors.  Exercises were performed well in pain-free range of motion, and she reports good compliance with HEP. Next session to continue to progress with exercises in order to increase pain free participation in ADLs and IADLs.    Verenice is progressing well towards her goals and there are no updates to goals at this time. Pt prognosis is Good.     Pt will continue to benefit from skilled outpatient occupational therapy to address the deficits listed in the problem list on initial evaluation provide pt/family education and to maximize pt's level of independence in the home and community environment.     Anticipated barriers to occupational therapy: none    Pt's spiritual, cultural and educational needs considered and pt agreeable to plan of care and goals.    Goals:  The following goals were discussed with the patient and patient is in agreement with them as to be addressed in the treatment plan.   Short term Goals:  1) Initiate HEP -Met, Ongoing  2) Pt will increase AROM of B by 5-10 degrees in order to assist with functional full fist by 4 weeks. -Not met, progressing  3) Pt will reduce edema by .5-1 cm in affected fingers by 4 weeks. -Not met, progressing    4) Pt will reduce pain to less than 4/10 by 4 weeks. -Not met, progressing    5) Pt will increase functional  strength by 5# in order to A in opening containers for med management or home management tasks by 4 weeks. -Not met, progressing    6) Patient will be able to achieve less than or equal to 45% on the FOTO, demonstrating overall improved functional ability with upper extremity. (Self-care category) -Not met,  progressing       Long Term Goals:  Goals to be met by discharge:  1) Independent with HEP -Not met, progressing    2) Pt will increase B MAYEN by 30 degrees in order to increase functional fist for grasp with home management or work related tasks by d/c. -Not met, progressing    3) Pt will decrease edema to trace or none to increase functional ROM by d/c. -Not met, progressing    4) Pt will decrease pain to trace or none while completing light home management tasks or work related tasks by d/c. -Not met, progressing    5) Patient will be able to achieve less than or equal to 20% on the FOTO, demonstrating overall improved functional ability with upper extremity.  (Self-care category) -Not met, progressing      Plan     Continue with POC.    Updates/Grading for next session: progress as tolerated      Margaret Boasberg, OT, CHT

## 2020-12-17 ENCOUNTER — CLINICAL SUPPORT (OUTPATIENT)
Dept: REHABILITATION | Facility: HOSPITAL | Age: 65
End: 2020-12-17
Payer: MEDICARE

## 2020-12-17 DIAGNOSIS — M25.541 ARTHRALGIA OF BOTH HANDS: ICD-10-CM

## 2020-12-17 DIAGNOSIS — M25.60 STIFFNESS IN JOINT: ICD-10-CM

## 2020-12-17 DIAGNOSIS — M25.542 ARTHRALGIA OF BOTH HANDS: ICD-10-CM

## 2020-12-17 PROCEDURE — 97140 MANUAL THERAPY 1/> REGIONS: CPT | Mod: PN

## 2020-12-17 PROCEDURE — 97018 PARAFFIN BATH THERAPY: CPT | Mod: PN

## 2020-12-17 PROCEDURE — 97110 THERAPEUTIC EXERCISES: CPT | Mod: PN

## 2020-12-17 NOTE — PROGRESS NOTES
"  Occupational Therapy Treatment Note     Date: 12/17/2020  Name: Verenice Mirza  Clinic Number: [id number]    Therapy Diagnosis:   Encounter Diagnoses   Name Primary?    Arthralgia of both hands     Stiffness in joint      Physician: Shona Galvez, *  Physician Orders: eval and tx   Medical Diagnosis:   M79.642 (ICD-10-CM) - Left hand pain   M19.041 (ICD-10-CM) - Arthritis of right hand      Surgical Procedure and Date: none, / Date of Injury/Onset: Beginning of August she had a fall   Evaluation Date: 11/20/20  Insurance Authorization Period Expiration: 12/31/20  Plan of Care Certification Period: 1/29/21  Date of Return to MD: NA     Visit # / Visits authorized:  5 / 50     FOTO: initial eval, 5th visit 40%     Time In:  10:15 AM  Time Out:  11:05 AM  Total treatment time:  50 minutes  Total Timed minutes  50 minutes     Precautions:  Standard    Subjective     Pt reports: B hands are hurting in the cold weather. She c/o increased pain in L wrist.   she was compliant with home exercise program given last session.   Response to previous treatment: good  Functional change: Pt continues to have difficulty with ADLs    Pain: 5/10  Location: bilateral hands      Objective     Verenice received the following supervised modalities after being cleared for contradictions for 10 minutes:   -Paraffin to B hands for pain management and tissue extensibility    Verenice received the following manual therapy techniques for 10 minutes:   -IASTM and Retrograde massage to decrease stiffness and edema    Verenice received therapeutic exercises for 25 minutes including:  Wrist and finger AROM 10X   iospheres 2 min each hand   Wrist maze 2 min R hand   Thumb extension off golf ball in "C" position 20X   Dowel rolling hook to full fist 10X each hand   Red therabar smiles, frowns, twists and waves 10X   1# wrist isotonics 1X15 not performed       Home Exercises and Education Provided     Education provided:   - Pt educated on joint " protection and activity modification.   - Progress towards goals     Written Home Exercises Provided: Patient instructed to cont prior HEP.  Exercises were reviewed and Verenice was able to demonstrate them prior to the end of the session.  Verenice demonstrated good  understanding of the HEP provided.   .   See EMR under Patient Instructions for exercises provided prior visit.        Assessment     Pt performed all exercises without pain. Trigger points palpated in B forearm extensors and tenderness in L ulnar wrist.  Exercises were performed well in pain-free range of motion, and she reports good compliance with HEP. FOTO score improved, indicating increased functional use of B hands. Next session to continue to progress with exercises in order to increase pain free participation in ADLs and IADLs.    Verenice is progressing well towards her goals and there are no updates to goals at this time. Pt prognosis is Good.     Pt will continue to benefit from skilled outpatient occupational therapy to address the deficits listed in the problem list on initial evaluation provide pt/family education and to maximize pt's level of independence in the home and community environment.     Anticipated barriers to occupational therapy: none    Pt's spiritual, cultural and educational needs considered and pt agreeable to plan of care and goals.    Goals:  The following goals were discussed with the patient and patient is in agreement with them as to be addressed in the treatment plan.   Short term Goals:  1) Initiate HEP -Met, Ongoing  2) Pt will increase AROM of B by 5-10 degrees in order to assist with functional full fist by 4 weeks. -Not met, progressing  3) Pt will reduce edema by .5-1 cm in affected fingers by 4 weeks. -Not met, progressing    4) Pt will reduce pain to less than 4/10 by 4 weeks. -Not met, progressing    5) Pt will increase functional  strength by 5# in order to A in opening containers for med management or home  management tasks by 4 weeks. -Not met, progressing    6) Patient will be able to achieve less than or equal to 45% on the FOTO, demonstrating overall improved functional ability with upper extremity. (Self-care category) -Not met, progressing       Long Term Goals:  Goals to be met by discharge:  1) Independent with HEP -Not met, progressing    2) Pt will increase B MAYEN by 30 degrees in order to increase functional fist for grasp with home management or work related tasks by d/c. -Not met, progressing    3) Pt will decrease edema to trace or none to increase functional ROM by d/c. -Not met, progressing    4) Pt will decrease pain to trace or none while completing light home management tasks or work related tasks by d/c. -Not met, progressing    5) Patient will be able to achieve less than or equal to 20% on the FOTO, demonstrating overall improved functional ability with upper extremity.  (Self-care category) -Not met, progressing      Plan     Continue with POC.    Updates/Grading for next session: progress as tolerated      Margaret Boasberg, OT, CHT

## 2020-12-28 ENCOUNTER — DOCUMENTATION ONLY (OUTPATIENT)
Dept: REHABILITATION | Facility: HOSPITAL | Age: 65
End: 2020-12-28

## 2020-12-28 NOTE — PROGRESS NOTES
PT/PTA met face to face to discuss pt's treatment plan and progress towards established goals. Pt will be seen by a physical therapist minimally every 6th visit or every 30 days.    Kerline Gorman, PT    Jazmyne Morales PTA

## 2020-12-29 ENCOUNTER — CLINICAL SUPPORT (OUTPATIENT)
Dept: REHABILITATION | Facility: HOSPITAL | Age: 65
End: 2020-12-29
Payer: MEDICARE

## 2020-12-29 DIAGNOSIS — M25.541 ARTHRALGIA OF BOTH HANDS: ICD-10-CM

## 2020-12-29 DIAGNOSIS — M25.60 STIFFNESS IN JOINT: ICD-10-CM

## 2020-12-29 DIAGNOSIS — M25.542 ARTHRALGIA OF BOTH HANDS: ICD-10-CM

## 2020-12-29 PROCEDURE — 97140 MANUAL THERAPY 1/> REGIONS: CPT | Mod: PN

## 2020-12-29 PROCEDURE — 97018 PARAFFIN BATH THERAPY: CPT | Mod: PN,59

## 2020-12-29 PROCEDURE — 97110 THERAPEUTIC EXERCISES: CPT | Mod: PN

## 2020-12-29 NOTE — PROGRESS NOTES
"  Occupational Therapy Treatment Note     Date: 12/29/2020  Name: Verenice Mirza  Clinic Number: [id number]    Therapy Diagnosis:   Encounter Diagnoses   Name Primary?    Arthralgia of both hands     Stiffness in joint      Physician: Shona Galvez, *  Physician Orders: eval and tx   Medical Diagnosis:   M79.642 (ICD-10-CM) - Left hand pain   M19.041 (ICD-10-CM) - Arthritis of right hand      Surgical Procedure and Date: none, / Date of Injury/Onset: Beginning of August she had a fall   Evaluation Date: 11/20/20  Insurance Authorization Period Expiration: 12/31/20  Plan of Care Certification Period: 1/29/21  Date of Return to MD: WALT     Visit # / Visits authorized:  6 / 50     FOTO: initial eval, 5th visit 40%     Time In:  11:00 AM  Time Out:  11:00 AM  Total treatment time:  45 minutes  Total Timed minutes  45 minutes     Precautions:  Standard    Subjective     Pt reports: Pt reports hands have been stiff and aching since Saturday. She states they are especially stiff when she wakes up in the morning.  she was compliant with home exercise program given last session.   Response to previous treatment: good  Functional change: Pt continues to have difficulty with ADLs    Pain: 4/10  Location: bilateral hands      Objective     Verenice received the following supervised modalities after being cleared for contradictions for 10 minutes:   -Paraffin to B hands for pain management and tissue extensibility    Verenice received the following manual therapy techniques for 10 minutes:   -IASTM and Retrograde massage to decrease stiffness and edema    Verenice received therapeutic exercises for 25 minutes including:  Wrist and finger AROM 10X   iospheres 2 min each hand   Wrist maze 2 min R hand   Thumb extension off golf ball in "C" position 20X not performed   Dowel rolling hook to full fist 10X each hand   Red therabar smiles, frowns, twists and waves 10X   1# extension and flexion 1X15        Home Exercises and Education " Provided     Education provided:   - Pt educated on joint protection and activity modification.   - Progress towards goals     Written Home Exercises Provided: Patient instructed to cont prior HEP.  Exercises were reviewed and Verenice was able to demonstrate them prior to the end of the session.  Verenice demonstrated good  understanding of the HEP provided.   .   See EMR under Patient Instructions for exercises provided prior visit.        Assessment     Pt tolerated tx without difficulty or pain. Educated pt on activity modification and recommended compression gloves for sleeping.  Next session to continue to progress with exercises in order to increase pain free participation in ADLs and IADLs.    Verenice is progressing well towards her goals and there are no updates to goals at this time. Pt prognosis is Good.     Pt will continue to benefit from skilled outpatient occupational therapy to address the deficits listed in the problem list on initial evaluation provide pt/family education and to maximize pt's level of independence in the home and community environment.     Anticipated barriers to occupational therapy: none    Pt's spiritual, cultural and educational needs considered and pt agreeable to plan of care and goals.    Goals:  The following goals were discussed with the patient and patient is in agreement with them as to be addressed in the treatment plan.   Short term Goals:  1) Initiate HEP -Met, Ongoing  2) Pt will increase AROM of B by 5-10 degrees in order to assist with functional full fist by 4 weeks. -Not met, progressing  3) Pt will reduce edema by .5-1 cm in affected fingers by 4 weeks. -Not met, progressing    4) Pt will reduce pain to less than 4/10 by 4 weeks. -Not met, progressing    5) Pt will increase functional  strength by 5# in order to A in opening containers for med management or home management tasks by 4 weeks. -Not met, progressing    6) Patient will be able to achieve less than or  equal to 45% on the FOTO, demonstrating overall improved functional ability with upper extremity. (Self-care category) -Not met, progressing       Long Term Goals:  Goals to be met by discharge:  1) Independent with HEP -Not met, progressing    2) Pt will increase B MAYEN by 30 degrees in order to increase functional fist for grasp with home management or work related tasks by d/c. -Not met, progressing    3) Pt will decrease edema to trace or none to increase functional ROM by d/c. -Not met, progressing    4) Pt will decrease pain to trace or none while completing light home management tasks or work related tasks by d/c. -Not met, progressing    5) Patient will be able to achieve less than or equal to 20% on the FOTO, demonstrating overall improved functional ability with upper extremity.  (Self-care category) -Not met, progressing      Plan     Continue with POC.    Updates/Grading for next session: progress as tolerated      Margaret Boasberg, OT, CHT

## 2021-01-04 ENCOUNTER — CLINICAL SUPPORT (OUTPATIENT)
Dept: REHABILITATION | Facility: HOSPITAL | Age: 66
End: 2021-01-04
Payer: MEDICARE

## 2021-01-04 DIAGNOSIS — M25.60 STIFFNESS IN JOINT: ICD-10-CM

## 2021-01-04 DIAGNOSIS — M25.541 ARTHRALGIA OF BOTH HANDS: ICD-10-CM

## 2021-01-04 DIAGNOSIS — G89.29 BILATERAL CHRONIC KNEE PAIN: ICD-10-CM

## 2021-01-04 DIAGNOSIS — M62.81 MUSCLE WEAKNESS: ICD-10-CM

## 2021-01-04 DIAGNOSIS — M25.571 CHRONIC PAIN OF RIGHT ANKLE: ICD-10-CM

## 2021-01-04 DIAGNOSIS — M25.542 ARTHRALGIA OF BOTH HANDS: ICD-10-CM

## 2021-01-04 DIAGNOSIS — Z74.09 IMPAIRED MOBILITY: ICD-10-CM

## 2021-01-04 DIAGNOSIS — M25.561 BILATERAL CHRONIC KNEE PAIN: ICD-10-CM

## 2021-01-04 DIAGNOSIS — M25.562 BILATERAL CHRONIC KNEE PAIN: ICD-10-CM

## 2021-01-04 DIAGNOSIS — G89.29 CHRONIC PAIN OF RIGHT ANKLE: ICD-10-CM

## 2021-01-04 PROCEDURE — 97110 THERAPEUTIC EXERCISES: CPT | Mod: PN

## 2021-01-04 PROCEDURE — 97140 MANUAL THERAPY 1/> REGIONS: CPT | Mod: PN

## 2021-01-06 ENCOUNTER — CLINICAL SUPPORT (OUTPATIENT)
Dept: REHABILITATION | Facility: HOSPITAL | Age: 66
End: 2021-01-06
Payer: MEDICARE

## 2021-01-06 DIAGNOSIS — M25.561 BILATERAL CHRONIC KNEE PAIN: ICD-10-CM

## 2021-01-06 DIAGNOSIS — M25.571 CHRONIC PAIN OF RIGHT ANKLE: ICD-10-CM

## 2021-01-06 DIAGNOSIS — M25.60 STIFFNESS IN JOINT: ICD-10-CM

## 2021-01-06 DIAGNOSIS — M62.81 MUSCLE WEAKNESS: ICD-10-CM

## 2021-01-06 DIAGNOSIS — M25.541 ARTHRALGIA OF BOTH HANDS: ICD-10-CM

## 2021-01-06 DIAGNOSIS — Z74.09 IMPAIRED MOBILITY: ICD-10-CM

## 2021-01-06 DIAGNOSIS — G89.29 BILATERAL CHRONIC KNEE PAIN: ICD-10-CM

## 2021-01-06 DIAGNOSIS — G89.29 CHRONIC PAIN OF RIGHT ANKLE: ICD-10-CM

## 2021-01-06 DIAGNOSIS — M25.542 ARTHRALGIA OF BOTH HANDS: ICD-10-CM

## 2021-01-06 DIAGNOSIS — M25.562 BILATERAL CHRONIC KNEE PAIN: ICD-10-CM

## 2021-01-06 PROCEDURE — 97018 PARAFFIN BATH THERAPY: CPT | Mod: PN

## 2021-01-06 PROCEDURE — 97110 THERAPEUTIC EXERCISES: CPT | Mod: PN

## 2021-01-07 ENCOUNTER — HOSPITAL ENCOUNTER (OUTPATIENT)
Dept: RADIOLOGY | Facility: HOSPITAL | Age: 66
Discharge: HOME | End: 2021-01-07
Attending: PHYSICIAN ASSISTANT
Payer: MEDICARE

## 2021-01-07 DIAGNOSIS — Z91.89 AT HIGH RISK FOR BREAST CANCER: ICD-10-CM

## 2021-01-07 PROCEDURE — A9577 INJ MULTIHANCE: HCPCS | Performed by: PHYSICIAN ASSISTANT

## 2021-01-07 PROCEDURE — C8937 CAD BREAST MRI: HCPCS | Mod: TC

## 2021-01-07 PROCEDURE — 25500020 PHARM REV CODE 255: Performed by: PHYSICIAN ASSISTANT

## 2021-01-07 PROCEDURE — 77049 MRI BREAST C-+ W/CAD BI: CPT | Mod: 26,,, | Performed by: RADIOLOGY

## 2021-01-07 PROCEDURE — 77049 MRI BREAST W/WO CONTRAST, W/CAD, BILATERAL: ICD-10-PCS | Mod: 26,,, | Performed by: RADIOLOGY

## 2021-01-07 RX ADMIN — GADOBENATE DIMEGLUMINE 20 ML: 529 INJECTION, SOLUTION INTRAVENOUS at 01:01

## 2021-01-11 ENCOUNTER — CLINICAL SUPPORT (OUTPATIENT)
Dept: REHABILITATION | Facility: HOSPITAL | Age: 66
End: 2021-01-11
Payer: MEDICARE

## 2021-01-11 DIAGNOSIS — M25.571 CHRONIC PAIN OF RIGHT ANKLE: ICD-10-CM

## 2021-01-11 DIAGNOSIS — M25.562 BILATERAL CHRONIC KNEE PAIN: ICD-10-CM

## 2021-01-11 DIAGNOSIS — M25.60 STIFFNESS IN JOINT: ICD-10-CM

## 2021-01-11 DIAGNOSIS — G89.29 CHRONIC PAIN OF RIGHT ANKLE: ICD-10-CM

## 2021-01-11 DIAGNOSIS — M25.542 ARTHRALGIA OF BOTH HANDS: ICD-10-CM

## 2021-01-11 DIAGNOSIS — M25.561 BILATERAL CHRONIC KNEE PAIN: ICD-10-CM

## 2021-01-11 DIAGNOSIS — M62.81 MUSCLE WEAKNESS: ICD-10-CM

## 2021-01-11 DIAGNOSIS — Z74.09 IMPAIRED MOBILITY: ICD-10-CM

## 2021-01-11 DIAGNOSIS — M25.541 ARTHRALGIA OF BOTH HANDS: ICD-10-CM

## 2021-01-11 DIAGNOSIS — G89.29 BILATERAL CHRONIC KNEE PAIN: ICD-10-CM

## 2021-01-11 PROCEDURE — 97110 THERAPEUTIC EXERCISES: CPT | Mod: PN

## 2021-01-11 PROCEDURE — 97140 MANUAL THERAPY 1/> REGIONS: CPT | Mod: PN

## 2021-01-13 ENCOUNTER — CLINICAL SUPPORT (OUTPATIENT)
Dept: REHABILITATION | Facility: HOSPITAL | Age: 66
End: 2021-01-13
Payer: MEDICARE

## 2021-01-13 DIAGNOSIS — G89.29 CHRONIC PAIN OF RIGHT ANKLE: ICD-10-CM

## 2021-01-13 DIAGNOSIS — M25.562 BILATERAL CHRONIC KNEE PAIN: ICD-10-CM

## 2021-01-13 DIAGNOSIS — Z74.09 IMPAIRED MOBILITY: ICD-10-CM

## 2021-01-13 DIAGNOSIS — G89.29 BILATERAL CHRONIC KNEE PAIN: ICD-10-CM

## 2021-01-13 DIAGNOSIS — M62.81 MUSCLE WEAKNESS: ICD-10-CM

## 2021-01-13 DIAGNOSIS — M25.561 BILATERAL CHRONIC KNEE PAIN: ICD-10-CM

## 2021-01-13 DIAGNOSIS — M25.60 STIFFNESS IN JOINT: ICD-10-CM

## 2021-01-13 DIAGNOSIS — M25.541 ARTHRALGIA OF BOTH HANDS: ICD-10-CM

## 2021-01-13 DIAGNOSIS — M25.571 CHRONIC PAIN OF RIGHT ANKLE: ICD-10-CM

## 2021-01-13 DIAGNOSIS — M25.542 ARTHRALGIA OF BOTH HANDS: ICD-10-CM

## 2021-01-13 PROCEDURE — 97110 THERAPEUTIC EXERCISES: CPT | Mod: PN,CQ

## 2021-01-13 PROCEDURE — 97110 THERAPEUTIC EXERCISES: CPT | Mod: PN

## 2021-01-13 PROCEDURE — 97140 MANUAL THERAPY 1/> REGIONS: CPT | Mod: PN

## 2021-01-20 ENCOUNTER — CLINICAL SUPPORT (OUTPATIENT)
Dept: REHABILITATION | Facility: HOSPITAL | Age: 66
End: 2021-01-20
Payer: MEDICARE

## 2021-01-20 DIAGNOSIS — Z74.09 IMPAIRED MOBILITY: ICD-10-CM

## 2021-01-20 DIAGNOSIS — M25.562 BILATERAL CHRONIC KNEE PAIN: ICD-10-CM

## 2021-01-20 DIAGNOSIS — M25.60 STIFFNESS IN JOINT: ICD-10-CM

## 2021-01-20 DIAGNOSIS — G89.29 BILATERAL CHRONIC KNEE PAIN: ICD-10-CM

## 2021-01-20 DIAGNOSIS — G89.29 CHRONIC PAIN OF RIGHT ANKLE: ICD-10-CM

## 2021-01-20 DIAGNOSIS — M25.541 ARTHRALGIA OF BOTH HANDS: ICD-10-CM

## 2021-01-20 DIAGNOSIS — M25.542 ARTHRALGIA OF BOTH HANDS: ICD-10-CM

## 2021-01-20 DIAGNOSIS — M62.81 MUSCLE WEAKNESS: ICD-10-CM

## 2021-01-20 DIAGNOSIS — M25.561 BILATERAL CHRONIC KNEE PAIN: ICD-10-CM

## 2021-01-20 DIAGNOSIS — M25.571 CHRONIC PAIN OF RIGHT ANKLE: ICD-10-CM

## 2021-01-20 PROCEDURE — 97110 THERAPEUTIC EXERCISES: CPT | Mod: PN,CQ

## 2021-01-20 PROCEDURE — 97140 MANUAL THERAPY 1/> REGIONS: CPT | Mod: PN

## 2021-01-20 PROCEDURE — 97018 PARAFFIN BATH THERAPY: CPT | Mod: PN

## 2021-01-20 PROCEDURE — 97110 THERAPEUTIC EXERCISES: CPT | Mod: PN

## 2021-01-22 ENCOUNTER — TELEPHONE (OUTPATIENT)
Dept: HEMATOLOGY/ONCOLOGY | Facility: CLINIC | Age: 66
End: 2021-01-22

## 2021-01-25 ENCOUNTER — CLINICAL SUPPORT (OUTPATIENT)
Dept: REHABILITATION | Facility: HOSPITAL | Age: 66
End: 2021-01-25
Payer: MEDICARE

## 2021-01-25 DIAGNOSIS — M25.562 BILATERAL CHRONIC KNEE PAIN: ICD-10-CM

## 2021-01-25 DIAGNOSIS — M25.571 CHRONIC PAIN OF RIGHT ANKLE: ICD-10-CM

## 2021-01-25 DIAGNOSIS — Z74.09 IMPAIRED MOBILITY: ICD-10-CM

## 2021-01-25 DIAGNOSIS — M25.541 ARTHRALGIA OF BOTH HANDS: ICD-10-CM

## 2021-01-25 DIAGNOSIS — M25.60 STIFFNESS IN JOINT: ICD-10-CM

## 2021-01-25 DIAGNOSIS — M62.81 MUSCLE WEAKNESS: ICD-10-CM

## 2021-01-25 DIAGNOSIS — G89.29 CHRONIC PAIN OF RIGHT ANKLE: ICD-10-CM

## 2021-01-25 DIAGNOSIS — M25.561 BILATERAL CHRONIC KNEE PAIN: ICD-10-CM

## 2021-01-25 DIAGNOSIS — G89.29 BILATERAL CHRONIC KNEE PAIN: ICD-10-CM

## 2021-01-25 DIAGNOSIS — M25.542 ARTHRALGIA OF BOTH HANDS: ICD-10-CM

## 2021-01-25 PROCEDURE — 97140 MANUAL THERAPY 1/> REGIONS: CPT | Mod: PN

## 2021-01-25 PROCEDURE — 97018 PARAFFIN BATH THERAPY: CPT | Mod: PN

## 2021-01-25 PROCEDURE — 97110 THERAPEUTIC EXERCISES: CPT | Mod: PN

## 2021-01-27 ENCOUNTER — CLINICAL SUPPORT (OUTPATIENT)
Dept: REHABILITATION | Facility: HOSPITAL | Age: 66
End: 2021-01-27
Payer: MEDICARE

## 2021-01-27 DIAGNOSIS — G89.29 BILATERAL CHRONIC KNEE PAIN: ICD-10-CM

## 2021-01-27 DIAGNOSIS — G89.29 CHRONIC PAIN OF RIGHT ANKLE: ICD-10-CM

## 2021-01-27 DIAGNOSIS — M25.541 ARTHRALGIA OF BOTH HANDS: ICD-10-CM

## 2021-01-27 DIAGNOSIS — M25.60 STIFFNESS IN JOINT: ICD-10-CM

## 2021-01-27 DIAGNOSIS — M25.542 ARTHRALGIA OF BOTH HANDS: ICD-10-CM

## 2021-01-27 DIAGNOSIS — Z74.09 IMPAIRED MOBILITY: ICD-10-CM

## 2021-01-27 DIAGNOSIS — M25.571 CHRONIC PAIN OF RIGHT ANKLE: ICD-10-CM

## 2021-01-27 DIAGNOSIS — M25.562 BILATERAL CHRONIC KNEE PAIN: ICD-10-CM

## 2021-01-27 DIAGNOSIS — M62.81 MUSCLE WEAKNESS: ICD-10-CM

## 2021-01-27 DIAGNOSIS — M25.561 BILATERAL CHRONIC KNEE PAIN: ICD-10-CM

## 2021-01-27 PROCEDURE — 97110 THERAPEUTIC EXERCISES: CPT | Mod: PN

## 2021-01-27 PROCEDURE — 97110 THERAPEUTIC EXERCISES: CPT | Mod: PN,CQ

## 2021-01-27 PROCEDURE — 97018 PARAFFIN BATH THERAPY: CPT | Mod: PN

## 2021-01-27 PROCEDURE — 97140 MANUAL THERAPY 1/> REGIONS: CPT | Mod: PN

## 2021-02-03 ENCOUNTER — OFFICE VISIT (OUTPATIENT)
Dept: DERMATOLOGY | Facility: CLINIC | Age: 66
End: 2021-02-03
Payer: MEDICARE

## 2021-02-03 DIAGNOSIS — L82.1 SK (SEBORRHEIC KERATOSIS): ICD-10-CM

## 2021-02-03 DIAGNOSIS — L90.5 SCAR: ICD-10-CM

## 2021-02-03 DIAGNOSIS — L85.3 XEROSIS CUTIS: ICD-10-CM

## 2021-02-03 DIAGNOSIS — L30.4 INTERTRIGO: ICD-10-CM

## 2021-02-03 DIAGNOSIS — L91.8 SKIN TAG: ICD-10-CM

## 2021-02-03 DIAGNOSIS — Z85.828 PERSONAL HISTORY OF SKIN CANCER: ICD-10-CM

## 2021-02-03 DIAGNOSIS — L81.4 LENTIGO: ICD-10-CM

## 2021-02-03 DIAGNOSIS — L57.0 AK (ACTINIC KERATOSIS): Primary | ICD-10-CM

## 2021-02-03 DIAGNOSIS — L65.9 ALOPECIA: ICD-10-CM

## 2021-02-03 DIAGNOSIS — Z86.018 HISTORY OF DYSPLASTIC NEVUS: ICD-10-CM

## 2021-02-03 PROCEDURE — 99999 PR PBB SHADOW E&M-EST. PATIENT-LVL III: CPT | Mod: PBBFAC,,, | Performed by: DERMATOLOGY

## 2021-02-03 PROCEDURE — 99999 PR PBB SHADOW E&M-EST. PATIENT-LVL III: ICD-10-PCS | Mod: PBBFAC,,, | Performed by: DERMATOLOGY

## 2021-02-03 PROCEDURE — 17000 PR DESTRUCTION(LASER SURGERY,CRYOSURGERY,CHEMOSURGERY),PREMALIGNANT LESIONS,FIRST LESION: ICD-10-PCS | Mod: S$PBB,,, | Performed by: DERMATOLOGY

## 2021-02-03 PROCEDURE — 99213 OFFICE O/P EST LOW 20 MIN: CPT | Mod: PBBFAC,PO,25 | Performed by: DERMATOLOGY

## 2021-02-03 PROCEDURE — 99214 PR OFFICE/OUTPT VISIT, EST, LEVL IV, 30-39 MIN: ICD-10-PCS | Mod: 25,S$PBB,, | Performed by: DERMATOLOGY

## 2021-02-03 PROCEDURE — 99214 OFFICE O/P EST MOD 30 MIN: CPT | Mod: 25,S$PBB,, | Performed by: DERMATOLOGY

## 2021-02-03 PROCEDURE — 17000 DESTRUCT PREMALG LESION: CPT | Mod: S$PBB,,, | Performed by: DERMATOLOGY

## 2021-02-03 PROCEDURE — 17000 DESTRUCT PREMALG LESION: CPT | Mod: PBBFAC,PO | Performed by: DERMATOLOGY

## 2021-02-04 ENCOUNTER — OFFICE VISIT (OUTPATIENT)
Dept: HEMATOLOGY/ONCOLOGY | Facility: CLINIC | Age: 66
End: 2021-02-04
Attending: INTERNAL MEDICINE
Payer: MEDICARE

## 2021-02-04 DIAGNOSIS — Z12.31 ENCOUNTER FOR SCREENING MAMMOGRAM FOR HIGH-RISK PATIENT: ICD-10-CM

## 2021-02-04 DIAGNOSIS — N60.92 ATYPICAL DUCTAL HYPERPLASIA OF LEFT BREAST: Primary | ICD-10-CM

## 2021-02-04 PROCEDURE — 99212 OFFICE O/P EST SF 10 MIN: CPT | Mod: 95,,, | Performed by: INTERNAL MEDICINE

## 2021-02-04 PROCEDURE — 99212 PR OFFICE/OUTPT VISIT, EST, LEVL II, 10-19 MIN: ICD-10-PCS | Mod: 95,,, | Performed by: INTERNAL MEDICINE

## 2021-02-08 ENCOUNTER — HOSPITAL ENCOUNTER (OUTPATIENT)
Dept: RADIOLOGY | Facility: HOSPITAL | Age: 66
Discharge: HOME | End: 2021-02-08
Attending: INTERNAL MEDICINE
Payer: MEDICARE

## 2021-02-08 ENCOUNTER — OFFICE VISIT (OUTPATIENT)
Dept: RHEUMATOLOGY | Facility: CLINIC | Age: 66
End: 2021-02-08
Payer: MEDICARE

## 2021-02-08 VITALS
TEMPERATURE: 97 F | WEIGHT: 236.5 LBS | BODY MASS INDEX: 43.52 KG/M2 | DIASTOLIC BLOOD PRESSURE: 78 MMHG | OXYGEN SATURATION: 97 % | SYSTOLIC BLOOD PRESSURE: 123 MMHG | HEART RATE: 57 BPM | HEIGHT: 62 IN

## 2021-02-08 DIAGNOSIS — M19.90 INFLAMMATORY ARTHRITIS: ICD-10-CM

## 2021-02-08 DIAGNOSIS — M25.50 POLYARTHRALGIA: ICD-10-CM

## 2021-02-08 DIAGNOSIS — M19.90 INFLAMMATORY ARTHRITIS: Primary | ICD-10-CM

## 2021-02-08 DIAGNOSIS — E03.9 HYPOTHYROIDISM, UNSPECIFIED TYPE: ICD-10-CM

## 2021-02-08 DIAGNOSIS — K76.0 HEPATIC STEATOSIS: ICD-10-CM

## 2021-02-08 DIAGNOSIS — M85.859 OSTEOPENIA OF NECK OF FEMUR, UNSPECIFIED LATERALITY: ICD-10-CM

## 2021-02-08 PROCEDURE — 73630 X-RAY EXAM OF FOOT: CPT | Mod: TC,50,FY,PO

## 2021-02-08 PROCEDURE — 99204 OFFICE O/P NEW MOD 45 MIN: CPT | Mod: S$PBB,,, | Performed by: INTERNAL MEDICINE

## 2021-02-08 PROCEDURE — 73130 X-RAY EXAM OF HAND: CPT | Mod: TC,50,FY,PO

## 2021-02-08 PROCEDURE — 73130 X-RAY EXAM OF HAND: CPT | Mod: 26,50,, | Performed by: RADIOLOGY

## 2021-02-08 PROCEDURE — 73630 X-RAY EXAM OF FOOT: CPT | Mod: 26,50,, | Performed by: RADIOLOGY

## 2021-02-08 PROCEDURE — 73562 XR KNEE 3 VIEW BILATERAL: ICD-10-PCS | Mod: 26,50,, | Performed by: RADIOLOGY

## 2021-02-08 PROCEDURE — 99999 PR PBB SHADOW E&M-EST. PATIENT-LVL V: ICD-10-PCS | Mod: PBBFAC,,, | Performed by: INTERNAL MEDICINE

## 2021-02-08 PROCEDURE — 99215 OFFICE O/P EST HI 40 MIN: CPT | Mod: PBBFAC,PO,25 | Performed by: INTERNAL MEDICINE

## 2021-02-08 PROCEDURE — 99204 PR OFFICE/OUTPT VISIT, NEW, LEVL IV, 45-59 MIN: ICD-10-PCS | Mod: S$PBB,,, | Performed by: INTERNAL MEDICINE

## 2021-02-08 PROCEDURE — 73562 X-RAY EXAM OF KNEE 3: CPT | Mod: 26,50,, | Performed by: RADIOLOGY

## 2021-02-08 PROCEDURE — 73562 X-RAY EXAM OF KNEE 3: CPT | Mod: TC,50,FY,PO

## 2021-02-08 PROCEDURE — 99999 PR PBB SHADOW E&M-EST. PATIENT-LVL V: CPT | Mod: PBBFAC,,, | Performed by: INTERNAL MEDICINE

## 2021-02-08 PROCEDURE — 73630 XR FOOT COMPLETE 3 VIEW BILATERAL: ICD-10-PCS | Mod: 26,50,, | Performed by: RADIOLOGY

## 2021-02-08 PROCEDURE — 73130 XR HAND COMPLETE 3 VIEWS BILATERAL: ICD-10-PCS | Mod: 26,50,, | Performed by: RADIOLOGY

## 2021-02-08 RX ORDER — PREDNISONE 10 MG/1
30 TABLET ORAL DAILY
Qty: 15 TABLET | Refills: 0 | Status: SHIPPED | OUTPATIENT
Start: 2021-02-08 | End: 2021-02-13

## 2021-02-10 ENCOUNTER — PATIENT MESSAGE (OUTPATIENT)
Dept: INTERNAL MEDICINE | Facility: CLINIC | Age: 66
End: 2021-02-10

## 2021-02-10 ENCOUNTER — TELEPHONE (OUTPATIENT)
Dept: RHEUMATOLOGY | Facility: CLINIC | Age: 66
End: 2021-02-10

## 2021-02-10 DIAGNOSIS — M05.79 RHEUMATOID ARTHRITIS INVOLVING MULTIPLE SITES WITH POSITIVE RHEUMATOID FACTOR: ICD-10-CM

## 2021-02-10 DIAGNOSIS — M19.90 INFLAMMATORY ARTHRITIS: Primary | ICD-10-CM

## 2021-02-12 ENCOUNTER — PATIENT MESSAGE (OUTPATIENT)
Dept: HEMATOLOGY/ONCOLOGY | Facility: CLINIC | Age: 66
End: 2021-02-12

## 2021-02-12 ENCOUNTER — PATIENT MESSAGE (OUTPATIENT)
Dept: RHEUMATOLOGY | Facility: CLINIC | Age: 66
End: 2021-02-12

## 2021-02-15 ENCOUNTER — PATIENT MESSAGE (OUTPATIENT)
Dept: RHEUMATOLOGY | Facility: CLINIC | Age: 66
End: 2021-02-15

## 2021-02-15 ENCOUNTER — PATIENT MESSAGE (OUTPATIENT)
Dept: REHABILITATION | Facility: HOSPITAL | Age: 66
End: 2021-02-15

## 2021-02-15 ENCOUNTER — DOCUMENTATION ONLY (OUTPATIENT)
Dept: REHABILITATION | Facility: HOSPITAL | Age: 66
End: 2021-02-15

## 2021-02-19 ENCOUNTER — PATIENT MESSAGE (OUTPATIENT)
Dept: RHEUMATOLOGY | Facility: CLINIC | Age: 66
End: 2021-02-19

## 2021-02-22 ENCOUNTER — LAB VISIT (OUTPATIENT)
Dept: LAB | Facility: HOSPITAL | Age: 66
End: 2021-02-22
Attending: INTERNAL MEDICINE
Payer: MEDICARE

## 2021-02-22 DIAGNOSIS — M05.79 RHEUMATOID ARTHRITIS INVOLVING MULTIPLE SITES WITH POSITIVE RHEUMATOID FACTOR: ICD-10-CM

## 2021-02-22 PROCEDURE — 86704 HEP B CORE ANTIBODY TOTAL: CPT

## 2021-02-22 PROCEDURE — 86803 HEPATITIS C AB TEST: CPT

## 2021-02-22 PROCEDURE — 86787 VARICELLA-ZOSTER ANTIBODY: CPT

## 2021-02-22 PROCEDURE — 86592 SYPHILIS TEST NON-TREP QUAL: CPT

## 2021-02-22 PROCEDURE — 86790 VIRUS ANTIBODY NOS: CPT

## 2021-02-22 PROCEDURE — 86706 HEP B SURFACE ANTIBODY: CPT

## 2021-02-22 PROCEDURE — 87340 HEPATITIS B SURFACE AG IA: CPT

## 2021-02-22 PROCEDURE — 86703 HIV-1/HIV-2 1 RESULT ANTBDY: CPT

## 2021-02-23 LAB
HBV CORE AB SERPL QL IA: NEGATIVE
HBV SURFACE AB SER-ACNC: NEGATIVE M[IU]/ML
HBV SURFACE AG SERPL QL IA: NEGATIVE
HCV AB SERPL QL IA: NEGATIVE
HEPATITIS A ANTIBODY, IGG: POSITIVE
HIV 1+2 AB+HIV1 P24 AG SERPL QL IA: NEGATIVE
RPR SER QL: NORMAL

## 2021-02-24 ENCOUNTER — PATIENT MESSAGE (OUTPATIENT)
Dept: RHEUMATOLOGY | Facility: CLINIC | Age: 66
End: 2021-02-24

## 2021-02-24 LAB
VARICELLA INTERPRETATION: POSITIVE
VARICELLA ZOSTER IGG: 3.81 ISR (ref 0–0.9)

## 2021-02-25 ENCOUNTER — TELEPHONE (OUTPATIENT)
Dept: REHABILITATION | Facility: HOSPITAL | Age: 66
End: 2021-02-25

## 2021-02-26 ENCOUNTER — PATIENT MESSAGE (OUTPATIENT)
Dept: REHABILITATION | Facility: HOSPITAL | Age: 66
End: 2021-02-26

## 2021-02-26 LAB — STRONGYLOIDES ANTIBODY IGG: NEGATIVE

## 2021-03-02 ENCOUNTER — DOCUMENTATION ONLY (OUTPATIENT)
Dept: REHABILITATION | Facility: HOSPITAL | Age: 66
End: 2021-03-02

## 2021-03-02 PROBLEM — M25.562 BILATERAL CHRONIC KNEE PAIN: Status: RESOLVED | Noted: 2020-10-07 | Resolved: 2021-03-02

## 2021-03-02 PROBLEM — M62.81 MUSCLE WEAKNESS: Status: RESOLVED | Noted: 2020-10-07 | Resolved: 2021-03-02

## 2021-03-02 PROBLEM — M25.571 CHRONIC PAIN OF RIGHT ANKLE: Status: RESOLVED | Noted: 2020-10-07 | Resolved: 2021-03-02

## 2021-03-02 PROBLEM — G89.29 CHRONIC PAIN OF RIGHT ANKLE: Status: RESOLVED | Noted: 2020-10-07 | Resolved: 2021-03-02

## 2021-03-02 PROBLEM — G89.29 BILATERAL CHRONIC KNEE PAIN: Status: RESOLVED | Noted: 2020-10-07 | Resolved: 2021-03-02

## 2021-03-02 PROBLEM — Z74.09 IMPAIRED MOBILITY: Status: RESOLVED | Noted: 2020-10-07 | Resolved: 2021-03-02

## 2021-03-02 PROBLEM — M25.561 BILATERAL CHRONIC KNEE PAIN: Status: RESOLVED | Noted: 2020-10-07 | Resolved: 2021-03-02

## 2021-03-08 ENCOUNTER — LAB VISIT (OUTPATIENT)
Dept: LAB | Facility: HOSPITAL | Age: 66
End: 2021-03-08
Attending: INTERNAL MEDICINE
Payer: MEDICARE

## 2021-03-08 DIAGNOSIS — E78.5 DYSLIPIDEMIA ASSOCIATED WITH TYPE 2 DIABETES MELLITUS: ICD-10-CM

## 2021-03-08 DIAGNOSIS — E11.69 DYSLIPIDEMIA ASSOCIATED WITH TYPE 2 DIABETES MELLITUS: ICD-10-CM

## 2021-03-08 DIAGNOSIS — E11.59 HYPERTENSION ASSOCIATED WITH DIABETES: ICD-10-CM

## 2021-03-08 DIAGNOSIS — I15.2 HYPERTENSION ASSOCIATED WITH DIABETES: ICD-10-CM

## 2021-03-08 LAB
CHOLEST SERPL-MCNC: 139 MG/DL (ref 120–199)
CHOLEST/HDLC SERPL: 3.8 {RATIO} (ref 2–5)
HDLC SERPL-MCNC: 37 MG/DL (ref 40–75)
HDLC SERPL: 26.6 % (ref 20–50)
LDLC SERPL CALC-MCNC: 72.8 MG/DL (ref 63–159)
NONHDLC SERPL-MCNC: 102 MG/DL
TRIGL SERPL-MCNC: 146 MG/DL (ref 30–150)

## 2021-03-08 PROCEDURE — 80061 LIPID PANEL: CPT | Performed by: INTERNAL MEDICINE

## 2021-03-08 PROCEDURE — 83036 HEMOGLOBIN GLYCOSYLATED A1C: CPT | Performed by: INTERNAL MEDICINE

## 2021-03-08 PROCEDURE — 36415 COLL VENOUS BLD VENIPUNCTURE: CPT | Mod: PO | Performed by: INTERNAL MEDICINE

## 2021-03-09 LAB
ESTIMATED AVG GLUCOSE: 154 MG/DL (ref 68–131)
HBA1C MFR BLD: 7 % (ref 4–5.6)

## 2021-03-10 ENCOUNTER — OFFICE VISIT (OUTPATIENT)
Dept: INTERNAL MEDICINE | Facility: CLINIC | Age: 66
End: 2021-03-10
Payer: MEDICARE

## 2021-03-10 VITALS
HEART RATE: 60 BPM | DIASTOLIC BLOOD PRESSURE: 78 MMHG | BODY MASS INDEX: 42.98 KG/M2 | SYSTOLIC BLOOD PRESSURE: 126 MMHG | WEIGHT: 235 LBS

## 2021-03-10 DIAGNOSIS — G47.33 OSA ON CPAP: ICD-10-CM

## 2021-03-10 DIAGNOSIS — E78.5 HYPERLIPIDEMIA, UNSPECIFIED HYPERLIPIDEMIA TYPE: ICD-10-CM

## 2021-03-10 DIAGNOSIS — Z12.39 ENCOUNTER FOR SCREENING FOR MALIGNANT NEOPLASM OF BREAST, UNSPECIFIED SCREENING MODALITY: ICD-10-CM

## 2021-03-10 DIAGNOSIS — R06.02 SOB (SHORTNESS OF BREATH): ICD-10-CM

## 2021-03-10 DIAGNOSIS — E78.5 DYSLIPIDEMIA ASSOCIATED WITH TYPE 2 DIABETES MELLITUS: Primary | ICD-10-CM

## 2021-03-10 DIAGNOSIS — Z12.31 ENCOUNTER FOR SCREENING MAMMOGRAM FOR MALIGNANT NEOPLASM OF BREAST: ICD-10-CM

## 2021-03-10 DIAGNOSIS — E66.01 MORBID OBESITY WITH BMI OF 40.0-44.9, ADULT: ICD-10-CM

## 2021-03-10 DIAGNOSIS — E11.69 DYSLIPIDEMIA ASSOCIATED WITH TYPE 2 DIABETES MELLITUS: Primary | ICD-10-CM

## 2021-03-10 DIAGNOSIS — I15.2 HYPERTENSION ASSOCIATED WITH DIABETES: ICD-10-CM

## 2021-03-10 DIAGNOSIS — E03.4 HYPOTHYROIDISM DUE TO ACQUIRED ATROPHY OF THYROID: ICD-10-CM

## 2021-03-10 DIAGNOSIS — E11.59 HYPERTENSION ASSOCIATED WITH DIABETES: ICD-10-CM

## 2021-03-10 PROBLEM — E88.819 INSULIN RESISTANCE: Status: RESOLVED | Noted: 2017-04-12 | Resolved: 2021-03-10

## 2021-03-10 PROCEDURE — 99214 PR OFFICE/OUTPT VISIT, EST, LEVL IV, 30-39 MIN: ICD-10-PCS | Mod: 95,,, | Performed by: INTERNAL MEDICINE

## 2021-03-10 PROCEDURE — 99214 OFFICE O/P EST MOD 30 MIN: CPT | Mod: 95,,, | Performed by: INTERNAL MEDICINE

## 2021-03-15 ENCOUNTER — HOSPITAL ENCOUNTER (OUTPATIENT)
Dept: RADIOLOGY | Facility: HOSPITAL | Age: 66
Discharge: HOME | End: 2021-03-15
Attending: INTERNAL MEDICINE
Payer: MEDICARE

## 2021-03-15 ENCOUNTER — PATIENT MESSAGE (OUTPATIENT)
Dept: INTERNAL MEDICINE | Facility: CLINIC | Age: 66
End: 2021-03-15

## 2021-03-15 DIAGNOSIS — E78.5 DYSLIPIDEMIA ASSOCIATED WITH TYPE 2 DIABETES MELLITUS: ICD-10-CM

## 2021-03-15 DIAGNOSIS — I15.2 HYPERTENSION ASSOCIATED WITH DIABETES: ICD-10-CM

## 2021-03-15 DIAGNOSIS — E11.59 HYPERTENSION ASSOCIATED WITH DIABETES: ICD-10-CM

## 2021-03-15 DIAGNOSIS — E11.69 DYSLIPIDEMIA ASSOCIATED WITH TYPE 2 DIABETES MELLITUS: ICD-10-CM

## 2021-03-15 PROCEDURE — 75571 CT HRT W/O DYE W/CA TEST: CPT | Mod: TC

## 2021-03-15 PROCEDURE — 75571 CT CALCIUM SCORING CARDIAC: ICD-10-PCS | Mod: 26,,, | Performed by: RADIOLOGY

## 2021-03-15 PROCEDURE — 75571 CT HRT W/O DYE W/CA TEST: CPT | Mod: 26,,, | Performed by: RADIOLOGY

## 2021-03-17 ENCOUNTER — HOSPITAL ENCOUNTER (OUTPATIENT)
Dept: CARDIOLOGY | Facility: HOSPITAL | Age: 66
Discharge: HOME | End: 2021-03-17
Attending: INTERNAL MEDICINE
Payer: MEDICARE

## 2021-03-17 VITALS — HEIGHT: 62 IN | WEIGHT: 235 LBS | BODY MASS INDEX: 43.24 KG/M2

## 2021-03-17 DIAGNOSIS — R06.02 SOB (SHORTNESS OF BREATH): ICD-10-CM

## 2021-03-17 DIAGNOSIS — I15.2 HYPERTENSION ASSOCIATED WITH DIABETES: ICD-10-CM

## 2021-03-17 DIAGNOSIS — E11.59 HYPERTENSION ASSOCIATED WITH DIABETES: ICD-10-CM

## 2021-03-17 LAB
AORTIC ROOT ANNULUS: 3.2 CM
ASCENDING AORTA: 2.77 CM
AV INDEX (PROSTH): 0.82
AV MEAN GRADIENT: 5 MMHG
AV PEAK GRADIENT: 9 MMHG
AV VALVE AREA: 2.98 CM2
AV VELOCITY RATIO: 0.77
BSA FOR ECHO PROCEDURE: 2.16 M2
CV ECHO LV RWT: 0.35 CM
DOP CALC AO PEAK VEL: 1.46 M/S
DOP CALC AO VTI: 27.32 CM
DOP CALC LVOT AREA: 3.6 CM2
DOP CALC LVOT DIAMETER: 2.15 CM
DOP CALC LVOT PEAK VEL: 1.13 M/S
DOP CALC LVOT STROKE VOLUME: 81.5 CM3
DOP CALCLVOT PEAK VEL VTI: 22.46 CM
E WAVE DECELERATION TIME: 166.88 MSEC
E/A RATIO: 1.14
E/E' RATIO: 12.71 M/S
ECHO LV POSTERIOR WALL: 0.74 CM (ref 0.6–1.1)
FRACTIONAL SHORTENING: 40 % (ref 28–44)
INTERVENTRICULAR SEPTUM: 0.87 CM (ref 0.6–1.1)
IVRT: 105.61 MSEC
LA MAJOR: 4.61 CM
LA MINOR: 4.83 CM
LA WIDTH: 3.11 CM
LEFT ATRIUM SIZE: 3.56 CM
LEFT ATRIUM VOLUME INDEX MOD: 15.2 ML/M2
LEFT ATRIUM VOLUME INDEX: 21.7 ML/M2
LEFT ATRIUM VOLUME MOD: 31.15 CM3
LEFT ATRIUM VOLUME: 44.4 CM3
LEFT INTERNAL DIMENSION IN SYSTOLE: 2.52 CM (ref 2.1–4)
LEFT VENTRICLE DIASTOLIC VOLUME INDEX: 38.78 ML/M2
LEFT VENTRICLE DIASTOLIC VOLUME: 79.49 ML
LEFT VENTRICLE MASS INDEX: 50 G/M2
LEFT VENTRICLE SYSTOLIC VOLUME INDEX: 11.1 ML/M2
LEFT VENTRICLE SYSTOLIC VOLUME: 22.85 ML
LEFT VENTRICULAR INTERNAL DIMENSION IN DIASTOLE: 4.22 CM (ref 3.5–6)
LEFT VENTRICULAR MASS: 102.94 G
LV LATERAL E/E' RATIO: 12.71 M/S
LV SEPTAL E/E' RATIO: 12.71 M/S
MV A" WAVE DURATION": 17.13 MSEC
MV PEAK A VEL: 0.78 M/S
MV PEAK E VEL: 0.89 M/S
MV STENOSIS PRESSURE HALF TIME: 48.4 MS
MV VALVE AREA P 1/2 METHOD: 4.55 CM2
PULM VEIN S/D RATIO: 1.13
PV PEAK D VEL: 0.16 M/S
PV PEAK S VEL: 0.18 M/S
RA MAJOR: 4.43 CM
RA PRESSURE: 3 MMHG
RA WIDTH: 3.58 CM
RIGHT VENTRICULAR END-DIASTOLIC DIMENSION: 2.97 CM
RV TISSUE DOPPLER FREE WALL SYSTOLIC VELOCITY 1 (APICAL 4 CHAMBER VIEW): 9.94 CM/S
STJ: 2.66 CM
TDI LATERAL: 0.07 M/S
TDI SEPTAL: 0.07 M/S
TDI: 0.07 M/S
TRICUSPID ANNULAR PLANE SYSTOLIC EXCURSION: 2.88 CM

## 2021-03-17 PROCEDURE — 93306 TTE W/DOPPLER COMPLETE: CPT | Mod: 26,,, | Performed by: INTERNAL MEDICINE

## 2021-03-17 PROCEDURE — 93306 TTE W/DOPPLER COMPLETE: CPT

## 2021-03-17 PROCEDURE — 93306 ECHO (CUPID ONLY): ICD-10-PCS | Mod: 26,,, | Performed by: INTERNAL MEDICINE

## 2021-03-18 ENCOUNTER — OFFICE VISIT (OUTPATIENT)
Dept: RHEUMATOLOGY | Facility: CLINIC | Age: 66
End: 2021-03-18
Payer: MEDICARE

## 2021-03-18 ENCOUNTER — HOSPITAL ENCOUNTER (OUTPATIENT)
Dept: RADIOLOGY | Facility: HOSPITAL | Age: 66
Discharge: HOME | End: 2021-03-18
Attending: INTERNAL MEDICINE
Payer: MEDICARE

## 2021-03-18 ENCOUNTER — PATIENT MESSAGE (OUTPATIENT)
Dept: RHEUMATOLOGY | Facility: CLINIC | Age: 66
End: 2021-03-18

## 2021-03-18 VITALS
SYSTOLIC BLOOD PRESSURE: 142 MMHG | HEIGHT: 62 IN | BODY MASS INDEX: 42.98 KG/M2 | OXYGEN SATURATION: 97 % | HEART RATE: 67 BPM | TEMPERATURE: 97 F | DIASTOLIC BLOOD PRESSURE: 85 MMHG

## 2021-03-18 DIAGNOSIS — K76.0 HEPATIC STEATOSIS: ICD-10-CM

## 2021-03-18 DIAGNOSIS — Z87.19 HISTORY OF DIVERTICULITIS: ICD-10-CM

## 2021-03-18 DIAGNOSIS — M05.9 SEROPOSITIVE RHEUMATOID ARTHRITIS: Primary | ICD-10-CM

## 2021-03-18 DIAGNOSIS — M05.9 SEROPOSITIVE RHEUMATOID ARTHRITIS: ICD-10-CM

## 2021-03-18 DIAGNOSIS — Z79.899 HIGH RISK MEDICATION USE: ICD-10-CM

## 2021-03-18 DIAGNOSIS — E11.69 TYPE 2 DIABETES MELLITUS WITH OTHER SPECIFIED COMPLICATION, UNSPECIFIED WHETHER LONG TERM INSULIN USE: ICD-10-CM

## 2021-03-18 DIAGNOSIS — M85.859 OSTEOPENIA OF NECK OF FEMUR, UNSPECIFIED LATERALITY: ICD-10-CM

## 2021-03-18 PROCEDURE — 99214 OFFICE O/P EST MOD 30 MIN: CPT | Mod: S$PBB,,, | Performed by: INTERNAL MEDICINE

## 2021-03-18 PROCEDURE — 71046 X-RAY EXAM CHEST 2 VIEWS: CPT | Mod: TC,FY,PO

## 2021-03-18 PROCEDURE — 99214 PR OFFICE/OUTPT VISIT, EST, LEVL IV, 30-39 MIN: ICD-10-PCS | Mod: S$PBB,,, | Performed by: INTERNAL MEDICINE

## 2021-03-18 PROCEDURE — 99999 PR PBB SHADOW E&M-EST. PATIENT-LVL III: CPT | Mod: PBBFAC,,, | Performed by: INTERNAL MEDICINE

## 2021-03-18 PROCEDURE — 99213 OFFICE O/P EST LOW 20 MIN: CPT | Mod: PBBFAC,25,PO | Performed by: INTERNAL MEDICINE

## 2021-03-18 PROCEDURE — 71046 XR CHEST PA AND LATERAL: ICD-10-PCS | Mod: 26,,, | Performed by: RADIOLOGY

## 2021-03-18 PROCEDURE — 99999 PR PBB SHADOW E&M-EST. PATIENT-LVL III: ICD-10-PCS | Mod: PBBFAC,,, | Performed by: INTERNAL MEDICINE

## 2021-03-18 PROCEDURE — 71046 X-RAY EXAM CHEST 2 VIEWS: CPT | Mod: 26,,, | Performed by: RADIOLOGY

## 2021-03-18 RX ORDER — ABATACEPT 125 MG/ML
125 INJECTION, SOLUTION SUBCUTANEOUS
Qty: 4 ML | Refills: 2 | Status: SHIPPED | OUTPATIENT
Start: 2021-03-18 | End: 2021-05-27 | Stop reason: SINTOL

## 2021-03-24 ENCOUNTER — SPECIALTY PHARMACY (OUTPATIENT)
Dept: PHARMACY | Facility: CLINIC | Age: 66
End: 2021-03-24

## 2021-03-30 ENCOUNTER — TELEPHONE (OUTPATIENT)
Dept: RHEUMATOLOGY | Facility: CLINIC | Age: 66
End: 2021-03-30

## 2021-03-30 DIAGNOSIS — M05.9 SEROPOSITIVE RHEUMATOID ARTHRITIS: Primary | ICD-10-CM

## 2021-03-30 RX ORDER — CALCIUM CARBONATE 200(500)MG
1 TABLET,CHEWABLE ORAL
COMMUNITY

## 2021-04-01 ENCOUNTER — PATIENT MESSAGE (OUTPATIENT)
Dept: RHEUMATOLOGY | Facility: CLINIC | Age: 66
End: 2021-04-01

## 2021-04-01 ENCOUNTER — PATIENT MESSAGE (OUTPATIENT)
Dept: HEMATOLOGY/ONCOLOGY | Facility: CLINIC | Age: 66
End: 2021-04-01

## 2021-04-01 ENCOUNTER — PATIENT MESSAGE (OUTPATIENT)
Dept: INTERNAL MEDICINE | Facility: CLINIC | Age: 66
End: 2021-04-01

## 2021-04-05 ENCOUNTER — PATIENT MESSAGE (OUTPATIENT)
Dept: RHEUMATOLOGY | Facility: CLINIC | Age: 66
End: 2021-04-05

## 2021-04-13 ENCOUNTER — PATIENT MESSAGE (OUTPATIENT)
Dept: INTERNAL MEDICINE | Facility: CLINIC | Age: 66
End: 2021-04-13

## 2021-04-14 ENCOUNTER — CLINICAL SUPPORT (OUTPATIENT)
Dept: FAMILY MEDICINE | Facility: CLINIC | Age: 66
End: 2021-04-14
Payer: MEDICARE

## 2021-04-14 DIAGNOSIS — Z23 NEED FOR VACCINATION: Primary | ICD-10-CM

## 2021-04-14 PROCEDURE — 90670 PCV13 VACCINE IM: CPT | Mod: PBBFAC,PO

## 2021-04-14 PROCEDURE — G0009 ADMIN PNEUMOCOCCAL VACCINE: HCPCS | Mod: PBBFAC,PO

## 2021-04-22 ENCOUNTER — SPECIALTY PHARMACY (OUTPATIENT)
Dept: PHARMACY | Facility: CLINIC | Age: 66
End: 2021-04-22

## 2021-04-24 ENCOUNTER — TELEPHONE (OUTPATIENT)
Dept: PHARMACY | Facility: CLINIC | Age: 66
End: 2021-04-24

## 2021-04-24 ENCOUNTER — PATIENT MESSAGE (OUTPATIENT)
Dept: RHEUMATOLOGY | Facility: CLINIC | Age: 66
End: 2021-04-24

## 2021-04-26 ENCOUNTER — OFFICE VISIT (OUTPATIENT)
Dept: INTERNAL MEDICINE | Facility: CLINIC | Age: 66
End: 2021-04-26
Payer: MEDICARE

## 2021-04-26 ENCOUNTER — TELEPHONE (OUTPATIENT)
Dept: RHEUMATOLOGY | Facility: CLINIC | Age: 66
End: 2021-04-26

## 2021-04-26 ENCOUNTER — PATIENT MESSAGE (OUTPATIENT)
Dept: INTERNAL MEDICINE | Facility: CLINIC | Age: 66
End: 2021-04-26

## 2021-04-26 DIAGNOSIS — K57.92 DIVERTICULITIS: Primary | ICD-10-CM

## 2021-04-26 PROCEDURE — 99214 PR OFFICE/OUTPT VISIT, EST, LEVL IV, 30-39 MIN: ICD-10-PCS | Mod: 95,,, | Performed by: INTERNAL MEDICINE

## 2021-04-26 PROCEDURE — 99214 OFFICE O/P EST MOD 30 MIN: CPT | Mod: 95,,, | Performed by: INTERNAL MEDICINE

## 2021-04-26 RX ORDER — CIPROFLOXACIN 500 MG/1
500 TABLET ORAL 2 TIMES DAILY
Qty: 20 TABLET | Refills: 0 | Status: SHIPPED | OUTPATIENT
Start: 2021-04-26 | End: 2021-05-06

## 2021-04-26 RX ORDER — METRONIDAZOLE 500 MG/1
500 TABLET ORAL 3 TIMES DAILY
Qty: 30 TABLET | Refills: 0 | Status: SHIPPED | OUTPATIENT
Start: 2021-04-26 | End: 2021-05-06

## 2021-05-05 ENCOUNTER — PATIENT MESSAGE (OUTPATIENT)
Dept: INTERNAL MEDICINE | Facility: CLINIC | Age: 66
End: 2021-05-05

## 2021-05-07 ENCOUNTER — PATIENT MESSAGE (OUTPATIENT)
Dept: RHEUMATOLOGY | Facility: CLINIC | Age: 66
End: 2021-05-07

## 2021-05-10 ENCOUNTER — TELEPHONE (OUTPATIENT)
Dept: INTERNAL MEDICINE | Facility: CLINIC | Age: 66
End: 2021-05-10

## 2021-05-10 DIAGNOSIS — K57.92 DIVERTICULITIS: Primary | ICD-10-CM

## 2021-05-11 ENCOUNTER — PATIENT MESSAGE (OUTPATIENT)
Dept: INTERNAL MEDICINE | Facility: CLINIC | Age: 66
End: 2021-05-11

## 2021-05-11 ENCOUNTER — HOSPITAL ENCOUNTER (OUTPATIENT)
Dept: RADIOLOGY | Facility: HOSPITAL | Age: 66
Discharge: HOME | End: 2021-05-11
Attending: INTERNAL MEDICINE
Payer: MEDICARE

## 2021-05-11 DIAGNOSIS — K57.92 DIVERTICULITIS: ICD-10-CM

## 2021-05-11 PROCEDURE — A9698 NON-RAD CONTRAST MATERIALNOC: HCPCS | Performed by: INTERNAL MEDICINE

## 2021-05-11 PROCEDURE — 74177 CT ABD & PELVIS W/CONTRAST: CPT | Mod: 26,,, | Performed by: RADIOLOGY

## 2021-05-11 PROCEDURE — 25500020 PHARM REV CODE 255: Performed by: INTERNAL MEDICINE

## 2021-05-11 PROCEDURE — 74177 CT ABDOMEN PELVIS WITH CONTRAST: ICD-10-PCS | Mod: 26,,, | Performed by: RADIOLOGY

## 2021-05-11 PROCEDURE — 74177 CT ABD & PELVIS W/CONTRAST: CPT | Mod: TC

## 2021-05-11 RX ORDER — IBUPROFEN 800 MG/1
TABLET ORAL
COMMUNITY
End: 2021-07-09

## 2021-05-11 RX ADMIN — IOHEXOL 1000 ML: 12 SOLUTION ORAL at 12:05

## 2021-05-11 RX ADMIN — IOHEXOL 100 ML: 350 INJECTION, SOLUTION INTRAVENOUS at 01:05

## 2021-05-12 ENCOUNTER — OFFICE VISIT (OUTPATIENT)
Dept: INTERNAL MEDICINE | Facility: CLINIC | Age: 66
End: 2021-05-12
Payer: MEDICARE

## 2021-05-12 VITALS
WEIGHT: 233.25 LBS | OXYGEN SATURATION: 97 % | DIASTOLIC BLOOD PRESSURE: 76 MMHG | BODY MASS INDEX: 42.66 KG/M2 | SYSTOLIC BLOOD PRESSURE: 110 MMHG | HEART RATE: 65 BPM

## 2021-05-12 DIAGNOSIS — K57.92 DIVERTICULITIS: Primary | ICD-10-CM

## 2021-05-12 PROCEDURE — 99999 PR PBB SHADOW E&M-EST. PATIENT-LVL V: CPT | Mod: PBBFAC,,, | Performed by: INTERNAL MEDICINE

## 2021-05-12 PROCEDURE — 99999 PR PBB SHADOW E&M-EST. PATIENT-LVL V: ICD-10-PCS | Mod: PBBFAC,,, | Performed by: INTERNAL MEDICINE

## 2021-05-12 PROCEDURE — 99214 PR OFFICE/OUTPT VISIT, EST, LEVL IV, 30-39 MIN: ICD-10-PCS | Mod: S$PBB,,, | Performed by: INTERNAL MEDICINE

## 2021-05-12 PROCEDURE — 99215 OFFICE O/P EST HI 40 MIN: CPT | Mod: PBBFAC,PO | Performed by: INTERNAL MEDICINE

## 2021-05-12 PROCEDURE — 99214 OFFICE O/P EST MOD 30 MIN: CPT | Mod: S$PBB,,, | Performed by: INTERNAL MEDICINE

## 2021-05-12 RX ORDER — AMOXICILLIN AND CLAVULANATE POTASSIUM 875; 125 MG/1; MG/1
1 TABLET, FILM COATED ORAL 2 TIMES DAILY
Qty: 20 TABLET | Refills: 0 | Status: SHIPPED | OUTPATIENT
Start: 2021-05-12 | End: 2021-05-22

## 2021-05-17 ENCOUNTER — PATIENT MESSAGE (OUTPATIENT)
Dept: RHEUMATOLOGY | Facility: CLINIC | Age: 66
End: 2021-05-17

## 2021-05-18 ENCOUNTER — PATIENT MESSAGE (OUTPATIENT)
Dept: RHEUMATOLOGY | Facility: CLINIC | Age: 66
End: 2021-05-18

## 2021-05-24 ENCOUNTER — PATIENT MESSAGE (OUTPATIENT)
Dept: INTERNAL MEDICINE | Facility: CLINIC | Age: 66
End: 2021-05-24

## 2021-05-24 DIAGNOSIS — Z87.19 HISTORY OF COLONIC DIVERTICULITIS: ICD-10-CM

## 2021-05-24 DIAGNOSIS — R10.32 LLQ ABDOMINAL PAIN: Primary | ICD-10-CM

## 2021-05-27 ENCOUNTER — PATIENT MESSAGE (OUTPATIENT)
Dept: HEMATOLOGY/ONCOLOGY | Facility: CLINIC | Age: 66
End: 2021-05-27

## 2021-06-10 ENCOUNTER — PATIENT MESSAGE (OUTPATIENT)
Dept: INTERNAL MEDICINE | Facility: CLINIC | Age: 66
End: 2021-06-10

## 2021-06-10 ENCOUNTER — LAB VISIT (OUTPATIENT)
Dept: LAB | Facility: HOSPITAL | Age: 66
End: 2021-06-10
Attending: INTERNAL MEDICINE
Payer: MEDICARE

## 2021-06-10 DIAGNOSIS — K76.0 HEPATIC STEATOSIS: ICD-10-CM

## 2021-06-10 DIAGNOSIS — M25.50 POLYARTHRALGIA: ICD-10-CM

## 2021-06-10 DIAGNOSIS — M19.90 INFLAMMATORY ARTHRITIS: ICD-10-CM

## 2021-06-10 LAB
ALBUMIN SERPL BCP-MCNC: 3.5 G/DL (ref 3.5–5.2)
ALP SERPL-CCNC: 91 U/L (ref 55–135)
ALT SERPL W/O P-5'-P-CCNC: 45 U/L (ref 10–44)
ANION GAP SERPL CALC-SCNC: 10 MMOL/L (ref 8–16)
AST SERPL-CCNC: 27 U/L (ref 10–40)
BASOPHILS # BLD AUTO: 0.04 K/UL (ref 0–0.2)
BASOPHILS NFR BLD: 0.6 % (ref 0–1.9)
BILIRUB SERPL-MCNC: 0.3 MG/DL (ref 0.1–1)
BUN SERPL-MCNC: 13 MG/DL (ref 8–23)
CALCIUM SERPL-MCNC: 9.9 MG/DL (ref 8.7–10.5)
CHLORIDE SERPL-SCNC: 108 MMOL/L (ref 95–110)
CO2 SERPL-SCNC: 23 MMOL/L (ref 23–29)
CREAT SERPL-MCNC: 0.8 MG/DL (ref 0.5–1.4)
CRP SERPL-MCNC: 0.7 MG/L (ref 0–8.2)
DIFFERENTIAL METHOD: NORMAL
EOSINOPHIL # BLD AUTO: 0.2 K/UL (ref 0–0.5)
EOSINOPHIL NFR BLD: 2.5 % (ref 0–8)
ERYTHROCYTE [DISTWIDTH] IN BLOOD BY AUTOMATED COUNT: 14.3 % (ref 11.5–14.5)
ERYTHROCYTE [SEDIMENTATION RATE] IN BLOOD BY WESTERGREN METHOD: 14 MM/HR (ref 0–36)
EST. GFR  (AFRICAN AMERICAN): >60 ML/MIN/1.73 M^2
EST. GFR  (NON AFRICAN AMERICAN): >60 ML/MIN/1.73 M^2
GLUCOSE SERPL-MCNC: 147 MG/DL (ref 70–110)
HCT VFR BLD AUTO: 44.1 % (ref 37–48.5)
HGB BLD-MCNC: 14.2 G/DL (ref 12–16)
IMM GRANULOCYTES # BLD AUTO: 0.01 K/UL (ref 0–0.04)
IMM GRANULOCYTES NFR BLD AUTO: 0.2 % (ref 0–0.5)
LYMPHOCYTES # BLD AUTO: 2 K/UL (ref 1–4.8)
LYMPHOCYTES NFR BLD: 31 % (ref 18–48)
MCH RBC QN AUTO: 29.5 PG (ref 27–31)
MCHC RBC AUTO-ENTMCNC: 32.2 G/DL (ref 32–36)
MCV RBC AUTO: 92 FL (ref 82–98)
MONOCYTES # BLD AUTO: 0.4 K/UL (ref 0.3–1)
MONOCYTES NFR BLD: 6.7 % (ref 4–15)
NEUTROPHILS # BLD AUTO: 3.8 K/UL (ref 1.8–7.7)
NEUTROPHILS NFR BLD: 59 % (ref 38–73)
NRBC BLD-RTO: 0 /100 WBC
PLATELET # BLD AUTO: 244 K/UL (ref 150–450)
PMV BLD AUTO: 11.2 FL (ref 9.2–12.9)
POTASSIUM SERPL-SCNC: 4.3 MMOL/L (ref 3.5–5.1)
PROT SERPL-MCNC: 6.9 G/DL (ref 6–8.4)
RBC # BLD AUTO: 4.82 M/UL (ref 4–5.4)
SODIUM SERPL-SCNC: 141 MMOL/L (ref 136–145)
WBC # BLD AUTO: 6.46 K/UL (ref 3.9–12.7)

## 2021-06-10 PROCEDURE — 85025 COMPLETE CBC W/AUTO DIFF WBC: CPT | Performed by: INTERNAL MEDICINE

## 2021-06-10 PROCEDURE — 85652 RBC SED RATE AUTOMATED: CPT | Performed by: INTERNAL MEDICINE

## 2021-06-10 PROCEDURE — 80053 COMPREHEN METABOLIC PANEL: CPT | Performed by: INTERNAL MEDICINE

## 2021-06-10 PROCEDURE — 86140 C-REACTIVE PROTEIN: CPT | Performed by: INTERNAL MEDICINE

## 2021-06-10 PROCEDURE — 36415 COLL VENOUS BLD VENIPUNCTURE: CPT | Mod: PO | Performed by: INTERNAL MEDICINE

## 2021-06-14 ENCOUNTER — OFFICE VISIT (OUTPATIENT)
Dept: RHEUMATOLOGY | Facility: CLINIC | Age: 66
End: 2021-06-14
Payer: MEDICARE

## 2021-06-14 VITALS
HEIGHT: 62 IN | OXYGEN SATURATION: 96 % | DIASTOLIC BLOOD PRESSURE: 81 MMHG | HEART RATE: 57 BPM | BODY MASS INDEX: 41.77 KG/M2 | SYSTOLIC BLOOD PRESSURE: 139 MMHG | WEIGHT: 227 LBS

## 2021-06-14 DIAGNOSIS — E11.69 TYPE 2 DIABETES MELLITUS WITH OTHER SPECIFIED COMPLICATION, UNSPECIFIED WHETHER LONG TERM INSULIN USE: ICD-10-CM

## 2021-06-14 DIAGNOSIS — M05.9 SEROPOSITIVE RHEUMATOID ARTHRITIS: Primary | ICD-10-CM

## 2021-06-14 DIAGNOSIS — M85.859 OSTEOPENIA OF NECK OF FEMUR, UNSPECIFIED LATERALITY: ICD-10-CM

## 2021-06-14 DIAGNOSIS — Z87.19 HISTORY OF DIVERTICULITIS: ICD-10-CM

## 2021-06-14 DIAGNOSIS — K76.0 HEPATIC STEATOSIS: ICD-10-CM

## 2021-06-14 DIAGNOSIS — Z79.899 HIGH RISK MEDICATION USE: ICD-10-CM

## 2021-06-14 PROCEDURE — 99999 PR PBB SHADOW E&M-EST. PATIENT-LVL V: CPT | Mod: PBBFAC,,, | Performed by: INTERNAL MEDICINE

## 2021-06-14 PROCEDURE — 99999 PR PBB SHADOW E&M-EST. PATIENT-LVL V: ICD-10-PCS | Mod: PBBFAC,,, | Performed by: INTERNAL MEDICINE

## 2021-06-14 PROCEDURE — 99214 OFFICE O/P EST MOD 30 MIN: CPT | Mod: S$PBB,,, | Performed by: INTERNAL MEDICINE

## 2021-06-14 PROCEDURE — 99214 PR OFFICE/OUTPT VISIT, EST, LEVL IV, 30-39 MIN: ICD-10-PCS | Mod: S$PBB,,, | Performed by: INTERNAL MEDICINE

## 2021-06-14 PROCEDURE — 99215 OFFICE O/P EST HI 40 MIN: CPT | Mod: PBBFAC,PO | Performed by: INTERNAL MEDICINE

## 2021-06-17 ENCOUNTER — OFFICE VISIT (OUTPATIENT)
Dept: SURGERY | Facility: CLINIC | Age: 66
End: 2021-06-17
Payer: MEDICARE

## 2021-06-17 VITALS
HEIGHT: 62 IN | SYSTOLIC BLOOD PRESSURE: 137 MMHG | BODY MASS INDEX: 42.36 KG/M2 | WEIGHT: 230.19 LBS | HEART RATE: 62 BPM | DIASTOLIC BLOOD PRESSURE: 85 MMHG

## 2021-06-17 DIAGNOSIS — K58.0 IRRITABLE BOWEL SYNDROME WITH DIARRHEA: Primary | ICD-10-CM

## 2021-06-17 DIAGNOSIS — Z87.19 HISTORY OF COLONIC DIVERTICULITIS: ICD-10-CM

## 2021-06-17 DIAGNOSIS — R10.32 LLQ ABDOMINAL PAIN: ICD-10-CM

## 2021-06-17 PROCEDURE — 99215 OFFICE O/P EST HI 40 MIN: CPT | Mod: PBBFAC | Performed by: COLON & RECTAL SURGERY

## 2021-06-17 PROCEDURE — 99203 OFFICE O/P NEW LOW 30 MIN: CPT | Mod: S$PBB,,, | Performed by: COLON & RECTAL SURGERY

## 2021-06-17 PROCEDURE — 99999 PR PBB SHADOW E&M-EST. PATIENT-LVL V: CPT | Mod: PBBFAC,,, | Performed by: COLON & RECTAL SURGERY

## 2021-06-17 PROCEDURE — 99999 PR PBB SHADOW E&M-EST. PATIENT-LVL V: ICD-10-PCS | Mod: PBBFAC,,, | Performed by: COLON & RECTAL SURGERY

## 2021-06-17 PROCEDURE — 99203 PR OFFICE/OUTPT VISIT, NEW, LEVL III, 30-44 MIN: ICD-10-PCS | Mod: S$PBB,,, | Performed by: COLON & RECTAL SURGERY

## 2021-06-17 RX ORDER — HYOSCYAMINE SULFATE 0.12 MG/1
0.12 TABLET SUBLINGUAL EVERY 4 HOURS PRN
Qty: 50 TABLET | Refills: 1 | Status: SHIPPED | OUTPATIENT
Start: 2021-06-17 | End: 2023-03-20

## 2021-06-21 ENCOUNTER — PATIENT MESSAGE (OUTPATIENT)
Dept: RHEUMATOLOGY | Facility: CLINIC | Age: 66
End: 2021-06-21

## 2021-06-21 ENCOUNTER — SPECIALTY PHARMACY (OUTPATIENT)
Dept: PHARMACY | Facility: CLINIC | Age: 66
End: 2021-06-21

## 2021-06-24 ENCOUNTER — TELEPHONE (OUTPATIENT)
Dept: RHEUMATOLOGY | Facility: CLINIC | Age: 66
End: 2021-06-24

## 2021-06-24 DIAGNOSIS — M05.9 SEROPOSITIVE RHEUMATOID ARTHRITIS: Primary | ICD-10-CM

## 2021-06-24 RX ORDER — SULFASALAZINE 500 MG/1
500 TABLET ORAL 2 TIMES DAILY
Qty: 60 TABLET | Refills: 2 | Status: SHIPPED | OUTPATIENT
Start: 2021-06-24 | End: 2021-07-23 | Stop reason: SDUPTHER

## 2021-07-02 ENCOUNTER — PATIENT MESSAGE (OUTPATIENT)
Dept: RHEUMATOLOGY | Facility: CLINIC | Age: 66
End: 2021-07-02

## 2021-07-03 ENCOUNTER — HOSPITAL ENCOUNTER (OUTPATIENT)
Dept: RADIOLOGY | Facility: HOSPITAL | Age: 66
Discharge: HOME | End: 2021-07-03
Attending: INTERNAL MEDICINE
Payer: MEDICARE

## 2021-07-03 VITALS — WEIGHT: 230 LBS | HEIGHT: 62 IN | BODY MASS INDEX: 42.33 KG/M2

## 2021-07-03 DIAGNOSIS — Z12.31 ENCOUNTER FOR SCREENING MAMMOGRAM FOR HIGH-RISK PATIENT: ICD-10-CM

## 2021-07-03 PROCEDURE — 77067 MAMMO DIGITAL SCREENING BILAT WITH TOMO: ICD-10-PCS | Mod: 26,,, | Performed by: RADIOLOGY

## 2021-07-03 PROCEDURE — 77063 BREAST TOMOSYNTHESIS BI: CPT | Mod: 26,,, | Performed by: RADIOLOGY

## 2021-07-03 PROCEDURE — 77067 SCR MAMMO BI INCL CAD: CPT | Mod: TC

## 2021-07-03 PROCEDURE — 77067 SCR MAMMO BI INCL CAD: CPT | Mod: 26,,, | Performed by: RADIOLOGY

## 2021-07-03 PROCEDURE — 77063 MAMMO DIGITAL SCREENING BILAT WITH TOMO: ICD-10-PCS | Mod: 26,,, | Performed by: RADIOLOGY

## 2021-07-07 ENCOUNTER — LAB VISIT (OUTPATIENT)
Dept: LAB | Facility: HOSPITAL | Age: 66
End: 2021-07-07
Attending: INTERNAL MEDICINE
Payer: MEDICARE

## 2021-07-07 DIAGNOSIS — E11.59 HYPERTENSION ASSOCIATED WITH DIABETES: ICD-10-CM

## 2021-07-07 DIAGNOSIS — I15.2 HYPERTENSION ASSOCIATED WITH DIABETES: ICD-10-CM

## 2021-07-07 DIAGNOSIS — E03.4 HYPOTHYROIDISM DUE TO ACQUIRED ATROPHY OF THYROID: ICD-10-CM

## 2021-07-07 DIAGNOSIS — E11.69 DYSLIPIDEMIA ASSOCIATED WITH TYPE 2 DIABETES MELLITUS: ICD-10-CM

## 2021-07-07 DIAGNOSIS — E78.5 DYSLIPIDEMIA ASSOCIATED WITH TYPE 2 DIABETES MELLITUS: ICD-10-CM

## 2021-07-07 LAB
ALBUMIN/CREAT UR: 6 UG/MG (ref 0–30)
CHOLEST SERPL-MCNC: 138 MG/DL (ref 120–199)
CHOLEST/HDLC SERPL: 3.1 {RATIO} (ref 2–5)
CREAT UR-MCNC: 182 MG/DL (ref 15–325)
ESTIMATED AVG GLUCOSE: 157 MG/DL (ref 68–131)
HBA1C MFR BLD: 7.1 % (ref 4–5.6)
HDLC SERPL-MCNC: 45 MG/DL (ref 40–75)
HDLC SERPL: 32.6 % (ref 20–50)
LDLC SERPL CALC-MCNC: 70.6 MG/DL (ref 63–159)
MICROALBUMIN UR DL<=1MG/L-MCNC: 11 UG/ML
NONHDLC SERPL-MCNC: 93 MG/DL
TRIGL SERPL-MCNC: 112 MG/DL (ref 30–150)
TSH SERPL DL<=0.005 MIU/L-ACNC: 2.72 UIU/ML (ref 0.4–4)

## 2021-07-07 PROCEDURE — 82570 ASSAY OF URINE CREATININE: CPT | Performed by: INTERNAL MEDICINE

## 2021-07-07 PROCEDURE — 84443 ASSAY THYROID STIM HORMONE: CPT | Performed by: INTERNAL MEDICINE

## 2021-07-07 PROCEDURE — 36415 COLL VENOUS BLD VENIPUNCTURE: CPT | Mod: PO | Performed by: INTERNAL MEDICINE

## 2021-07-07 PROCEDURE — 80061 LIPID PANEL: CPT | Performed by: INTERNAL MEDICINE

## 2021-07-07 PROCEDURE — 83036 HEMOGLOBIN GLYCOSYLATED A1C: CPT | Performed by: INTERNAL MEDICINE

## 2021-07-07 PROCEDURE — 82043 UR ALBUMIN QUANTITATIVE: CPT | Performed by: INTERNAL MEDICINE

## 2021-07-09 ENCOUNTER — OFFICE VISIT (OUTPATIENT)
Dept: INTERNAL MEDICINE | Facility: CLINIC | Age: 66
End: 2021-07-09
Payer: MEDICARE

## 2021-07-09 ENCOUNTER — TELEPHONE (OUTPATIENT)
Dept: RHEUMATOLOGY | Facility: CLINIC | Age: 66
End: 2021-07-09

## 2021-07-09 VITALS
SYSTOLIC BLOOD PRESSURE: 134 MMHG | HEART RATE: 65 BPM | BODY MASS INDEX: 41.52 KG/M2 | WEIGHT: 227 LBS | DIASTOLIC BLOOD PRESSURE: 80 MMHG

## 2021-07-09 DIAGNOSIS — K58.0 IRRITABLE BOWEL SYNDROME WITH DIARRHEA: ICD-10-CM

## 2021-07-09 DIAGNOSIS — E78.5 DYSLIPIDEMIA ASSOCIATED WITH TYPE 2 DIABETES MELLITUS: ICD-10-CM

## 2021-07-09 DIAGNOSIS — E11.69 DYSLIPIDEMIA ASSOCIATED WITH TYPE 2 DIABETES MELLITUS: ICD-10-CM

## 2021-07-09 DIAGNOSIS — M05.9 SEROPOSITIVE RHEUMATOID ARTHRITIS: ICD-10-CM

## 2021-07-09 DIAGNOSIS — E03.9 HYPOTHYROIDISM, UNSPECIFIED TYPE: ICD-10-CM

## 2021-07-09 DIAGNOSIS — G47.33 OSA ON CPAP: ICD-10-CM

## 2021-07-09 DIAGNOSIS — E66.01 MORBID OBESITY WITH BMI OF 40.0-44.9, ADULT: ICD-10-CM

## 2021-07-09 DIAGNOSIS — I15.2 HYPERTENSION ASSOCIATED WITH DIABETES: Primary | ICD-10-CM

## 2021-07-09 DIAGNOSIS — E11.59 HYPERTENSION ASSOCIATED WITH DIABETES: Primary | ICD-10-CM

## 2021-07-09 PROCEDURE — 99214 OFFICE O/P EST MOD 30 MIN: CPT | Mod: 95,,, | Performed by: INTERNAL MEDICINE

## 2021-07-09 PROCEDURE — 99214 PR OFFICE/OUTPT VISIT, EST, LEVL IV, 30-39 MIN: ICD-10-PCS | Mod: 95,,, | Performed by: INTERNAL MEDICINE

## 2021-07-19 ENCOUNTER — LAB VISIT (OUTPATIENT)
Dept: LAB | Facility: HOSPITAL | Age: 66
End: 2021-07-19
Attending: INTERNAL MEDICINE
Payer: MEDICARE

## 2021-07-19 DIAGNOSIS — M05.9 SEROPOSITIVE RHEUMATOID ARTHRITIS: ICD-10-CM

## 2021-07-19 LAB
ALBUMIN SERPL BCP-MCNC: 3.4 G/DL (ref 3.5–5.2)
ALP SERPL-CCNC: 83 U/L (ref 55–135)
ALT SERPL W/O P-5'-P-CCNC: 46 U/L (ref 10–44)
ANION GAP SERPL CALC-SCNC: 11 MMOL/L (ref 8–16)
AST SERPL-CCNC: 24 U/L (ref 10–40)
BASOPHILS # BLD AUTO: 0.05 K/UL (ref 0–0.2)
BASOPHILS NFR BLD: 1 % (ref 0–1.9)
BILIRUB SERPL-MCNC: 0.3 MG/DL (ref 0.1–1)
BUN SERPL-MCNC: 13 MG/DL (ref 8–23)
CALCIUM SERPL-MCNC: 10.2 MG/DL (ref 8.7–10.5)
CHLORIDE SERPL-SCNC: 104 MMOL/L (ref 95–110)
CO2 SERPL-SCNC: 23 MMOL/L (ref 23–29)
CREAT SERPL-MCNC: 0.9 MG/DL (ref 0.5–1.4)
CRP SERPL-MCNC: 17.8 MG/L (ref 0–8.2)
DIFFERENTIAL METHOD: NORMAL
EOSINOPHIL # BLD AUTO: 0.2 K/UL (ref 0–0.5)
EOSINOPHIL NFR BLD: 3 % (ref 0–8)
ERYTHROCYTE [DISTWIDTH] IN BLOOD BY AUTOMATED COUNT: 14.4 % (ref 11.5–14.5)
ERYTHROCYTE [SEDIMENTATION RATE] IN BLOOD BY WESTERGREN METHOD: 39 MM/HR (ref 0–36)
EST. GFR  (AFRICAN AMERICAN): >60 ML/MIN/1.73 M^2
EST. GFR  (NON AFRICAN AMERICAN): >60 ML/MIN/1.73 M^2
GLUCOSE SERPL-MCNC: 142 MG/DL (ref 70–110)
HCT VFR BLD AUTO: 45.1 % (ref 37–48.5)
HGB BLD-MCNC: 14.6 G/DL (ref 12–16)
IMM GRANULOCYTES # BLD AUTO: 0.01 K/UL (ref 0–0.04)
IMM GRANULOCYTES NFR BLD AUTO: 0.2 % (ref 0–0.5)
LYMPHOCYTES # BLD AUTO: 2 K/UL (ref 1–4.8)
LYMPHOCYTES NFR BLD: 38.8 % (ref 18–48)
MCH RBC QN AUTO: 29.8 PG (ref 27–31)
MCHC RBC AUTO-ENTMCNC: 32.4 G/DL (ref 32–36)
MCV RBC AUTO: 92 FL (ref 82–98)
MONOCYTES # BLD AUTO: 0.4 K/UL (ref 0.3–1)
MONOCYTES NFR BLD: 8.1 % (ref 4–15)
NEUTROPHILS # BLD AUTO: 2.5 K/UL (ref 1.8–7.7)
NEUTROPHILS NFR BLD: 48.9 % (ref 38–73)
NRBC BLD-RTO: 0 /100 WBC
PLATELET # BLD AUTO: 302 K/UL (ref 150–450)
PMV BLD AUTO: 10.4 FL (ref 9.2–12.9)
POTASSIUM SERPL-SCNC: 4.1 MMOL/L (ref 3.5–5.1)
PROT SERPL-MCNC: 7.1 G/DL (ref 6–8.4)
RBC # BLD AUTO: 4.9 M/UL (ref 4–5.4)
SODIUM SERPL-SCNC: 138 MMOL/L (ref 136–145)
WBC # BLD AUTO: 5.05 K/UL (ref 3.9–12.7)

## 2021-07-19 PROCEDURE — 86140 C-REACTIVE PROTEIN: CPT | Performed by: INTERNAL MEDICINE

## 2021-07-19 PROCEDURE — 85025 COMPLETE CBC W/AUTO DIFF WBC: CPT | Performed by: INTERNAL MEDICINE

## 2021-07-19 PROCEDURE — 36415 COLL VENOUS BLD VENIPUNCTURE: CPT | Mod: PO | Performed by: INTERNAL MEDICINE

## 2021-07-19 PROCEDURE — 80053 COMPREHEN METABOLIC PANEL: CPT | Performed by: INTERNAL MEDICINE

## 2021-07-19 PROCEDURE — 85652 RBC SED RATE AUTOMATED: CPT | Performed by: INTERNAL MEDICINE

## 2021-07-23 ENCOUNTER — OFFICE VISIT (OUTPATIENT)
Dept: RHEUMATOLOGY | Facility: CLINIC | Age: 66
End: 2021-07-23
Payer: MEDICARE

## 2021-07-23 VITALS
HEART RATE: 67 BPM | SYSTOLIC BLOOD PRESSURE: 125 MMHG | BODY MASS INDEX: 42.29 KG/M2 | WEIGHT: 229.81 LBS | OXYGEN SATURATION: 96 % | HEIGHT: 62 IN | DIASTOLIC BLOOD PRESSURE: 82 MMHG

## 2021-07-23 DIAGNOSIS — K76.0 HEPATIC STEATOSIS: ICD-10-CM

## 2021-07-23 DIAGNOSIS — Z87.19 HISTORY OF DIVERTICULITIS: ICD-10-CM

## 2021-07-23 DIAGNOSIS — M05.9 SEROPOSITIVE RHEUMATOID ARTHRITIS: Primary | ICD-10-CM

## 2021-07-23 DIAGNOSIS — Z79.899 HIGH RISK MEDICATION USE: ICD-10-CM

## 2021-07-23 DIAGNOSIS — M85.859 OSTEOPENIA OF NECK OF FEMUR, UNSPECIFIED LATERALITY: ICD-10-CM

## 2021-07-23 PROCEDURE — 99214 OFFICE O/P EST MOD 30 MIN: CPT | Mod: S$PBB,,, | Performed by: INTERNAL MEDICINE

## 2021-07-23 PROCEDURE — 99215 OFFICE O/P EST HI 40 MIN: CPT | Mod: PBBFAC,PO | Performed by: INTERNAL MEDICINE

## 2021-07-23 PROCEDURE — 99999 PR PBB SHADOW E&M-EST. PATIENT-LVL V: ICD-10-PCS | Mod: PBBFAC,,, | Performed by: INTERNAL MEDICINE

## 2021-07-23 PROCEDURE — 99999 PR PBB SHADOW E&M-EST. PATIENT-LVL V: CPT | Mod: PBBFAC,,, | Performed by: INTERNAL MEDICINE

## 2021-07-23 PROCEDURE — 99214 PR OFFICE/OUTPT VISIT, EST, LEVL IV, 30-39 MIN: ICD-10-PCS | Mod: S$PBB,,, | Performed by: INTERNAL MEDICINE

## 2021-07-23 RX ORDER — SULFASALAZINE 500 MG/1
1000 TABLET ORAL 2 TIMES DAILY
Qty: 120 TABLET | Refills: 2 | Status: SHIPPED | OUTPATIENT
Start: 2021-07-23 | End: 2021-08-23 | Stop reason: SDUPTHER

## 2021-07-27 ENCOUNTER — PATIENT MESSAGE (OUTPATIENT)
Dept: RHEUMATOLOGY | Facility: CLINIC | Age: 66
End: 2021-07-27

## 2021-07-27 ENCOUNTER — TELEPHONE (OUTPATIENT)
Dept: RHEUMATOLOGY | Facility: CLINIC | Age: 66
End: 2021-07-27

## 2021-07-29 ENCOUNTER — PATIENT MESSAGE (OUTPATIENT)
Dept: SURGERY | Facility: CLINIC | Age: 66
End: 2021-07-29

## 2021-08-09 ENCOUNTER — PATIENT MESSAGE (OUTPATIENT)
Dept: DERMATOLOGY | Facility: CLINIC | Age: 66
End: 2021-08-09

## 2021-08-09 ENCOUNTER — PATIENT MESSAGE (OUTPATIENT)
Dept: HEMATOLOGY/ONCOLOGY | Facility: CLINIC | Age: 66
End: 2021-08-09

## 2021-08-16 ENCOUNTER — PATIENT MESSAGE (OUTPATIENT)
Dept: INTERNAL MEDICINE | Facility: CLINIC | Age: 66
End: 2021-08-16

## 2021-08-17 ENCOUNTER — OFFICE VISIT (OUTPATIENT)
Dept: HEMATOLOGY/ONCOLOGY | Facility: CLINIC | Age: 66
End: 2021-08-17
Attending: INTERNAL MEDICINE
Payer: MEDICARE

## 2021-08-17 DIAGNOSIS — N60.92 ATYPICAL DUCTAL HYPERPLASIA OF LEFT BREAST: Primary | ICD-10-CM

## 2021-08-17 PROCEDURE — 99212 PR OFFICE/OUTPT VISIT, EST, LEVL II, 10-19 MIN: ICD-10-PCS | Mod: 95,,, | Performed by: INTERNAL MEDICINE

## 2021-08-17 PROCEDURE — 99212 OFFICE O/P EST SF 10 MIN: CPT | Mod: 95,,, | Performed by: INTERNAL MEDICINE

## 2021-08-17 RX ORDER — RALOXIFENE HYDROCHLORIDE 60 MG/1
60 TABLET, FILM COATED ORAL DAILY
Qty: 90 TABLET | Refills: 3 | Status: SHIPPED | OUTPATIENT
Start: 2021-08-17 | End: 2022-08-08

## 2021-08-18 ENCOUNTER — LAB VISIT (OUTPATIENT)
Dept: LAB | Facility: HOSPITAL | Age: 66
End: 2021-08-18
Attending: INTERNAL MEDICINE
Payer: MEDICARE

## 2021-08-18 ENCOUNTER — PATIENT OUTREACH (OUTPATIENT)
Dept: ADMINISTRATIVE | Facility: OTHER | Age: 66
End: 2021-08-18

## 2021-08-18 DIAGNOSIS — M05.9 SEROPOSITIVE RHEUMATOID ARTHRITIS: ICD-10-CM

## 2021-08-18 LAB
ALBUMIN SERPL BCP-MCNC: 3.7 G/DL (ref 3.5–5.2)
ALP SERPL-CCNC: 82 U/L (ref 55–135)
ALT SERPL W/O P-5'-P-CCNC: 50 U/L (ref 10–44)
ANION GAP SERPL CALC-SCNC: 12 MMOL/L (ref 8–16)
AST SERPL-CCNC: 29 U/L (ref 10–40)
BASOPHILS # BLD AUTO: 0.07 K/UL (ref 0–0.2)
BASOPHILS NFR BLD: 1.3 % (ref 0–1.9)
BILIRUB SERPL-MCNC: 0.4 MG/DL (ref 0.1–1)
BUN SERPL-MCNC: 16 MG/DL (ref 8–23)
CALCIUM SERPL-MCNC: 9.3 MG/DL (ref 8.7–10.5)
CHLORIDE SERPL-SCNC: 106 MMOL/L (ref 95–110)
CO2 SERPL-SCNC: 20 MMOL/L (ref 23–29)
CREAT SERPL-MCNC: 0.8 MG/DL (ref 0.5–1.4)
CRP SERPL-MCNC: 1.9 MG/L (ref 0–8.2)
DIFFERENTIAL METHOD: ABNORMAL
EOSINOPHIL # BLD AUTO: 0.3 K/UL (ref 0–0.5)
EOSINOPHIL NFR BLD: 5.4 % (ref 0–8)
ERYTHROCYTE [DISTWIDTH] IN BLOOD BY AUTOMATED COUNT: 15.4 % (ref 11.5–14.5)
ERYTHROCYTE [SEDIMENTATION RATE] IN BLOOD BY WESTERGREN METHOD: 6 MM/HR (ref 0–36)
EST. GFR  (AFRICAN AMERICAN): >60 ML/MIN/1.73 M^2
EST. GFR  (NON AFRICAN AMERICAN): >60 ML/MIN/1.73 M^2
GLUCOSE SERPL-MCNC: 124 MG/DL (ref 70–110)
HCT VFR BLD AUTO: 44.9 % (ref 37–48.5)
HGB BLD-MCNC: 14 G/DL (ref 12–16)
IMM GRANULOCYTES # BLD AUTO: 0.01 K/UL (ref 0–0.04)
IMM GRANULOCYTES NFR BLD AUTO: 0.2 % (ref 0–0.5)
LYMPHOCYTES # BLD AUTO: 1.9 K/UL (ref 1–4.8)
LYMPHOCYTES NFR BLD: 34.5 % (ref 18–48)
MCH RBC QN AUTO: 29 PG (ref 27–31)
MCHC RBC AUTO-ENTMCNC: 31.2 G/DL (ref 32–36)
MCV RBC AUTO: 93 FL (ref 82–98)
MONOCYTES # BLD AUTO: 0.5 K/UL (ref 0.3–1)
MONOCYTES NFR BLD: 8.1 % (ref 4–15)
NEUTROPHILS # BLD AUTO: 2.8 K/UL (ref 1.8–7.7)
NEUTROPHILS NFR BLD: 50.5 % (ref 38–73)
NRBC BLD-RTO: 0 /100 WBC
PLATELET # BLD AUTO: 278 K/UL (ref 150–450)
PMV BLD AUTO: 10.6 FL (ref 9.2–12.9)
POTASSIUM SERPL-SCNC: 3.8 MMOL/L (ref 3.5–5.1)
PROT SERPL-MCNC: 6.7 G/DL (ref 6–8.4)
RBC # BLD AUTO: 4.83 M/UL (ref 4–5.4)
SODIUM SERPL-SCNC: 138 MMOL/L (ref 136–145)
WBC # BLD AUTO: 5.59 K/UL (ref 3.9–12.7)

## 2021-08-18 PROCEDURE — 36415 COLL VENOUS BLD VENIPUNCTURE: CPT | Mod: PO | Performed by: INTERNAL MEDICINE

## 2021-08-18 PROCEDURE — 85652 RBC SED RATE AUTOMATED: CPT | Performed by: INTERNAL MEDICINE

## 2021-08-18 PROCEDURE — 86140 C-REACTIVE PROTEIN: CPT | Performed by: INTERNAL MEDICINE

## 2021-08-18 PROCEDURE — 85025 COMPLETE CBC W/AUTO DIFF WBC: CPT | Performed by: INTERNAL MEDICINE

## 2021-08-18 PROCEDURE — 80053 COMPREHEN METABOLIC PANEL: CPT | Performed by: INTERNAL MEDICINE

## 2021-08-19 ENCOUNTER — OFFICE VISIT (OUTPATIENT)
Dept: DERMATOLOGY | Facility: CLINIC | Age: 66
End: 2021-08-19
Payer: MEDICARE

## 2021-08-19 DIAGNOSIS — L57.0 AK (ACTINIC KERATOSIS): ICD-10-CM

## 2021-08-19 DIAGNOSIS — L81.9 DYSCHROMIA: Primary | ICD-10-CM

## 2021-08-19 DIAGNOSIS — L82.0 INFLAMED SEBORRHEIC KERATOSIS: ICD-10-CM

## 2021-08-19 PROCEDURE — 99213 OFFICE O/P EST LOW 20 MIN: CPT | Mod: 25,S$PBB,, | Performed by: DERMATOLOGY

## 2021-08-19 PROCEDURE — 17000 PR DESTRUCTION(LASER SURGERY,CRYOSURGERY,CHEMOSURGERY),PREMALIGNANT LESIONS,FIRST LESION: ICD-10-PCS | Mod: 59,S$PBB,, | Performed by: DERMATOLOGY

## 2021-08-19 PROCEDURE — 17000 DESTRUCT PREMALG LESION: CPT | Mod: 59,PBBFAC,PO | Performed by: DERMATOLOGY

## 2021-08-19 PROCEDURE — 17110 DESTRUCTION B9 LES UP TO 14: CPT | Mod: PBBFAC,PO | Performed by: DERMATOLOGY

## 2021-08-19 PROCEDURE — 99213 OFFICE O/P EST LOW 20 MIN: CPT | Mod: PBBFAC,PO | Performed by: DERMATOLOGY

## 2021-08-19 PROCEDURE — 99999 PR PBB SHADOW E&M-EST. PATIENT-LVL III: CPT | Mod: PBBFAC,,, | Performed by: DERMATOLOGY

## 2021-08-19 PROCEDURE — 99999 PR PBB SHADOW E&M-EST. PATIENT-LVL III: ICD-10-PCS | Mod: PBBFAC,,, | Performed by: DERMATOLOGY

## 2021-08-19 PROCEDURE — 17110 DESTRUCTION B9 LES UP TO 14: CPT | Mod: S$PBB,,, | Performed by: DERMATOLOGY

## 2021-08-19 PROCEDURE — 17000 DESTRUCT PREMALG LESION: CPT | Mod: 59,S$PBB,, | Performed by: DERMATOLOGY

## 2021-08-19 PROCEDURE — 17110 PR DESTRUCTION BENIGN LESIONS UP TO 14: ICD-10-PCS | Mod: S$PBB,,, | Performed by: DERMATOLOGY

## 2021-08-19 PROCEDURE — 99213 PR OFFICE/OUTPT VISIT, EST, LEVL III, 20-29 MIN: ICD-10-PCS | Mod: 25,S$PBB,, | Performed by: DERMATOLOGY

## 2021-08-23 ENCOUNTER — PATIENT MESSAGE (OUTPATIENT)
Dept: INTERNAL MEDICINE | Facility: CLINIC | Age: 66
End: 2021-08-23

## 2021-08-23 ENCOUNTER — OFFICE VISIT (OUTPATIENT)
Dept: RHEUMATOLOGY | Facility: CLINIC | Age: 66
End: 2021-08-23
Payer: MEDICARE

## 2021-08-23 ENCOUNTER — PATIENT MESSAGE (OUTPATIENT)
Dept: RHEUMATOLOGY | Facility: CLINIC | Age: 66
End: 2021-08-23

## 2021-08-23 VITALS
WEIGHT: 228.69 LBS | HEART RATE: 67 BPM | OXYGEN SATURATION: 95 % | DIASTOLIC BLOOD PRESSURE: 81 MMHG | SYSTOLIC BLOOD PRESSURE: 127 MMHG | HEIGHT: 62 IN | BODY MASS INDEX: 42.08 KG/M2

## 2021-08-23 DIAGNOSIS — K76.0 HEPATIC STEATOSIS: Primary | ICD-10-CM

## 2021-08-23 DIAGNOSIS — M05.9 SEROPOSITIVE RHEUMATOID ARTHRITIS: ICD-10-CM

## 2021-08-23 DIAGNOSIS — Z87.19 HISTORY OF DIVERTICULITIS: ICD-10-CM

## 2021-08-23 DIAGNOSIS — M85.859 OSTEOPENIA OF NECK OF FEMUR, UNSPECIFIED LATERALITY: ICD-10-CM

## 2021-08-23 DIAGNOSIS — Z79.899 HIGH RISK MEDICATION USE: ICD-10-CM

## 2021-08-23 PROCEDURE — 99999 PR PBB SHADOW E&M-EST. PATIENT-LVL V: CPT | Mod: PBBFAC,,, | Performed by: INTERNAL MEDICINE

## 2021-08-23 PROCEDURE — 99214 OFFICE O/P EST MOD 30 MIN: CPT | Mod: S$PBB,,, | Performed by: INTERNAL MEDICINE

## 2021-08-23 PROCEDURE — 99215 OFFICE O/P EST HI 40 MIN: CPT | Mod: PBBFAC,PO | Performed by: INTERNAL MEDICINE

## 2021-08-23 PROCEDURE — 99214 PR OFFICE/OUTPT VISIT, EST, LEVL IV, 30-39 MIN: ICD-10-PCS | Mod: S$PBB,,, | Performed by: INTERNAL MEDICINE

## 2021-08-23 PROCEDURE — 99999 PR PBB SHADOW E&M-EST. PATIENT-LVL V: ICD-10-PCS | Mod: PBBFAC,,, | Performed by: INTERNAL MEDICINE

## 2021-08-23 RX ORDER — SULFASALAZINE 500 MG/1
1500 TABLET ORAL 2 TIMES DAILY
Qty: 180 TABLET | Refills: 2 | Status: SHIPPED | OUTPATIENT
Start: 2021-08-23 | End: 2021-10-18 | Stop reason: SDUPTHER

## 2021-09-20 ENCOUNTER — PATIENT MESSAGE (OUTPATIENT)
Dept: INTERNAL MEDICINE | Facility: CLINIC | Age: 66
End: 2021-09-20

## 2021-09-23 ENCOUNTER — LAB VISIT (OUTPATIENT)
Dept: LAB | Facility: HOSPITAL | Age: 66
End: 2021-09-23
Attending: INTERNAL MEDICINE
Payer: MEDICARE

## 2021-09-23 DIAGNOSIS — M05.9 SEROPOSITIVE RHEUMATOID ARTHRITIS: ICD-10-CM

## 2021-09-23 LAB
ALBUMIN SERPL BCP-MCNC: 3.8 G/DL (ref 3.5–5.2)
ALP SERPL-CCNC: 82 U/L (ref 55–135)
ALT SERPL W/O P-5'-P-CCNC: 58 U/L (ref 10–44)
ANION GAP SERPL CALC-SCNC: 12 MMOL/L (ref 8–16)
AST SERPL-CCNC: 33 U/L (ref 10–40)
BASOPHILS # BLD AUTO: 0.05 K/UL (ref 0–0.2)
BASOPHILS NFR BLD: 1 % (ref 0–1.9)
BILIRUB SERPL-MCNC: 0.3 MG/DL (ref 0.1–1)
BUN SERPL-MCNC: 12 MG/DL (ref 8–23)
CALCIUM SERPL-MCNC: 9.8 MG/DL (ref 8.7–10.5)
CHLORIDE SERPL-SCNC: 107 MMOL/L (ref 95–110)
CO2 SERPL-SCNC: 18 MMOL/L (ref 23–29)
CREAT SERPL-MCNC: 0.8 MG/DL (ref 0.5–1.4)
CRP SERPL-MCNC: 1.3 MG/L (ref 0–8.2)
DIFFERENTIAL METHOD: ABNORMAL
EOSINOPHIL # BLD AUTO: 0.1 K/UL (ref 0–0.5)
EOSINOPHIL NFR BLD: 2.9 % (ref 0–8)
ERYTHROCYTE [DISTWIDTH] IN BLOOD BY AUTOMATED COUNT: 15.1 % (ref 11.5–14.5)
ERYTHROCYTE [SEDIMENTATION RATE] IN BLOOD BY WESTERGREN METHOD: 12 MM/HR (ref 0–36)
EST. GFR  (AFRICAN AMERICAN): >60 ML/MIN/1.73 M^2
EST. GFR  (NON AFRICAN AMERICAN): >60 ML/MIN/1.73 M^2
GLUCOSE SERPL-MCNC: 137 MG/DL (ref 70–110)
HCT VFR BLD AUTO: 44.1 % (ref 37–48.5)
HGB BLD-MCNC: 14 G/DL (ref 12–16)
IMM GRANULOCYTES # BLD AUTO: 0.01 K/UL (ref 0–0.04)
IMM GRANULOCYTES NFR BLD AUTO: 0.2 % (ref 0–0.5)
LYMPHOCYTES # BLD AUTO: 1.5 K/UL (ref 1–4.8)
LYMPHOCYTES NFR BLD: 31.6 % (ref 18–48)
MCH RBC QN AUTO: 29.7 PG (ref 27–31)
MCHC RBC AUTO-ENTMCNC: 31.7 G/DL (ref 32–36)
MCV RBC AUTO: 93 FL (ref 82–98)
MONOCYTES # BLD AUTO: 0.4 K/UL (ref 0.3–1)
MONOCYTES NFR BLD: 8.8 % (ref 4–15)
NEUTROPHILS # BLD AUTO: 2.7 K/UL (ref 1.8–7.7)
NEUTROPHILS NFR BLD: 55.5 % (ref 38–73)
NRBC BLD-RTO: 0 /100 WBC
PLATELET # BLD AUTO: 270 K/UL (ref 150–450)
PMV BLD AUTO: 11 FL (ref 9.2–12.9)
POTASSIUM SERPL-SCNC: 4 MMOL/L (ref 3.5–5.1)
PROT SERPL-MCNC: 6.8 G/DL (ref 6–8.4)
RBC # BLD AUTO: 4.72 M/UL (ref 4–5.4)
SODIUM SERPL-SCNC: 137 MMOL/L (ref 136–145)
WBC # BLD AUTO: 4.87 K/UL (ref 3.9–12.7)

## 2021-09-23 PROCEDURE — 36415 COLL VENOUS BLD VENIPUNCTURE: CPT | Mod: PO | Performed by: INTERNAL MEDICINE

## 2021-09-23 PROCEDURE — 86140 C-REACTIVE PROTEIN: CPT | Performed by: INTERNAL MEDICINE

## 2021-09-23 PROCEDURE — 85652 RBC SED RATE AUTOMATED: CPT | Performed by: INTERNAL MEDICINE

## 2021-09-23 PROCEDURE — 80053 COMPREHEN METABOLIC PANEL: CPT | Performed by: INTERNAL MEDICINE

## 2021-09-23 PROCEDURE — 85025 COMPLETE CBC W/AUTO DIFF WBC: CPT | Performed by: INTERNAL MEDICINE

## 2021-09-27 ENCOUNTER — PES CALL (OUTPATIENT)
Dept: ADMINISTRATIVE | Facility: CLINIC | Age: 66
End: 2021-09-27

## 2021-10-06 ENCOUNTER — TELEPHONE (OUTPATIENT)
Dept: RHEUMATOLOGY | Facility: CLINIC | Age: 66
End: 2021-10-06

## 2021-10-07 ENCOUNTER — IMMUNIZATION (OUTPATIENT)
Dept: INTERNAL MEDICINE | Facility: CLINIC | Age: 66
End: 2021-10-07
Payer: MEDICARE

## 2021-10-07 DIAGNOSIS — Z23 NEED FOR VACCINATION: Primary | ICD-10-CM

## 2021-10-07 PROCEDURE — 91300 COVID-19, MRNA, LNP-S, PF, 30 MCG/0.3 ML DOSE VACCINE: CPT | Mod: PBBFAC,PO

## 2021-10-07 PROCEDURE — 0003A COVID-19, MRNA, LNP-S, PF, 30 MCG/0.3 ML DOSE VACCINE: CPT | Mod: PBBFAC,PO

## 2021-10-15 ENCOUNTER — PATIENT MESSAGE (OUTPATIENT)
Dept: HEMATOLOGY/ONCOLOGY | Facility: CLINIC | Age: 66
End: 2021-10-15
Payer: MEDICARE

## 2021-10-18 ENCOUNTER — OFFICE VISIT (OUTPATIENT)
Dept: RHEUMATOLOGY | Facility: CLINIC | Age: 66
End: 2021-10-18
Payer: MEDICARE

## 2021-10-18 VITALS
DIASTOLIC BLOOD PRESSURE: 74 MMHG | HEIGHT: 62 IN | HEART RATE: 78 BPM | WEIGHT: 222 LBS | SYSTOLIC BLOOD PRESSURE: 132 MMHG | BODY MASS INDEX: 40.85 KG/M2

## 2021-10-18 DIAGNOSIS — M05.9 SEROPOSITIVE RHEUMATOID ARTHRITIS: Primary | ICD-10-CM

## 2021-10-18 PROBLEM — M25.542 ARTHRALGIA OF BOTH HANDS: Status: RESOLVED | Noted: 2020-11-30 | Resolved: 2021-10-18

## 2021-10-18 PROBLEM — M25.541 ARTHRALGIA OF BOTH HANDS: Status: RESOLVED | Noted: 2020-11-30 | Resolved: 2021-10-18

## 2021-10-18 PROBLEM — M25.60 STIFFNESS IN JOINT: Status: RESOLVED | Noted: 2020-11-30 | Resolved: 2021-10-18

## 2021-10-18 PROCEDURE — 99214 OFFICE O/P EST MOD 30 MIN: CPT | Mod: S$PBB,,, | Performed by: INTERNAL MEDICINE

## 2021-10-18 PROCEDURE — 99999 PR PBB SHADOW E&M-EST. PATIENT-LVL V: CPT | Mod: PBBFAC,,, | Performed by: INTERNAL MEDICINE

## 2021-10-18 PROCEDURE — 99214 PR OFFICE/OUTPT VISIT, EST, LEVL IV, 30-39 MIN: ICD-10-PCS | Mod: S$PBB,,, | Performed by: INTERNAL MEDICINE

## 2021-10-18 PROCEDURE — 99215 OFFICE O/P EST HI 40 MIN: CPT | Mod: PBBFAC | Performed by: INTERNAL MEDICINE

## 2021-10-18 PROCEDURE — 99999 PR PBB SHADOW E&M-EST. PATIENT-LVL V: ICD-10-PCS | Mod: PBBFAC,,, | Performed by: INTERNAL MEDICINE

## 2021-10-18 RX ORDER — SULFASALAZINE 500 MG/1
1500 TABLET ORAL 2 TIMES DAILY
Qty: 180 TABLET | Refills: 2 | Status: SHIPPED | OUTPATIENT
Start: 2021-10-18 | End: 2022-02-21 | Stop reason: SDUPTHER

## 2021-10-18 ASSESSMENT — ROUTINE ASSESSMENT OF PATIENT INDEX DATA (RAPID3)
AM STIFFNESS SCORE: 0, NO
FATIGUE SCORE: 1.5
MDHAQ FUNCTION SCORE: 0.2
PSYCHOLOGICAL DISTRESS SCORE: 1.1
PAIN SCORE: 1
TOTAL RAPID3 SCORE: 0.72
PATIENT GLOBAL ASSESSMENT SCORE: 0.5

## 2021-10-19 ENCOUNTER — PATIENT MESSAGE (OUTPATIENT)
Dept: INTERNAL MEDICINE | Facility: CLINIC | Age: 66
End: 2021-10-19
Payer: MEDICARE

## 2021-10-23 DIAGNOSIS — L65.8 FEMALE PATTERN BALDNESS: ICD-10-CM

## 2021-10-23 RX ORDER — SPIRONOLACTONE 100 MG/1
100 TABLET, FILM COATED ORAL DAILY
Qty: 90 TABLET | Refills: 3 | Status: CANCELLED | OUTPATIENT
Start: 2021-10-23

## 2021-10-25 DIAGNOSIS — L65.8 FEMALE PATTERN BALDNESS: ICD-10-CM

## 2021-10-25 RX ORDER — SPIRONOLACTONE 100 MG/1
100 TABLET, FILM COATED ORAL DAILY
Qty: 90 TABLET | Refills: 3 | Status: SHIPPED | OUTPATIENT
Start: 2021-10-25 | End: 2021-10-29 | Stop reason: SDUPTHER

## 2021-10-26 DIAGNOSIS — I15.2 HYPERTENSION ASSOCIATED WITH DIABETES: ICD-10-CM

## 2021-10-26 DIAGNOSIS — I10 ESSENTIAL HYPERTENSION: ICD-10-CM

## 2021-10-26 DIAGNOSIS — L65.8 FEMALE PATTERN BALDNESS: ICD-10-CM

## 2021-10-26 DIAGNOSIS — E78.5 DYSLIPIDEMIA ASSOCIATED WITH TYPE 2 DIABETES MELLITUS: ICD-10-CM

## 2021-10-26 DIAGNOSIS — E03.4 HYPOTHYROIDISM DUE TO ACQUIRED ATROPHY OF THYROID: ICD-10-CM

## 2021-10-26 DIAGNOSIS — E11.69 DYSLIPIDEMIA ASSOCIATED WITH TYPE 2 DIABETES MELLITUS: ICD-10-CM

## 2021-10-26 DIAGNOSIS — E11.59 HYPERTENSION ASSOCIATED WITH DIABETES: ICD-10-CM

## 2021-10-26 DIAGNOSIS — E78.5 HYPERLIPIDEMIA, UNSPECIFIED HYPERLIPIDEMIA TYPE: ICD-10-CM

## 2021-10-26 RX ORDER — METFORMIN HYDROCHLORIDE 500 MG/1
TABLET ORAL
Qty: 180 TABLET | Refills: 0 | Status: SHIPPED | OUTPATIENT
Start: 2021-10-26 | End: 2021-12-02 | Stop reason: SDUPTHER

## 2021-10-26 RX ORDER — ATORVASTATIN CALCIUM 40 MG/1
40 TABLET, FILM COATED ORAL DAILY
Qty: 90 TABLET | Refills: 3 | Status: CANCELLED | OUTPATIENT
Start: 2021-10-26

## 2021-10-26 RX ORDER — LEVOTHYROXINE SODIUM 88 UG/1
88 TABLET ORAL
Qty: 90 TABLET | Refills: 0 | Status: CANCELLED | OUTPATIENT
Start: 2021-10-26

## 2021-10-26 RX ORDER — METFORMIN HYDROCHLORIDE 500 MG/1
500 TABLET ORAL 2 TIMES DAILY
Qty: 180 TABLET | Refills: 3 | Status: CANCELLED | OUTPATIENT
Start: 2021-10-26 | End: 2022-10-26

## 2021-10-26 RX ORDER — FINASTERIDE 5 MG/1
5 TABLET, FILM COATED ORAL NIGHTLY
Qty: 90 TABLET | Refills: 0 | Status: CANCELLED | OUTPATIENT
Start: 2021-10-26

## 2021-10-26 RX ORDER — METOPROLOL SUCCINATE 50 MG/1
50 TABLET, EXTENDED RELEASE ORAL NIGHTLY
Qty: 90 TABLET | Refills: 3 | Status: CANCELLED | OUTPATIENT
Start: 2021-10-26

## 2021-10-26 RX ORDER — OMEGA-3-ACID ETHYL ESTERS 1 G/1
2 CAPSULE, LIQUID FILLED ORAL 2 TIMES DAILY
Qty: 360 CAPSULE | Refills: 3 | Status: CANCELLED | OUTPATIENT
Start: 2021-10-26

## 2021-10-27 RX ORDER — ATORVASTATIN CALCIUM 40 MG/1
40 TABLET, FILM COATED ORAL DAILY
Qty: 90 TABLET | Refills: 3 | Status: CANCELLED | OUTPATIENT
Start: 2021-10-27

## 2021-10-29 DIAGNOSIS — L65.8 FEMALE PATTERN BALDNESS: ICD-10-CM

## 2021-11-01 RX ORDER — SPIRONOLACTONE 100 MG/1
100 TABLET, FILM COATED ORAL DAILY
Qty: 90 TABLET | Refills: 3 | Status: SHIPPED | OUTPATIENT
Start: 2021-11-01 | End: 2021-12-02 | Stop reason: SDUPTHER

## 2021-11-05 ENCOUNTER — PATIENT MESSAGE (OUTPATIENT)
Dept: HEMATOLOGY/ONCOLOGY | Facility: CLINIC | Age: 66
End: 2021-11-05
Payer: MEDICARE

## 2021-11-06 ENCOUNTER — PATIENT MESSAGE (OUTPATIENT)
Dept: HEMATOLOGY/ONCOLOGY | Facility: CLINIC | Age: 66
End: 2021-11-06
Payer: MEDICARE

## 2021-11-08 ENCOUNTER — PATIENT OUTREACH (OUTPATIENT)
Dept: ADMINISTRATIVE | Facility: OTHER | Age: 66
End: 2021-11-08
Payer: MEDICARE

## 2021-11-08 ENCOUNTER — PATIENT MESSAGE (OUTPATIENT)
Dept: HEMATOLOGY/ONCOLOGY | Facility: CLINIC | Age: 66
End: 2021-11-08
Payer: MEDICARE

## 2021-11-09 ENCOUNTER — HOSPITAL ENCOUNTER (OUTPATIENT)
Dept: RADIOLOGY | Facility: HOSPITAL | Age: 66
Discharge: HOME | End: 2021-11-09
Attending: ORTHOPAEDIC SURGERY
Payer: MEDICARE

## 2021-11-09 ENCOUNTER — OFFICE VISIT (OUTPATIENT)
Dept: SPORTS MEDICINE | Facility: CLINIC | Age: 66
End: 2021-11-09
Payer: MEDICARE

## 2021-11-09 ENCOUNTER — PATIENT MESSAGE (OUTPATIENT)
Dept: HEMATOLOGY/ONCOLOGY | Facility: CLINIC | Age: 66
End: 2021-11-09
Payer: MEDICARE

## 2021-11-09 VITALS
HEIGHT: 62 IN | DIASTOLIC BLOOD PRESSURE: 71 MMHG | HEART RATE: 69 BPM | SYSTOLIC BLOOD PRESSURE: 115 MMHG | BODY MASS INDEX: 40.85 KG/M2 | WEIGHT: 222 LBS

## 2021-11-09 DIAGNOSIS — M25.562 PAIN IN BOTH KNEES, UNSPECIFIED CHRONICITY: Primary | ICD-10-CM

## 2021-11-09 DIAGNOSIS — M25.561 PAIN IN BOTH KNEES, UNSPECIFIED CHRONICITY: ICD-10-CM

## 2021-11-09 DIAGNOSIS — M25.561 RIGHT KNEE PAIN, UNSPECIFIED CHRONICITY: ICD-10-CM

## 2021-11-09 DIAGNOSIS — M25.562 PAIN IN BOTH KNEES, UNSPECIFIED CHRONICITY: ICD-10-CM

## 2021-11-09 DIAGNOSIS — M25.561 PAIN IN BOTH KNEES, UNSPECIFIED CHRONICITY: Primary | ICD-10-CM

## 2021-11-09 PROCEDURE — 99214 OFFICE O/P EST MOD 30 MIN: CPT | Mod: S$PBB,,, | Performed by: ORTHOPAEDIC SURGERY

## 2021-11-09 PROCEDURE — 73564 XR KNEE ORTHO BILAT WITH FLEXION: ICD-10-PCS | Mod: 26,50,, | Performed by: RADIOLOGY

## 2021-11-09 PROCEDURE — 99214 PR OFFICE/OUTPT VISIT, EST, LEVL IV, 30-39 MIN: ICD-10-PCS | Mod: S$PBB,,, | Performed by: ORTHOPAEDIC SURGERY

## 2021-11-09 PROCEDURE — 99999 PR PBB SHADOW E&M-EST. PATIENT-LVL V: ICD-10-PCS | Mod: PBBFAC,,, | Performed by: ORTHOPAEDIC SURGERY

## 2021-11-09 PROCEDURE — 99215 OFFICE O/P EST HI 40 MIN: CPT | Mod: PBBFAC | Performed by: ORTHOPAEDIC SURGERY

## 2021-11-09 PROCEDURE — 73564 X-RAY EXAM KNEE 4 OR MORE: CPT | Mod: TC,50

## 2021-11-09 PROCEDURE — 99999 PR PBB SHADOW E&M-EST. PATIENT-LVL V: CPT | Mod: PBBFAC,,, | Performed by: ORTHOPAEDIC SURGERY

## 2021-11-09 PROCEDURE — 73564 X-RAY EXAM KNEE 4 OR MORE: CPT | Mod: 26,50,, | Performed by: RADIOLOGY

## 2021-11-10 DIAGNOSIS — E11.9 TYPE 2 DIABETES MELLITUS WITHOUT COMPLICATION, UNSPECIFIED WHETHER LONG TERM INSULIN USE: ICD-10-CM

## 2021-11-22 ENCOUNTER — HOSPITAL ENCOUNTER (OUTPATIENT)
Dept: RADIOLOGY | Facility: HOSPITAL | Age: 66
Discharge: HOME | End: 2021-11-22
Attending: ORTHOPAEDIC SURGERY
Payer: MEDICARE

## 2021-11-22 DIAGNOSIS — M25.561 RIGHT KNEE PAIN, UNSPECIFIED CHRONICITY: ICD-10-CM

## 2021-11-22 PROCEDURE — 73721 MRI KNEE WITHOUT CONTRAST RIGHT: ICD-10-PCS | Mod: 26,RT,, | Performed by: RADIOLOGY

## 2021-11-22 PROCEDURE — 73721 MRI JNT OF LWR EXTRE W/O DYE: CPT | Mod: 26,RT,, | Performed by: RADIOLOGY

## 2021-11-22 PROCEDURE — 73721 MRI JNT OF LWR EXTRE W/O DYE: CPT | Mod: TC,RT

## 2021-11-23 ENCOUNTER — PATIENT MESSAGE (OUTPATIENT)
Dept: SPORTS MEDICINE | Facility: CLINIC | Age: 66
End: 2021-11-23

## 2021-11-23 ENCOUNTER — PATIENT MESSAGE (OUTPATIENT)
Dept: INTERNAL MEDICINE | Facility: CLINIC | Age: 66
End: 2021-11-23
Payer: MEDICARE

## 2021-11-23 ENCOUNTER — TELEPHONE (OUTPATIENT)
Dept: INTERNAL MEDICINE | Facility: CLINIC | Age: 66
End: 2021-11-23
Payer: MEDICARE

## 2021-11-23 ENCOUNTER — OFFICE VISIT (OUTPATIENT)
Dept: SPORTS MEDICINE | Facility: CLINIC | Age: 66
End: 2021-11-23
Payer: MEDICARE

## 2021-11-23 VITALS — WEIGHT: 222 LBS | HEIGHT: 62 IN | BODY MASS INDEX: 40.85 KG/M2

## 2021-11-23 DIAGNOSIS — M17.11 PRIMARY OSTEOARTHRITIS OF RIGHT KNEE: ICD-10-CM

## 2021-11-23 DIAGNOSIS — S83.231D COMPLEX TEAR OF MEDIAL MENISCUS OF RIGHT KNEE AS CURRENT INJURY, SUBSEQUENT ENCOUNTER: Primary | ICD-10-CM

## 2021-11-23 DIAGNOSIS — S83.231D COMPLEX TEAR OF MEDIAL MENISCUS OF RIGHT KNEE AS CURRENT INJURY, SUBSEQUENT ENCOUNTER: ICD-10-CM

## 2021-11-23 DIAGNOSIS — M25.561 RIGHT KNEE PAIN, UNSPECIFIED CHRONICITY: Primary | ICD-10-CM

## 2021-11-23 PROCEDURE — 99214 OFFICE O/P EST MOD 30 MIN: CPT | Mod: PBBFAC | Performed by: PHYSICIAN ASSISTANT

## 2021-11-23 PROCEDURE — 99999 PR PBB SHADOW E&M-EST. PATIENT-LVL IV: CPT | Mod: PBBFAC,,, | Performed by: PHYSICIAN ASSISTANT

## 2021-11-23 PROCEDURE — 99999 PR PBB SHADOW E&M-EST. PATIENT-LVL IV: ICD-10-PCS | Mod: PBBFAC,,, | Performed by: PHYSICIAN ASSISTANT

## 2021-11-23 PROCEDURE — 99214 OFFICE O/P EST MOD 30 MIN: CPT | Mod: S$PBB,,, | Performed by: PHYSICIAN ASSISTANT

## 2021-11-23 PROCEDURE — 99214 PR OFFICE/OUTPT VISIT, EST, LEVL IV, 30-39 MIN: ICD-10-PCS | Mod: S$PBB,,, | Performed by: PHYSICIAN ASSISTANT

## 2021-11-24 ENCOUNTER — PATIENT MESSAGE (OUTPATIENT)
Dept: PREADMISSION TESTING | Facility: HOSPITAL | Age: 66
End: 2021-11-24
Payer: MEDICARE

## 2021-11-24 ENCOUNTER — TELEPHONE (OUTPATIENT)
Dept: SPORTS MEDICINE | Facility: CLINIC | Age: 66
End: 2021-11-24
Payer: MEDICARE

## 2021-11-24 ENCOUNTER — PATIENT MESSAGE (OUTPATIENT)
Dept: INTERNAL MEDICINE | Facility: CLINIC | Age: 66
End: 2021-11-24
Payer: MEDICARE

## 2021-11-24 DIAGNOSIS — S83.231D COMPLEX TEAR OF MEDIAL MENISCUS OF RIGHT KNEE AS CURRENT INJURY, SUBSEQUENT ENCOUNTER: Primary | ICD-10-CM

## 2021-11-29 ENCOUNTER — PATIENT MESSAGE (OUTPATIENT)
Dept: SPORTS MEDICINE | Facility: CLINIC | Age: 66
End: 2021-11-29
Payer: MEDICARE

## 2021-11-29 ENCOUNTER — LAB VISIT (OUTPATIENT)
Dept: LAB | Facility: HOSPITAL | Age: 66
End: 2021-11-29
Attending: INTERNAL MEDICINE
Payer: MEDICARE

## 2021-11-29 ENCOUNTER — PATIENT MESSAGE (OUTPATIENT)
Dept: RHEUMATOLOGY | Facility: CLINIC | Age: 66
End: 2021-11-29
Payer: MEDICARE

## 2021-11-29 DIAGNOSIS — R26.9 GAIT DIFFICULTY: ICD-10-CM

## 2021-11-29 DIAGNOSIS — Z74.1 REQUIRES ASSISTANCE WITH ACTIVITIES OF DAILY LIVING (ADL): Primary | ICD-10-CM

## 2021-11-29 DIAGNOSIS — E03.9 HYPOTHYROIDISM, UNSPECIFIED TYPE: ICD-10-CM

## 2021-11-29 DIAGNOSIS — E78.5 DYSLIPIDEMIA ASSOCIATED WITH TYPE 2 DIABETES MELLITUS: ICD-10-CM

## 2021-11-29 DIAGNOSIS — E11.69 DYSLIPIDEMIA ASSOCIATED WITH TYPE 2 DIABETES MELLITUS: ICD-10-CM

## 2021-11-29 DIAGNOSIS — E11.59 HYPERTENSION ASSOCIATED WITH DIABETES: ICD-10-CM

## 2021-11-29 DIAGNOSIS — I15.2 HYPERTENSION ASSOCIATED WITH DIABETES: ICD-10-CM

## 2021-11-29 LAB
ALBUMIN SERPL BCP-MCNC: 3.7 G/DL (ref 3.5–5.2)
ALP SERPL-CCNC: 76 U/L (ref 55–135)
ALT SERPL W/O P-5'-P-CCNC: 36 U/L (ref 10–44)
ANION GAP SERPL CALC-SCNC: 10 MMOL/L (ref 8–16)
AST SERPL-CCNC: 24 U/L (ref 10–40)
BASOPHILS # BLD AUTO: 0.05 K/UL (ref 0–0.2)
BASOPHILS NFR BLD: 0.8 % (ref 0–1.9)
BILIRUB SERPL-MCNC: 0.4 MG/DL (ref 0.1–1)
BUN SERPL-MCNC: 14 MG/DL (ref 8–23)
CALCIUM SERPL-MCNC: 9.5 MG/DL (ref 8.7–10.5)
CHLORIDE SERPL-SCNC: 106 MMOL/L (ref 95–110)
CHOLEST SERPL-MCNC: 142 MG/DL (ref 120–199)
CHOLEST/HDLC SERPL: 3.4 {RATIO} (ref 2–5)
CO2 SERPL-SCNC: 21 MMOL/L (ref 23–29)
CREAT SERPL-MCNC: 0.7 MG/DL (ref 0.5–1.4)
DIFFERENTIAL METHOD: NORMAL
EOSINOPHIL # BLD AUTO: 0.2 K/UL (ref 0–0.5)
EOSINOPHIL NFR BLD: 3.4 % (ref 0–8)
ERYTHROCYTE [DISTWIDTH] IN BLOOD BY AUTOMATED COUNT: 14 % (ref 11.5–14.5)
EST. GFR  (AFRICAN AMERICAN): >60 ML/MIN/1.73 M^2
EST. GFR  (NON AFRICAN AMERICAN): >60 ML/MIN/1.73 M^2
ESTIMATED AVG GLUCOSE: 123 MG/DL (ref 68–131)
GLUCOSE SERPL-MCNC: 123 MG/DL (ref 70–110)
HBA1C MFR BLD: 5.9 % (ref 4–5.6)
HCT VFR BLD AUTO: 42.1 % (ref 37–48.5)
HDLC SERPL-MCNC: 42 MG/DL (ref 40–75)
HDLC SERPL: 29.6 % (ref 20–50)
HGB BLD-MCNC: 13.6 G/DL (ref 12–16)
IMM GRANULOCYTES # BLD AUTO: 0.02 K/UL (ref 0–0.04)
IMM GRANULOCYTES NFR BLD AUTO: 0.3 % (ref 0–0.5)
LDLC SERPL CALC-MCNC: 78.8 MG/DL (ref 63–159)
LYMPHOCYTES # BLD AUTO: 1.9 K/UL (ref 1–4.8)
LYMPHOCYTES NFR BLD: 31.7 % (ref 18–48)
MCH RBC QN AUTO: 30.1 PG (ref 27–31)
MCHC RBC AUTO-ENTMCNC: 32.3 G/DL (ref 32–36)
MCV RBC AUTO: 93 FL (ref 82–98)
MONOCYTES # BLD AUTO: 0.5 K/UL (ref 0.3–1)
MONOCYTES NFR BLD: 9.2 % (ref 4–15)
NEUTROPHILS # BLD AUTO: 3.2 K/UL (ref 1.8–7.7)
NEUTROPHILS NFR BLD: 54.6 % (ref 38–73)
NONHDLC SERPL-MCNC: 100 MG/DL
NRBC BLD-RTO: 0 /100 WBC
PLATELET # BLD AUTO: 255 K/UL (ref 150–450)
PMV BLD AUTO: 11.2 FL (ref 9.2–12.9)
POTASSIUM SERPL-SCNC: 4.1 MMOL/L (ref 3.5–5.1)
PROT SERPL-MCNC: 6.6 G/DL (ref 6–8.4)
RBC # BLD AUTO: 4.52 M/UL (ref 4–5.4)
SODIUM SERPL-SCNC: 137 MMOL/L (ref 136–145)
TRIGL SERPL-MCNC: 106 MG/DL (ref 30–150)
TSH SERPL DL<=0.005 MIU/L-ACNC: 1.14 UIU/ML (ref 0.4–4)
WBC # BLD AUTO: 5.89 K/UL (ref 3.9–12.7)

## 2021-11-29 PROCEDURE — 80053 COMPREHEN METABOLIC PANEL: CPT | Performed by: INTERNAL MEDICINE

## 2021-11-29 PROCEDURE — 36415 COLL VENOUS BLD VENIPUNCTURE: CPT | Mod: PO | Performed by: INTERNAL MEDICINE

## 2021-11-29 PROCEDURE — 85025 COMPLETE CBC W/AUTO DIFF WBC: CPT | Performed by: INTERNAL MEDICINE

## 2021-11-29 PROCEDURE — 83036 HEMOGLOBIN GLYCOSYLATED A1C: CPT | Performed by: INTERNAL MEDICINE

## 2021-11-29 PROCEDURE — 80061 LIPID PANEL: CPT | Performed by: INTERNAL MEDICINE

## 2021-11-29 PROCEDURE — 84443 ASSAY THYROID STIM HORMONE: CPT | Performed by: INTERNAL MEDICINE

## 2021-12-02 ENCOUNTER — OFFICE VISIT (OUTPATIENT)
Dept: INTERNAL MEDICINE | Facility: CLINIC | Age: 66
End: 2021-12-02
Payer: MEDICARE

## 2021-12-02 VITALS
WEIGHT: 222.25 LBS | HEART RATE: 67 BPM | DIASTOLIC BLOOD PRESSURE: 80 MMHG | OXYGEN SATURATION: 97 % | BODY MASS INDEX: 40.9 KG/M2 | SYSTOLIC BLOOD PRESSURE: 118 MMHG | HEIGHT: 62 IN

## 2021-12-02 DIAGNOSIS — E03.9 HYPOTHYROIDISM, UNSPECIFIED TYPE: ICD-10-CM

## 2021-12-02 DIAGNOSIS — I34.0 NONRHEUMATIC MITRAL VALVE REGURGITATION: ICD-10-CM

## 2021-12-02 DIAGNOSIS — E78.5 DYSLIPIDEMIA ASSOCIATED WITH TYPE 2 DIABETES MELLITUS: ICD-10-CM

## 2021-12-02 DIAGNOSIS — E11.9 NEW ONSET TYPE 2 DIABETES MELLITUS: ICD-10-CM

## 2021-12-02 DIAGNOSIS — I10 ESSENTIAL HYPERTENSION: ICD-10-CM

## 2021-12-02 DIAGNOSIS — E03.4 HYPOTHYROIDISM DUE TO ACQUIRED ATROPHY OF THYROID: ICD-10-CM

## 2021-12-02 DIAGNOSIS — L65.8 FEMALE PATTERN BALDNESS: ICD-10-CM

## 2021-12-02 DIAGNOSIS — I15.2 HYPERTENSION ASSOCIATED WITH DIABETES: ICD-10-CM

## 2021-12-02 DIAGNOSIS — E78.5 HYPERLIPIDEMIA, UNSPECIFIED HYPERLIPIDEMIA TYPE: ICD-10-CM

## 2021-12-02 DIAGNOSIS — Z00.00 PREVENTATIVE HEALTH CARE: Primary | ICD-10-CM

## 2021-12-02 DIAGNOSIS — E11.59 HYPERTENSION ASSOCIATED WITH DIABETES: ICD-10-CM

## 2021-12-02 DIAGNOSIS — Z12.2 ENCOUNTER FOR SCREENING FOR LUNG CANCER: ICD-10-CM

## 2021-12-02 DIAGNOSIS — E11.59 HYPERTENSION ASSOCIATED WITH DIABETES: Primary | ICD-10-CM

## 2021-12-02 DIAGNOSIS — I15.2 HYPERTENSION ASSOCIATED WITH DIABETES: Primary | ICD-10-CM

## 2021-12-02 DIAGNOSIS — Z87.891 PERSONAL HISTORY OF NICOTINE DEPENDENCE: ICD-10-CM

## 2021-12-02 DIAGNOSIS — L30.4 INTERTRIGO: ICD-10-CM

## 2021-12-02 DIAGNOSIS — E11.69 DYSLIPIDEMIA ASSOCIATED WITH TYPE 2 DIABETES MELLITUS: ICD-10-CM

## 2021-12-02 PROCEDURE — 99214 OFFICE O/P EST MOD 30 MIN: CPT | Mod: S$PBB,,, | Performed by: INTERNAL MEDICINE

## 2021-12-02 PROCEDURE — 99999 PR PBB SHADOW E&M-EST. PATIENT-LVL V: CPT | Mod: PBBFAC,,, | Performed by: INTERNAL MEDICINE

## 2021-12-02 PROCEDURE — 99214 PR OFFICE/OUTPT VISIT, EST, LEVL IV, 30-39 MIN: ICD-10-PCS | Mod: S$PBB,,, | Performed by: INTERNAL MEDICINE

## 2021-12-02 PROCEDURE — 99999 PR PBB SHADOW E&M-EST. PATIENT-LVL V: ICD-10-PCS | Mod: PBBFAC,,, | Performed by: INTERNAL MEDICINE

## 2021-12-02 PROCEDURE — 99215 OFFICE O/P EST HI 40 MIN: CPT | Mod: PBBFAC,PO | Performed by: INTERNAL MEDICINE

## 2021-12-02 RX ORDER — FINASTERIDE 5 MG/1
5 TABLET, FILM COATED ORAL NIGHTLY
Qty: 90 TABLET | Refills: 1 | Status: SHIPPED | OUTPATIENT
Start: 2021-12-02 | End: 2022-04-04 | Stop reason: SDUPTHER

## 2021-12-02 RX ORDER — KETOCONAZOLE 20 MG/G
CREAM TOPICAL
Qty: 60 G | Refills: 0 | Status: SHIPPED | OUTPATIENT
Start: 2021-12-02 | End: 2022-04-04 | Stop reason: SDUPTHER

## 2021-12-02 RX ORDER — METOPROLOL SUCCINATE 50 MG/1
50 TABLET, EXTENDED RELEASE ORAL NIGHTLY
Qty: 90 TABLET | Refills: 3 | Status: SHIPPED | OUTPATIENT
Start: 2021-12-02 | End: 2022-01-31 | Stop reason: SDUPTHER

## 2021-12-02 RX ORDER — SPIRONOLACTONE 100 MG/1
100 TABLET, FILM COATED ORAL DAILY
Qty: 90 TABLET | Refills: 3 | Status: SHIPPED | OUTPATIENT
Start: 2021-12-02 | End: 2022-04-04 | Stop reason: SDUPTHER

## 2021-12-02 RX ORDER — ATORVASTATIN CALCIUM 40 MG/1
40 TABLET, FILM COATED ORAL DAILY
Qty: 90 TABLET | Refills: 3 | Status: SHIPPED | OUTPATIENT
Start: 2021-12-02 | End: 2022-04-11 | Stop reason: SDUPTHER

## 2021-12-02 RX ORDER — LEVOTHYROXINE SODIUM 88 UG/1
88 TABLET ORAL
Qty: 90 TABLET | Refills: 0 | Status: SHIPPED | OUTPATIENT
Start: 2021-12-02 | End: 2022-02-25

## 2021-12-02 RX ORDER — NYSTATIN 100000 [USP'U]/G
POWDER TOPICAL
Qty: 120 G | Refills: 6 | Status: SHIPPED | OUTPATIENT
Start: 2021-12-02 | End: 2022-04-04 | Stop reason: SDUPTHER

## 2021-12-02 RX ORDER — OMEGA-3-ACID ETHYL ESTERS 1 G/1
2 CAPSULE, LIQUID FILLED ORAL 2 TIMES DAILY
Qty: 360 CAPSULE | Refills: 3 | Status: SHIPPED | OUTPATIENT
Start: 2021-12-02 | End: 2022-08-10 | Stop reason: SDUPTHER

## 2021-12-02 RX ORDER — METFORMIN HYDROCHLORIDE 500 MG/1
500 TABLET ORAL 2 TIMES DAILY
Qty: 180 TABLET | Refills: 1 | Status: SHIPPED | OUTPATIENT
Start: 2021-12-02 | End: 2022-03-07 | Stop reason: SDUPTHER

## 2021-12-02 RX ORDER — LANCETS
1 EACH MISCELLANEOUS DAILY
Qty: 100 EACH | Refills: 3 | Status: SHIPPED | OUTPATIENT
Start: 2021-12-02

## 2021-12-03 ENCOUNTER — HOSPITAL ENCOUNTER (OUTPATIENT)
Dept: RADIOLOGY | Facility: HOSPITAL | Age: 66
Discharge: HOME | End: 2021-12-03
Attending: INTERNAL MEDICINE
Payer: MEDICARE

## 2021-12-03 ENCOUNTER — PATIENT MESSAGE (OUTPATIENT)
Dept: SPORTS MEDICINE | Facility: CLINIC | Age: 66
End: 2021-12-03
Payer: MEDICARE

## 2021-12-03 DIAGNOSIS — Z12.2 ENCOUNTER FOR SCREENING FOR LUNG CANCER: ICD-10-CM

## 2021-12-03 DIAGNOSIS — Z87.891 PERSONAL HISTORY OF NICOTINE DEPENDENCE: ICD-10-CM

## 2021-12-03 PROCEDURE — 71271 CT THORAX LUNG CANCER SCR C-: CPT | Mod: 26,,, | Performed by: RADIOLOGY

## 2021-12-03 PROCEDURE — 71271 CT CHEST LUNG SCREENING LOW DOSE: ICD-10-PCS | Mod: 26,,, | Performed by: RADIOLOGY

## 2021-12-03 PROCEDURE — 71271 CT THORAX LUNG CANCER SCR C-: CPT | Mod: TC

## 2021-12-07 ENCOUNTER — OFFICE VISIT (OUTPATIENT)
Dept: SPORTS MEDICINE | Facility: CLINIC | Age: 66
End: 2021-12-07
Payer: MEDICARE

## 2021-12-07 ENCOUNTER — ANESTHESIA EVENT (OUTPATIENT)
Dept: SURGERY | Facility: HOSPITAL | Age: 66
End: 2021-12-07
Payer: MEDICARE

## 2021-12-07 VITALS
HEIGHT: 62 IN | DIASTOLIC BLOOD PRESSURE: 78 MMHG | HEART RATE: 70 BPM | WEIGHT: 222 LBS | SYSTOLIC BLOOD PRESSURE: 122 MMHG | BODY MASS INDEX: 40.85 KG/M2

## 2021-12-07 DIAGNOSIS — S83.231D COMPLEX TEAR OF MEDIAL MENISCUS OF RIGHT KNEE AS CURRENT INJURY, SUBSEQUENT ENCOUNTER: Primary | ICD-10-CM

## 2021-12-07 PROCEDURE — 99999 PR PBB SHADOW E&M-EST. PATIENT-LVL III: ICD-10-PCS | Mod: PBBFAC,,, | Performed by: PHYSICIAN ASSISTANT

## 2021-12-07 PROCEDURE — 99213 OFFICE O/P EST LOW 20 MIN: CPT | Mod: PBBFAC | Performed by: PHYSICIAN ASSISTANT

## 2021-12-07 PROCEDURE — 99499 NO LOS: ICD-10-PCS | Mod: S$PBB,,, | Performed by: PHYSICIAN ASSISTANT

## 2021-12-07 PROCEDURE — 99499 UNLISTED E&M SERVICE: CPT | Mod: S$PBB,,, | Performed by: PHYSICIAN ASSISTANT

## 2021-12-07 PROCEDURE — 99999 PR PBB SHADOW E&M-EST. PATIENT-LVL III: CPT | Mod: PBBFAC,,, | Performed by: PHYSICIAN ASSISTANT

## 2021-12-07 RX ORDER — PROMETHAZINE HYDROCHLORIDE 25 MG/1
25 TABLET ORAL EVERY 6 HOURS PRN
Qty: 20 TABLET | Refills: 0 | Status: SHIPPED | OUTPATIENT
Start: 2021-12-07 | End: 2022-04-04

## 2021-12-07 RX ORDER — TRAMADOL HYDROCHLORIDE 50 MG/1
50 TABLET ORAL EVERY 6 HOURS PRN
Qty: 20 TABLET | Refills: 0 | Status: SHIPPED | OUTPATIENT
Start: 2021-12-07 | End: 2022-04-04

## 2021-12-07 RX ORDER — HYDROCODONE BITARTRATE AND ACETAMINOPHEN 10; 325 MG/1; MG/1
1 TABLET ORAL EVERY 6 HOURS PRN
Qty: 20 TABLET | Refills: 0 | Status: SHIPPED | OUTPATIENT
Start: 2021-12-07 | End: 2022-04-04

## 2021-12-07 RX ORDER — ASPIRIN 325 MG
325 TABLET ORAL DAILY
Qty: 21 TABLET | Refills: 0 | Status: ON HOLD | OUTPATIENT
Start: 2021-12-07 | End: 2021-12-08 | Stop reason: HOSPADM

## 2021-12-07 RX ORDER — CEFAZOLIN SODIUM 2 G/50ML
2 SOLUTION INTRAVENOUS
Status: CANCELLED | OUTPATIENT
Start: 2021-12-07

## 2021-12-07 RX ORDER — SODIUM CHLORIDE 9 MG/ML
INJECTION, SOLUTION INTRAVENOUS CONTINUOUS
Status: CANCELLED | OUTPATIENT
Start: 2021-12-07

## 2021-12-08 ENCOUNTER — ANESTHESIA (OUTPATIENT)
Dept: SURGERY | Facility: HOSPITAL | Age: 66
End: 2021-12-08
Payer: MEDICARE

## 2021-12-08 ENCOUNTER — HOSPITAL ENCOUNTER (OUTPATIENT)
Facility: HOSPITAL | Age: 66
Discharge: HOME | End: 2021-12-08
Attending: ORTHOPAEDIC SURGERY | Admitting: ORTHOPAEDIC SURGERY
Payer: MEDICARE

## 2021-12-08 VITALS
DIASTOLIC BLOOD PRESSURE: 49 MMHG | WEIGHT: 219 LBS | HEART RATE: 55 BPM | TEMPERATURE: 98 F | SYSTOLIC BLOOD PRESSURE: 103 MMHG | HEIGHT: 62 IN | BODY MASS INDEX: 40.3 KG/M2 | OXYGEN SATURATION: 97 % | RESPIRATION RATE: 17 BRPM

## 2021-12-08 DIAGNOSIS — S83.231D COMPLEX TEAR OF MEDIAL MENISCUS OF RIGHT KNEE AS CURRENT INJURY, SUBSEQUENT ENCOUNTER: Primary | ICD-10-CM

## 2021-12-08 PROBLEM — S83.231A COMPLEX TEAR OF MEDIAL MENISCUS OF RIGHT KNEE AS CURRENT INJURY: Status: ACTIVE | Noted: 2021-12-08

## 2021-12-08 PROBLEM — T88.4XXA HARD TO INTUBATE: Status: ACTIVE | Noted: 2021-12-08

## 2021-12-08 LAB
POCT GLUCOSE: 120 MG/DL (ref 70–110)
POCT GLUCOSE: 122 MG/DL (ref 70–110)

## 2021-12-08 PROCEDURE — 36000710: Performed by: ORTHOPAEDIC SURGERY

## 2021-12-08 PROCEDURE — 25000003 PHARM REV CODE 250: Performed by: NURSE ANESTHETIST, CERTIFIED REGISTERED

## 2021-12-08 PROCEDURE — 63600175 PHARM REV CODE 636 W HCPCS: Performed by: ANESTHESIOLOGY

## 2021-12-08 PROCEDURE — 64447 NJX AA&/STRD FEMORAL NRV IMG: CPT | Mod: 59,RT,, | Performed by: ANESTHESIOLOGY

## 2021-12-08 PROCEDURE — 71000015 HC POSTOP RECOV 1ST HR: Performed by: ORTHOPAEDIC SURGERY

## 2021-12-08 PROCEDURE — 76942 ECHO GUIDE FOR BIOPSY: CPT | Mod: 26,,, | Performed by: ANESTHESIOLOGY

## 2021-12-08 PROCEDURE — 94761 N-INVAS EAR/PLS OXIMETRY MLT: CPT

## 2021-12-08 PROCEDURE — 25000003 PHARM REV CODE 250: Performed by: ANESTHESIOLOGY

## 2021-12-08 PROCEDURE — 63600175 PHARM REV CODE 636 W HCPCS: Performed by: NURSE ANESTHETIST, CERTIFIED REGISTERED

## 2021-12-08 PROCEDURE — 37000008 HC ANESTHESIA 1ST 15 MINUTES: Performed by: ORTHOPAEDIC SURGERY

## 2021-12-08 PROCEDURE — 25000003 PHARM REV CODE 250: Performed by: PHYSICIAN ASSISTANT

## 2021-12-08 PROCEDURE — 71000039 HC RECOVERY, EACH ADD'L HOUR: Performed by: ORTHOPAEDIC SURGERY

## 2021-12-08 PROCEDURE — 64447 PR NERVE BLOCK INJ, ANES/STEROID, FEMORAL, INCL IMAG GUIDANCE: ICD-10-PCS | Mod: 59,RT,, | Performed by: ANESTHESIOLOGY

## 2021-12-08 PROCEDURE — D9220A PRA ANESTHESIA: ICD-10-PCS | Mod: CRNA,,, | Performed by: NURSE ANESTHETIST, CERTIFIED REGISTERED

## 2021-12-08 PROCEDURE — D9220A PRA ANESTHESIA: ICD-10-PCS | Mod: ANES,,, | Performed by: ANESTHESIOLOGY

## 2021-12-08 PROCEDURE — 76942 PR U/S GUIDANCE FOR NEEDLE GUIDANCE: ICD-10-PCS | Mod: 26,,, | Performed by: ANESTHESIOLOGY

## 2021-12-08 PROCEDURE — 36000711: Performed by: ORTHOPAEDIC SURGERY

## 2021-12-08 PROCEDURE — 29880 ARTHRS KNE SRG MNISECTMY M&L: CPT | Mod: RT,,, | Performed by: ORTHOPAEDIC SURGERY

## 2021-12-08 PROCEDURE — 63600175 PHARM REV CODE 636 W HCPCS: Performed by: PHYSICIAN ASSISTANT

## 2021-12-08 PROCEDURE — 29880 PR KNEE SCOPE MED/LAT MENISCECTOMY: ICD-10-PCS | Mod: RT,,, | Performed by: ORTHOPAEDIC SURGERY

## 2021-12-08 PROCEDURE — D9220A PRA ANESTHESIA: Mod: ANES,,, | Performed by: ANESTHESIOLOGY

## 2021-12-08 PROCEDURE — 63600175 PHARM REV CODE 636 W HCPCS: Performed by: ORTHOPAEDIC SURGERY

## 2021-12-08 PROCEDURE — 27201423 OPTIME MED/SURG SUP & DEVICES STERILE SUPPLY: Performed by: ORTHOPAEDIC SURGERY

## 2021-12-08 PROCEDURE — 71000033 HC RECOVERY, INTIAL HOUR: Performed by: ORTHOPAEDIC SURGERY

## 2021-12-08 PROCEDURE — 29880 ARTHRS KNE SRG MNISECTMY M&L: CPT | Mod: AS,RT,, | Performed by: PHYSICIAN ASSISTANT

## 2021-12-08 PROCEDURE — 37000009 HC ANESTHESIA EA ADD 15 MINS: Performed by: ORTHOPAEDIC SURGERY

## 2021-12-08 PROCEDURE — 29880 PR KNEE SCOPE MED/LAT MENISCECTOMY: ICD-10-PCS | Mod: AS,RT,, | Performed by: PHYSICIAN ASSISTANT

## 2021-12-08 PROCEDURE — 76942 ECHO GUIDE FOR BIOPSY: CPT | Performed by: ANESTHESIOLOGY

## 2021-12-08 PROCEDURE — D9220A PRA ANESTHESIA: Mod: CRNA,,, | Performed by: NURSE ANESTHETIST, CERTIFIED REGISTERED

## 2021-12-08 PROCEDURE — 25000003 PHARM REV CODE 250: Performed by: STUDENT IN AN ORGANIZED HEALTH CARE EDUCATION/TRAINING PROGRAM

## 2021-12-08 PROCEDURE — 99900035 HC TECH TIME PER 15 MIN (STAT)

## 2021-12-08 RX ORDER — CELECOXIB 200 MG/1
400 CAPSULE ORAL ONCE
Status: COMPLETED | OUTPATIENT
Start: 2021-12-08 | End: 2021-12-08

## 2021-12-08 RX ORDER — FENTANYL CITRATE 50 UG/ML
25-200 INJECTION, SOLUTION INTRAMUSCULAR; INTRAVENOUS EVERY 5 MIN PRN
Status: DISPENSED | OUTPATIENT
Start: 2021-12-08

## 2021-12-08 RX ORDER — FAMOTIDINE 10 MG/ML
INJECTION INTRAVENOUS
Status: DISCONTINUED | OUTPATIENT
Start: 2021-12-08 | End: 2021-12-08

## 2021-12-08 RX ORDER — OXYCODONE HYDROCHLORIDE 5 MG/1
5 TABLET ORAL ONCE
Status: COMPLETED | OUTPATIENT
Start: 2021-12-08 | End: 2021-12-08

## 2021-12-08 RX ORDER — ROPIVACAINE HYDROCHLORIDE 5 MG/ML
INJECTION, SOLUTION EPIDURAL; INFILTRATION; PERINEURAL
Status: DISCONTINUED | OUTPATIENT
Start: 2021-12-08 | End: 2021-12-08

## 2021-12-08 RX ORDER — SODIUM CHLORIDE 9 MG/ML
INJECTION, SOLUTION INTRAVENOUS CONTINUOUS
Status: DISCONTINUED | OUTPATIENT
Start: 2021-12-08 | End: 2021-12-08 | Stop reason: HOSPADM

## 2021-12-08 RX ORDER — CEFAZOLIN SODIUM 1 G/3ML
2 INJECTION, POWDER, FOR SOLUTION INTRAMUSCULAR; INTRAVENOUS
Status: COMPLETED | OUTPATIENT
Start: 2021-12-08 | End: 2021-12-08

## 2021-12-08 RX ORDER — CARBOXYMETHYLCELLULOSE SODIUM 5 MG/ML
SOLUTION/ DROPS OPHTHALMIC
Status: DISCONTINUED | OUTPATIENT
Start: 2021-12-08 | End: 2021-12-08

## 2021-12-08 RX ORDER — EPINEPHRINE 1 MG/ML
INJECTION, SOLUTION INTRACARDIAC; INTRAMUSCULAR; INTRAVENOUS; SUBCUTANEOUS
Status: DISCONTINUED | OUTPATIENT
Start: 2021-12-08 | End: 2021-12-08 | Stop reason: HOSPADM

## 2021-12-08 RX ORDER — LIDOCAINE HCL/PF 100 MG/5ML
SYRINGE (ML) INTRAVENOUS
Status: DISCONTINUED | OUTPATIENT
Start: 2021-12-08 | End: 2021-12-08

## 2021-12-08 RX ORDER — HYDROMORPHONE HYDROCHLORIDE 1 MG/ML
0.2 INJECTION, SOLUTION INTRAMUSCULAR; INTRAVENOUS; SUBCUTANEOUS EVERY 5 MIN PRN
Status: DISCONTINUED | OUTPATIENT
Start: 2021-12-08 | End: 2021-12-08 | Stop reason: HOSPADM

## 2021-12-08 RX ORDER — MIDAZOLAM HYDROCHLORIDE 1 MG/ML
.5-4 INJECTION INTRAMUSCULAR; INTRAVENOUS
Status: DISPENSED | OUTPATIENT
Start: 2021-12-08

## 2021-12-08 RX ORDER — ROCURONIUM BROMIDE 10 MG/ML
INJECTION, SOLUTION INTRAVENOUS
Status: DISCONTINUED | OUTPATIENT
Start: 2021-12-08 | End: 2021-12-08

## 2021-12-08 RX ORDER — PROPOFOL 10 MG/ML
VIAL (ML) INTRAVENOUS
Status: DISCONTINUED | OUTPATIENT
Start: 2021-12-08 | End: 2021-12-08

## 2021-12-08 RX ORDER — ACETAMINOPHEN 500 MG
1000 TABLET ORAL
Status: COMPLETED | OUTPATIENT
Start: 2021-12-08 | End: 2021-12-08

## 2021-12-08 RX ORDER — MIDAZOLAM HYDROCHLORIDE 1 MG/ML
INJECTION INTRAMUSCULAR; INTRAVENOUS
Status: DISCONTINUED | OUTPATIENT
Start: 2021-12-08 | End: 2021-12-08

## 2021-12-08 RX ORDER — METHOCARBAMOL 750 MG/1
750 TABLET, FILM COATED ORAL 4 TIMES DAILY
Status: DISCONTINUED | OUTPATIENT
Start: 2021-12-08 | End: 2021-12-08 | Stop reason: HOSPADM

## 2021-12-08 RX ORDER — SODIUM CHLORIDE 0.9 % (FLUSH) 0.9 %
3 SYRINGE (ML) INJECTION
Status: DISCONTINUED | OUTPATIENT
Start: 2021-12-08 | End: 2021-12-08 | Stop reason: HOSPADM

## 2021-12-08 RX ORDER — FENTANYL CITRATE 50 UG/ML
INJECTION, SOLUTION INTRAMUSCULAR; INTRAVENOUS
Status: DISCONTINUED | OUTPATIENT
Start: 2021-12-08 | End: 2021-12-08

## 2021-12-08 RX ORDER — NEOSTIGMINE METHYLSULFATE 1 MG/ML
INJECTION, SOLUTION INTRAVENOUS
Status: DISCONTINUED | OUTPATIENT
Start: 2021-12-08 | End: 2021-12-08

## 2021-12-08 RX ADMIN — HYDROMORPHONE HYDROCHLORIDE 0.2 MG: 1 INJECTION, SOLUTION INTRAMUSCULAR; INTRAVENOUS; SUBCUTANEOUS at 01:12

## 2021-12-08 RX ADMIN — FENTANYL CITRATE 25 MCG: 50 INJECTION, SOLUTION INTRAMUSCULAR; INTRAVENOUS at 12:12

## 2021-12-08 RX ADMIN — SODIUM CHLORIDE, SODIUM GLUCONATE, SODIUM ACETATE, POTASSIUM CHLORIDE, MAGNESIUM CHLORIDE, SODIUM PHOSPHATE, DIBASIC, AND POTASSIUM PHOSPHATE: .53; .5; .37; .037; .03; .012; .00082 INJECTION, SOLUTION INTRAVENOUS at 12:12

## 2021-12-08 RX ADMIN — CELECOXIB 400 MG: 200 CAPSULE ORAL at 11:12

## 2021-12-08 RX ADMIN — LIDOCAINE HYDROCHLORIDE 60 MG: 20 INJECTION, SOLUTION INTRAVENOUS at 11:12

## 2021-12-08 RX ADMIN — PROPOFOL 30 MG: 10 INJECTION, EMULSION INTRAVENOUS at 12:12

## 2021-12-08 RX ADMIN — NEOSTIGMINE METHYLSULFATE 4 MG: 1 INJECTION INTRAVENOUS at 12:12

## 2021-12-08 RX ADMIN — ACETAMINOPHEN 1000 MG: 500 TABLET ORAL at 11:12

## 2021-12-08 RX ADMIN — GLYCOPYRROLATE 0.4 MG: 0.2 INJECTION, SOLUTION INTRAMUSCULAR; INTRAVITREAL at 12:12

## 2021-12-08 RX ADMIN — CARBOXYMETHYLCELLULOSE SODIUM 4 DROP: 5 SOLUTION/ DROPS OPHTHALMIC at 11:12

## 2021-12-08 RX ADMIN — PROPOFOL 150 MG: 10 INJECTION, EMULSION INTRAVENOUS at 11:12

## 2021-12-08 RX ADMIN — CEFAZOLIN 2 G: 330 INJECTION, POWDER, FOR SOLUTION INTRAMUSCULAR; INTRAVENOUS at 11:12

## 2021-12-08 RX ADMIN — ROPIVACAINE HYDROCHLORIDE 10 ML: 5 INJECTION, SOLUTION EPIDURAL; INFILTRATION; PERINEURAL at 12:12

## 2021-12-08 RX ADMIN — OXYCODONE 5 MG: 5 TABLET ORAL at 01:12

## 2021-12-08 RX ADMIN — MIDAZOLAM HYDROCHLORIDE 1 MG: 1 INJECTION, SOLUTION INTRAMUSCULAR; INTRAVENOUS at 11:12

## 2021-12-08 RX ADMIN — SODIUM CHLORIDE: 0.9 INJECTION, SOLUTION INTRAVENOUS at 10:12

## 2021-12-08 RX ADMIN — ROCURONIUM BROMIDE 50 MG: 10 INJECTION, SOLUTION INTRAVENOUS at 11:12

## 2021-12-08 RX ADMIN — FAMOTIDINE 20 MG: 10 INJECTION, SOLUTION INTRAVENOUS at 11:12

## 2021-12-08 RX ADMIN — FENTANYL CITRATE 50 MCG: 50 INJECTION, SOLUTION INTRAMUSCULAR; INTRAVENOUS at 11:12

## 2021-12-08 RX ADMIN — SODIUM CHLORIDE: 0.9 INJECTION, SOLUTION INTRAVENOUS at 11:12

## 2021-12-08 RX ADMIN — METHOCARBAMOL 750 MG: 750 TABLET ORAL at 01:12

## 2021-12-09 ENCOUNTER — CLINICAL SUPPORT (OUTPATIENT)
Dept: REHABILITATION | Facility: HOSPITAL | Age: 66
End: 2021-12-09
Payer: MEDICARE

## 2021-12-09 DIAGNOSIS — M25.561 ACUTE PAIN OF RIGHT KNEE: ICD-10-CM

## 2021-12-09 DIAGNOSIS — M25.661 DECREASED RANGE OF MOTION OF RIGHT KNEE: ICD-10-CM

## 2021-12-09 DIAGNOSIS — S83.231D COMPLEX TEAR OF MEDIAL MENISCUS OF RIGHT KNEE AS CURRENT INJURY, SUBSEQUENT ENCOUNTER: ICD-10-CM

## 2021-12-09 DIAGNOSIS — R26.9 GAIT ABNORMALITY: ICD-10-CM

## 2021-12-09 PROCEDURE — 97161 PT EVAL LOW COMPLEX 20 MIN: CPT | Mod: PN

## 2021-12-09 PROCEDURE — 97110 THERAPEUTIC EXERCISES: CPT | Mod: PN

## 2021-12-10 ENCOUNTER — CLINICAL SUPPORT (OUTPATIENT)
Dept: REHABILITATION | Facility: HOSPITAL | Age: 66
End: 2021-12-10
Payer: MEDICARE

## 2021-12-10 DIAGNOSIS — M25.561 ACUTE PAIN OF RIGHT KNEE: ICD-10-CM

## 2021-12-10 DIAGNOSIS — R26.9 GAIT ABNORMALITY: ICD-10-CM

## 2021-12-10 DIAGNOSIS — M25.661 DECREASED RANGE OF MOTION OF RIGHT KNEE: ICD-10-CM

## 2021-12-10 PROCEDURE — 97140 MANUAL THERAPY 1/> REGIONS: CPT | Mod: KX,PN

## 2021-12-10 PROCEDURE — 97110 THERAPEUTIC EXERCISES: CPT | Mod: KX,PN

## 2021-12-13 ENCOUNTER — CLINICAL SUPPORT (OUTPATIENT)
Dept: REHABILITATION | Facility: HOSPITAL | Age: 66
End: 2021-12-13
Payer: MEDICARE

## 2021-12-13 ENCOUNTER — PATIENT MESSAGE (OUTPATIENT)
Dept: SPORTS MEDICINE | Facility: CLINIC | Age: 66
End: 2021-12-13
Payer: MEDICARE

## 2021-12-13 DIAGNOSIS — M25.561 ACUTE PAIN OF RIGHT KNEE: ICD-10-CM

## 2021-12-13 DIAGNOSIS — R26.9 GAIT ABNORMALITY: ICD-10-CM

## 2021-12-13 DIAGNOSIS — M25.661 DECREASED RANGE OF MOTION OF RIGHT KNEE: ICD-10-CM

## 2021-12-13 PROCEDURE — 97116 GAIT TRAINING THERAPY: CPT | Mod: KX,PN

## 2021-12-13 PROCEDURE — 97140 MANUAL THERAPY 1/> REGIONS: CPT | Mod: KX,PN

## 2021-12-13 PROCEDURE — 97110 THERAPEUTIC EXERCISES: CPT | Mod: KX,PN

## 2021-12-15 ENCOUNTER — CLINICAL SUPPORT (OUTPATIENT)
Dept: REHABILITATION | Facility: HOSPITAL | Age: 66
End: 2021-12-15
Payer: MEDICARE

## 2021-12-15 DIAGNOSIS — M25.561 ACUTE PAIN OF RIGHT KNEE: ICD-10-CM

## 2021-12-15 DIAGNOSIS — R26.9 GAIT ABNORMALITY: ICD-10-CM

## 2021-12-15 DIAGNOSIS — M25.661 DECREASED RANGE OF MOTION OF RIGHT KNEE: ICD-10-CM

## 2021-12-15 PROCEDURE — 97112 NEUROMUSCULAR REEDUCATION: CPT | Mod: KX,PN

## 2021-12-15 PROCEDURE — 97530 THERAPEUTIC ACTIVITIES: CPT | Mod: KX,PN

## 2021-12-15 PROCEDURE — 97116 GAIT TRAINING THERAPY: CPT | Mod: KX,PN

## 2021-12-15 PROCEDURE — 97110 THERAPEUTIC EXERCISES: CPT | Mod: KX,PN

## 2021-12-17 ENCOUNTER — CLINICAL SUPPORT (OUTPATIENT)
Dept: REHABILITATION | Facility: HOSPITAL | Age: 66
End: 2021-12-17
Payer: MEDICARE

## 2021-12-17 DIAGNOSIS — M25.561 ACUTE PAIN OF RIGHT KNEE: ICD-10-CM

## 2021-12-17 DIAGNOSIS — M25.661 DECREASED RANGE OF MOTION OF RIGHT KNEE: ICD-10-CM

## 2021-12-17 DIAGNOSIS — R26.9 GAIT ABNORMALITY: ICD-10-CM

## 2021-12-17 PROCEDURE — 97140 MANUAL THERAPY 1/> REGIONS: CPT | Mod: PN

## 2021-12-17 PROCEDURE — 97112 NEUROMUSCULAR REEDUCATION: CPT | Mod: PN

## 2021-12-17 PROCEDURE — 97110 THERAPEUTIC EXERCISES: CPT | Mod: PN

## 2021-12-17 PROCEDURE — 97530 THERAPEUTIC ACTIVITIES: CPT | Mod: PN

## 2021-12-21 ENCOUNTER — OFFICE VISIT (OUTPATIENT)
Dept: SPORTS MEDICINE | Facility: CLINIC | Age: 66
End: 2021-12-21
Payer: MEDICARE

## 2021-12-21 VITALS — WEIGHT: 219 LBS | BODY MASS INDEX: 40.3 KG/M2 | HEIGHT: 62 IN

## 2021-12-21 DIAGNOSIS — G89.18 ACUTE POSTOPERATIVE PAIN OF RIGHT KNEE: ICD-10-CM

## 2021-12-21 DIAGNOSIS — Z98.890 S/P ARTHROSCOPIC SURGERY OF RIGHT KNEE: Primary | ICD-10-CM

## 2021-12-21 DIAGNOSIS — M25.561 ACUTE POSTOPERATIVE PAIN OF RIGHT KNEE: ICD-10-CM

## 2021-12-21 PROCEDURE — 99213 OFFICE O/P EST LOW 20 MIN: CPT | Mod: PBBFAC | Performed by: PHYSICIAN ASSISTANT

## 2021-12-21 PROCEDURE — 99999 PR PBB SHADOW E&M-EST. PATIENT-LVL III: ICD-10-PCS | Mod: PBBFAC,,, | Performed by: PHYSICIAN ASSISTANT

## 2021-12-21 PROCEDURE — 99999 PR PBB SHADOW E&M-EST. PATIENT-LVL III: CPT | Mod: PBBFAC,,, | Performed by: PHYSICIAN ASSISTANT

## 2021-12-21 PROCEDURE — 99024 POSTOP FOLLOW-UP VISIT: CPT | Mod: POP,,, | Performed by: PHYSICIAN ASSISTANT

## 2021-12-21 PROCEDURE — 99024 PR POST-OP FOLLOW-UP VISIT: ICD-10-PCS | Mod: POP,,, | Performed by: PHYSICIAN ASSISTANT

## 2021-12-29 ENCOUNTER — CLINICAL SUPPORT (OUTPATIENT)
Dept: REHABILITATION | Facility: HOSPITAL | Age: 66
End: 2021-12-29
Payer: MEDICARE

## 2021-12-29 DIAGNOSIS — R26.9 GAIT ABNORMALITY: ICD-10-CM

## 2021-12-29 DIAGNOSIS — M25.561 ACUTE PAIN OF RIGHT KNEE: ICD-10-CM

## 2021-12-29 DIAGNOSIS — M25.661 DECREASED RANGE OF MOTION OF RIGHT KNEE: ICD-10-CM

## 2021-12-29 PROCEDURE — 97110 THERAPEUTIC EXERCISES: CPT | Mod: PN

## 2021-12-29 PROCEDURE — 97116 GAIT TRAINING THERAPY: CPT | Mod: PN

## 2021-12-29 PROCEDURE — 97112 NEUROMUSCULAR REEDUCATION: CPT | Mod: PN

## 2021-12-30 ENCOUNTER — CLINICAL SUPPORT (OUTPATIENT)
Dept: REHABILITATION | Facility: HOSPITAL | Age: 66
End: 2021-12-30
Payer: MEDICARE

## 2021-12-30 DIAGNOSIS — R26.9 GAIT ABNORMALITY: ICD-10-CM

## 2021-12-30 DIAGNOSIS — M25.561 ACUTE PAIN OF RIGHT KNEE: ICD-10-CM

## 2021-12-30 DIAGNOSIS — M25.661 DECREASED RANGE OF MOTION OF RIGHT KNEE: ICD-10-CM

## 2021-12-30 PROCEDURE — 97112 NEUROMUSCULAR REEDUCATION: CPT | Mod: PN

## 2021-12-30 PROCEDURE — 97110 THERAPEUTIC EXERCISES: CPT | Mod: PN

## 2021-12-30 PROCEDURE — 97140 MANUAL THERAPY 1/> REGIONS: CPT | Mod: PN

## 2022-01-05 ENCOUNTER — CLINICAL SUPPORT (OUTPATIENT)
Dept: REHABILITATION | Facility: HOSPITAL | Age: 67
End: 2022-01-05
Payer: MEDICARE

## 2022-01-05 DIAGNOSIS — R26.9 GAIT ABNORMALITY: ICD-10-CM

## 2022-01-05 DIAGNOSIS — M25.561 ACUTE PAIN OF RIGHT KNEE: ICD-10-CM

## 2022-01-05 DIAGNOSIS — M25.661 DECREASED RANGE OF MOTION OF RIGHT KNEE: ICD-10-CM

## 2022-01-05 PROCEDURE — 97110 THERAPEUTIC EXERCISES: CPT | Mod: PN

## 2022-01-05 PROCEDURE — 97530 THERAPEUTIC ACTIVITIES: CPT | Mod: PN

## 2022-01-05 PROCEDURE — 97140 MANUAL THERAPY 1/> REGIONS: CPT | Mod: PN

## 2022-01-05 NOTE — PROGRESS NOTES
"[facility]   Physical Therapy Treatment Note     Name: Verenice Mirza  Clinic Number: [id number]    Therapy Diagnosis:   Encounter Diagnoses   Name Primary?    Decreased range of motion of right knee     Acute pain of right knee     Gait abnormality      Physician: Bin Artis MD    Visit Date: 1/5/2022    Physician Orders: PT Eval and Treat   Medical Diagnosis from Referral: S83.231D (ICD-10-CM) - Complex tear of medial meniscus of right knee as current injury, subsequent encounter  Evaluation Date: 12/9/2021  Authorization Period Expiration: 12/31/2021  Plan of Care Expiration: 1/20/2021  Visit # / Visits authorized: 8/ 50  FOTO: 8/8 PTA Visit #: 0/5     DOS: 12/8/2021    Time In:1415  Time Out: 1515  Total Appointment Time (timed & untimed codes): 55 min (2 TE, 1 TA, 1 MT)  Precautions: Standard and S/p partial meniscetomy DOS: 12/8/21    SUBJECTIVE     Pt reports: that she is feeling well today.  Feels like her knee is getting better, stronger overall.    She was compliant with home exercise program.  Response to previous treatment: soreness - < 24 hours.   Functional change: improved ROM    Pain: 2/10  Location: right knee      OBJECTIVE     Objective Measures updated at progress report unless specified.     12/30/2021     Knee Passive Range of Motion:    Right  Left    Flexion 130 134 @IE   Extension 2 deg hyper 7 deg hyper @IE        Treatment       Verenice received the treatments listed below:      THERAPEUTIC EXERCISES to develop strength, endurance, ROM, flexibility, posture and core stabilization for 30 minutes including:   - Recumbent bike 6 min, level 2 for increased ROM/tissue extensibility   - Heel Prop; 5 min w/ strap at toes  - SLR with quad set 3x12   - Heel slides; 2x10 5" hold (not today)  -TKE GTB 2x10 R (not today)  - seated LAQ 2x10 5# ankle weight  - Leg press 2x15 at 5 plates DL  - seated Jefry knee extension 2x15 --> followed by quad sets " "2x15      MANUAL THERAPY TECHNIQUES including Joint mobilizations and Soft tissue Mobilization were applied to R knee for 10 minutes.   - patellar mobs Gr 3-4, all directions  - knee ext mobs tibia on femur    NEUROMUSCULAR RE-EDUCATION ACTIVITIES to improve Balance, Coordination, Sense and Proprioception for 0 minutes.  The following were included:   - SAQ; B 30x w/ 5" hold   - Quad Set; w/ 1/2 foam roll under knees; 20x w/ 5" hold NT   - Quad Set; w/ 1/2 foam under heel; 20x w/ 5" hold    THERAPEUTIC ACTIVITIES to improve dynamic and functional performance for 15 minutes including:  STS: 2 x 10 standard chair  Step ups 2x10 single UE support stair B    GAIT TRAINING to improve functional mobility and safety for 00minutes, including:  - 150 ft, with emphasis on TKE  - fwd/bkwd walking in // 10 ft x 6 w/ cues for TKE      Patient Education and Home Exercises     Home Exercises Provided and Patient Education Provided   Education provided:   - updated HEP    Written Home Exercises Provided: Patient instructed to cont prior HEP. Exercises were reviewed and Verenice was able to demonstrate them prior to the end of the session.  Verenice demonstrated good  understanding of the education provided. See EMR under Patient Instructions for exercises provided during therapy sessions    ASSESSMENT   Verenice reports to therapy s/p R partial medial meniscectomy. Surgery date: 12/8/2021. Post-op 5.  Making steady gains towards goal.  Ambulating with mild antalgic pattern without assistive device.  Full flexion.  Lacks terminal hyperextension as compared to non-op knee.  Good quad control. Normal SLR quality.  Progress strength and functional training at this time.  Normalized gait pattern.     Verenice Is progressing well towards her goals.   Pt prognosis is Good.     Pt will continue to benefit from skilled outpatient physical therapy to address the deficits listed in the problem list box on initial evaluation, provide pt/family education " and to maximize pt's level of independence in the home and community environment.     Pt's spiritual, cultural and educational needs considered and pt agreeable to plan of care and goals.     Anticipated barriers to physical therapy:  transportation, previous wheelchair use, and comorbidities    Goals:   Short Term Goals: (3 weeks)  1. Pt will be independent with HEP in order to supplement patient in improving functional mobility. - MET 01/5/2022  2. Pt will improve (R ) knee AROM to at least 2-115 in order to improve gait. - MET 01/5/2022  3. Pt will improve hip flexor/quadriceps mobility to WNL in order to improve hip/knee AROM and improved gait function - progressing, not met  4. Pt will improve hip abduction strength from 3+/5 to 4+/5 to improve functional gait deviation. - progressing, not met     Long Term Goals: (6 weeks)  1. Pt will be independent with updated HEP supplement PT in improving functional mobility. - progressing, not met  2. Pt will improve (R ) knee AROM to at least 0-120 in order to improve gait and ability to perform ADLs. - MET 01/5/2022  3. Pt will improve FOTO knee survey score to </= 43 % limited in order to demo improved functional mobility. - progressing, not met  4. Pt will perform TUG in < 12 seconds without AD in order to decrease risk of falling - progressing, not met  5. Pt will perform at least 10 sit to stands without UE support on 30 second sit to stand test in order to demo improved ability to perform transfers. - progressing, not met    PLAN     Improve quad control, increase weight bearing tolerance, restore full ROM    Francisco Colin DPT, OCS

## 2022-01-06 ENCOUNTER — PATIENT MESSAGE (OUTPATIENT)
Dept: RHEUMATOLOGY | Facility: CLINIC | Age: 67
End: 2022-01-06
Payer: MEDICARE

## 2022-01-07 ENCOUNTER — CLINICAL SUPPORT (OUTPATIENT)
Dept: REHABILITATION | Facility: HOSPITAL | Age: 67
End: 2022-01-07
Payer: MEDICARE

## 2022-01-07 DIAGNOSIS — M25.661 DECREASED RANGE OF MOTION OF RIGHT KNEE: ICD-10-CM

## 2022-01-07 DIAGNOSIS — M25.561 ACUTE PAIN OF RIGHT KNEE: ICD-10-CM

## 2022-01-07 DIAGNOSIS — R26.9 GAIT ABNORMALITY: ICD-10-CM

## 2022-01-07 PROCEDURE — 97110 THERAPEUTIC EXERCISES: CPT | Mod: PN

## 2022-01-07 PROCEDURE — 97140 MANUAL THERAPY 1/> REGIONS: CPT | Mod: PN

## 2022-01-07 NOTE — PROGRESS NOTES
"[facility]   Physical Therapy Treatment Note     Name: Verenice Mirza  Clinic Number: [id number]    Therapy Diagnosis:   Encounter Diagnoses   Name Primary?    Decreased range of motion of right knee     Acute pain of right knee     Gait abnormality      Physician: Bin Artis MD    Visit Date: 1/7/2022    Physician Orders: PT Eval and Treat   Medical Diagnosis from Referral: S83.231D (ICD-10-CM) - Complex tear of medial meniscus of right knee as current injury, subsequent encounter  Evaluation Date: 12/9/2021  Authorization Period Expiration: 12/31/2021  Plan of Care Expiration: 1/20/2021  Visit # / Visits authorized: 9/ 50    FOTO: 0/8   PTA Visit #: 0/5     DOS: 12/8/2021    Time In: 2:00 pm  Time Out: 3:08 pm  Total Appointment Time (timed & untimed codes): 68 min (3 TE, 1 MT)  Precautions: Standard and S/p partial meniscetomy DOS: 12/8/21    SUBJECTIVE     Pt reports: her knee felt very sore the day after last therapy session, but pain went away after a few hours and she has felt good ever since    She was compliant with home exercise program.  Response to previous treatment: soreness - < 24 hours.   Functional change: improved ROM    Pain: 0/10  Location: right knee      OBJECTIVE     Objective Measures updated at progress report unless specified.     12/30/2021     Knee Passive Range of Motion:    Right  Left    Flexion 130 134 @IE   Extension 2 deg hyper 7 deg hyper @IE        Treatment       Verenice received the treatments listed below:      THERAPEUTIC EXERCISES to develop strength, endurance, ROM, flexibility, posture and core stabilization for 48 minutes including:   - Recumbent bike 6 min, level 2 for increased ROM/tissue extensibility   - Heel Prop; 5 min w/ strap at toes  - SLR with quad set 3x12   - Heel slides; 2x10 5" hold (not today)  -TKE GTB 2x10 R (not today)  - seated LAQ 2x10 5# ankle weight (not today)  - Leg press 2x15 at 5.5 plates DL  - Leg extension " "machine 10# 2x10  - Hamstring curl machine 4 plates, 2x10  - seated Jefry knee extension 2x15 --> followed by quad sets 2x15 (not today)  - Step ups 2x10 single UE support stair B    MANUAL THERAPY TECHNIQUES including Joint mobilizations and Soft tissue Mobilization were applied to R knee for 10 minutes.   - patellar mobs Gr 3-4, all directions  - knee ext mobs tibia on femur    NEUROMUSCULAR RE-EDUCATION ACTIVITIES to improve Balance, Coordination, Sense and Proprioception for 0 minutes.  The following were included:   - SAQ; B 30x w/ 5" hold   - Quad Set; w/ 1/2 foam roll under knees; 20x w/ 5" hold NT   - Quad Set; w/ 1/2 foam under heel; 20x w/ 5" hold    THERAPEUTIC ACTIVITIES to improve dynamic and functional performance for 00 minutes including:  STS: 2 x 10 standard chair    GAIT TRAINING to improve functional mobility and safety for 00minutes, including:  - 150 ft, with emphasis on TKE  - fwd/bkwd walking in // 10 ft x 6 w/ cues for TKE    Ice at end of treatment - 10 min (no charge)    Patient Education and Home Exercises     Home Exercises Provided and Patient Education Provided   Education provided:   - updated HEP    Written Home Exercises Provided: Patient instructed to cont prior HEP. Exercises were reviewed and Verenice was able to demonstrate them prior to the end of the session.  Verenice demonstrated good  understanding of the education provided. See EMR under Patient Instructions for exercises provided during therapy sessions    ASSESSMENT   Verenice reports to therapy s/p R partial medial meniscectomy. Surgery date: 12/8/2021. Post-op 5. Pt is making steady progress in regards to mobility and strength, and is ambulating with mild antalgic pattern without use of assistive device. She was able to progress lower extremity strengthening exercises with use of several new machines this date, and pt tolerated all new interventions well, reporting an appropriate amount of muscle fatigue upon completion. Pt " will be on vacation next week so she was educated on importance of maintained compliance with HEP throughout this time in order to maintain mobility/strength gains.     Verenice Is progressing well towards her goals.   Pt prognosis is Good.     Pt will continue to benefit from skilled outpatient physical therapy to address the deficits listed in the problem list box on initial evaluation, provide pt/family education and to maximize pt's level of independence in the home and community environment.     Pt's spiritual, cultural and educational needs considered and pt agreeable to plan of care and goals.     Anticipated barriers to physical therapy:  transportation, previous wheelchair use, and comorbidities    Goals:   Short Term Goals: (3 weeks)  1. Pt will be independent with HEP in order to supplement patient in improving functional mobility. - MET 01/5/2022  2. Pt will improve (R ) knee AROM to at least 2-115 in order to improve gait. - MET 01/5/2022  3. Pt will improve hip flexor/quadriceps mobility to WNL in order to improve hip/knee AROM and improved gait function - progressing, not met  4. Pt will improve hip abduction strength from 3+/5 to 4+/5 to improve functional gait deviation. - progressing, not met     Long Term Goals: (6 weeks)  1. Pt will be independent with updated HEP supplement PT in improving functional mobility. - progressing, not met  2. Pt will improve (R ) knee AROM to at least 0-120 in order to improve gait and ability to perform ADLs. - MET 01/5/2022  3. Pt will improve FOTO knee survey score to </= 43 % limited in order to demo improved functional mobility. - progressing, not met  4. Pt will perform TUG in < 12 seconds without AD in order to decrease risk of falling - progressing, not met  5. Pt will perform at least 10 sit to stands without UE support on 30 second sit to stand test in order to demo improved ability to perform transfers. - progressing, not met    PLAN     Progress lower  extremity strengthening program as appropriate, obtain full knee extension     Osmin Heath, PT, DPT

## 2022-01-10 ENCOUNTER — OFFICE VISIT (OUTPATIENT)
Dept: OPTOMETRY | Facility: CLINIC | Age: 67
End: 2022-01-10
Payer: MEDICARE

## 2022-01-10 DIAGNOSIS — E11.9 TYPE 2 DIABETES MELLITUS WITHOUT RETINOPATHY: Primary | ICD-10-CM

## 2022-01-10 DIAGNOSIS — Z13.5 GLAUCOMA SCREENING: ICD-10-CM

## 2022-01-10 DIAGNOSIS — H25.13 NUCLEAR SCLEROSIS, BILATERAL: ICD-10-CM

## 2022-01-10 PROCEDURE — 92014 COMPRE OPH EXAM EST PT 1/>: CPT | Mod: S$PBB,,, | Performed by: OPTOMETRIST

## 2022-01-10 PROCEDURE — 99999 PR PBB SHADOW E&M-EST. PATIENT-LVL III: CPT | Mod: PBBFAC,,, | Performed by: OPTOMETRIST

## 2022-01-10 PROCEDURE — 92014 PR EYE EXAM, EST PATIENT,COMPREHESV: ICD-10-PCS | Mod: S$PBB,,, | Performed by: OPTOMETRIST

## 2022-01-10 PROCEDURE — 99213 OFFICE O/P EST LOW 20 MIN: CPT | Mod: PBBFAC | Performed by: OPTOMETRIST

## 2022-01-10 PROCEDURE — 99999 PR PBB SHADOW E&M-EST. PATIENT-LVL III: ICD-10-PCS | Mod: PBBFAC,,, | Performed by: OPTOMETRIST

## 2022-01-10 NOTE — PROGRESS NOTES
[facility]     [place]  - 10/23/2020    Presents today for Diabetic Eye Exam.  Pt does not want refraction today.    LBS: did not check today  Hemoglobin A1C       Date                     Value               Ref Range             Status                11/29/2021               5.9 (H)             4.0 - 5.6 %           Final                  07/07/2021               7.1 (H)             4.0 - 5.6 %           Final                  03/08/2021               7.0 (H)             4.0 - 5.6 %           Final                  Last edited by Michell Rodríguez MA on 1/10/2022  3:02 PM. (History)        ROS     Positive for: Endocrine (DM)    Negative for: Constitutional, Gastrointestinal, Neurological, Skin,   Genitourinary, Musculoskeletal, HENT, Cardiovascular, Eyes, Respiratory,   Psychiatric, Allergic/Imm, Heme/Lymph    Last edited by Michael Mirza, OMEGA on 1/10/2022  3:10 PM. (History)        Assessment /Plan     For exam results, see Encounter Report.    Type 2 diabetes mellitus without retinopathy    Glaucoma screening    Nuclear sclerosis, bilateral        1. [place] OU--pt happy w otc readers--wishes to defer refraction  2. DM- WITHOUT RETINOPATHY.  Advised yearly DFE  3. Dry eyes--pt to cont w ATs    PLAN:    rtc 1 yr

## 2022-01-11 ENCOUNTER — HOSPITAL ENCOUNTER (OUTPATIENT)
Dept: RADIOLOGY | Facility: HOSPITAL | Age: 67
Discharge: HOME | End: 2022-01-11
Attending: INTERNAL MEDICINE
Payer: MEDICARE

## 2022-01-11 DIAGNOSIS — N60.92 ATYPICAL DUCTAL HYPERPLASIA OF LEFT BREAST: ICD-10-CM

## 2022-01-11 PROCEDURE — 25500020 PHARM REV CODE 255: Performed by: INTERNAL MEDICINE

## 2022-01-11 PROCEDURE — 77047 MRI BREAST W/O CONTRAST, BILATERAL: ICD-10-PCS | Mod: 26,,, | Performed by: RADIOLOGY

## 2022-01-11 PROCEDURE — 77047 MRI BREAST C- BILATERAL: CPT | Mod: TC

## 2022-01-11 PROCEDURE — A9577 INJ MULTIHANCE: HCPCS | Performed by: INTERNAL MEDICINE

## 2022-01-11 PROCEDURE — 77047 MRI BREAST C- BILATERAL: CPT | Mod: 26,,, | Performed by: RADIOLOGY

## 2022-01-11 RX ADMIN — GADOBENATE DIMEGLUMINE 20 ML: 529 INJECTION, SOLUTION INTRAVENOUS at 11:01

## 2022-01-26 ENCOUNTER — CLINICAL SUPPORT (OUTPATIENT)
Dept: REHABILITATION | Facility: HOSPITAL | Age: 67
End: 2022-01-26
Payer: MEDICARE

## 2022-01-26 DIAGNOSIS — R26.9 GAIT ABNORMALITY: ICD-10-CM

## 2022-01-26 DIAGNOSIS — M25.661 DECREASED RANGE OF MOTION OF RIGHT KNEE: ICD-10-CM

## 2022-01-26 DIAGNOSIS — M25.561 ACUTE PAIN OF RIGHT KNEE: ICD-10-CM

## 2022-01-26 PROCEDURE — 97140 MANUAL THERAPY 1/> REGIONS: CPT | Mod: PN

## 2022-01-26 PROCEDURE — 97110 THERAPEUTIC EXERCISES: CPT | Mod: PN

## 2022-01-27 ENCOUNTER — TELEPHONE (OUTPATIENT)
Dept: HEMATOLOGY/ONCOLOGY | Facility: CLINIC | Age: 67
End: 2022-01-27
Payer: MEDICARE

## 2022-01-27 ENCOUNTER — LAB VISIT (OUTPATIENT)
Dept: LAB | Facility: HOSPITAL | Age: 67
End: 2022-01-27
Attending: INTERNAL MEDICINE
Payer: MEDICARE

## 2022-01-27 DIAGNOSIS — M05.9 SEROPOSITIVE RHEUMATOID ARTHRITIS: ICD-10-CM

## 2022-01-27 LAB
ALBUMIN SERPL BCP-MCNC: 3.7 G/DL (ref 3.5–5.2)
ALP SERPL-CCNC: 92 U/L (ref 55–135)
ALT SERPL W/O P-5'-P-CCNC: 26 U/L (ref 10–44)
ANION GAP SERPL CALC-SCNC: 9 MMOL/L (ref 8–16)
AST SERPL-CCNC: 20 U/L (ref 10–40)
BILIRUB SERPL-MCNC: 0.3 MG/DL (ref 0.1–1)
BUN SERPL-MCNC: 14 MG/DL (ref 8–23)
CALCIUM SERPL-MCNC: 10 MG/DL (ref 8.7–10.5)
CHLORIDE SERPL-SCNC: 107 MMOL/L (ref 95–110)
CO2 SERPL-SCNC: 23 MMOL/L (ref 23–29)
CREAT SERPL-MCNC: 0.7 MG/DL (ref 0.5–1.4)
CRP SERPL-MCNC: 1.5 MG/L (ref 0–8.2)
ERYTHROCYTE [DISTWIDTH] IN BLOOD BY AUTOMATED COUNT: 14.6 % (ref 11.5–14.5)
ERYTHROCYTE [SEDIMENTATION RATE] IN BLOOD BY WESTERGREN METHOD: 16 MM/HR (ref 0–36)
EST. GFR  (AFRICAN AMERICAN): >60 ML/MIN/1.73 M^2
EST. GFR  (NON AFRICAN AMERICAN): >60 ML/MIN/1.73 M^2
GLUCOSE SERPL-MCNC: 112 MG/DL (ref 70–110)
HCT VFR BLD AUTO: 44 % (ref 37–48.5)
HGB BLD-MCNC: 13.4 G/DL (ref 12–16)
MCH RBC QN AUTO: 29.8 PG (ref 27–31)
MCHC RBC AUTO-ENTMCNC: 30.5 G/DL (ref 32–36)
MCV RBC AUTO: 98 FL (ref 82–98)
PLATELET # BLD AUTO: 236 K/UL (ref 150–450)
PMV BLD AUTO: 11.6 FL (ref 9.2–12.9)
POTASSIUM SERPL-SCNC: 4 MMOL/L (ref 3.5–5.1)
PROT SERPL-MCNC: 6.9 G/DL (ref 6–8.4)
RBC # BLD AUTO: 4.49 M/UL (ref 4–5.4)
SODIUM SERPL-SCNC: 139 MMOL/L (ref 136–145)
WBC # BLD AUTO: 6.12 K/UL (ref 3.9–12.7)

## 2022-01-27 PROCEDURE — 86140 C-REACTIVE PROTEIN: CPT | Performed by: INTERNAL MEDICINE

## 2022-01-27 PROCEDURE — 36415 COLL VENOUS BLD VENIPUNCTURE: CPT | Mod: PO | Performed by: INTERNAL MEDICINE

## 2022-01-27 PROCEDURE — 85027 COMPLETE CBC AUTOMATED: CPT | Performed by: INTERNAL MEDICINE

## 2022-01-27 PROCEDURE — 80053 COMPREHEN METABOLIC PANEL: CPT | Performed by: INTERNAL MEDICINE

## 2022-01-27 PROCEDURE — 85652 RBC SED RATE AUTOMATED: CPT | Performed by: INTERNAL MEDICINE

## 2022-01-27 NOTE — PROGRESS NOTES
"[facility]   Physical Therapy Treatment Note     Name: Verenice Mirza  Clinic Number: [id number]    Therapy Diagnosis:   Encounter Diagnoses   Name Primary?    Decreased range of motion of right knee     Acute pain of right knee     Gait abnormality      Physician: Bin Artis MD    Visit Date: 1/26/2022    Physician Orders: PT Eval and Treat   Medical Diagnosis from Referral: S83.231D (ICD-10-CM) - Complex tear of medial meniscus of right knee as current injury, subsequent encounter  Evaluation Date: 12/9/2021  Authorization Period Expiration: 12/31/2021  Plan of Care Expiration: 1/20/2021  Visit # / Visits authorized: 10/ 50    FOTO: 10/10 next  PTA Visit #: 0/5     DOS: 12/8/2021    Time In: 1415  Time Out: 1510  Total Appointment Time (timed & untimed codes): 55 min (2 TE, 2 MT)  Precautions: Standard and S/p partial meniscetomy DOS: 12/8/21    SUBJECTIVE     Pt reports: back to PT after 3 week break due to going to [place] for the birth of a family member.  She returns today limping heavily.  She states that 3-4 days ago she was turning over in bed in the middle of the night and had a sharp knee pain with a 'cruching' sensation.  Was able to fall back asleep but in the morning she had increased pain that extends into today.        She was compliant with home exercise program.  Response to previous treatment: no adverse effects.   Functional change: improved ROM    Pain: 5/10  Location: right knee      OBJECTIVE     R knee joint effusion.  TTP along medial femoral condyle.  TTP posterior joint line.   Heavy antalgic gait today without assistive device.        Treatment       Verenice received the treatments listed below:    THERAPEUTIC EXERCISES to develop strength, endurance, ROM, flexibility, posture and core stabilization for 30 minutes including re-assessment of R knee:   - quad sets   20x5"  - SAQs  20x5"  - LAQs   3x10  - leg press  2x15 at 4 plates    MANUAL THERAPY " "TECHNIQUES including Joint mobilizations and Soft tissue Mobilization were applied to R knee for 25 minutes.   - patellar mobs Gr 1-3, all directions  - passive physiological knee flexion and extension grade 2-3.     NEUROMUSCULAR RE-EDUCATION ACTIVITIES to improve Balance, Coordination, Sense and Proprioception for 0 minutes.  The following were included:   - SAQ; B 30x w/ 5" hold   - Quad Set; w/ 1/2 foam roll under knees; 20x w/ 5" hold NT   - Quad Set; w/ 1/2 foam under heel; 20x w/ 5" hold    THERAPEUTIC ACTIVITIES to improve dynamic and functional performance for 00 minutes including:  STS: 2 x 10 standard chair    GAIT TRAINING to improve functional mobility and safety for 00minutes, including:  - 150 ft, with emphasis on TKE  - fwd/bkwd walking in // 10 ft x 6 w/ cues for TKE    Ice at end of treatment - 10 min (no charge)    Patient Education and Home Exercises     Home Exercises Provided and Patient Education Provided   Education provided:   - updated HEP    Written Home Exercises Provided: Patient instructed to cont prior HEP. Exercises were reviewed and Verenice was able to demonstrate them prior to the end of the session.  Verenice demonstrated good  understanding of the education provided. See EMR under Patient Instructions for exercises provided during therapy sessions    ASSESSMENT   Verenice reports to therapy s/p R partial medial meniscectomy. Surgery date: 12/8/2021. Post-op 8. Held supervised PT for several weeks due patient leaving the state for the birth of a family member.  Returns to PT today limping heavily.  States that 3-4 days ago she was turning over in bed in the middle of the night and felt pain and a crunch sensation in her knee.  Pain since that has not resolved.  Re-assessment of knee today is noteworthy for effusion, decreased ROM, and heavy antalgic gait.  Patient was doing very well; potential DC today prior to set-back.  [place] better after her session today but will continue to need " skilled PT services at this time.      Verenice Is progressing well towards her goals.   Pt prognosis is Good.     Pt will continue to benefit from skilled outpatient physical therapy to address the deficits listed in the problem list box on initial evaluation, provide pt/family education and to maximize pt's level of independence in the home and community environment.     Pt's spiritual, cultural and educational needs considered and pt agreeable to plan of care and goals.     Anticipated barriers to physical therapy:  transportation, previous wheelchair use, and comorbidities    Goals:   Short Term Goals: (3 weeks)  1. Pt will be independent with HEP in order to supplement patient in improving functional mobility. - MET 01/5/2022  2. Pt will improve (R ) knee AROM to at least 2-115 in order to improve gait. - MET 01/5/2022  3. Pt will improve hip flexor/quadriceps mobility to WNL in order to improve hip/knee AROM and improved gait function - progressing, not met  4. Pt will improve hip abduction strength from 3+/5 to 4+/5 to improve functional gait deviation. - progressing, not met     Long Term Goals: (6 weeks)  1. Pt will be independent with updated HEP supplement PT in improving functional mobility. - progressing, not met  2. Pt will improve (R ) knee AROM to at least 0-120 in order to improve gait and ability to perform ADLs. - MET 01/5/2022  3. Pt will improve FOTO knee survey score to </= 43 % limited in order to demo improved functional mobility. - progressing, not met  4. Pt will perform TUG in < 12 seconds without AD in order to decrease risk of falling - progressing, not met  5. Pt will perform at least 10 sit to stands without UE support on 30 second sit to stand test in order to demo improved ability to perform transfers. - progressing, not met    PLAN     Re-assess knee next visit.     Francisco Colin, DPT, OCS

## 2022-01-27 NOTE — PLAN OF CARE
Outpatient Therapy Updated Plan of Care     Visit Date: 1/26/2022  Name: Verenice Mirza  Clinic Number: [id number]    Therapy Diagnosis:   Encounter Diagnoses   Name Primary?    Decreased range of motion of right knee     Acute pain of right knee     Gait abnormality      Physician: Bin Artis MD    Physician Orders: PT Eval and Treat   Medical Diagnosis from Referral: S83.231D (ICD-10-CM) - Complex tear of medial meniscus of right knee as current injury, subsequent encounter  Evaluation Date: 12/9/2021    Total Visits Received: 10  Cancelled Visits:  2 weeks  No Show Visits: none    Current Certification Period:  12/9/2021 to 01/20/2022  Precautions:  Post-op  Visits from Evaluation Date:  9  Functional Level Prior to Evaluation:  See initial evaluation.     Subjective     Update:   Pt reports: back to PT after 3 week break due to going to [place] for the birth of a family member.  She returns today limping heavily.  She states that 3-4 days ago she was turning over in bed in the middle of the night and had a sharp knee pain with a 'cruching' sensation.  Was able to fall back asleep but in the morning she had increased pain that extends into today.        She was compliant with home exercise program.  Response to previous treatment: no adverse effects.   Functional change: improved ROM     Pain: 5/10 at rest, 8/10 with standing/walking  Location: right knee         Objective     Update:    knee joint effusion.  TTP along medial femoral condyle.  TTP posterior joint line.   Heavy antalgic gait today without assistive device.   Fair quad setting today.   Pain inhibition with SLR.     Assessment     Update:   Verenice reports to therapy s/p R partial medial meniscectomy. Surgery date: 12/8/2021. Post-op 8. Held supervised PT for several weeks due patient leaving the [facility] for the birth of a family member.  Returns to PT today limping heavily.  States that 3-4 days ago she was turning over in bed in the middle of  the night and felt pain and a crunch sensation in her knee.  Pain since that has not resolved.  Re-assessment of knee today is noteworthy for effusion, decreased ROM, and heavy antalgic gait.  Patient was doing very well; potential DC today prior to set-back.  [place] better after her session today but will continue to need skilled PT services at this time.      Previous Short Term Goals Status:     Short Term Goals: (3 weeks)  1. Pt will be independent with HEP in order to supplement patient in improving functional mobility. - MET 01/5/2022  2. Pt will improve (R ) knee AROM to at least 2-115 in order to improve gait. - MET 01/5/2022  3. Pt will improve hip flexor/quadriceps mobility to WNL in order to improve hip/knee AROM and improved gait function - progressing, not met  4. Pt will improve hip abduction strength from 3+/5 to 4+/5 to improve functional gait deviation. - progressing, not met    New Short Term Goals Status:   None    Long Term Goal Status:     Long Term Goals: (6 weeks)  1. Pt will be independent with updated HEP supplement PT in improving functional mobility. - progressing, not met  2. Pt will improve (R ) knee AROM to at least 0-120 in order to improve gait and ability to perform ADLs. - MET 01/5/2022  3. Pt will improve FOTO knee survey score to </= 43 % limited in order to demo improved functional mobility. - progressing, not met  4. Pt will perform TUG in < 12 seconds without AD in order to decrease risk of falling - progressing, not met  5. Pt will perform at least 10 sit to stands without UE support on 30 second sit to stand test in order to demo improved ability to perform transfers. - progressing, not met    Reasons for Recertification of Therapy:   Update POC    Plan     Updated Certification Period: 1/26/2022 to 02/24/2022  Recommended Treatment Plan: 2 times per week for 4 weeks: Gait Training, Manual Therapy, Moist Heat/ Ice, Neuromuscular Re-ed, Patient Education, Self Care, Therapeutic  Activities and Therapeutic Exercise  Other Recommendations: IASIRMA, Dry Needling.    Francisco Colin, PT  1/26/2022      I CERTIFY THE NEED FOR THESE SERVICES FURNISHED UNDER THIS PLAN OF TREATMENT AND WHILE UNDER MY CARE    Physician's comments:        Physician's Signature: ___________________________________________________

## 2022-01-28 ENCOUNTER — CLINICAL SUPPORT (OUTPATIENT)
Dept: REHABILITATION | Facility: HOSPITAL | Age: 67
End: 2022-01-28
Payer: MEDICARE

## 2022-01-28 DIAGNOSIS — M25.561 ACUTE PAIN OF RIGHT KNEE: ICD-10-CM

## 2022-01-28 DIAGNOSIS — M25.661 DECREASED RANGE OF MOTION OF RIGHT KNEE: ICD-10-CM

## 2022-01-28 DIAGNOSIS — R26.9 GAIT ABNORMALITY: ICD-10-CM

## 2022-01-28 PROCEDURE — 97530 THERAPEUTIC ACTIVITIES: CPT | Mod: PN

## 2022-01-28 PROCEDURE — 97140 MANUAL THERAPY 1/> REGIONS: CPT | Mod: PN

## 2022-01-28 PROCEDURE — 97116 GAIT TRAINING THERAPY: CPT | Mod: PN

## 2022-01-28 PROCEDURE — 97110 THERAPEUTIC EXERCISES: CPT | Mod: PN

## 2022-01-28 NOTE — PROGRESS NOTES
"[facility]   Physical Therapy Treatment Note     Name: Verenice Mirza  Clinic Number: [id number]    Therapy Diagnosis:   Encounter Diagnoses   Name Primary?    Decreased range of motion of right knee     Acute pain of right knee     Gait abnormality      Physician: Bin Artis MD    Visit Date: 1/28/2022    Physician Orders: PT Eval and Treat   Medical Diagnosis from Referral: S83.231D (ICD-10-CM) - Complex tear of medial meniscus of right knee as current injury, subsequent encounter  Evaluation Date: 12/9/2021  Authorization Period Expiration: 12/31/2021  Plan of Care Expiration: 02/24/2022  Visit # / Visits authorized: 11/ 50    FOTO: 11/10 next    PTA Visit #: 0/5     DOS: 12/8/2021    Time In: 2:00 pm  Time Out: 2:56 pm   Total Appointment Time (timed & untimed codes): 56 min (1 TE, 1 MT, 1 TA, 1 GT)  Precautions: Standard and S/p partial meniscetomy DOS: 12/8/21    SUBJECTIVE     Pt reports: she feels pretty much the same as last session. Pain/swelling in knee has not improved since she re-injured it turning over in bed      She was compliant with home exercise program.  Response to previous treatment: no adverse effects.   Functional change: improved ROM    Pain: 6/10  Location: right knee      OBJECTIVE     R knee joint effusion.  TTP along medial femoral condyle.  TTP posterior joint line.   Heavy antalgic gait today without assistive device.        Treatment       Verenice received the treatments listed below:    THERAPEUTIC EXERCISES to develop strength, endurance, ROM, flexibility, posture and core stabilization for 15 minutes including re-assessment of R knee:   - quad sets   20x5"  - SAQs  20x5"   - LAQs   3x10 (NP)  - leg press  2x15 at 4 plates (NP)  - heel slides  2x10     MANUAL THERAPY TECHNIQUES including Joint mobilizations and Soft tissue Mobilization were applied to R knee for 15 minutes.   - patellar mobs Gr 1-3, all directions  - passive physiological knee " "flexion and extension grade 2-3.     NEUROMUSCULAR RE-EDUCATION ACTIVITIES to improve Balance, Coordination, Sense and Proprioception for 0 minutes.  The following were included:   - SAQ; B 30x w/ 5" hold   - Quad Set; w/ 1/2 foam roll under knees; 20x w/ 5" hold NT   - Quad Set; w/ 1/2 foam under heel; 20x w/ 5" hold    THERAPEUTIC ACTIVITIES to improve dynamic and functional performance for 10 minutes including:  STS: 2 x 10 standard chair  Step taps at 1st stair 3x10    GAIT TRAINING to improve functional mobility and safety for 15 minutes, including:  - 150 ft, with emphasis on heel-toe rollover   - fwd/bkwd walking in // 10 ft x 6 w/ cues for heel-toe rollover       Patient Education and Home Exercises     Home Exercises Provided and Patient Education Provided   Education provided:   - updated HEP    Written Home Exercises Provided: Patient instructed to cont prior HEP. Exercises were reviewed and Verenice was able to demonstrate them prior to the end of the session.  Verenice demonstrated good  understanding of the education provided. See EMR under Patient Instructions for exercises provided during therapy sessions    ASSESSMENT   Verenice reports to therapy s/p R partial medial meniscectomy. Surgery date: 12/8/2021. Patient presents to treatment with relatively unchanged symptoms since hurting knee about a week ago. She continues to exhibit R knee effusion and antalgic gait, though ROM was within normal limits this date. Gait training was performed both in and out of parallel bars where pt focused on heel-toe patterning, and pt reported that pain was greatly improved when she ambulated with these improved mechanics. She experienced some discomfort with most activities performed, but reported that pain was at a tolerable level, and she provided improved subjective report upon completion of treatment.     Verenice Is progressing well towards her goals.   Pt prognosis is Good.     Pt will continue to benefit from skilled " outpatient physical therapy to address the deficits listed in the problem list box on initial evaluation, provide pt/family education and to maximize pt's level of independence in the home and community environment.     Pt's spiritual, cultural and educational needs considered and pt agreeable to plan of care and goals.     Anticipated barriers to physical therapy:  transportation, previous wheelchair use, and comorbidities    Goals:   Short Term Goals: (3 weeks)  1. Pt will be independent with HEP in order to supplement patient in improving functional mobility. - MET 01/5/2022  2. Pt will improve (R ) knee AROM to at least 2-115 in order to improve gait. - MET 01/5/2022  3. Pt will improve hip flexor/quadriceps mobility to WNL in order to improve hip/knee AROM and improved gait function - progressing, not met  4. Pt will improve hip abduction strength from 3+/5 to 4+/5 to improve functional gait deviation. - progressing, not met     Long Term Goals: (6 weeks)  1. Pt will be independent with updated HEP supplement PT in improving functional mobility. - progressing, not met  2. Pt will improve (R ) knee AROM to at least 0-120 in order to improve gait and ability to perform ADLs. - MET 01/5/2022  3. Pt will improve FOTO knee survey score to </= 43 % limited in order to demo improved functional mobility. - progressing, not met  4. Pt will perform TUG in < 12 seconds without AD in order to decrease risk of falling - progressing, not met  5. Pt will perform at least 10 sit to stands without UE support on 30 second sit to stand test in order to demo improved ability to perform transfers. - progressing, not met    PLAN     Re-assess knee and continue with strengthening as tolerated     Osmin Heath, PT, DPT

## 2022-01-31 ENCOUNTER — PATIENT MESSAGE (OUTPATIENT)
Dept: SPORTS MEDICINE | Facility: CLINIC | Age: 67
End: 2022-01-31
Payer: MEDICARE

## 2022-01-31 ENCOUNTER — PATIENT MESSAGE (OUTPATIENT)
Dept: INTERNAL MEDICINE | Facility: CLINIC | Age: 67
End: 2022-01-31
Payer: MEDICARE

## 2022-01-31 ENCOUNTER — PATIENT MESSAGE (OUTPATIENT)
Dept: RHEUMATOLOGY | Facility: CLINIC | Age: 67
End: 2022-01-31
Payer: MEDICARE

## 2022-01-31 DIAGNOSIS — I10 ESSENTIAL HYPERTENSION: ICD-10-CM

## 2022-01-31 RX ORDER — METOPROLOL SUCCINATE 50 MG/1
50 TABLET, EXTENDED RELEASE ORAL NIGHTLY
Qty: 90 TABLET | Refills: 3 | Status: SHIPPED | OUTPATIENT
Start: 2022-01-31 | End: 2022-01-31 | Stop reason: SDUPTHER

## 2022-01-31 RX ORDER — METOPROLOL SUCCINATE 50 MG/1
50 TABLET, EXTENDED RELEASE ORAL NIGHTLY
Qty: 90 TABLET | Refills: 3 | Status: SHIPPED | OUTPATIENT
Start: 2022-01-31 | End: 2022-08-10 | Stop reason: SDUPTHER

## 2022-01-31 NOTE — TELEPHONE ENCOUNTER
No new care gaps identified.  Powered by [employer]. Reference number: [id number].   1/31/2022 10:42:47 AM CST

## 2022-02-01 ENCOUNTER — CLINICAL SUPPORT (OUTPATIENT)
Dept: REHABILITATION | Facility: HOSPITAL | Age: 67
End: 2022-02-01
Payer: MEDICARE

## 2022-02-01 DIAGNOSIS — M25.561 ACUTE PAIN OF RIGHT KNEE: ICD-10-CM

## 2022-02-01 DIAGNOSIS — R26.9 GAIT ABNORMALITY: ICD-10-CM

## 2022-02-01 DIAGNOSIS — M25.661 DECREASED RANGE OF MOTION OF RIGHT KNEE: ICD-10-CM

## 2022-02-01 PROCEDURE — 97110 THERAPEUTIC EXERCISES: CPT | Mod: KX,PN

## 2022-02-01 PROCEDURE — 97116 GAIT TRAINING THERAPY: CPT | Mod: KX,PN

## 2022-02-01 NOTE — PROGRESS NOTES
"[facility]   Physical Therapy Treatment Note     Name: Verenice Mirza  Clinic Number: [id number]    Therapy Diagnosis:   Encounter Diagnoses   Name Primary?    Decreased range of motion of right knee     Acute pain of right knee     Gait abnormality      Physician: Bin Artis MD    Visit Date: 2/1/2022    Physician Orders: PT Eval and Treat   Medical Diagnosis from Referral: S83.231D (ICD-10-CM) - Complex tear of medial meniscus of right knee as current injury, subsequent encounter  Evaluation Date: 12/9/2021  Authorization Period Expiration: 12/31/2021  Plan of Care Expiration: 02/24/2022  Visit # / Visits authorized: 12/ 50    FOTO: Next at DC    PTA Visit #: 0/5     DOS: 12/8/2021    Time In: 2:00 pm  Time Out: 3:00 pm   Total Appointment Time (timed & untimed codes): 60 min (2 TE, 2 GT)  Precautions: Standard and S/p partial meniscetomy DOS: 12/8/21    SUBJECTIVE     Pt reports: she is feeling much better today as compared to last session. Her pain and motion have improved dramatically and while she still experiences some pain when she first starts moving in the morning, she is currently not having any pain while weight bearing/ambulating      She was compliant with home exercise program.  Response to previous treatment: decreased pain   Functional change: improved ROM    Pain: 0/10  Location: right knee      OBJECTIVE     Reduced R knee joint effusion.  TTP along medial femoral condyle.  Mild antalgic gait today without assistive device.        Treatment       Verenice received the treatments listed below:    THERAPEUTIC EXERCISES to develop strength, endurance, ROM, flexibility, posture and core stabilization for 30 minutes including re-assessment of R knee:   - quad sets   20x5"  - SLR with quad set 30x5"  - SAQs  20x5" (NP)  - LAQs   3x10   - leg press  2x15 at 4 plates   - heel slides  2x10 (NP)  - hamstring curl 2x10, 4 plates   - Supine clamshells 3x10, RTB    MANUAL " "THERAPY TECHNIQUES including Joint mobilizations and Soft tissue Mobilization were applied to R knee for 00 minutes.   - patellar mobs Gr 1-3, all directions  - passive physiological knee flexion and extension grade 2-3.     NEUROMUSCULAR RE-EDUCATION ACTIVITIES to improve Balance, Coordination, Sense and Proprioception for 00 minutes.  The following were included:   - SAQ; B 30x w/ 5" hold   - Quad Set; w/ 1/2 foam roll under knees; 20x w/ 5" hold NT   - Quad Set; w/ 1/2 foam under heel; 20x w/ 5" hold    THERAPEUTIC ACTIVITIES to improve dynamic and functional performance for 00 minutes including:  STS: 2 x 10 standard chair  Step taps at 1st stair 3x10    GAIT TRAINING to improve functional mobility and safety for 30 minutes, including:  - 150 ft, with emphasis on heel-toe rollover   - fwd/bkwd walking in // 10 ft x 6 w/ cues for heel-toe rollover and full weightbearing on R lower extremities  - single leg stance in preparation for stance phase of gait, 3x30"      Patient Education and Home Exercises     Home Exercises Provided and Patient Education Provided   Education provided:   - updated HEP    Written Home Exercises Provided: Patient instructed to cont prior HEP. Exercises were reviewed and Verenice was able to demonstrate them prior to the end of the session.  Verenice demonstrated good  understanding of the education provided. See EMR under Patient Instructions for exercises provided during therapy sessions    ASSESSMENT   Verenice reports to therapy s/p R partial medial meniscectomy. Surgery date: 12/8/2021. Patient presents to treatment with reports of improved R knee pain as compared to last week. She demonstrates less effusion and mild antalgic gait pattern. Heavy emphasis was placed on gait training this date, with focus on weight bearing through the R lower extremity to avoid shortened stance phase, and maintaining good heel-toe patterning. She reported less pain with ambulation while implementing improved " mechanics. Pt tolerated all interventions well without any exacerbation of symptoms, and reported appropriate amount of muscular fatigue upon completion of treatment.     Verenice Is progressing well towards her goals.   Pt prognosis is Good.     Pt will continue to benefit from skilled outpatient physical therapy to address the deficits listed in the problem list box on initial evaluation, provide pt/family education and to maximize pt's level of independence in the home and community environment.     Pt's spiritual, cultural and educational needs considered and pt agreeable to plan of care and goals.     Anticipated barriers to physical therapy:  transportation, previous wheelchair use, and comorbidities    Goals:   Short Term Goals: (3 weeks)  1. Pt will be independent with HEP in order to supplement patient in improving functional mobility. - MET 01/5/2022  2. Pt will improve (R ) knee AROM to at least 2-115 in order to improve gait. - MET 01/5/2022  3. Pt will improve hip flexor/quadriceps mobility to WNL in order to improve hip/knee AROM and improved gait function - progressing, not met  4. Pt will improve hip abduction strength from 3+/5 to 4+/5 to improve functional gait deviation. - progressing, not met     Long Term Goals: (6 weeks)  1. Pt will be independent with updated HEP supplement PT in improving functional mobility. - progressing, not met  2. Pt will improve (R ) knee AROM to at least 0-120 in order to improve gait and ability to perform ADLs. - MET 01/5/2022  3. Pt will improve FOTO knee survey score to </= 43 % limited in order to demo improved functional mobility. - progressing, not met  4. Pt will perform TUG in < 12 seconds without AD in order to decrease risk of falling - progressing, not met  5. Pt will perform at least 10 sit to stands without UE support on 30 second sit to stand test in order to demo improved ability to perform transfers. - progressing, not met    PLAN     Re-assess knee and  continue with strengthening as tolerated     Osmin Heath, PT, DPT

## 2022-02-03 ENCOUNTER — CLINICAL SUPPORT (OUTPATIENT)
Dept: REHABILITATION | Facility: HOSPITAL | Age: 67
End: 2022-02-03
Payer: MEDICARE

## 2022-02-03 DIAGNOSIS — R26.9 GAIT ABNORMALITY: ICD-10-CM

## 2022-02-03 DIAGNOSIS — M25.661 DECREASED RANGE OF MOTION OF RIGHT KNEE: ICD-10-CM

## 2022-02-03 DIAGNOSIS — M25.561 ACUTE PAIN OF RIGHT KNEE: ICD-10-CM

## 2022-02-03 PROCEDURE — 97110 THERAPEUTIC EXERCISES: CPT | Mod: KX,PN,CQ

## 2022-02-03 PROCEDURE — 97116 GAIT TRAINING THERAPY: CPT | Mod: KX,PN,CQ

## 2022-02-03 NOTE — PROGRESS NOTES
"[facility]   Physical Therapy Treatment Note     Name: Verenice Mirza  Clinic Number: [id number]    Therapy Diagnosis:   Encounter Diagnoses   Name Primary?    Decreased range of motion of right knee     Acute pain of right knee     Gait abnormality      Physician: Bin Artis MD    Visit Date: 2/3/2022    Physician Orders: PT Eval and Treat   Medical Diagnosis from Referral: S83.231D (ICD-10-CM) - Complex tear of medial meniscus of right knee as current injury, subsequent encounter  Evaluation Date: 12/9/2021  Authorization Period Expiration: 12/31/2021  Plan of Care Expiration: 02/24/2022  Visit # / Visits authorized: 13/ 50    FOTO: Next at DC    PTA Visit #: 1/5     DOS: 12/8/2021    Time In: 2:00 pm  Time Out: 2:45 pm   Total Appointment Time (timed & untimed codes): 45 min (2 TE, 1 GT) KX Modifier  Precautions: Standard and S/p partial meniscetomy DOS: 12/8/21    SUBJECTIVE     Pt reports: she is doing much better this week and only feels some discomfort, which she specifically distinguishes from pain, with extended periods of weightbearing.  She feels she is improving   She was compliant with home exercise program.  Response to previous treatment: decreased pain   Functional change: improved ROM    Pain: 0/10  Location: right knee      OBJECTIVE     Reduced R knee joint effusion.  TTP along medial femoral condyle.  Mild antalgic gait today without assistive device.        Treatment       Verenice received the treatments listed below:    THERAPEUTIC EXERCISES to develop strength, endurance, ROM, flexibility, posture and core stabilization for 30 minutes including re-assessment of R knee:   - quad sets   20x5"  - SLR with quad set 30x5"  - SAQs  20x5"   - LAQs   3x10   - Bridges                     2x10  - leg press  2x15 at 4 plates   - heel slides  2x10 (NP)  - hamstring curl 2x15, 4 plates   - Supine clamshells 3x10, RTB    MANUAL THERAPY TECHNIQUES including Joint " "mobilizations and Soft tissue Mobilization were applied to R knee for 00 minutes.   - patellar mobs Gr 1-3, all directions  - passive physiological knee flexion and extension grade 2-3.     NEUROMUSCULAR RE-EDUCATION ACTIVITIES to improve Balance, Coordination, Sense and Proprioception for 00 minutes.  The following were included:   - SAQ; B 30x w/ 5" hold   - Quad Set; w/ 1/2 foam roll under knees; 20x w/ 5" hold NT   - Quad Set; w/ 1/2 foam under heel; 20x w/ 5" hold    THERAPEUTIC ACTIVITIES to improve dynamic and functional performance for 00 minutes including:  STS: 2 x 10 standard chair  Step taps at 1st stair 3x10    GAIT TRAINING to improve functional mobility and safety for 15 minutes, including:  - 150 ft, with emphasis on heel-toe rollover   - fwd/bkwd walking in // 10 ft x 6 w/ cues for heel-toe rollover and full weightbearing on R lower extremities  - single leg stance in preparation for stance phase of gait, 3x30"       Patient Education and Home Exercises     Home Exercises Provided and Patient Education Provided   Education provided:   - updated HEP    Written Home Exercises Provided: Patient instructed to cont prior HEP. Exercises were reviewed and Verenice was able to demonstrate them prior to the end of the session.  Verenice demonstrated good  understanding of the education provided. See EMR under Patient Instructions for exercises provided during therapy sessions    ASSESSMENT   Verenice arrived to session without any complaints of R knee pain and was ready to participate in treatment. Session focused on BLE strengthening with a focus on quad activation and normalizing gait.  She completed all exercises correctly following minimal verbal cuing for proper technique.  She was extremely fatigued following hamstring curls and required a seated rest break to recover.  During gait portion of session pt exhibited improved heel strike and equal stance time.  She only required verbal cuing to increase hip " extension and step length during retro walk.  She ambulated overground for one trial of 150 feet with improved mechanics and did not experience any loss of balance.  She fatigued quickly following single ambulation trial and reported a slight increase in R knee pain which resolved after a brief seated break before completion of session.         Verenice Is progressing well towards her goals.   Pt prognosis is Good.     Pt will continue to benefit from skilled outpatient physical therapy to address the deficits listed in the problem list box on initial evaluation, provide pt/family education and to maximize pt's level of independence in the home and community environment.     Pt's spiritual, cultural and educational needs considered and pt agreeable to plan of care and goals.     Anticipated barriers to physical therapy:  transportation, previous wheelchair use, and comorbidities    Goals:   Short Term Goals: (3 weeks)  1. Pt will be independent with HEP in order to supplement patient in improving functional mobility. - MET 01/5/2022  2. Pt will improve (R ) knee AROM to at least 2-115 in order to improve gait. - MET 01/5/2022  3. Pt will improve hip flexor/quadriceps mobility to WNL in order to improve hip/knee AROM and improved gait function - progressing, not met  4. Pt will improve hip abduction strength from 3+/5 to 4+/5 to improve functional gait deviation. - progressing, not met     Long Term Goals: (6 weeks)  1. Pt will be independent with updated HEP supplement PT in improving functional mobility. - progressing, not met  2. Pt will improve (R ) knee AROM to at least 0-120 in order to improve gait and ability to perform ADLs. - MET 01/5/2022  3. Pt will improve FOTO knee survey score to </= 43 % limited in order to demo improved functional mobility. - progressing, not met  4. Pt will perform TUG in < 12 seconds without AD in order to decrease risk of falling - progressing, not met  5. Pt will perform at  least 10 sit to stands without UE support on 30 second sit to stand test in order to demo improved ability to perform transfers. - progressing, not met    PLAN     Cont progression of PT goals per POC.    Xiomy Duarte, PTA

## 2022-02-08 ENCOUNTER — OFFICE VISIT (OUTPATIENT)
Dept: SPORTS MEDICINE | Facility: CLINIC | Age: 67
End: 2022-02-08
Payer: MEDICARE

## 2022-02-08 ENCOUNTER — CLINICAL SUPPORT (OUTPATIENT)
Dept: REHABILITATION | Facility: HOSPITAL | Age: 67
End: 2022-02-08
Payer: MEDICARE

## 2022-02-08 VITALS
WEIGHT: 220 LBS | BODY MASS INDEX: 40.48 KG/M2 | HEIGHT: 62 IN | HEART RATE: 66 BPM | DIASTOLIC BLOOD PRESSURE: 75 MMHG | SYSTOLIC BLOOD PRESSURE: 125 MMHG

## 2022-02-08 DIAGNOSIS — M25.661 DECREASED RANGE OF MOTION OF RIGHT KNEE: ICD-10-CM

## 2022-02-08 DIAGNOSIS — M25.561 ACUTE PAIN OF RIGHT KNEE: ICD-10-CM

## 2022-02-08 DIAGNOSIS — Z98.890 S/P ARTHROSCOPIC SURGERY OF RIGHT KNEE: Primary | ICD-10-CM

## 2022-02-08 DIAGNOSIS — R26.9 GAIT ABNORMALITY: ICD-10-CM

## 2022-02-08 PROCEDURE — 99024 POSTOP FOLLOW-UP VISIT: CPT | Mod: POP,,, | Performed by: ORTHOPAEDIC SURGERY

## 2022-02-08 PROCEDURE — 97530 THERAPEUTIC ACTIVITIES: CPT | Mod: KX,PN

## 2022-02-08 PROCEDURE — 99213 OFFICE O/P EST LOW 20 MIN: CPT | Mod: PBBFAC | Performed by: ORTHOPAEDIC SURGERY

## 2022-02-08 PROCEDURE — 99024 PR POST-OP FOLLOW-UP VISIT: ICD-10-PCS | Mod: POP,,, | Performed by: ORTHOPAEDIC SURGERY

## 2022-02-08 PROCEDURE — 97110 THERAPEUTIC EXERCISES: CPT | Mod: KX,PN

## 2022-02-08 PROCEDURE — 99999 PR PBB SHADOW E&M-EST. PATIENT-LVL III: ICD-10-PCS | Mod: PBBFAC,,, | Performed by: ORTHOPAEDIC SURGERY

## 2022-02-08 PROCEDURE — 99999 PR PBB SHADOW E&M-EST. PATIENT-LVL III: CPT | Mod: PBBFAC,,, | Performed by: ORTHOPAEDIC SURGERY

## 2022-02-08 NOTE — PROGRESS NOTES
Subjective:       Patient ID: Verenice Mirza is a 66 y.o. female.    Chief Complaint: No chief complaint on file.    HPI  Review of Systems   Constitutional: Negative for appetite change.   HENT: Negative for mouth sores.    Eyes: Negative for visual disturbance.   Respiratory: Negative for cough and shortness of breath.    Cardiovascular: Negative for chest pain.   Gastrointestinal: Positive for diarrhea. Negative for abdominal pain.   Genitourinary: Positive for frequency.   Musculoskeletal: Negative for back pain.   Integumentary:  Negative for rash.   Neurological: Negative for headaches.   Hematological: Negative for adenopathy.   Psychiatric/Behavioral: The patient is not nervous/anxious.          Objective:      Physical Exam    Assessment:       Problem List Items Addressed This Visit     Atypical ductal hyperplasia of left breast - Primary          Plan:       ***

## 2022-02-08 NOTE — PROGRESS NOTES
"[facility]   Physical Therapy Treatment Note     Name: Verenice Mirza  Clinic Number: [id number]    Therapy Diagnosis:   Encounter Diagnoses   Name Primary?    Decreased range of motion of right knee     Acute pain of right knee     Gait abnormality      Physician: Bin Artis MD    Visit Date: 2/8/2022    Physician Orders: PT Eval and Treat   Medical Diagnosis from Referral: S83.231D (ICD-10-CM) - Complex tear of medial meniscus of right knee as current injury, subsequent encounter  Evaluation Date: 12/9/2021  Authorization Period Expiration: 12/31/2021  Plan of Care Expiration: 02/24/2022  Visit # / Visits authorized: 14/ 50    FOTO: Next at DC    PTA Visit #: 0/5     DOS: 12/8/2021    Time In: 2:00 pm  Time Out: 2:55 pm   Total Appointment Time (timed & untimed codes): 55 min (3 TE, 1 TA) KX Modifier  Precautions: Standard and S/p partial meniscetomy DOS: 12/8/21    SUBJECTIVE     Pt reports: she went to see MD this morning in regards to knee. Dr told her it was normal to experience popping/pain after this surgery, and that it would take about a month for pain to subside to recover and to continue with physical therapy   She was compliant with home exercise program.  Response to previous treatment: decreased pain   Functional change: improved ROM    Pain: 0/10  Location: right knee      OBJECTIVE     Objective Measures updated at progress report unless specified.        Treatment       Verenice received the treatments listed below:    THERAPEUTIC EXERCISES to develop strength, endurance, ROM, flexibility, posture and core stabilization for 45 minutes including re-assessment of R knee:   - quad sets   20x5"  - SLR with quad set 30x5"  - SAQs  20x5"   - LAQs   3x10   - Bridges                     2x10  - heel slides  2x10 (NP)  - Supine clamshells 3x10, RTB    Machines:  - hamstring curl 2x15, 5 plates   - knee extension  2x8, 15#  - leg press  2x15 at 4.5 plates  " "      THERAPEUTIC ACTIVITIES to improve dynamic and functional performance for 10 minutes including:  STS: 2 x 10 standard chair  Step ups at 1st stair 2x10 B    GAIT TRAINING to improve functional mobility and safety for 00 minutes, including:  - 150 ft, with emphasis on heel-toe rollover   - fwd/bkwd walking in // 10 ft x 6 w/ cues for heel-toe rollover and full weightbearing on R lower extremities  - single leg stance in preparation for stance phase of gait, 3x30"       Patient Education and Home Exercises     Home Exercises Provided and Patient Education Provided   Education provided:   - updated HEP    Written Home Exercises Provided: Patient instructed to cont prior HEP. Exercises were reviewed and Verenice was able to demonstrate them prior to the end of the session.  Verenice demonstrated good  understanding of the education provided. See EMR under Patient Instructions for exercises provided during therapy sessions    ASSESSMENT   Verenice reports to therapy s/p R partial medial meniscectomy with no complaints of knee pain. She continues to feel much better with functional activity as compared to last week. Pt was able to tolerate progressions of lower extremity strengthening exercises without experiencing anterior knee pain. She demonstrated improved gait mechanics while ambulating around clinic, with less notable hip drop and good heel-toe patterning. Pt reported appropriate amount of muscular fatigue upon completion of treatment. She would continue to benefit from lower extremity strengthening and further normalization of gait pattern.     Verenice Is progressing well towards her goals.   Pt prognosis is Good.     Pt will continue to benefit from skilled outpatient physical therapy to address the deficits listed in the problem list box on initial evaluation, provide pt/family education and to maximize pt's level of independence in the home and community environment.     Pt's spiritual, cultural and educational needs " considered and pt agreeable to plan of care and goals.     Anticipated barriers to physical therapy:  transportation, previous wheelchair use, and comorbidities    Goals:   Short Term Goals: (3 weeks)  1. Pt will be independent with HEP in order to supplement patient in improving functional mobility. - MET 01/5/2022  2. Pt will improve (R ) knee AROM to at least 2-115 in order to improve gait. - MET 01/5/2022  3. Pt will improve hip flexor/quadriceps mobility to WNL in order to improve hip/knee AROM and improved gait function - progressing, not met  4. Pt will improve hip abduction strength from 3+/5 to 4+/5 to improve functional gait deviation. - progressing, not met     Long Term Goals: (6 weeks)  1. Pt will be independent with updated HEP supplement PT in improving functional mobility. - progressing, not met  2. Pt will improve (R ) knee AROM to at least 0-120 in order to improve gait and ability to perform ADLs. - MET 01/5/2022  3. Pt will improve FOTO knee survey score to </= 43 % limited in order to demo improved functional mobility. - progressing, not met  4. Pt will perform TUG in < 12 seconds without AD in order to decrease risk of falling - progressing, not met  5. Pt will perform at least 10 sit to stands without UE support on 30 second sit to stand test in order to demo improved ability to perform transfers. - progressing, not met    PLAN     Cont progression of PT goals per POC.    Osmin Heath, PT, DPT

## 2022-02-08 NOTE — PROGRESS NOTES
"CC: Right knee scope post op 6 weeks    Patient is here for her 2 week post op appointment s/p below and is doing well. Patient is doing PT at [facility] location and is progressing as expected. Patient is no longer taking pain medication. she denies any chest pain, SOB, fevers, chills, nausea, vomiting, or drainage from incision sites.     DATE OF PROCEDURE:  12/8/2021      PROCEDURES:   1. Right knee arthroscopic chondroplasty  2. Right knee arthroscopic partial medial and lateral meniscectomies     SURGEON: Bin Artis M.D.     PE:    /75   Pulse 66   Ht 5' 2" (1.575 m)   Wt 99.8 kg (220 lb)   BMI 40.24 kg/m²      Right knee:    Incision clean/dry/intact  No sign of infection  Mild swelling  Compartments soft  Neurovascular status intact in extremity    AROM 0 to 110 degrees  Decreased quad strength  Minimal to no effusion    Assessment:  9 weeks s/p right knee arthroscopic chondroplasty and partial medial and lateral menisectomies    Plan:  1. Continue PT    2. F/u PRN    3. Possible future visco injections.     Medical Dictation software was used during the dictation of portions or the entirety of this medical record.  Phonetic or grammatic errors may exist due to the use of this software. For clarification, refer to the author of the document.      "

## 2022-02-09 ENCOUNTER — PATIENT MESSAGE (OUTPATIENT)
Dept: HEMATOLOGY/ONCOLOGY | Facility: CLINIC | Age: 67
End: 2022-02-09
Payer: MEDICARE

## 2022-02-09 ENCOUNTER — TELEPHONE (OUTPATIENT)
Dept: HEMATOLOGY/ONCOLOGY | Facility: CLINIC | Age: 67
End: 2022-02-09
Payer: MEDICARE

## 2022-02-09 ENCOUNTER — OFFICE VISIT (OUTPATIENT)
Dept: HEMATOLOGY/ONCOLOGY | Facility: CLINIC | Age: 67
End: 2022-02-09
Payer: MEDICARE

## 2022-02-09 DIAGNOSIS — N60.92 ATYPICAL DUCTAL HYPERPLASIA OF LEFT BREAST: Primary | ICD-10-CM

## 2022-02-09 DIAGNOSIS — Z12.31 ENCOUNTER FOR SCREENING MAMMOGRAM FOR HIGH-RISK PATIENT: ICD-10-CM

## 2022-02-09 PROCEDURE — 99499 NO LOS: ICD-10-PCS | Mod: 95,,, | Performed by: INTERNAL MEDICINE

## 2022-02-09 PROCEDURE — 99499 UNLISTED E&M SERVICE: CPT | Mod: 95,,, | Performed by: INTERNAL MEDICINE

## 2022-02-09 NOTE — PROGRESS NOTES
Subjective:       Patient ID: Verenice Mirza is a 66 y.o. female.    Chief Complaint: No chief complaint on file.    HPI 67 Y/O with ADH on raloxifene    The patient location is: home  The chief complaint leading to consultation is: atypical ductal hyperplasia    Visit type: {TELE AUDIOVISUAL:04765}    Face to Face time with patient: ***  *** minutes of total time spent on the encounter, which includes face to face time and non-face to face time preparing to see the patient (eg, review of tests), Obtaining and/or reviewing separately obtained history, Documenting clinical information in the electronic or other health record, Independently interpreting results (not separately reported) and communicating results to the patient/family/caregiver, or Care coordination (not separately reported).         Each patient to whom he or she provides medical services by telemedicine is:  (1) informed of the relationship between the physician and patient and the respective role of any other health care provider with respect to management of the patient; and (2) notified that he or she may decline to receive medical services by telemedicine and may withdraw from such care at any time.    Notes:               Mammogram from July 1, 2019 showed architectural distortion left breast 2:00 position.  There was nothing seen by ultrasound.    On July 8, 2019 a needle biopsy was performed which showed benign breast tissue with apocrine adenosis, calcifications but no atypia.    Excisional biopsy on August 19, 2019 showed atypical ductal hyperplasia.    She started raloxifene in January 2020.    Diagnosed with RA, now on sulfasalizine.        Review of Systems   Constitutional: Negative for appetite change.   HENT: Negative for mouth sores.    Eyes: Negative for visual disturbance.   Respiratory: Negative for cough and shortness of breath.    Cardiovascular: Negative for chest pain.   Gastrointestinal: Positive for diarrhea. Negative for  abdominal pain.   Genitourinary: Positive for frequency.   Musculoskeletal: Negative for back pain.   Integumentary:  Negative for rash.   Neurological: Negative for headaches.   Hematological: Negative for adenopathy.   Psychiatric/Behavioral: The patient is not nervous/anxious.          Objective:      Physical Exam    Assessment:     MRI 1/22/22  -negative  Problem List Items Addressed This Visit     Atypical ductal hyperplasia of left breast - Primary          Plan:       RTC 6 M with mammogram

## 2022-02-09 NOTE — TELEPHONE ENCOUNTER
appts rescheduled see previous note.              ----- Message from Rashel Zamorano MD sent at 2/9/2022  9:29 AM CST -----  Regarding: FW: virtual visit issues    ----- Message -----  From: Ba Reid  Sent: 2/9/2022   9:17 AM CST  To: Rashel Zamorano MD  Subject: virtual visit issues                             Patient is requesting a phone call to reschedule the appointment. Pt called requesting assistance with her virtual visit and I recommended the patient to reschedule with the office and call us before the next appointment starts

## 2022-02-10 ENCOUNTER — PATIENT MESSAGE (OUTPATIENT)
Dept: HEMATOLOGY/ONCOLOGY | Facility: CLINIC | Age: 67
End: 2022-02-10
Payer: MEDICARE

## 2022-02-10 ENCOUNTER — CLINICAL SUPPORT (OUTPATIENT)
Dept: REHABILITATION | Facility: HOSPITAL | Age: 67
End: 2022-02-10
Payer: MEDICARE

## 2022-02-10 DIAGNOSIS — M25.661 DECREASED RANGE OF MOTION OF RIGHT KNEE: ICD-10-CM

## 2022-02-10 DIAGNOSIS — R26.9 GAIT ABNORMALITY: ICD-10-CM

## 2022-02-10 DIAGNOSIS — M25.561 ACUTE PAIN OF RIGHT KNEE: ICD-10-CM

## 2022-02-10 PROCEDURE — 97110 THERAPEUTIC EXERCISES: CPT | Mod: PN,CQ

## 2022-02-10 PROCEDURE — 97530 THERAPEUTIC ACTIVITIES: CPT | Mod: PN,CQ

## 2022-02-10 NOTE — PROGRESS NOTES
" [facility]   Physical Therapy Treatment Note     Name: Verenice Mirza  Clinic Number: [id number]    Therapy Diagnosis:   Encounter Diagnoses   Name Primary?    Decreased range of motion of right knee     Acute pain of right knee     Gait abnormality      Physician: Bin Artis MD    Visit Date: 2/10/2022    Physician Orders: PT Eval and Treat   Medical Diagnosis from Referral: S83.231D (ICD-10-CM) - Complex tear of medial meniscus of right knee as current injury, subsequent encounter  Evaluation Date: 12/9/2021  Authorization Period Expiration: 12/31/2021  Plan of Care Expiration: 02/24/2022  Visit # / Visits authorized: 15/ 50    FOTO: Next at DC    PTA Visit #: 1/5     DOS: 12/8/2021    Time In: 1:55 pm  Time Out: 2:40 pm   Total Appointment Time (timed & untimed codes): 55 min (2 TE, 1 TA) KX Modifier  Precautions: Standard and S/p partial meniscetomy DOS: 12/8/21    SUBJECTIVE     Pt reports: doing fine today no complaints   She was compliant with home exercise program.  Response to previous treatment: decreased pain   Functional change: improved ROM and function    Pain: 0/10  Location: right knee      OBJECTIVE     Objective Measures updated at progress report unless specified.        Treatment       Verenice received the treatments listed below:    THERAPEUTIC EXERCISES to develop strength, endurance, ROM, flexibility, posture and core stabilization for 35 minutes including re-assessment of R knee:     Sci Fit recumbent bike 5 minute for tissue extensibility  - quad sets   20x5"  - SLR with quad set 30x5"  - Bridges                     2x10 w/GTB at knees  w/3 sec hold  - Supine clamshells 3x10, GTB w/3 sec hold  - Side lying Hip Abd   2x10 bilateral  - Standing TKE           2x10 w/GTB 3 sec hold    Machines:  - hamstring curl 2x15, 5 plates   - knee extension  3x8, 10#  - leg press  2x15 at 5.0 plates        THERAPEUTIC ACTIVITIES to improve dynamic and functional " "performance for 10 minutes including:  STS: 2 x 10 standard chair w/RTB at knees  Step ups at 1st stair 2x10 B    GAIT TRAINING to improve functional mobility and safety for 00 minutes, including:  - 150 ft, with emphasis on heel-toe rollover   - fwd/bkwd walking in // 10 ft x 6 w/ cues for heel-toe rollover and full weightbearing on R lower extremities  - single leg stance in preparation for stance phase of gait, 3x30"       Patient Education and Home Exercises     Home Exercises Provided and Patient Education Provided   Education provided:   - updated HEP    Written Home Exercises Provided: Patient instructed to cont prior HEP. Exercises were reviewed and Verenice was able to demonstrate them prior to the end of the session.  Verenice demonstrated good  understanding of the education provided. See EMR under Patient Instructions for exercises provided during therapy sessions    ASSESSMENT   Verenice reports to therapy s/p R partial medial meniscectomy with no complaints of knee pain.  Pt was able to tolerate progressions again today of lower extremity strengthening exercises in bold  without experiencing anterior knee pain. She demonstrated improved gait mechanics while ambulating around clinic, with less notable hip drop and good heel-toe patterning. Pt reported appropriate amount of muscular fatigue upon completion of treatment. Needed to cancel one session next week due to going out of town.  She would continue to benefit from lower extremity strengthening and further normalization of gait pattern.     Verenice Is progressing well towards her goals.   Pt prognosis is Good.     Pt will continue to benefit from skilled outpatient physical therapy to address the deficits listed in the problem list box on initial evaluation, provide pt/family education and to maximize pt's level of independence in the home and community environment.     Pt's spiritual, cultural and educational needs considered and pt agreeable to plan of care " and goals.     Anticipated barriers to physical therapy:  transportation, previous wheelchair use, and comorbidities    Goals:   Short Term Goals: (3 weeks)  1. Pt will be independent with HEP in order to supplement patient in improving functional mobility. - MET 01/5/2022  2. Pt will improve (R ) knee AROM to at least 2-115 in order to improve gait. - MET 01/5/2022  3. Pt will improve hip flexor/quadriceps mobility to WNL in order to improve hip/knee AROM and improved gait function - progressing, not met  4. Pt will improve hip abduction strength from 3+/5 to 4+/5 to improve functional gait deviation. - progressing, not met     Long Term Goals: (6 weeks)  1. Pt will be independent with updated HEP supplement PT in improving functional mobility. - progressing, not met  2. Pt will improve (R ) knee AROM to at least 0-120 in order to improve gait and ability to perform ADLs. - MET 01/5/2022  3. Pt will improve FOTO knee survey score to </= 43 % limited in order to demo improved functional mobility. - progressing, not met  4. Pt will perform TUG in < 12 seconds without AD in order to decrease risk of falling - progressing, not met  5. Pt will perform at least 10 sit to stands without UE support on 30 second sit to stand test in order to demo improved ability to perform transfers. - progressing, not met    PLAN     Cont progression of PT goals per POC.    Jeancarlos Ahuja, PTA

## 2022-02-14 ENCOUNTER — OFFICE VISIT (OUTPATIENT)
Dept: HEMATOLOGY/ONCOLOGY | Facility: CLINIC | Age: 67
End: 2022-02-14
Payer: MEDICARE

## 2022-02-14 ENCOUNTER — TELEPHONE (OUTPATIENT)
Dept: HEMATOLOGY/ONCOLOGY | Facility: CLINIC | Age: 67
End: 2022-02-14
Payer: MEDICARE

## 2022-02-14 DIAGNOSIS — N60.92 ATYPICAL DUCTAL HYPERPLASIA OF LEFT BREAST: Primary | ICD-10-CM

## 2022-02-14 PROCEDURE — 99499 NO LOS: ICD-10-PCS | Mod: 95,,, | Performed by: INTERNAL MEDICINE

## 2022-02-14 PROCEDURE — 99499 UNLISTED E&M SERVICE: CPT | Mod: 95,,, | Performed by: INTERNAL MEDICINE

## 2022-02-14 NOTE — TELEPHONE ENCOUNTER
Pt experiencing technical difficulties with Virtual Visit with Dr. Zamorano.  Called pt . Pt states she is able to see and hear Dr. Zamorano and us but we are unable to see and hear her. Pt plans to call help desk at number provided. Offered in person visit as pt is able and willing to come in person, pt declined offer at this time to schedule in person visit. Offered to pt to schedule virtual visit pt declined offer. Pt states that she will contact office when ready.

## 2022-02-14 NOTE — PROGRESS NOTES
Subjective:       Patient ID: Verenice Mirza is a 66 y.o. female.    Chief Complaint: No chief complaint on file.    HPI Ms. Mirza 66-year-old female who returns for follow-up with a diagnosis of atypical ductal hyperplasia.She is on Raloxifene.             Each patient to whom he or she provides medical services by telemedicine is:  (1) informed of the relationship between the physician and patient and the respective role of any other health care provider with respect to management of the patient; and (2) notified that he or she may decline to receive medical services by telemedicine and may withdraw from such care at any time.    Notes: she could not get her video to work            Mammogram in July was negative.  Breast MRI on 1/11/22 was negative          Mammogram from July 1, 2019 showed architectural distortion left breast 2:00 position.  There was nothing seen by ultrasound.    On July 8, 2019 a needle biopsy was performed which showed benign breast tissue with apocrine adenosis, calcifications but no atypia.    Excisional biopsy on August 19, 2019 showed atypical ductal hyperplasia.    She started raloxifene in January 2020.    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  Review of Systems       Objective:          Assessment:       1. Atypical ductal hyperplasia of left breast        Plan:         She will reschedule.

## 2022-02-21 ENCOUNTER — PATIENT MESSAGE (OUTPATIENT)
Dept: HEMATOLOGY/ONCOLOGY | Facility: CLINIC | Age: 67
End: 2022-02-21
Payer: MEDICARE

## 2022-02-21 ENCOUNTER — OFFICE VISIT (OUTPATIENT)
Dept: RHEUMATOLOGY | Facility: CLINIC | Age: 67
End: 2022-02-21
Payer: MEDICARE

## 2022-02-21 DIAGNOSIS — M05.9 SEROPOSITIVE RHEUMATOID ARTHRITIS: ICD-10-CM

## 2022-02-21 DIAGNOSIS — E66.01 MORBID OBESITY WITH BMI OF 40.0-44.9, ADULT: ICD-10-CM

## 2022-02-21 PROBLEM — M25.561 ACUTE PAIN OF RIGHT KNEE: Status: RESOLVED | Noted: 2021-12-09 | Resolved: 2022-02-21

## 2022-02-21 PROCEDURE — 99214 OFFICE O/P EST MOD 30 MIN: CPT | Mod: 95,,, | Performed by: INTERNAL MEDICINE

## 2022-02-21 PROCEDURE — 99214 PR OFFICE/OUTPT VISIT, EST, LEVL IV, 30-39 MIN: ICD-10-PCS | Mod: 95,,, | Performed by: INTERNAL MEDICINE

## 2022-02-21 RX ORDER — SULFASALAZINE 500 MG/1
1500 TABLET ORAL 2 TIMES DAILY
Qty: 540 TABLET | Refills: 0 | Status: SHIPPED | OUTPATIENT
Start: 2022-02-21 | End: 2022-05-09 | Stop reason: SDUPTHER

## 2022-02-21 ASSESSMENT — ROUTINE ASSESSMENT OF PATIENT INDEX DATA (RAPID3)
MDHAQ FUNCTION SCORE: 0.2
TOTAL RAPID3 SCORE: 0.39
PAIN SCORE: 0.5
PATIENT GLOBAL ASSESSMENT SCORE: 0
FATIGUE SCORE: 1.5
PSYCHOLOGICAL DISTRESS SCORE: 0

## 2022-02-21 NOTE — ASSESSMENT & PLAN NOTE
Has achieved remission with SSZ at 3000 mg/d and without side effects; will continue this for now     No treatment related adverse effects; will continue to monitor for drug toxicity      Will plan f/u lab in 4 mo ([facility]) and RTC then; virtual or in-person    Also will send her the link to anti-inflammatory diet recommendations

## 2022-02-21 NOTE — PATIENT INSTRUCTIONS
Anti-inflammatory Diet Made Easy:    [email or web address]    Courtesy of the [employer]

## 2022-02-21 NOTE — ASSESSMENT & PLAN NOTE
Will give her web link to anti-inflammatory diet     [email or web address]

## 2022-02-21 NOTE — PROGRESS NOTES
Subjective:       Patient ID: Verenice Mirza is a 66 y.o. female.    Chief Complaint: No chief complaint on file.    HPI   The patient location is: [place]  The chief complaint leading to consultation is: RA mgt    Visit type: audiovisual    Face to Face time with patient: 10 min  20 minutes of total time spent on the encounter, which includes face to face time and non-face to face time preparing to see the patient (eg, review of tests), Obtaining and/or reviewing separately obtained history, Documenting clinical information in the electronic or other health record, Independently interpreting results (not separately reported) and communicating results to the patient/family/caregiver, or Care coordination (not separately reported).         Each patient to whom he or she provides medical services by telemedicine is:  (1) informed of the relationship between the physician and patient and the respective role of any other health care provider with respect to management of the patient; and (2) notified that he or she may decline to receive medical services by telemedicine and may withdraw from such care at any time.    Notes:    Fell early 2021; R hand swelled after fall but never got better  Dr. Erickson dx RA  Orencia weekly x 3 (April) helped but then she got diverticulitis  June 2021 started Sulfasalazine up to 3 bid    Feb 2021 RF 92,      Previous hx of plantar fasciitis, achilles tendinitis, R knee pain, and lumbar pain  Had gel shots in R knee  Had PT for knee  A lot of pain around the knee; no cane   Meloxicam      Hx DM2  Steatosis/ fatty liver  HTN metoprolol   HLD  Hypothyroid  PVD  PAD  Rosacea  JORDANA  Raloxifene since 2019 for atypical ductal hyperplasia  Fam hx crohn's grandmother     Limits carbs and sugars    Doing well with SSZ  Hands better  occas diarrhea but not more than before    Had surgery knee Dec 2021; going to PT    Review of Systems   Constitutional: Negative for fever and unexpected weight  change.   HENT: Negative for mouth sores and trouble swallowing.    Eyes: Negative for redness.   Respiratory: Negative for cough and shortness of breath.    Cardiovascular: Negative for chest pain.   Gastrointestinal: Positive for diarrhea. Negative for constipation.   Genitourinary: Negative for dysuria and genital sores.   Skin: Negative for rash.   Neurological: Negative for headaches.   Hematological: Does not bruise/bleed easily.       Rapid3 Question Responses and Scores 2/16/2022   MDHAQ Score 0.2   Psychologic Score 0   Pain Score 0.5   When you awakened in the morning OVER THE LAST WEEK, did you feel stiff? No   If Yes, please indicate the number of hours until you are as limber as you will be for the day -   Fatigue Score 1.5   Global Health Score 0   RAPID3 Score 0.39        Objective:   There were no vitals taken for this visit.     Physical Exam   Pulmonary/Chest: No respiratory distress.   No increased work of breathing   Neurological:   Alert, awake, with no abnormal movements   Skin:   No rash on face; skin clear   Psychiatric:   Normal mood and affect; clear, rational thinking; speech normal and not pressured         Lab Results   Component Value Date    SEDRATE 16 01/27/2022      Assessment:       1. Seropositive rheumatoid arthritis    2. Morbid obesity with BMI of 40.0-44.9, adult            Plan:       Problem List Items Addressed This Visit        Active Problems    Seropositive rheumatoid arthritis     Has achieved remission with SSZ at 3000 mg/d and without side effects; will continue this for now     No treatment related adverse effects; will continue to monitor for drug toxicity      Will plan f/u lab in 4 mo ([facility]) and RTC then; virtual or in-person    Also will send her the link to anti-inflammatory diet recommendations           Relevant Medications    sulfaSALAzine (AZULFIDINE) 500 mg Tab    Morbid obesity with BMI of 40.0-44.9, adult     Will give her web link to  anti-inflammatory diet     [email or web address]

## 2022-02-22 ENCOUNTER — CLINICAL SUPPORT (OUTPATIENT)
Dept: REHABILITATION | Facility: HOSPITAL | Age: 67
End: 2022-02-22
Payer: MEDICARE

## 2022-02-22 DIAGNOSIS — M25.661 DECREASED RANGE OF MOTION OF RIGHT KNEE: Primary | ICD-10-CM

## 2022-02-22 DIAGNOSIS — R26.9 GAIT ABNORMALITY: ICD-10-CM

## 2022-02-22 PROCEDURE — 97530 THERAPEUTIC ACTIVITIES: CPT | Mod: KX,PN

## 2022-02-22 PROCEDURE — 97110 THERAPEUTIC EXERCISES: CPT | Mod: KX,PN

## 2022-02-22 NOTE — PROGRESS NOTES
"  [facility]   Physical Therapy Treatment Note     Name: Verenice Mirza  Clinic Number: [id number]    Therapy Diagnosis:   Encounter Diagnoses   Name Primary?    Decreased range of motion of right knee Yes    Gait abnormality      Physician: Bin Artis MD    Visit Date: 2/22/2022    Physician Orders: PT Eval and Treat   Medical Diagnosis from Referral: S83.231D (ICD-10-CM) - Complex tear of medial meniscus of right knee as current injury, subsequent encounter  Evaluation Date: 12/9/2021  Authorization Period Expiration: 12/31/2021  Plan of Care Expiration: 02/24/2022  Visit # / Visits authorized: 16/ 50    FOTO: Next at DC    PTA Visit #: 0/5     DOS: 12/8/2021    Time In: 1:57 pm  Time Out: 2:36 pm   Total Appointment Time (timed & untimed codes): 39 min (2 TE, 1 TA) KX Modifier  Precautions: Standard and S/p partial meniscetomy DOS: 12/8/21    SUBJECTIVE     Pt reports: doing fine today no complaints, no pain, just lingering soreness/achiness from flu-like illness   She was compliant with home exercise program.  Response to previous treatment: decreased pain   Functional change: improved ROM and function    Pain: 1/10  Location: right knee      OBJECTIVE     Objective Measures updated at progress report unless specified.        Treatment       Verenice received the treatments listed below:    THERAPEUTIC EXERCISES to develop strength, endurance, ROM, flexibility, posture and core stabilization for 30 minutes including re-assessment of R knee:     Sci Fit recumbent bike 6 minute for tissue extensibility  - quad sets   20x5"  - SLR with quad set 30x5"  - Bridges                     2x10 w/GTB at knees  w/3 sec hold  - Supine clamshells 3x10, GTB w/3 sec hold    Not today:    - Side lying Hip Abd   2x10 bilateral   - Standing TKE           2x10 w/GTB 3 sec hold    Machines:  - hamstring curl 2x15, 5 plates   - knee extension  3x8, 10#  - leg press  2x15 at 5.0 plates  " "      THERAPEUTIC ACTIVITIES to improve dynamic and functional performance for 9 minutes including:  STS: 3x10 no upper extremities support   Short distance ambulation working on speed and turns   Step ups at 1st stair 2x10 B (NP)    GAIT TRAINING to improve functional mobility and safety for 00 minutes, including:  - 150 ft, with emphasis on heel-toe rollover   - fwd/bkwd walking in // 10 ft x 6 w/ cues for heel-toe rollover and full weightbearing on R lower extremities  - single leg stance in preparation for stance phase of gait, 3x30"       Patient Education and Home Exercises     Home Exercises Provided and Patient Education Provided   Education provided:   - updated HEP    Written Home Exercises Provided: Patient instructed to cont prior HEP. Exercises were reviewed and Verenice was able to demonstrate them prior to the end of the session.  Verenice demonstrated good  understanding of the education provided. See EMR under Patient Instructions for exercises provided during therapy sessions    ASSESSMENT   Verenice reports to therapy s/p R partial medial meniscectomy with no complaints of knee pain. She continues to experience no knee pain with performance of exercises. She reports appropriate amount of muscle fatigue with performance of strengthening activities. Pt is appropriate for discharge at this time due to her performance in therapy and achievement of all PT goals.     Verenice Is progressing well towards her goals.   Pt prognosis is Good.     Pt will continue to benefit from skilled outpatient physical therapy to address the deficits listed in the problem list box on initial evaluation, provide pt/family education and to maximize pt's level of independence in the home and community environment.     Pt's spiritual, cultural and educational needs considered and pt agreeable to plan of care and goals.     Anticipated barriers to physical therapy:  transportation, previous wheelchair use, and comorbidities    Goals: "   Short Term Goals: (3 weeks)  1. Pt will be independent with HEP in order to supplement patient in improving functional mobility. - MET 2022  2. Pt will improve (R ) knee AROM to at least 2-115 in order to improve gait. - MET 2022  3. Pt will improve hip flexor/quadriceps mobility to WNL in order to improve hip/knee AROM and improved gait function - MET 2022  4. Pt will improve hip abduction strength from 3+/5 to 4+/5 to improve functional gait deviation. - MET 2022     Long Term Goals: (6 weeks)  1. Pt will be independent with updated HEP supplement PT in improving functional mobility. - MET 2022  2. Pt will improve (R ) knee AROM to at least 0-120 in order to improve gait and ability to perform ADLs. - MET 2022  3. Pt will improve FOTO knee survey score to </= 43 % limited in order to demo improved functional mobility. - MET 2022  4. Pt will perform TUG in < 12 seconds without AD in order to decrease risk of falling - MET 2022  5. Pt will perform at least 10 sit to stands without UE support on 30 second sit to stand test in order to demo improved ability to perform transfers. - MET 2022    PLAN     DC patient at this time    Osmin Heath, PT, DPT           [facility]  Physical Therapy Discharge Note    Name: Verenice Mirza  Clinic Number: [id number]    Therapy Diagnosis:   Encounter Diagnoses   Name Primary?    Decreased range of motion of right knee Yes    Gait abnormality      Physician: Bin Artis MD      Physician Orders: PT Eval and Treat   Medical Diagnosis from Referral: S83.231D (ICD-10-CM) - Complex tear of medial meniscus of right knee as current injury, subsequent encounter  Evaluation Date: 2021    Date of Last visit: 2022  Total Visits Received: 16    OBJECTIVE     TU sec    30 second sit to stand: 11 reps    Hip abduction strength 4+/5 bilaterally       ASSESSMENT      Verenice reports to therapy  s/p R partial medial meniscectomy. Patient has made notable improvements in mobility, strength, and overall function since beginning physical therapy. She has demonstrated adequate hip/LE strength to improve performance with gait and transfers, and is pain-free with all daily activities. Patient has achieved all of her goals and is appropriate to discharge at this time with continued performance of HEP to maintain strength gains from therapy.     Discharge reason: Patient has met all of his/her goals    Discharge FOTO Score: 31% limitation     Goals:   Short Term Goals: (3 weeks)  1. Pt will be independent with HEP in order to supplement patient in improving functional mobility. - MET 01/5/2022  2. Pt will improve (R ) knee AROM to at least 2-115 in order to improve gait. - MET 01/5/2022  3. Pt will improve hip flexor/quadriceps mobility to WNL in order to improve hip/knee AROM and improved gait function - MET 02/22/2022  4. Pt will improve hip abduction strength from 3+/5 to 4+/5 to improve functional gait deviation. - MET 02/22/2022     Long Term Goals: (6 weeks)  1. Pt will be independent with updated HEP supplement PT in improving functional mobility. - MET 02/22/2022  2. Pt will improve (R ) knee AROM to at least 0-120 in order to improve gait and ability to perform ADLs. - MET 01/5/2022  3. Pt will improve FOTO knee survey score to </= 43 % limited in order to demo improved functional mobility. - progressing, not met  4. Pt will perform TUG in < 12 seconds without AD in order to decrease risk of falling - MET 02/22/2022  5. Pt will perform at least 10 sit to stands without UE support on 30 second sit to stand test in order to demo improved ability to perform transfers. - MET 02/22/2022    PLAN   This patient is discharged from Physical Therapy      Osmin Heath, PT, DPT

## 2022-02-23 ENCOUNTER — OFFICE VISIT (OUTPATIENT)
Dept: HEMATOLOGY/ONCOLOGY | Facility: CLINIC | Age: 67
End: 2022-02-23
Payer: MEDICARE

## 2022-02-23 DIAGNOSIS — D84.9 IMMUNOSUPPRESSED STATUS: ICD-10-CM

## 2022-02-23 DIAGNOSIS — N60.92 ATYPICAL DUCTAL HYPERPLASIA OF LEFT BREAST: Primary | ICD-10-CM

## 2022-02-23 DIAGNOSIS — E03.4 HYPOTHYROIDISM DUE TO ACQUIRED ATROPHY OF THYROID: ICD-10-CM

## 2022-02-23 PROCEDURE — 99212 PR OFFICE/OUTPT VISIT, EST, LEVL II, 10-19 MIN: ICD-10-PCS | Mod: 95,,, | Performed by: INTERNAL MEDICINE

## 2022-02-23 PROCEDURE — 99212 OFFICE O/P EST SF 10 MIN: CPT | Mod: 95,,, | Performed by: INTERNAL MEDICINE

## 2022-02-23 NOTE — TELEPHONE ENCOUNTER
No new care gaps identified.  Powered by [employer] by [employer]. Reference number: [id number].   2/23/2022 10:42:52 AM CST

## 2022-02-25 RX ORDER — LEVOTHYROXINE SODIUM 88 UG/1
TABLET ORAL
Qty: 90 TABLET | Refills: 0 | Status: SHIPPED | OUTPATIENT
Start: 2022-02-25 | End: 2022-08-10

## 2022-03-07 DIAGNOSIS — I15.2 HYPERTENSION ASSOCIATED WITH DIABETES: ICD-10-CM

## 2022-03-07 DIAGNOSIS — E11.69 DYSLIPIDEMIA ASSOCIATED WITH TYPE 2 DIABETES MELLITUS: ICD-10-CM

## 2022-03-07 DIAGNOSIS — E11.59 HYPERTENSION ASSOCIATED WITH DIABETES: ICD-10-CM

## 2022-03-07 DIAGNOSIS — E78.5 DYSLIPIDEMIA ASSOCIATED WITH TYPE 2 DIABETES MELLITUS: ICD-10-CM

## 2022-03-07 NOTE — TELEPHONE ENCOUNTER
Care Due:                  Date            Visit Type   Department     Provider  --------------------------------------------------------------------------------                                EP -                              PRIMARY      [facility] INTERNAL  Last Visit: 12-      CARE ([facility])   MEDICINE       Areli Crespo                              ESTABLISHED                              PATIENT -    [facility] INTERNAL  Next Visit: 04-      VIRTUAL      MEDICINE       Areli Crespo                                                            Last  Test          Frequency    Reason                     Performed    Due Date  --------------------------------------------------------------------------------    HBA1C.......  6 months...  metFORMIN................  11- 05-    Powered by Dragon Tail by [email or web address]. Reference number: [id number].   3/07/2022 12:29:34 PM CST

## 2022-03-09 ENCOUNTER — PATIENT MESSAGE (OUTPATIENT)
Dept: INTERNAL MEDICINE | Facility: CLINIC | Age: 67
End: 2022-03-09
Payer: MEDICARE

## 2022-03-10 ENCOUNTER — PES CALL (OUTPATIENT)
Dept: ADMINISTRATIVE | Facility: CLINIC | Age: 67
End: 2022-03-10
Payer: MEDICARE

## 2022-03-11 ENCOUNTER — PATIENT MESSAGE (OUTPATIENT)
Dept: INTERNAL MEDICINE | Facility: CLINIC | Age: 67
End: 2022-03-11
Payer: MEDICARE

## 2022-03-12 ENCOUNTER — PATIENT MESSAGE (OUTPATIENT)
Dept: INTERNAL MEDICINE | Facility: CLINIC | Age: 67
End: 2022-03-12
Payer: MEDICARE

## 2022-03-12 NOTE — TELEPHONE ENCOUNTER
Refill Routing Note   Medication(s) are not appropriate for processing by [facility] for the following reason(s):      - Drug-Disease Interaction (metFORMIN and Irritable bowel syndrome with diarrhea)    [facility] action(s):  Defer Medication-related problems identified: Drug-disease interaction     Medication Therapy Plan: Recent script that was sent in was set to print  --->Care Gap information included in message below if applicable.   Medication reconciliation completed: No   Automatic Epic Generated Protocol Data:        Requested Prescriptions   Pending Prescriptions Disp Refills    metFORMIN (GLUCOPHAGE) 500 MG tablet [Pharmacy Med Name: metFORMIN HCl 500 MG Oral Tablet] 180 tablet 1     Sig: Take 1 tablet by mouth twice daily       Endocrinology:  Diabetes - Biguanides Passed - 3/7/2022 12:28 PM        Passed - Patient is at least 18 years old        Passed - Valid encounter within last 15 months     Recent Visits  Date Type Provider Dept   12/02/21 Office Visit Areli Crespo MD [facility] Internal Medicine   07/09/21 Office Visit Areli Crespo MD [facility] Internal Medicine   05/12/21 Office Visit Areli Crespo MD [facility] Internal Medicine   04/26/21 Office Visit Areli Crespo MD [facility] Internal Medicine   03/10/21 Office Visit Areli Crespo MD [facility] Internal Medicine   12/09/20 Office Visit Areli Crespo MD [facility] Internal Medicine   09/23/20 Office Visit Areli Crespo MD [facility] Internal Medicine   05/20/20 Office Visit Areli Crespo MD [facility] Internal Medicine   Showing recent visits within past 720 days and meeting all other requirements  Future Appointments  No visits were found meeting these conditions.  Showing future appointments within next 150 days and meeting all other requirements      Future Appointments              In 2 weeks [facility] [facility] [place]    In 2 weeks [facility] [place]    In 3 weeks Areli Crespo MD [facility] Internal  Medicine, [place]    In 3 weeks [facility] - Family Medicine, [place]    In 1 month Genevieve Nascimento MD [facility] - Dermatology, [place]    In 2 months [facility], [facility] [place] - Lab, [place]    In 2 months Emory Arrington MD [id number] [address]                Passed - Cr is 1.39 or below and within 360 days     Lab Results   Component Value Date    CREATININE 0.7 01/27/2022    CREATININE 0.7 11/29/2021    CREATININE 0.8 09/23/2021    POCCRE 0.8 01/06/2020    POCCRE 0.7 07/22/2019              Passed - HBA1C within 180 days     Lab Results   Component Value Date    LABA1C 6.3 (H) 04/20/2018    LABA1C 6.2 (H) 04/19/2016    HGBA1C 5.9 (H) 11/29/2021    HGBA1C 7.1 (H) 07/07/2021    HGBA1C 7.0 (H) 03/08/2021              Passed - eGFR is 45 or above and within 360 days     Lab Results   Component Value Date    EGFRNONAA >60.0 01/27/2022    EGFRNONAA >60.0 11/29/2021    EGFRNONAA >60.0 09/23/2021                      Appointments  past 12m or future 3m with PCP    Date Provider   Last Visit   Visit date not found Nadya Garcia MD   Next Visit   Visit date not found Nadya Garcia MD   ED visits in past 90 days: 0        Note composed:8:39 PM 03/11/2022

## 2022-03-14 DIAGNOSIS — E78.5 DYSLIPIDEMIA ASSOCIATED WITH TYPE 2 DIABETES MELLITUS: ICD-10-CM

## 2022-03-14 DIAGNOSIS — E11.59 HYPERTENSION ASSOCIATED WITH DIABETES: ICD-10-CM

## 2022-03-14 DIAGNOSIS — E11.69 DYSLIPIDEMIA ASSOCIATED WITH TYPE 2 DIABETES MELLITUS: ICD-10-CM

## 2022-03-14 DIAGNOSIS — I15.2 HYPERTENSION ASSOCIATED WITH DIABETES: ICD-10-CM

## 2022-03-14 RX ORDER — METFORMIN HYDROCHLORIDE 500 MG/1
TABLET ORAL
Qty: 180 TABLET | Refills: 1 | Status: SHIPPED | OUTPATIENT
Start: 2022-03-14 | End: 2022-03-14 | Stop reason: SDUPTHER

## 2022-03-14 NOTE — TELEPHONE ENCOUNTER
No new care gaps identified.  Powered by Marley Spoon by [employer]. Reference number: [id number].   3/14/2022 4:01:19 PM CDT

## 2022-03-16 RX ORDER — METFORMIN HYDROCHLORIDE 500 MG/1
500 TABLET ORAL 2 TIMES DAILY
Qty: 180 TABLET | Refills: 1 | Status: SHIPPED | OUTPATIENT
Start: 2022-03-16 | End: 2023-03-20

## 2022-03-29 ENCOUNTER — PATIENT MESSAGE (OUTPATIENT)
Dept: HEMATOLOGY/ONCOLOGY | Facility: CLINIC | Age: 67
End: 2022-03-29
Payer: MEDICARE

## 2022-03-30 ENCOUNTER — LAB VISIT (OUTPATIENT)
Dept: LAB | Facility: HOSPITAL | Age: 67
End: 2022-03-30
Attending: INTERNAL MEDICINE
Payer: MEDICARE

## 2022-03-30 DIAGNOSIS — I15.2 HYPERTENSION ASSOCIATED WITH DIABETES: ICD-10-CM

## 2022-03-30 DIAGNOSIS — E11.59 HYPERTENSION ASSOCIATED WITH DIABETES: ICD-10-CM

## 2022-03-30 LAB
ALBUMIN SERPL BCP-MCNC: 3.8 G/DL (ref 3.5–5.2)
ALP SERPL-CCNC: 88 U/L (ref 55–135)
ALT SERPL W/O P-5'-P-CCNC: 28 U/L (ref 10–44)
ANION GAP SERPL CALC-SCNC: 8 MMOL/L (ref 8–16)
AST SERPL-CCNC: 25 U/L (ref 10–40)
BILIRUB SERPL-MCNC: 0.3 MG/DL (ref 0.1–1)
BUN SERPL-MCNC: 15 MG/DL (ref 8–23)
CALCIUM SERPL-MCNC: 10 MG/DL (ref 8.7–10.5)
CHLORIDE SERPL-SCNC: 106 MMOL/L (ref 95–110)
CHOLEST SERPL-MCNC: 150 MG/DL (ref 120–199)
CHOLEST/HDLC SERPL: 2.9 {RATIO} (ref 2–5)
CO2 SERPL-SCNC: 25 MMOL/L (ref 23–29)
CREAT SERPL-MCNC: 0.8 MG/DL (ref 0.5–1.4)
EST. GFR  (AFRICAN AMERICAN): >60 ML/MIN/1.73 M^2
EST. GFR  (NON AFRICAN AMERICAN): >60 ML/MIN/1.73 M^2
ESTIMATED AVG GLUCOSE: 128 MG/DL (ref 68–131)
GLUCOSE SERPL-MCNC: 124 MG/DL (ref 70–110)
HBA1C MFR BLD: 6.1 % (ref 4–5.6)
HDLC SERPL-MCNC: 52 MG/DL (ref 40–75)
HDLC SERPL: 34.7 % (ref 20–50)
LDLC SERPL CALC-MCNC: 75.8 MG/DL (ref 63–159)
NONHDLC SERPL-MCNC: 98 MG/DL
POTASSIUM SERPL-SCNC: 4.6 MMOL/L (ref 3.5–5.1)
PROT SERPL-MCNC: 6.9 G/DL (ref 6–8.4)
SODIUM SERPL-SCNC: 139 MMOL/L (ref 136–145)
TRIGL SERPL-MCNC: 111 MG/DL (ref 30–150)

## 2022-03-30 PROCEDURE — 83036 HEMOGLOBIN GLYCOSYLATED A1C: CPT | Performed by: INTERNAL MEDICINE

## 2022-03-30 PROCEDURE — 80053 COMPREHEN METABOLIC PANEL: CPT | Performed by: INTERNAL MEDICINE

## 2022-03-30 PROCEDURE — 36415 COLL VENOUS BLD VENIPUNCTURE: CPT | Mod: PO | Performed by: INTERNAL MEDICINE

## 2022-03-30 PROCEDURE — 80061 LIPID PANEL: CPT | Performed by: INTERNAL MEDICINE

## 2022-03-31 ENCOUNTER — TELEPHONE (OUTPATIENT)
Dept: ADMINISTRATIVE | Facility: CLINIC | Age: 67
End: 2022-03-31
Payer: MEDICARE

## 2022-04-04 ENCOUNTER — PATIENT MESSAGE (OUTPATIENT)
Dept: RHEUMATOLOGY | Facility: CLINIC | Age: 67
End: 2022-04-04
Payer: MEDICARE

## 2022-04-04 ENCOUNTER — OFFICE VISIT (OUTPATIENT)
Dept: INTERNAL MEDICINE | Facility: CLINIC | Age: 67
End: 2022-04-04
Payer: MEDICARE

## 2022-04-04 VITALS — HEART RATE: 75 BPM | DIASTOLIC BLOOD PRESSURE: 75 MMHG | SYSTOLIC BLOOD PRESSURE: 125 MMHG

## 2022-04-04 DIAGNOSIS — Z86.010 HISTORY OF COLON POLYPS: ICD-10-CM

## 2022-04-04 DIAGNOSIS — E11.59 HYPERTENSION ASSOCIATED WITH DIABETES: ICD-10-CM

## 2022-04-04 DIAGNOSIS — E11.59 HYPERTENSION ASSOCIATED WITH DIABETES: Primary | ICD-10-CM

## 2022-04-04 DIAGNOSIS — E11.69 DYSLIPIDEMIA ASSOCIATED WITH TYPE 2 DIABETES MELLITUS: Primary | ICD-10-CM

## 2022-04-04 DIAGNOSIS — M79.605 PAIN IN BOTH LOWER EXTREMITIES: ICD-10-CM

## 2022-04-04 DIAGNOSIS — M79.604 PAIN IN BOTH LOWER EXTREMITIES: ICD-10-CM

## 2022-04-04 DIAGNOSIS — I15.2 HYPERTENSION ASSOCIATED WITH DIABETES: Primary | ICD-10-CM

## 2022-04-04 DIAGNOSIS — L30.4 INTERTRIGO: ICD-10-CM

## 2022-04-04 DIAGNOSIS — E78.5 DYSLIPIDEMIA ASSOCIATED WITH TYPE 2 DIABETES MELLITUS: Primary | ICD-10-CM

## 2022-04-04 DIAGNOSIS — L65.8 FEMALE PATTERN BALDNESS: ICD-10-CM

## 2022-04-04 DIAGNOSIS — E03.9 HYPOTHYROIDISM, UNSPECIFIED TYPE: ICD-10-CM

## 2022-04-04 DIAGNOSIS — I15.2 HYPERTENSION ASSOCIATED WITH DIABETES: ICD-10-CM

## 2022-04-04 PROCEDURE — 99214 OFFICE O/P EST MOD 30 MIN: CPT | Mod: 95,,, | Performed by: INTERNAL MEDICINE

## 2022-04-04 PROCEDURE — 99214 PR OFFICE/OUTPT VISIT, EST, LEVL IV, 30-39 MIN: ICD-10-PCS | Mod: 95,,, | Performed by: INTERNAL MEDICINE

## 2022-04-04 RX ORDER — FINASTERIDE 5 MG/1
5 TABLET, FILM COATED ORAL NIGHTLY
Qty: 90 TABLET | Refills: 1 | Status: SHIPPED | OUTPATIENT
Start: 2022-04-04 | End: 2022-08-10 | Stop reason: SDUPTHER

## 2022-04-04 RX ORDER — KETOCONAZOLE 20 MG/G
CREAM TOPICAL
Qty: 60 G | Refills: 6 | Status: SHIPPED | OUTPATIENT
Start: 2022-04-04 | End: 2023-03-20 | Stop reason: SDUPTHER

## 2022-04-04 RX ORDER — NYSTATIN 100000 [USP'U]/G
POWDER TOPICAL
Qty: 120 G | Refills: 6 | Status: SHIPPED | OUTPATIENT
Start: 2022-04-04 | End: 2023-03-20 | Stop reason: SDUPTHER

## 2022-04-04 RX ORDER — SPIRONOLACTONE 100 MG/1
100 TABLET, FILM COATED ORAL DAILY
Qty: 90 TABLET | Refills: 3 | Status: SHIPPED | OUTPATIENT
Start: 2022-04-04 | End: 2022-08-10 | Stop reason: SDUPTHER

## 2022-04-04 NOTE — PROGRESS NOTES
Subjective:       Patient ID: Verenice Mirza is a 66 y.o. female.    Chief Complaint: Diabetes  Female virtual video visit follow-up hypertension and dyslipidemia social diabetes hypothyroidism patient would like to get a referral for colonoscopy for which she is due due to history of colon polyps.  She needs refills of certain medications as well.  Patient has reported some stiffness and pain in the posterior aspects of her leg we have discussed proceeding with an FRANTZ with exercise which she had more than a decade ago at that time there was the report of possibly mild PAD  HPIReview of Systems    otherwise negative  Objective:      Physical Exam  General: Well-appearing, well-nourished.  No distress  HEENT: conjunctivae are normal.   Hearing is grossly normal.  Nasopharynx yobany congestion  Oropharynx is clear.  Neck: Supple.  Visually No thyroid megaly.   Heart: Regular rate   Lungs:  respiratory effort normal.  Abdomen:  nontender  Extremities:   No edema.  Psych: Oriented to time person place.  Judgment and insight seem unimpaired.  Mood and affect are appropriate.  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    Diagnoses and all orders for this visit:    Dyslipidemia associated with type 2 diabetes mellitus  Controlled.  Continue current medical  regimen.  Prescription refills addressed.  Followup advised. See after visit summary.  Hypertension associated with diabetes  Controlled.  Continue current medical regimen.  Prescription refills addressed.  Followup advised. See after visit summary.  Hypothyroidism, unspecified type  Controlled.  Continue current medical regimen.  Prescription refills addressed.  Followup advised. See after visit summary.  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  -     spironolactone (ALDACTONE) 100 MG tablet; Take 1 tablet (100 mg total) by mouth once daily.    Intertrigo  -     ketoconazole (NIZORAL) 2 % cream; APPLY  THIN LAYER TOPICALLY ONCE DAILY AT BEDTIME UNDER  BREAST  AS  NEEDED  FOR  FLARE  -     nystatin (MYCOSTATIN) powder; aaa qam prn flare    Pain in both lower extremities  -     VAS US Ankle Brachial Indices with Exercise; Future           The patient location is:  [facility]  The chief complaint leading to consultation is:  Hypertension diabetes dyslipidemia pain in legs hypothyroidism    Visit type: audiovisual    Face to Face time with patient:  Twenty  20 minutes of total time spent on the encounter, which includes face to face time and non-face to face time preparing to see the patient (eg, review of tests), Obtaining and/or reviewing separately obtained history, Documenting clinical information in the electronic or other health record, Independently interpreting results (not separately reported) and communicating results to the patient/family/caregiver, or Care coordination (not separately reported).         Each patient to whom he or she provides medical services by telemedicine is:  (1) informed of the relationship between the physician and patient and the respective role of any other health care provider with respect to management of the patient; and (2) notified that he or  she may decline to receive medical services by telemedicine and may withdraw from such care at any time.    Notes:

## 2022-04-05 DIAGNOSIS — Z71.89 COMPLEX CARE COORDINATION: ICD-10-CM

## 2022-04-07 ENCOUNTER — PES CALL (OUTPATIENT)
Dept: ADMINISTRATIVE | Facility: CLINIC | Age: 67
End: 2022-04-07
Payer: MEDICARE

## 2022-04-10 ENCOUNTER — PATIENT MESSAGE (OUTPATIENT)
Dept: INTERNAL MEDICINE | Facility: CLINIC | Age: 67
End: 2022-04-10
Payer: MEDICARE

## 2022-04-10 DIAGNOSIS — E78.5 HYPERLIPIDEMIA, UNSPECIFIED HYPERLIPIDEMIA TYPE: ICD-10-CM

## 2022-04-10 NOTE — TELEPHONE ENCOUNTER
No new care gaps identified.  Powered by An[employer] by [employer]. Reference number: [id number].   4/10/2022 12:24:02 PM CDT

## 2022-04-11 ENCOUNTER — PATIENT MESSAGE (OUTPATIENT)
Dept: INTERNAL MEDICINE | Facility: CLINIC | Age: 67
End: 2022-04-11
Payer: MEDICARE

## 2022-04-11 RX ORDER — ATORVASTATIN CALCIUM 40 MG/1
TABLET, FILM COATED ORAL
Qty: 90 TABLET | Refills: 0 | OUTPATIENT
Start: 2022-04-11

## 2022-04-11 RX ORDER — ATORVASTATIN CALCIUM 40 MG/1
40 TABLET, FILM COATED ORAL DAILY
Qty: 90 TABLET | Refills: 3 | Status: SHIPPED | OUTPATIENT
Start: 2022-04-11 | End: 2022-04-19 | Stop reason: SDUPTHER

## 2022-04-11 NOTE — TELEPHONE ENCOUNTER
No new care gaps identified.  Powered by [employer] by [employer]. Reference number: [id number].   4/11/2022 7:23:09 AM CDT

## 2022-04-19 DIAGNOSIS — E78.5 HYPERLIPIDEMIA, UNSPECIFIED HYPERLIPIDEMIA TYPE: ICD-10-CM

## 2022-04-19 RX ORDER — ATORVASTATIN CALCIUM 40 MG/1
40 TABLET, FILM COATED ORAL DAILY
Qty: 90 TABLET | Refills: 0 | Status: SHIPPED | OUTPATIENT
Start: 2022-04-19 | End: 2022-08-10 | Stop reason: SDUPTHER

## 2022-04-19 NOTE — TELEPHONE ENCOUNTER
No new care gaps identified.  Powered by Let[employer] by [employer]. Reference number: [id number].   4/19/2022 9:25:09 AM CDT

## 2022-04-21 ENCOUNTER — HOSPITAL ENCOUNTER (OUTPATIENT)
Dept: VASCULAR SURGERY | Facility: CLINIC | Age: 67
Discharge: HOME | End: 2022-04-21
Attending: INTERNAL MEDICINE
Payer: MEDICARE

## 2022-04-21 DIAGNOSIS — M79.604 PAIN IN BOTH LOWER EXTREMITIES: ICD-10-CM

## 2022-04-21 DIAGNOSIS — M79.605 PAIN IN BOTH LOWER EXTREMITIES: ICD-10-CM

## 2022-04-21 PROCEDURE — 93923 UPR/LXTR ART STDY 3+ LVLS: CPT | Mod: PBBFAC | Performed by: SURGERY

## 2022-04-21 PROCEDURE — 93923 PR NON-INVASIVE PHYSIOLOGIC STUDY EXTREMITY 3 LEVELS: ICD-10-PCS | Mod: 26,S$PBB,, | Performed by: SURGERY

## 2022-04-21 PROCEDURE — 93923 UPR/LXTR ART STDY 3+ LVLS: CPT | Mod: 26,S$PBB,, | Performed by: SURGERY

## 2022-04-25 NOTE — PROGRESS NOTES
Test shows minimal peripheral artery disease.  Treatment includes cholesterol medication and aspirin.  Please let me know if any questions.

## 2022-04-27 ENCOUNTER — OFFICE VISIT (OUTPATIENT)
Dept: DERMATOLOGY | Facility: CLINIC | Age: 67
End: 2022-04-27
Payer: MEDICARE

## 2022-04-27 DIAGNOSIS — D22.9 NEVUS: ICD-10-CM

## 2022-04-27 DIAGNOSIS — D23.9 DERMATOFIBROMA: ICD-10-CM

## 2022-04-27 DIAGNOSIS — D18.01 CHERRY ANGIOMA: ICD-10-CM

## 2022-04-27 DIAGNOSIS — D48.5 NEOPLASM OF UNCERTAIN BEHAVIOR OF SKIN: Primary | ICD-10-CM

## 2022-04-27 DIAGNOSIS — L30.9 DERMATITIS: ICD-10-CM

## 2022-04-27 DIAGNOSIS — L81.4 LENTIGO: ICD-10-CM

## 2022-04-27 DIAGNOSIS — L90.5 SCAR: ICD-10-CM

## 2022-04-27 DIAGNOSIS — L82.1 SK (SEBORRHEIC KERATOSIS): ICD-10-CM

## 2022-04-27 DIAGNOSIS — Z85.828 PERSONAL HISTORY OF SKIN CANCER: ICD-10-CM

## 2022-04-27 PROCEDURE — 99213 PR OFFICE/OUTPT VISIT, EST, LEVL III, 20-29 MIN: ICD-10-PCS | Mod: 25,S$PBB,, | Performed by: DERMATOLOGY

## 2022-04-27 PROCEDURE — 88305 TISSUE EXAM BY PATHOLOGIST: ICD-10-PCS | Mod: 26,,, | Performed by: PATHOLOGY

## 2022-04-27 PROCEDURE — 11102 TANGNTL BX SKIN SINGLE LES: CPT | Mod: PBBFAC,PO | Performed by: DERMATOLOGY

## 2022-04-27 PROCEDURE — 88305 TISSUE EXAM BY PATHOLOGIST: CPT | Mod: 26,,, | Performed by: PATHOLOGY

## 2022-04-27 PROCEDURE — 11102 TANGNTL BX SKIN SINGLE LES: CPT | Mod: S$PBB,,, | Performed by: DERMATOLOGY

## 2022-04-27 PROCEDURE — 11102 PR TANGENTIAL BIOPSY, SKIN, SINGLE LESION: ICD-10-PCS | Mod: S$PBB,,, | Performed by: DERMATOLOGY

## 2022-04-27 PROCEDURE — 99999 PR PBB SHADOW E&M-EST. PATIENT-LVL IV: ICD-10-PCS | Mod: PBBFAC,,, | Performed by: DERMATOLOGY

## 2022-04-27 PROCEDURE — 99213 OFFICE O/P EST LOW 20 MIN: CPT | Mod: 25,S$PBB,, | Performed by: DERMATOLOGY

## 2022-04-27 PROCEDURE — 88305 TISSUE EXAM BY PATHOLOGIST: CPT | Performed by: PATHOLOGY

## 2022-04-27 PROCEDURE — 99999 PR PBB SHADOW E&M-EST. PATIENT-LVL IV: CPT | Mod: PBBFAC,,, | Performed by: DERMATOLOGY

## 2022-04-27 PROCEDURE — 99214 OFFICE O/P EST MOD 30 MIN: CPT | Mod: PBBFAC,PO | Performed by: DERMATOLOGY

## 2022-04-27 NOTE — PROGRESS NOTES
"  Subjective:       Patient ID:  Verenice Mirza is a 66 y.o. female who presents for   Chief Complaint   Patient presents with    Skin Check     tbse    Lesion     Chest  L heel     Non-healing spots to shoulders     Patient is here today for a "mole" check.   Pt has a history of normal sun exposure in the past.   Pt recalls several blistering sunburns in the past- yes  Pt has history of tanning bed use- yes  Pt has had moles removed in the past- yes, benign per pt  Pt has history of melanoma in first degree relatives- no      c/o lesion to chest x months, no sx. No prev tx  C/o lesion to L heel x months, no sx. No prev tx  C/o non healing lesions to shoulders x 1yr. "Bleeding when messing with scab" no prev tx    Lesion        Review of Systems   Skin: Positive for daily sunscreen use and activity-related sunscreen use. Negative for tendency to form keloidal scars.   Hematologic/Lymphatic: Bruises/bleeds easily (bruise).        Objective:    Physical Exam   Constitutional: She appears well-developed and well-nourished. She is obese.  No distress.   Neurological: She is alert and oriented to person, place, and time. She is not disoriented.   Psychiatric: She has a normal mood and affect.   Skin:   Areas Examined (abnormalities noted in diagram):   Scalp / Hair Palpated and Inspected  Head / Face Inspection Performed  Neck Inspection Performed  Chest / Axilla Inspection Performed  Abdomen Inspection Performed  Genitals / Buttocks / Groin Inspection Performed  Back Inspection Performed  RUE Inspected  LUE Inspection Performed  RLE Inspected  LLE Inspection Performed  Nails and Digits Inspection Performed                       Diagram Legend     Erythematous scaling macule/papule c/w actinic keratosis       Vascular papule c/w angioma      Pigmented verrucoid papule/plaque c/w seborrheic keratosis      Yellow umbilicated papule c/w sebaceous hyperplasia      Irregularly shaped tan macule c/w lentigo     1-2 mm smooth " white papules consistent with Milia      Movable subcutaneous cyst with punctum c/w epidermal inclusion cyst      Subcutaneous movable cyst c/w pilar cyst      Firm pink to brown papule c/w dermatofibroma      Pedunculated fleshy papule(s) c/w skin tag(s)      Evenly pigmented macule c/w junctional nevus     Mildly variegated pigmented, slightly irregular-bordered macule c/w mildly atypical nevus      Flesh colored to evenly pigmented papule c/w intradermal nevus       Pink pearly papule/plaque c/w basal cell carcinoma      Erythematous hyperkeratotic cursted plaque c/w SCC      Surgical scar with no sign of skin cancer recurrence      Open and closed comedones      Inflammatory papules and pustules      Verrucoid papule consistent consistent with wart     Erythematous eczematous patches and plaques     Dystrophic onycholytic nail with subungual debris c/w onychomycosis     Umbilicated papule    Erythematous-base heme-crusted tan verrucoid plaque consistent with inflamed seborrheic keratosis     Erythematous Silvery Scaling Plaque c/w Psoriasis     See annotation      Assessment / Plan:      Pathology Orders:     Normal Orders This Visit    Specimen to Pathology, Dermatology     Comments:    Number of Specimens:->1  ------------------------->-------------------------  Spec 1 Procedure:->Biopsy  Spec 1 Clinical Impression:->r/o inflamed keratosis v other  Spec 1 Source:->mid upper chest    Questions:    Procedure Type: Dermatology and skin neoplasms    Number of Specimens: 1    ------------------------: -------------------------    Spec 1 Procedure: Biopsy    Spec 1 Clinical Impression: r/o inflamed keratosis v other    Spec 1 Source: mid upper chest    Release to patient:         Neoplasm of uncertain behavior of skin  Shave biopsy procedure note:    Shave biopsy performed after verbal consent including risk of infection, scar, recurrence, need for additional treatment of site. Area prepped with alcohol,  anesthetized with approximately 1.0cc of 1% lidocaine with epinephrine. Lesional tissue shaved with razor blade. Hemostasis achieved with application of aluminum chloride followed by hyfrecation. No complications. Dressing applied. Wound care explained.      -     Specimen to Pathology, Dermatology    Dermatitis  OTC moisturizers and hydrocortisone cream if needed    Lentigo  This is a benign hyperpigmented sun induced lesion. Recommend daily sun protection/avoidance and use of at least SPF 30, broad spectrum sunscreen (OTC drug) will reduce the number of new lesions. Treatment of these benign lesions are considered cosmetic.    The nature of sun-induced photo-aging and skin cancers is discussed.  Sun avoidance, protective clothing, and the use of 30-SPF sunscreens is advised. Observe closely for skin damage/changes, and call if such occurs.    Cherry angioma  These are benign vascular lesions that are inherited.  Treatment is not necessary.    Nevus  Discussed ABCDE's of nevi.  Monitor for new mole or moles that are becoming bigger, darker, irritated, or developing irregular borders. Brochure provided. Instructed patient to observe lesion(s) for changes and follow up in clinic if changes are noted. Patient to monitor skin at home for new or changing lesions.     SK (seborrheic keratosis)  These are benign inherited growths without a malignant potential. Reassurance given to patient. No treatment is necessary.     Dermatofibroma  These are benign bundles of scar tissue that can arise spontaneously or after trauma from a bug bite or nicking yourself shaving, or other minimal trauma.   They are very common in middle aged adults and commonly found on the lower legs, arms above the elbows, and trunk.   Removal of lesions is not recommended as the lesion is just replaced with an additional scar, however if lesion is symptomatic, removal can be considered. Lesions can recur.     Personal history of skin  cancer  Scar  Patient with a history of non melanoma skin cancer.   Total body skin examination performed today including at least 12 points as noted in physical examination. Suspicious lesions noted.             Follow up in about 1 year (around 4/27/2023) for prn bx report, at f/u have no tinted spray in scalp so we can see it.

## 2022-04-27 NOTE — PATIENT INSTRUCTIONS
Shave Biopsy Wound Care    Your doctor has performed a shave biopsy today.  A band aid and vaseline ointment has been placed over the site.  This should remain in place for NO LONGER THAN 48 hours.  It is fine to remove the bandaid after 24 hours, if the area is no longer bleeding. It is recommended that you keep the area dry (do not wet)) for the first 24 hours.  After 24 hours, wash the area with warm soap and water and apply Vaseline jelly.  Many patients prefer to use Neosporin or Bacitracin ointment.  This is acceptable; however, know that you can develop an allergy to this medication even if you have used it safely for years.  It is important to keep the area moist.  Letting it dry out and get air slows healing time, and will worsen the scar.        If you notice increasing redness, tenderness, pain, or yellow drainage at the biopsy site, please notify your doctor.  These are signs of an infection.    If your biopsy site is bleeding, apply firm pressure for 15 minutes straight.  Repeat for another 15 minutes, if it is still bleeding.   If the surgical site continues to bleed, then please contact your doctor.      For [facility] users:   You will receive your biopsy results in [facility] as soon as they are available. Please be assured that your physician/provider will review your results and will then determine what further treatment, evaluation, or planning is required. You should be contacted by your physician's/provider's office within 5 business days of receiving your results; If not, please reach out to directly. This is one more way [facility] is putting you first.     [facility] [place] [address]/ [phone] FAX/ [email or web address]

## 2022-05-04 LAB
FINAL PATHOLOGIC DIAGNOSIS: NORMAL
GROSS: NORMAL
Lab: NORMAL
MICROSCOPIC EXAM: NORMAL

## 2022-05-04 NOTE — PROGRESS NOTES
Ms. Mirza, your pathology report indicates a benign, non cancerous lesion. No further treatment is needed at this time. Please schedule a follow up appointment in 12 months. Thank you for allowing me to care for you and take care, Dr. Nascimento    Skin, mid upper chest, shave biopsy:   -VERRUCOUS KERATOSIS, IRRITATED AND INFLAMED,

## 2022-05-09 DIAGNOSIS — E03.4 HYPOTHYROIDISM DUE TO ACQUIRED ATROPHY OF THYROID: ICD-10-CM

## 2022-05-09 DIAGNOSIS — M05.9 SEROPOSITIVE RHEUMATOID ARTHRITIS: ICD-10-CM

## 2022-05-09 RX ORDER — SULFASALAZINE 500 MG/1
1500 TABLET ORAL 2 TIMES DAILY
Qty: 540 TABLET | Refills: 0 | OUTPATIENT
Start: 2022-05-09 | End: 2022-08-07

## 2022-05-09 RX ORDER — SULFASALAZINE 500 MG/1
1500 TABLET ORAL 2 TIMES DAILY
Qty: 540 TABLET | Refills: 0 | Status: SHIPPED | OUTPATIENT
Start: 2022-05-09 | End: 2022-08-17

## 2022-05-09 NOTE — TELEPHONE ENCOUNTER
No new care gaps identified.  [facility] Embedded Care Gaps. Reference number: [id number]. 5/09/2022   4:20:28 PM CDT

## 2022-05-10 RX ORDER — LEVOTHYROXINE SODIUM 88 UG/1
TABLET ORAL
Qty: 90 TABLET | Refills: 2 | Status: SHIPPED | OUTPATIENT
Start: 2022-05-10 | End: 2022-08-10 | Stop reason: SDUPTHER

## 2022-05-10 NOTE — TELEPHONE ENCOUNTER
Refill Authorization Note   Verenice Mirza  is requesting a refill authorization.  Brief Assessment and Rationale for Refill:  Approve     Medication Therapy Plan:       Medication Reconciliation Completed: No   Comments:     No Care Gaps recommended.     Note composed:1:19 PM 05/10/2022

## 2022-05-11 NOTE — TELEPHONE ENCOUNTER
----- Message from Jada Nagel sent at 6/16/2020 12:53 PM CDT -----  Regarding: FW: Need to change visit to Audio  Contact: Pt    ----- Message -----  From: Virginia Ng  Sent: 6/16/2020  12:02 PM CDT  To: [facility] Staff  Subject: Need to change visit to Audio                    Name of Caller: Verenice Mirza  Reason for Visit/Symptoms: f/u visit  Best Contact Number or Confirm if Mychart Preferred: [phone]  Preferred Date/Time of Appointment: First available    Interested in Virtual Visit (yes/no):   Additional Information:  Says she will be out of town and need to change her visit to a phone visit       Lab: [id number] Lab: [id number]

## 2022-05-13 ENCOUNTER — LAB VISIT (OUTPATIENT)
Dept: LAB | Facility: HOSPITAL | Age: 67
End: 2022-05-13
Attending: INTERNAL MEDICINE
Payer: MEDICARE

## 2022-05-13 ENCOUNTER — PATIENT OUTREACH (OUTPATIENT)
Dept: ADMINISTRATIVE | Facility: OTHER | Age: 67
End: 2022-05-13
Payer: MEDICARE

## 2022-05-13 DIAGNOSIS — M05.9 SEROPOSITIVE RHEUMATOID ARTHRITIS: ICD-10-CM

## 2022-05-13 LAB
ALBUMIN SERPL BCP-MCNC: 3.8 G/DL (ref 3.5–5.2)
ALP SERPL-CCNC: 85 U/L (ref 55–135)
ALT SERPL W/O P-5'-P-CCNC: 23 U/L (ref 10–44)
ANION GAP SERPL CALC-SCNC: 9 MMOL/L (ref 8–16)
AST SERPL-CCNC: 20 U/L (ref 10–40)
BILIRUB SERPL-MCNC: 0.2 MG/DL (ref 0.1–1)
BUN SERPL-MCNC: 19 MG/DL (ref 8–23)
CALCIUM SERPL-MCNC: 9.4 MG/DL (ref 8.7–10.5)
CHLORIDE SERPL-SCNC: 107 MMOL/L (ref 95–110)
CO2 SERPL-SCNC: 20 MMOL/L (ref 23–29)
CREAT SERPL-MCNC: 0.9 MG/DL (ref 0.5–1.4)
CRP SERPL-MCNC: 1.7 MG/L (ref 0–8.2)
ERYTHROCYTE [DISTWIDTH] IN BLOOD BY AUTOMATED COUNT: 15 % (ref 11.5–14.5)
ERYTHROCYTE [SEDIMENTATION RATE] IN BLOOD BY WESTERGREN METHOD: 10 MM/HR (ref 0–36)
EST. GFR  (AFRICAN AMERICAN): >60 ML/MIN/1.73 M^2
EST. GFR  (NON AFRICAN AMERICAN): >60 ML/MIN/1.73 M^2
GLUCOSE SERPL-MCNC: 94 MG/DL (ref 70–110)
HCT VFR BLD AUTO: 41.7 % (ref 37–48.5)
HGB BLD-MCNC: 13.1 G/DL (ref 12–16)
MCH RBC QN AUTO: 29.3 PG (ref 27–31)
MCHC RBC AUTO-ENTMCNC: 31.4 G/DL (ref 32–36)
MCV RBC AUTO: 93 FL (ref 82–98)
PLATELET # BLD AUTO: 268 K/UL (ref 150–450)
PMV BLD AUTO: 11.2 FL (ref 9.2–12.9)
POTASSIUM SERPL-SCNC: 4.2 MMOL/L (ref 3.5–5.1)
PROT SERPL-MCNC: 6.8 G/DL (ref 6–8.4)
RBC # BLD AUTO: 4.47 M/UL (ref 4–5.4)
SODIUM SERPL-SCNC: 136 MMOL/L (ref 136–145)
WBC # BLD AUTO: 5.7 K/UL (ref 3.9–12.7)

## 2022-05-13 PROCEDURE — 80053 COMPREHEN METABOLIC PANEL: CPT | Performed by: INTERNAL MEDICINE

## 2022-05-13 PROCEDURE — 86140 C-REACTIVE PROTEIN: CPT | Performed by: INTERNAL MEDICINE

## 2022-05-13 PROCEDURE — 85027 COMPLETE CBC AUTOMATED: CPT | Performed by: INTERNAL MEDICINE

## 2022-05-13 PROCEDURE — 36415 COLL VENOUS BLD VENIPUNCTURE: CPT | Mod: PO | Performed by: INTERNAL MEDICINE

## 2022-05-13 PROCEDURE — 85652 RBC SED RATE AUTOMATED: CPT | Performed by: INTERNAL MEDICINE

## 2022-05-13 NOTE — PROGRESS NOTES
Health Maintenance Due   Topic Date Due    COVID-19 Vaccine (4 - Booster for Pfizer series) 01/07/2022    Colorectal Cancer Screening  05/24/2022    Foot Exam  07/09/2022     Updates were requested from care everywhere.  Chart was reviewed for overdue [facility]active [facility] Encounters ([facility]ST[facility]) topics (CRS, Breast Cancer Screening, Eye exam)  Health Maintenance has been updated.  LINKS immunization registry triggered.  Immunizations were reconciled.  Case Request Endoscopy: COLONOSCOPYOrdered 4/4/2022

## 2022-05-16 ENCOUNTER — TELEPHONE (OUTPATIENT)
Dept: ENDOSCOPY | Facility: HOSPITAL | Age: 67
End: 2022-05-16
Payer: MEDICARE

## 2022-05-16 ENCOUNTER — OFFICE VISIT (OUTPATIENT)
Dept: RHEUMATOLOGY | Facility: CLINIC | Age: 67
End: 2022-05-16
Payer: MEDICARE

## 2022-05-16 DIAGNOSIS — E66.01 MORBID OBESITY WITH BMI OF 40.0-44.9, ADULT: ICD-10-CM

## 2022-05-16 DIAGNOSIS — M05.9 SEROPOSITIVE RHEUMATOID ARTHRITIS: ICD-10-CM

## 2022-05-16 PROCEDURE — 99214 OFFICE O/P EST MOD 30 MIN: CPT | Mod: 95,,, | Performed by: INTERNAL MEDICINE

## 2022-05-16 PROCEDURE — 99214 PR OFFICE/OUTPT VISIT, EST, LEVL IV, 30-39 MIN: ICD-10-PCS | Mod: 95,,, | Performed by: INTERNAL MEDICINE

## 2022-05-16 RX ORDER — SODIUM, POTASSIUM,MAG SULFATES 17.5-3.13G
1 SOLUTION, RECONSTITUTED, ORAL ORAL DAILY
Qty: 1 KIT | Refills: 0 | Status: SHIPPED | OUTPATIENT
Start: 2022-05-16 | End: 2022-05-18

## 2022-05-16 RX ORDER — SODIUM, POTASSIUM,MAG SULFATES 17.5-3.13G
1 SOLUTION, RECONSTITUTED, ORAL ORAL DAILY
Qty: 1 KIT | Refills: 0 | Status: SHIPPED | OUTPATIENT
Start: 2022-05-16 | End: 2022-05-16

## 2022-05-16 ASSESSMENT — ROUTINE ASSESSMENT OF PATIENT INDEX DATA (RAPID3)
WHEN YOU AWAKENED IN THE MORNING OVER THE LAST WEEK, PLEASE INDICATE THE AMOUNT OF TIME IT TAKES UNTIL YOU ARE AS LIMBER AS YOU WILL BE FOR THE DAY: 1 HOUR
PATIENT GLOBAL ASSESSMENT SCORE: 0
PAIN SCORE: 0
MDHAQ FUNCTION SCORE: 0.5
FATIGUE SCORE: 0.5
TOTAL RAPID3 SCORE: 0.56
PSYCHOLOGICAL DISTRESS SCORE: 0
AM STIFFNESS SCORE: 1, YES

## 2022-05-16 NOTE — TELEPHONE ENCOUNTER
Endoscopy Scheduling Questionnaire:         1. Are you taking any blood thinners? n               If Yes  Have you been on them for longer than one year?     2. Have you been diagnosed with Diverticulitis in past three months?  n    3. Are you on dialysis or have Kidney Disease? n    4. Previous Colonoscopy?  y         If yes Do you have a history of colon polyps?  y      6. Are you a diabetic?  n    7. Do you have a history of constipation?  n      Procedure scheduled with Dr. Hernandez on  07/28/2022    The prep being used is Suprep    The patient's prep instructions were sent by [facility]

## 2022-05-16 NOTE — ASSESSMENT & PLAN NOTE
She continues to do well on SSZ and is probably improving diet as well     Will cont SSZ and encouraged her to cont anti-inflammatory diet    RTC 4 mo with lab

## 2022-05-16 NOTE — PROGRESS NOTES
Rapid3 Question Responses and Scores 5/9/2022   MDHAQ Score 0.5   Psychologic Score 0   Pain Score 0   When you awakened in the morning OVER THE LAST WEEK, did you feel stiff? Yes   If Yes, please indicate the number of hours until you are as limber as you will be for the day 1   Fatigue Score 0.5   Global Health Score 0   RAPID3 Score 0.56     Answers for HPI/ROS submitted by the patient on 5/9/2022  fever: No  eye redness: No  mouth sores: No  headaches: No  shortness of breath: No  chest pain: No  trouble swallowing: No  diarrhea: Yes  constipation: No  unexpected weight change: No  genital sore: No  dysuria: No  During the last 3 days, have you had a skin rash?: No  Bruises or bleeds easily: Yes  cough: No

## 2022-05-17 ENCOUNTER — PATIENT MESSAGE (OUTPATIENT)
Dept: INTERNAL MEDICINE | Facility: CLINIC | Age: 67
End: 2022-05-17
Payer: MEDICARE

## 2022-05-26 ENCOUNTER — IMMUNIZATION (OUTPATIENT)
Dept: INTERNAL MEDICINE | Facility: CLINIC | Age: 67
End: 2022-05-26
Payer: MEDICARE

## 2022-05-26 DIAGNOSIS — Z23 NEED FOR VACCINATION: Primary | ICD-10-CM

## 2022-05-26 PROCEDURE — 91305 COVID-19, MRNA, LNP-S, PF, 30 MCG/0.3 ML DOSE VACCINE (PFIZER): CPT | Mod: PBBFAC,PO

## 2022-06-07 ENCOUNTER — PATIENT MESSAGE (OUTPATIENT)
Dept: HEMATOLOGY/ONCOLOGY | Facility: CLINIC | Age: 67
End: 2022-06-07
Payer: MEDICARE

## 2022-06-09 ENCOUNTER — PATIENT MESSAGE (OUTPATIENT)
Dept: HEMATOLOGY/ONCOLOGY | Facility: CLINIC | Age: 67
End: 2022-06-09
Payer: MEDICARE

## 2022-06-10 ENCOUNTER — PATIENT MESSAGE (OUTPATIENT)
Dept: HEMATOLOGY/ONCOLOGY | Facility: CLINIC | Age: 67
End: 2022-06-10
Payer: MEDICARE

## 2022-07-05 ENCOUNTER — HOSPITAL ENCOUNTER (OUTPATIENT)
Dept: RADIOLOGY | Facility: HOSPITAL | Age: 67
Discharge: HOME | End: 2022-07-05
Attending: INTERNAL MEDICINE
Payer: MEDICARE

## 2022-07-05 VITALS — HEIGHT: 62 IN | WEIGHT: 220 LBS | BODY MASS INDEX: 40.48 KG/M2

## 2022-07-05 DIAGNOSIS — N60.92 ATYPICAL DUCTAL HYPERPLASIA OF LEFT BREAST: ICD-10-CM

## 2022-07-05 DIAGNOSIS — Z12.31 ENCOUNTER FOR SCREENING MAMMOGRAM FOR HIGH-RISK PATIENT: ICD-10-CM

## 2022-07-05 PROCEDURE — 77067 SCR MAMMO BI INCL CAD: CPT | Mod: TC

## 2022-07-05 PROCEDURE — 77067 SCR MAMMO BI INCL CAD: CPT | Mod: 26,,, | Performed by: RADIOLOGY

## 2022-07-05 PROCEDURE — 77063 BREAST TOMOSYNTHESIS BI: CPT | Mod: 26,,, | Performed by: RADIOLOGY

## 2022-07-05 PROCEDURE — 77067 MAMMO DIGITAL SCREENING BILAT WITH TOMO: ICD-10-PCS | Mod: 26,,, | Performed by: RADIOLOGY

## 2022-07-05 PROCEDURE — 77063 MAMMO DIGITAL SCREENING BILAT WITH TOMO: ICD-10-PCS | Mod: 26,,, | Performed by: RADIOLOGY

## 2022-07-21 RX ORDER — SODIUM, POTASSIUM,MAG SULFATES 17.5-3.13G
1 SOLUTION, RECONSTITUTED, ORAL ORAL DAILY
Qty: 1 KIT | Refills: 0 | Status: SHIPPED | OUTPATIENT
Start: 2022-07-21 | End: 2022-07-23

## 2022-07-21 RX ORDER — SOD SULF/POT CHLORIDE/MAG SULF 1.479 G
12 TABLET ORAL DAILY
Qty: 24 TABLET | Refills: 0 | Status: SHIPPED | OUTPATIENT
Start: 2022-07-21 | End: 2022-09-13

## 2022-07-21 NOTE — TELEPHONE ENCOUNTER
----- Message from Blanca Neely sent at 7/21/2022  9:36 AM CDT -----  Type:  Needs Medical Advice    Who Called: pt   Symptoms (please be specific):  would like to get a call back , she has a few questions regarding colonoscopy and also being told by pharmacy they never receive script for prep      Pharmacy name and phone #:  [id number] - [place], [place] - [address] Phone: [phone]  Fax:  [phone]    Would the patient rather a call back or a response via [facility]?  Call   Best Call Back Number:  [phone]  Additional Information:  and would like to confirm sodium,potassium,mag sulfates (SUPREP BOWEL PREP KIT) 17.5-3.13-1.6 gram SolR will be covered by insurance if not pt would like to change to pill form

## 2022-07-26 ENCOUNTER — TELEPHONE (OUTPATIENT)
Dept: ENDOSCOPY | Facility: HOSPITAL | Age: 67
End: 2022-07-26
Payer: MEDICARE

## 2022-07-26 NOTE — TELEPHONE ENCOUNTER
Spoke with patient about arrival time @ 0745.   Covid test = boosted    Prep instructions reviewed: the day before the procedure, follow a clear liquid diet all day, then start the first 1/2 of prep at 5pm and take 2nd 1/2 of prep @ 0245.  Pt must be completely NPO when prep completed @ 0445.              Medications: Do not take Insulin or oral diabetic medications the day of the procedure.  Take as prescribed: heart, seizure and blood pressure medication in the morning with a sip of water (less than an ounce).  Take any breathing medications and bring inhalers to hospital with you Leave all valuables and jewelry at home.     Wear comfortable clothes to procedure to change into hospital gown You cannot drive for 24 hours after your procedure because you will receive sedation for your procedure to make you comfortable.  A ride must be provided at discharge.

## 2022-07-28 ENCOUNTER — ANESTHESIA (OUTPATIENT)
Dept: ENDOSCOPY | Facility: HOSPITAL | Age: 67
End: 2022-07-28
Payer: MEDICARE

## 2022-07-28 ENCOUNTER — HOSPITAL ENCOUNTER (OUTPATIENT)
Facility: HOSPITAL | Age: 67
Discharge: HOME | End: 2022-07-28
Attending: INTERNAL MEDICINE | Admitting: INTERNAL MEDICINE
Payer: MEDICARE

## 2022-07-28 ENCOUNTER — ANESTHESIA EVENT (OUTPATIENT)
Dept: ENDOSCOPY | Facility: HOSPITAL | Age: 67
End: 2022-07-28
Payer: MEDICARE

## 2022-07-28 VITALS
BODY MASS INDEX: 40.48 KG/M2 | TEMPERATURE: 98 F | HEIGHT: 62 IN | WEIGHT: 220 LBS | SYSTOLIC BLOOD PRESSURE: 137 MMHG | DIASTOLIC BLOOD PRESSURE: 80 MMHG | OXYGEN SATURATION: 100 % | RESPIRATION RATE: 20 BRPM | HEART RATE: 61 BPM

## 2022-07-28 DIAGNOSIS — Z12.11 SCREEN FOR COLON CANCER: ICD-10-CM

## 2022-07-28 PROCEDURE — 88305 TISSUE EXAM BY PATHOLOGIST: ICD-10-PCS | Mod: 26,,, | Performed by: STUDENT IN AN ORGANIZED HEALTH CARE EDUCATION/TRAINING PROGRAM

## 2022-07-28 PROCEDURE — 25000003 PHARM REV CODE 250: Performed by: INTERNAL MEDICINE

## 2022-07-28 PROCEDURE — 45380 COLONOSCOPY AND BIOPSY: CPT | Mod: PT,,, | Performed by: INTERNAL MEDICINE

## 2022-07-28 PROCEDURE — 88305 TISSUE EXAM BY PATHOLOGIST: CPT | Mod: 26,,, | Performed by: STUDENT IN AN ORGANIZED HEALTH CARE EDUCATION/TRAINING PROGRAM

## 2022-07-28 PROCEDURE — 45380 PR COLONOSCOPY,BIOPSY: ICD-10-PCS | Mod: PT,,, | Performed by: INTERNAL MEDICINE

## 2022-07-28 PROCEDURE — 37000009 HC ANESTHESIA EA ADD 15 MINS: Performed by: INTERNAL MEDICINE

## 2022-07-28 PROCEDURE — 82962 GLUCOSE BLOOD TEST: CPT | Performed by: INTERNAL MEDICINE

## 2022-07-28 PROCEDURE — 37000008 HC ANESTHESIA 1ST 15 MINUTES: Performed by: INTERNAL MEDICINE

## 2022-07-28 PROCEDURE — 25000003 PHARM REV CODE 250: Performed by: NURSE ANESTHETIST, CERTIFIED REGISTERED

## 2022-07-28 PROCEDURE — 88305 TISSUE EXAM BY PATHOLOGIST: CPT | Mod: 59 | Performed by: STUDENT IN AN ORGANIZED HEALTH CARE EDUCATION/TRAINING PROGRAM

## 2022-07-28 PROCEDURE — 27201012 HC FORCEPS, HOT/COLD, DISP: Performed by: INTERNAL MEDICINE

## 2022-07-28 PROCEDURE — 45380 COLONOSCOPY AND BIOPSY: CPT | Mod: PT | Performed by: INTERNAL MEDICINE

## 2022-07-28 PROCEDURE — 63600175 PHARM REV CODE 636 W HCPCS: Performed by: NURSE ANESTHETIST, CERTIFIED REGISTERED

## 2022-07-28 RX ORDER — LIDOCAINE HYDROCHLORIDE 20 MG/ML
INJECTION INTRAVENOUS
Status: DISCONTINUED | OUTPATIENT
Start: 2022-07-28 | End: 2022-07-28

## 2022-07-28 RX ORDER — PROPOFOL 10 MG/ML
VIAL (ML) INTRAVENOUS
Status: DISCONTINUED | OUTPATIENT
Start: 2022-07-28 | End: 2022-07-28

## 2022-07-28 RX ORDER — SODIUM CHLORIDE 9 MG/ML
INJECTION, SOLUTION INTRAVENOUS CONTINUOUS
Status: DISCONTINUED | OUTPATIENT
Start: 2022-07-28 | End: 2022-07-28 | Stop reason: HOSPADM

## 2022-07-28 RX ORDER — PROPOFOL 10 MG/ML
VIAL (ML) INTRAVENOUS CONTINUOUS PRN
Status: DISCONTINUED | OUTPATIENT
Start: 2022-07-28 | End: 2022-07-28

## 2022-07-28 RX ORDER — SODIUM CHLORIDE 0.9 % (FLUSH) 0.9 %
10 SYRINGE (ML) INJECTION
Status: DISCONTINUED | OUTPATIENT
Start: 2022-07-29 | End: 2022-07-28 | Stop reason: HOSPADM

## 2022-07-28 RX ADMIN — LIDOCAINE HYDROCHLORIDE 100 MG: 20 INJECTION, SOLUTION INTRAVENOUS at 08:07

## 2022-07-28 RX ADMIN — PROPOFOL 80 MG: 10 INJECTION, EMULSION INTRAVENOUS at 08:07

## 2022-07-28 RX ADMIN — SODIUM CHLORIDE: 0.9 INJECTION, SOLUTION INTRAVENOUS at 08:07

## 2022-07-28 RX ADMIN — PROPOFOL 150 MCG/KG/MIN: 10 INJECTION, EMULSION INTRAVENOUS at 08:07

## 2022-07-28 NOTE — [employer] PATIENT INSTRUCTIONS
Discharge Summary/Instructions after an Endoscopic Procedure  Patient Name: Verenice Mirza  Patient MRN: [id number]  Patient YOB: 1955 Thursday, July 28, 2022  Cedrick Hernandez MD  Dear patient,  As a result of recent federal legislation (The Federal Cures Act), you may   receive lab or pathology results from your procedure in your [facility]   account before your physician is able to contact you. Your physician or   their representative will relay the results to you with their   recommendations at their soonest availability.  Thank you,  Your health is very important to us during the Covid Crisis. Following your   procedure today, you will receive a daily text for 2 weeks asking about   signs or symptoms of Covid 19.  Please respond to this text when you   receive it so we can follow up and keep you as safe as possible.   RESTRICTIONS:  During your procedure today, you received medications for sedation.  These   medications may affect your judgment, balance and coordination.  Therefore,   for 24 hours, you have the following restrictions:   - DO NOT drive a car, operate machinery, make legal/financial decisions,   sign important papers or drink alcohol.    ACTIVITY:  Today: no heavy lifting, straining or running due to procedural   sedation/anesthesia.  The following day: return to full activity including work.  DIET:  Eat and drink normally unless instructed otherwise.     TREATMENT FOR COMMON SIDE EFFECTS:  - Mild abdominal pain, nausea, belching, bloating or excessive gas:  rest,   eat lightly and use a heating pad.  - Sore Throat: treat with throat lozenges and/or gargle with warm salt   water.  - Because air was used during the procedure, expelling large amounts of air   from your rectum or belching is normal.  - If a bowel prep was taken, you may not have a bowel movement for 1-3 days.    This is normal.  SYMPTOMS TO WATCH FOR AND REPORT TO YOUR PHYSICIAN:  1. Abdominal pain or bloating, other than  gas cramps.  2. Chest pain.  3. Back pain.  4. Signs of infection such as: chills or fever occurring within 24 hours   after the procedure.  5. Rectal bleeding, which would show as bright red, maroon, or black stools.   (A tablespoon of blood from the rectum is not serious, especially if   hemorrhoids are present.)  6. Vomiting.  7. Weakness or dizziness.  GO DIRECTLY TO THE NEAREST EMERGENCY ROOM IF YOU HAVE ANY OF THE FOLLOWING:      Difficulty breathing              Chills and/or fever over 101 F   Persistent vomiting and/or vomiting blood   Severe abdominal pain   Severe chest pain   Black, tarry stools   Bleeding- more than one tablespoon   Any other symptom or condition that you feel may need urgent attention  Your doctor recommends these additional instructions:  If any biopsies were taken, your doctors clinic will contact you in 1 to 2   weeks with any results.  - Discharge patient to home.   - Patient has a contact number available for emergencies.  The signs and   symptoms of potential delayed complications were discussed with the   patient.  Return to normal activities tomorrow.  Written discharge   instructions were provided to the patient.   - Resume previous diet.   - Continue present medications.   - Await pathology results.   - Repeat colonoscopy in 7 years for surveillance.  For questions, problems or results please call your physician - Cedrick Hernandez MD.  EMERGENCY PHONE NUMBER: [phone],  LAB RESULTS: [phone]  IF A COMPLICATION OR EMERGENCY SITUATION ARISES AND YOU ARE UNABLE TO REACH   YOUR PHYSICIAN - GO DIRECTLY TO THE EMERGENCY ROOM.  Cedrick Hernandez MD  7/28/2022 8:51:32 AM  This report has been verified and signed electronically.  Dear patient,  As a result of recent federal legislation (The Federal Cures Act), you may   receive lab or pathology results from your procedure in your [facility]   account before your physician is able to contact you. Your physician or   their  representative will relay the results to you with their   recommendations at their soonest availability.  Thank you,  [employer]

## 2022-07-28 NOTE — TRANSFER OF CARE
"Anesthesia Transfer of Care Note    Patient: Verenice Mirza    Procedure(s) Performed: Procedure(s) (LRB):  COLONOSCOPY Suprep (N/A)    Patient location: GI    Anesthesia Type: MAC    Transport from OR: Transported from OR on 6-10 L/min O2 by face mask with adequate spontaneous ventilation    Post pain: adequate analgesia    Post assessment: no apparent anesthetic complications    Level of consciousness: awake, alert and oriented    Nausea/Vomiting: no nausea/vomiting    Complications: none    Transfer of care protocol was followed      Last vitals:   Visit Vitals  /77 (BP Location: Left arm, Patient Position: Lying)   Pulse 73   Temp 36.1 °C (97 °F) (Tympanic)   Resp 20   Ht 5' 2" (1.575 m)   Wt 99.8 kg (220 lb)   SpO2 (!) 94%   Breastfeeding No   BMI 40.24 kg/m²     "

## 2022-07-28 NOTE — H&P
Short Stay Endoscopy History and Physical    PCP - Areli Crespo MD    Procedure - Colonoscopy  ASA - per anesthesia  Mallampati - per anesthesia  History of Anesthesia problems - no  Family history Anesthesia problems - no   Plan of anesthesia - General    HPI:  This is a 67 y.o. female here for evaluation of : personal history of colon polyps      ROS:  Constitutional: No fevers, chills, No weight loss  CV: No chest pain  Pulm: No cough, No shortness of breath  GI: see HPI  Derm: No rash    Medical History:  has a past medical history of Acute seasonal allergic rhinitis, Atypical ductal hyperplasia of left breast, BMI 40.0-44.9, adult, Breast cyst, Diabetes mellitus, Dysphagia, Dysplastic nevus of trunk (03/2017), Fatty liver disease, nonalcoholic, Fibrocystic breast, Hiatal hernia, Hyperlipidemia, Hypertension, Hypothyroid, IGT (impaired glucose tolerance), Irritable bowel syndrome (IBS), Kidney stones, Left breast mass, Lumbar disc disease, Morbid obesity, Nicotine use disorder, JORDANA on CPAP, PAD (peripheral artery disease), Personal history of colonic polyps (05/27/2009), PVD (peripheral vascular disease), Renal angiomyolipoma, Rosacea, Squamous cell carcinoma (12/2017), Venous insufficiency, and Vitamin D deficiency disease.    Surgical History:  has a past surgical history that includes Hysterectomy; Bladder suspension; Lithotripsy; Cystoscopy; Oophorectomy; Colonoscopy (N/A, 5/24/2017); Esophagogastroduodenoscopy (N/A, 12/5/2018); Excisional biopsy (Left, 8/19/2019); Breast biopsy; Knee arthroscopy w/ meniscectomy (Right, 12/8/2021); and Arthroscopic chondroplasty of knee joint (Right, 12/8/2021).    Family History: family history includes Alzheimer's disease in her mother; Breast cancer in her mother and paternal aunt; Depression in her maternal grandfather; Diabetes type II in her maternal grandfather; Skin cancer in her sister.. Otherwise no colon cancer, inflammatory bowel disease, or GI  malignancies.    Social History:  reports that she quit smoking about 13 years ago. Her smoking use included cigarettes. She has a 15.00 pack-year smoking history. She has quit using smokeless tobacco. She reports that she does not drink alcohol and does not use drugs.    Review of patient's allergies indicates:  No Known Allergies    Medications:   Medications Prior to Admission   Medication Sig Dispense Refill Last Dose    aspirin (ECOTRIN) 81 MG EC tablet Take 81 mg by mouth 2 (two) times daily. Takes the first dose after lunch   Past Week at Unknown time    atorvastatin (LIPITOR) 40 MG tablet Take 1 tablet (40 mg total) by mouth once daily. 90 tablet 0 7/27/2022 at Unknown time    b complex vitamins tablet Take 1 tablet by mouth after lunch.    7/27/2022 at Unknown time    blood sugar diagnostic (TRUE METRIX GLUCOSE TEST STRIP) Strp 1 strip by Misc.(Non-Drug; Combo Route) route once daily. 100 strip 3 Past Week at Unknown time    calcium carbonate (TUMS) 200 mg calcium (500 mg) chewable tablet Take 1 tablet by mouth as needed.   7/27/2022 at Unknown time    co-enzyme Q-10 30 mg capsule Take 30 mg by mouth 2 (two) times daily.    7/27/2022 at Unknown time    EUTHYROX 88 mcg tablet TAKE 1 TABLET BY MOUTH BEFORE BREAKFAST 90 tablet 0 7/27/2022 at Unknown time    finasteride (PROSCAR) 5 mg tablet Take 1 tablet (5 mg total) by mouth nightly. 90 tablet 1 7/27/2022 at Unknown time    hyoscyamine (LEVSIN/SL) 0.125 mg Subl Place 1 tablet (0.125 mg total) under the tongue every 4 (four) hours as needed (abdominal spasm). 50 tablet 1 7/27/2022 at Unknown time    LACTOBAC CMB #3/FOS/PANTETHINE (PROBIOTIC & ACIDOPHILUS ORAL) Take 1 tablet by mouth after lunch.    7/27/2022 at Unknown time    lancets (ONETOUCH ULTRASOFT LANCETS) Misc 1 lancet by Misc.(Non-Drug; Combo Route) route once daily. 100 each 3 Past Week at Unknown time    levothyroxine (SYNTHROID) 88 MCG tablet TAKE 1 TABLET BY MOUTH BEFORE BREAKFAST 90  tablet 2 7/28/2022 at Unknown time    metFORMIN (GLUCOPHAGE) 500 MG tablet Take 1 tablet (500 mg total) by mouth 2 (two) times daily. 180 tablet 1 7/27/2022 at Unknown time    metoprolol succinate (TOPROL-XL) 50 MG 24 hr tablet Take 1 tablet (50 mg total) by mouth nightly. 90 tablet 3 7/27/2022 at Unknown time    nystatin (MYCOSTATIN) powder aaa qam prn flare 120 g 6 7/27/2022 at Unknown time    omega-3 acid ethyl esters (LOVAZA) 1 gram capsule Take 2 capsules (2 g total) by mouth 2 (two) times daily. 360 capsule 3 7/27/2022 at Unknown time    raloxifene (EVISTA) 60 mg tablet Take 1 tablet (60 mg total) by mouth once daily. 90 tablet 3 7/27/2022 at Unknown time    sod sulf-pot chloride-mag sulf (SUTAB) 1.479-0.188- 0.225 gram tablet Take 12 tablets by mouth once daily. Take according to package instructions with indicated amount of water. 24 tablet 0 7/27/2022 at Unknown time    spironolactone (ALDACTONE) 100 MG tablet Take 1 tablet (100 mg total) by mouth once daily. 90 tablet 3 7/27/2022 at Unknown time    sulfaSALAzine (AZULFIDINE) 500 mg Tab Take 3 tablets (1,500 mg total) by mouth 2 (two) times a day. 540 tablet 0 7/27/2022 at Unknown time    vitamin D 1000 units Tab Take 185 mg by mouth after lunch.    7/27/2022 at Unknown time    albuterol (PROVENTIL/VENTOLIN HFA) 90 mcg/actuation inhaler Inhale 2 puffs into the lungs every 6 (six) hours as needed for Wheezing. (Patient not taking: No sig reported) 18 g 11 More than a month at Unknown time    inhalation spacing device (MICROSPACER) Use as directed for inhalation. (Patient not taking: No sig reported) 1 Device 0 Unknown at Unknown time    ketoconazole (NIZORAL) 2 % cream APPLY  THIN LAYER TOPICALLY ONCE DAILY AT BEDTIME UNDER  BREAST  AS  NEEDED  FOR  FLARE 60 g 6 Unknown at Unknown time    magnesium oxide (MAG-OX) 400 mg tablet Take 400 mg by mouth after lunch.             Physical Exam:    Vital Signs:   Vitals:    07/28/22 0808   BP: 138/77    Pulse: 73   Resp: 20   Temp: 97 °F (36.1 °C)       General Appearance: Well appearing in no acute distress  Eyes:    No scleral icterus  ENT: Neck supple, Lips, mucosa, and tongue normal; teeth and gums normal  Abdomen: Soft, non tender, non distended with positive bowel sounds. No hepatosplenomegaly, ascites, or mass.  Extremities: 2+ pulses, no clubbing, cyanosis or edema  Skin: No rash      Labs:  Lab Results   Component Value Date    WBC 5.70 05/13/2022    HGB 13.1 05/13/2022    HCT 41.7 05/13/2022     05/13/2022    CHOL 150 03/30/2022    TRIG 111 03/30/2022    HDL 52 03/30/2022    ALT 23 05/13/2022    AST 20 05/13/2022     05/13/2022    K 4.2 05/13/2022     05/13/2022    CREATININE 0.9 05/13/2022    BUN 19 05/13/2022    CO2 20 (L) 05/13/2022    TSH 1.143 11/29/2021    INR 1.0 06/30/2004    HGBA1C 6.1 (H) 03/30/2022    MICROALBUR 0.6 10/14/2019       I have explained the risks and benefits of endoscopy procedures to the patient including but not limited to bleeding, perforation, infection, and death.  The patient was asked if they understand and allowed to ask any further questions to their satisfaction.    Cedrick Hernandez MD

## 2022-07-28 NOTE — ANESTHESIA PREPROCEDURE EVALUATION
07/28/2022  Verenice Mirza is a 67 y.o., female.    Pre-operative evaluation for Procedure(s) (LRB):  ARTHROSCOPY, KNEE, WITH MENISCECTOMY (Right)  ARTHROSCOPY, KNEE, WITH CHONDROPLASTY (Right)  ARTHROSCOPY, KNEE Loose body removal (Right)      Patient Active Problem List   Diagnosis    Hypertension associated with diabetes    Hypothyroid    JORDANA on CPAP    Lumbar disc disease    Venous insufficiency of both lower extremities    Morbid obesity with BMI of 40.0-44.9, adult    Dysphagia    Hiatal hernia    Personal history of skin cancer    History of dysplastic nevus    Atypical ductal hyperplasia of left breast    Seropositive rheumatoid arthritis    Dyslipidemia associated with type 2 diabetes mellitus    Irritable bowel syndrome with diarrhea    Nonrheumatic mitral valve regurgitation    Hard to intubate    Complex tear of medial meniscus of right knee as current injury    Decreased range of motion of right knee    Gait abnormality    Pain in both lower extremities       Review of patient's allergies indicates:  No Known Allergies    Current Outpatient Medications on File Prior to Visit   Medication Sig Dispense Refill    albuterol (PROVENTIL/VENTOLIN HFA) 90 mcg/actuation inhaler Inhale 2 puffs into the lungs every 6 (six) hours as needed for Wheezing. (Patient not taking: Reported on 5/16/2022) 18 g 11    aspirin (ECOTRIN) 81 MG EC tablet Take 81 mg by mouth 2 (two) times daily. Takes the first dose after lunch      atorvastatin (LIPITOR) 40 MG tablet Take 1 tablet (40 mg total) by mouth once daily. 90 tablet 0    b complex vitamins tablet Take 1 tablet by mouth after lunch.       blood sugar diagnostic (TRUE METRIX GLUCOSE TEST STRIP) Strp 1 strip by Misc.(Non-Drug; Combo Route) route once daily. 100 strip 3    calcium carbonate (TUMS) 200 mg calcium (500 mg) chewable tablet Take  1 tablet by mouth as needed.      co-enzyme Q-10 30 mg capsule Take 30 mg by mouth 2 (two) times daily.       EUTHYROX 88 mcg tablet TAKE 1 TABLET BY MOUTH BEFORE BREAKFAST 90 tablet 0    finasteride (PROSCAR) 5 mg tablet Take 1 tablet (5 mg total) by mouth nightly. 90 tablet 1    hyoscyamine (LEVSIN/SL) 0.125 mg Subl Place 1 tablet (0.125 mg total) under the tongue every 4 (four) hours as needed (abdominal spasm). 50 tablet 1    inhalation spacing device (MICROSPACER) Use as directed for inhalation. (Patient not taking: Reported on 5/16/2022) 1 Device 0    ketoconazole (NIZORAL) 2 % cream APPLY  THIN LAYER TOPICALLY ONCE DAILY AT BEDTIME UNDER  BREAST  AS  NEEDED  FOR  FLARE 60 g 6    LACTOBAC CMB #3/FOS/PANTETHINE (PROBIOTIC & ACIDOPHILUS ORAL) Take 1 tablet by mouth after lunch.       lancets (ONETOUCH ULTRASOFT LANCETS) Misc 1 lancet by Misc.(Non-Drug; Combo Route) route once daily. 100 each 3    levothyroxine (SYNTHROID) 88 MCG tablet TAKE 1 TABLET BY MOUTH BEFORE BREAKFAST 90 tablet 2    magnesium oxide (MAG-OX) 400 mg tablet Take 400 mg by mouth after lunch.       metFORMIN (GLUCOPHAGE) 500 MG tablet Take 1 tablet (500 mg total) by mouth 2 (two) times daily. 180 tablet 1    metoprolol succinate (TOPROL-XL) 50 MG 24 hr tablet Take 1 tablet (50 mg total) by mouth nightly. 90 tablet 3    nystatin (MYCOSTATIN) powder aaa qam prn flare 120 g 6    omega-3 acid ethyl esters (LOVAZA) 1 gram capsule Take 2 capsules (2 g total) by mouth 2 (two) times daily. 360 capsule 3    raloxifene (EVISTA) 60 mg tablet Take 1 tablet (60 mg total) by mouth once daily. 90 tablet 3    sod sulf-pot chloride-mag sulf (SUTAB) 1.479-0.188- 0.225 gram tablet Take 12 tablets by mouth once daily. Take according to package instructions with indicated amount of water. 24 tablet 0    spironolactone (ALDACTONE) 100 MG tablet Take 1 tablet (100 mg total) by mouth once daily. 90 tablet 3    sulfaSALAzine (AZULFIDINE) 500 mg Tab  Take 3 tablets (1,500 mg total) by mouth 2 (two) times a day. 540 tablet 0    vitamin D 1000 units Tab Take 185 mg by mouth after lunch.       [DISCONTINUED] tamsulosin (FLOMAX) 0.4 mg Cp24 Take 1 capsule (0.4 mg total) by mouth once daily. 30 capsule 11     Current Facility-Administered Medications on File Prior to Visit   Medication Dose Route Frequency Provider Last Rate Last Admin    fentaNYL 50 mcg/mL injection  mcg   mcg Intravenous Q5 Min PRN Omar Gresham MD        midazolam (VERSED) 1 mg/mL injection 0.5-4 mg  0.5-4 mg Intravenous PRN Omar Gresham MD           Past Surgical History:   Procedure Laterality Date    ARTHROSCOPIC CHONDROPLASTY OF KNEE JOINT Right 12/8/2021    Procedure: ARTHROSCOPY, KNEE, WITH CHONDROPLASTY;  Surgeon: Bin Artis MD;  Location: [facility];  Service: Orthopedics;  Laterality: Right;    BLADDER SUSPENSION      BREAST BIOPSY      COLONOSCOPY N/A 5/24/2017    Procedure: COLONOSCOPY;  Surgeon: Georgina Bunch MD;  Location: [facility];  Service: Endoscopy;  Laterality: N/A;    CYSTOSCOPY      ESOPHAGOGASTRODUODENOSCOPY N/A 12/5/2018    Procedure: ESOPHAGOGASTRODUODENOSCOPY (EGD);  Surgeon: Georgina Bunch MD;  Location: [facility];  Service: Endoscopy;  Laterality: N/A;  1000 am arrival time, has transportation set up    EXCISIONAL BIOPSY Left 8/19/2019    Procedure: EXCISIONAL BIOPSY LEFT BREAST with SEED LOCALIZATION;  Surgeon: Ramon Bass MD;  Location: [address];  Service: General;  Laterality: Left;    HYSTERECTOMY      KNEE ARTHROSCOPY W/ MENISCECTOMY Right 12/8/2021    Procedure: ARTHROSCOPY, KNEE, WITH MENISCECTOMY;  Surgeon: Bin Artis MD;  Location: [facility];  Service: Orthopedics;  Laterality: Right;  medial & lateral    LITHOTRIPSY      OOPHORECTOMY           CBC:  Lab Results   Component Value Date    WBC 5.70 05/13/2022    RBC 4.47 05/13/2022    HGB 13.1 05/13/2022    HCT 41.7 05/13/2022    PLT  268 05/13/2022    MCV 93 05/13/2022    MCH 29.3 05/13/2022    MCHC 31.4 (L) 05/13/2022       CMP:   Lab Results   Component Value Date     05/13/2022    K 4.2 05/13/2022     05/13/2022    CO2 20 (L) 05/13/2022    BUN 19 05/13/2022    CREATININE 0.9 05/13/2022    GLU 94 05/13/2022    CALCIUM 9.4 05/13/2022    ALBUMIN 3.8 05/13/2022    PROT 6.8 05/13/2022    ALKPHOS 85 05/13/2022    ALT 23 05/13/2022    AST 20 05/13/2022    BILITOT 0.2 05/13/2022       INR:  No results found for: PT, INR, PROTIME, APTT      Diagnostic Studies:      EKG:   Results for orders placed or performed during the hospital encounter of 08/14/19   EKG 12-lead    Collection Time: 08/14/19 11:37 AM    Narrative    Test Reason : Z01.818,    Vent. Rate : 051 BPM     Atrial Rate : 051 BPM     P-R Int : 142 ms          QRS Dur : 076 ms      QT Int : 472 ms       P-R-T Axes : 031 -01 029 degrees     QTc Int : 435 ms    Sinus bradycardia  Minimal voltage criteria for LVH, may be normal variant  Borderline Abnormal ECG  When compared with ECG of 10-NOV-2014 13:37,  No significant change was found    Confirmed by Damari Hidalgo MD ([id number]) on 8/14/2019 2:08:24 PM    Referred By: OSITO SULTANA           Confirmed By:Damari Hidalgo MD          Pre-op Vitals   BP Pulse Resp Temp SpO2   -- -- -- -- --      Height Weight BMI (Calculated)     -- -- --          Pre-op Assessment    I have reviewed the Patient Summary Reports.    I have reviewed the Nursing Notes. I have reviewed the NPO Status.      Review of Systems  Anesthesia Hx:  No problems with previous Anesthesia    Social:  No Alcohol Use, Non-Smoker    Cardiovascular:   Exercise tolerance: poor Hypertension    Pulmonary:   Sleep Apnea    Renal/:   Chronic Renal Disease    Hepatic/GI:   Hiatal Hernia, Liver Disease, Fatty liver   Endocrine:   Diabetes, well controlled Hypothyroidism        Physical Exam  General:  Morbid Obesity      Airway/Jaw/Neck:  Airway Findings: Mouth Opening:  Normal   Tongue: Normal   Mallampati: II Improves to II with phonation.  TM Distance: Normal   Jaw/Neck Findings:  Neck ROM: Normal ROM       Dental:  Dental Findings: In tact     Chest/Lungs:  Chest/Lungs Findings: Clear to auscultation, Normal Respiratory Rate      Heart/Vascular:  Heart Findings: Rate: Normal  Rhythm: Regular Rhythm        Mental Status:  Mental Status Findings:  Cooperative, Alert and Oriented         Anesthesia Plan  Type of Anesthesia, risks & benefits discussed:  Anesthesia Type:  general, regional    Patient's Preference:   Plan Factors:          Intra-op Monitoring Plan: standard ASA monitors  Intra-op Monitoring Plan Comments:   Post Op Pain Control Plan: IV/PO Opioids PRN, multimodal analgesia and peripheral nerve block  Post Op Pain Control Plan Comments:     Induction:    Beta Blocker:  Patient is not currently on a Beta-Blocker (No further documentation required).       Informed Consent: Informed consent signed with the Patient and all parties understand the risks and agree with anesthesia plan.  All questions answered.  Anesthesia consent signed with patient.  ASA Score: 3     Day of Surgery Review of History & Physical:              Ready For Surgery From Anesthesia Perspective.           Physical Exam  General: Morbid Obesity    Airway:  Mallampati: II / II  Mouth Opening: Normal  TM Distance: Normal  Tongue: Normal  Neck ROM: Normal ROM    Dental:  In tact    Chest/Lungs:  Clear to auscultation, Normal Respiratory Rate    Heart:  Rate: Normal  Rhythm: Regular Rhythm          Anesthesia Plan  Type of Anesthesia, risks & benefits discussed:    Anesthesia Type: general, regional  Intra-op Monitoring Plan: standard ASA monitors  Post Op Pain Control Plan: IV/PO Opioids PRN, multimodal analgesia and peripheral nerve block  Informed Consent: Informed consent signed with the Patient and all parties understand the risks and agree with anesthesia plan.  All questions answered.   ASA Score:  3    Ready For Surgery From Anesthesia Perspective.       .

## 2022-07-28 NOTE — ANESTHESIA POSTPROCEDURE EVALUATION
Anesthesia Post Evaluation    Patient: Verenice Mirza    Procedure(s) Performed: Procedure(s) (LRB):  COLONOSCOPY Suprep (N/A)    Final Anesthesia Type: MAC      Patient location during evaluation: GI PACU  Patient participation: Yes- Able to Participate  Level of consciousness: awake and alert and oriented  Post-procedure vital signs: reviewed and stable  Pain management: adequate  Airway patency: patent    PONV status at discharge: No PONV  Anesthetic complications: no      Cardiovascular status: blood pressure returned to baseline  Respiratory status: unassisted, spontaneous ventilation and room air  Hydration status: euvolemic  Follow-up not needed.          Vitals Value Taken Time   /111 07/28/22 0854   Temp 36.8 °C (98.2 °F) 07/28/22 0854   Pulse 63 07/28/22 0854   Resp 20 07/28/22 0854   SpO2 95 % 07/28/22 0854         No case tracking events are documented in the log.      Pain/Marva Score: No data recorded

## 2022-07-29 ENCOUNTER — TELEPHONE (OUTPATIENT)
Dept: ENDOSCOPY | Facility: HOSPITAL | Age: 67
End: 2022-07-29
Payer: MEDICARE

## 2022-07-29 LAB — POCT GLUCOSE: 146 MG/DL (ref 70–110)

## 2022-08-02 LAB
FINAL PATHOLOGIC DIAGNOSIS: NORMAL
GROSS: NORMAL
Lab: NORMAL
MICROSCOPIC EXAM: NORMAL

## 2022-08-04 ENCOUNTER — LAB VISIT (OUTPATIENT)
Dept: LAB | Facility: HOSPITAL | Age: 67
End: 2022-08-04
Attending: INTERNAL MEDICINE
Payer: MEDICARE

## 2022-08-04 DIAGNOSIS — I15.2 HYPERTENSION ASSOCIATED WITH DIABETES: ICD-10-CM

## 2022-08-04 DIAGNOSIS — E11.59 HYPERTENSION ASSOCIATED WITH DIABETES: ICD-10-CM

## 2022-08-04 LAB
ALBUMIN SERPL BCP-MCNC: 3.9 G/DL (ref 3.5–5.2)
ALP SERPL-CCNC: 91 U/L (ref 55–135)
ALT SERPL W/O P-5'-P-CCNC: 22 U/L (ref 10–44)
ANION GAP SERPL CALC-SCNC: 10 MMOL/L (ref 8–16)
AST SERPL-CCNC: 17 U/L (ref 10–40)
BILIRUB SERPL-MCNC: 0.4 MG/DL (ref 0.1–1)
BUN SERPL-MCNC: 15 MG/DL (ref 8–23)
CALCIUM SERPL-MCNC: 9.8 MG/DL (ref 8.7–10.5)
CHLORIDE SERPL-SCNC: 106 MMOL/L (ref 95–110)
CHOLEST SERPL-MCNC: 170 MG/DL (ref 120–199)
CHOLEST/HDLC SERPL: 3.6 {RATIO} (ref 2–5)
CO2 SERPL-SCNC: 22 MMOL/L (ref 23–29)
CREAT SERPL-MCNC: 0.8 MG/DL (ref 0.5–1.4)
EST. GFR  (NO RACE VARIABLE): >60 ML/MIN/1.73 M^2
ESTIMATED AVG GLUCOSE: 123 MG/DL (ref 68–131)
GLUCOSE SERPL-MCNC: 151 MG/DL (ref 70–110)
HBA1C MFR BLD: 5.9 % (ref 4–5.6)
HDLC SERPL-MCNC: 47 MG/DL (ref 40–75)
HDLC SERPL: 27.6 % (ref 20–50)
LDLC SERPL CALC-MCNC: 99 MG/DL (ref 63–159)
NONHDLC SERPL-MCNC: 123 MG/DL
POTASSIUM SERPL-SCNC: 4.4 MMOL/L (ref 3.5–5.1)
PROT SERPL-MCNC: 6.8 G/DL (ref 6–8.4)
SODIUM SERPL-SCNC: 138 MMOL/L (ref 136–145)
TRIGL SERPL-MCNC: 120 MG/DL (ref 30–150)

## 2022-08-04 PROCEDURE — 36415 COLL VENOUS BLD VENIPUNCTURE: CPT | Mod: PO | Performed by: INTERNAL MEDICINE

## 2022-08-04 PROCEDURE — 80061 LIPID PANEL: CPT | Performed by: INTERNAL MEDICINE

## 2022-08-04 PROCEDURE — 83036 HEMOGLOBIN GLYCOSYLATED A1C: CPT | Performed by: INTERNAL MEDICINE

## 2022-08-04 PROCEDURE — 80053 COMPREHEN METABOLIC PANEL: CPT | Performed by: INTERNAL MEDICINE

## 2022-08-08 ENCOUNTER — TELEPHONE (OUTPATIENT)
Dept: HEMATOLOGY/ONCOLOGY | Facility: CLINIC | Age: 67
End: 2022-08-08
Payer: MEDICARE

## 2022-08-08 DIAGNOSIS — N60.92 ATYPICAL DUCTAL HYPERPLASIA OF LEFT BREAST: ICD-10-CM

## 2022-08-08 RX ORDER — RALOXIFENE HYDROCHLORIDE 60 MG/1
60 TABLET, FILM COATED ORAL DAILY
Qty: 90 TABLET | Refills: 3 | OUTPATIENT
Start: 2022-08-08

## 2022-08-09 ENCOUNTER — OFFICE VISIT (OUTPATIENT)
Dept: HEMATOLOGY/ONCOLOGY | Facility: CLINIC | Age: 67
End: 2022-08-09
Payer: MEDICARE

## 2022-08-09 VITALS
SYSTOLIC BLOOD PRESSURE: 135 MMHG | OXYGEN SATURATION: 94 % | RESPIRATION RATE: 18 BRPM | TEMPERATURE: 97 F | DIASTOLIC BLOOD PRESSURE: 87 MMHG | HEIGHT: 62 IN | BODY MASS INDEX: 41.78 KG/M2 | HEART RATE: 67 BPM | WEIGHT: 227.06 LBS

## 2022-08-09 DIAGNOSIS — N60.92 ATYPICAL DUCTAL HYPERPLASIA OF LEFT BREAST: Primary | ICD-10-CM

## 2022-08-09 PROCEDURE — 99999 PR PBB SHADOW E&M-EST. PATIENT-LVL V: CPT | Mod: PBBFAC,,, | Performed by: INTERNAL MEDICINE

## 2022-08-09 PROCEDURE — 99213 PR OFFICE/OUTPT VISIT, EST, LEVL III, 20-29 MIN: ICD-10-PCS | Mod: S$PBB,,, | Performed by: INTERNAL MEDICINE

## 2022-08-09 PROCEDURE — 99999 PR PBB SHADOW E&M-EST. PATIENT-LVL V: ICD-10-PCS | Mod: PBBFAC,,, | Performed by: INTERNAL MEDICINE

## 2022-08-09 PROCEDURE — 99215 OFFICE O/P EST HI 40 MIN: CPT | Mod: PBBFAC | Performed by: INTERNAL MEDICINE

## 2022-08-09 PROCEDURE — 99213 OFFICE O/P EST LOW 20 MIN: CPT | Mod: S$PBB,,, | Performed by: INTERNAL MEDICINE

## 2022-08-09 NOTE — PROGRESS NOTES
Subjective:       Patient ID: Verenice Mirza is a 67 y.o. female.    Chief Complaint: No chief complaint on file.    HPI 65 Y/O female with history of ADH on raloxifene.    Other than chronic right knee pain she is doing well.  No breast complaints.      History:  Mammogram from July 1, 2019 showed architectural distortion left breast 2:00 position.  There was nothing seen by ultrasound.    On July 8, 2019 a needle biopsy was performed which showed benign breast tissue with apocrine adenosis, calcifications but no atypia.    Excisional biopsy on August 19, 2019 showed atypical ductal hyperplasia.    She started raloxifene in January 2020.    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  Review of Systems   Constitutional: Negative for appetite change and unexpected weight change.   HENT: Negative for mouth sores.    Eyes: Negative for visual disturbance.   Respiratory: Negative for cough and shortness of breath.    Cardiovascular: Negative for chest pain.   Gastrointestinal: Negative for abdominal pain and diarrhea.   Musculoskeletal: Positive for arthralgias (right knee). Negative for back pain.   Integumentary:  Negative for rash.   Neurological: Negative for headaches.   Hematological: Negative for adenopathy.   Psychiatric/Behavioral: The patient is not nervous/anxious.          Objective:      Physical Exam  Vitals reviewed.   Constitutional:       General: She is not in acute distress.     Appearance: Normal appearance.   Cardiovascular:      Rate and Rhythm: Normal rate and regular rhythm.   Pulmonary:      Effort: Pulmonary effort is normal. No respiratory distress.      Breath sounds: Normal breath sounds. No wheezing or rales.   Chest:   Breasts:      Right: Normal. No axillary adenopathy or supraclavicular adenopathy.      Left: No mass, nipple discharge, skin change, axillary adenopathy or supraclavicular adenopathy.         Abdominal:      Palpations: Abdomen is  soft. There is no mass.      Tenderness: There is no abdominal tenderness.   Lymphadenopathy:      Cervical: No cervical adenopathy.      Upper Body:      Right upper body: No supraclavicular or axillary adenopathy.      Left upper body: No supraclavicular or axillary adenopathy.   Neurological:      Mental Status: She is alert.   Psychiatric:         Mood and Affect: Mood normal.         Behavior: Behavior normal.         Thought Content: Thought content normal.         Judgment: Judgment normal.         Assessment:     Mammogram - 7/5/22  -negative  Problem List Items Addressed This Visit     Atypical ductal hyperplasia of left breast - Primary          Plan:       RTC 6 M with MRI breast      Route Chart for Scheduling    Med Onc Chart Routing      Follow up with physician 6 months.   Follow up with MEERA    Infusion scheduling note    Injection scheduling note    Labs    Imaging MRI      Pharmacy appointment    Other referrals

## 2022-08-10 ENCOUNTER — OFFICE VISIT (OUTPATIENT)
Dept: INTERNAL MEDICINE | Facility: CLINIC | Age: 67
End: 2022-08-10
Payer: MEDICARE

## 2022-08-10 VITALS
SYSTOLIC BLOOD PRESSURE: 136 MMHG | WEIGHT: 220 LBS | DIASTOLIC BLOOD PRESSURE: 87 MMHG | BODY MASS INDEX: 40.24 KG/M2 | HEART RATE: 67 BPM

## 2022-08-10 DIAGNOSIS — I10 ESSENTIAL HYPERTENSION: ICD-10-CM

## 2022-08-10 DIAGNOSIS — E11.59 HYPERTENSION ASSOCIATED WITH DIABETES: ICD-10-CM

## 2022-08-10 DIAGNOSIS — E11.69 DYSLIPIDEMIA ASSOCIATED WITH TYPE 2 DIABETES MELLITUS: ICD-10-CM

## 2022-08-10 DIAGNOSIS — M05.9 SEROPOSITIVE RHEUMATOID ARTHRITIS: ICD-10-CM

## 2022-08-10 DIAGNOSIS — E66.01 MORBID OBESITY WITH BMI OF 40.0-44.9, ADULT: ICD-10-CM

## 2022-08-10 DIAGNOSIS — I15.2 HYPERTENSION ASSOCIATED WITH DIABETES: ICD-10-CM

## 2022-08-10 DIAGNOSIS — G89.29 CHRONIC PAIN OF RIGHT KNEE: Primary | ICD-10-CM

## 2022-08-10 DIAGNOSIS — M25.561 CHRONIC PAIN OF RIGHT KNEE: Primary | ICD-10-CM

## 2022-08-10 DIAGNOSIS — E78.5 DYSLIPIDEMIA ASSOCIATED WITH TYPE 2 DIABETES MELLITUS: ICD-10-CM

## 2022-08-10 DIAGNOSIS — E03.4 HYPOTHYROIDISM DUE TO ACQUIRED ATROPHY OF THYROID: ICD-10-CM

## 2022-08-10 DIAGNOSIS — L65.8 FEMALE PATTERN BALDNESS: ICD-10-CM

## 2022-08-10 DIAGNOSIS — E78.5 HYPERLIPIDEMIA, UNSPECIFIED HYPERLIPIDEMIA TYPE: ICD-10-CM

## 2022-08-10 PROBLEM — S83.231A COMPLEX TEAR OF MEDIAL MENISCUS OF RIGHT KNEE AS CURRENT INJURY: Status: RESOLVED | Noted: 2021-12-08 | Resolved: 2022-08-10

## 2022-08-10 PROCEDURE — 99214 OFFICE O/P EST MOD 30 MIN: CPT | Mod: 95,,, | Performed by: INTERNAL MEDICINE

## 2022-08-10 PROCEDURE — 99214 PR OFFICE/OUTPT VISIT, EST, LEVL IV, 30-39 MIN: ICD-10-PCS | Mod: 95,,, | Performed by: INTERNAL MEDICINE

## 2022-08-10 RX ORDER — METOPROLOL SUCCINATE 50 MG/1
50 TABLET, EXTENDED RELEASE ORAL NIGHTLY
Qty: 90 TABLET | Refills: 3 | Status: SHIPPED | OUTPATIENT
Start: 2022-08-10 | End: 2023-02-02

## 2022-08-10 RX ORDER — SPIRONOLACTONE 100 MG/1
100 TABLET, FILM COATED ORAL DAILY
Qty: 90 TABLET | Refills: 3 | Status: SHIPPED | OUTPATIENT
Start: 2022-08-10 | End: 2023-03-20 | Stop reason: SDUPTHER

## 2022-08-10 RX ORDER — OMEGA-3-ACID ETHYL ESTERS 1 G/1
2 CAPSULE, LIQUID FILLED ORAL 2 TIMES DAILY
Qty: 360 CAPSULE | Refills: 3 | Status: SHIPPED | OUTPATIENT
Start: 2022-08-10 | End: 2023-02-02

## 2022-08-10 RX ORDER — SEMAGLUTIDE 1.34 MG/ML
0.25 INJECTION, SOLUTION SUBCUTANEOUS
Qty: 1 PEN | Refills: 5 | Status: SHIPPED | OUTPATIENT
Start: 2022-08-10 | End: 2022-09-15 | Stop reason: SDUPTHER

## 2022-08-10 RX ORDER — LEVOTHYROXINE SODIUM 88 UG/1
88 TABLET ORAL
Qty: 90 TABLET | Refills: 3 | Status: SHIPPED | OUTPATIENT
Start: 2022-08-10 | End: 2023-03-20 | Stop reason: SDUPTHER

## 2022-08-10 RX ORDER — ATORVASTATIN CALCIUM 40 MG/1
40 TABLET, FILM COATED ORAL DAILY
Qty: 90 TABLET | Refills: 3 | Status: SHIPPED | OUTPATIENT
Start: 2022-08-10 | End: 2022-10-24

## 2022-08-10 RX ORDER — FINASTERIDE 5 MG/1
5 TABLET, FILM COATED ORAL NIGHTLY
Qty: 90 TABLET | Refills: 3 | Status: SHIPPED | OUTPATIENT
Start: 2022-08-10 | End: 2022-10-02

## 2022-08-10 NOTE — PROGRESS NOTES
Subjective:       Patient ID: Verenice Mirza is a 67 y.o. female.    Chief Complaint: Diabetes    HPI 67-year-old female presents to clinic virtual video today for follow-up hypertension dyslipidemia social diabetes hypothyroidism patient is really having knee pain affecting the quality her life and she is not able to ambulate or get around like she needs to.  She is followed by rheumatologist for rheumatoid arthritis but they do not suspect that her knee symptoms are related to her rheumatoid arthritis.  She has seen Dr. Artis in the past for arthroscopic knee surgery but would like to see a surgeon for more degenerative related arthritis and possibly knee replacement if indicated.  In the case the patient needs surgery she would like to try to lose weight she would like to try Ozempic in place of metformin for management of diabetes  Review of Systems    otherwise negative  Objective:      Physical Exam  General: Well-appearing, well-nourished.  No distress  HEENT: conjunctivae are normal.   Hearing is grossly normal.  Nasopharynx yobany congestion  Oropharynx is clear.  Neck: Supple.  Visually No thyroid megaly.   Heart: Regular rate   Lungs:  respiratory effort normal.  Abdomen:  nontender  Extremities:   No edema.  Psych: Oriented to time person place.  Judgment and insight seem unimpaired.  Mood and affect are appropriate.  Assessment:       Problem List Items Addressed This Visit     Hypertension associated with diabetes    Relevant Medications    semaglutide (OZEMPIC) 0.25 mg or 0.5 mg(2 mg/1.5 mL) pen injector    metoprolol succinate (TOPROL-XL) 50 MG 24 hr tablet    Other Relevant Orders    CBC Auto Differential    TSH    Comprehensive Metabolic Panel    Hemoglobin A1C    Lipid Panel    Microalbumin/Creatinine Ratio, Urine    Hypothyroid    Relevant Medications    levothyroxine (SYNTHROID) 88 MCG tablet    Morbid obesity with BMI of 40.0-44.9, adult    Seropositive rheumatoid arthritis    Dyslipidemia  associated with type 2 diabetes mellitus    Relevant Medications    semaglutide (OZEMPIC) 0.25 mg or 0.5 mg(2 mg/1.5 mL) pen injector    Other Relevant Orders    CBC Auto Differential    TSH    Comprehensive Metabolic Panel    Hemoglobin A1C    Lipid Panel    Microalbumin/Creatinine Ratio, Urine      Other Visit Diagnoses     Chronic pain of right knee    -  Primary    Relevant Orders    Ambulatory referral/consult to Orthopedics    Hyperlipidemia, unspecified hyperlipidemia type        Relevant Medications    atorvastatin (LIPITOR) 40 MG tablet    omega-3 acid ethyl esters (LOVAZA) 1 gram capsule    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        Relevant Medications    spironolactone (ALDACTONE) 100 MG tablet    finasteride (PROSCAR) 5 mg tablet    Essential hypertension        Relevant Medications    metoprolol succinate (TOPROL-XL) 50 MG 24 hr tablet          Plan:       Verenice was seen today for diabetes.    Diagnoses and all orders for this visit:    Chronic pain of right knee  -     Ambulatory referral/consult to Orthopedics; Future  Recommend consultation with Dr. Harry  Dyslipidemia associated with type 2 diabetes mellitus  -     CBC Auto Differential; Future  -     TSH; Future  -     Comprehensive Metabolic Panel; Standing  -     Hemoglobin A1C; Standing  -     Lipid Panel; Standing  -     Microalbumin/Creatinine Ratio, Urine; Standing  -     semaglutide (OZEMPIC) 0.25 mg or 0.5 mg(2 mg/1.5 mL) pen injector; Inject 0.25 mg into the skin every 7 days.  Controlled.  Continue current medical regimen.  Prescription refills addressed.  Followup advised. See after visit summary.  Hypertension associated with diabetes  -     CBC Auto Differential; Future  -     TSH; Future  -     Comprehensive Metabolic Panel; Standing  -     Hemoglobin A1C; Standing  -     Lipid Panel; Standing  -     Microalbumin/Creatinine Ratio, Urine; Standing  -     semaglutide (OZEMPIC) 0.25 mg or 0.5 mg(2 mg/1.5 mL) pen injector; Inject 0.25 mg into the  skin every 7 days.  Discuss use benefit as well as potential adverse side effects  Morbid obesity with BMI of 40.0-44.9, adult  See above  Seropositive rheumatoid arthritis  Followed by Rheumatology  Hyperlipidemia, unspecified hyperlipidemia type  -     atorvastatin (LIPITOR) 40 MG tablet; Take 1 tablet (40 mg total) by mouth once daily.  -     omega-3 acid ethyl esters (LOVAZA) 1 gram capsule; Take 2 capsules (2 g total) by mouth 2 (two) times daily.  Controlled.  Continue current medical regimen.  Prescription refills addressed.  Followup advised. See after visit summary.  Hypothyroidism due to acquired atrophy of thyroid  -     levothyroxine (SYNTHROID) 88 MCG tablet; Take 1 tablet (88 mcg total) by mouth before breakfast.  Controlled.  Continue current medical regimen.  Prescription refills addressed.  Followup advised. See after visit summary.  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  -     finasteride (PROSCAR) 5 mg tablet; Take 1 tablet (5 mg total) by mouth nightly.  Controlled.  Continue current medical regimen.  Prescription refills addressed.  Followup advised. See after visit summary.  Essential hypertension  -     metoprolol succinate (TOPROL-XL) 50 MG 24 hr tablet; Take 1 tablet (50 mg total) by mouth nightly.       The patient location is:  [facility]  The chief complaint leading to consultation is:  Knee pain affecting gait and ADLs, hypothyroidism diabetes hypertension dyslipidemia female pattern baldness    Visit type: audiovisual    Face to Face time with patient:  Twenty-five  30 minutes of total time spent on the encounter, which includes face to face time and non-face to face time preparing to see the patient (eg, review of tests), Obtaining and/or reviewing separately obtained history, Documenting clinical information in the electronic or other health record, Independently interpreting results (not separately  reported) and communicating results to the patient/family/caregiver, or Care coordination (not separately reported).         Each patient to whom he or she provides medical services by telemedicine is:  (1) informed of the relationship between the physician and patient and the respective role of any other health care provider with respect to management of the patient; and (2) notified that he or she may decline to receive medical services by telemedicine and may withdraw from such care at any time.    Notes:

## 2022-08-16 DIAGNOSIS — M25.561 ACUTE PAIN OF RIGHT KNEE: Primary | ICD-10-CM

## 2022-08-17 ENCOUNTER — PATIENT MESSAGE (OUTPATIENT)
Dept: ORTHOPEDICS | Facility: CLINIC | Age: 67
End: 2022-08-17
Payer: MEDICARE

## 2022-08-17 ENCOUNTER — PATIENT MESSAGE (OUTPATIENT)
Dept: RHEUMATOLOGY | Facility: CLINIC | Age: 67
End: 2022-08-17
Payer: MEDICARE

## 2022-08-17 DIAGNOSIS — M05.9 SEROPOSITIVE RHEUMATOID ARTHRITIS: ICD-10-CM

## 2022-08-17 DIAGNOSIS — M05.9 SEROPOSITIVE RHEUMATOID ARTHRITIS: Primary | ICD-10-CM

## 2022-08-17 RX ORDER — SULFASALAZINE 500 MG/1
1500 TABLET ORAL 2 TIMES DAILY
Qty: 540 TABLET | Refills: 0 | OUTPATIENT
Start: 2022-08-17 | End: 2022-11-15

## 2022-08-24 ENCOUNTER — HOSPITAL ENCOUNTER (OUTPATIENT)
Dept: RADIOLOGY | Facility: HOSPITAL | Age: 67
Discharge: HOME | End: 2022-08-24
Attending: INTERNAL MEDICINE
Payer: MEDICARE

## 2022-08-24 DIAGNOSIS — M05.9 SEROPOSITIVE RHEUMATOID ARTHRITIS: ICD-10-CM

## 2022-08-24 PROCEDURE — 73521 XR HIPS BILATERAL 2 VIEW INCL AP PELVIS: ICD-10-PCS | Mod: 26,,, | Performed by: RADIOLOGY

## 2022-08-24 PROCEDURE — 73521 X-RAY EXAM HIPS BI 2 VIEWS: CPT | Mod: 26,,, | Performed by: RADIOLOGY

## 2022-08-24 PROCEDURE — 73521 X-RAY EXAM HIPS BI 2 VIEWS: CPT | Mod: TC,FY,PO

## 2022-08-26 NOTE — PROGRESS NOTES
Subjective:       Patient ID: Verenice Mirza is a 64 y.o. female.    Chief Complaint: No chief complaint on file.    HPI Ms. Mirza 64-year-old female referred by Dr. Bass for recent diagnosis of atypical ductal hyperplasia.    Mammogram from July 1, 2019 showed architectural distortion left breast 2:00 position.  There was nothing seen by ultrasound.    On July 8, 2019 a needle biopsy was performed which showed benign breast tissue with apocrine adenosis, calcifications but no atypia.    Excisional biopsy on August 19, 2019 showed atypical ductal hyperplasia.    Menstrual History:    Menarche -11    G - 0  P - 0  ,BCP - 20 Y    Menopause -  Early 50s     HRT - no    Family History -                                 Breast - mother                                Ovarian -  no                                    Other - paternal aunt - ?    Social History :    Smoking - stopped 2009        ETOH - stopped 10 Y ago  -never heavy  Retired teacher    PMH:HBP, HLP, Hypothyroid, fatty liver, PVD, diverticular disease, plantar fasciitis, obesity  Review of Systems   Constitutional: Negative.    HENT: Negative.    Respiratory: Negative.    Cardiovascular: Negative.    Gastrointestinal: Positive for abdominal pain.        Diverticulitis   Genitourinary: Negative for frequency.        Stress incontenence   Musculoskeletal: Positive for arthralgias (knees) and back pain.   Neurological: Negative.    Psychiatric/Behavioral: Negative.        Objective:      Physical Exam   Constitutional: She is oriented to person, place, and time. She appears well-developed and well-nourished. No distress.   HENT:   Head: Normocephalic.   Eyes: Pupils are equal, round, and reactive to light. No scleral icterus.   Cardiovascular: Normal rate and regular rhythm.   Pulmonary/Chest: Effort normal and breath sounds normal. She has no wheezes. She has no rales. Right breast exhibits no mass, no nipple discharge and no skin change. Left breast  · POA, reported with hypoxia, wheezing, respiratory distress and increased mucus production  · Currently on 3 L at rest with sats 90%  Decreased to 75% with exertion in the office  · Chest x-ray ordered  Started on IV steroids 40 mg b i d  · Per pulmonology office notes, he is on azithromycin 250mg daily chronically for pulmonary emphysema  · Check procalcitonin, sputum culture and Gram stain, COVID/flu/RSV PCR  · Continue respiratory protocol, nebs q i d    · Was on Trelegy in the past but patient states no longer on it due to cost exhibits no mass, no nipple discharge and no skin change.       Abdominal: Soft. She exhibits no mass. There is no tenderness.   Lymphadenopathy:     She has no cervical adenopathy.     She has no axillary adenopathy.        Right: No supraclavicular adenopathy present.        Left: No supraclavicular adenopathy present.   Neurological: She is alert and oriented to person, place, and time.   Psychiatric: She has a normal mood and affect. Her behavior is normal. Thought content normal.   Vitals reviewed.      Assessment:       1. Atypical ductal hyperplasia of left breast        Plan:       I reviewed her breast cancer risk.   Based on the risk assessment tool she has a 7.4% risk of developing breast cancer over the next 5 years and a 25.2%  lifetime risk ( 7.8% for general population).       I discussed risk reduction with exemestane - up to a 65% relative risk reduction. Side effects of aromatase inhibitor therapy were discussed, including musculoskeletal effects, hot flashes, weight gain and bone loss. Written information for exemestane was provided.  I also provided her with written information for raloxifene.    She would like to discuss genetic testing-we will arrange that.  I plan to see her back again in 3 months with bone density for final decision.

## 2022-08-29 ENCOUNTER — TELEPHONE (OUTPATIENT)
Dept: ORTHOPEDICS | Facility: CLINIC | Age: 67
End: 2022-08-29
Payer: MEDICARE

## 2022-08-29 NOTE — TELEPHONE ENCOUNTER
I called and spoke to the patient to confirm her appointment at  before seeing Dr. Harry. The patient verbalized understanding and has no further questions.

## 2022-08-30 ENCOUNTER — OFFICE VISIT (OUTPATIENT)
Dept: ORTHOPEDICS | Facility: CLINIC | Age: 67
End: 2022-08-30
Payer: MEDICARE

## 2022-08-30 ENCOUNTER — HOSPITAL ENCOUNTER (OUTPATIENT)
Dept: RADIOLOGY | Facility: HOSPITAL | Age: 67
Discharge: HOME | End: 2022-08-30
Attending: ORTHOPAEDIC SURGERY
Payer: MEDICARE

## 2022-08-30 VITALS — HEIGHT: 61 IN | WEIGHT: 219.69 LBS | BODY MASS INDEX: 41.48 KG/M2

## 2022-08-30 DIAGNOSIS — M70.62 GREATER TROCHANTERIC BURSITIS OF LEFT HIP: ICD-10-CM

## 2022-08-30 DIAGNOSIS — M17.11 PRIMARY OSTEOARTHRITIS OF RIGHT KNEE: Primary | ICD-10-CM

## 2022-08-30 DIAGNOSIS — M70.51 PES ANSERINUS BURSITIS OF RIGHT KNEE: ICD-10-CM

## 2022-08-30 DIAGNOSIS — M25.561 ACUTE PAIN OF RIGHT KNEE: ICD-10-CM

## 2022-08-30 DIAGNOSIS — M25.561 CHRONIC PAIN OF RIGHT KNEE: ICD-10-CM

## 2022-08-30 DIAGNOSIS — G89.29 CHRONIC PAIN OF RIGHT KNEE: ICD-10-CM

## 2022-08-30 PROCEDURE — 73562 X-RAY EXAM OF KNEE 3: CPT | Mod: 26,LT,, | Performed by: RADIOLOGY

## 2022-08-30 PROCEDURE — 73562 XR KNEE ORTHO RIGHT WITH FLEXION: ICD-10-PCS | Mod: 26,LT,, | Performed by: RADIOLOGY

## 2022-08-30 PROCEDURE — 73562 X-RAY EXAM OF KNEE 3: CPT | Mod: 59,TC,LT

## 2022-08-30 PROCEDURE — 73564 X-RAY EXAM KNEE 4 OR MORE: CPT | Mod: 26,RT,, | Performed by: RADIOLOGY

## 2022-08-30 PROCEDURE — 99214 OFFICE O/P EST MOD 30 MIN: CPT | Mod: S$PBB,,, | Performed by: ORTHOPAEDIC SURGERY

## 2022-08-30 PROCEDURE — 99999 PR PBB SHADOW E&M-EST. PATIENT-LVL IV: ICD-10-PCS | Mod: PBBFAC,,, | Performed by: ORTHOPAEDIC SURGERY

## 2022-08-30 PROCEDURE — 99214 OFFICE O/P EST MOD 30 MIN: CPT | Mod: PBBFAC | Performed by: ORTHOPAEDIC SURGERY

## 2022-08-30 PROCEDURE — 99214 PR OFFICE/OUTPT VISIT, EST, LEVL IV, 30-39 MIN: ICD-10-PCS | Mod: S$PBB,,, | Performed by: ORTHOPAEDIC SURGERY

## 2022-08-30 PROCEDURE — 73564 XR KNEE ORTHO RIGHT WITH FLEXION: ICD-10-PCS | Mod: 26,RT,, | Performed by: RADIOLOGY

## 2022-08-30 PROCEDURE — 99999 PR PBB SHADOW E&M-EST. PATIENT-LVL IV: CPT | Mod: PBBFAC,,, | Performed by: ORTHOPAEDIC SURGERY

## 2022-08-30 RX ORDER — CELECOXIB 200 MG/1
200 CAPSULE ORAL DAILY
Qty: 90 CAPSULE | Refills: 0 | Status: SHIPPED | OUTPATIENT
Start: 2022-08-30 | End: 2023-03-20

## 2022-08-30 NOTE — PROGRESS NOTES
Subjective:     HPI:   Verenice Mirza is a 67 y.o. female who presents for eval R knee pain    Right knee arthroscopy with menisectomy  (12/8/2021) with Dr. Artis -  The patient reported no issues with his inicision healing and no complications with infection after surgery. The patient spent 0 night(s) in the hospital. The patient had 3 months of out patient physical therapy. The patient stated that she healed well since surgery.     She says the right knee scope helped for about a month or 2.      She has had years of right knee pain intermittent symptoms predominantly medial.  Denies any groin anterior thigh radicular pain or paresthesias.  Pain is moderate to severe activity-related relieved with rest.      Medications: tumeric, tart cherry extract, glucosamine, anti-inflammatory diet, weight loss shakes help, can take nsaids without GI upset but rarely takes due to concerns about side effects    Injections: Yes. The patient has had HA and iaCSI with Dr. Matson in the past in 2020, the patient reported that she got mild relief from the injections.     Physical Therapy: Yes, completed OP-PT from 12/2021 to 3/2022, the patient reported that the OP-PT helped with the pain.     Bracing: None     Assistive Devices: Yes, walker, the patient uses the walker if she has to go for long walks or outside of the house.      Walking:   < 1 block    Limitations: General walking, difficulty going up/down steps, difficulty sleeping at night , difficulty rising from sitting , and difficulty standing for long periods of time          Occupation: Retired - the patient retired from [employer].     Social support: The patient stated that they live at home alone. The patient stated that she doesn't have any family locally in the area and doesn't have anyone in the area to help take care of her.      ROS:  The updated medical history is in the chart and has been reviewed. A review of systems is updated and there  is no reported vision changes, ear/nose/mouth/throat complaints,  chest pain, shortness of breath, abdominal pain, urological complaints, fevers or chills, psychiatric complaints. Musculoskeletal and neurologcial symptoms are as documented. All other systems are negative.      Objective:   Exam:  There were no vitals filed for this visit.  Body mass index is 41.51 kg/m².    Physical examination included assessment of the patient's general appearance with particular attention to development, nutrition, body habitus, attention to grooming, and any evidence of distress.  Constitutional: The patient is a well-developed, well-nourished patient in no acute distress.   Cardiovascular: Vascular examination included warmth and capillary refill as well inspection for edema and assessment of pedal pulses. Pulses are palpable and regular.  Musculoskeletal: Gait was assessed as to whether it was steady, non-antalgic, and/or required the use of an assist device. The patient was also asked to walk independently and get onto the examination table.  Skin: The skin was examined for any obvious rashes or lesions in the extremity.  Neurologic: Sensation is intact to light touch in the extremity. The patient has good coordination without hyperreflexia and is alert and oriented to person, place and time and has normal mood and affect.     All of the above were examined and found to be within normal limits except for the following pertinent clinical findings:    Antalgic gait with a limp.  0-120 degree knee range of motion, difficult to measure alignment due to body habitus but appears neutral.  Tender palpation at the pes anserinus more so than the medial joint line also tender lateral and patellofemoral joints mild effusion knee stable anterior-posterior varus valgus stresses no flexion contracture or extensor lag.  Distally she has 1+ DP pulse less than 2nd capillary refill in her toes.      She is tender over the greater trochanter on  the right she has no pain with active straight leg raise no pain with internal-external range of motion of her hip joint      Imaging:    HIP R NORMAL           Indication:  Right hip pain  Exam Ordered: Radiographs include an anteroposterior pelvis, an anteroposterior and lateral view of the proximal femur including the hip joint.  Details of Examination: Exam shows no evidence of joint space narrowing, fracture or dislocation.  No other significant findings are noted.  Impression: Normal Exam, Right Hip and KNEE R ARTHRITIS    Indication:  Right knee pain  Exam Ordered: Radiographs of the right knee include a standing anteroposterior view, a standing posterioanterior view, a lateral view in full flexion, and a sunrise view  Details of Examination: Exam shows evidence of joint space narrowing, osteophyte formation, and subchondral sclerosis, all consistent with degenerative arthritis of the knee.  No other significant findings are noted.  Impression:  Degenerative Arthritis, Right Knee    R knee KL G3-4 varus arthritis, significant progressin since 2021  Hip XR no sig OA      Assessment:       ICD-10-CM ICD-9-CM   1. Primary osteoarthritis of right knee  M17.11 715.16   2. Chronic pain of right knee  M25.561 719.46    G89.29 338.29   3. Greater trochanteric bursitis of left hip  M70.62 726.5      R knee scope 12/21    BMI 41.5, drinking shakes for weight loss is helping the knee pain  DM 5.9  RA on sulfasalazine, most recent CRP 1.7  IBS but tolerates NSAIDS  JORDANA does not use CPAP  Documented PAD but FRANTZ normal 4/22, on ASA     Plan:       The above findings were discussed with patient length. We discussed the risks of conservative versus surgical management knee arthritis. Conservative management consisting of anti-inflammatory medications, glucosamine/chondroitin sulfate, weight loss, physical therapy, activity modification, as well as injections (lubricant versus corticosteroid) was discussed at length. At this  point considering the patient's level of activity, pain, and radiographic findings I recommend continued conservative management of the knee arthritis.     The patient was given a handout with treatment strategies for hip and knee joint care prior to surgery from [employer], the [employer].   This included information regarding medications, injections, weight loss, exercise, braces, physical therapy, and alternative therapies.       I explained the potentially adverse gastric, cardiac, and renal effects of NSAIDs and explained that if the patient wishes to take it for longer that the patient should discuss this with their primary care physician to determine if it is safe to do so.      x Tylenol   Rx celebrex to avoid GI upset Aleve    Voltaren Gel   [facility]    Bursitis info    Total Joint Info    HEP: [facility] [employer] home exercise conditioning program     PT   X  R knee IA + pes CSI: intra-articular steroid injection    CSI: greater trochanter bursitis    HA: hyaluronic acid injection    Brace:     Referral:      6 week f/u for repeat exam    She has pes bursitis and varus knee arthritis.  We injected both areas today.  Will try to optimize her from a nonoperative standpoint.  Long-term knee replacement if symptoms persist    Orders Placed This Encounter   Procedures    Ambulatory referral/consult to Physical/Occupational Therapy     Standing Status:   Future     Standing Expiration Date:   9/30/2023     Referral Priority:   Routine     Referral Type:   Physical Medicine     Referral Reason:   Specialty Services Required     Number of Visits Requested:   1       Medications Ordered This Encounter   Medications    celecoxib (CELEBREX) 200 MG capsule     Sig: Take 1 capsule (200 mg total) by mouth once daily.     Dispense:  90 capsule     Refill:  0        Past Medical History:   Diagnosis Date    Acute seasonal allergic rhinitis     Atypical ductal hyperplasia of  left breast     BMI 40.0-44.9, adult     Breast cyst     Diabetes mellitus     Dysphagia     Dysplastic nevus of trunk 03/2017    moderately atypical    Fatty liver disease, nonalcoholic     Fibrocystic breast     Hiatal hernia     Hyperlipidemia     Hypertension     Hypothyroid     IGT (impaired glucose tolerance)     Irritable bowel syndrome (IBS)     Kidney stones     Left breast mass     Lumbar disc disease     Morbid obesity     Nicotine use disorder     JORDANA on CPAP     PAD (peripheral artery disease)     Personal history of colonic polyps 05/27/2009    PVD (peripheral vascular disease)     Renal angiomyolipoma     Rosacea     Squamous cell carcinoma 12/2017    in situ mid scalp    Venous insufficiency     Vitamin D deficiency disease        Past Surgical History:   Procedure Laterality Date    ARTHROSCOPIC CHONDROPLASTY OF KNEE JOINT Right 12/8/2021    Procedure: ARTHROSCOPY, KNEE, WITH CHONDROPLASTY;  Surgeon: Bin Artis MD;  Location: [facility];  Service: Orthopedics;  Laterality: Right;    BLADDER SUSPENSION      BREAST BIOPSY      COLONOSCOPY N/A 5/24/2017    Procedure: COLONOSCOPY;  Surgeon: Georgina Bunch MD;  Location: [facility];  Service: Endoscopy;  Laterality: N/A;    COLONOSCOPY N/A 7/28/2022    Procedure: COLONOSCOPY Suprep;  Surgeon: Cedrick Hernandez MD;  Location: [facility];  Service: Endoscopy;  Laterality: N/A;    CYSTOSCOPY      ESOPHAGOGASTRODUODENOSCOPY N/A 12/5/2018    Procedure: ESOPHAGOGASTRODUODENOSCOPY (EGD);  Surgeon: Georgina Bunch MD;  Location: [facility];  Service: Endoscopy;  Laterality: N/A;  1000 am arrival time, has transportation set up    EXCISIONAL BIOPSY Left 8/19/2019    Procedure: EXCISIONAL BIOPSY LEFT BREAST with SEED LOCALIZATION;  Surgeon: Ramon Bass MD;  Location: [address];  Service: General;  Laterality: Left;    HYSTERECTOMY      KNEE ARTHROSCOPY W/ MENISCECTOMY Right 12/8/2021    Procedure: ARTHROSCOPY, KNEE, WITH  MENISCECTOMY;  Surgeon: Bin Artis MD;  Location: [facility];  Service: Orthopedics;  Laterality: Right;  medial & lateral    LITHOTRIPSY      OOPHORECTOMY         Family History   Problem Relation Age of Onset    Alzheimer's disease Mother     Breast cancer Mother     Skin cancer Sister     Diabetes type II Maternal Grandfather     Depression Maternal Grandfather     Breast cancer Paternal Aunt     Lupus Neg Hx     Psoriasis Neg Hx     Melanoma Neg Hx        Social History     Socioeconomic History    Marital status: Single   Occupational History    Occupation: retired teacher     Employer: Retired   Tobacco Use    Smoking status: Former     Packs/day: 0.50     Years: 30.00     Pack years: 15.00     Types: Cigarettes     Quit date: 2008     Years since quittin.0    Smokeless tobacco: Former   Substance and Sexual Activity    Alcohol use: No    Drug use: No    Sexual activity: Never     Social Determinants of Health     Financial Resource Strain: Unknown    Difficulty of Paying Living Expenses: Patient refused   Food Insecurity: Unknown    Worried About Running Out of Food in the Last Year: Patient refused    Ran Out of Food in the Last Year: Patient refused   Transportation Needs: Unknown    Lack of Transportation (Medical): Patient refused    Lack of Transportation (Non-Medical): Patient refused   Physical Activity: Unknown    Days of Exercise per Week: Patient refused    Minutes of Exercise per Session: 0 min   Stress: Unknown    Feeling of Stress : Patient refused   Social Connections: Unknown    Frequency of Communication with Friends and Family: Patient refused    Frequency of Social Gatherings with Friends and Family: Patient refused    Active Member of Clubs or Organizations: Patient refused    Attends Club or Organization Meetings: Patient refused    Marital Status: Patient refused   Housing Stability: Unknown    Unable to Pay for Housing in the Last Year: Patient refused    Unstable Housing  in the Last Year: Patient refused

## 2022-09-13 ENCOUNTER — OFFICE VISIT (OUTPATIENT)
Dept: RHEUMATOLOGY | Facility: CLINIC | Age: 67
End: 2022-09-13
Payer: MEDICARE

## 2022-09-13 VITALS
TEMPERATURE: 98 F | SYSTOLIC BLOOD PRESSURE: 116 MMHG | WEIGHT: 218.25 LBS | HEIGHT: 61 IN | DIASTOLIC BLOOD PRESSURE: 77 MMHG | HEART RATE: 63 BPM | BODY MASS INDEX: 41.21 KG/M2

## 2022-09-13 DIAGNOSIS — E66.01 MORBID OBESITY WITH BMI OF 40.0-44.9, ADULT: ICD-10-CM

## 2022-09-13 DIAGNOSIS — M17.0 PRIMARY OSTEOARTHRITIS OF BOTH KNEES: Primary | ICD-10-CM

## 2022-09-13 DIAGNOSIS — M05.9 SEROPOSITIVE RHEUMATOID ARTHRITIS: ICD-10-CM

## 2022-09-13 PROCEDURE — 99999 PR PBB SHADOW E&M-EST. PATIENT-LVL V: CPT | Mod: PBBFAC,,, | Performed by: INTERNAL MEDICINE

## 2022-09-13 PROCEDURE — 99214 PR OFFICE/OUTPT VISIT, EST, LEVL IV, 30-39 MIN: ICD-10-PCS | Mod: S$PBB,,, | Performed by: INTERNAL MEDICINE

## 2022-09-13 PROCEDURE — 99214 OFFICE O/P EST MOD 30 MIN: CPT | Mod: S$PBB,,, | Performed by: INTERNAL MEDICINE

## 2022-09-13 PROCEDURE — 99215 OFFICE O/P EST HI 40 MIN: CPT | Mod: PBBFAC | Performed by: INTERNAL MEDICINE

## 2022-09-13 PROCEDURE — 99999 PR PBB SHADOW E&M-EST. PATIENT-LVL V: ICD-10-PCS | Mod: PBBFAC,,, | Performed by: INTERNAL MEDICINE

## 2022-09-13 RX ORDER — SULFASALAZINE 500 MG/1
1500 TABLET ORAL 2 TIMES DAILY
Qty: 540 TABLET | Refills: 0 | Status: SHIPPED | OUTPATIENT
Start: 2022-11-11 | End: 2023-02-27 | Stop reason: SDUPTHER

## 2022-09-13 ASSESSMENT — ROUTINE ASSESSMENT OF PATIENT INDEX DATA (RAPID3)
FATIGUE SCORE: 1
AM STIFFNESS SCORE: 0, NO
TOTAL RAPID3 SCORE: 1.28
PATIENT GLOBAL ASSESSMENT SCORE: 1
PAIN SCORE: 1.5
PSYCHOLOGICAL DISTRESS SCORE: 1.1
MDHAQ FUNCTION SCORE: 0.4

## 2022-09-13 NOTE — PROGRESS NOTES
"Subjective:       Patient ID: Verenice Mirza is a 67 y.o. female.    Chief Complaint: Disease Management    HPI    Fell early 2021; R hand swelled after fall but never got better  Dr. Erickson dx RA  Orencia weekly x 3 (April) helped but then she got diverticulitis  June 2021 started Sulfasalazine up to 3 bid     Feb 2021 RF 92,      Previous hx of plantar fasciitis, achilles tendinitis, R knee pain, and lumbar pain  Had gel shots in R knee  Had PT for knee  A lot of pain around the knee; no cane   Meloxicam      Hx DM2  Steatosis/ fatty liver  HTN metoprolol   HLD  Hypothyroid  PVD  PAD  Rosacea  JORDANA  Raloxifene since 2019 for atypical ductal hyperplasia  Fam hx crohn's grandmother     Limits carbs and sugars     Had surgery knee Dec 2021; going to PT  R knee pain ; ortho recommended celecoxib      Review of Systems   Constitutional:  Negative for fever and unexpected weight change.   HENT:  Negative for mouth sores and trouble swallowing.    Eyes:  Negative for redness.   Respiratory:  Negative for cough and shortness of breath.    Cardiovascular:  Negative for chest pain.   Gastrointestinal:  Negative for constipation and diarrhea.   Genitourinary:  Negative for dysuria and genital sores.   Skin:  Negative for rash.   Neurological:  Negative for headaches.   Hematological:  Does not bruise/bleed easily.       Rapid3 Question Responses and Scores 9/9/2022   MDHAQ Score 0.4   Psychologic Score 1.1   Pain Score 1.5   When you awakened in the morning OVER THE LAST WEEK, did you feel stiff? No   If Yes, please indicate the number of hours until you are as limber as you will be for the day -   Fatigue Score 1   Global Health Score 1   RAPID3 Score 1.28      Objective:   /77   Pulse 63   Temp 97.9 °F (36.6 °C)   Ht 5' 1" (1.549 m)   Wt 99 kg (218 lb 4.1 oz)   BMI 41.24 kg/m²      Physical Exam        10/18/2021 9/13/2022   Tender (HERNANDEZ-28) 2 / 28  0 / 28    Swollen (HERNANDEZ-28) 1 / 28 2 / 28    Provider " Global 5 mm 10 mm   Patient Global 5 mm 10 mm   ESR 12 mm/hr 30 mm/hr   CRP 1.3 mg/L 10.6 mg/L   HERNANDEZ-28 (ESR) 2.88 (Low disease activity) 2.92 (Low disease activity)   HERNANDEZ-28 (CRP) 2.4 (Remission) 2.38 (Remission)   CDAI Score 4  4      Lab Results   Component Value Date    SEDRATE 30 08/30/2022          Assessment:       1. Primary osteoarthritis of both knees    2. Seropositive rheumatoid arthritis    3. Morbid obesity with BMI of 40.0-44.9, adult            Plan:       Problem List Items Addressed This Visit          Active Problems    Seropositive rheumatoid arthritis     RA continues well controlled on SSZ monotherapy    Has had sx from OA of R knee but improved after injections per ortho    She is working on diet and weight and will cont SSZ for RA and Celebrex as needed         Relevant Medications    sulfaSALAzine (AZULFIDINE) 500 mg Tab (Start on 11/11/2022)    Morbid obesity with BMI of 40.0-44.9, adult     Discussed weight reduction for knee OA and reiterated anti-inflammatory diet         Primary osteoarthritis of both knees - Primary

## 2022-09-13 NOTE — ASSESSMENT & PLAN NOTE
RA continues well controlled on SSZ monotherapy    Has had sx from OA of R knee but improved after injections per ortho    She is working on diet and weight and will cont SSZ for RA and Celebrex as needed

## 2022-09-13 NOTE — PROGRESS NOTES
Rapid3 Question Responses and Scores 9/9/2022   MDHAQ Score 0.4   Psychologic Score 1.1   Pain Score 1.5   When you awakened in the morning OVER THE LAST WEEK, did you feel stiff? No   If Yes, please indicate the number of hours until you are as limber as you will be for the day -   Fatigue Score 1   Global Health Score 1   RAPID3 Score 1.28     Answers submitted by the patient for this visit:  Rheumatology Questionnaire (Submitted on 9/9/2022)  fever: No  eye redness: No  mouth sores: No  headaches: No  shortness of breath: No  chest pain: No  trouble swallowing: No  diarrhea: No  constipation: No  unexpected weight change: No  genital sore: No  dysuria: No  During the last 3 days, have you had a skin rash?: No  Bruises or bleeds easily: No  cough: No

## 2022-09-15 ENCOUNTER — PATIENT MESSAGE (OUTPATIENT)
Dept: INTERNAL MEDICINE | Facility: CLINIC | Age: 67
End: 2022-09-15
Payer: MEDICARE

## 2022-09-15 DIAGNOSIS — E11.69 DYSLIPIDEMIA ASSOCIATED WITH TYPE 2 DIABETES MELLITUS: ICD-10-CM

## 2022-09-15 DIAGNOSIS — E78.5 DYSLIPIDEMIA ASSOCIATED WITH TYPE 2 DIABETES MELLITUS: ICD-10-CM

## 2022-09-15 DIAGNOSIS — I15.2 HYPERTENSION ASSOCIATED WITH DIABETES: ICD-10-CM

## 2022-09-15 DIAGNOSIS — E11.59 HYPERTENSION ASSOCIATED WITH DIABETES: ICD-10-CM

## 2022-09-15 RX ORDER — SEMAGLUTIDE 1.34 MG/ML
0.5 INJECTION, SOLUTION SUBCUTANEOUS
Qty: 1 PEN | Refills: 11 | Status: SHIPPED | OUTPATIENT
Start: 2022-09-15 | End: 2022-11-11

## 2022-09-15 NOTE — TELEPHONE ENCOUNTER
Care Due:                  Date            Visit Type   Department     Provider  --------------------------------------------------------------------------------                                ESTABLISHED                              PATIENT -    [facility] INTERNAL  Last Visit: 08-      VIRTUAL      MEDICINE       Areli Crespo                              EP -                              PRIMARY      [facility] INTERNAL  Next Visit: 02-      CARE ([facility])   MEDICINE       Areli Crespo                                                            Last  Test          Frequency    Reason                     Performed    Due Date  --------------------------------------------------------------------------------    TSH.........  12 months..  levothyroxine............  11- 11-    [facility] Embedded Care Gaps. Reference number: [id number]. 9/15/2022   2:22:32 PM CDT

## 2022-09-15 NOTE — TELEPHONE ENCOUNTER
Refill Routing Note   Medication(s) are not appropriate for processing by [facility] for the following reason(s):      - Change request from Pharmacy-see pharmacy comment for details  - Medication is a new start (<3 months)    [facility] action(s):  Defer       Medication Therapy Plan: FOV;FLOS; Pharmacy comment: pt states that she is supposed to be doing 0.5mg now. PLease send updated rx.  Medication reconciliation completed: No     Appointments  past 12m or future 3m with PCP    Date Provider   Last Visit   8/10/2022 Areli Crespo MD   Next Visit   2/10/2023 Areli Crespo MD   ED visits in past 90 days: 0        Note composed:2:42 PM 09/15/2022

## 2022-09-15 NOTE — PROGRESS NOTES
Injection Information    Triamcinolone Acetonide Injectable Suspension (40mg/mL)  Lot Number: [id number]  Expiration Date: 4/30/2024

## 2022-09-19 ENCOUNTER — CLINICAL SUPPORT (OUTPATIENT)
Dept: REHABILITATION | Facility: HOSPITAL | Age: 67
End: 2022-09-19
Payer: MEDICARE

## 2022-09-19 DIAGNOSIS — M17.11 PRIMARY OSTEOARTHRITIS OF RIGHT KNEE: ICD-10-CM

## 2022-09-19 DIAGNOSIS — M25.551 LATERAL PAIN OF RIGHT HIP: ICD-10-CM

## 2022-09-19 DIAGNOSIS — R26.89 ANTALGIC GAIT: ICD-10-CM

## 2022-09-19 DIAGNOSIS — M25.561 MEDIAL KNEE PAIN, RIGHT: ICD-10-CM

## 2022-09-19 PROCEDURE — 97161 PT EVAL LOW COMPLEX 20 MIN: CPT | Mod: PN

## 2022-09-19 NOTE — PLAN OF CARE
[facility]  Physical Therapy Initial Evaluation    Name: Verenice Mirza  Clinic Number: [id number]    Therapy Diagnosis:   Encounter Diagnoses   Name Primary?    Primary osteoarthritis of right knee     Medial knee pain, right     Lateral pain of right hip     Antalgic gait      Physician: Jose L Harry III, *    Physician Orders: PT Eval and Treat;   Note    Eval and treat with modalities: right hip bursitis, right knee varus arthritis and pes tendonitis        Medical Diagnosis: M17.11 (ICD-10-CM) - Primary osteoarthritis of right knee  Evaluation Date: 9/19/2022  Authorization Period Expiration: 12/31/2022  Plan of Care Certification Period: 9/19/2022 to 11/16/2022  Visit # / Visits authorized: 1/50 FOTO: 1/5  PTA visit: 0/5    Time In: 1610  Time Out: 1650  Total Billable Time: 40 minutes (1 LCE)  Precautions: hypertension, DM, thyroid disease     Subjective   Verenice reports to OPPT with primary complaint of right lateral hip and right medial knee pain.  History of Right knee arthroscopy 12/2021.  2 months of PT; good results.  Was pain-free for several months following.  Recent return.  Saw Dr. Harry.  Received injection; helped.  Walking better. Celebrex PRN for pain. Secondary complaint of right lateral hip pain. Denies low back pain or radicular symptoms.  Pain in knee and hip is mostly with weightbearing tasks and direct palpation to these areas.          Past Medical History:   Diagnosis Date    Acute seasonal allergic rhinitis     Atypical ductal hyperplasia of left breast     BMI 40.0-44.9, adult     Breast cyst     Diabetes mellitus     Dysphagia     Dysplastic nevus of trunk 03/2017    moderately atypical    Fatty liver disease, nonalcoholic     Fibrocystic breast     Hiatal hernia     Hyperlipidemia     Hypertension     Hypothyroid     IGT (impaired glucose tolerance)     Irritable bowel syndrome (IBS)     Kidney stones     Left breast mass     Lumbar disc disease      "Morbid obesity     Nicotine use disorder     JORDANA on CPAP     PAD (peripheral artery disease)     Personal history of colonic polyps 05/27/2009    PVD (peripheral vascular disease)     Renal angiomyolipoma     Rosacea     Squamous cell carcinoma 12/2017    in situ mid scalp    Venous insufficiency     Vitamin D deficiency disease      Verenice Mirza  has a past surgical history that includes Hysterectomy; Bladder suspension; Lithotripsy; Cystoscopy; Oophorectomy; Colonoscopy (N/A, 5/24/2017); Esophagogastroduodenoscopy (N/A, 12/5/2018); Excisional biopsy (Left, 8/19/2019); Breast biopsy; Knee arthroscopy w/ meniscectomy (Right, 12/8/2021); Arthroscopic chondroplasty of knee joint (Right, 12/8/2021); and Colonoscopy (N/A, 7/28/2022).    Verenice has a current medication list which includes the following prescription(s): albuterol, aspirin, atorvastatin, b complex vitamins, blood sugar diagnostic, calcium carbonate, celecoxib, co-enzyme q-10, finasteride, hyoscyamine, inhalation spacing device, ketoconazole, lactobacillus 3/fos/pantethine, lancets, levothyroxine, magnesium oxide, metformin, metoprolol succinate, nystatin, omega-3 acid ethyl esters, raloxifene, ozempic, sour cherry extract, spironolactone, [START ON 11/11/2022] sulfasalazine, vitamin d, and [DISCONTINUED] tamsulosin, and the following Facility-Administered Medications: fentanyl and midazolam.    Review of patient's allergies indicates:  No Known Allergies     Imaging:   Right hip radiographs:  08/2022: Findings: "Mild DJD.  No significant joint space narrowing.  No fracture or dislocation.  No bone destruction identified."  Right knee radiographs: 08/2022: Findings: "Right knee joint is mildly narrowed medially.  No joint effusion is seen.  Minimal degenerative changes are noted."     Prior Therapy: yes; following right knee arthroscopy in Dec 2021.  Good results.   Social History: Lives at alone.    Occupation: retired .   Prior Level of " Function: was doing well prior to 2-3 months ago.   Current Level of Function: Exercises from previous PT course; twice weekly usually.     Pain:  Current 3/10, worst 5/10, best 1/10   Location: right medial knee   Description: sore, tender.     Pts goals:  1. To get her knee stronger    2.  To improve her walking.     Objective     Palpation: TTP right pes anserine, TTP right greater trochanter     Gross Movement Analysis:  - Gait:  antalgic gait pattern; quickened stance phase on the right.   - Squats: deferred    Range of Motion:   LE Right Left   Hip flexion 100 100   Hip abduction WNL WNL   Hip ER 40 40   Hip IR  WNL WNL   Hip extension 0-10 0-10   Knee (+) 2 - 0 - 130 (+) 4 - 0 - 135   Ankle DF WNL WNL     Lower Extremity Strength                 LE           Right           Left   Hip flexion: 3+/5 3+/5   Hip abduction 3+/5 3+/5   Hip extension 4/5 4/5   Hip ER 3+/5 3+/5   Knee flexion 3+/5  12.3 kg 3+/5  13.2 kg   Knee extension 3+/5  18.6 kg 4/5  23.6 kg   Ankle dorsiflexion: 5/5 5/5   Ankle plantarflexion: 3/5 WB 3/5 WB     Special Tests:   Right Left   Valgus Stress Test - -   Varus Stress test - -   Lachman's test deferred deferred   Posterior drawer - -   Anterior drawer - -   Bernard's Test - -   Hip special test: (right)   KLEBER test:  negative   FADIR test:  negative   FAIR test:  Negative   Hip external de-rotation test: negative         Joint Mobility: (+) lateral patellar tilt bilaterally.  2+ quadrant mobility superior and inferiorly   Sensation: intact light touch sensation to BLE throughout L2-S2 dermatomal pattern      CMS Impairment/Limitation/Restriction for FOTO Knee Survey    Therapist reviewed FOTO scores for Verenice Mirza on 9/19/2022.   FOTO documents entered into [facility] - see Media section.    Limitation Score: 39%  Predicted Limitation Score: 34%  LEFS: 50/80         TREATMENT   Not today.  Next: Phase I knee strengthening, bridges, BKFOs banded    Home Exercises and Patient  Education Provided  Education provided re:   - PT diagnosis and recommended treatment course.    Written Home Exercises Provided: not today.     Assessment   Verenice is a 67 y.o. female referred to outpatient Physical Therapy with a medical diagnosis of Primary osteoarthritis of right knee. Pt presents with right lateral hip and right medial knee pain.  History of right knee arthroscopy in 2021.  Return of medial knee pain over the last several months.  No MILO.  Clinical examination today reveals pain along greater trochanter, hip weakness, decreased thigh muscle strength, antalgic gait changes, and decreased tolerance to functional transfers.  Would benefit from OPPT course to address deficits. Hip gluteal tendinopathy program.  Knee OA non-operative program with emphasis on improving joint health and dynamic stability.     Pt prognosis is Good.   Pt will benefit from skilled outpatient Physical Therapy to address the deficits stated above and in the chart below, provide pt/family education, and to maximize pt's level of independence.     Plan of care discussed with patient: Yes  Pt's spiritual, cultural and educational needs considered and patient is agreeable to the plan of care and goals as stated below:     Anticipated Barriers for therapy: co-morbidities    Medical Necessity is demonstrated by the following  History  Co-morbidities and personal factors that may impact the plan of care Co-morbidities:   Arthritis, Back pain, BMI over 30, Diabetes Type I or II, Gastrointestinal Disease,  Headaches, High Blood Pressure, Peripheral Vascular Disease    Personal Factors:   age  lifestyle     moderate   Examination  Body Structures and Functions, activity limitations and participation restrictions that may impact the plan of care Body Regions:   lower extremities    Body Systems:    ROM  strength  balance  gait  transfers  transitions    Participation Restrictions:   none    Activity limitations:   Learning and  applying knowledge  no deficits    General Tasks and Commands  undertaking multiple tasks    Communication  no deficits    Mobility  lifting and carrying objects  walking  Bending, kneeling    Self care  no deficits    Domestic Life  cooking  doing house work (cleaning house, washing dishes, laundry)    Interactions/Relationships  no deficits    Life Areas  no deficits    Community and Social Life  community life  recreation and leisure         low   Clinical Presentation stable and uncomplicated low   Decision Making/ Complexity Score: low     Goals:  Short Term Goals: 3 weeks   1.  Patient will demonstrate understanding and compliance with HEP.   2.  Patient will demonstrate non-antalgic gait pattern on smooth level surface x 500'.   3.  Patient will report >10 point improvement on LEFS.     Long Term Goals: 8 weeks   1.  Patient will demonstrate ability to perform sit-to-stand from standard chair x 8 reps in 30 seconds without increased right knee pain.   2.  Patient will report <30% limitation on FOTO survey.   3.  Patient will demonstrate ability complete 6MWT without increased right knee pain/right hip pain for improved community ambulation tolerance.   4.  Patient will report consistently <3/10 right lateral hip pain at worst during daily activities.     Plan   Certification Period/Plan of care expiration: 9/19/2022 to 11/16/2022.    Outpatient Physical Therapy 12 times weekly for 8 weeks to include the following interventions: Electrical Stimulation IFC/NMES, Gait Training, Manual Therapy, Neuromuscular Re-ed, Self Care, Therapeutic Activities, and Therapeutic Exercise, [email or web address], Gerda Colin, PT, DPT, OCS

## 2022-09-21 ENCOUNTER — PATIENT MESSAGE (OUTPATIENT)
Dept: INTERNAL MEDICINE | Facility: CLINIC | Age: 67
End: 2022-09-21
Payer: MEDICARE

## 2022-09-21 DIAGNOSIS — K21.9 GASTROESOPHAGEAL REFLUX DISEASE, UNSPECIFIED WHETHER ESOPHAGITIS PRESENT: Primary | ICD-10-CM

## 2022-09-21 RX ORDER — OMEPRAZOLE 40 MG/1
40 CAPSULE, DELAYED RELEASE ORAL DAILY
Qty: 30 CAPSULE | Refills: 5 | Status: SHIPPED | OUTPATIENT
Start: 2022-09-21 | End: 2023-03-20 | Stop reason: SDUPTHER

## 2022-09-29 ENCOUNTER — CLINICAL SUPPORT (OUTPATIENT)
Dept: REHABILITATION | Facility: HOSPITAL | Age: 67
End: 2022-09-29
Payer: MEDICARE

## 2022-09-29 DIAGNOSIS — M25.551 LATERAL PAIN OF RIGHT HIP: ICD-10-CM

## 2022-09-29 DIAGNOSIS — R26.89 ANTALGIC GAIT: ICD-10-CM

## 2022-09-29 DIAGNOSIS — M25.561 MEDIAL KNEE PAIN, RIGHT: Primary | ICD-10-CM

## 2022-09-29 PROCEDURE — 97110 THERAPEUTIC EXERCISES: CPT | Mod: PN

## 2022-09-29 PROCEDURE — 97140 MANUAL THERAPY 1/> REGIONS: CPT | Mod: PN

## 2022-09-29 NOTE — PROGRESS NOTES
"[facility]   Physical Therapy Treatment Note     Name: Verenice Mirza  Clinic Number: [id number]    Therapy Diagnosis:   Encounter Diagnoses   Name Primary?    Medial knee pain, right Yes    Lateral pain of right hip     Antalgic gait      Physician: Jose L Harry III, *    Visit Date: 9/29/2022    Physician Orders: PT Eval and Treat;       Note     Eval and treat with modalities: right hip bursitis, right knee varus arthritis and pes tendonitis         Medical Diagnosis: M17.11 (ICD-10-CM) - Primary osteoarthritis of right knee  Evaluation Date: 9/19/2022  Authorization Period Expiration: 12/31/2022  Plan of Care Certification Period: 9/19/2022 to 11/16/2022  Visit # / Visits authorized: 2/50 FOTO: 2/5  PTA visit: 0/5     Time In: 1300  Time Out: 1355  Total Billable Time: 55 minutes (3 TE, 1 MT)  Precautions: hypertension, DM, thyroid disease       SUBJECTIVE     Pt reports: improving right knee pain.  She was compliant with home exercise program.  Response to previous treatment: eval only  Functional change: walking better 'I think'.     Pain: 2/10  Location: right knee pain.      OBJECTIVE     Objective Measures updated at progress report unless specified.     Treatment     Verenice received the treatments listed below:      Therapeutic exercises to develop strength, ROM, and flexibility for 40 minutes including:  Quad sets 10x10"  Straight leg raise 3x10  LAQs red TB 2x10 & LAQs 2x10 3#  Leg press 2x10 at 5 plates double leg  Bike 7' at L2.5    Manual therapy techniques: total time 15 minutes, including:  - grade III patellar mobs  - grade III/IV right knee passive physiological flexion.         Patient Education and Home Exercises     Home Exercises Provided and Patient Education Provided   Education provided:   - exercise benefits with regard to knee OA.     Written Home Exercises Provided: issue next session.     ASSESSMENT   Verenice is a 67 y.o. female referred to outpatient " Physical Therapy with a medical diagnosis of Primary osteoarthritis of right knee. Pt presents with right lateral hip and right medial knee pain.  History of right knee arthroscopy in 2021.  Return of medial knee pain over the last several months.  No MILO.  Clinical examination at evaluation revealed pain along greater trochanter, hip weakness, decreased thigh muscle strength, antalgic gait changes, and decreased tolerance to functional transfers.  Initiated knee manual therapy, thigh/hip strengthening program, and bike for aerobic conditioning today. Tolerated well without pain increase at the end of the session.  Education on benefits of exercise for knee OA.     Verenice Is progressing well towards her goals.   Pt prognosis is Good.     Pt will continue to benefit from skilled outpatient physical therapy to address the deficits listed in the problem list box on initial evaluation, provide pt/family education and to maximize pt's level of independence in the home and community environment.   Pt's spiritual, cultural and educational needs considered and pt agreeable to plan of care and goals.     Anticipated barriers to physical therapy: co-morbidities    Goals:   Short Term Goals: 3 weeks   1.  Patient will demonstrate understanding and compliance with HEP.  progressing towards not met  2.  Patient will demonstrate non-antalgic gait pattern on smooth level surface x 500'. progressing towards not met  3.  Patient will report >10 point improvement on LEFS. progressing towards not met     Long Term Goals: 8 weeks   1.  Patient will demonstrate ability to perform sit-to-stand from standard chair x 8 reps in 30 seconds without increased right knee pain.  progressing towards not met  2.  Patient will report <30% limitation on FOTO survey. progressing towards not met  3.  Patient will demonstrate ability complete 6MWT without increased right knee pain/right hip pain for improved community ambulation tolerance. progressing  towards not met  4.  Patient will report consistently <3/10 right lateral hip pain at worst during daily activities.  progressing towards not met    PLAN   Phase I of program  Mat thigh and hip strengthening  Leg press  Bike for aerobic conditioning.     Francisco Colin, PT, DPT, OCS

## 2022-10-04 ENCOUNTER — CLINICAL SUPPORT (OUTPATIENT)
Dept: REHABILITATION | Facility: HOSPITAL | Age: 67
End: 2022-10-04
Payer: MEDICARE

## 2022-10-04 DIAGNOSIS — R26.89 ANTALGIC GAIT: ICD-10-CM

## 2022-10-04 DIAGNOSIS — M25.561 MEDIAL KNEE PAIN, RIGHT: Primary | ICD-10-CM

## 2022-10-04 DIAGNOSIS — M25.551 LATERAL PAIN OF RIGHT HIP: ICD-10-CM

## 2022-10-04 PROCEDURE — 97140 MANUAL THERAPY 1/> REGIONS: CPT | Mod: PN

## 2022-10-04 PROCEDURE — 97110 THERAPEUTIC EXERCISES: CPT | Mod: PN

## 2022-10-04 NOTE — PROGRESS NOTES
"[facility]   Physical Therapy Treatment Note     Name: Verenice Mirza  Clinic Number: [id number]    Therapy Diagnosis:   Encounter Diagnoses   Name Primary?    Medial knee pain, right Yes    Lateral pain of right hip     Antalgic gait      Physician: Jose L Harry III, *    Visit Date: 10/4/2022    Physician Orders: PT Eval and Treat;       Note     Eval and treat with modalities: right hip bursitis, right knee varus arthritis and pes tendonitis         Medical Diagnosis: M17.11 (ICD-10-CM) - Primary osteoarthritis of right knee  Evaluation Date: 9/19/2022  Authorization Period Expiration: 12/31/2022  Plan of Care Certification Period: 9/19/2022 to 11/16/2022  Visit # / Visits authorized: 3/50 FOTO: 3/5  PTA visit: 0/5     Time In: 1300  Time Out: 1355  Total Billable Time: 55 minutes (3 TE, 1 MT)  Precautions: hypertension, DM, thyroid disease       SUBJECTIVE     Pt reports: more knee pain today upon arrival.    She was compliant with home exercise program.  Response to previous treatment: no increased in pain the remainder of the day following last session.   Functional change: none voiced today.     Pain: 4/10  Location: right knee pain; describes as a 'twinge'.     OBJECTIVE     Objective Measures updated at progress report unless specified.     Treatment     Verenice received the treatments listed below:      Therapeutic exercises to develop strength, ROM, and flexibility for 40 minutes including:  Quad sets 10x10"  Straight leg raise 2x15  LAQs red TB 2x10 & LAQs 2x10 4#  Leg press 2x10 at 5 plates double leg  (+) sidelying hip abduction 2x10 (hard)  Bike 7' at L2.5    Manual therapy techniques: total time 15 minutes, including:  - grade III patellar mobs  - grade III/IV right knee passive physiological flexion.       Patient Education and Home Exercises     Home Exercises Provided and Patient Education Provided   Education provided:   - exercise benefits with regard to knee OA. "     Written Home Exercises Provided: issue next session.     ASSESSMENT   Verenice is a 67 y.o. female referred to outpatient Physical Therapy with a medical diagnosis of Primary osteoarthritis of right knee. Pt presents with right lateral hip and right medial knee pain.  History of right knee arthroscopy in 2021.  More medial knee pain today in pes anserine area.  Mild antalgic pattern.  Tolerated session; no change in pain level post-PT.  More hip strengthening than knee during this first phase of her program.     Verenice Is progressing well towards her goals.   Pt prognosis is Good.     Pt will continue to benefit from skilled outpatient physical therapy to address the deficits listed in the problem list box on initial evaluation, provide pt/family education and to maximize pt's level of independence in the home and community environment.   Pt's spiritual, cultural and educational needs considered and pt agreeable to plan of care and goals.     Anticipated barriers to physical therapy: co-morbidities    Goals:   Short Term Goals: 3 weeks   1.  Patient will demonstrate understanding and compliance with HEP.  progressing towards not met  2.  Patient will demonstrate non-antalgic gait pattern on smooth level surface x 500'. progressing towards not met  3.  Patient will report >10 point improvement on LEFS. progressing towards not met     Long Term Goals: 8 weeks   1.  Patient will demonstrate ability to perform sit-to-stand from standard chair x 8 reps in 30 seconds without increased right knee pain.  progressing towards not met  2.  Patient will report <30% limitation on FOTO survey. progressing towards not met  3.  Patient will demonstrate ability complete 6MWT without increased right knee pain/right hip pain for improved community ambulation tolerance. progressing towards not met  4.  Patient will report consistently <3/10 right lateral hip pain at worst during daily activities.  progressing towards not met    PLAN    Phase I of program  Mat thigh and hip strengthening  Leg press  Bike for aerobic conditioning.     Francisco Colin, PT, DPT, OCS

## 2022-10-06 ENCOUNTER — CLINICAL SUPPORT (OUTPATIENT)
Dept: REHABILITATION | Facility: HOSPITAL | Age: 67
End: 2022-10-06
Payer: MEDICARE

## 2022-10-06 DIAGNOSIS — R26.89 ANTALGIC GAIT: ICD-10-CM

## 2022-10-06 DIAGNOSIS — M25.561 MEDIAL KNEE PAIN, RIGHT: Primary | ICD-10-CM

## 2022-10-06 DIAGNOSIS — M25.551 LATERAL PAIN OF RIGHT HIP: ICD-10-CM

## 2022-10-06 PROCEDURE — 97140 MANUAL THERAPY 1/> REGIONS: CPT | Mod: PN

## 2022-10-06 PROCEDURE — 97110 THERAPEUTIC EXERCISES: CPT | Mod: PN

## 2022-10-06 NOTE — PROGRESS NOTES
"[facility]   Physical Therapy Treatment Note     Name: Verenice Mirza  Clinic Number: [id number]    Therapy Diagnosis:   Encounter Diagnoses   Name Primary?    Medial knee pain, right Yes    Lateral pain of right hip     Antalgic gait      Physician: Jose L Harry III, *    Visit Date: 10/6/2022    Physician Orders: PT Eval and Treat;       Note     Eval and treat with modalities: right hip bursitis, right knee varus arthritis and pes tendonitis         Medical Diagnosis: M17.11 (ICD-10-CM) - Primary osteoarthritis of right knee  Evaluation Date: 9/19/2022  Authorization Period Expiration: 12/31/2022  Plan of Care Certification Period: 9/19/2022 to 11/16/2022  Visit # / Visits authorized: 4/50 FOTO: 4/5  PTA visit: 0/5     Time In: 1300  Time Out: 1355  Total Billable Time: 55 minutes (3 TE, 1 MT)  Precautions: hypertension, DM, thyroid disease       SUBJECTIVE     Pt reports: more left knee pain today upon arrival.  Right knee is feeling well.   She was compliant with home exercise program.  Response to previous treatment: no increased in pain the remainder of the day following last session.   Functional change: none voiced today.     Pain: 1/10  Location: right knee pain; describes as a 'twinge'.     OBJECTIVE     Objective Measures updated at progress report unless specified.     Treatment     Verenice received the treatments listed below:      Therapeutic exercises to develop strength, ROM, and flexibility for 40 minutes including:  Quad sets 10x10" --> Straight leg raise 2x15  LAQs red TB 2x10 & LAQs 2x10 7#  (Cybex next)  Leg press 3x10 at 6 plates double leg  sidelying hip abduction 2x10   (+) standing heel raises 2x15  Bike 7' at L2.5    Manual therapy techniques: total time 15 minutes, including:  - grade III patellar mobs  - grade III/IV right knee passive physiological flexion.   - seated knee distraction grade II/III      Patient Education and Home Exercises     Home Exercises " Provided and Patient Education Provided   Education provided:   - exercise benefits with regard to knee OA.     Written Home Exercises Provided: issue next session.     ASSESSMENT   Verenice is a 67 y.o. female referred to outpatient Physical Therapy with a medical diagnosis of Primary osteoarthritis of right knee. Pt presents with right lateral hip and right medial knee pain.  History of right knee arthroscopy in 2021.  More left medial knee pain today in pes anserine area; no right knee pain today upon arrival. Gait: non- antalgic pattern today.  Tolerating graded loading program. Increased resistance and aerobic conditioning elements as patient tolerates.    Verenice Is progressing well towards her goals.   Pt prognosis is Good.     Pt will continue to benefit from skilled outpatient physical therapy to address the deficits listed in the problem list box on initial evaluation, provide pt/family education and to maximize pt's level of independence in the home and community environment.   Pt's spiritual, cultural and educational needs considered and pt agreeable to plan of care and goals.     Anticipated barriers to physical therapy: co-morbidities    Goals:   Short Term Goals: 3 weeks   1.  Patient will demonstrate understanding and compliance with HEP.  progressing towards not met  2.  Patient will demonstrate non-antalgic gait pattern on smooth level surface x 500'. progressing towards not met  3.  Patient will report >10 point improvement on LEFS. progressing towards not met     Long Term Goals: 8 weeks   1.  Patient will demonstrate ability to perform sit-to-stand from standard chair x 8 reps in 30 seconds without increased right knee pain.  progressing towards not met  2.  Patient will report <30% limitation on FOTO survey. progressing towards not met  3.  Patient will demonstrate ability complete 6MWT without increased right knee pain/right hip pain for improved community ambulation tolerance. progressing towards  not met  4.  Patient will report consistently <3/10 right lateral hip pain at worst during daily activities.  progressing towards not met    PLAN   Phase I of program  Mat thigh and hip strengthening  Leg press  Bike for aerobic conditioning.   Ready for transition to phase II of program.     Francisco Colin, PT, DPT, OCS

## 2022-10-07 ENCOUNTER — PATIENT MESSAGE (OUTPATIENT)
Dept: HEMATOLOGY/ONCOLOGY | Facility: CLINIC | Age: 67
End: 2022-10-07
Payer: MEDICARE

## 2022-10-11 ENCOUNTER — CLINICAL SUPPORT (OUTPATIENT)
Dept: REHABILITATION | Facility: HOSPITAL | Age: 67
End: 2022-10-11
Payer: MEDICARE

## 2022-10-11 DIAGNOSIS — M25.551 LATERAL PAIN OF RIGHT HIP: ICD-10-CM

## 2022-10-11 DIAGNOSIS — M25.561 MEDIAL KNEE PAIN, RIGHT: Primary | ICD-10-CM

## 2022-10-11 DIAGNOSIS — R26.89 ANTALGIC GAIT: ICD-10-CM

## 2022-10-11 PROCEDURE — 97140 MANUAL THERAPY 1/> REGIONS: CPT | Mod: PN,CQ

## 2022-10-11 PROCEDURE — 97110 THERAPEUTIC EXERCISES: CPT | Mod: PN,CQ

## 2022-10-11 NOTE — PROGRESS NOTES
"[facility]   Physical Therapy Treatment Note     Name: Verenice Mirza  Clinic Number: [id number]    Therapy Diagnosis:   Encounter Diagnoses   Name Primary?    Medial knee pain, right Yes    Lateral pain of right hip     Antalgic gait      Physician: Jose L Harry III, *    Visit Date: 10/11/2022    Physician Orders: PT Eval and Treat;       Note     Eval and treat with modalities: right hip bursitis, right knee varus arthritis and pes tendonitis         Medical Diagnosis: M17.11 (ICD-10-CM) - Primary osteoarthritis of right knee  Evaluation Date: 9/19/2022  Authorization Period Expiration: 12/31/2022  Plan of Care Certification Period: 9/19/2022 to 11/16/2022  Visit # / Visits authorized: 4/50 FOTO: 4/5  PTA visit: 0/5     Time In: 1300  Time Out: 1355  Total Billable Time: 55 minutes (1 TE, 1 MT)  Precautions: hypertension, DM, thyroid disease       SUBJECTIVE     Pt reports: Feeling good today, not having any knee pain currently.  She was compliant with home exercise program.  Response to previous treatment: no increased in pain the remainder of the day following last session.   Functional change: none voiced today.     Pain: 0/10  Location: right knee pain; describes as a 'twinge'.     OBJECTIVE     Objective Measures updated at progress report unless specified.     Treatment     Verenice received the treatments listed below:      Therapeutic exercises to develop strength, ROM, and flexibility for 15 minutes 1:1 and 25 minutes supervision including:  Quad sets 10x10" --> Straight leg raise 2x15  LAQs red TB 2x10 & LAQs 2x10 7#  (Cybex next)  Leg press 3x10 at 6 plates double leg  sidelying hip abduction 2x10 B  (+) standing heel raises 2x15  Bike 7' at L2.5  (+) Step ups 4" 2 x 10    Manual therapy techniques: total time 15 minutes, including:  - grade III patellar mobs  - grade III/IV right knee passive physiological flexion.   - seated knee distraction grade II/III      Patient " Education and Home Exercises     Home Exercises Provided and Patient Education Provided   Education provided:   - exercise benefits with regard to knee OA.     Written Home Exercises Provided: issue next session.     ASSESSMENT   Verenice is a 67 y.o. female referred to outpatient Physical Therapy with a medical diagnosis of Primary osteoarthritis of right knee. Pt presents with right lateral hip and right medial knee pain.  History of right knee arthroscopy in 2021. Gait: non- antalgic pattern today. Continued with graded loading program. Mild exacerbation of R knee pain with SLR today but remained low for rest of session. Increase resistance and aerobic conditioning elements as patient tolerates.    Verenice Is progressing well towards her goals.   Pt prognosis is Good.     Pt will continue to benefit from skilled outpatient physical therapy to address the deficits listed in the problem list box on initial evaluation, provide pt/family education and to maximize pt's level of independence in the home and community environment.   Pt's spiritual, cultural and educational needs considered and pt agreeable to plan of care and goals.     Anticipated barriers to physical therapy: co-morbidities    Goals:   Short Term Goals: 3 weeks   1.  Patient will demonstrate understanding and compliance with HEP.  progressing towards not met  2.  Patient will demonstrate non-antalgic gait pattern on smooth level surface x 500'. progressing towards not met  3.  Patient will report >10 point improvement on LEFS. progressing towards not met     Long Term Goals: 8 weeks   1.  Patient will demonstrate ability to perform sit-to-stand from standard chair x 8 reps in 30 seconds without increased right knee pain.  progressing towards not met  2.  Patient will report <30% limitation on FOTO survey. progressing towards not met  3.  Patient will demonstrate ability complete 6MWT without increased right knee pain/right hip pain for improved community  ambulation tolerance. progressing towards not met  4.  Patient will report consistently <3/10 right lateral hip pain at worst during daily activities.  progressing towards not met    PLAN   Phase I of program  Mat thigh and hip strengthening  Leg press  Bike for aerobic conditioning.   Ready for transition to phase II of program.     Isabell Crystal, PTA

## 2022-10-13 ENCOUNTER — OFFICE VISIT (OUTPATIENT)
Dept: ORTHOPEDICS | Facility: CLINIC | Age: 67
End: 2022-10-13
Payer: MEDICARE

## 2022-10-13 ENCOUNTER — CLINICAL SUPPORT (OUTPATIENT)
Dept: REHABILITATION | Facility: HOSPITAL | Age: 67
End: 2022-10-13
Payer: MEDICARE

## 2022-10-13 VITALS — WEIGHT: 218.25 LBS | HEIGHT: 61 IN | BODY MASS INDEX: 41.21 KG/M2

## 2022-10-13 DIAGNOSIS — M17.11 PRIMARY OSTEOARTHRITIS OF RIGHT KNEE: Primary | ICD-10-CM

## 2022-10-13 DIAGNOSIS — R26.89 ANTALGIC GAIT: ICD-10-CM

## 2022-10-13 DIAGNOSIS — M70.52 PES ANSERINUS BURSITIS OF LEFT KNEE: ICD-10-CM

## 2022-10-13 DIAGNOSIS — M70.51 PES ANSERINUS BURSITIS OF RIGHT KNEE: ICD-10-CM

## 2022-10-13 DIAGNOSIS — M17.12 PRIMARY OSTEOARTHRITIS OF LEFT KNEE: ICD-10-CM

## 2022-10-13 DIAGNOSIS — M25.561 MEDIAL KNEE PAIN, RIGHT: Primary | ICD-10-CM

## 2022-10-13 DIAGNOSIS — M25.551 LATERAL PAIN OF RIGHT HIP: ICD-10-CM

## 2022-10-13 PROCEDURE — 99999 PR PBB SHADOW E&M-EST. PATIENT-LVL II: CPT | Mod: PBBFAC,,, | Performed by: ORTHOPAEDIC SURGERY

## 2022-10-13 PROCEDURE — 99999 PR PBB SHADOW E&M-EST. PATIENT-LVL II: ICD-10-PCS | Mod: PBBFAC,,, | Performed by: ORTHOPAEDIC SURGERY

## 2022-10-13 PROCEDURE — 97140 MANUAL THERAPY 1/> REGIONS: CPT | Mod: PN

## 2022-10-13 PROCEDURE — 99212 PR OFFICE/OUTPT VISIT, EST, LEVL II, 10-19 MIN: ICD-10-PCS | Mod: S$PBB,,, | Performed by: ORTHOPAEDIC SURGERY

## 2022-10-13 PROCEDURE — 99212 OFFICE O/P EST SF 10 MIN: CPT | Mod: PBBFAC | Performed by: ORTHOPAEDIC SURGERY

## 2022-10-13 PROCEDURE — 99212 OFFICE O/P EST SF 10 MIN: CPT | Mod: S$PBB,,, | Performed by: ORTHOPAEDIC SURGERY

## 2022-10-13 PROCEDURE — 97110 THERAPEUTIC EXERCISES: CPT | Mod: PN

## 2022-10-13 NOTE — PROGRESS NOTES
6 week f/u R knee varus arthritis    S/p CSI IA + pes: 100% relief x4-5 weeks  Physical therapy ongoing, helping  Rx celebrex    C/o L knee symptoms but not as bad as right    L knee: 0-120 rom, ttp MJL + pes, min eff  R knee: nttp, no pain with ROM    Requests 6 week f/u for B knee injections IA +/- pes

## 2022-10-13 NOTE — PROGRESS NOTES
"[facility]   Physical Therapy Treatment Note     Name: Verenice Mirza  Clinic Number: [id number]    Therapy Diagnosis:   Encounter Diagnoses   Name Primary?    Medial knee pain, right Yes    Lateral pain of right hip     Antalgic gait      Physician: Jose L Harry III, *    Visit Date: 10/13/2022    Physician Orders: PT Eval and Treat;       Note     Eval and treat with modalities: right hip bursitis, right knee varus arthritis and pes tendonitis         Medical Diagnosis: M17.11 (ICD-10-CM) - Primary osteoarthritis of right knee  Evaluation Date: 9/19/2022  Authorization Period Expiration: 12/31/2022  Plan of Care Certification Period: 9/19/2022 to 11/16/2022  Visit # / Visits authorized: 6/50 FOTO: 5/5  PTA visit: 0/5     Time In: 10:06  Time Out: 11:00  Total Billable Time: 54 minutes (1 TE, 1 MT)  Precautions: hypertension, DM, thyroid disease       SUBJECTIVE     Pt reports: she climbed a ladder yesterday and is having more pain today.   She was compliant with home exercise program.  Response to previous treatment: no increased in pain the remainder of the day following last session.   Functional change: none voiced today.     Pain: 3/10  Location: right knee pain; describes as a 'twinge'.     OBJECTIVE     Objective Measures updated at progress report unless specified.     Treatment     Verenice received the treatments listed below:      Therapeutic exercises to develop strength, ROM, and flexibility for  40 minutes  including:  Quad sets 10x10" --> Straight leg raise 2x15  Cybex LAQs 0#   3x10   Leg press 2x15 at 6 plates double leg  sidelying hip abduction 2x10 B  standing heel raises 2x15  Bike 7' at L2.5  Step ups 4" 2 x 10    Manual therapy techniques: total time 14 minutes, including:  - grade III patellar mobs  - grade III/IV right knee passive physiological flexion.   - seated knee distraction grade II/III      Patient Education and Home Exercises     Home Exercises Provided " and Patient Education Provided   Education provided:   - exercise benefits with regard to knee OA.     Written Home Exercises Provided: issue next session.     ASSESSMENT   Verenice is a 67 y.o. female referred to outpatient Physical Therapy with a medical diagnosis of Primary osteoarthritis of right knee. Pt presents with right lateral hip and right medial knee pain.  History of right knee arthroscopy in 2021. Gait: non- antalgic pattern today. Pt tolerated today's session well. Good response to manual techniques. Pt challenged with LAQs but able to perform with appropriate rest breaks. No increased pain post treatment.     Verenice Is progressing well towards her goals.   Pt prognosis is Good.     Pt will continue to benefit from skilled outpatient physical therapy to address the deficits listed in the problem list box on initial evaluation, provide pt/family education and to maximize pt's level of independence in the home and community environment.   Pt's spiritual, cultural and educational needs considered and pt agreeable to plan of care and goals.     Anticipated barriers to physical therapy: co-morbidities    Goals:   Short Term Goals: 3 weeks   1.  Patient will demonstrate understanding and compliance with HEP.  progressing towards not met  2.  Patient will demonstrate non-antalgic gait pattern on smooth level surface x 500'. progressing towards not met  3.  Patient will report >10 point improvement on LEFS. progressing towards not met     Long Term Goals: 8 weeks   1.  Patient will demonstrate ability to perform sit-to-stand from standard chair x 8 reps in 30 seconds without increased right knee pain.  progressing towards not met  2.  Patient will report <30% limitation on FOTO survey. progressing towards not met  3.  Patient will demonstrate ability complete 6MWT without increased right knee pain/right hip pain for improved community ambulation tolerance. progressing towards not met  4.  Patient will report  consistently <3/10 right lateral hip pain at worst during daily activities.  progressing towards not met    PLAN   Phase I of program  Mat thigh and hip strengthening  Leg press  Bike for aerobic conditioning.   Ready for transition to phase II of program.     ISSA RIVERA, PT

## 2022-10-20 ENCOUNTER — CLINICAL SUPPORT (OUTPATIENT)
Dept: REHABILITATION | Facility: HOSPITAL | Age: 67
End: 2022-10-20
Payer: MEDICARE

## 2022-10-20 DIAGNOSIS — M25.551 LATERAL PAIN OF RIGHT HIP: ICD-10-CM

## 2022-10-20 DIAGNOSIS — M25.561 MEDIAL KNEE PAIN, RIGHT: Primary | ICD-10-CM

## 2022-10-20 DIAGNOSIS — R26.89 ANTALGIC GAIT: ICD-10-CM

## 2022-10-20 PROCEDURE — 97110 THERAPEUTIC EXERCISES: CPT | Mod: PN

## 2022-10-20 NOTE — PROGRESS NOTES
"[facility]   Physical Therapy Treatment Note     Name: Verenice Mirza  Clinic Number: [id number]    Therapy Diagnosis:   Encounter Diagnoses   Name Primary?    Medial knee pain, right Yes    Lateral pain of right hip     Antalgic gait      Physician: Jose L Harry III, *    Visit Date: 10/20/2022    Physician Orders: PT Eval and Treat;       Note     Eval and treat with modalities: right hip bursitis, right knee varus arthritis and pes tendonitis         Medical Diagnosis: M17.11 (ICD-10-CM) - Primary osteoarthritis of right knee  Evaluation Date: 9/19/2022  Authorization Period Expiration: 12/31/2022  Plan of Care Certification Period: 9/19/2022 to 11/16/2022  Visit # / Visits authorized: 7/50 FOTO: today PTA visit: 0/5     Time In: 1100  Time Out: 11:55  Total Billable Time: 30 minutes (2 TE)  Precautions: hypertension, DM, thyroid disease       SUBJECTIVE     Pt reports: beginning to experience more right and left knee pain.  Noticed with walking activities over the weekend.   She has not been compliant with home exercise program.  Response to previous treatment: no increased in pain the remainder of the day following last session.   Functional change: none voiced today.     Pain: 3/10  Location: right knee pain; describes as a 'twinge'.     OBJECTIVE     Objective Measures updated at progress report unless specified.     Treatment     Verenice received the treatments listed below:      Therapeutic exercises to develop strength, ROM, and flexibility for  25 minutes with PT 1:1 including:  Bike 8' at L2.5  Straight leg raise 2x15  sidelying hip abduction 2x10 bilateral    Cybex LAQs 0#  3x10   Leg press 2x15 at 6 plates double leg    standing heel raises 2x15  Step ups 4" 2 x 10  (+) Mini-squat 2x10    Manual therapy techniques: total time 5 minutes, including:  - grade III patellar mobs  - grade III/IV right knee passive physiological flexion.   - seated knee distraction grade " II/III      Patient Education and Home Exercises     Home Exercises Provided and Patient Education Provided   Education provided:   - exercise benefits with regard to knee OA.     Written Home Exercises Provided: issue next session.     ASSESSMENT   Verenice is a 67 y.o. female referred to outpatient Physical Therapy with a medical diagnosis of Primary osteoarthritis of right knee. Pt presents with right lateral hip and right medial knee pain.  History of right knee arthroscopy in 2021. Gait: moderate antalgic pattern today observed with first 3-5 steps following sitting rest.  Continues to require education about the need to engage in a daily exercise program which she has not been doing up to this point.      Verenice Is progressing well towards her goals.   Pt prognosis is Good.     Pt will continue to benefit from skilled outpatient physical therapy to address the deficits listed in the problem list box on initial evaluation, provide pt/family education and to maximize pt's level of independence in the home and community environment.   Pt's spiritual, cultural and educational needs considered and pt agreeable to plan of care and goals.     Anticipated barriers to physical therapy: co-morbidities    Goals:   Short Term Goals: 3 weeks   1.  Patient will demonstrate understanding and compliance with HEP.  progressing towards not met  2.  Patient will demonstrate non-antalgic gait pattern on smooth level surface x 500'. progressing towards not met  3.  Patient will report >10 point improvement on LEFS. progressing towards not met     Long Term Goals: 8 weeks   1.  Patient will demonstrate ability to perform sit-to-stand from standard chair x 8 reps in 30 seconds without increased right knee pain.  progressing towards not met  2.  Patient will report <30% limitation on FOTO survey. progressing towards not met  3.  Patient will demonstrate ability complete 6MWT without increased right knee pain/right hip pain for improved  community ambulation tolerance. progressing towards not met  4.  Patient will report consistently <3/10 right lateral hip pain at worst during daily activities.  progressing towards not met    PLAN   Phase I of program  Mat thigh and hip strengthening  Leg press  Bike for aerobic conditioning.   Ready for transition to phase II of program.     Francisco Colin, PT

## 2022-10-24 NOTE — PROGRESS NOTES
"[facility]   Physical Therapy Treatment Note     Name: Verenice Mirza  Clinic Number: [id number]    Therapy Diagnosis:   Encounter Diagnoses   Name Primary?    Medial knee pain, right Yes    Lateral pain of right hip     Antalgic gait        Physician: Jose L Harry III, *    Visit Date: 10/25/2022    Physician Orders: PT Eval and Treat;       Note     Eval and treat with modalities: right hip bursitis, right knee varus arthritis and pes tendonitis         Medical Diagnosis: M17.11 (ICD-10-CM) - Primary osteoarthritis of right knee  Evaluation Date: 9/19/2022  Authorization Period Expiration: 12/31/2022  Plan of Care Certification Period: 9/19/2022 to 11/16/2022  Visit # / Visits authorized: 9/50 FOTO: at dcPTA visit: 0/5     Time In: 1200  Time Out: 1250  Total Billable Time: 30 minutes (2 TE)  Precautions: hypertension, DM, thyroid disease       SUBJECTIVE     Pt reports: performed HeP over the weekend without issue.   She has been compliant with home exercise program.  Response to previous treatment: no increased in pain the remainder of the day following last session.   Functional change: none voiced today.     Pain: 3/10   Location: bilateral knee pain.     OBJECTIVE     Objective Measures updated at progress report unless specified.     Treatment     Verenice received the treatments listed below:      Therapeutic exercises to develop strength, ROM, and flexibility for  25 minutes with PT 1:1 including:  Bike 8' at Robert Applebaum MD program Lv4  Straight leg raise 2x15  sidelying hip abduction 2x10 bilateral    Cybex LAQs 10#  DL 3x10   Leg press 3x10 at 7.5 plates double leg    standing heel raises 2x15  Step ups 4" 2 x 10 (not today)  Mini-squat 2x10 (not today)    Manual therapy techniques: total time 5 minutes, including:  - grade III patellar mobs  - grade III/IV right knee passive physiological flexion.   - seated knee distraction grade II/III      Patient Education and Home Exercises "     Home Exercises Provided and Patient Education Provided   Education provided:   - exercise benefits with regard to knee OA.     Written Home Exercises Provided: issue next session.     ASSESSMENT   Verenice is a 67 y.o. female referred to outpatient Physical Therapy with a medical diagnosis of Primary osteoarthritis of right knee. Pt presents with right lateral hip and right medial knee pain.  History of right knee arthroscopy in 2021. Gait: moderate antalgic pattern today observed with first 3-5 steps following sitting rest. Demonstrating improved strength bilateral thigh musculature.     Verenice Is progressing well towards her goals.   Pt prognosis is Good.     Pt will continue to benefit from skilled outpatient physical therapy to address the deficits listed in the problem list box on initial evaluation, provide pt/family education and to maximize pt's level of independence in the home and community environment.   Pt's spiritual, cultural and educational needs considered and pt agreeable to plan of care and goals.     Anticipated barriers to physical therapy: co-morbidities    Goals:   Short Term Goals: 3 weeks   1.  Patient will demonstrate understanding and compliance with HEP.  progressing towards not met  2.  Patient will demonstrate non-antalgic gait pattern on smooth level surface x 500'. progressing towards not met  3.  Patient will report >10 point improvement on LEFS. progressing towards not met     Long Term Goals: 8 weeks   1.  Patient will demonstrate ability to perform sit-to-stand from standard chair x 8 reps in 30 seconds without increased right knee pain.  progressing towards not met  2.  Patient will report <30% limitation on FOTO survey. progressing towards not met  3.  Patient will demonstrate ability complete 6MWT without increased right knee pain/right hip pain for improved community ambulation tolerance. progressing towards not met  4.  Patient will report consistently <3/10 right lateral hip  pain at worst during daily activities.  progressing towards not met    PLAN   Phase I of program  Mat thigh and hip strengthening  Leg press  Bike for aerobic conditioning.   Ready for transition to phase II of program.     Francisco Colin, PT

## 2022-10-25 ENCOUNTER — CLINICAL SUPPORT (OUTPATIENT)
Dept: REHABILITATION | Facility: HOSPITAL | Age: 67
End: 2022-10-25
Payer: MEDICARE

## 2022-10-25 DIAGNOSIS — M25.551 LATERAL PAIN OF RIGHT HIP: ICD-10-CM

## 2022-10-25 DIAGNOSIS — M25.561 MEDIAL KNEE PAIN, RIGHT: Primary | ICD-10-CM

## 2022-10-25 DIAGNOSIS — R26.89 ANTALGIC GAIT: ICD-10-CM

## 2022-10-25 PROCEDURE — 97110 THERAPEUTIC EXERCISES: CPT | Mod: PN

## 2022-10-31 ENCOUNTER — CLINICAL SUPPORT (OUTPATIENT)
Dept: REHABILITATION | Facility: HOSPITAL | Age: 67
End: 2022-10-31
Payer: MEDICARE

## 2022-10-31 DIAGNOSIS — R26.89 ANTALGIC GAIT: ICD-10-CM

## 2022-10-31 DIAGNOSIS — M25.561 MEDIAL KNEE PAIN, RIGHT: Primary | ICD-10-CM

## 2022-10-31 DIAGNOSIS — M25.551 LATERAL PAIN OF RIGHT HIP: ICD-10-CM

## 2022-10-31 PROCEDURE — 97110 THERAPEUTIC EXERCISES: CPT | Mod: PN

## 2022-10-31 PROCEDURE — 97140 MANUAL THERAPY 1/> REGIONS: CPT | Mod: PN

## 2022-10-31 NOTE — PROGRESS NOTES
[facility]   Physical Therapy Treatment Note     Name: Verenice Mirza  Clinic Number: [id number]    Therapy Diagnosis:   Encounter Diagnoses   Name Primary?    Medial knee pain, right Yes    Lateral pain of right hip     Antalgic gait          Physician: Jose L Harry III, *    Visit Date: 10/31/2022    Physician Orders: PT Eval and Treat;       Note     Eval and treat with modalities: right hip bursitis, right knee varus arthritis and pes tendonitis         Medical Diagnosis: M17.11 (ICD-10-CM) - Primary osteoarthritis of right knee  Evaluation Date: 9/19/2022  Authorization Period Expiration: 12/31/2022  Plan of Care Certification Period: 9/19/2022 to 11/16/2022  Visit # / Visits authorized: 10/50 FOTO: at dc PTA visit: 0/5     Time In: 1500  Time Out: 1600  Total Billable Time: 55 minutes (1 MT, 3 TE)  Precautions: hypertension, DM, thyroid disease       SUBJECTIVE     Pt reports: doing yard work prior to coming to PT today.   She has [id number] with home exercise program.  Response to revious treatment: no increased in pain the remainder of the day following last session.   Functional change: none voiced today.     Pain: 3/10   Location: bilateral knee pain.     OBJECTIVE     Objective Measures updated at progress report unless specified.     Treatment     Verenice received the treatments listed below:      Therapeutic exercises to develop strength, ROM, and flexibility for  45 minutes including:  Bike 8' at hill program Lv4  Straight leg raise 2x15    Cybex LAQs 10#  DL 1x10 and sL 3x8/each 5#  Leg press 3x10 at 7.5 plates double leg and sL 3x10 4 plates    standing heel raises 2x15  Mini-split squat (wide base) 1x10/each    Manual therapy techniques: total time 10 minutes, including:  - grade III patellar mobs  - grade III/IV right knee passive physiological flexion.   - seated knee distraction grade II/III      Patient Education and Home Exercises     Home Exercises Provided and  Patient Education Provided   Education provided:   - exercise benefits with regard to knee OA.     Written Home Exercises Provided: issue next session.     ASSESSMENT   Verenice is a 67 y.o. female referred to outpatient Physical Therapy with a medical diagnosis of Primary osteoarthritis of right knee. Pt presents with right lateral hip and right medial knee pain.  History of right knee arthroscopy in 2021. Gait: mild antalgic pattern today observed with first 3-5 steps following sitting rest. Demonstrating improved strength bilateral thigh musculature.  Progressing to more single leg resistance, interval walking program, and more advanced close-chain flexion activities to patient tolerance. Increased joint soreness following today's session; adjust intensity accordingly based on patient feedback at next session.     Verenice Is progressing well towards her goals.   Pt prognosis is Good.     Pt will continue to benefit from skilled outpatient physical therapy to address the deficits listed in the problem list box on initial evaluation, provide pt/family education and to maximize pt's level of independence in the home and community environment.   Pt's spiritual, cultural and educational needs considered and pt agreeable to plan of care and goals.     Anticipated barriers to physical therapy: co-morbidities    Goals:   Short Term Goals: 3 weeks   1.  Patient will demonstrate understanding and compliance with HEP.  progressing towards not met  2.  Patient will demonstrate non-antalgic gait pattern on smooth level surface x 500'. progressing towards not met  3.  Patient will report >10 point improvement on LEFS. progressing towards not met     Long Term Goals: 8 weeks   1.  Patient will demonstrate ability to perform sit-to-stand from standard chair x 8 reps in 30 seconds without increased right knee pain.  progressing towards not met  2.  Patient will report <30% limitation on FOTO survey. progressing towards not met  3.   Patient will demonstrate ability complete 6MWT without increased right knee pain/right hip pain for improved community ambulation tolerance. progressing towards not met  4.  Patient will report consistently <3/10 right lateral hip pain at worst during daily activities.  progressing towards not met    PLAN   Phase II of program  Mat thigh and hip strengthening --> single leg work  Leg press  Bike for aerobic conditioning --> walking program   Ready for transition to phase II of program.     Francisco Colin, PT

## 2022-11-03 ENCOUNTER — CLINICAL SUPPORT (OUTPATIENT)
Dept: REHABILITATION | Facility: HOSPITAL | Age: 67
End: 2022-11-03
Attending: ORTHOPAEDIC SURGERY
Payer: MEDICARE

## 2022-11-03 ENCOUNTER — TELEPHONE (OUTPATIENT)
Dept: ORTHOPEDICS | Facility: CLINIC | Age: 67
End: 2022-11-03
Payer: MEDICARE

## 2022-11-03 DIAGNOSIS — M25.551 LATERAL PAIN OF RIGHT HIP: ICD-10-CM

## 2022-11-03 DIAGNOSIS — R26.89 ANTALGIC GAIT: ICD-10-CM

## 2022-11-03 DIAGNOSIS — M25.561 MEDIAL KNEE PAIN, RIGHT: Primary | ICD-10-CM

## 2022-11-03 PROCEDURE — 97140 MANUAL THERAPY 1/> REGIONS: CPT | Mod: PN,CQ

## 2022-11-03 PROCEDURE — 97110 THERAPEUTIC EXERCISES: CPT | Mod: PN,CQ

## 2022-11-03 NOTE — TELEPHONE ENCOUNTER
I called the patient. The patient did not answer. I left a voicemail with a call back number.     ----- Message from Abelardo Odell sent at 11/3/2022  1:58 PM CDT -----  Good afternoon,     Yes!    Sincerely,   Rafat Odell MS, OTC  OR & Clinical Assistant to Dr. Jose L Harry III  Phone: [phone]  Fax: [phone]    ----- Message -----  From: Kiarra Ro MA  Sent: 11/3/2022   1:51 PM CDT  To: Abelardo Odell    Before I call you want me to ask her to move to 11/11 at 10:40am?    Sincerely,   Kiarra Ro   Medical Assistant   Phone: [phone]  Fax: [phone]    ----- Message -----  From: Abelardo Odell  Sent: 11/3/2022   1:18 PM CDT  To: Kiarra Ro MA    Good afternoon,     Do you think you can call this patient and see if she can move her appointment up to 11/11 at 10:40am?    I need to move a patient from 11/11's clinic to 11/15.     Thank you!    Sincerely,   Rafat Odell MS, OTC  OR & Clinical Assistant to Dr. Jose L Harry III  Phone: [phone]  Fax: [phone]

## 2022-11-03 NOTE — PROGRESS NOTES
[facility]   Physical Therapy Treatment Note     Name: Verenice Mirza  Clinic Number: [id number]    Therapy Diagnosis:   Encounter Diagnoses   Name Primary?    Medial knee pain, right Yes    Lateral pain of right hip     Antalgic gait      Physician: Jose L Harry III, *    Visit Date: 11/3/2022    Physician Orders: PT Eval and Treat;   Eval and treat with modalities: right hip bursitis, right knee varus arthritis and pes tendonitis         Medical Diagnosis: M17.11 (ICD-10-CM) - Primary osteoarthritis of right knee  Evaluation Date: 9/19/2022  Authorization Period Expiration: 12/31/2022  Plan of Care Certification Period: 9/19/2022 to 11/16/2022  Visit # / Visits authorized: 10/50 FOTO: at dc PTA visit: 1/5     Time In: 1400  Time Out: 1458  Total Billable Time: 58 minutes (1 MT, 3 TE)  Precautions: hypertension, DM, thyroid disease     SUBJECTIVE     Pt reports: her knees are hurting a little more today, not sure why other than possibly arthritis.   She has [id number] with home exercise program.  Response to revious treatment: no problems.   Functional change: none voiced today.     Pain: 4/10   Location: bilateral knee pain.     OBJECTIVE     Objective Measures updated at progress report unless specified.     Treatment     Verenice received the treatments listed below:      Therapeutic exercises to develop strength, ROM, and flexibility for 48 minutes including:  Bike 8' at hill program Lv4  Straight leg raise 2x15    Cybex LAQs 10#  DL 1x10 and sL 3x8/each 5#  Leg press 3x10 at 7.5 plates double leg and sL 3x10 4 plates    standing heel raises 2x15  Mini-split squat (wide base) 1x10/each    Manual therapy techniques: total time 10 minutes, including:  - grade III patellar mobs  - grade III/IV right knee passive physiological flexion.   - seated knee distraction grade II/III      Patient Education and Home Exercises     Home Exercises Provided and Patient Education Provided    Education provided:   - exercise benefits with regard to knee OA.     Written Home Exercises Provided: issue next session.     ASSESSMENT   Verenice is a 67 y.o. female referred to outpatient Physical Therapy with a medical diagnosis of Primary osteoarthritis of right knee. Pt presents with right lateral hip and right medial knee pain.  History of right knee arthroscopy in 2021. Gait: mild antalgic pattern today observed with first 3-5 steps following sitting rest. Progressing to more single leg resistance, interval walking program, and more advanced close-chain flexion activities to patient tolerance. Patient completed her therapy with no increase in symptoms prior to leaving the clinic.      Verenice Is progressing well towards her goals.   Pt prognosis is Good.     Pt will continue to benefit from skilled outpatient physical therapy to address the deficits listed in the problem list box on initial evaluation, provide pt/family education and to maximize pt's level of independence in the home and community environment.   Pt's spiritual, cultural and educational needs considered and pt agreeable to plan of care and goals.     Anticipated barriers to physical therapy: co-morbidities    Goals:   Short Term Goals: 3 weeks   1.  Patient will demonstrate understanding and compliance with HEP.  progressing towards not met  2.  Patient will demonstrate non-antalgic gait pattern on smooth level surface x 500'. progressing towards not met  3.  Patient will report >10 point improvement on LEFS. progressing towards not met     Long Term Goals: 8 weeks   1.  Patient will demonstrate ability to perform sit-to-stand from standard chair x 8 reps in 30 seconds without increased right knee pain.  progressing towards not met  2.  Patient will report <30% limitation on FOTO survey. progressing towards not met  3.  Patient will demonstrate ability complete 6MWT without increased right knee pain/right hip pain for improved community  ambulation tolerance. progressing towards not met  4.  Patient will report consistently <3/10 right lateral hip pain at worst during daily activities.  progressing towards not met    PLAN   Phase II of program  Mat thigh and hip strengthening --> single leg work  Leg press  Bike for aerobic conditioning --> walking program   Ready for transition to phase II of program.     Anthony Melton, PTA

## 2022-11-04 ENCOUNTER — PATIENT MESSAGE (OUTPATIENT)
Dept: INTERNAL MEDICINE | Facility: CLINIC | Age: 67
End: 2022-11-04
Payer: MEDICARE

## 2022-11-04 ENCOUNTER — PATIENT MESSAGE (OUTPATIENT)
Dept: ORTHOPEDICS | Facility: CLINIC | Age: 67
End: 2022-11-04
Payer: MEDICARE

## 2022-11-08 ENCOUNTER — CLINICAL SUPPORT (OUTPATIENT)
Dept: REHABILITATION | Facility: HOSPITAL | Age: 67
End: 2022-11-08
Attending: INTERNAL MEDICINE
Payer: MEDICARE

## 2022-11-08 DIAGNOSIS — R26.89 ANTALGIC GAIT: ICD-10-CM

## 2022-11-08 DIAGNOSIS — M25.551 LATERAL PAIN OF RIGHT HIP: ICD-10-CM

## 2022-11-08 DIAGNOSIS — M25.561 MEDIAL KNEE PAIN, RIGHT: Primary | ICD-10-CM

## 2022-11-08 PROCEDURE — 97110 THERAPEUTIC EXERCISES: CPT | Mod: PN,CQ

## 2022-11-08 PROCEDURE — 97140 MANUAL THERAPY 1/> REGIONS: CPT | Mod: PN,CQ

## 2022-11-08 NOTE — PROGRESS NOTES
[facility]   Physical Therapy Treatment Note     Name: Verenice Mirza  Clinic Number: [id number]    Therapy Diagnosis:   Encounter Diagnoses   Name Primary?    Medial knee pain, right Yes    Lateral pain of right hip     Antalgic gait      Physician: Jose L Harry III, *    Visit Date: 11/8/2022    Physician Orders: PT Eval and Treat;   Eval and treat with modalities: right hip bursitis, right knee varus arthritis and pes tendonitis         Medical Diagnosis: M17.11 (ICD-10-CM) - Primary osteoarthritis of right knee  Evaluation Date: 9/19/2022  Authorization Period Expiration: 12/31/2022  Plan of Care Certification Period: 9/19/2022 to 11/16/2022  Visit # / Visits authorized: 10/50 FOTO: at dc PTA visit: 1/5     Time In: 1500  Time Out: 1555  Total Billable Time: 55 minutes (1 MT, 3 TE)  Precautions: hypertension, DM, thyroid disease     SUBJECTIVE     Pt reports: her knees are hurting a little more today, not sure why other than possibly arthritis.   She has [id number] with home exercise program.  Response to revious treatment: no problems.   Functional change: none voiced today.     Pain: 3/10   Location: bilateral knee pain.     OBJECTIVE     Objective Measures updated at progress report unless specified.     Treatment     Verenice received the treatments listed below:      Therapeutic exercises to develop strength, ROM, and flexibility for 45 minutes including:  Bike 8' at hill program Lv4  Straight leg raise 2x15    Cybex LAQs 10#  DL 1x10 and sL 3x8/each 5#  Leg press 3x10 at 7.5 plates double leg and sL 3x10 4 plates    standing heel raises 2x15  Mini-split squat (wide base) 1x15/each    Manual therapy techniques: total time 10 minutes, including:  - grade III patellar mobs  - grade III/IV right knee passive physiological flexion.   - seated knee distraction grade II/III      Patient Education and Home Exercises     Home Exercises Provided and Patient Education Provided    Education provided:   - exercise benefits with regard to knee OA.     Written Home Exercises Provided: issue next session.     ASSESSMENT   Verenice is a 67 y.o. female referred to outpatient Physical Therapy with a medical diagnosis of Primary osteoarthritis of right knee. Pt presents with right lateral hip and right medial knee pain.  History of right knee arthroscopy in 2021. No antalgic pattern today observed.  Progressing to more single leg resistance, interval walking program, and more advanced close-chain flexion activities to patient tolerance. Patient completed her therapy with no increase in symptoms prior to leaving the clinic.      Verenice Is progressing well towards her goals.   Pt prognosis is Good.     Pt will continue to benefit from skilled outpatient physical therapy to address the deficits listed in the problem list box on initial evaluation, provide pt/family education and to maximize pt's level of independence in the home and community environment.   Pt's spiritual, cultural and educational needs considered and pt agreeable to plan of care and goals.     Anticipated barriers to physical therapy: co-morbidities    Goals:   Short Term Goals: 3 weeks   1.  Patient will demonstrate understanding and compliance with HEP.  progressing towards not met  2.  Patient will demonstrate non-antalgic gait pattern on smooth level surface x 500'. progressing towards not met  3.  Patient will report >10 point improvement on LEFS. progressing towards not met     Long Term Goals: 8 weeks   1.  Patient will demonstrate ability to perform sit-to-stand from standard chair x 8 reps in 30 seconds without increased right knee pain.  progressing towards not met  2.  Patient will report <30% limitation on FOTO survey. progressing towards not met  3.  Patient will demonstrate ability complete 6MWT without increased right knee pain/right hip pain for improved community ambulation tolerance. progressing towards not met  4.   Patient will report consistently <3/10 right lateral hip pain at worst during daily activities.  progressing towards not met    PLAN   Phase II of program  Mat thigh and hip strengthening --> single leg work  Leg press  Bike for aerobic conditioning --> walking program   Ready for transition to phase II of program.     Jeancarlos Ahuja, PTA

## 2022-11-11 ENCOUNTER — PATIENT MESSAGE (OUTPATIENT)
Dept: INTERNAL MEDICINE | Facility: CLINIC | Age: 67
End: 2022-11-11
Payer: MEDICARE

## 2022-11-11 ENCOUNTER — CLINICAL SUPPORT (OUTPATIENT)
Dept: REHABILITATION | Facility: HOSPITAL | Age: 67
End: 2022-11-11
Attending: INTERNAL MEDICINE
Payer: MEDICARE

## 2022-11-11 DIAGNOSIS — E11.69 DYSLIPIDEMIA ASSOCIATED WITH TYPE 2 DIABETES MELLITUS: Primary | ICD-10-CM

## 2022-11-11 DIAGNOSIS — M25.561 MEDIAL KNEE PAIN, RIGHT: Primary | ICD-10-CM

## 2022-11-11 DIAGNOSIS — R26.89 ANTALGIC GAIT: ICD-10-CM

## 2022-11-11 DIAGNOSIS — E78.5 DYSLIPIDEMIA ASSOCIATED WITH TYPE 2 DIABETES MELLITUS: Primary | ICD-10-CM

## 2022-11-11 DIAGNOSIS — M25.551 LATERAL PAIN OF RIGHT HIP: ICD-10-CM

## 2022-11-11 PROCEDURE — 97140 MANUAL THERAPY 1/> REGIONS: CPT | Mod: PN

## 2022-11-11 PROCEDURE — 97110 THERAPEUTIC EXERCISES: CPT | Mod: PN

## 2022-11-11 RX ORDER — SEMAGLUTIDE 1.34 MG/ML
1 INJECTION, SOLUTION SUBCUTANEOUS
Qty: 1 PEN | Refills: 5 | Status: SHIPPED | OUTPATIENT
Start: 2022-11-11 | End: 2023-02-02 | Stop reason: SDUPTHER

## 2022-11-13 NOTE — PROGRESS NOTES
[facility]   Physical Therapy Treatment Note     Name: Verenice Mirza  Clinic Number: [id number]    Therapy Diagnosis:   Encounter Diagnoses   Name Primary?    Medial knee pain, right Yes    Lateral pain of right hip     Antalgic gait      Physician: Jose L Harry III, *    Visit Date: 11/11/2022    Physician Orders: PT Eval and Treat;   Eval and treat with modalities: right hip bursitis, right knee varus arthritis and pes tendonitis         Medical Diagnosis: M17.11 (ICD-10-CM) - Primary osteoarthritis of right knee  Evaluation Date: 9/19/2022  Authorization Period Expiration: 12/31/2022  Plan of Care Certification Period: 9/19/2022 to 11/16/2022  Visit # / Visits authorized: 11/50 FOTO: at dc PTA visit: 1/5     Time In: 1100  Time Out: 1155  Total Billable Time: 30 minutes (1 MT, 1 TE)  Precautions: hypertension, DM, thyroid disease     SUBJECTIVE     Pt reports: more left knee pain now than right.   She has been not been compliant with home exercise program.  Response to revious treatment: no problems.   Functional change: none voiced today.     Pain: 3/10   Location: bilateral knee pain.     OBJECTIVE     Objective Measures updated at progress report unless specified.     Treatment     Verenice received the treatments listed below:      Therapeutic exercises to develop strength, ROM, and flexibility for 20 minutes with PT 1:1, including:  Bike 8' at hill program Lv4  Straight leg raise 2x15/each  Sidelying hip abd 2x12/each    Cybex LAQs 10#  DL 1x10 and sL 3x8/each 5# (not today)  Leg press 3x10 at 7.5 plates double leg and sL 3x10 4 plates    standing heel raises 2x15  Mini-split squat (wide base) 1x10/each    Manual therapy techniques: total time 10 minutes, including:  - grade III patellar mobs  - grade III/IV right knee passive physiological flexion.   - seated knee distraction grade II/III      Patient Education and Home Exercises     Home Exercises Provided and Patient Education  Provided   Education provided:   - exercise benefits with regard to knee OA again re-addressed.    Written Home Exercises Provided: issue next session.     ASSESSMENT   Verenice is a 67 y.o. female referred to outpatient Physical Therapy with a medical diagnosis of Primary osteoarthritis of right knee. Pt presents with right lateral hip and right medial knee pain.  History of right knee arthroscopy in 2021. No antalgic pattern today observed.  Yet to achieve full buy-in with home exercise program and daily exercise benefits for knee OA.  DC planning.     Verenice Is progressing well towards her goals.   Pt prognosis is Good.     Pt will continue to benefit from skilled outpatient physical therapy to address the deficits listed in the problem list box on initial evaluation, provide pt/family education and to maximize pt's level of independence in the home and community environment.   Pt's spiritual, cultural and educational needs considered and pt agreeable to plan of care and goals.     Anticipated barriers to physical therapy: co-morbidities    Goals:   Short Term Goals: 3 weeks   1.  Patient will demonstrate understanding and compliance with HEP.  progressing towards not met  2.  Patient will demonstrate non-antalgic gait pattern on smooth level surface x 500'. progressing towards not met  3.  Patient will report >10 point improvement on LEFS. progressing towards not met     Long Term Goals: 8 weeks   1.  Patient will demonstrate ability to perform sit-to-stand from standard chair x 8 reps in 30 seconds without increased right knee pain.  progressing towards not met  2.  Patient will report <30% limitation on FOTO survey. progressing towards not met  3.  Patient will demonstrate ability complete 6MWT without increased right knee pain/right hip pain for improved community ambulation tolerance. progressing towards not met  4.  Patient will report consistently <3/10 right lateral hip pain at worst during daily activities.   progressing towards not met    PLAN   Phase II of program  Mat thigh and hip strengthening --> single leg work  Leg press  Bike for aerobic conditioning --> walking program   Ready for transition to phase II of program.     Francisco Colin, PT

## 2022-11-17 ENCOUNTER — CLINICAL SUPPORT (OUTPATIENT)
Dept: REHABILITATION | Facility: HOSPITAL | Age: 67
End: 2022-11-17
Payer: MEDICARE

## 2022-11-17 DIAGNOSIS — M25.551 LATERAL PAIN OF RIGHT HIP: ICD-10-CM

## 2022-11-17 DIAGNOSIS — M25.561 MEDIAL KNEE PAIN, RIGHT: Primary | ICD-10-CM

## 2022-11-17 DIAGNOSIS — R26.89 ANTALGIC GAIT: ICD-10-CM

## 2022-11-17 PROCEDURE — 97110 THERAPEUTIC EXERCISES: CPT | Mod: PN

## 2022-11-17 PROCEDURE — 97140 MANUAL THERAPY 1/> REGIONS: CPT | Mod: PN

## 2022-11-18 NOTE — PROGRESS NOTES
[facility]   Physical Therapy Treatment Note     Name: Verenice Mirza  Clinic Number: [id number]    Therapy Diagnosis:   Encounter Diagnoses   Name Primary?    Medial knee pain, right Yes    Lateral pain of right hip     Antalgic gait      Physician: Jose L Harry III, *    Visit Date: 11/17/2022    Physician Orders: PT Eval and Treat;   Eval and treat with modalities: right hip bursitis, right knee varus arthritis and pes tendonitis         Medical Diagnosis: M17.11 (ICD-10-CM) - Primary osteoarthritis of right knee  Evaluation Date: 9/19/2022  Authorization Period Expiration: 12/31/2022  Plan of Care Certification Period: 9/19/2022 to 11/16/2022  Visit # / Visits authorized: 12/50 FOTO: at dc PTA visit: 1/5     Time In: 1000  Time Out: 1040  Total Billable Time: 30 minutes (1 MT, 1 TE)  Precautions: hypertension, DM, thyroid disease     SUBJECTIVE     Pt reports: increased soreness in her whole body today.  Slipped while working in her yard several days ago.  Was unable to get up due to pre-existing knee pain. Had to drag her self for several feet to get to a place for support to try and stand.  She states that she is sore in her back, thighs, and knees but otherwise she is okay. Did not feel the incident warranted a trip to the emergency room   She has been not been compliant with home exercise program.  Response to revious treatment: no problems.   Functional change: none voiced today.     Pain: 6/10   Location: bilateral knee pain.     OBJECTIVE     Objective Measures updated at progress report unless specified.     Treatment     Verenice received the treatments listed below:      Therapeutic exercises to develop strength, ROM, and flexibility for 20 minutes with PT 1:1, including:  Bike 8' at [email or web address] program Lv4  Straight leg raise 2x15/each  Sidelying hip abd 2x12/each (not today)    Cybex LAQs 10#  DL 1x10 and sL 3x8/each 5# (not today)  Leg press 3x10 at 7.5 plates double leg    standing heel raises 2x15    Manual therapy techniques: total time 10 minutes, including:  - grade III patellar mobs  - grade III/IV right knee passive physiological flexion.   - seated knee distraction grade II/III      Patient Education and Home Exercises     Home Exercises Provided and Patient Education Provided   Education provided:   - exercise benefits with regard to knee OA again re-addressed.    Written Home Exercises Provided: issue next session.     ASSESSMENT   Verenice is a 67 y.o. female referred to outpatient Physical Therapy with a medical diagnosis of Primary osteoarthritis of right knee. Pt presents with right lateral hip and right medial knee pain.  History of right knee arthroscopy in 2021. Fall vs slipped and couldn't get up in her yard at home with marked difficulty getting back into a standing position.  (-) for all [place] knee rules except age > 54 y/o.  Happened 6 days ago.  Patient feels she doesn't need Xrays.  Able to tolerate all activities with PT; felt better in her knees following the session.  Will continue to monitor.    Verenice Is progressing well towards her goals.   Pt prognosis is Good.     Pt will continue to benefit from skilled outpatient physical therapy to address the deficits listed in the problem list box on initial evaluation, provide pt/family education and to maximize pt's level of independence in the home and community environment.   Pt's spiritual, cultural and educational needs considered and pt agreeable to plan of care and goals.     Anticipated barriers to physical therapy: co-morbidities    Goals:   Short Term Goals: 3 weeks   1.  Patient will demonstrate understanding and compliance with HEP.  progressing towards not met  2.  Patient will demonstrate non-antalgic gait pattern on smooth level surface x 500'. progressing towards not met  3.  Patient will report >10 point improvement on LEFS. progressing towards not met     Long Term Goals: 8 weeks   1.  Patient will  demonstrate ability to perform sit-to-stand from standard chair x 8 reps in 30 seconds without increased right knee pain.  progressing towards not met  2.  Patient will report <30% limitation on FOTO survey. progressing towards not met  3.  Patient will demonstrate ability complete 6MWT without increased right knee pain/right hip pain for improved community ambulation tolerance. progressing towards not met  4.  Patient will report consistently <3/10 right lateral hip pain at worst during daily activities.  progressing towards not met    PLAN   Phase II of program  Mat thigh and hip strengthening --> single leg work  Leg press  Bike for aerobic conditioning --> walking program   Ready for transition to phase II of program.     Francisco Colin, PT, DPT, OCS

## 2022-11-19 ENCOUNTER — PATIENT MESSAGE (OUTPATIENT)
Dept: RHEUMATOLOGY | Facility: CLINIC | Age: 67
End: 2022-11-19
Payer: MEDICARE

## 2022-11-29 ENCOUNTER — PATIENT MESSAGE (OUTPATIENT)
Dept: INTERNAL MEDICINE | Facility: CLINIC | Age: 67
End: 2022-11-29
Payer: MEDICARE

## 2022-11-29 ENCOUNTER — CLINICAL SUPPORT (OUTPATIENT)
Dept: REHABILITATION | Facility: HOSPITAL | Age: 67
End: 2022-11-29
Attending: INTERNAL MEDICINE
Payer: MEDICARE

## 2022-11-29 DIAGNOSIS — M25.561 MEDIAL KNEE PAIN, RIGHT: Primary | ICD-10-CM

## 2022-11-29 DIAGNOSIS — Z87.891 PERSONAL HISTORY OF NICOTINE DEPENDENCE: ICD-10-CM

## 2022-11-29 DIAGNOSIS — M25.551 LATERAL PAIN OF RIGHT HIP: ICD-10-CM

## 2022-11-29 DIAGNOSIS — Z72.0 NICOTINE USE: Primary | ICD-10-CM

## 2022-11-29 DIAGNOSIS — R26.89 ANTALGIC GAIT: ICD-10-CM

## 2022-11-29 PROCEDURE — 97140 MANUAL THERAPY 1/> REGIONS: CPT | Mod: PN,CQ

## 2022-11-29 PROCEDURE — 97110 THERAPEUTIC EXERCISES: CPT | Mod: PN,CQ

## 2022-11-29 NOTE — PROGRESS NOTES
[facility]   Physical Therapy Treatment Note     Name: Verenice Mirza  Clinic Number: [id number]    Therapy Diagnosis:   Encounter Diagnoses   Name Primary?    Medial knee pain, right Yes    Lateral pain of right hip     Antalgic gait      Physician: Jose L Harry III, *    Visit Date: 11/29/2022    Physician Orders: PT Eval and Treat;   Eval and treat with modalities: right hip bursitis, right knee varus arthritis and pes tendonitis         Medical Diagnosis: M17.11 (ICD-10-CM) - Primary osteoarthritis of right knee  Evaluation Date: 9/19/2022  Authorization Period Expiration: 12/31/2022  Plan of Care Certification Period: Updated 11/17  Visit # / Visits authorized: 14/50 FOTO: at dc PTA visit: 1/5     Time In: 100  Time Out: 1155  Total Billable Time: 30 minutes (1 MT, 1 TE)  Precautions: hypertension, DM, thyroid disease     SUBJECTIVE     Pt reports: [place] at [place] her very sore/achy and with bruises at various parts of her body. Did not feel the need to go to urgent care.   She has been not been compliant with home exercise program.  Response to revious treatment: no problems.   Functional change: none voiced today.     Pain: 6/10   Location: bilateral knee pain.     OBJECTIVE     Objective Measures updated at progress report unless specified.     Treatment     Verenice received the treatments listed below:      Therapeutic exercises to develop strength, ROM, and flexibility for 20 minutes with PT 1:1, 25 minutes supervision including:  Bike 8' at [employer] program Lv4  Straight leg raise 2x15/each  Sidelying hip abd 2x12/each (not today)    Cybex LAQs 10#  DL 1x10 and sL 3x8/each 5# (not today)  Leg press 3x10 at 7.5 plates double leg   standing heel raises 2x15    Manual therapy techniques: total time 10 minutes, including:  - grade III patellar mobs  - grade III/IV right knee passive physiological flexion.   - seated knee distraction grade II/III      Patient Education  and Home Exercises     Home Exercises Provided and Patient Education Provided   Education provided:   - exercise benefits with regard to knee OA again re-addressed.    Written Home Exercises Provided: issue next session.     ASSESSMENT   Verenice is a 67 y.o. female referred to outpatient Physical Therapy with a medical diagnosis of Primary osteoarthritis of right knee. Pt presents with right lateral hip and right medial knee pain.  History of right knee arthroscopy in 2021. [place] leaving her very sore/achy in her body. Various bruises in her body including right knee. Did not feel she was injured enough to go to ER or need Xrays. Able to tolerate all activities with PT. Will continue to monitor.    Verenice Is progressing well towards her goals.   Pt prognosis is Good.     Pt will continue to benefit from skilled outpatient physical therapy to address the deficits listed in the problem list box on initial evaluation, provide pt/family education and to maximize pt's level of independence in the home and community environment.   Pt's spiritual, cultural and educational needs considered and pt agreeable to plan of care and goals.     Anticipated barriers to physical therapy: co-morbidities    Goals:   Short Term Goals: 3 weeks   1.  Patient will demonstrate understanding and compliance with HEP.  progressing towards not met  2.  Patient will demonstrate non-antalgic gait pattern on smooth level surface x 500'. progressing towards not met  3.  Patient will report >10 point improvement on LEFS. progressing towards not met     Long Term Goals: 8 weeks   1.  Patient will demonstrate ability to perform sit-to-stand from standard chair x 8 reps in 30 seconds without increased right knee pain.  progressing towards not met  2.  Patient will report <30% limitation on FOTO survey. progressing towards not met  3.  Patient will demonstrate ability complete 6MWT without increased right knee pain/right hip pain  for improved community ambulation tolerance. progressing towards not met  4.  Patient will report consistently <3/10 right lateral hip pain at worst during daily activities.  progressing towards not met    PLAN   Phase II of program  Mat thigh and hip strengthening --> single leg work  Leg press  Bike for aerobic conditioning --> walking program   Ready for transition to phase II of program.     Isabell Crystal, PTA

## 2022-12-01 ENCOUNTER — CLINICAL SUPPORT (OUTPATIENT)
Dept: REHABILITATION | Facility: HOSPITAL | Age: 67
End: 2022-12-01
Payer: MEDICARE

## 2022-12-01 ENCOUNTER — TELEPHONE (OUTPATIENT)
Dept: ADMINISTRATIVE | Facility: OTHER | Age: 67
End: 2022-12-01
Payer: MEDICARE

## 2022-12-01 DIAGNOSIS — M25.551 LATERAL PAIN OF RIGHT HIP: ICD-10-CM

## 2022-12-01 DIAGNOSIS — M25.561 MEDIAL KNEE PAIN, RIGHT: Primary | ICD-10-CM

## 2022-12-01 DIAGNOSIS — R26.89 ANTALGIC GAIT: ICD-10-CM

## 2022-12-01 PROCEDURE — 97140 MANUAL THERAPY 1/> REGIONS: CPT | Mod: PN,CQ

## 2022-12-01 PROCEDURE — 97110 THERAPEUTIC EXERCISES: CPT | Mod: PN,CQ

## 2022-12-01 NOTE — PROGRESS NOTES
[facility]   Physical Therapy Treatment Note     Name: Verenice Mirza  Clinic Number: [id number]    Therapy Diagnosis:   Encounter Diagnoses   Name Primary?    Medial knee pain, right Yes    Lateral pain of right hip     Antalgic gait      Physician: Jose L Harry III, *    Visit Date: 12/1/2022    Physician Orders: PT Eval and Treat;   Eval and treat with modalities: right hip bursitis, right knee varus arthritis and pes tendonitis         Medical Diagnosis: M17.11 (ICD-10-CM) - Primary osteoarthritis of right knee  Evaluation Date: 9/19/2022  Authorization Period Expiration: 12/31/2022  Plan of Care Certification Period: Updated 11/17  Visit # / Visits authorized: 14/50 FOTO: at dc PTA visit: 1/5     Time In: 100  Time Out: 155  Total Billable Time: 30 minutes (1 MT, 1 TE)  Precautions: hypertension, DM, thyroid disease     SUBJECTIVE     Pt reports: Still lingering soreness from fall at [employer] last week.   She has been not been compliant with home exercise program.  Response to revious treatment: no problems.   Functional change: none voiced today.     Pain: 2/10   Location: bilateral knee pain.     OBJECTIVE     Objective Measures updated at progress report unless specified.     Treatment     Verenice received the treatments listed below:      Therapeutic exercises to develop strength, ROM, and flexibility for 20 minutes with PT 1:1, 25 minutes supervision including:  Bike 8' at [employer] program Lv4  Straight leg raise 2x15/each  Sidelying hip abd 2x12/each (not today)    Cybex LAQs 10#  DL 1x10 and sL 3x8/each 5# (not today)  Leg press 3x10 at 7.5 plates double leg   standing heel raises 2x15    Manual therapy techniques: total time 10 minutes, including:  - grade III patellar mobs  - grade III/IV right knee passive physiological flexion.   - seated knee distraction grade II/III      Patient Education and Home Exercises     Home Exercises Provided and Patient Education Provided    Education provided:   - exercise benefits with regard to knee OA again re-addressed.    Written Home Exercises Provided: issue next session.     ASSESSMENT   Verenice is a 67 y.o. female referred to outpatient Physical Therapy with a medical diagnosis of Primary osteoarthritis of right knee. Pt presents with right lateral hip and right medial knee pain.  History of right knee arthroscopy in 2021. Lingering soreness from [place] [employer] last week but overall feeling better. Able to tolerate all activities with PT. Moderate fatigue post session. Will continue to monitor.    Verenice Is progressing well towards her goals.   Pt prognosis is Good.     Pt will continue to benefit from skilled outpatient physical therapy to address the deficits listed in the problem list box on initial evaluation, provide pt/family education and to maximize pt's level of independence in the home and community environment.   Pt's spiritual, cultural and educational needs considered and pt agreeable to plan of care and goals.     Anticipated barriers to physical therapy: co-morbidities    Goals:   Short Term Goals: 3 weeks   1.  Patient will demonstrate understanding and compliance with HEP.  progressing towards not met  2.  Patient will demonstrate non-antalgic gait pattern on smooth level surface x 500'. progressing towards not met  3.  Patient will report >10 point improvement on LEFS. progressing towards not met     Long Term Goals: 8 weeks   1.  Patient will demonstrate ability to perform sit-to-stand from standard chair x 8 reps in 30 seconds without increased right knee pain.  progressing towards not met  2.  Patient will report <30% limitation on FOTO survey. progressing towards not met  3.  Patient will demonstrate ability complete 6MWT without increased right knee pain/right hip pain for improved community ambulation tolerance. progressing towards not met  4.  Patient will report consistently <3/10 right lateral hip pain at  worst during daily activities.  progressing towards not met    PLAN   Phase II of program  Mat thigh and hip strengthening --> single leg work  Leg press  Bike for aerobic conditioning --> walking program   Ready for transition to phase II of program.     Isabell Crystal, PTA

## 2022-12-05 ENCOUNTER — HOSPITAL ENCOUNTER (OUTPATIENT)
Dept: RADIOLOGY | Facility: HOSPITAL | Age: 67
Discharge: HOME | End: 2022-12-05
Attending: INTERNAL MEDICINE
Payer: MEDICARE

## 2022-12-05 DIAGNOSIS — Z87.891 PERSONAL HISTORY OF NICOTINE DEPENDENCE: ICD-10-CM

## 2022-12-05 DIAGNOSIS — Z72.0 NICOTINE USE: ICD-10-CM

## 2022-12-05 PROCEDURE — 71271 CT CHEST LUNG SCREENING LOW DOSE: ICD-10-PCS | Mod: 26,,, | Performed by: RADIOLOGY

## 2022-12-05 PROCEDURE — 71271 CT THORAX LUNG CANCER SCR C-: CPT | Mod: 26,,, | Performed by: RADIOLOGY

## 2022-12-05 PROCEDURE — 71271 CT THORAX LUNG CANCER SCR C-: CPT | Mod: TC

## 2022-12-07 ENCOUNTER — PATIENT MESSAGE (OUTPATIENT)
Dept: ORTHOPEDICS | Facility: CLINIC | Age: 67
End: 2022-12-07
Payer: MEDICARE

## 2022-12-14 ENCOUNTER — OFFICE VISIT (OUTPATIENT)
Dept: ORTHOPEDICS | Facility: CLINIC | Age: 67
End: 2022-12-14
Payer: MEDICARE

## 2022-12-14 ENCOUNTER — HOSPITAL ENCOUNTER (OUTPATIENT)
Dept: RADIOLOGY | Facility: HOSPITAL | Age: 67
Discharge: HOME | End: 2022-12-14
Attending: PHYSICIAN ASSISTANT
Payer: MEDICARE

## 2022-12-14 DIAGNOSIS — M25.562 ACUTE PAIN OF LEFT KNEE: ICD-10-CM

## 2022-12-14 DIAGNOSIS — M25.562 ACUTE PAIN OF LEFT KNEE: Primary | ICD-10-CM

## 2022-12-14 PROCEDURE — 99999 PR PBB SHADOW E&M-EST. PATIENT-LVL IV: CPT | Mod: PBBFAC,,, | Performed by: PHYSICIAN ASSISTANT

## 2022-12-14 PROCEDURE — 99999 PR PBB SHADOW E&M-EST. PATIENT-LVL IV: ICD-10-PCS | Mod: PBBFAC,,, | Performed by: PHYSICIAN ASSISTANT

## 2022-12-14 PROCEDURE — 20610 PR DRAIN/INJECT LARGE JOINT/BURSA: ICD-10-PCS | Mod: S$PBB,LT,, | Performed by: PHYSICIAN ASSISTANT

## 2022-12-14 PROCEDURE — 20610 DRAIN/INJ JOINT/BURSA W/O US: CPT | Mod: PBBFAC,LT | Performed by: PHYSICIAN ASSISTANT

## 2022-12-14 PROCEDURE — 73562 X-RAY EXAM OF KNEE 3: CPT | Mod: 26,LT,, | Performed by: RADIOLOGY

## 2022-12-14 PROCEDURE — 73560 X-RAY EXAM OF KNEE 1 OR 2: CPT | Mod: 59

## 2022-12-14 PROCEDURE — 73560 X-RAY EXAM OF KNEE 1 OR 2: CPT | Mod: 26,RT,, | Performed by: RADIOLOGY

## 2022-12-14 PROCEDURE — 73562 XR KNEE ORTHO LEFT: ICD-10-PCS | Mod: 26,LT,, | Performed by: RADIOLOGY

## 2022-12-14 PROCEDURE — 73560 XR KNEE ORTHO LEFT: ICD-10-PCS | Mod: 26,RT,, | Performed by: RADIOLOGY

## 2022-12-14 PROCEDURE — 99214 OFFICE O/P EST MOD 30 MIN: CPT | Mod: PBBFAC,25 | Performed by: PHYSICIAN ASSISTANT

## 2022-12-14 PROCEDURE — 99214 PR OFFICE/OUTPT VISIT, EST, LEVL IV, 30-39 MIN: ICD-10-PCS | Mod: S$PBB,25,, | Performed by: PHYSICIAN ASSISTANT

## 2022-12-14 PROCEDURE — 99214 OFFICE O/P EST MOD 30 MIN: CPT | Mod: S$PBB,25,, | Performed by: PHYSICIAN ASSISTANT

## 2022-12-14 PROCEDURE — 20610 DRAIN/INJ JOINT/BURSA W/O US: CPT | Mod: S$PBB,LT,, | Performed by: PHYSICIAN ASSISTANT

## 2022-12-14 RX ORDER — BETAMETHASONE SODIUM PHOSPHATE AND BETAMETHASONE ACETATE 3; 3 MG/ML; MG/ML
6 INJECTION, SUSPENSION INTRA-ARTICULAR; INTRALESIONAL; INTRAMUSCULAR; SOFT TISSUE
Status: COMPLETED | OUTPATIENT
Start: 2022-12-14 | End: 2022-12-14

## 2022-12-14 RX ADMIN — BETAMETHASONE SODIUM PHOSPHATE AND BETAMETHASONE ACETATE 6 MG: 3; 3 INJECTION, SUSPENSION INTRA-ARTICULAR; INTRALESIONAL; INTRAMUSCULAR at 02:12

## 2022-12-14 NOTE — PROGRESS NOTES
SUBJECTIVE:     Chief Complaint & History of Present Illness:  Verenice Mirza is a Established patient 67 y.o. female who is seen here today with a complaint of    Chief Complaint   Patient presents with    Left Knee - Pain, Injury    .  She is patient well-known to us was last seen treated the clinic 10/13/2022 by Dr. Harry for arthritis of bilateral knees.  She has received cortisone injection of the right knee in the past with very good results and is requesting repeat injection of the left knee today to help alleviate her symptoms.  She is aware that she is in need of knee replacement surgery had much apprehension multiple questions regarding the surgery and recovery.  Afterwards these questions were answered at length Patient feels much better about moving forward with her surgery   On a scale of 1-10, with 10 being worst pain imaginable, he rates this pain as 4 on good days and 8 on bad days.  she describes the pain as sore and achy.    Review of patient's allergies indicates:  No Known Allergies      Current Outpatient Medications   Medication Sig Dispense Refill    albuterol (PROVENTIL/VENTOLIN HFA) 90 mcg/actuation inhaler Inhale 2 puffs into the lungs every 6 (six) hours as needed for Wheezing. 18 g 11    aspirin (ECOTRIN) 81 MG EC tablet Take 81 mg by mouth 2 (two) times daily. Takes the first dose after lunch      atorvastatin (LIPITOR) 40 MG tablet Take 1 tablet by mouth once daily 90 tablet 3    b complex vitamins tablet Take 1 tablet by mouth after lunch.       blood sugar diagnostic (TRUE METRIX GLUCOSE TEST STRIP) Strp 1 strip by Misc.(Non-Drug; Combo Route) route once daily. 100 strip 3    calcium carbonate (TUMS) 200 mg calcium (500 mg) chewable tablet Take 1 tablet by mouth as needed.      celecoxib (CELEBREX) 200 MG capsule Take 1 capsule (200 mg total) by mouth once daily. 90 capsule 0    co-enzyme Q-10 30 mg capsule Take 30 mg by mouth 2 (two) times daily.       finasteride (PROSCAR) 5 mg  tablet Take 1 tablet by mouth nightly 90 tablet 3    hyoscyamine (LEVSIN/SL) 0.125 mg Subl Place 1 tablet (0.125 mg total) under the tongue every 4 (four) hours as needed (abdominal spasm). 50 tablet 1    inhalation spacing device (MICROSPACER) Use as directed for inhalation. 1 Device 0    ketoconazole (NIZORAL) 2 % cream APPLY  THIN LAYER TOPICALLY ONCE DAILY AT BEDTIME UNDER  BREAST  AS  NEEDED  FOR  FLARE 60 g 6    LACTOBAC CMB #3/FOS/PANTETHINE (PROBIOTIC & ACIDOPHILUS ORAL) Take 1 tablet by mouth after lunch.       lancets (ONETOUCH ULTRASOFT LANCETS) Misc 1 lancet by Misc.(Non-Drug; Combo Route) route once daily. 100 each 3    levothyroxine (SYNTHROID) 88 MCG tablet Take 1 tablet (88 mcg total) by mouth before breakfast. 90 tablet 3    magnesium oxide (MAG-OX) 400 mg tablet Take 400 mg by mouth after lunch.       metFORMIN (GLUCOPHAGE) 500 MG tablet Take 1 tablet (500 mg total) by mouth 2 (two) times daily. 180 tablet 1    metoprolol succinate (TOPROL-XL) 50 MG 24 hr tablet Take 1 tablet (50 mg total) by mouth nightly. 90 tablet 3    nystatin (MYCOSTATIN) powder aaa qam prn flare 120 g 6    omega-3 acid ethyl esters (LOVAZA) 1 gram capsule Take 2 capsules (2 g total) by mouth 2 (two) times daily. 360 capsule 3    raloxifene (EVISTA) 60 mg tablet Take 1 tablet by mouth once daily 90 tablet 3    semaglutide (OZEMPIC) 1 mg/dose (4 mg/3 mL) Inject 1 mg into the skin every 7 days. 1 pen 5    sour cherry extract (TART CHERRY EXTRACT ORAL) Take by mouth.      spironolactone (ALDACTONE) 100 MG tablet Take 1 tablet (100 mg total) by mouth once daily. 90 tablet 3    sulfaSALAzine (AZULFIDINE) 500 mg Tab Take 3 tablets (1,500 mg total) by mouth 2 (two) times daily. 540 tablet 0    vitamin D 1000 units Tab Take 185 mg by mouth after lunch.       omeprazole (PRILOSEC) 40 MG capsule Take 1 capsule (40 mg total) by mouth once daily. 30 capsule 5     No current facility-administered medications for this visit.      Facility-Administered Medications Ordered in Other Visits   Medication Dose Route Frequency Provider Last Rate Last Admin    fentaNYL 50 mcg/mL injection  mcg   mcg Intravenous Q5 Min PRN Omar Gresham MD        midazolam (VERSED) 1 mg/mL injection 0.5-4 mg  0.5-4 mg Intravenous PRN Omar Gresham MD           Past Medical History:   Diagnosis Date    Acute seasonal allergic rhinitis     Atypical ductal hyperplasia of left breast     BMI 40.0-44.9, adult     Breast cyst     Diabetes mellitus     Dysphagia     Dysplastic nevus of trunk 03/2017    moderately atypical    Fatty liver disease, nonalcoholic     Fibrocystic breast     Hiatal hernia     Hyperlipidemia     Hypertension     Hypothyroid     IGT (impaired glucose tolerance)     Irritable bowel syndrome (IBS)     Kidney stones     Left breast mass     Lumbar disc disease     Morbid obesity     Nicotine use disorder     JORDANA on CPAP     PAD (peripheral artery disease)     Personal history of colonic polyps 05/27/2009    PVD (peripheral vascular disease)     Renal angiomyolipoma     Rosacea     Squamous cell carcinoma 12/2017    in situ mid scalp    Venous insufficiency     Vitamin D deficiency disease        Past Surgical History:   Procedure Laterality Date    ARTHROSCOPIC CHONDROPLASTY OF KNEE JOINT Right 12/8/2021    Procedure: ARTHROSCOPY, KNEE, WITH CHONDROPLASTY;  Surgeon: Bin Artis MD;  Location: [facility];  Service: Orthopedics;  Laterality: Right;    BLADDER SUSPENSION      BREAST BIOPSY      COLONOSCOPY N/A 5/24/2017    Procedure: COLONOSCOPY;  Surgeon: Georgina Bunch MD;  Location: [facility];  Service: Endoscopy;  Laterality: N/A;    COLONOSCOPY N/A 7/28/2022    Procedure: COLONOSCOPY Suprep;  Surgeon: Cedrick Hernandez MD;  Location: [facility] ENDO;  Service: Endoscopy;  Laterality: N/A;    CYSTOSCOPY      ESOPHAGOGASTRODUODENOSCOPY N/A 12/5/2018    Procedure: ESOPHAGOGASTRODUODENOSCOPY (EGD);  Surgeon: Georgina DIEZ  MD Alivia;  Location: [facility] ENDO;  Service: Endoscopy;  Laterality: N/A;  1000 am arrival time, has transportation set up    EXCISIONAL BIOPSY Left 8/19/2019    Procedure: EXCISIONAL BIOPSY LEFT BREAST with SEED LOCALIZATION;  Surgeon: Ramon Bass MD;  Location: [address];  Service: General;  Laterality: Left;    HYSTERECTOMY      KNEE ARTHROSCOPY W/ MENISCECTOMY Right 12/8/2021    Procedure: ARTHROSCOPY, KNEE, WITH MENISCECTOMY;  Surgeon: Bin Artis MD;  Location: [facility];  Service: Orthopedics;  Laterality: Right;  medial & lateral    LITHOTRIPSY      OOPHORECTOMY         Vital Signs (Most Recent)  There were no vitals filed for this visit.        Review of Systems:  ROS:  Constitutional: no fever or chills,    Eyes: no visual changes  ENT: no nasal congestion or sore throat  Respiratory: no cough or shortness of breath, difficult intubation  Cardiovascular: no chest pain or palpitations, positive peripheral vascular disease, venous insufficiency, nonrheumatic mitral valve regurgitation  Gastrointestinal: no nausea or vomiting, tolerating diet, positive fatty liver disease, IBS, dysphagia, hiatal hernia,  Genitourinary: no hematuria or dysuria, positive history of renal calculi, fibrocystic breast disease  Integument/Breast: no rash or pruritis, positive for rosacea, dysplastic nevus of the trunk  Hematologic/Lymphatic: no easy bruising or lymphadenopathy, seropositive rheumatoid arthritis squamous cell carcinoma history of skin cancer atypical ductal hyperplasia of the left breast  Musculoskeletal: no arthralgias or myalgias,    Neurological: no seizures or tremors, positive lumbar disc disease, decreased range of motion lumbar spine  Behavioral/Psych:  Decreased  Endocrine: no heat or cold intolerance, positive vitamin-D deficiency, diabetes type 2, hypothyroidism                OBJECTIVE:     PHYSICAL EXAM:     , General Appearance: Well nourished, well developed, in no acute  distress.  Neurological: Mood & affect are normal.    Knee Exam: left  Knee Range of Motion:  0-100  Effusion:none  Condition of skin:intact  Location of tenderness:  Globally   Strength:limited by pain and 5 of 5  Stability:  Lachman: stable, LCL: stable, MCL: stable, PCL: stable, and posteromedial (dial): stable  Varus /Valgus stress:  normal  Bernard:   negative/negative    right  Knee Exam:  Knee Range of Motion:0-110 degrees flexion   Effusion:none  Condition of skin:intact  Location of tenderness:  Globally   Strength:limited by pain and 5 of 5  Stability:  Lachman: stable, LCL: stable, MCL: stable, PCL: stable, and posteromedial (dial): stable  Varus /Valgus stress:  normal  Bernard:   negative/negative      Hip Examination:  normal    RADIOGRAPHS:  X-rays taken today films reviewed by me demonstrate moderate arthritic changes throughout both knees with medial joint space narrowing early sclerotic changes and osteophytic spurring most notable in the medial compartments no evidence of fracture dislocation or other bony abnormality    ASSESSMENT/PLAN:       ICD-10-CM ICD-9-CM   1. Acute pain of left knee  M25.562 719.46       Plan: We discussed with the patient at length all the different treatment options available for  the knee including anti-inflammatories, acetaminophen, rest, ice, knee strengthening exercise, occasional cortisone injections for temporary relief, Viscosupplimentation injections, arthroscopic debridement osteotomy, and finally knee arthroplasty.   Will proceed with therapeutic cortisone injection of the left knee     The injection site was identified and the skin was prepared with a betadine solution. The   left  knee was injected with 1 ml of Celestone and 5 ml Lidocaine under sterile technique. Verenice Mirza tolerated the procedure well, she was advised to rest the knee today, ice and elevation. she did receive immediate relief of the pain in and about his knee she was told this would  be short lived and is secondary to the lidocaine. she may have an increase in his discomfort tonight followed by steady improvement over the next several days. I may take 1-3 weeks following the injection to get the full benefit of the medication.  I will see her back in 4-6 months. Sooner if he has any problems or concerns.    Verenice NATE Mirza was advised to monitor her blood sugars closely over the next several days. The steroid may cause a rise in them. If her glucose levels rise to a point she is uncomfortable or he is unable to control them is is to contact his PCP or go immediately to the emergency department.

## 2022-12-28 ENCOUNTER — CLINICAL SUPPORT (OUTPATIENT)
Dept: REHABILITATION | Facility: HOSPITAL | Age: 67
End: 2022-12-28
Payer: MEDICARE

## 2022-12-28 ENCOUNTER — PES CALL (OUTPATIENT)
Dept: ADMINISTRATIVE | Facility: CLINIC | Age: 67
End: 2022-12-28
Payer: MEDICARE

## 2022-12-28 DIAGNOSIS — M25.561 MEDIAL KNEE PAIN, RIGHT: Primary | ICD-10-CM

## 2022-12-28 DIAGNOSIS — M25.551 LATERAL PAIN OF RIGHT HIP: ICD-10-CM

## 2022-12-28 DIAGNOSIS — R26.89 ANTALGIC GAIT: ICD-10-CM

## 2022-12-28 PROCEDURE — 97110 THERAPEUTIC EXERCISES: CPT | Mod: PN

## 2022-12-29 NOTE — PROGRESS NOTES
[facility]   Physical Therapy Treatment Note & Discharge Note    Name: Verenice Mirza  Clinic Number: [id number]    Therapy Diagnosis:   Encounter Diagnoses   Name Primary?    Medial knee pain, right Yes    Lateral pain of right hip     Antalgic gait      Physician: Bin Artis MD    Visit Date: 12/28/2022    Physician Orders: PT Eval and Treat;   Eval and treat with modalities: right hip bursitis, right knee varus arthritis and pes tendonitis         Medical Diagnosis: M17.11 (ICD-10-CM) - Primary osteoarthritis of right knee  Evaluation Date: 9/19/2022  Authorization Period Expiration: 12/31/2022  Plan of Care Certification Period: Updated 11/17  Visit # / Visits authorized: 15/50 FOTO: at dc PTA visit: 0/5     Time In: 1500  Time Out: 1540  Total Billable Time: 30 minutes (2 TE)  Precautions: hypertension, DM, thyroid disease     SUBJECTIVE     Pt reports: that she think she is ready to proceed with knee surgery.    She has been not been compliant with home exercise program.  Response to revious treatment: no problems.   Functional change: none voiced today.     Pain: 4/10   Location: bilateral knee pain (left > right)     OBJECTIVE     Objective Measures updated at progress report unless specified.     Treatment     Verenice received the treatments listed below:      Therapeutic exercises to develop strength, ROM, and flexibility for 20 minutes with PT 1:1, 25 minutes supervision including:  Bike 8' at [employer] program Lv4  Straight leg raise 2x15/each  Education: most of session spent discussing upcoming surgery including expectations, rehab time frames, DME needs, HHPT versus OPPT.       Patient Education and Home Exercises     Home Exercises Provided and Patient Education Provided   Education provided:   - see above.     Written Home Exercises Provided: issue next session.     ASSESSMENT   Verenice is a 67 y.o. female referred to outpatient Physical Therapy with a medical diagnosis  of Primary osteoarthritis of right knee. Pt presents with right lateral hip and right medial knee pain.  History of right knee arthroscopy in 2021. Did well with PT sessions initially, but had several falls (at home, [place] park) that set her sessions back.  Last seen several weeks ago.  In the mean time, she has decided to proceed with total knee replacement.  Answered all Mrs. Caldera' questions.  Ready for DCEran Caldera Is progressing well towards her goals.   Pt prognosis is Good.     Pt will continue to benefit from skilled outpatient physical therapy to address the deficits listed in the problem list box on initial evaluation, provide pt/family education and to maximize pt's level of independence in the home and community environment.   Pt's spiritual, cultural and educational needs considered and pt agreeable to plan of care and goals.     Anticipated barriers to physical therapy: co-morbidities    Goals:   Short Term Goals: 3 weeks   1.  Patient will demonstrate understanding and compliance with HEP.  progressing towards not met  2.  Patient will demonstrate non-antalgic gait pattern on smooth level surface x 500'. progressing towards not met  3.  Patient will report >10 point improvement on LEFS. progressing towards not met     Long Term Goals: 8 weeks   1.  Patient will demonstrate ability to perform sit-to-stand from standard chair x 8 reps in 30 seconds without increased right knee pain.  progressing towards not met  2.  Patient will report <30% limitation on FOTO survey. progressing towards not met  3.  Patient will demonstrate ability complete 6MWT without increased right knee pain/right hip pain for improved community ambulation tolerance. progressing towards not met  4.  Patient will report consistently <3/10 right lateral hip pain at worst during daily activities.  progressing towards not met    PLAN   CYNTHIA Colin, PT    [facility]  Discharge Note    Name:  Verenice SULLIVAN Hermes  Clinic Number: [id number]    Therapy Diagnosis:   Encounter Diagnoses   Name Primary?    Medial knee pain, right Yes    Lateral pain of right hip     Antalgic gait      Physician: Bin Artis MD    Physician Orders: PT Eval and Treat;   Eval and treat with modalities: right hip bursitis, right knee varus arthritis and pes tendonitis         Medical Diagnosis: M17.11 (ICD-10-CM) - Primary osteoarthritis of right knee  Evaluation Date: 9/19/2022  Date of Last visit: 12/28/2022  Total Visits Received: 14    ASSESSMENT        Discharge reason: Patient has reached the maximum rehab potential for the present time  Discharge FOTO Score: staff did not capture.   Goals: see above     PLAN   This patient is discharged from Physical Therapy      Francisco Colin PT

## 2023-01-03 ENCOUNTER — PATIENT MESSAGE (OUTPATIENT)
Dept: HEMATOLOGY/ONCOLOGY | Facility: CLINIC | Age: 68
End: 2023-01-03
Payer: MEDICARE

## 2023-01-04 ENCOUNTER — PATIENT MESSAGE (OUTPATIENT)
Dept: HEMATOLOGY/ONCOLOGY | Facility: CLINIC | Age: 68
End: 2023-01-04
Payer: MEDICARE

## 2023-01-04 DIAGNOSIS — N60.92 ATYPICAL DUCTAL HYPERPLASIA OF LEFT BREAST: Primary | ICD-10-CM

## 2023-01-11 DIAGNOSIS — M25.562 ACUTE PAIN OF LEFT KNEE: Primary | ICD-10-CM

## 2023-01-23 ENCOUNTER — PATIENT MESSAGE (OUTPATIENT)
Dept: INTERNAL MEDICINE | Facility: CLINIC | Age: 68
End: 2023-01-23
Payer: MEDICARE

## 2023-01-23 DIAGNOSIS — M25.561 CHRONIC PAIN OF RIGHT KNEE: Primary | ICD-10-CM

## 2023-01-23 DIAGNOSIS — G89.29 CHRONIC PAIN OF RIGHT KNEE: Primary | ICD-10-CM

## 2023-01-25 ENCOUNTER — PATIENT MESSAGE (OUTPATIENT)
Dept: ORTHOPEDICS | Facility: CLINIC | Age: 68
End: 2023-01-25
Payer: MEDICARE

## 2023-01-25 RX ORDER — MELOXICAM 7.5 MG/1
7.5 TABLET ORAL DAILY PRN
Qty: 90 TABLET | Refills: 0 | Status: SHIPPED | OUTPATIENT
Start: 2023-01-25 | End: 2023-09-20 | Stop reason: SDUPTHER

## 2023-01-26 ENCOUNTER — PATIENT MESSAGE (OUTPATIENT)
Dept: RHEUMATOLOGY | Facility: CLINIC | Age: 68
End: 2023-01-26
Payer: MEDICARE

## 2023-01-26 ENCOUNTER — HOSPITAL ENCOUNTER (OUTPATIENT)
Dept: RADIOLOGY | Facility: HOSPITAL | Age: 68
Discharge: HOME | End: 2023-01-26
Attending: INTERNAL MEDICINE
Payer: MEDICARE

## 2023-01-26 ENCOUNTER — INFUSION (OUTPATIENT)
Dept: INFUSION THERAPY | Facility: HOSPITAL | Age: 68
End: 2023-01-26
Payer: MEDICARE

## 2023-01-26 DIAGNOSIS — N60.92 ATYPICAL DUCTAL HYPERPLASIA OF LEFT BREAST: ICD-10-CM

## 2023-01-26 LAB
CREAT SERPL-MCNC: 0.8 MG/DL (ref 0.5–1.4)
SAMPLE: NORMAL

## 2023-01-26 PROCEDURE — 77049 MRI BREAST W/WO CONTRAST, W/CAD, BILATERAL: ICD-10-PCS | Mod: 26,,, | Performed by: RADIOLOGY

## 2023-01-26 PROCEDURE — 77049 MRI BREAST C-+ W/CAD BI: CPT | Mod: TC

## 2023-01-26 PROCEDURE — A9577 INJ MULTIHANCE: HCPCS | Performed by: INTERNAL MEDICINE

## 2023-01-26 PROCEDURE — 25500020 PHARM REV CODE 255: Performed by: INTERNAL MEDICINE

## 2023-01-26 PROCEDURE — 77049 MRI BREAST C-+ W/CAD BI: CPT | Mod: 26,,, | Performed by: RADIOLOGY

## 2023-01-26 RX ADMIN — GADOBENATE DIMEGLUMINE 10 ML: 529 INJECTION, SOLUTION INTRAVENOUS at 06:01

## 2023-01-26 NOTE — NURSING
PIV placed in right wrist for MRI.  Good blood return noted. Flushes easily.  Patient tolerated well.

## 2023-01-30 DIAGNOSIS — Z12.31 ENCOUNTER FOR SCREENING MAMMOGRAM FOR MALIGNANT NEOPLASM OF BREAST: ICD-10-CM

## 2023-01-30 DIAGNOSIS — Z12.31 ENCOUNTER FOR SCREENING MAMMOGRAM FOR HIGH-RISK PATIENT: ICD-10-CM

## 2023-01-30 DIAGNOSIS — N60.92 ATYPICAL DUCTAL HYPERPLASIA OF LEFT BREAST: Primary | ICD-10-CM

## 2023-01-31 ENCOUNTER — OFFICE VISIT (OUTPATIENT)
Dept: ORTHOPEDICS | Facility: CLINIC | Age: 68
End: 2023-01-31
Payer: MEDICARE

## 2023-01-31 ENCOUNTER — HOSPITAL ENCOUNTER (OUTPATIENT)
Dept: RADIOLOGY | Facility: HOSPITAL | Age: 68
Discharge: HOME | End: 2023-01-31
Attending: ORTHOPAEDIC SURGERY
Payer: MEDICARE

## 2023-01-31 VITALS — HEIGHT: 61 IN | WEIGHT: 219 LBS | BODY MASS INDEX: 41.35 KG/M2

## 2023-01-31 DIAGNOSIS — M25.562 ACUTE PAIN OF LEFT KNEE: Primary | ICD-10-CM

## 2023-01-31 DIAGNOSIS — M25.562 ACUTE PAIN OF LEFT KNEE: ICD-10-CM

## 2023-01-31 PROCEDURE — 99213 PR OFFICE/OUTPT VISIT, EST, LEVL III, 20-29 MIN: ICD-10-PCS | Mod: S$PBB,,, | Performed by: ORTHOPAEDIC SURGERY

## 2023-01-31 PROCEDURE — 73560 X-RAY EXAM OF KNEE 1 OR 2: CPT | Mod: TC,50

## 2023-01-31 PROCEDURE — 99214 OFFICE O/P EST MOD 30 MIN: CPT | Mod: PBBFAC | Performed by: ORTHOPAEDIC SURGERY

## 2023-01-31 PROCEDURE — 99999 PR PBB SHADOW E&M-EST. PATIENT-LVL IV: ICD-10-PCS | Mod: PBBFAC,,, | Performed by: ORTHOPAEDIC SURGERY

## 2023-01-31 PROCEDURE — 99999 PR PBB SHADOW E&M-EST. PATIENT-LVL IV: CPT | Mod: PBBFAC,,, | Performed by: ORTHOPAEDIC SURGERY

## 2023-01-31 PROCEDURE — 73560 XR KNEE 1 OR 2 VIEW BILATERAL: ICD-10-PCS | Mod: 26,50,, | Performed by: RADIOLOGY

## 2023-01-31 PROCEDURE — 99213 OFFICE O/P EST LOW 20 MIN: CPT | Mod: S$PBB,,, | Performed by: ORTHOPAEDIC SURGERY

## 2023-01-31 PROCEDURE — 73560 X-RAY EXAM OF KNEE 1 OR 2: CPT | Mod: 26,50,, | Performed by: RADIOLOGY

## 2023-01-31 NOTE — PROGRESS NOTES
Subjective:     HPI:   Verenice Mirza is a 67 y.o. female who presents for eval L knee ref by Darin Lara    I saw patient for R knee varus arthritis august 2022, had relief with IA + pes injection, R knee doing ok now  S/p R knee ATS for MMT with Dr Artis 12/21    L knee: leg feel between trampoline and frame at [facility] Sept 2022, seen by darin lara 12/22 for persistent medial sided L knee pain  Xrs negative for arthritis  S/p CSI with minimal relief  Referred to me for f/u         Objective:   Body mass index is 41.38 kg/m².  Exam:  Right knee no significant pain with range of motion.      Left knee 0-125 degree knee range of motion difficult to tell alignment due to body habitus appears relatively neutral.  Tender palpation predominantly medial-sided knee between medial femoral condyle medial joint line and medial tibia along the MCL distribution and medial joint line she is much more tender there than the pes anserinus.  Difficult to assess effusion does not seem significant.  She opens to valgus stress does have some pain with this but has a solid endpoint feels symmetric to the right knee.  Stable to varus anterior-posterior stress no flexion contracture or extensor lag      Imaging:  X-rays today PA flex and previous x-rays December 14th no significant left knee arthritic changes      Assessment:       ICD-10-CM ICD-9-CM   1. Acute pain of left knee  M25.562 719.46      R knee scope 12/21 Dr Artis     BMI 41.4,  DM 5.9  RA on sulfasalazine, most recent CRP 1.7  IBS but tolerates NSAIDS  JORDANA does not use CPAP  Documented PAD but FRANTZ normal 4/22, on ASA     Plan:       She is pain localizing more to the joint line and along the MCL she could strained her MCL likely has another meniscus tear.  She got minimal relief with an intra-articular injection in December with Darin Lara.  She did not meet radiographic criteria for knee replacement based off her left knee x-rays today.      Given  persistent pain unrelieved with intra-articular injection recommend repeat MRI and follow up with Dr. Artis who did a right knee scope as she maybe a candidate for a left knee scope.    Happy to see her back for right knee arthritic pain if returns    Orders Placed This Encounter   Procedures    MRI Knee Without Contrast Left     Standing Status:   Future     Standing Expiration Date:   1/31/2024     Order Specific Question:   Does the patient have a pacemaker or a defibrillator (Note: Some facilities may not be able to schedule an MRI for patients with pacemakers and defibrillators. You should contact your local radiology department to determine if this is the case.)?     Answer:   No     Order Specific Question:   Does the patient have an aneurysm or surgical clip, pump, nerve/brain stimulator, middle/inner ear prosthesis, or other metal implant or foreign object (bullet, shrapnel)? If they have a card related to their implant, ask them to bring it. Issues related to the implant may cause the MRI to be delayed.     Answer:   No     Order Specific Question:   Is the patient claustrophobic?     Answer:   No     Order Specific Question:   Will the patient require sedation?     Answer:   No     Order Specific Question:   May the Radiologist modify the order per protocol to meet the clinical needs of the patient?     Answer:   Yes     Order Specific Question:   Is this part of a Research Study?     Answer:   No     Order Specific Question:   Does the patient have on a skin patch for medication with aluminized backing?     Answer:   No             Past Medical History:   Diagnosis Date    Acute seasonal allergic rhinitis     Atypical ductal hyperplasia of left breast     BMI 40.0-44.9, adult     Breast cyst     Diabetes mellitus     Dysphagia     Dysplastic nevus of trunk 03/2017    moderately atypical    Fatty liver disease, nonalcoholic     Fibrocystic breast     Hiatal hernia     Hyperlipidemia     Hypertension      Hypothyroid     IGT (impaired glucose tolerance)     Irritable bowel syndrome (IBS)     Kidney stones     Left breast mass     Lumbar disc disease     Morbid obesity     Nicotine use disorder     JORDANA on CPAP     PAD (peripheral artery disease)     Personal history of colonic polyps 05/27/2009    PVD (peripheral vascular disease)     Renal angiomyolipoma     Rosacea     Squamous cell carcinoma 12/2017    in situ mid scalp    Venous insufficiency     Vitamin D deficiency disease        Past Surgical History:   Procedure Laterality Date    ARTHROSCOPIC CHONDROPLASTY OF KNEE JOINT Right 12/8/2021    Procedure: ARTHROSCOPY, KNEE, WITH CHONDROPLASTY;  Surgeon: Bin Artis MD;  Location: [facility];  Service: Orthopedics;  Laterality: Right;    BLADDER SUSPENSION      BREAST BIOPSY      COLONOSCOPY N/A 5/24/2017    Procedure: COLONOSCOPY;  Surgeon: Georgina Bunch MD;  Location: [facility];  Service: Endoscopy;  Laterality: N/A;    COLONOSCOPY N/A 7/28/2022    Procedure: COLONOSCOPY Suprep;  Surgeon: Cedrick Hernandez MD;  Location: [facility];  Service: Endoscopy;  Laterality: N/A;    CYSTOSCOPY      ESOPHAGOGASTRODUODENOSCOPY N/A 12/5/2018    Procedure: ESOPHAGOGASTRODUODENOSCOPY (EGD);  Surgeon: Georgina Bunch MD;  Location: [facility];  Service: Endoscopy;  Laterality: N/A;  1000 am arrival time, has transportation set up    EXCISIONAL BIOPSY Left 8/19/2019    Procedure: EXCISIONAL BIOPSY LEFT BREAST with SEED LOCALIZATION;  Surgeon: Ramon Bass MD;  Location: [address];  Service: General;  Laterality: Left;    HYSTERECTOMY      KNEE ARTHROSCOPY W/ MENISCECTOMY Right 12/8/2021    Procedure: ARTHROSCOPY, KNEE, WITH MENISCECTOMY;  Surgeon: Bin Artis MD;  Location: [facility];  Service: Orthopedics;  Laterality: Right;  medial & lateral    LITHOTRIPSY      OOPHORECTOMY         Family History   Problem Relation Age of Onset    Alzheimer's disease Mother     Breast cancer Mother      Skin cancer Sister     Diabetes type II Maternal Grandfather     Depression Maternal Grandfather     Breast cancer Paternal Aunt     Lupus Neg Hx     Psoriasis Neg Hx     Melanoma Neg Hx        Social History     Socioeconomic History    Marital status: Single   Occupational History    Occupation: retired teacher     Employer: Retired   Tobacco Use    Smoking status: Former     Packs/day: 0.50     Years: 30.00     Pack years: 15.00     Types: Cigarettes     Quit date: 2008     Years since quittin.4    Smokeless tobacco: Former   Substance and Sexual Activity    Alcohol use: No    Drug use: No    Sexual activity: Never     Social Determinants of Health     Financial Resource Strain: Unknown    Difficulty of Paying Living Expenses: Patient refused   Food Insecurity: Unknown    Worried About Running Out of Food in the Last Year: Patient refused    Ran Out of Food in the Last Year: Patient refused   Transportation Needs: Unknown    Lack of Transportation (Medical): Patient refused    Lack of Transportation (Non-Medical): Patient refused   Physical Activity: Unknown    Days of Exercise per Week: Patient refused    Minutes of Exercise per Session: 0 min   Stress: Unknown    Feeling of Stress : Patient refused   Social Connections: Unknown    Frequency of Communication with Friends and Family: Patient refused    Frequency of Social Gatherings with Friends and Family: Patient refused    Active Member of Clubs or Organizations: Patient refused    Attends Club or Organization Meetings: Patient refused    Marital Status: Patient refused   Housing Stability: Unknown    Unable to Pay for Housing in the Last Year: Patient refused    Unstable Housing in the Last Year: Patient refused

## 2023-02-02 ENCOUNTER — PATIENT MESSAGE (OUTPATIENT)
Dept: INTERNAL MEDICINE | Facility: CLINIC | Age: 68
End: 2023-02-02
Payer: MEDICARE

## 2023-02-03 ENCOUNTER — LAB VISIT (OUTPATIENT)
Dept: LAB | Facility: HOSPITAL | Age: 68
End: 2023-02-03
Attending: INTERNAL MEDICINE
Payer: MEDICARE

## 2023-02-03 DIAGNOSIS — M05.9 SEROPOSITIVE RHEUMATOID ARTHRITIS: ICD-10-CM

## 2023-02-03 LAB
ALBUMIN SERPL BCP-MCNC: 4 G/DL (ref 3.5–5.2)
ALP SERPL-CCNC: 91 U/L (ref 55–135)
ALT SERPL W/O P-5'-P-CCNC: 23 U/L (ref 10–44)
ANION GAP SERPL CALC-SCNC: 10 MMOL/L (ref 8–16)
AST SERPL-CCNC: 20 U/L (ref 10–40)
BILIRUB SERPL-MCNC: 0.2 MG/DL (ref 0.1–1)
BUN SERPL-MCNC: 17 MG/DL (ref 8–23)
CALCIUM SERPL-MCNC: 10.1 MG/DL (ref 8.7–10.5)
CHLORIDE SERPL-SCNC: 105 MMOL/L (ref 95–110)
CO2 SERPL-SCNC: 23 MMOL/L (ref 23–29)
CREAT SERPL-MCNC: 0.8 MG/DL (ref 0.5–1.4)
CRP SERPL-MCNC: 1.1 MG/L (ref 0–8.2)
ERYTHROCYTE [DISTWIDTH] IN BLOOD BY AUTOMATED COUNT: 14.4 % (ref 11.5–14.5)
ERYTHROCYTE [SEDIMENTATION RATE] IN BLOOD BY PHOTOMETRIC METHOD: 6 MM/HR (ref 0–36)
EST. GFR  (NO RACE VARIABLE): >60 ML/MIN/1.73 M^2
GLUCOSE SERPL-MCNC: 91 MG/DL (ref 70–110)
HCT VFR BLD AUTO: 45.4 % (ref 37–48.5)
HGB BLD-MCNC: 14.1 G/DL (ref 12–16)
MCH RBC QN AUTO: 30.5 PG (ref 27–31)
MCHC RBC AUTO-ENTMCNC: 31.1 G/DL (ref 32–36)
MCV RBC AUTO: 98 FL (ref 82–98)
PLATELET # BLD AUTO: 229 K/UL (ref 150–450)
PMV BLD AUTO: 11 FL (ref 9.2–12.9)
POTASSIUM SERPL-SCNC: 4.1 MMOL/L (ref 3.5–5.1)
PROT SERPL-MCNC: 7.1 G/DL (ref 6–8.4)
RBC # BLD AUTO: 4.62 M/UL (ref 4–5.4)
SODIUM SERPL-SCNC: 138 MMOL/L (ref 136–145)
WBC # BLD AUTO: 6.36 K/UL (ref 3.9–12.7)

## 2023-02-03 PROCEDURE — 86140 C-REACTIVE PROTEIN: CPT | Performed by: INTERNAL MEDICINE

## 2023-02-03 PROCEDURE — 80053 COMPREHEN METABOLIC PANEL: CPT | Performed by: INTERNAL MEDICINE

## 2023-02-03 PROCEDURE — 85652 RBC SED RATE AUTOMATED: CPT | Performed by: INTERNAL MEDICINE

## 2023-02-03 PROCEDURE — 85027 COMPLETE CBC AUTOMATED: CPT | Performed by: INTERNAL MEDICINE

## 2023-02-03 PROCEDURE — 36415 COLL VENOUS BLD VENIPUNCTURE: CPT | Mod: PO | Performed by: INTERNAL MEDICINE

## 2023-02-09 ENCOUNTER — PATIENT MESSAGE (OUTPATIENT)
Dept: INTERNAL MEDICINE | Facility: CLINIC | Age: 68
End: 2023-02-09
Payer: MEDICARE

## 2023-02-09 DIAGNOSIS — E11.69 DYSLIPIDEMIA ASSOCIATED WITH TYPE 2 DIABETES MELLITUS: ICD-10-CM

## 2023-02-09 DIAGNOSIS — E78.5 DYSLIPIDEMIA ASSOCIATED WITH TYPE 2 DIABETES MELLITUS: ICD-10-CM

## 2023-02-13 RX ORDER — SEMAGLUTIDE 2.68 MG/ML
2 INJECTION, SOLUTION SUBCUTANEOUS
Qty: 1 PEN | Refills: 3 | Status: SHIPPED | OUTPATIENT
Start: 2023-02-13 | End: 2023-03-20 | Stop reason: SDUPTHER

## 2023-02-14 ENCOUNTER — TELEPHONE (OUTPATIENT)
Dept: RHEUMATOLOGY | Facility: CLINIC | Age: 68
End: 2023-02-14
Payer: MEDICARE

## 2023-02-14 NOTE — TELEPHONE ENCOUNTER
Called  to inform patient of the need to r/s 2/24/23  appointment with Dr. Arrington, due to a change in the provider's schedule.  Patient verbalizes understanding.  Appointment r/s to 3/24/23 @ 11:30am.

## 2023-02-19 ENCOUNTER — HOSPITAL ENCOUNTER (OUTPATIENT)
Dept: RADIOLOGY | Facility: HOSPITAL | Age: 68
Discharge: HOME | End: 2023-02-19
Attending: ORTHOPAEDIC SURGERY
Payer: MEDICARE

## 2023-02-19 DIAGNOSIS — M25.562 ACUTE PAIN OF LEFT KNEE: ICD-10-CM

## 2023-02-19 PROCEDURE — 73721 MRI JNT OF LWR EXTRE W/O DYE: CPT | Mod: 26,LT,, | Performed by: RADIOLOGY

## 2023-02-19 PROCEDURE — 73721 MRI KNEE WITHOUT CONTRAST LEFT: ICD-10-PCS | Mod: 26,LT,, | Performed by: RADIOLOGY

## 2023-02-19 PROCEDURE — 73721 MRI JNT OF LWR EXTRE W/O DYE: CPT | Mod: TC,LT

## 2023-02-28 ENCOUNTER — PATIENT MESSAGE (OUTPATIENT)
Dept: RHEUMATOLOGY | Facility: CLINIC | Age: 68
End: 2023-02-28
Payer: MEDICARE

## 2023-03-05 ENCOUNTER — PATIENT MESSAGE (OUTPATIENT)
Dept: REHABILITATION | Facility: HOSPITAL | Age: 68
End: 2023-03-05
Payer: MEDICARE

## 2023-03-07 ENCOUNTER — OFFICE VISIT (OUTPATIENT)
Dept: SPORTS MEDICINE | Facility: CLINIC | Age: 68
End: 2023-03-07
Payer: MEDICARE

## 2023-03-07 ENCOUNTER — PATIENT MESSAGE (OUTPATIENT)
Dept: INTERNAL MEDICINE | Facility: CLINIC | Age: 68
End: 2023-03-07
Payer: MEDICARE

## 2023-03-07 VITALS
HEART RATE: 68 BPM | SYSTOLIC BLOOD PRESSURE: 112 MMHG | DIASTOLIC BLOOD PRESSURE: 66 MMHG | WEIGHT: 216 LBS | HEIGHT: 61 IN | BODY MASS INDEX: 40.78 KG/M2

## 2023-03-07 DIAGNOSIS — S83.232A COMPLEX TEAR OF MEDIAL MENISCUS OF LEFT KNEE, UNSPECIFIED WHETHER OLD OR CURRENT TEAR, INITIAL ENCOUNTER: Primary | ICD-10-CM

## 2023-03-07 DIAGNOSIS — M25.511 RIGHT SHOULDER PAIN, UNSPECIFIED CHRONICITY: ICD-10-CM

## 2023-03-07 PROCEDURE — 99214 OFFICE O/P EST MOD 30 MIN: CPT | Mod: PBBFAC,25 | Performed by: ORTHOPAEDIC SURGERY

## 2023-03-07 PROCEDURE — 20610 LARGE JOINT ASPIRATION/INJECTION: R SUBACROMIAL BURSA: ICD-10-PCS | Mod: S$PBB,RT,, | Performed by: ORTHOPAEDIC SURGERY

## 2023-03-07 PROCEDURE — 20610 DRAIN/INJ JOINT/BURSA W/O US: CPT | Mod: PBBFAC,RT | Performed by: ORTHOPAEDIC SURGERY

## 2023-03-07 PROCEDURE — 99214 OFFICE O/P EST MOD 30 MIN: CPT | Mod: 25,S$PBB,, | Performed by: ORTHOPAEDIC SURGERY

## 2023-03-07 PROCEDURE — 99999 PR PBB SHADOW E&M-EST. PATIENT-LVL IV: CPT | Mod: PBBFAC,,, | Performed by: ORTHOPAEDIC SURGERY

## 2023-03-07 PROCEDURE — 99214 PR OFFICE/OUTPT VISIT, EST, LEVL IV, 30-39 MIN: ICD-10-PCS | Mod: 25,S$PBB,, | Performed by: ORTHOPAEDIC SURGERY

## 2023-03-07 PROCEDURE — 99999 PR PBB SHADOW E&M-EST. PATIENT-LVL IV: ICD-10-PCS | Mod: PBBFAC,,, | Performed by: ORTHOPAEDIC SURGERY

## 2023-03-07 RX ADMIN — TRIAMCINOLONE ACETONIDE 60 MG: 40 INJECTION, SUSPENSION INTRA-ARTICULAR; INTRAMUSCULAR at 03:03

## 2023-03-07 NOTE — PROGRESS NOTES
CC: Left knee pain    67 y.o. Female with a history of left knee pain who has had a previous right knee scope by me in 2021. She had a recent injury on a trampoline and was seen in Ortho with Dr. Harry, an MRI was done at that time which shows a meniscus tear.  She states that the pain is severe and not responding to any conservative care.      She reports that the pain and weakness. It also bothers her at night.    + mechanical symptoms (catching, clicking), + instability    Is affecting ADLs.     REVIEW OF SYSTEMS:  Constitution: Negative. Negative for chills, fever and night sweats.   HENT: Negative for congestion and headaches.    Eyes: Negative for blurred vision, left vision loss and right vision loss.   Cardiovascular: Negative for chest pain and syncope.   Respiratory: Negative for cough and shortness of breath.    Endocrine: Negative for polydipsia, polyphagia and polyuria.   Hematologic/Lymphatic: Negative for bleeding problem. Does not bruise/bleed easily.   Skin: Negative for dry skin, itching and rash.   Musculoskeletal: Negative for falls. Positive for left knee pain and  muscle weakness.   Gastrointestinal: Negative for abdominal pain and bowel incontinence.   Genitourinary: Negative for bladder incontinence and nocturia.   Neurological: Negative for disturbances in coordination, loss of balance and seizures.   Psychiatric/Behavioral: Negative for depression. The patient does not have insomnia.    Allergic/Immunologic: Negative for hives and persistent infections.     PAST MEDICAL HISTORY:    Past Medical History:   Diagnosis Date    Acute seasonal allergic rhinitis     Atypical ductal hyperplasia of left breast     BMI 40.0-44.9, adult     Breast cyst     Diabetes mellitus     Dysphagia     Dysplastic nevus of trunk 03/2017    moderately atypical    Fatty liver disease, nonalcoholic     Fibrocystic breast     Hiatal hernia     Hyperlipidemia     Hypertension     Hypothyroid     IGT (impaired glucose  tolerance)     Irritable bowel syndrome (IBS)     Kidney stones     Left breast mass     Lumbar disc disease     Morbid obesity     Nicotine use disorder     JORDANA on CPAP     PAD (peripheral artery disease)     Personal history of colonic polyps 05/27/2009    PVD (peripheral vascular disease)     Renal angiomyolipoma     Rosacea     Squamous cell carcinoma 12/2017    in situ mid scalp    Venous insufficiency     Vitamin D deficiency disease        PAST SURGICAL HISTORY:   Past Surgical History:   Procedure Laterality Date    ARTHROSCOPIC CHONDROPLASTY OF KNEE JOINT Right 12/8/2021    Procedure: ARTHROSCOPY, KNEE, WITH CHONDROPLASTY;  Surgeon: Bin Artis MD;  Location: [facility];  Service: Orthopedics;  Laterality: Right;    BLADDER SUSPENSION      BREAST BIOPSY      COLONOSCOPY N/A 5/24/2017    Procedure: COLONOSCOPY;  Surgeon: Georgina Bunch MD;  Location: [facility];  Service: Endoscopy;  Laterality: N/A;    COLONOSCOPY N/A 7/28/2022    Procedure: COLONOSCOPY Suprep;  Surgeon: Cedrick Hernandez MD;  Location: [facility];  Service: Endoscopy;  Laterality: N/A;    CYSTOSCOPY      ESOPHAGOGASTRODUODENOSCOPY N/A 12/5/2018    Procedure: ESOPHAGOGASTRODUODENOSCOPY (EGD);  Surgeon: Georgina Bunch MD;  Location: [facility];  Service: Endoscopy;  Laterality: N/A;  1000 am arrival time, has transportation set up    EXCISIONAL BIOPSY Left 8/19/2019    Procedure: EXCISIONAL BIOPSY LEFT BREAST with SEED LOCALIZATION;  Surgeon: Ramon Bass MD;  Location: [address];  Service: General;  Laterality: Left;    HYSTERECTOMY      KNEE ARTHROSCOPY W/ MENISCECTOMY Right 12/8/2021    Procedure: ARTHROSCOPY, KNEE, WITH MENISCECTOMY;  Surgeon: Bin Artis MD;  Location: [facility];  Service: Orthopedics;  Laterality: Right;  medial & lateral    LITHOTRIPSY      OOPHORECTOMY         FAMILY HISTORY:   Family History   Problem Relation Age of Onset    Alzheimer's disease Mother     Breast cancer Mother      Skin cancer Sister     Diabetes type II Maternal Grandfather     Depression Maternal Grandfather     Breast cancer Paternal Aunt     Lupus Neg Hx     Psoriasis Neg Hx     Melanoma Neg Hx        SOCIAL HISTORY:   Social History     Socioeconomic History    Marital status: Single   Occupational History    Occupation: retired teacher     Employer: Retired   Tobacco Use    Smoking status: Former     Packs/day: 0.50     Years: 30.00     Pack years: 15.00     Types: Cigarettes     Quit date: 2008     Years since quittin.5    Smokeless tobacco: Former   Substance and Sexual Activity    Alcohol use: No    Drug use: No    Sexual activity: Never     Social Determinants of Health     Financial Resource Strain: Unknown    Difficulty of Paying Living Expenses: Patient refused   Food Insecurity: Unknown    Worried About Running Out of Food in the Last Year: Patient refused    Ran Out of Food in the Last Year: Patient refused   Transportation Needs: Unknown    Lack of Transportation (Medical): Patient refused    Lack of Transportation (Non-Medical): Patient refused   Physical Activity: Unknown    Days of Exercise per Week: Patient refused   Stress: Unknown    Feeling of Stress : Patient refused   Social Connections: Unknown    Frequency of Communication with Friends and Family: Patient refused    Frequency of Social Gatherings with Friends and Family: Patient refused    Active Member of Clubs or Organizations: Patient refused    Attends Club or Organization Meetings: Patient refused    Marital Status: Patient refused   Housing Stability: Unknown    Unable to Pay for Housing in the Last Year: Patient refused    Unstable Housing in the Last Year: Patient refused       MEDICATIONS:     Current Outpatient Medications:     albuterol (PROVENTIL/VENTOLIN HFA) 90 mcg/actuation inhaler, Inhale 2 puffs into the lungs every 6 (six) hours as needed for Wheezing., Disp: 18 g, Rfl: 11    aspirin (ECOTRIN) 81 MG EC tablet, Take 81 mg  by mouth 2 (two) times daily. Takes the first dose after lunch, Disp: , Rfl:     atorvastatin (LIPITOR) 40 MG tablet, Take 1 tablet by mouth once daily, Disp: 90 tablet, Rfl: 3    b complex vitamins tablet, Take 1 tablet by mouth after lunch. , Disp: , Rfl:     blood sugar diagnostic (TRUE METRIX GLUCOSE TEST STRIP) Strp, 1 strip by Misc.(Non-Drug; Combo Route) route once daily., Disp: 100 strip, Rfl: 3    calcium carbonate (TUMS) 200 mg calcium (500 mg) chewable tablet, Take 1 tablet by mouth as needed., Disp: , Rfl:     celecoxib (CELEBREX) 200 MG capsule, Take 1 capsule (200 mg total) by mouth once daily., Disp: 90 capsule, Rfl: 0    co-enzyme Q-10 30 mg capsule, Take 30 mg by mouth 2 (two) times daily. , Disp: , Rfl:     finasteride (PROSCAR) 5 mg tablet, Take 1 tablet by mouth nightly, Disp: 90 tablet, Rfl: 3    hyoscyamine (LEVSIN/SL) 0.125 mg Subl, Place 1 tablet (0.125 mg total) under the tongue every 4 (four) hours as needed (abdominal spasm)., Disp: 50 tablet, Rfl: 1    inhalation spacing device (MICROSPACER), Use as directed for inhalation., Disp: 1 Device, Rfl: 0    ketoconazole (NIZORAL) 2 % cream, APPLY  THIN LAYER TOPICALLY ONCE DAILY AT BEDTIME UNDER  BREAST  AS  NEEDED  FOR  FLARE, Disp: 60 g, Rfl: 6    LACTOBAC CMB #3/FOS/PANTETHINE (PROBIOTIC & ACIDOPHILUS ORAL), Take 1 tablet by mouth after lunch. , Disp: , Rfl:     lancets (ONETOUCH ULTRASOFT LANCETS) Misc, 1 lancet by Misc.(Non-Drug; Combo Route) route once daily., Disp: 100 each, Rfl: 3    levothyroxine (SYNTHROID) 88 MCG tablet, Take 1 tablet (88 mcg total) by mouth before breakfast., Disp: 90 tablet, Rfl: 3    magnesium oxide (MAG-OX) 400 mg tablet, Take 400 mg by mouth after lunch. , Disp: , Rfl:     meloxicam (MOBIC) 7.5 MG tablet, TAKE 1 TABLET BY MOUTH ONCE DAILY AS NEEDED FOR PAIN, Disp: 90 tablet, Rfl: 0    meloxicam (MOBIC) 7.5 MG tablet, Take 1 tablet (7.5 mg total) by mouth daily as needed for Pain., Disp: 90 tablet, Rfl: 0     "metFORMIN (GLUCOPHAGE) 500 MG tablet, Take 1 tablet (500 mg total) by mouth 2 (two) times daily., Disp: 180 tablet, Rfl: 1    metoprolol succinate (TOPROL-XL) 50 MG 24 hr tablet, Take 1 tablet by mouth nightly, Disp: 90 tablet, Rfl: 1    nystatin (MYCOSTATIN) powder, aaa qam prn flare, Disp: 120 g, Rfl: 6    omega-3 acid ethyl esters (LOVAZA) 1 gram capsule, Take 2 capsules by mouth twice daily, Disp: 360 capsule, Rfl: 1    raloxifene (EVISTA) 60 mg tablet, Take 1 tablet by mouth once daily, Disp: 90 tablet, Rfl: 3    semaglutide (OZEMPIC) 2 mg/dose (8 mg/3 mL) PnIj, Inject 2 mg into the skin every 7 days., Disp: 1 pen, Rfl: 3    sour cherry extract (TART CHERRY EXTRACT ORAL), Take by mouth., Disp: , Rfl:     spironolactone (ALDACTONE) 100 MG tablet, Take 1 tablet (100 mg total) by mouth once daily., Disp: 90 tablet, Rfl: 3    sulfaSALAzine (AZULFIDINE) 500 mg Tab, Take 3 tablets (1,500 mg total) by mouth 2 (two) times daily., Disp: 540 tablet, Rfl: 0    vitamin D 1000 units Tab, Take 185 mg by mouth after lunch. , Disp: , Rfl:     omeprazole (PRILOSEC) 40 MG capsule, Take 1 capsule (40 mg total) by mouth once daily., Disp: 30 capsule, Rfl: 5  No current facility-administered medications for this visit.    Facility-Administered Medications Ordered in Other Visits:     fentaNYL 50 mcg/mL injection  mcg,  mcg, Intravenous, Q5 Min PRN, Omar Gresham MD    midazolam (VERSED) 1 mg/mL injection 0.5-4 mg, 0.5-4 mg, Intravenous, PRN, Omar Gresham MD    ALLERGIES:   Review of patient's allergies indicates:  No Known Allergies    VITAL SIGNS:   /66   Pulse 68   Ht 5' 1" (1.549 m)   Wt 98 kg (216 lb)   BMI 40.81 kg/m²      PHYSICAL EXAMINATION  General:  The patient is alert and oriented x 3.  Mood is pleasant.  Observation of ears, eyes and nose reveal no gross abnormalities.  No labored breathing observed.    LEFT KNEE EXAMINATION     OBSERVATION / INSPECTION   Gait:   Antalgic "   Alignment:  Neutral   Scars:   None   Muscle atrophy: Mild  Effusion:  Mild  Warmth:  None   Discoloration:   none     TENDERNESS / CREPITUS (T / C):          T / C      T / C   Patella   - / -   Lateral joint line   - / -   Peripatellar medial  -  Medial joint line    + / -   Peripatellar lateral -  Medial plica   - / -   Patellar tendon -   Popliteal fossa  - / -   Quad tendon   -   Gastrocnemius   -   Prepatellar Bursa - / -   Quadricep   -   Tibial tubercle  -  Thigh/hamstring  -   Pes anserine/HS -  Fibula    -   ITB   - / -  Tibia     -   Tib/fib joint  - / -  LCL    -     MFC   - / -   MCL: Proximal  -    LFC   - / -    Distal   -          ROM: (* = pain)  PASSIVE   ACTIVE    Left :   5 / 0 / 145   5 / 0 / 145     Right :    5 / 0 / 145   5 / 0 / 145    Patellofemoral examination:  See above noted areas of tenderness.   Patella position    Subluxation / dislocation: Centered           Sup. / Inf;   Normal   Crepitus (PF):    Absent   Patellar Mobility:       Medial-lateral:   Normal    Superior-inferior:  Normal    Inferior tilt   Normal    Patellar tendon:  Normal   Lateral tilt:    Normal   J-sign:     None   Patellofemoral grind:   No pain       MENISCAL SIGNS:     Pain on terminal extension:  +  Pain on terminal flexion:  +  Bernards maneuver:  + (for pain)  Squat     + (for pain)    LIGAMENT EXAMINATION:  ACL / Lachman:  normal (-1 to 2mm)    PCL-Post.  drawer: normal 0 to 2mm  MCL- Valgus:  normal 0 to 2mm  LCL- Varus:  normal 0 to 2mm  Pivot shift: normal (Equal)   Dial Test: difference c/w other side   At 30° flexion: normal (< 5°)    At 90° flexion: normal (< 5°)   Reverse Pivot Shift:   normal (Equal)     STRENGTH: (* = with pain) PAINFUL SIDE   Quadricep   5/5   Hamstrin/5    EXTREMITY NEURO-VASCULAR EXAMINATION:   Sensation:  Grossly intact to light touch all dermatomal regions.   Motor Function:  Fully intact motor function at hip, knee, foot and ankle    DTRs;  quadriceps and   achilles 2+.  No clonus and downgoing Babinski.    Vascular status:  DP and PT pulses 2+, brisk capillary refill, symmetric.     OTHER FINDINGS:      IMAGING:     MRI left knee -   Impression:     1. Complex tear of the posterior horn and body of the medial meniscus with vertical and horizontal components noted.  There is a radial tear posteriorly and extrusion of the body of the medial meniscus from the joint space with meniscal tissue extending into the inferior meniscal gutter.  2. Grade 2 MCL sprain.       ASSESSMENT:    Left Knee  Pain, Medial meniscus tear     PLAN:   Treatment options were discussed with the patient about Her knee.  I reviewed the MRI images with her, including the relevant anatomy, and what this means for his knee.    We discussed both non-operative and operative options for her knee and the risks and benefits of each.    I feel like she would benefit from surgery for her knee.  After a discussion of specific risks and benefits, she would like to proceed with setting this up.    These risks include: bleeding, infection, scarring, damage to neurovascular structures, damage to cartilage, stiffness, blood clots, pulmonary embolism, swelling, compartment syndrome, need for further surgery, and the risks of anesthesia.      She verbalized her understanding of these risks and wished to proceed with surgery.    A total of 25 minutes were spent face-to-face with the patient during this encounter and over half of that time was spent on counseling about treatment options including his choice for surgery and coordination of her care for her preoperative visits, surgery and post-operative rehab.  We also had a detailed discussion of her expectation level for this procedure as well as any limitations at home or work and with exercising following surgery.     The operative plan is:    left   1. Arthroscopic partial meniscectomy  2. Possible arthroscopic synovectomy  3. Possible arthroscopic loose body  removal  4. Possible arthroscopic chondroplasty    Position: Supine    Patient will  need medical clearance prior to the pre-operative appointment.    If she wishes to proceed, we'll get her scheduled for this at her convenience around her work schedule.    2. Shoulder injection today - right shoulder     All questions were answered, pt will contact us for questions or concerns in the interim.

## 2023-03-07 NOTE — PROCEDURES
Large Joint Aspiration/Injection: R subacromial bursa    Date/Time: 3/7/2023 3:00 PM  Performed by: Bin Artis MD  Authorized by: Bin Artis MD     Consent Done?:  Yes (Verbal)  Indications:  Pain  Site marked: the procedure site was marked    Timeout: prior to procedure the correct patient, procedure, and site was verified    Prep: patient was prepped and draped in usual sterile fashion      Details:  Needle Size:  22 G  Ultrasonic Guidance for needle placement?: No    Approach:  Posterior  Location:  Shoulder  Site:  R subacromial bursa  Medications:  60 mg triamcinolone acetonide 40 mg/mL  Patient tolerance:  Patient tolerated the procedure well with no immediate complications

## 2023-03-08 DIAGNOSIS — M94.262 CHONDROMALACIA OF LEFT KNEE: ICD-10-CM

## 2023-03-08 DIAGNOSIS — M23.204 OLD TEAR OF MEDIAL MENISCUS OF LEFT KNEE, UNSPECIFIED TEAR TYPE: Primary | ICD-10-CM

## 2023-03-09 ENCOUNTER — TELEPHONE (OUTPATIENT)
Dept: REHABILITATION | Facility: HOSPITAL | Age: 68
End: 2023-03-09
Payer: MEDICARE

## 2023-03-09 RX ORDER — TRIAMCINOLONE ACETONIDE 40 MG/ML
60 INJECTION, SUSPENSION INTRA-ARTICULAR; INTRAMUSCULAR
Status: DISCONTINUED | OUTPATIENT
Start: 2023-03-07 | End: 2023-03-09 | Stop reason: HOSPADM

## 2023-03-09 NOTE — TELEPHONE ENCOUNTER
Called pt back in lieu of Francisco Colin, PT, DPT, OCS who is out of town until 3/20/23. Answered her questions to the best of my ability via voicemail.     Luzmaria Howard, PT, DPT  3/9/2023

## 2023-03-14 ENCOUNTER — PATIENT MESSAGE (OUTPATIENT)
Dept: PREADMISSION TESTING | Facility: HOSPITAL | Age: 68
End: 2023-03-14
Payer: MEDICARE

## 2023-03-14 NOTE — ANESTHESIA PAT ROS NOTE
03/14/2023  Verenice Mirza is a 67 y.o., female.      Pre-op Assessment    I have reviewed the Patient Summary Reports.       I have reviewed the Medications.     Review of Systems  Anesthesia Hx:    Difficult Intubation History of prior surgery of interest to airway management or planning:  Previous anesthesia: General, Nerve Block, MAC 12/8/2021  Right Knee Arthroscopy, Meniscectomy with general anesthesia.  Procedure performed at an [facility].     for 12/8/2021  Right Knee Arthroscopy, meniscectomy.  Procedure performed at an [facility].  7/28/2022  colonoscopy with MAC.  Procedure performed at an [facility]. Airway issues documented on chart review include mask, easy, GETA, videolaryngoscope used  , view on video-laryngoscopy Grade II     Personal Hx of Anesthesia complications   Difficult Intubation, documented in Epic anesthesia history, confirmed by previous anesthetic record review, multiple attempts / no special equipment used, glidescope used successfully                  Social:  Former Smoker, No Alcohol Use Quit smoking 2008      Hematology/Oncology:  Hematology Normal                  Hematology Comments: Vitamin D deficiency      --  Cancer in past history:       Other (see Oncology comments)       surgery   Oncology Comments: Atypical ductal hyperplasia left breast,  Breast Biopsy, Excisional Biopsy left breast, Squamous Cell Carcinoma     EENT/Dental:  chronic allergic rhinitis Seasonal Allergies, Dysphagia          Cardiovascular:  Exercise tolerance: good   Hypertension Valvular problems/Murmurs, MR   Denies CAD.    Denies CABG/stent.       PVD hyperlipidemia  Denies KWONG.  ECG has been reviewed. PAD/ PVD, venous insufficiency bilateral LE's,  Mild mitral regurgitation.  Mild tricuspid regurgitation.  Mild pulmonic regurgitation per Echo,  Normal EF 60%, normal Diastolic  function                         Pulmonary:        Sleep Apnea, CPAP           Education provided regarding risk of obstructive sleep apnea            Renal/:   renal calculi  Renal angiomyolipoma,  Bladder suspension, Cystoscopy, Lithotripsy             Hepatic/GI:    Hiatal Hernia, GERD Liver Disease,  Non-alcoholic Fatty Liver Disease, IBS, Hx colon polyps          Musculoskeletal:  Arthritis   Old tear of medial meniscus of left knee,   Chondromalacia of left knee, H/O right Knee Arthroscopy, Meniscectomy, Chondroplasty,  Rheumatoid Arthritis         Spine Disorders: lumbar Disc disease           OB/GYN/PEDS:  Breast cyst, fibrocystic breast, Hysterectomy, Oophorectomy           Neurological:  Neurology Normal   Denies CVA.    Denies Headaches. Denies Seizures.                                Endocrine:  Diabetes, well controlled, type 2 Hypothyroidism  HA1C 5.9 on 8/4/2022      Morbid Obesity / BMI > 40  Dermatological:  Dysplastic Nevus of trunk,  Rosacea   Psych:  Psychiatric Normal                   Anesthesia Assessment: Preoperative EQUATION    Planned Procedure: Procedure(s) (LRB):  ARTHROSCOPY, KNEE, WITH MENISCECTOMY (Left)  ARTHROSCOPY, KNEE, WITH CHONDROPLASTY (Left)  Requested Anesthesia Type:General  Surgeon: Bin Artis MD  Service: Orthopedics  Known or anticipated Date of Surgery:3/27/2023    Surgeon notes: reviewed    Electronic QUestionnaire Assessment completed via nurse interview with patient.        Triage considerations:     The patient has no apparent active cardiac condition (No unstable coronary Syndrome such as severe unstable angina or recent [<1 month] myocardial infarction, decompensated CHF, severe valvular   disease or significant arrhythmia)    Previous anesthesia records:GETA, MAC, Nerve block for post-op pain, Easy airway, Difficult intubation, and No problems  Right Knee Arthroscopy, Meniscectomy 12/8/2021:  Placement Date: 12/08/21; Placement Time: 1128 (created via  procedure documentation); Method of Intubation: Direct laryngoscopy; Mask Ventilation: Easy - oral; Intubated: Postinduction; Blade: Mendes #2- unsuccessful. ; Airway Device Size: 6.0; Placement Verified By: Capnometry; Complicating Factors: None; Intubation Findings: Bilateral breath sounds; Securment: Lips; Complications: None;   2 attempts, [place]amo 3 used, ETT size 6.0, larger tube would not fit. Removal Date: 12/08/21;  Removal Time: 1242      Last PCP note: within 1 month , within [facility]   Subspecialty notes: Rheumatology      Patient is cleared/ optimized per PCP for surgery.     Other important co-morbidities: DM2, GERD, HLD, HTN, Hypothyroid, Morbid Obesity, and JORDANA       EKG 3/20/2023:  Vent. Rate : 073 BPM     Atrial Rate : 073 BPM      P-R Int : 158 ms          QRS Dur : 074 ms       QT Int : 398 ms       P-R-T Axes : 065 013 071 degrees      QTc Int : 438 ms   Normal sinus rhythm   Early repolarization   Normal ECG   When compared with ECG of 14-AUG-2019 11:37,   ST elevation now present in Inferior leads   ST less elevated in Lateral leads   Confirmed by Erik Patiño MD ([id number]) on 3/21/2023 7:09:41 AM       Echo 3/17/2021:  Summary  The left ventricle is normal in size with normal systolic function. The estimated ejection fraction is 60%  Normal left ventricular diastolic function.  Mild mitral regurgitation.  Mild tricuspid regurgitation.  Mild pulmonic regurgitation.  Normal right ventricular size with normal right ventricular systolic function.  There is no pulmonary hypertension.       Exercise Stress Echo 11/10/2014:  EKG Conclusions:   1. The EKG portion of this study is negative for ischemia at a low workload, and peak heart rate of 130 bpm (85% of predicted).   2. Exercise capacity is below average.   3. Blood pressure response to exercise was normal (Presenting BP: 117/71 Peak BP: 149/103).   4. The following arrhythmias were present: frequent PVCs.   5. There were no symptoms of chest  discomfort or significant dyspnea throughout the protocol.   6. The Duke treadmill score was 5 suggesting a low probability for future cardiovascular events.     CONCLUSIONS     1 - Normal left ventricular systolic function (EF 60-65%).     2 - Normal left ventricular diastolic function.     3 - Normal right ventricular systolic function .   No evidence of stress induced myocardial ischemia.               Instructions given. (See in Nurse's note)      Ht: 5'1  Wt: 216 lb  BMI: 40.81  Fully Vaccinated

## 2023-03-15 ENCOUNTER — TELEPHONE (OUTPATIENT)
Dept: SPORTS MEDICINE | Facility: CLINIC | Age: 68
End: 2023-03-15
Payer: MEDICARE

## 2023-03-15 DIAGNOSIS — M94.262 CHONDROMALACIA OF LEFT KNEE: ICD-10-CM

## 2023-03-15 DIAGNOSIS — M23.204 OLD TEAR OF MEDIAL MENISCUS OF LEFT KNEE, UNSPECIFIED TEAR TYPE: Primary | ICD-10-CM

## 2023-03-15 NOTE — TELEPHONE ENCOUNTER
POST OP PT - Left knee Procedure meniscectomy    DOS 3/27/23    Start PT on 3/28/23      ----- Message from Alisha Arrington MA sent at 3/8/2023 10:08 AM CST -----  Patient will do PT at [facility]    Pre op - 3/23/23    Date of surgery - 3/27/23

## 2023-03-16 ENCOUNTER — OFFICE VISIT (OUTPATIENT)
Dept: OPTOMETRY | Facility: CLINIC | Age: 68
End: 2023-03-16
Payer: MEDICARE

## 2023-03-16 DIAGNOSIS — H25.13 NUCLEAR SCLEROSIS, BILATERAL: ICD-10-CM

## 2023-03-16 DIAGNOSIS — E11.9 TYPE 2 DIABETES MELLITUS WITHOUT RETINOPATHY: Primary | ICD-10-CM

## 2023-03-16 DIAGNOSIS — Z13.5 GLAUCOMA SCREENING: ICD-10-CM

## 2023-03-16 PROCEDURE — 99999 PR PBB SHADOW E&M-EST. PATIENT-LVL IV: ICD-10-PCS | Mod: PBBFAC,,, | Performed by: OPTOMETRIST

## 2023-03-16 PROCEDURE — 99214 OFFICE O/P EST MOD 30 MIN: CPT | Mod: PBBFAC,PO | Performed by: OPTOMETRIST

## 2023-03-16 PROCEDURE — 92014 PR EYE EXAM, EST PATIENT,COMPREHESV: ICD-10-PCS | Mod: S$PBB,,, | Performed by: OPTOMETRIST

## 2023-03-16 PROCEDURE — 92014 COMPRE OPH EXAM EST PT 1/>: CPT | Mod: S$PBB,,, | Performed by: OPTOMETRIST

## 2023-03-16 PROCEDURE — 99999 PR PBB SHADOW E&M-EST. PATIENT-LVL IV: CPT | Mod: PBBFAC,,, | Performed by: OPTOMETRIST

## 2023-03-16 NOTE — PROGRESS NOTES
HPI     Diabetic Eye Exam            Comments: [place]: 1/10/2022          Comments    Presents today for diabetic eye exam. Pt reports no vision   complaints--wear OTC readers for near. Pt denies eye pain, floaters and   flashes. No gtts. Pt would like to defer refraction today.    LBS: did not check today  Hemoglobin A1C       Date                     Value               Ref Range             Status                08/04/2022               5.9 (H)             4.0 - 5.6 %           Final                   03/30/2022               6.1 (H)             4.0 - 5.6 %           Final                   11/29/2021               5.9 (H)             4.0 - 5.6 %           Final                        Last edited by Michell Rodríguez MA on 3/16/2023 12:36 PM.            Assessment /Plan     For exam results, see Encounter Report.    Type 2 diabetes mellitus without retinopathy    Glaucoma screening    Nuclear sclerosis, bilateral      1. [place] OU--pt happy w otc readers--wishes to defer refraction  2. DM- WITHOUT RETINOPATHY.  Advised yearly DFE    PLAN:    rtc 1 yr

## 2023-03-20 ENCOUNTER — OFFICE VISIT (OUTPATIENT)
Dept: INTERNAL MEDICINE | Facility: CLINIC | Age: 68
End: 2023-03-20
Payer: MEDICARE

## 2023-03-20 VITALS
HEART RATE: 71 BPM | SYSTOLIC BLOOD PRESSURE: 118 MMHG | BODY MASS INDEX: 40.66 KG/M2 | WEIGHT: 215.38 LBS | HEIGHT: 61 IN | DIASTOLIC BLOOD PRESSURE: 80 MMHG | OXYGEN SATURATION: 97 %

## 2023-03-20 DIAGNOSIS — E11.69 DYSLIPIDEMIA ASSOCIATED WITH TYPE 2 DIABETES MELLITUS: ICD-10-CM

## 2023-03-20 DIAGNOSIS — L30.4 INTERTRIGO: ICD-10-CM

## 2023-03-20 DIAGNOSIS — K21.9 GASTROESOPHAGEAL REFLUX DISEASE, UNSPECIFIED WHETHER ESOPHAGITIS PRESENT: ICD-10-CM

## 2023-03-20 DIAGNOSIS — M17.0 PRIMARY OSTEOARTHRITIS OF BOTH KNEES: ICD-10-CM

## 2023-03-20 DIAGNOSIS — M85.80 OSTEOPENIA, UNSPECIFIED LOCATION: ICD-10-CM

## 2023-03-20 DIAGNOSIS — Z78.0 ASYMPTOMATIC MENOPAUSAL STATE: ICD-10-CM

## 2023-03-20 DIAGNOSIS — I10 ESSENTIAL HYPERTENSION: ICD-10-CM

## 2023-03-20 DIAGNOSIS — L65.8 FEMALE PATTERN BALDNESS: ICD-10-CM

## 2023-03-20 DIAGNOSIS — Z00.00 PREVENTATIVE HEALTH CARE: Primary | ICD-10-CM

## 2023-03-20 DIAGNOSIS — E11.59 HYPERTENSION ASSOCIATED WITH DIABETES: ICD-10-CM

## 2023-03-20 DIAGNOSIS — E78.5 HYPERLIPIDEMIA, UNSPECIFIED HYPERLIPIDEMIA TYPE: ICD-10-CM

## 2023-03-20 DIAGNOSIS — I15.2 HYPERTENSION ASSOCIATED WITH DIABETES: ICD-10-CM

## 2023-03-20 DIAGNOSIS — M05.9 SEROPOSITIVE RHEUMATOID ARTHRITIS: ICD-10-CM

## 2023-03-20 DIAGNOSIS — E03.9 HYPOTHYROIDISM, UNSPECIFIED TYPE: ICD-10-CM

## 2023-03-20 DIAGNOSIS — E87.6 HYPOKALEMIA: ICD-10-CM

## 2023-03-20 DIAGNOSIS — E03.4 HYPOTHYROIDISM DUE TO ACQUIRED ATROPHY OF THYROID: ICD-10-CM

## 2023-03-20 DIAGNOSIS — E78.5 DYSLIPIDEMIA ASSOCIATED WITH TYPE 2 DIABETES MELLITUS: ICD-10-CM

## 2023-03-20 PROCEDURE — 93010 EKG 12-LEAD: ICD-10-PCS | Mod: S$PBB,,, | Performed by: INTERNAL MEDICINE

## 2023-03-20 PROCEDURE — 99397 PER PM REEVAL EST PAT 65+ YR: CPT | Mod: S$PBB,GZ,, | Performed by: INTERNAL MEDICINE

## 2023-03-20 PROCEDURE — 93010 ELECTROCARDIOGRAM REPORT: CPT | Mod: S$PBB,,, | Performed by: INTERNAL MEDICINE

## 2023-03-20 PROCEDURE — 93005 ELECTROCARDIOGRAM TRACING: CPT | Mod: PBBFAC,PO | Performed by: INTERNAL MEDICINE

## 2023-03-20 PROCEDURE — 99215 OFFICE O/P EST HI 40 MIN: CPT | Mod: PBBFAC,PO | Performed by: INTERNAL MEDICINE

## 2023-03-20 PROCEDURE — 99397 PR PREVENTIVE VISIT,EST,65 & OVER: ICD-10-PCS | Mod: S$PBB,GZ,, | Performed by: INTERNAL MEDICINE

## 2023-03-20 PROCEDURE — 99999 PR PBB SHADOW E&M-EST. PATIENT-LVL V: ICD-10-PCS | Mod: PBBFAC,,, | Performed by: INTERNAL MEDICINE

## 2023-03-20 PROCEDURE — 99999 PR PBB SHADOW E&M-EST. PATIENT-LVL V: CPT | Mod: PBBFAC,,, | Performed by: INTERNAL MEDICINE

## 2023-03-20 RX ORDER — SEMAGLUTIDE 2.68 MG/ML
2 INJECTION, SOLUTION SUBCUTANEOUS
Qty: 1 EACH | Refills: 3 | Status: SHIPPED | OUTPATIENT
Start: 2023-03-20 | End: 2023-07-04 | Stop reason: SDUPTHER

## 2023-03-20 RX ORDER — OMEGA-3-ACID ETHYL ESTERS 1 G/1
2 CAPSULE, LIQUID FILLED ORAL 2 TIMES DAILY
Qty: 360 CAPSULE | Refills: 1 | Status: SHIPPED | OUTPATIENT
Start: 2023-03-20 | End: 2023-09-20 | Stop reason: SDUPTHER

## 2023-03-20 RX ORDER — NYSTATIN 100000 [USP'U]/G
POWDER TOPICAL
Qty: 120 G | Refills: 6 | Status: SHIPPED | OUTPATIENT
Start: 2023-03-20

## 2023-03-20 RX ORDER — LEVOTHYROXINE SODIUM 88 UG/1
88 TABLET ORAL
Qty: 90 TABLET | Refills: 3 | Status: SHIPPED | OUTPATIENT
Start: 2023-03-20 | End: 2023-09-20 | Stop reason: SDUPTHER

## 2023-03-20 RX ORDER — SPIRONOLACTONE 100 MG/1
100 TABLET, FILM COATED ORAL DAILY
Qty: 90 TABLET | Refills: 3 | Status: SHIPPED | OUTPATIENT
Start: 2023-03-20 | End: 2023-09-20 | Stop reason: SDUPTHER

## 2023-03-20 RX ORDER — METOPROLOL SUCCINATE 50 MG/1
50 TABLET, EXTENDED RELEASE ORAL NIGHTLY
Qty: 90 TABLET | Refills: 3 | Status: SHIPPED | OUTPATIENT
Start: 2023-03-20 | End: 2023-09-20 | Stop reason: SDUPTHER

## 2023-03-20 RX ORDER — OMEPRAZOLE 40 MG/1
40 CAPSULE, DELAYED RELEASE ORAL DAILY
Qty: 90 CAPSULE | Refills: 1 | Status: SHIPPED | OUTPATIENT
Start: 2023-03-20 | End: 2023-09-20 | Stop reason: SDUPTHER

## 2023-03-20 RX ORDER — FINASTERIDE 5 MG/1
5 TABLET, FILM COATED ORAL NIGHTLY
Qty: 90 TABLET | Refills: 3 | Status: SHIPPED | OUTPATIENT
Start: 2023-03-20 | End: 2023-09-20 | Stop reason: SDUPTHER

## 2023-03-20 RX ORDER — ATORVASTATIN CALCIUM 40 MG/1
40 TABLET, FILM COATED ORAL DAILY
Qty: 90 TABLET | Refills: 3 | Status: SHIPPED | OUTPATIENT
Start: 2023-03-20 | End: 2023-09-20 | Stop reason: SDUPTHER

## 2023-03-20 RX ORDER — KETOCONAZOLE 20 MG/G
CREAM TOPICAL
Qty: 60 G | Refills: 6 | Status: SHIPPED | OUTPATIENT
Start: 2023-03-20 | End: 2023-09-20 | Stop reason: SDUPTHER

## 2023-03-21 NOTE — PROGRESS NOTES
Subjective:       Patient ID: Verenice Mirza is a 67 y.o. female.    Chief Complaint: Pre-op Exam  Annual physical   HPI 67-year-old female presents to clinic today for annual physical and preoperative evaluation for upcoming arthroscopic surgery of her knee with Dr. Artis.  Patient denies any palpitations chest pain shortness breast no productive cough no fever chills.  Patient denies any significant functional limitations except related to her left knee for arthroscopic surgery for.  Review of Systems    Otherwise negative  Objective:      Physical Exam  General: Well-appearing, well-nourished.  No distress  HEENT: conjunctivae are normal.  Pupils are equal and reative to light.  TM's are clear and intact bilaterally.  Hearing is grossly normal.  Nasopharynx is clear.  Oropharynx is clear.  Neck: Supple.  No thyroid megaly.  No bruits.  Lymph: No cervical or supraclavicular adenopathy.  Heart: Regular rate and rhythm, without murmur, rub or gallop.  Lungs: Clear to auscultation; respiratory effort normal.  Abdomen: Soft, nontender, nondistended.  Normoactive bowel sounds.  No hepatomegaly.  No masses.  Extremities: Good distal pulses.  No edema.  Psych: Oriented to time person place.  Judgment and insight seem unimpaired.  Mood and affect are appropriate.  Assessment:       Problem List Items Addressed This Visit       Hypertension associated with diabetes    Relevant Medications    metoprolol succinate (TOPROL-XL) 50 MG 24 hr tablet    semaglutide (OZEMPIC) 2 mg/dose (8 mg/3 mL) PnIj    Other Relevant Orders    IN OFFICE EKG 12-LEAD (to Muse) (Completed)    Comprehensive Metabolic Panel    Hemoglobin A1C    Lipid Panel    TSH    Hypothyroid    Relevant Medications    levothyroxine (SYNTHROID) 88 MCG tablet    Other Relevant Orders    Comprehensive Metabolic Panel    Hemoglobin A1C    Lipid Panel    TSH    Seropositive rheumatoid arthritis    Dyslipidemia associated with type 2 diabetes mellitus    Relevant  Medications    semaglutide (OZEMPIC) 2 mg/dose (8 mg/3 mL) PnIj    Other Relevant Orders    Comprehensive Metabolic Panel    Hemoglobin A1C    Lipid Panel    TSH    Primary osteoarthritis of both knees     Other Visit Diagnoses       Preventative health care    -  Primary    Osteopenia, unspecified location        Relevant Orders    DXA Bone Density Axial Skeleton 1 or more sites    Asymptomatic menopausal state        Relevant Orders    DXA Bone Density Axial Skeleton 1 or more sites    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        Relevant Medications    spironolactone (ALDACTONE) 100 MG tablet    finasteride (PROSCAR) 5 mg tablet    Hyperlipidemia, unspecified hyperlipidemia type        Relevant Medications    omega-3 acid ethyl esters (LOVAZA) 1 gram capsule    atorvastatin (LIPITOR) 40 MG tablet    Essential hypertension        Relevant Medications    metoprolol succinate (TOPROL-XL) 50 MG 24 hr tablet    Gastroesophageal reflux disease, unspecified whether esophagitis present        Relevant Medications    omeprazole (PRILOSEC) 40 MG capsule    Intertrigo        Relevant Medications    ketoconazole (NIZORAL) 2 % cream    nystatin (MYCOSTATIN) powder    Hypokalemia                  Plan:       Verenice was seen today for pre-op exam.    Diagnoses and all orders for this visit:    Preventative health care  Healthcare maintenance performed, reviewed, updated and counseled today.  Healthcare maintenance performed, reviewed, updated and counseled today.  Hypothyroidism, unspecified type  -     Comprehensive Metabolic Panel; Standing  -     Hemoglobin A1C; Standing  -     Lipid Panel; Standing  -     TSH; Standing  Controlled.  Continue current medical regimen.  Prescription refills addressed.  Followup advised. See after visit summary.  Seropositive rheumatoid arthritis  Upcoming appointment with rheumatologist  Dyslipidemia associated with type 2 diabetes mellitus  -     semaglutide (OZEMPIC) 2 mg/dose (8 mg/3 mL) PnIj; Inject 2 mg  into the skin every 7 days.  Excellent response to appetite suppression and with weight loss patient's pleased with results most significant side effects reported  -     Comprehensive Metabolic Panel; Standing  -     Hemoglobin A1C; Standing  -     Lipid Panel; Standing  -     TSH; Standing  Controlled.  Continue current medical regimen.  Prescription refills addressed.  Followup advised. See after visit summary.  Hypertension associated with diabetes  -     IN OFFICE EKG 12-LEAD (to Muse)  -     Comprehensive Metabolic Panel; Standing  -     Hemoglobin A1C; Standing  -     Lipid Panel; Standing  -     TSH; Standing  Controlled.  Continue current medical regimen.  Prescription refills addressed.  Followup advised. See after visit summary.  Primary osteoarthritis of both knees  Upcoming arthroscopic surgery plan with Dr. 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  -     finasteride (PROSCAR) 5 mg tablet; Take 1 tablet (5 mg total) by mouth every evening.    Hyperlipidemia, unspecified hyperlipidemia type  -     omega-3 acid ethyl esters (LOVAZA) 1 gram capsule; Take 2 capsules (2 g total) by mouth 2 (two) times daily.  -     atorvastatin (LIPITOR) 40 MG tablet; Take 1 tablet (40 mg total) by mouth once daily.    Essential hypertension  -     metoprolol succinate (TOPROL-XL) 50 MG 24 hr tablet; Take 1 tablet (50 mg total) by mouth every evening.    Gastroesophageal reflux disease, unspecified whether esophagitis present  -     omeprazole (PRILOSEC) 40 MG capsule; Take 1 capsule (40 mg total) by mouth once daily.  Controlled.  Continue current medical regimen.   Prescription refills addressed.  Followup advised. See after visit summary.  Hypothyroidism due to acquired atrophy of thyroid  -     levothyroxine (SYNTHROID) 88 MCG tablet; Take 1 tablet (88 mcg total) by mouth before breakfast.    Intertrigo  -     ketoconazole (NIZORAL) 2 % cream; APPLY  THIN LAYER TOPICALLY ONCE DAILY AT BEDTIME UNDER  BREAST  AS  NEEDED  FOR  FLARE  -     nystatin (MYCOSTATIN) powder; aaa qam prn flare    Hypokalemia  Continue potassium containing foods in diet.  Patient is asymptomatic.  Handout provided       Preoperative consultation patient has no significant contraindications and  May proceed with arthroscopic surgery with Dr. Artis for her knee at this time

## 2023-03-23 ENCOUNTER — OFFICE VISIT (OUTPATIENT)
Dept: SPORTS MEDICINE | Facility: CLINIC | Age: 68
End: 2023-03-23
Payer: MEDICARE

## 2023-03-23 VITALS
SYSTOLIC BLOOD PRESSURE: 128 MMHG | BODY MASS INDEX: 40.02 KG/M2 | HEIGHT: 61 IN | HEART RATE: 68 BPM | WEIGHT: 212 LBS | DIASTOLIC BLOOD PRESSURE: 85 MMHG

## 2023-03-23 DIAGNOSIS — Z01.818 PRE-OP EVALUATION: ICD-10-CM

## 2023-03-23 DIAGNOSIS — M94.262 CHONDROMALACIA OF LEFT KNEE: ICD-10-CM

## 2023-03-23 DIAGNOSIS — S83.232A COMPLEX TEAR OF MEDIAL MENISCUS OF LEFT KNEE, UNSPECIFIED WHETHER OLD OR CURRENT TEAR, INITIAL ENCOUNTER: Primary | ICD-10-CM

## 2023-03-23 PROCEDURE — 99499 NO LOS: ICD-10-PCS | Mod: S$PBB,,, | Performed by: ORTHOPAEDIC SURGERY

## 2023-03-23 PROCEDURE — 99999 PR PBB SHADOW E&M-EST. PATIENT-LVL V: CPT | Mod: PBBFAC,,, | Performed by: ORTHOPAEDIC SURGERY

## 2023-03-23 PROCEDURE — 99999 PR PBB SHADOW E&M-EST. PATIENT-LVL V: ICD-10-PCS | Mod: PBBFAC,,, | Performed by: ORTHOPAEDIC SURGERY

## 2023-03-23 PROCEDURE — 99499 UNLISTED E&M SERVICE: CPT | Mod: S$PBB,,, | Performed by: ORTHOPAEDIC SURGERY

## 2023-03-23 PROCEDURE — 99215 OFFICE O/P EST HI 40 MIN: CPT | Mod: PBBFAC | Performed by: ORTHOPAEDIC SURGERY

## 2023-03-23 RX ORDER — CEFAZOLIN SODIUM 2 G/50ML
2 SOLUTION INTRAVENOUS
Status: CANCELLED | OUTPATIENT
Start: 2023-03-23

## 2023-03-23 RX ORDER — OXYCODONE AND ACETAMINOPHEN 5; 325 MG/1; MG/1
1 TABLET ORAL EVERY 4 HOURS PRN
Qty: 20 TABLET | Refills: 0 | Status: SHIPPED | OUTPATIENT
Start: 2023-03-23 | End: 2023-07-27

## 2023-03-23 RX ORDER — ASPIRIN 325 MG
325 TABLET ORAL DAILY
Qty: 20 TABLET | Refills: 0 | Status: SHIPPED | OUTPATIENT
Start: 2023-03-23 | End: 2023-05-16

## 2023-03-23 RX ORDER — TRAMADOL HYDROCHLORIDE 50 MG/1
50 TABLET ORAL EVERY 6 HOURS PRN
Qty: 20 TABLET | Refills: 0 | Status: SHIPPED | OUTPATIENT
Start: 2023-03-23 | End: 2023-07-27

## 2023-03-23 RX ORDER — SODIUM CHLORIDE 9 MG/ML
INJECTION, SOLUTION INTRAVENOUS CONTINUOUS
Status: CANCELLED | OUTPATIENT
Start: 2023-03-23

## 2023-03-23 RX ORDER — PROMETHAZINE HYDROCHLORIDE 25 MG/1
25 TABLET ORAL EVERY 6 HOURS PRN
Qty: 20 TABLET | Refills: 0 | Status: SHIPPED | OUTPATIENT
Start: 2023-03-23 | End: 2023-07-27

## 2023-03-23 NOTE — H&P
"CC:  left knee pain     HPI:    PLAN OF ACTION: Verenice Mirza  is here for a completion of her perioperative paperwork. she Is scheduled to undergo:    LEFT Knee  Arthroscopy   Partial medial meniscectomy, possible repair  Possible chondroplasty   All other indicated procedures     on 03/27/2023.  She is a healthy individual and does need clearance for this procedure.     "May proceed with arthroscopic surgery with Dr. Artis for her knee at this time"  Areli Crespo MD    Risks, indications and benefits of the surgical procedure were discussed with the patient. All questions with regard to surgery, rehab, expected return to functional activities, activities of daily living and recreational endeavors were answered to her satisfaction.    Review of patient's allergies indicates:  No Known Allergies    Past Medical History:   Diagnosis Date    Acute seasonal allergic rhinitis     Atypical ductal hyperplasia of left breast     BMI 40.0-44.9, adult     Breast cyst     Diabetes mellitus     Dysphagia     Dysplastic nevus of trunk 03/2017    moderately atypical    Fatty liver disease, nonalcoholic     Fibrocystic breast     Hiatal hernia     Hyperlipidemia     Hypertension     Hypothyroid     IGT (impaired glucose tolerance)     Irritable bowel syndrome (IBS)     Kidney stones     Left breast mass     Lumbar disc disease     Morbid obesity     Nicotine use disorder     JORDANA on CPAP     PAD (peripheral artery disease)     Personal history of colonic polyps 05/27/2009    PVD (peripheral vascular disease)     Renal angiomyolipoma     Rosacea     Squamous cell carcinoma 12/2017    in situ mid scalp    Venous insufficiency     Vitamin D deficiency disease        Past Surgical History:   Procedure Laterality Date    ARTHROSCOPIC CHONDROPLASTY OF KNEE JOINT Right 12/8/2021    Procedure: ARTHROSCOPY, KNEE, WITH CHONDROPLASTY;  Surgeon: Bin Artis MD;  Location: [facility];  Service: Orthopedics;  Laterality: Right;    " BLADDER SUSPENSION      BREAST BIOPSY      COLONOSCOPY N/A 2017    Procedure: COLONOSCOPY;  Surgeon: Georgina Bunch MD;  Location: [facility];  Service: Endoscopy;  Laterality: N/A;    COLONOSCOPY N/A 2022    Procedure: COLONOSCOPY Suprep;  Surgeon: Cedrick Hernandez MD;  Location: [facility];  Service: Endoscopy;  Laterality: N/A;    CYSTOSCOPY      ESOPHAGOGASTRODUODENOSCOPY N/A 2018    Procedure: ESOPHAGOGASTRODUODENOSCOPY (EGD);  Surgeon: Georgina Bunch MD;  Location: [facility];  Service: Endoscopy;  Laterality: N/A;  1000 am arrival time, has transportation set up    EXCISIONAL BIOPSY Left 2019    Procedure: EXCISIONAL BIOPSY LEFT BREAST with SEED LOCALIZATION;  Surgeon: Ramon Bass MD;  Location: [address];  Service: General;  Laterality: Left;    HYSTERECTOMY      KNEE ARTHROSCOPY W/ MENISCECTOMY Right 2021    Procedure: ARTHROSCOPY, KNEE, WITH MENISCECTOMY;  Surgeon: Bin Artis MD;  Location: [facility];  Service: Orthopedics;  Laterality: Right;  medial & lateral    LITHOTRIPSY      OOPHORECTOMY         Social History     Socioeconomic History    Marital status: Single   Occupational History    Occupation: retired teacher     Employer: Retired   Tobacco Use    Smoking status: Former     Packs/day: 0.50     Years: 30.00     Pack years: 15.00     Types: Cigarettes     Quit date: 2008     Years since quittin.5    Smokeless tobacco: Former   Substance and Sexual Activity    Alcohol use: No    Drug use: No    Sexual activity: Never     Social Determinants of Health     Financial Resource Strain: Unknown    Difficulty of Paying Living Expenses: Patient refused   Food Insecurity: Unknown    Worried About Running Out of Food in the Last Year: Patient refused    Ran Out of Food in the Last Year: Patient refused   Transportation Needs: Unknown    Lack of Transportation (Medical): Patient refused    Lack of Transportation (Non-Medical): Patient refused    Physical Activity: Unknown    Days of Exercise per Week: 0 days   Stress: Stress Concern Present    Feeling of Stress : To some extent   Social Connections: Unknown    Frequency of Communication with Friends and Family: Patient refused    Frequency of Social Gatherings with Friends and Family: Patient refused    Active Member of Clubs or Organizations: Patient refused    Attends Club or Organization Meetings: Patient refused    Marital Status: Patient refused   Housing Stability: Unknown    Unable to Pay for Housing in the Last Year: Patient refused    Unstable Housing in the Last Year: Patient refused       Family History   Problem Relation Age of Onset    Alzheimer's disease Mother     Breast cancer Mother     Skin cancer Sister     Diabetes type II Maternal Grandfather     Depression Maternal Grandfather     Breast cancer Paternal Aunt     Lupus Neg Hx     Psoriasis Neg Hx     Melanoma Neg Hx          Current Outpatient Medications:     albuterol (PROVENTIL/VENTOLIN HFA) 90 mcg/actuation inhaler, Inhale 2 puffs into the lungs every 6 (six) hours as needed for Wheezing., Disp: 18 g, Rfl: 11    aspirin (ECOTRIN) 81 MG EC tablet, Take 81 mg by mouth 2 (two) times daily. Takes the first dose after lunch, Disp: , Rfl:     atorvastatin (LIPITOR) 40 MG tablet, Take 1 tablet (40 mg total) by mouth once daily., Disp: 90 tablet, Rfl: 3    b complex vitamins tablet, Take 1 tablet by mouth after lunch. , Disp: , Rfl:     blood sugar diagnostic (TRUE METRIX GLUCOSE TEST STRIP) Strp, 1 strip by Misc.(Non-Drug; Combo Route) route once daily., Disp: 100 strip, Rfl: 3    calcium carbonate (TUMS) 200 mg calcium (500 mg) chewable tablet, Take 1 tablet by mouth as needed., Disp: , Rfl:     co-enzyme Q-10 30 mg capsule, Take 30 mg by mouth 2 (two) times daily. , Disp: , Rfl:     finasteride (PROSCAR) 5 mg tablet, Take 1 tablet (5 mg total) by mouth every evening., Disp: 90 tablet, Rfl: 3    inhalation spacing device (MICROSPACER),  Use as directed for inhalation., Disp: 1 Device, Rfl: 0    ketoconazole (NIZORAL) 2 % cream, APPLY  THIN LAYER TOPICALLY ONCE DAILY AT BEDTIME UNDER  BREAST  AS  NEEDED  FOR  FLARE, Disp: 60 g, Rfl: 6    LACTOBAC CMB #3/FOS/PANTETHINE (PROBIOTIC & ACIDOPHILUS ORAL), Take 1 tablet by mouth after lunch. , Disp: , Rfl:     lancets (ONETOUCH ULTRASOFT LANCETS) Misc, 1 lancet by Misc.(Non-Drug; Combo Route) route once daily., Disp: 100 each, Rfl: 3    levothyroxine (SYNTHROID) 88 MCG tablet, Take 1 tablet (88 mcg total) by mouth before breakfast., Disp: 90 tablet, Rfl: 3    meloxicam (MOBIC) 7.5 MG tablet, Take 1 tablet (7.5 mg total) by mouth daily as needed for Pain., Disp: 90 tablet, Rfl: 0    metoprolol succinate (TOPROL-XL) 50 MG 24 hr tablet, Take 1 tablet (50 mg total) by mouth every evening., Disp: 90 tablet, Rfl: 3    nystatin (MYCOSTATIN) powder, aaa qam prn flare, Disp: 120 g, Rfl: 6    omega-3 acid ethyl esters (LOVAZA) 1 gram capsule, Take 2 capsules (2 g total) by mouth 2 (two) times daily., Disp: 360 capsule, Rfl: 1    omeprazole (PRILOSEC) 40 MG capsule, Take 1 capsule (40 mg total) by mouth once daily., Disp: 90 capsule, Rfl: 1    raloxifene (EVISTA) 60 mg tablet, Take 1 tablet by mouth once daily, Disp: 90 tablet, Rfl: 3    semaglutide (OZEMPIC) 2 mg/dose (8 mg/3 mL) PnIj, Inject 2 mg into the skin every 7 days., Disp: 1 each, Rfl: 3    sour cherry extract (TART CHERRY EXTRACT ORAL), Take by mouth., Disp: , Rfl:     spironolactone (ALDACTONE) 100 MG tablet, Take 1 tablet (100 mg total) by mouth once daily., Disp: 90 tablet, Rfl: 3    sulfaSALAzine (AZULFIDINE) 500 mg Tab, Take 3 tablets (1,500 mg total) by mouth 2 (two) times daily., Disp: 540 tablet, Rfl: 0    vitamin D 1000 units Tab, Take 185 mg by mouth after lunch. , Disp: , Rfl:   No current facility-administered medications for this visit.    Facility-Administered Medications Ordered in Other Visits:     fentaNYL 50 mcg/mL injection  mcg,   mcg, Intravenous, Q5 Min PRN, Omar Gresham MD    midazolam (VERSED) 1 mg/mL injection 0.5-4 mg, 0.5-4 mg, Intravenous, PRN, Omar Gresham MD    Please see patient's chart for applicable emotional/behavioral/social status.    REVIEW OF SYSTEMS:  Constitution: Negative. Negative for chills, fever and night sweats.   HENT: Negative for congestion and headaches.    Eyes: Negative for blurred vision, left vision loss and right vision loss.   Cardiovascular: Negative for chest pain and syncope.   Respiratory: Negative for cough and shortness of breath.    Endocrine: Negative for polydipsia, polyphagia and polyuria.   Hematologic/Lymphatic: Negative for bleeding problem. Does not bruise/bleed easily.   Skin: Negative for dry skin, itching and rash.   Musculoskeletal: Negative for falls. Positive for left knee pain and muscle weakness.   Gastrointestinal: Negative for abdominal pain and bowel incontinence.   Genitourinary: Negative for bladder incontinence and nocturia.   Neurological: Negative for disturbances in coordination, loss of balance and seizures.   Psychiatric/Behavioral: Negative for depression. The patient does not have insomnia.    Allergic/Immunologic: Negative for hives and persistent infections.     Once no other questions were asked, a brief history and physical exam was then performed.    PHYSICAL EXAM:  GEN: A&Ox3, WD WN NAD  HEENT: WNL  CHEST: CTAB, no W/R/R  HEART: RRR, no M/R/G  ABD: Soft, NT ND, BS x4 QUADS  MS; See Epic  NEURO: CN II-XII intact       The surgical consent was then reviewed with the patient, who agreed with all the contents of the consent form and it was signed. she was then given the [facility] surgery packet to bring with her to [facility] for the anesthesia portion of her perioperative paperwork.     PHYSICAL THERAPY:  She was also instructed regarding physical therapy and will begin on  3/28/23. She was given a copy of the original prescription to schedule.     POST OP  CARE:instructions were reviewed including care of the wound and dressing after surgery and when she can shower.     PAIN MANAGEMENT: Verenice Mirza was also givenpain management regime.  She declined receiving a new TENs unit today as she states she already has one.  She also states she already has a Polar ice unit that will be in place after surgery.  Her postoperative pain medications were discussed with her in detail.    MEDICATION:  Percocet 10/325mg 1 po q 4-6 hours prn pain  Ultram 50 mg one p.o. q.4-6 hours p.r.n. breakthrough pain,   Phenergan 25 mg one p.o. q.4-6 hours p.r.n. nausea and vomiting.       Post op meds to be delivered bedside prior to discharge. Deliver to family if patient is in surgery at 5pm.       Patient was educated on the signs and symptoms of DVTs as well as the risk of their occurrence.     IMPRESSION: surgical candidate for Left partial meniscectomy    CONCLUSION: Pre-operative information reviewed with patient and they have voiced their understanding and signed consent. Proceed with surgery as planned.    Dr. Artis was present in the office at the time of pre op appointment and available for questions if the patient had any for him. As there were no other questions to be asked, she was given my business card along with Bin Artis MD business card if she has any questions or concerns prior to surgery or in the postop period.

## 2023-03-24 ENCOUNTER — ANESTHESIA EVENT (OUTPATIENT)
Dept: SURGERY | Facility: HOSPITAL | Age: 68
End: 2023-03-24
Payer: MEDICARE

## 2023-03-24 ENCOUNTER — TELEPHONE (OUTPATIENT)
Dept: SPORTS MEDICINE | Facility: CLINIC | Age: 68
End: 2023-03-24
Payer: MEDICARE

## 2023-03-24 ENCOUNTER — OFFICE VISIT (OUTPATIENT)
Dept: RHEUMATOLOGY | Facility: CLINIC | Age: 68
End: 2023-03-24
Payer: MEDICARE

## 2023-03-24 VITALS
DIASTOLIC BLOOD PRESSURE: 81 MMHG | HEART RATE: 70 BPM | WEIGHT: 216.25 LBS | SYSTOLIC BLOOD PRESSURE: 113 MMHG | HEIGHT: 61 IN | BODY MASS INDEX: 40.83 KG/M2

## 2023-03-24 DIAGNOSIS — M05.9 SEROPOSITIVE RHEUMATOID ARTHRITIS: ICD-10-CM

## 2023-03-24 DIAGNOSIS — E66.01 MORBID OBESITY WITH BMI OF 40.0-44.9, ADULT: ICD-10-CM

## 2023-03-24 PROCEDURE — 99999 PR PBB SHADOW E&M-EST. PATIENT-LVL III: ICD-10-PCS | Mod: PBBFAC,,, | Performed by: INTERNAL MEDICINE

## 2023-03-24 PROCEDURE — 99999 PR PBB SHADOW E&M-EST. PATIENT-LVL III: CPT | Mod: PBBFAC,,, | Performed by: INTERNAL MEDICINE

## 2023-03-24 PROCEDURE — 99214 PR OFFICE/OUTPT VISIT, EST, LEVL IV, 30-39 MIN: ICD-10-PCS | Mod: S$PBB,,, | Performed by: INTERNAL MEDICINE

## 2023-03-24 PROCEDURE — 99213 OFFICE O/P EST LOW 20 MIN: CPT | Mod: PBBFAC | Performed by: INTERNAL MEDICINE

## 2023-03-24 PROCEDURE — 99214 OFFICE O/P EST MOD 30 MIN: CPT | Mod: S$PBB,,, | Performed by: INTERNAL MEDICINE

## 2023-03-24 RX ORDER — SULFASALAZINE 500 MG/1
1500 TABLET ORAL 2 TIMES DAILY
Qty: 540 TABLET | Refills: 0 | Status: SHIPPED | OUTPATIENT
Start: 2023-03-24 | End: 2023-05-30 | Stop reason: SDUPTHER

## 2023-03-24 ASSESSMENT — ROUTINE ASSESSMENT OF PATIENT INDEX DATA (RAPID3)
TOTAL RAPID3 SCORE: 1.28
MDHAQ FUNCTION SCORE: 0.7
PSYCHOLOGICAL DISTRESS SCORE: 1
PATIENT GLOBAL ASSESSMENT SCORE: 1
FATIGUE SCORE: 1.5
AM STIFFNESS SCORE: 0, NO
PAIN SCORE: 0.5

## 2023-03-24 NOTE — PROGRESS NOTES
Rapid3 Question Responses and Scores 3/17/2023   MDHAQ Score 0.7   Psychologic Score 1.1   Pain Score 0.5   When you awakened in the morning OVER THE LAST WEEK, did you feel stiff? No   If Yes, please indicate the number of hours until you are as limber as you will be for the day -   Fatigue Score 1.5   Global Health Score 1   RAPID3 Score 1.28      Answers submitted by the patient for this visit:  Rheumatology Questionnaire (Submitted on 3/17/2023)  fever: No  eye redness: No  mouth sores: No  headaches: No  shortness of breath: No  chest pain: No  trouble swallowing: No  diarrhea: No  constipation: No  unexpected weight change: No  genital sore: No  dysuria: No  During the last 3 days, have you had a skin rash?: No  Bruises or bleeds easily: Yes  cough: No

## 2023-03-24 NOTE — ASSESSMENT & PLAN NOTE
RA doing well on sulfasalazine monotherapy with negligible stiffness and minimal pain/ swelling    Has OA of knees with some symptoms    Continues to improve nutrition     Sulfasalazine has been well tolerated so far with no significant clinical symptoms and labs show no leukopenia, macrocytosis, or liver toxicity, so will continue to monitor for safety    Will continue sulfasalazine and monitor to assure continued control of chronic synovitis and safety of medical management    RTC me 6 mo

## 2023-03-24 NOTE — ASSESSMENT & PLAN NOTE
She is losing weight on Ozempic and is improving diet and nutrition and we reviewed anti-inflammatory, Mediterranean style diet

## 2023-03-26 ENCOUNTER — OFFICE VISIT (OUTPATIENT)
Dept: URGENT CARE | Facility: CLINIC | Age: 68
End: 2023-03-26
Payer: MEDICARE

## 2023-03-26 VITALS
WEIGHT: 216 LBS | TEMPERATURE: 98 F | SYSTOLIC BLOOD PRESSURE: 123 MMHG | HEIGHT: 61 IN | BODY MASS INDEX: 40.78 KG/M2 | HEART RATE: 81 BPM | DIASTOLIC BLOOD PRESSURE: 90 MMHG | OXYGEN SATURATION: 98 % | RESPIRATION RATE: 18 BRPM

## 2023-03-26 DIAGNOSIS — H10.021 OTHER MUCOPURULENT CONJUNCTIVITIS OF RIGHT EYE: Primary | ICD-10-CM

## 2023-03-26 DIAGNOSIS — H01.003 BLEPHARITIS OF EYELID OF RIGHT EYE, UNSPECIFIED EYELID, UNSPECIFIED TYPE: ICD-10-CM

## 2023-03-26 PROCEDURE — 99213 PR OFFICE/OUTPT VISIT, EST, LEVL III, 20-29 MIN: ICD-10-PCS | Mod: S$GLB,,, | Performed by: NURSE PRACTITIONER

## 2023-03-26 PROCEDURE — 99213 OFFICE O/P EST LOW 20 MIN: CPT | Mod: S$GLB,,, | Performed by: NURSE PRACTITIONER

## 2023-03-26 RX ORDER — OFLOXACIN 3 MG/ML
1 SOLUTION/ DROPS OPHTHALMIC EVERY 4 HOURS
Qty: 10 ML | Refills: 0 | Status: SHIPPED | OUTPATIENT
Start: 2023-03-26 | End: 2023-04-02

## 2023-03-26 NOTE — PATIENT INSTRUCTIONS
Other mucopurulent conjunctivitis of right eye  Blepharitis of eyelid of right eye, unspecified eyelid, unspecified type    -     ofloxacin (OCUFLOX) 0.3 % ophthalmic solution; Place 1 drop into the right eye every 4 (four) hours. for 7 days  Dispense: 10 mL; Refill: 0      Keep eyes cleaned.  Use the eye drops as prescribed.    Avoid sharing towels while infection persist.  Warm compresses to affected eye.   Disinfect any glasses/sunglasses that may have come in contact with the eye.  Disinfect frequently surfaces such as light switches, countertops, remote controls, cell phones.    Do not wear your contact lens ( if you use them) for at least 5 days after you stop having symptoms and are rechecked by your doctor.   Throw away the contacts, contact solution and carrying case you were using and start with new material.     If you develop increase eye symptoms or change in your vision seek medical care immediately either with your ophthalomologist or the ER or return here.      If your condition worsens or fails to improve we recommend that you receive another evaluation at the ER immediately or contact your PCP to discuss your concerns or return here. You must understand that you've received an urgent care treatment only and that you may be released before all your medical problems are known or treated. You the patient will arrange for followup care as instructed.

## 2023-03-26 NOTE — PROGRESS NOTES
"Subjective:       Patient ID: Verenice Mirza is a 67 y.o. female.    Vitals:  height is 5' 1" (1.549 m) and weight is 98 kg (216 lb). Her oral temperature is 97.8 °F (36.6 °C). Her blood pressure is 123/90 (abnormal) and her pulse is 81. Her respiration is 18 and oxygen saturation is 98%.     Chief Complaint: Eye Pain      Provider note begins below:    Pt is a  67 yr old female presenting with right eye redness, itching, and discharge.  Onset of symptoms was yesterday morning when she awoke to her eye crusted shut and purulent drainage.    Eye Pain   The right eye is affected. This is a new problem. The current episode started yesterday. The problem occurs constantly. The problem has been gradually worsening. There was no injury mechanism. The pain is at a severity of 5/10. The pain is mild. There is No known exposure to pink eye. She Does not wear contacts. Associated symptoms include an eye discharge, eye redness and itching. Pertinent negatives include no blurred vision, double vision, fever, foreign body sensation, nausea, photophobia, recent URI or vomiting. She has tried nothing for the symptoms.   Constitution: Negative for fever.   Eyes:  Positive for eye discharge, eye itching, eye pain, eye redness and eyelid swelling (upper and lower right). Negative for eye trauma, foreign body in eye, photophobia, vision loss, double vision and blurred vision.   Gastrointestinal:  Negative for nausea and vomiting.     Objective:      Physical Exam   Constitutional: She is oriented to person, place, and time.   HENT:   Head: Normocephalic.   Ears:   Right Ear: Hearing and external ear normal. No no drainage, swelling or tenderness. Tympanic membrane is not erythematous, not retracted and not bulging. No middle ear effusion.   Left Ear: Hearing and external ear normal. No no drainage, swelling or tenderness. Tympanic membrane is not erythematous, not retracted and not bulging.  No middle ear effusion.   Nose: Nose " normal. No mucosal edema, rhinorrhea or purulent discharge. Right sinus exhibits no maxillary sinus tenderness and no frontal sinus tenderness. Left sinus exhibits no maxillary sinus tenderness and no frontal sinus tenderness.   Mouth/Throat: Uvula is midline, oropharynx is clear and moist and mucous membranes are normal. No uvula swelling. No oropharyngeal exudate, posterior oropharyngeal edema or posterior oropharyngeal erythema.   Eyes: Pupils are equal, round, and reactive to light. No visual field deficit is present. Right eye exhibits discharge and exudate (purulent). Right eye exhibits no hordeolum. No foreign body present in the right eye. Left eye exhibits no exudate. Right conjunctiva is injected. Right conjunctiva has no hemorrhage. Left conjunctiva is not injected. Left conjunctiva has no hemorrhage. No scleral icterus. Right eye exhibits normal extraocular motion and no nystagmus. Right pupil is round, reactive and not sluggish. Left pupil is round, reactive and not sluggish. Extraocular movement intact vision grossly intact gaze aligned appropriately periorbital hyperpigmentation     Comments: Right upper and lower eyelid has erythema and edema   Cardiovascular: Normal rate and regular rhythm.   Pulmonary/Chest: Effort normal and breath sounds normal.   Neurological: She is alert and oriented to person, place, and time.   Skin: Skin is warm and dry.   Nursing note and vitals reviewed.      Assessment:       1. Other mucopurulent conjunctivitis of right eye    2. Blepharitis of eyelid of right eye, unspecified eyelid, unspecified type          Plan:         Other mucopurulent conjunctivitis of right eye  -     ofloxacin (OCUFLOX) 0.3 % ophthalmic solution; Place 1 drop into the right eye every 4 (four) hours. for 7 days  Dispense: 10 mL; Refill: 0    Blepharitis of eyelid of right eye, unspecified eyelid, unspecified type

## 2023-03-27 ENCOUNTER — PATIENT MESSAGE (OUTPATIENT)
Dept: SPORTS MEDICINE | Facility: CLINIC | Age: 68
End: 2023-03-27
Payer: MEDICARE

## 2023-03-27 ENCOUNTER — ANESTHESIA (OUTPATIENT)
Dept: SURGERY | Facility: HOSPITAL | Age: 68
End: 2023-03-27
Payer: MEDICARE

## 2023-03-30 ENCOUNTER — TELEPHONE (OUTPATIENT)
Dept: SPORTS MEDICINE | Facility: CLINIC | Age: 68
End: 2023-03-30
Payer: MEDICARE

## 2023-03-30 DIAGNOSIS — S83.232A COMPLEX TEAR OF MEDIAL MENISCUS OF LEFT KNEE, UNSPECIFIED WHETHER OLD OR CURRENT TEAR, INITIAL ENCOUNTER: ICD-10-CM

## 2023-03-30 DIAGNOSIS — M94.262 CHONDROMALACIA OF LEFT KNEE: Primary | ICD-10-CM

## 2023-04-12 ENCOUNTER — TELEPHONE (OUTPATIENT)
Dept: SPORTS MEDICINE | Facility: CLINIC | Age: 68
End: 2023-04-12
Payer: MEDICARE

## 2023-04-12 NOTE — TELEPHONE ENCOUNTER
Spoke with patient. She had question about home health following her surgery. Told that we have put an order in and they should give her a call to schedule.   She requested to not be first case day of surgery.       ----- Message from Marimar Gonzalez sent at 4/12/2023 10:05 AM CDT -----  Regarding: Pt Advice  Contact: [phone]  Pt is having surgery 04/17/23 and has questions.  Please call.

## 2023-04-14 ENCOUNTER — TELEPHONE (OUTPATIENT)
Dept: SPORTS MEDICINE | Facility: CLINIC | Age: 68
End: 2023-04-14
Payer: MEDICARE

## 2023-04-16 ENCOUNTER — TELEPHONE (OUTPATIENT)
Dept: SPORTS MEDICINE | Facility: CLINIC | Age: 68
End: 2023-04-16
Payer: MEDICARE

## 2023-04-16 NOTE — TELEPHONE ENCOUNTER
Spoke to patient about Dr. Artis needing to leave town because of a family emergency.   Told he will not be in town for tomorrow and we will need to push her surgery to 4/26.     Patient stated understanding.

## 2023-04-18 ENCOUNTER — TELEPHONE (OUTPATIENT)
Dept: SPORTS MEDICINE | Facility: CLINIC | Age: 68
End: 2023-04-18
Payer: MEDICARE

## 2023-04-25 ENCOUNTER — PATIENT MESSAGE (OUTPATIENT)
Dept: SURGERY | Facility: HOSPITAL | Age: 68
End: 2023-04-25
Payer: MEDICARE

## 2023-04-26 ENCOUNTER — HOSPITAL ENCOUNTER (OUTPATIENT)
Facility: HOSPITAL | Age: 68
Discharge: HOME | End: 2023-04-26
Attending: ORTHOPAEDIC SURGERY | Admitting: ORTHOPAEDIC SURGERY
Payer: MEDICARE

## 2023-04-26 VITALS
TEMPERATURE: 98 F | DIASTOLIC BLOOD PRESSURE: 57 MMHG | HEIGHT: 61 IN | BODY MASS INDEX: 40.78 KG/M2 | WEIGHT: 216 LBS | SYSTOLIC BLOOD PRESSURE: 111 MMHG | RESPIRATION RATE: 15 BRPM | HEART RATE: 71 BPM | OXYGEN SATURATION: 100 %

## 2023-04-26 DIAGNOSIS — M94.262 CHONDROMALACIA OF LEFT KNEE: ICD-10-CM

## 2023-04-26 DIAGNOSIS — S83.232A COMPLEX TEAR OF MEDIAL MENISCUS OF LEFT KNEE, UNSPECIFIED WHETHER OLD OR CURRENT TEAR, INITIAL ENCOUNTER: ICD-10-CM

## 2023-04-26 DIAGNOSIS — Z01.818 PRE-OP EVALUATION: ICD-10-CM

## 2023-04-26 LAB — POCT GLUCOSE: 90 MG/DL (ref 70–110)

## 2023-04-26 PROCEDURE — 64447 NJX AA&/STRD FEMORAL NRV IMG: CPT | Performed by: ANESTHESIOLOGY

## 2023-04-26 PROCEDURE — 63600175 PHARM REV CODE 636 W HCPCS: Performed by: ANESTHESIOLOGY

## 2023-04-26 PROCEDURE — 25000003 PHARM REV CODE 250: Performed by: STUDENT IN AN ORGANIZED HEALTH CARE EDUCATION/TRAINING PROGRAM

## 2023-04-26 PROCEDURE — 82962 GLUCOSE BLOOD TEST: CPT | Performed by: ORTHOPAEDIC SURGERY

## 2023-04-26 PROCEDURE — 94761 N-INVAS EAR/PLS OXIMETRY MLT: CPT

## 2023-04-26 PROCEDURE — 71000033 HC RECOVERY, INTIAL HOUR: Performed by: ORTHOPAEDIC SURGERY

## 2023-04-26 PROCEDURE — 25000003 PHARM REV CODE 250: Performed by: NURSE ANESTHETIST, CERTIFIED REGISTERED

## 2023-04-26 PROCEDURE — 63600175 PHARM REV CODE 636 W HCPCS: Performed by: NURSE ANESTHETIST, CERTIFIED REGISTERED

## 2023-04-26 PROCEDURE — 36000711: Performed by: ORTHOPAEDIC SURGERY

## 2023-04-26 PROCEDURE — 99900035 HC TECH TIME PER 15 MIN (STAT)

## 2023-04-26 PROCEDURE — 63600175 PHARM REV CODE 636 W HCPCS

## 2023-04-26 PROCEDURE — D9220A PRA ANESTHESIA: Mod: CRNA,,, | Performed by: NURSE ANESTHETIST, CERTIFIED REGISTERED

## 2023-04-26 PROCEDURE — 36000710: Performed by: ORTHOPAEDIC SURGERY

## 2023-04-26 PROCEDURE — 71000015 HC POSTOP RECOV 1ST HR: Performed by: ORTHOPAEDIC SURGERY

## 2023-04-26 PROCEDURE — 25000003 PHARM REV CODE 250: Performed by: ANESTHESIOLOGY

## 2023-04-26 PROCEDURE — 63600175 PHARM REV CODE 636 W HCPCS: Performed by: ORTHOPAEDIC SURGERY

## 2023-04-26 PROCEDURE — 64447 L ADDUCTOR SS: ICD-10-PCS | Mod: 59,LT,, | Performed by: ANESTHESIOLOGY

## 2023-04-26 PROCEDURE — 27201423 OPTIME MED/SURG SUP & DEVICES STERILE SUPPLY: Performed by: ORTHOPAEDIC SURGERY

## 2023-04-26 PROCEDURE — D9220A PRA ANESTHESIA: ICD-10-PCS | Mod: ANES,,, | Performed by: ANESTHESIOLOGY

## 2023-04-26 PROCEDURE — 29880 PR KNEE SCOPE MED/LAT MENISCECTOMY: ICD-10-PCS | Mod: LT,,, | Performed by: ORTHOPAEDIC SURGERY

## 2023-04-26 PROCEDURE — 25000003 PHARM REV CODE 250: Performed by: ORTHOPAEDIC SURGERY

## 2023-04-26 PROCEDURE — D9220A PRA ANESTHESIA: Mod: ANES,,, | Performed by: ANESTHESIOLOGY

## 2023-04-26 PROCEDURE — 29880 ARTHRS KNE SRG MNISECTMY M&L: CPT | Mod: LT,,, | Performed by: ORTHOPAEDIC SURGERY

## 2023-04-26 PROCEDURE — 37000008 HC ANESTHESIA 1ST 15 MINUTES: Performed by: ORTHOPAEDIC SURGERY

## 2023-04-26 PROCEDURE — D9220A PRA ANESTHESIA: ICD-10-PCS | Mod: CRNA,,, | Performed by: NURSE ANESTHETIST, CERTIFIED REGISTERED

## 2023-04-26 PROCEDURE — 37000009 HC ANESTHESIA EA ADD 15 MINS: Performed by: ORTHOPAEDIC SURGERY

## 2023-04-26 RX ORDER — MIDAZOLAM HYDROCHLORIDE 1 MG/ML
INJECTION INTRAMUSCULAR; INTRAVENOUS
Status: COMPLETED
Start: 2023-04-26 | End: 2023-04-26

## 2023-04-26 RX ORDER — PROPOFOL 10 MG/ML
VIAL (ML) INTRAVENOUS
Status: DISCONTINUED | OUTPATIENT
Start: 2023-04-26 | End: 2023-04-26

## 2023-04-26 RX ORDER — SODIUM CHLORIDE 9 MG/ML
INJECTION, SOLUTION INTRAVENOUS CONTINUOUS
Status: DISCONTINUED | OUTPATIENT
Start: 2023-04-26 | End: 2023-04-26 | Stop reason: HOSPADM

## 2023-04-26 RX ORDER — ONDANSETRON 2 MG/ML
INJECTION INTRAMUSCULAR; INTRAVENOUS
Status: DISCONTINUED | OUTPATIENT
Start: 2023-04-26 | End: 2023-04-26

## 2023-04-26 RX ORDER — DROPERIDOL 2.5 MG/ML
INJECTION, SOLUTION INTRAMUSCULAR; INTRAVENOUS
Status: DISCONTINUED | OUTPATIENT
Start: 2023-04-26 | End: 2023-04-26

## 2023-04-26 RX ORDER — FENTANYL CITRATE 50 UG/ML
100 INJECTION, SOLUTION INTRAMUSCULAR; INTRAVENOUS EVERY 5 MIN PRN
Status: DISCONTINUED | OUTPATIENT
Start: 2023-04-26 | End: 2023-04-26

## 2023-04-26 RX ORDER — SUCCINYLCHOLINE CHLORIDE 20 MG/ML
INJECTION INTRAMUSCULAR; INTRAVENOUS
Status: DISCONTINUED | OUTPATIENT
Start: 2023-04-26 | End: 2023-04-26

## 2023-04-26 RX ORDER — CELECOXIB 200 MG/1
400 CAPSULE ORAL ONCE
Status: COMPLETED | OUTPATIENT
Start: 2023-04-26 | End: 2023-04-26

## 2023-04-26 RX ORDER — EPINEPHRINE 1 MG/ML
INJECTION, SOLUTION, CONCENTRATE INTRAVENOUS
Status: DISCONTINUED | OUTPATIENT
Start: 2023-04-26 | End: 2023-04-26 | Stop reason: HOSPADM

## 2023-04-26 RX ORDER — ACETAMINOPHEN 500 MG
1000 TABLET ORAL ONCE
Status: COMPLETED | OUTPATIENT
Start: 2023-04-26 | End: 2023-04-26

## 2023-04-26 RX ORDER — MIDAZOLAM HYDROCHLORIDE 1 MG/ML
2 INJECTION INTRAMUSCULAR; INTRAVENOUS
Status: DISCONTINUED | OUTPATIENT
Start: 2023-04-26 | End: 2023-04-26

## 2023-04-26 RX ORDER — DROPERIDOL 2.5 MG/ML
INJECTION, SOLUTION INTRAMUSCULAR; INTRAVENOUS
Status: COMPLETED
Start: 2023-04-26 | End: 2023-04-26

## 2023-04-26 RX ORDER — OXYCODONE HYDROCHLORIDE 5 MG/1
5 TABLET ORAL
Status: DISCONTINUED | OUTPATIENT
Start: 2023-04-26 | End: 2023-04-26 | Stop reason: HOSPADM

## 2023-04-26 RX ORDER — FENTANYL CITRATE 50 UG/ML
25 INJECTION, SOLUTION INTRAMUSCULAR; INTRAVENOUS EVERY 5 MIN PRN
Status: DISCONTINUED | OUTPATIENT
Start: 2023-04-26 | End: 2023-04-26 | Stop reason: HOSPADM

## 2023-04-26 RX ORDER — ROCURONIUM BROMIDE 10 MG/ML
INJECTION, SOLUTION INTRAVENOUS
Status: DISCONTINUED | OUTPATIENT
Start: 2023-04-26 | End: 2023-04-26

## 2023-04-26 RX ORDER — METOCLOPRAMIDE HYDROCHLORIDE 5 MG/ML
10 INJECTION INTRAMUSCULAR; INTRAVENOUS ONCE
Status: COMPLETED | OUTPATIENT
Start: 2023-04-26 | End: 2023-04-26

## 2023-04-26 RX ORDER — FENTANYL CITRATE 50 UG/ML
INJECTION, SOLUTION INTRAMUSCULAR; INTRAVENOUS
Status: DISCONTINUED | OUTPATIENT
Start: 2023-04-26 | End: 2023-04-26

## 2023-04-26 RX ORDER — CARBOXYMETHYLCELLULOSE SODIUM 10 MG/ML
GEL OPHTHALMIC
Status: DISCONTINUED | OUTPATIENT
Start: 2023-04-26 | End: 2023-04-26

## 2023-04-26 RX ORDER — ROPIVACAINE HYDROCHLORIDE 5 MG/ML
INJECTION, SOLUTION EPIDURAL; INFILTRATION; PERINEURAL
Status: COMPLETED | OUTPATIENT
Start: 2023-04-26 | End: 2023-04-26

## 2023-04-26 RX ORDER — FAMOTIDINE 10 MG/ML
20 INJECTION INTRAVENOUS ONCE
Status: COMPLETED | OUTPATIENT
Start: 2023-04-26 | End: 2023-04-26

## 2023-04-26 RX ORDER — DIPHENHYDRAMINE HYDROCHLORIDE 50 MG/ML
INJECTION INTRAMUSCULAR; INTRAVENOUS
Status: DISCONTINUED | OUTPATIENT
Start: 2023-04-26 | End: 2023-04-26

## 2023-04-26 RX ORDER — DEXAMETHASONE SODIUM PHOSPHATE 4 MG/ML
INJECTION, SOLUTION INTRA-ARTICULAR; INTRALESIONAL; INTRAMUSCULAR; INTRAVENOUS; SOFT TISSUE
Status: DISCONTINUED | OUTPATIENT
Start: 2023-04-26 | End: 2023-04-26

## 2023-04-26 RX ADMIN — SODIUM CHLORIDE: 9 INJECTION, SOLUTION INTRAVENOUS at 10:04

## 2023-04-26 RX ADMIN — FENTANYL CITRATE 50 MCG: 50 INJECTION, SOLUTION INTRAMUSCULAR; INTRAVENOUS at 10:04

## 2023-04-26 RX ADMIN — ROPIVACAINE HYDROCHLORIDE 10 ML: 5 INJECTION EPIDURAL; INFILTRATION; PERINEURAL at 10:04

## 2023-04-26 RX ADMIN — FENTANYL CITRATE 25 MCG: 50 INJECTION, SOLUTION INTRAMUSCULAR; INTRAVENOUS at 10:04

## 2023-04-26 RX ADMIN — CEFAZOLIN 2 G: 2 INJECTION, POWDER, FOR SOLUTION INTRAMUSCULAR; INTRAVENOUS at 10:04

## 2023-04-26 RX ADMIN — MIDAZOLAM HYDROCHLORIDE 2 MG: 1 INJECTION, SOLUTION INTRAMUSCULAR; INTRAVENOUS at 10:04

## 2023-04-26 RX ADMIN — MIDAZOLAM HYDROCHLORIDE 2 MG: 1 INJECTION INTRAMUSCULAR; INTRAVENOUS at 10:04

## 2023-04-26 RX ADMIN — OXYCODONE HYDROCHLORIDE 5 MG: 5 TABLET ORAL at 11:04

## 2023-04-26 RX ADMIN — METOCLOPRAMIDE 10 MG: 5 INJECTION, SOLUTION INTRAMUSCULAR; INTRAVENOUS at 09:04

## 2023-04-26 RX ADMIN — FENTANYL CITRATE 25 MCG: 50 INJECTION, SOLUTION INTRAMUSCULAR; INTRAVENOUS at 11:04

## 2023-04-26 RX ADMIN — ACETAMINOPHEN 1000 MG: 500 TABLET ORAL at 09:04

## 2023-04-26 RX ADMIN — DROPERIDOL 0.62 MG: 2.5 INJECTION, SOLUTION INTRAMUSCULAR; INTRAVENOUS at 10:04

## 2023-04-26 RX ADMIN — ROCURONIUM BROMIDE 10 MG: 10 INJECTION INTRAVENOUS at 10:04

## 2023-04-26 RX ADMIN — SODIUM CHLORIDE: 9 INJECTION, SOLUTION INTRAVENOUS at 09:04

## 2023-04-26 RX ADMIN — SODIUM CHLORIDE: 9 INJECTION, SOLUTION INTRAVENOUS at 11:04

## 2023-04-26 RX ADMIN — CARBOXYMETHYLCELLULOSE SODIUM 4 DROP: 10 GEL OPHTHALMIC at 10:04

## 2023-04-26 RX ADMIN — CELECOXIB 400 MG: 200 CAPSULE ORAL at 09:04

## 2023-04-26 RX ADMIN — DEXAMETHASONE SODIUM PHOSPHATE 8 MG: 4 INJECTION, SOLUTION INTRAMUSCULAR; INTRAVENOUS at 10:04

## 2023-04-26 RX ADMIN — FAMOTIDINE 20 MG: 10 INJECTION, SOLUTION INTRAVENOUS at 09:04

## 2023-04-26 RX ADMIN — SUCCINYLCHOLINE CHLORIDE 160 MG: 20 INJECTION, SOLUTION INTRAMUSCULAR; INTRAVENOUS at 10:04

## 2023-04-26 RX ADMIN — DIPHENHYDRAMINE HYDROCHLORIDE 6.25 MG: 50 INJECTION, SOLUTION INTRAMUSCULAR; INTRAVENOUS at 10:04

## 2023-04-26 RX ADMIN — PROPOFOL 150 MG: 10 INJECTION, EMULSION INTRAVENOUS at 10:04

## 2023-04-26 RX ADMIN — ONDANSETRON 4 MG: 2 INJECTION, SOLUTION INTRAMUSCULAR; INTRAVENOUS at 10:04

## 2023-04-26 NOTE — ANESTHESIA POSTPROCEDURE EVALUATION
Anesthesia Post Evaluation    Patient: Verenice Mirza    Procedure(s) Performed: Procedure(s) (LRB):  ARTHROSCOPY, KNEE, WITH PARTIAL MEDIAL AND LATERAL MENISCECTOMY (Left)  ARTHROSCOPY, KNEE, WITH CHONDROPLASTY (Left)    Final Anesthesia Type: general      Patient location during evaluation: PACU  Patient participation: Yes- Able to Participate  Level of consciousness: awake and alert  Post-procedure vital signs: reviewed and stable  Pain management: adequate  Airway patency: patent    PONV status at discharge: No PONV  Anesthetic complications: no      Cardiovascular status: blood pressure returned to baseline  Respiratory status: unassisted  Hydration status: euvolemic  Follow-up not needed.          Vitals Value Taken Time   /57 04/26/23 1202   Temp 36.4 °C (97.5 °F) 04/26/23 1118   Pulse 69 04/26/23 1209   Resp 29 04/26/23 1209   SpO2 99 % 04/26/23 1209   Vitals shown include unvalidated device data.      Event Time   Out of Recovery 11:30:00         Pain/Marva Score: Pain Rating Prior to Med Admin: 5 (4/26/2023 11:21 AM)  Marva Score: 10 (4/26/2023 11:18 AM)

## 2023-04-26 NOTE — H&P
"CC:  left knee pain      HPI:     PLAN OF ACTION: Verenice Mirza  is here for a completion of her perioperative paperwork. she Is scheduled to undergo:     LEFT Knee  Arthroscopy   Partial medial meniscectomy, possible repair  Possible chondroplasty   All other indicated procedures      on 03/27/2023.  She is a healthy individual and does need clearance for this procedure.      "May proceed with arthroscopic surgery with Dr. Artis for her knee at this time"  Areli Crespo MD     Risks, indications and benefits of the surgical procedure were discussed with the patient. All questions with regard to surgery, rehab, expected return to functional activities, activities of daily living and recreational endeavors were answered to her satisfaction.     Review of patient's allergies indicates:  No Known Allergies          Past Medical History:   Diagnosis Date    Acute seasonal allergic rhinitis      Atypical ductal hyperplasia of left breast      BMI 40.0-44.9, adult      Breast cyst      Diabetes mellitus      Dysphagia      Dysplastic nevus of trunk 03/2017     moderately atypical    Fatty liver disease, nonalcoholic      Fibrocystic breast      Hiatal hernia      Hyperlipidemia      Hypertension      Hypothyroid      IGT (impaired glucose tolerance)      Irritable bowel syndrome (IBS)      Kidney stones      Left breast mass      Lumbar disc disease      Morbid obesity      Nicotine use disorder      JORDANA on CPAP      PAD (peripheral artery disease)      Personal history of colonic polyps 05/27/2009    PVD (peripheral vascular disease)      Renal angiomyolipoma      Rosacea      Squamous cell carcinoma 12/2017     in situ mid scalp    Venous insufficiency      Vitamin D deficiency disease                 Past Surgical History:   Procedure Laterality Date    ARTHROSCOPIC CHONDROPLASTY OF KNEE JOINT Right 12/8/2021     Procedure: ARTHROSCOPY, KNEE, WITH CHONDROPLASTY;  Surgeon: Bin Artis MD;  Location: [facility]" OR;  Service: Orthopedics;  Laterality: Right;    BLADDER SUSPENSION        BREAST BIOPSY        COLONOSCOPY N/A 2017     Procedure: COLONOSCOPY;  Surgeon: Georgina Bunch MD;  Location: [facility];  Service: Endoscopy;  Laterality: N/A;    COLONOSCOPY N/A 2022     Procedure: COLONOSCOPY Suprep;  Surgeon: Cedrick Hernandez MD;  Location: [facility] END[facility];  Service: Endoscopy;  Laterality: N/A;    CYSTOSCOPY        ESOPHAGOGASTRODUODENOSCOPY N/A 2018     Procedure: ESOPHAGOGASTRODUODENOSCOPY (EGD);  Surgeon: Georgina Bunch MD;  Location: [facility];  Service: Endoscopy;  Laterality: N/A;  1000 am arrival time, has transportation set up    EXCISIONAL BIOPSY Left 2019     Procedure: EXCISIONAL BIOPSY LEFT BREAST with SEED LOCALIZATION;  Surgeon: Ramon Bass MD;  Location: [address];  Service: General;  Laterality: Left;    HYSTERECTOMY        KNEE ARTHROSCOPY W/ MENISCECTOMY Right 2021     Procedure: ARTHROSCOPY, KNEE, WITH MENISCECTOMY;  Surgeon: Bin Artis MD;  Location: [facility];  Service: Orthopedics;  Laterality: Right;  medial & lateral    LITHOTRIPSY        OOPHORECTOMY             Social History               Socioeconomic History    Marital status: Single   Occupational History    Occupation: retired teacher       Employer: Retired   Tobacco Use    Smoking status: Former       Packs/day: 0.50       Years: 30.00       Pack years: 15.00       Types: Cigarettes       Quit date: 2008       Years since quittin.5    Smokeless tobacco: Former   Substance and Sexual Activity    Alcohol use: No    Drug use: No    Sexual activity: Never      Social Determinants of Health          Financial Resource Strain: Unknown    Difficulty of Paying Living Expenses: Patient refused   Food Insecurity: Unknown    Worried About Running Out of Food in the Last Year: Patient refused    Ran Out of Food in the Last Year: Patient refused   Transportation Needs: Unknown    Lack of  Transportation (Medical): Patient refused    Lack of Transportation (Non-Medical): Patient refused   Physical Activity: Unknown    Days of Exercise per Week: 0 days   Stress: Stress Concern Present    Feeling of Stress : To some extent   Social Connections: Unknown    Frequency of Communication with Friends and Family: Patient refused    Frequency of Social Gatherings with Friends and Family: Patient refused    Active Member of Clubs or Organizations: Patient refused    Attends Club or Organization Meetings: Patient refused    Marital Status: Patient refused   Housing Stability: Unknown    Unable to Pay for Housing in the Last Year: Patient refused    Unstable Housing in the Last Year: Patient refused                  Family History   Problem Relation Age of Onset    Alzheimer's disease Mother      Breast cancer Mother      Skin cancer Sister      Diabetes type II Maternal Grandfather      Depression Maternal Grandfather      Breast cancer Paternal Aunt      Lupus Neg Hx      Psoriasis Neg Hx      Melanoma Neg Hx              Current Outpatient Medications:     albuterol (PROVENTIL/VENTOLIN HFA) 90 mcg/actuation inhaler, Inhale 2 puffs into the lungs every 6 (six) hours as needed for Wheezing., Disp: 18 g, Rfl: 11    aspirin (ECOTRIN) 81 MG EC tablet, Take 81 mg by mouth 2 (two) times daily. Takes the first dose after lunch, Disp: , Rfl:     atorvastatin (LIPITOR) 40 MG tablet, Take 1 tablet (40 mg total) by mouth once daily., Disp: 90 tablet, Rfl: 3    b complex vitamins tablet, Take 1 tablet by mouth after lunch. , Disp: , Rfl:     blood sugar diagnostic (TRUE METRIX GLUCOSE TEST STRIP) Strp, 1 strip by Misc.(Non-Drug; Combo Route) route once daily., Disp: 100 strip, Rfl: 3    calcium carbonate (TUMS) 200 mg calcium (500 mg) chewable tablet, Take 1 tablet by mouth as needed., Disp: , Rfl:     co-enzyme Q-10 30 mg capsule, Take 30 mg by mouth 2 (two) times daily. , Disp: , Rfl:     finasteride (PROSCAR) 5 mg  tablet, Take 1 tablet (5 mg total) by mouth every evening., Disp: 90 tablet, Rfl: 3    inhalation spacing device (MICROSPACER), Use as directed for inhalation., Disp: 1 Device, Rfl: 0    ketoconazole (NIZORAL) 2 % cream, APPLY  THIN LAYER TOPICALLY ONCE DAILY AT BEDTIME UNDER  BREAST  AS  NEEDED  FOR  FLARE, Disp: 60 g, Rfl: 6    LACTOBAC CMB #3/FOS/PANTETHINE (PROBIOTIC & ACIDOPHILUS ORAL), Take 1 tablet by mouth after lunch. , Disp: , Rfl:     lancets (ONETOUCH ULTRASOFT LANCETS) Misc, 1 lancet by Misc.(Non-Drug; Combo Route) route once daily., Disp: 100 each, Rfl: 3    levothyroxine (SYNTHROID) 88 MCG tablet, Take 1 tablet (88 mcg total) by mouth before breakfast., Disp: 90 tablet, Rfl: 3    meloxicam (MOBIC) 7.5 MG tablet, Take 1 tablet (7.5 mg total) by mouth daily as needed for Pain., Disp: 90 tablet, Rfl: 0    metoprolol succinate (TOPROL-XL) 50 MG 24 hr tablet, Take 1 tablet (50 mg total) by mouth every evening., Disp: 90 tablet, Rfl: 3    nystatin (MYCOSTATIN) powder, aaa qam prn flare, Disp: 120 g, Rfl: 6    omega-3 acid ethyl esters (LOVAZA) 1 gram capsule, Take 2 capsules (2 g total) by mouth 2 (two) times daily., Disp: 360 capsule, Rfl: 1    omeprazole (PRILOSEC) 40 MG capsule, Take 1 capsule (40 mg total) by mouth once daily., Disp: 90 capsule, Rfl: 1    raloxifene (EVISTA) 60 mg tablet, Take 1 tablet by mouth once daily, Disp: 90 tablet, Rfl: 3    semaglutide (OZEMPIC) 2 mg/dose (8 mg/3 mL) PnIj, Inject 2 mg into the skin every 7 days., Disp: 1 each, Rfl: 3    sour cherry extract (TART CHERRY EXTRACT ORAL), Take by mouth., Disp: , Rfl:     spironolactone (ALDACTONE) 100 MG tablet, Take 1 tablet (100 mg total) by mouth once daily., Disp: 90 tablet, Rfl: 3    sulfaSALAzine (AZULFIDINE) 500 mg Tab, Take 3 tablets (1,500 mg total) by mouth 2 (two) times daily., Disp: 540 tablet, Rfl: 0    vitamin D 1000 units Tab, Take 185 mg by mouth after lunch. , Disp: , Rfl:   No current facility-administered  medications for this visit.     Facility-Administered Medications Ordered in Other Visits:     fentaNYL 50 mcg/mL injection  mcg,  mcg, Intravenous, Q5 Min PRN, Omar Gresham MD    midazolam (VERSED) 1 mg/mL injection 0.5-4 mg, 0.5-4 mg, Intravenous, PRN, Omar Gresham MD     Please see patient's chart for applicable emotional/behavioral/social status.     REVIEW OF SYSTEMS:  Constitution: Negative. Negative for chills, fever and night sweats.   HENT: Negative for congestion and headaches.    Eyes: Negative for blurred vision, left vision loss and right vision loss.   Cardiovascular: Negative for chest pain and syncope.   Respiratory: Negative for cough and shortness of breath.    Endocrine: Negative for polydipsia, polyphagia and polyuria.   Hematologic/Lymphatic: Negative for bleeding problem. Does not bruise/bleed easily.   Skin: Negative for dry skin, itching and rash.   Musculoskeletal: Negative for falls. Positive for left knee pain and muscle weakness.   Gastrointestinal: Negative for abdominal pain and bowel incontinence.   Genitourinary: Negative for bladder incontinence and nocturia.   Neurological: Negative for disturbances in coordination, loss of balance and seizures.   Psychiatric/Behavioral: Negative for depression. The patient does not have insomnia.    Allergic/Immunologic: Negative for hives and persistent infections.      Once no other questions were asked, a brief history and physical exam was then performed.     PHYSICAL EXAM:  GEN: A&Ox3, WD WN NAD  HEENT: WNL  CHEST: CTAB, no W/R/R  HEART: RRR, no M/R/G  ABD: Soft, NT ND, BS x4 QUADS  MS; See Epic  NEURO: CN II-XII intact        The surgical consent was then reviewed with the patient, who agreed with all the contents of the consent form and it was signed. she was then given the [facility] surgery packet to bring with her to [facility] for the anesthesia portion of her perioperative paperwork.      PHYSICAL THERAPY:  She was also  instructed regarding physical therapy and will begin on  3/28/23. She was given a copy of the original prescription to schedule.      POST OP CARE:instructions were reviewed including care of the wound and dressing after surgery and when she can shower.      PAIN MANAGEMENT: Verenice Mirza was also givenpain management regime.  She declined receiving a new TENs unit today as she states she already has one.  She also states she already has a Polar ice unit that will be in place after surgery.  Her postoperative pain medications were discussed with her in detail.     MEDICATION:  Percocet 10/325mg 1 po q 4-6 hours prn pain  Ultram 50 mg one p.o. q.4-6 hours p.r.n. breakthrough pain,   Phenergan 25 mg one p.o. q.4-6 hours p.r.n. nausea and vomiting.        Post op meds to be delivered bedside prior to discharge. Deliver to family if patient is in surgery at 5pm.        Patient was educated on the signs and symptoms of DVTs as well as the risk of their occurrence.      IMPRESSION: surgical candidate for Left partial meniscectomy     CONCLUSION: Pre-operative information reviewed with patient and they have voiced their understanding and signed consent. Proceed with surgery as planned.     Dr. Artis was present in the office at the time of pre op appointment and available for questions if the patient had any for him. As there were no other questions to be asked, she was given my business card along with Bin Artis MD business card if she has any questions or concerns prior to surgery or in the postop period.

## 2023-04-26 NOTE — PATIENT INSTRUCTIONS
[address], [place]                                                                                          [phone]                   Postoperative Instructions for Knee Surgery                 Your Surgery Included:   Open               Arthroscopic   [] Ligament Repair       [] Diagnostic           [] ACL     [] PCL     [] MCL     [] PLLC      [] Synovectomy / Plica Removal [] Meniscal Cartilage Repair / Transplantation      [] Lysis of Adhesions / Manipulation [] Articular Cartilage Repair      [] Interval Release           [] Microfracture       [] OATS   [] ACI      [x] Meniscectomy           [] Osteochondral Allograft      [] Meniscal Cartilage Repair  [] Patellar Realignment       [x] Debridement / Chondroplasty         [] Lateral Release   [] Ligament Repair      [] Articular Cartilage Repair          [] Extensor Mechanism             []   Microfracture  []  OATS         []  Cartilage Biopsy [] Tendon Repair          [] Ligament Reconstruction          [] Patella                  [] Quadriceps             []   ACL    []   PCL  [] High Tibial Osteotomy       [] PRP Arthrocentesis  [] Joint Replacement         [] Amniox Arthrocentesis           [] Unicompartmental   [] Patellofemoral                    [] Total Knee                  Call our office ([phone]) immediately if you experience any of the following:       Excessive bleeding or pus like drainage at the incision site       Uncontrollable pain not relieved by pain medication       Excessive swelling or redness at the incision site       Fever above 101.5 degrees not controlled with Tylenol or Motrin       Shortness of Breath       Any foul odor or blistering from the surgery site    FOR EMERGENCIES: If any unusual problems or difficulties occur, call our office at [phone], or page the  at [phone] who will direct your call appropriately    1.   Pain Management: A cold therapy cuff, pain  medications, local injections, and in some cases, regional anesthesia injections are used to manage your post-operative pain. The decision to use each of these options is based on their risks and benefits.     Medications: You were given one or more of the following medication prescriptions before leaving the hospital. Have the prescriptions filled at a pharmacy on your way home and follow the instructions on the bottles. If you need a refill, please call your pharmacy.      Narcotic Medication (usually Norco/Hydrocodone APAP, Percocet/Oxycodone APAP, Roxicodone, or Tramadol): Begin taking the medication before your knee starts to hurt. Some patients do not like to take any medication, but if you wait until your pain is severe before taking, you will be very uncomfortable for several hours waiting for the narcotic to work. Always take with food.     Nausea / Vomiting: For this issue, we prescribe Phenergan, use this medication as directed.     Cold Therapy: You may have been sent home with a [facility] cold therapy unit and wrap for your knee. Fill with ice and water, or frozen water bottles with water, to the indicated fill line and use throughout the day for the first two days and then as needed to help relieve pain and control swelling. Ice the knee in 30 minute intervals, and do not place the wrap directly onto the skin.     Regional Anesthesia Injections (Blocks): You may have been given a regional nerve block either before or after surgery. This may make your entire leg numb for 24-36 hours.                            * Proceed with caution when bearing weight on your leg.     2.   Diet: Eat a bland diet for the first day after surgery. Progress your diet as tolerated. Constipation may occur with Narcotic usage, contact our office if you are experiencing constipation.    3.   Activity: Limit your activity during the first 48 hours, keep your leg elevated with pillows under your heel. After the first 48 hours  at home, increase your activity level based on your symptoms.    4.   Dressing Change: Remove the soft dressing on the 3rd day. IF YOU HAVE A TAN AQUACEL BANDAGE ON YOUR KNEE, DO NOT REMOVE THIS. It is normal for some blood to be seen on the dressings. It is also normal for you to see apparent bruising on the skin around your knee when you remove the dressing. If present, leave the steri-strip tape across the incisions. If you are concerned by the drainage or the appearance of your knee, please call one of the numbers listed below.    5.   Showering: You may shower on the 3rd day after surgery with waterproof bandages over small incisions. If you have an incision with Prineo (clear mesh), it does not need to be covered. Do not submerge in any water until after your postoperative appointment in clinic.    6.   Your procedure did not require a post-operative brace.    7.   Your procedure did not require a Continuous Passive Motion (CPM) device.    8.   Weight Bearing: You may have been sent home with crutches. If instructed (see below), use these crutches at all times unless at complete rest.      [] Non-weight bearing for     0 weeks (you may touch your toes to the floor)      [] Partial weight bearing for  0 weeks    [] 25% Body Weight   [] 50% Body Weight      [x] Full weight bearing            [x]  NOW    []  after 0 weeks     9.  Knee Exercises: Begin these exercises the first day after surgery in order to help you regain your motion and strength. You may do the following marked exercises:     [x] Quad Sets - Begin activating your quadriceps muscle by driving your          knee downward into full knee extension while seated on a table or bed   with a towel rolled and propped under your heel     [x] Straight Leg Raise (SLR) - While inder your quadriceps muscle, lift     your fully extended leg to the level of your non-operative knee (as shown)     [x] Heel Slides - With the knee straight, slide your heel  "slowly toward your   buttocks, hold at the endpoint for 10-15 seconds, then slowly straighten     [x] Ankle pumps - With your knee straight, move your ankle in a "pumping"    fashion to activate your calf and leg muscles      10.  Physical Therapy: Physical therapy is an essential component to your recovery from surgery. Your physical therapy will start in 1 day.    FIRST POSTOPERATIVE VISIT: As scheduled.       "

## 2023-04-26 NOTE — ANESTHESIA PROCEDURE NOTES
L Adductor SS    Patient location during procedure: pre-op   Block not for primary anesthetic.  Reason for block: at surgeon's request and post-op pain management   Post-op Pain Location: L knee pain   Start time: 4/26/2023 10:01 AM  Timeout: 4/26/2023 10:00 AM   End time: 4/26/2023 10:02 AM    Staffing  Authorizing Provider: Shona Heath MD  Performing Provider: Shona Heath MD    Preanesthetic Checklist  Completed: patient identified, IV checked, site marked, risks and benefits discussed, surgical consent, monitors and equipment checked, pre-op evaluation and timeout performed  Peripheral Block  Patient position: supine  Prep: ChloraPrep  Patient monitoring: heart rate, cardiac monitor, continuous pulse ox, continuous capnometry and frequent blood pressure checks  Block type: adductor canal  Laterality: left  Injection technique: single shot  Needle  Needle type: Stimuplex   Needle gauge: 21 G  Needle length: 4 in  Needle localization: anatomical landmarks and ultrasound guidance  Needle insertion depth: 4 cm   -ultrasound image captured on disc.  Assessment  Injection assessment: negative aspiration, negative parasthesia and local visualized surrounding nerve  Paresthesia pain: none  Heart rate change: no  Slow fractionated injection: yes  Pain Tolerance: comfortable throughout block and no complaints  Medications:    Medications: ropivacaine (NAROPIN) injection 0.5% - Perineural   10 mL - 4/26/2023 10:01:00 AM    Additional Notes  VSS.  [facility] RN monitoring vitals throughout procedure.  Patient tolerated procedure well.   20 cc 0.25% ropiv with 1: 400 k epi

## 2023-04-26 NOTE — ANESTHESIA PREPROCEDURE EVALUATION
04/26/2023  Verenice Mirza is a 67 y.o., female.  Pre-operative evaluation for Procedure(s) (LRB):  ARTHROSCOPY, KNEE, WITH MENISCECTOMY (Left)  ARTHROSCOPY, KNEE, WITH CHONDROPLASTY (Left)    Verenice Mirza is a 67 y.o. female     Patient Active Problem List   Diagnosis    Hypertension associated with diabetes    Hypothyroid    JORDANA on CPAP    Lumbar disc disease    Venous insufficiency of both lower extremities    Morbid obesity with BMI of 40.0-44.9, adult    Dysphagia    Hiatal hernia    Personal history of skin cancer    History of dysplastic nevus    Atypical ductal hyperplasia of left breast    Seropositive rheumatoid arthritis    Dyslipidemia associated with type 2 diabetes mellitus    Irritable bowel syndrome with diarrhea    Nonrheumatic mitral valve regurgitation    Hard to intubate    Decreased range of motion of right knee    Gait abnormality    Primary osteoarthritis of both knees    Medial knee pain, right    Lateral pain of right hip    Antalgic gait       Review of patient's allergies indicates:  No Known Allergies    Current Facility-Administered Medications on File Prior to Encounter   Medication Dose Route Frequency Provider Last Rate Last Admin    fentaNYL 50 mcg/mL injection  mcg   mcg Intravenous Q5 Min PRN Omar Gresham MD        midazolam (VERSED) 1 mg/mL injection 0.5-4 mg  0.5-4 mg Intravenous PRN Omar Gresham MD         Current Outpatient Medications on File Prior to Encounter   Medication Sig Dispense Refill    albuterol (PROVENTIL/VENTOLIN HFA) 90 mcg/actuation inhaler Inhale 2 puffs into the lungs every 6 (six) hours as needed for Wheezing. 18 g 11    aspirin (ECOTRIN) 81 MG EC tablet Take 81 mg by mouth 2 (two) times daily. Takes the first dose after lunch      b complex vitamins tablet Take 1 tablet by mouth after lunch.        blood sugar diagnostic (TRUE METRIX GLUCOSE TEST STRIP) Strp 1 strip by Misc.(Non-Drug; Combo Route) route once daily. 100 strip 3    calcium carbonate (TUMS) 200 mg calcium (500 mg) chewable tablet Take 1 tablet by mouth as needed.      co-enzyme Q-10 30 mg capsule Take 30 mg by mouth 2 (two) times daily.       inhalation spacing device (MICROSPACER) Use as directed for inhalation. 1 Device 0    LACTOBAC CMB #3/FOS/PANTETHINE (PROBIOTIC & ACIDOPHILUS ORAL) Take 1 tablet by mouth after lunch.       lancets (ONETOUCH ULTRASOFT LANCETS) Misc 1 lancet by Misc.(Non-Drug; Combo Route) route once daily. 100 each 3    meloxicam (MOBIC) 7.5 MG tablet Take 1 tablet (7.5 mg total) by mouth daily as needed for Pain. 90 tablet 0    raloxifene (EVISTA) 60 mg tablet Take 1 tablet by mouth once daily 90 tablet 3    sour cherry extract (TART CHERRY EXTRACT ORAL) Take by mouth.      vitamin D 1000 units Tab Take 185 mg by mouth after lunch.       [DISCONTINUED] tamsulosin (FLOMAX) 0.4 mg Cp24 Take 1 capsule (0.4 mg total) by mouth once daily. 30 capsule 11       Past Surgical History:   Procedure Laterality Date    ARTHROSCOPIC CHONDROPLASTY OF KNEE JOINT Right 12/8/2021    Procedure: ARTHROSCOPY, KNEE, WITH CHONDROPLASTY;  Surgeon: Bin Artis MD;  Location: [facility];  Service: Orthopedics;  Laterality: Right;    BLADDER SUSPENSION      BREAST BIOPSY      COLONOSCOPY N/A 5/24/2017    Procedure: COLONOSCOPY;  Surgeon: Georgina Bunch MD;  Location: [facility];  Service: Endoscopy;  Laterality: N/A;    COLONOSCOPY N/A 7/28/2022    Procedure: COLONOSCOPY Suprep;  Surgeon: Cedrick Hernandez MD;  Location: [facility] ENDO;  Service: Endoscopy;  Laterality: N/A;    CYSTOSCOPY      ESOPHAGOGASTRODUODENOSCOPY N/A 12/5/2018    Procedure: ESOPHAGOGASTRODUODENOSCOPY (EGD);  Surgeon: Georgina Bunch MD;  Location: [facility] ENDO;  Service: Endoscopy;  Laterality: N/A;  1000 am arrival time, has transportation set up     EXCISIONAL BIOPSY Left 8/19/2019    Procedure: EXCISIONAL BIOPSY LEFT BREAST with SEED LOCALIZATION;  Surgeon: Ramon Bass MD;  Location: [facility] OR [address] [place] [address];  Service: General;  Laterality: Left;    HYSTERECTOMY      KNEE ARTHROSCOPY W/ MENISCECTOMY Right 12/8/2021    Procedure: ARTHROSCOPY, KNEE, WITH MENISCECTOMY;  Surgeon: Bin Artis MD;  Location: [facility] OR;  Service: Orthopedics;  Laterality: Right;  medial & lateral    LITHOTRIPSY      OOPHORECTOMY           CBC:  Lab Results   Component Value Date    WBC 5.77 03/16/2023    RBC 4.93 03/16/2023    HGB 14.9 03/16/2023    HCT 47.5 03/16/2023     03/16/2023    MCV 96 03/16/2023    MCH 30.2 03/16/2023    MCHC 31.4 (L) 03/16/2023       CMP:   Lab Results   Component Value Date     03/16/2023    K 3.4 (L) 03/16/2023     03/16/2023    CO2 25 03/16/2023    BUN 15 03/16/2023    CREATININE 0.9 03/16/2023    GLU 95 03/16/2023    CALCIUM 9.5 03/16/2023    ALBUMIN 4.0 03/16/2023    PROT 7.0 03/16/2023    ALKPHOS 90 03/16/2023    ALT 30 03/16/2023    AST 26 03/16/2023    BILITOT 0.4 03/16/2023       INR:  No results found for: PT, INR, PROTIME, APTT      Diagnostic Studies:      EKG:   Results for orders placed or performed in visit on 03/20/23   IN OFFICE EKG 12-LEAD (to Hardy)    Collection Time: 03/20/23  1:57 PM    Narrative    Test Reason : E11.59,I15.2,    Vent. Rate : 073 BPM     Atrial Rate : 073 BPM     P-R Int : 158 ms          QRS Dur : 074 ms      QT Int : 398 ms       P-R-T Axes : 065 013 071 degrees     QTc Int : 438 ms    Normal sinus rhythm  Early repolarization  Normal ECG  When compared with ECG of 14-AUG-2019 11:37,  ST elevation now present in Inferior leads  ST less elevated in Lateral leads  Confirmed by Erik Patiño MD ([id number]) on 3/21/2023 7:09:41 AM    Referred By: DARSHANA GARLAND           Confirmed By:Erik Patiño MD        2D Echo:  No results found. However, due to the size of the patient record,  not all encounters were searched. Please check Results Review for a complete set of results.    Stress Test:   No results found for this or any previous visit.           Pre-op Assessment          Review of Systems      Physical Exam  General: Well nourished    Airway:  Mallampati: I / I  Mouth Opening: Normal  TM Distance: Normal  Tongue: Normal  Neck ROM: Normal ROM    Dental:  Intact    Chest/Lungs:  Clear to auscultation    Heart:  Rate: Normal  Rhythm: Regular Rhythm  Sounds: Normal        Anesthesia Plan  Type of Anesthesia, risks & benefits discussed:    Anesthesia Type: MAC, Gen ETT, Gen Supraglottic Airway, Gen Natural Airway  Intra-op Monitoring Plan: Standard ASA Monitors  Post Op Pain Control Plan: multimodal analgesia and peripheral nerve block  Induction:  IV  Airway Plan: Direct  Informed Consent: Informed consent signed with the Patient and all parties understand the risks and agree with anesthesia plan.  All questions answered.   ASA Score: 3  Day of Surgery Review of History & Physical: H&P Update referred to the surgeon/provider.    Ready For Surgery From Anesthesia Perspective.     .

## 2023-04-26 NOTE — OP NOTE
[facility] [place])  Surgery Department  Operative Note    SUMMARY     Date of Procedure: 4/26/2023     Procedure: Procedure(s) (LRB):  ARTHROSCOPY, KNEE, WITH PARTIAL MEDIAL AND LATERAL MENISCECTOMY (Left)  ARTHROSCOPY, KNEE, WITH CHONDROPLASTY (Left)     Surgeon(s) and Role:     * Bin Artis MD - Primary    Assisting Surgeon:   Remington Yepez    Pre-Operative Diagnosis: Old tear of medial meniscus of left knee, unspecified tear type [M23.204]  Chondromalacia of left knee [M94.262]    Post-Operative Diagnosis: Post-Op Diagnosis Codes:     * Old tear of medial meniscus of left knee, unspecified tear type [M23.204]     * Chondromalacia of left knee [M94.262]    Anesthesia: General    Technical Procedures Used:     DATE OF PROCEDURE:  4/26/2023      PREOPERATIVE DIAGNOSES:   1. Left knee chondromalacia  2. Left knee medial meniscus tear.   3. Left knee lateral meniscus tear     POSTOPERATIVE DIAGNOSES:   1. Left knee chondromalacia  2. Left knee medial meniscus tear.   3. Left knee lateral meniscus tear     PROCEDURES:   1. Left knee arthroscopic chondroplasty  2. Left knee arthroscopic partial medial and lateral meniscectomies     SURGEON: Bin Artis M.D.      ASSISTANT:   Remington Yepez     COMPLICATIONS: None.      POSITION: Supine with arthroscopic leg becerril.      ANESTHESIA: General endotracheal plus local.      COMPLICATIONS: None.      TOURNIQUET TIME:  None     EBL:  Minimal     EXAMINATION UNDER ANESTHESIA:   Knee: full range of motion, no evidence of instability.      ARTHROSCOPIC FINDINGS:   1. Complex tear posterior horn / body medial meniscus.   2. Central tear, lateral meniscus  3. Chondromalacia, patella, trochlea, MFC grade 2/3 extensive     INDICATIONS: The patient is a 67 y.o. female with a history of left knee pain. MRI confirms a tear in her medial meniscus.  After the risks and benefits of surgery were discussed with the patient, including bleeding, infection, scarring,  persistent pain, risk of blood clots (DVT), pulmonary embolism, compartment syndrome, damage to cartilage, damage to neurovascular structures, plus the risks of anesthesia, including, death, stroke, and heart attack, the patient wished to proceed with operative intervention.        DESCRIPTION OF PROCEDURE:      The patient and correct limb were identified and marked in the pre-op holding area.      The patient was brought in the room. After undergoing general endotracheal anesthesia, she was placed on a well-padded operating table. her left lower extremity was prepped and draped in usual sterile fashion. A nonsterile tourniquet was placed high up around the thigh. A well padded arthroscopic leg becerril was used during the case. The contralateral leg was placed in a well padded Margarito stirrup with bony prominences all being well padded.       The standard inferolateral and inferomedial portals were created. Diagnostic arthroscopy performed.      CHONDROPLASTY: The patellofemoral joint was entered. There was significant chondromalacia on the trochlea and on the undersurface of the patella. Using a full radium shaver and biter, unstable cartilage flaps were trimmed down to a stable rim.     There was a mild synovitis noted within the anterior interval. An VAPR device was used to remove areas of irritating synovium from the overlying trochlea in the anterior interval to allow for visualization to minimize abrasion.     CHONDROPLASTY:  Attention turned to the medial compartment. Medial femoral condyle had grade 2/3 extensive changes along the mid flexion portion.  Using a full radium shaver and biter, unstable cartilage flaps were trimmed down to a stable rim.      There was a complex tear in the body and posterior horn of the medial meniscus, Using combination of straight and curved biters and arthroscopic jerome, the unstable portion of the medial meniscus was trimmed down to a stable rim. Approximately 25% of the body  of the medial meniscus was resected down to get this to a stable position.      Attention was turned to the intercondylar notch. The ACL and PCL were intact.      Attention was turned to the lateral compartment. The lateral meniscus had a tear along the central aspect.  Using a biter and shaver, the central 20% was trimmed out. The lateral femoral condyle and lateral tibial plateau were intact.       Portal sites were closed with 4-0 Monocryl, covered with Mastisol, Steri-Strips, Xeroform, 4 x 4 fluffs, ABD pads and thigh-high JOSELUIS hose.     The patient was extubated in the room, transferred to Recovery Room in stable condition accompanied by her physician.  There were no complications in the case.      I was present, scrubbed and did perform all critical portions of the case.        VIOLETA LANE MD        Description of the Findings of the Procedure: above    Significant Surgical Tasks Conducted by the Assistant(s), if Applicable: none    Complications: No    Estimated Blood Loss (EBL): * No values recorded between 4/26/2023 10:38 AM and 4/26/2023 11:15 AM *           Implants: * No implants in log *    Specimens:   Specimen (24h ago, onward)      None                    Condition: Good    Disposition: PACU - hemodynamically stable.    Attestation: I was present and scrubbed for the entire procedure.    Discharge Note    SUMMARY     Admit Date: 4/26/2023    Discharge Date and Time:  04/26/2023 1:06 PM    Hospital Course (synopsis of major diagnoses, care, treatment, and services provided during the course of the hospital stay): outpatient surgery     Final Diagnosis: Post-Op Diagnosis Codes:     * Old tear of medial meniscus of left knee, unspecified tear type [M23.204]     * Chondromalacia of left knee [M94.262]    Disposition: Home or Self Care    Follow Up/Patient Instructions:     Medications:  Reconciled Home Medications:      Medication List        CONTINUE taking these medications      albuterol 90  mcg/actuation inhaler  Commonly known as: PROVENTIL/VENTOLIN HFA  Inhale 2 puffs into the lungs every 6 (six) hours as needed for Wheezing.     * aspirin 81 MG EC tablet  Commonly known as: ECOTRIN  Take 81 mg by mouth 2 (two) times daily. Takes the first dose after lunch     * aspirin 325 MG tablet  Take 1 tablet (325 mg total) by mouth once daily.     atorvastatin 40 MG tablet  Commonly known as: LIPITOR  Take 1 tablet (40 mg total) by mouth once daily.     b complex vitamins tablet  Take 1 tablet by mouth after lunch.     blood sugar diagnostic Strp  Commonly known as: TRUE METRIX GLUCOSE TEST STRIP  1 strip by Misc.(Non-Drug; Combo Route) route once daily.     calcium carbonate 200 mg calcium (500 mg) chewable tablet  Commonly known as: TUMS  Take 1 tablet by mouth as needed.     co-enzyme Q-10 30 mg capsule  Take 30 mg by mouth 2 (two) times daily.     finasteride 5 mg tablet  Commonly known as: PROSCAR  Take 1 tablet (5 mg total) by mouth every evening.     inhalation spacing device  Commonly known as: MICROSPACER  Use as directed for inhalation.     ketoconazole 2 % cream  Commonly known as: NIZORAL  APPLY  THIN LAYER TOPICALLY ONCE DAILY AT BEDTIME UNDER  BREAST  AS  NEEDED  FOR  FLARE     lancets Misc  Commonly known as: ONETOUCH ULTRASOFT LANCETS  1 lancet by Misc.(Non-Drug; Combo Route) route once daily.     levothyroxine 88 MCG tablet  Commonly known as: SYNTHROID  Take 1 tablet (88 mcg total) by mouth before breakfast.     meloxicam 7.5 MG tablet  Commonly known as: MOBIC  Take 1 tablet (7.5 mg total) by mouth daily as needed for Pain.     metoprolol succinate 50 MG 24 hr tablet  Commonly known as: TOPROL-XL  Take 1 tablet (50 mg total) by mouth every evening.     nystatin powder  Commonly known as: MYCOSTATIN  aaa qam prn flare     omega-3 acid ethyl esters 1 gram capsule  Commonly known as: LOVAZA  Take 2 capsules (2 g total) by mouth 2 (two) times daily.     omeprazole 40 MG capsule  Commonly known  as: PRILOSEC  Take 1 capsule (40 mg total) by mouth once daily.     oxyCODONE-acetaminophen 5-325 mg per tablet  Commonly known as: PERCOCET  Take 1 tablet by mouth every 4 (four) hours as needed for Pain.     OZEMPIC 2 mg/dose (8 mg/3 mL) Pnij  Generic drug: semaglutide  Inject 2 mg into the skin every 7 days.     PROBIOTIC & ACIDOPHILUS ORAL  Take 1 tablet by mouth after lunch.     promethazine 25 MG tablet  Commonly known as: PHENERGAN  Take 1 tablet (25 mg total) by mouth every 6 (six) hours as needed for Nausea.     raloxifene 60 mg tablet  Commonly known as: EVISTA  Take 1 tablet by mouth once daily     spironolactone 100 MG tablet  Commonly known as: ALDACTONE  Take 1 tablet (100 mg total) by mouth once daily.     sulfaSALAzine 500 mg Tab  Commonly known as: AZULFIDINE  Take 3 tablets (1,500 mg total) by mouth 2 (two) times daily.     TART CHERRY EXTRACT ORAL  Take by mouth.     traMADoL 50 mg tablet  Commonly known as: ULTRAM  Take 1 tablet (50 mg total) by mouth every 6 (six) hours as needed for Pain.     vitamin D 1000 units Tab  Commonly known as: VITAMIN D3  Take 185 mg by mouth after lunch.           * This list has 2 medication(s) that are the same as other medications prescribed for you. Read the directions carefully, and ask your doctor or other care provider to review them with you.                No discharge procedures on file.

## 2023-04-26 NOTE — TRANSFER OF CARE
"Anesthesia Transfer of Care Note    Patient: Verenice Mirza    Procedure(s) Performed: Procedure(s) (LRB):  ARTHROSCOPY, KNEE, WITH PARTIAL MEDIAL AND LATERAL MENISCECTOMY (Left)  ARTHROSCOPY, KNEE, WITH CHONDROPLASTY (Left)    Patient location: PACU    Anesthesia Type: general and regional    Transport from OR: Transported from OR on 6-10 L/min O2 by face mask with adequate spontaneous ventilation    Post pain: adequate analgesia    Post assessment: no apparent anesthetic complications and tolerated procedure well    Post vital signs: stable    Level of consciousness: awake and responds to stimulation    Nausea/Vomiting: no nausea/vomiting    Complications: none    Transfer of care protocol was followed      Last vitals:   Visit Vitals  /65 (BP Location: Right arm, Patient Position: Lying)   Pulse 69   Temp 36.7 °C (98 °F) (Oral)   Resp 20   Ht 5' 1" (1.549 m)   Wt 98 kg (216 lb)   SpO2 99%   Breastfeeding No   BMI 40.81 kg/m²     "

## 2023-04-26 NOTE — PLAN OF CARE
Patient is AAO and VSS. Tolerating PO and states pain is tolerable. Left knee dressing CDI. Patient states they are ready for d/c. IV removed. Catheter tip intact. Friend given directions over the phone. Discharge instructions reviewed and copy given to the patient and friend. Questions answered.  Both verbalized understanding. Medication delivered to bedside. Patient's own DME at home. Patient wheeled to car.

## 2023-04-26 NOTE — BRIEF OP NOTE
[facility] [place] - Surgery (Hospital)  Brief Operative Note    Surgery Date: 4/26/2023     Surgeon(s) and Role:     * Bin Artis MD - Primary    Assisting Surgeon: None    Pre-op Diagnosis:  Old tear of medial meniscus of left knee, unspecified tear type [M23.204]  Chondromalacia of left knee [M94.262]    Post-op Diagnosis:  Post-Op Diagnosis Codes:     * Old tear of medial meniscus of left knee, unspecified tear type [M23.204]     * Chondromalacia of left knee [M94.262]    Procedure(s) (LRB):  ARTHROSCOPY, KNEE, WITH PARTIAL MEDIAL AND LATERAL MENISCECTOMY (Left)  ARTHROSCOPY, KNEE, WITH CHONDROPLASTY (Left)    Anesthesia: General    Operative Findings: see report    Estimated Blood Loss: * No values recorded between 4/26/2023 10:38 AM and 4/26/2023 11:03 AM *         Specimens:   Specimen (24h ago, onward)      None              Discharge Note    OUTCOME: Patient tolerated treatment/procedure well without complication and is now ready for discharge.    DISPOSITION: Home or Self Care    FINAL DIAGNOSIS: tear of medial meniscus of left knee    FOLLOWUP: In clinic      DISCHARGE INSTRUCTIONS:  No discharge procedures on file.

## 2023-04-26 NOTE — ANESTHESIA PROCEDURE NOTES
Intubation    Date/Time: 4/26/2023 10:22 AM  Performed by: Ronna Rios CRNA  Authorized by: Shona Heath MD     Intubation:     Induction:  Intravenous    Intubated:  Postinduction    Mask Ventilation:  Easy mask    Attempts:  1    Attempted By:  CRNA    Method of Intubation:  Video laryngoscopy    Blade:  Michell 3    Laryngeal View Grade: Grade I - full view of cords      Difficult Airway Encountered?: No      Complications:  None    Airway Device:  Oral endotracheal tube    Airway Device Size:  7.0    Style/Cuff Inflation:  Cuffed    Inflation Amount (mL):  7    Tube secured:  21    Secured at:  The lips    Placement Verified By:  Capnometry    Complicating Factors:  None    Findings Post-Intubation:  BS equal bilateral and atraumatic/condition of teeth unchanged

## 2023-04-27 PROCEDURE — G0180 MD CERTIFICATION HHA PATIENT: HCPCS | Mod: ,,, | Performed by: ORTHOPAEDIC SURGERY

## 2023-04-27 PROCEDURE — G0180 PR HOME HEALTH MD CERTIFICATION: ICD-10-PCS | Mod: ,,, | Performed by: ORTHOPAEDIC SURGERY

## 2023-04-28 ENCOUNTER — TELEPHONE (OUTPATIENT)
Dept: SPORTS MEDICINE | Facility: CLINIC | Age: 68
End: 2023-04-28
Payer: MEDICARE

## 2023-04-28 NOTE — TELEPHONE ENCOUNTER
Ba from [facility] called to notify us that patient only want in home PT, no nursing services.

## 2023-04-28 NOTE — TELEPHONE ENCOUNTER
Called [facility] to speak to Ba. They dont know anything about it.----- Message from Miladis Borja sent at 4/28/2023  9:02 AM CDT -----  Pt home health nurse calling in regards to  pt has declined home health only wants PT , please call          [phone]   pt care  Ba

## 2023-05-08 ENCOUNTER — OFFICE VISIT (OUTPATIENT)
Dept: DERMATOLOGY | Facility: CLINIC | Age: 68
End: 2023-05-08
Payer: MEDICARE

## 2023-05-08 DIAGNOSIS — D22.9 NEVUS: ICD-10-CM

## 2023-05-08 DIAGNOSIS — L81.4 LENTIGO: ICD-10-CM

## 2023-05-08 DIAGNOSIS — L81.9 DYSCHROMIA: ICD-10-CM

## 2023-05-08 DIAGNOSIS — D23.9 DERMATOFIBROMA: Primary | ICD-10-CM

## 2023-05-08 DIAGNOSIS — L82.1 SK (SEBORRHEIC KERATOSIS): ICD-10-CM

## 2023-05-08 DIAGNOSIS — D18.01 CHERRY ANGIOMA: ICD-10-CM

## 2023-05-08 DIAGNOSIS — L82.0 INFLAMED SEBORRHEIC KERATOSIS: ICD-10-CM

## 2023-05-08 DIAGNOSIS — L91.8 SKIN TAG: ICD-10-CM

## 2023-05-08 DIAGNOSIS — Z85.828 PERSONAL HISTORY OF SKIN CANCER: ICD-10-CM

## 2023-05-08 PROCEDURE — 17110 DESTRUCTION B9 LES UP TO 14: CPT | Mod: PBBFAC,PO | Performed by: DERMATOLOGY

## 2023-05-08 PROCEDURE — 99213 OFFICE O/P EST LOW 20 MIN: CPT | Mod: 25,S$PBB,, | Performed by: DERMATOLOGY

## 2023-05-08 PROCEDURE — 17110 DESTRUCTION B9 LES UP TO 14: CPT | Mod: S$PBB,,, | Performed by: DERMATOLOGY

## 2023-05-08 PROCEDURE — 99213 OFFICE O/P EST LOW 20 MIN: CPT | Mod: PBBFAC,PO | Performed by: DERMATOLOGY

## 2023-05-08 PROCEDURE — 17110 PR DESTRUCTION BENIGN LESIONS UP TO 14: ICD-10-PCS | Mod: S$PBB,,, | Performed by: DERMATOLOGY

## 2023-05-08 PROCEDURE — 99213 PR OFFICE/OUTPT VISIT, EST, LEVL III, 20-29 MIN: ICD-10-PCS | Mod: 25,S$PBB,, | Performed by: DERMATOLOGY

## 2023-05-08 PROCEDURE — 99999 PR PBB SHADOW E&M-EST. PATIENT-LVL III: CPT | Mod: PBBFAC,,, | Performed by: DERMATOLOGY

## 2023-05-08 PROCEDURE — 99999 PR PBB SHADOW E&M-EST. PATIENT-LVL III: ICD-10-PCS | Mod: PBBFAC,,, | Performed by: DERMATOLOGY

## 2023-05-08 NOTE — PROGRESS NOTES
"CC: Left knee scope post op 2 weeks    Patient is here for her 2 week post op appointment s/p below and is doing well. Patient is doing home health PT. Patient is not taking pain medication. she denies any chest pain, SOB, fevers, chills, nausea, vomiting, or drainage from incision sites.     DATE OF PROCEDURE:  4/26/2023      PREOPERATIVE DIAGNOSES:   1. Left knee chondromalacia  2. Left knee medial meniscus tear.   3. Left knee lateral meniscus tear     POSTOPERATIVE DIAGNOSES:   1. Left knee chondromalacia  2. Left knee medial meniscus tear.   3. Left knee lateral meniscus tear     PROCEDURES:   1. Left knee arthroscopic chondroplasty  2. Left knee arthroscopic partial medial and lateral meniscectomies     SURGEON: Bin Artis M.D.     PE:    /78   Pulse 65   Ht 5' 1" (1.549 m)   BMI 40.81 kg/m²      Left knee:    Incisions clean/dry/intact  No sign of infection  Mild swelling  Compartments soft  Neurovascular status intact in extremity    AROM 0 to 120 degrees  Decreased quad strength  Minimal to no effusion    Assessment:  2 weeks s/p left knee arthroscopic chondroplasty and partial medial and lateral menisectomies    Plan:  1.  Removed steri strips.  Wounds healing well    2.  Arthroscopic pictures reviewed and rehab plans discussed.    3.  Physical therapy for quad, ROM, modalities.  HEP for ROM, knee, VMO and hip abductor strengthening.     4.  Pain level acceptable for first post op visit.  Wean off pain medicine by next visit if still taking    5. Return to clinic in 4 weeks at 6 weeks post-op.      All questions were answered. Instructed patient to call with questions or concerns in the interim.       Medical Dictation software was used during the dictation of portions or the entirety of this medical record.  Phonetic or grammatic errors may exist due to the use of this software. For clarification, refer to the author of the document.    "

## 2023-05-08 NOTE — PROGRESS NOTES
"  Subjective:      Patient ID:  Verenice Mirza is a 67 y.o. female who presents for   Chief Complaint   Patient presents with    Skin Check     tbse    Lesion     L jawline  Chin  Scalp      Patient is here today for a "mole" check.   Pt has a history of normal sun exposure in the past.   Pt recalls several blistering sunburns in the past- yes  Pt has history of tanning bed use- yes  Pt has had moles removed in the past- yes, benign per pt  Pt has history of melanoma in first degree relatives- no     C/o lesion to L jaw line x 3-4mos. No sx. Pt states she picks at it.   C/o lesion to chin    Pt would like to have scalp rechecked.   C/o dark circles under her eyes for years. N o tx and getting worse  C/o bumps under arms.     Lesion    Review of Systems   Skin:  Positive for daily sunscreen use and activity-related sunscreen use. Negative for tendency to form keloidal scars.   Hematologic/Lymphatic: Bruises/bleeds easily (bruise).     Objective:   Physical Exam   Constitutional: She appears well-developed and well-nourished. She is obese.  No distress.   Neurological: She is alert and oriented to person, place, and time. She is not disoriented.   Psychiatric: She has a normal mood and affect.   Skin:   Areas Examined (abnormalities noted in diagram):   Scalp / Hair Palpated and Inspected  Head / Face Inspection Performed  Neck Inspection Performed  Chest / Axilla Inspection Performed  Abdomen Inspection Performed  Genitals / Buttocks / Groin Inspection Performed  Back Inspection Performed  RUE Inspected  LUE Inspection Performed  RLE Inspected  LLE Inspection Performed  Nails and Digits Inspection Performed               Diagram Legend     Erythematous scaling macule/papule c/w actinic keratosis       Vascular papule c/w angioma      Pigmented verrucoid papule/plaque c/w seborrheic keratosis      Yellow umbilicated papule c/w sebaceous hyperplasia      Irregularly shaped tan macule c/w lentigo     1-2 mm smooth white " papules consistent with Milia      Movable subcutaneous cyst with punctum c/w epidermal inclusion cyst      Subcutaneous movable cyst c/w pilar cyst      Firm pink to brown papule c/w dermatofibroma      Pedunculated fleshy papule(s) c/w skin tag(s)      Evenly pigmented macule c/w junctional nevus     Mildly variegated pigmented, slightly irregular-bordered macule c/w mildly atypical nevus      Flesh colored to evenly pigmented papule c/w intradermal nevus       Pink pearly papule/plaque c/w basal cell carcinoma      Erythematous hyperkeratotic cursted plaque c/w SCC      Surgical scar with no sign of skin cancer recurrence      Open and closed comedones      Inflammatory papules and pustules      Verrucoid papule consistent consistent with wart     Erythematous eczematous patches and plaques     Dystrophic onycholytic nail with subungual debris c/w onychomycosis     Umbilicated papule    Erythematous-base heme-crusted tan verrucoid plaque consistent with inflamed seborrheic keratosis     Erythematous Silvery Scaling Plaque c/w Psoriasis     See annotation      Assessment / Plan:        Dermatofibroma  These are benign bundles of scar tissue that can arise spontaneously or after trauma from a bug bite or nicking yourself shaving, or other minimal trauma.   They are very common in middle aged adults and commonly found on the lower legs, arms above the elbows, and trunk.   Removal of lesions is not recommended as the lesion is just replaced with an additional scar, however if lesion is symptomatic, removal can be considered. Lesions can recur.     Lentigo  This is a benign hyperpigmented sun induced lesion. Recommend daily sun protection/avoidance and use of at least SPF 30, broad spectrum sunscreen (OTC drug) will reduce the number of new lesions. Treatment of these benign lesions are considered cosmetic.  The nature of sun-induced photo-aging and skin cancers is discussed.  Sun avoidance, protective clothing, and  the use of 30-SPF sunscreens is advised. Observe closely for skin damage/changes, and call if such occurs.    Nevus  Discussed ABCDE's of nevi.  Monitor for new mole or moles that are becoming bigger, darker, irritated, or developing irregular borders. Brochure provided. Instructed patient to observe lesion(s) for changes and follow up in clinic if changes are noted. Patient to monitor skin at home for new or changing lesions.     Cherry angioma  These are benign vascular lesions that are inherited.  Treatment is not necessary.    Skin tag  Reassurance given to patient. No treatment is necessary.   Treatment of benign, asymptomatic lesions may be considered cosmetic.    SK (seborrheic keratosis)  These are benign inherited growths without a malignant potential. Reassurance given to patient. No treatment is necessary.     Personal history of skin cancer  Pt with history of non melanoma skin cancer  Total body skin examination performed today including at least 12 points as noted in physical examination. No suspicious lesions noted.Monitor for new or changing lesions as discussed such as pink scaly patches that are occasionally tender or not healing, 'pimples' that never go away, lesions that are not healing or bleeding.     Inflamed seborrheic keratosis  Cryosurgery procedure note:    Verbal consent from the patient is obtained including, but not limited to, risk of hypopigmentation/hyperpigmentation, scar, recurrence of lesion. Liquid nitrogen cryosurgery is applied to 2 lesions to produce a freeze injury. The patient is aware that blisters may form and is instructed on wound care with gentle cleansing and use of vaseline ointment to keep moist until healed. The patient is supplied a handout on cryosurgery and is instructed to call if lesions do not completely resolve.    Dyschromia  Teamine under eyes bid            Follow up in about 1 year (around 5/8/2024) for TBSE.

## 2023-05-09 ENCOUNTER — OFFICE VISIT (OUTPATIENT)
Dept: SPORTS MEDICINE | Facility: CLINIC | Age: 68
End: 2023-05-09
Payer: MEDICARE

## 2023-05-09 VITALS
DIASTOLIC BLOOD PRESSURE: 78 MMHG | BODY MASS INDEX: 40.81 KG/M2 | HEART RATE: 65 BPM | HEIGHT: 61 IN | SYSTOLIC BLOOD PRESSURE: 119 MMHG

## 2023-05-09 DIAGNOSIS — Z98.890 S/P ARTHROSCOPIC SURGERY OF LEFT KNEE: Primary | ICD-10-CM

## 2023-05-09 DIAGNOSIS — Z09 SURGERY FOLLOW-UP EXAMINATION: ICD-10-CM

## 2023-05-09 PROCEDURE — 99024 PR POST-OP FOLLOW-UP VISIT: ICD-10-PCS | Mod: POP,,, | Performed by: PHYSICIAN ASSISTANT

## 2023-05-09 PROCEDURE — 99999 PR PBB SHADOW E&M-EST. PATIENT-LVL V: ICD-10-PCS | Mod: PBBFAC,,, | Performed by: PHYSICIAN ASSISTANT

## 2023-05-09 PROCEDURE — 99215 OFFICE O/P EST HI 40 MIN: CPT | Mod: PBBFAC | Performed by: PHYSICIAN ASSISTANT

## 2023-05-09 PROCEDURE — 99999 PR PBB SHADOW E&M-EST. PATIENT-LVL V: CPT | Mod: PBBFAC,,, | Performed by: PHYSICIAN ASSISTANT

## 2023-05-09 PROCEDURE — 99024 POSTOP FOLLOW-UP VISIT: CPT | Mod: POP,,, | Performed by: PHYSICIAN ASSISTANT

## 2023-05-22 ENCOUNTER — CLINICAL SUPPORT (OUTPATIENT)
Dept: REHABILITATION | Facility: HOSPITAL | Age: 68
End: 2023-05-22
Payer: MEDICARE

## 2023-05-22 DIAGNOSIS — M25.562 ACUTE POSTOPERATIVE PAIN OF LEFT KNEE: ICD-10-CM

## 2023-05-22 DIAGNOSIS — Z98.890 S/P ARTHROSCOPIC SURGERY OF LEFT KNEE: ICD-10-CM

## 2023-05-22 DIAGNOSIS — R26.89 IMPAIRED GAIT AND MOBILITY: ICD-10-CM

## 2023-05-22 DIAGNOSIS — Z09 SURGERY FOLLOW-UP EXAMINATION: ICD-10-CM

## 2023-05-22 DIAGNOSIS — G89.18 ACUTE POSTOPERATIVE PAIN OF LEFT KNEE: ICD-10-CM

## 2023-05-22 DIAGNOSIS — M25.662 DECREASED RANGE OF MOTION (ROM) OF LEFT KNEE: ICD-10-CM

## 2023-05-22 PROCEDURE — 97140 MANUAL THERAPY 1/> REGIONS: CPT | Mod: PN

## 2023-05-22 PROCEDURE — 97161 PT EVAL LOW COMPLEX 20 MIN: CPT | Mod: PN

## 2023-05-22 PROCEDURE — 97110 THERAPEUTIC EXERCISES: CPT | Mod: PN

## 2023-05-22 NOTE — PLAN OF CARE
[facility]  Physical Therapy Initial Evaluation    Name: Verenice Mirza  Clinic Number: [id number]    Therapy Diagnosis:   Encounter Diagnoses   Name Primary?    S/P arthroscopic surgery of left knee     Surgery follow-up examination     Acute postoperative pain of left knee     Decreased range of motion (ROM) of left knee     Impaired gait and mobility         Physician: Stan Jenkins PA-C    Physician Orders: PT Eval and Treat   Medical Diagnosis from Referral:   Z98.890 (ICD-10-CM) - S/P arthroscopic surgery of left knee   Z09 (ICD-10-CM) - Surgery follow-up examination     Evaluation Date: 5/22/2023  Authorization Period Expiration: 05/08/2024  Plan of Care Expiration: 5/22/2023 to 07/07/2023  Visit # / Visits authorized: 1/TBD FOTO: 1/5 PTA visit: 0/5    Time In: 1505  Time Out: 1550  Total Appointment Time (timed & untimed codes): 40 minutes (1 LCE, 1 MT, 1 TE)  Precautions: post-op state, DM, HTN     PROCEDURES:   1. Left knee arthroscopic chondroplasty  2. Left knee arthroscopic partial medial and lateral meniscectomies    FERNANDO Caldera reports to OPPT today following left knee arthroscopic procedure.  Sx date: 04/26/2023 for the above listed procedure.  Underwent 3 weeks of HHPT. Comes to PT with continued left knee pain and soreness.  Ambulating without assistive device. Voices falling at her family's house last week when the family dog jumped on her.  She fell to her knees.  Voices incr'd knee soreness and pain following this incident. Unsure if she is still sore from the fall or the surgery.     Imaging:   See electronic medical record.   Prior Therapy:  Multiple PT courses in the past for her knees.   Social History:  Lives at home alone.  Still drives and is active in the community.   Occupation:   Retired .   Prior Level of Function:  Progressive worsening of left knee pain and function.   Current Level of Function:  Multiple falls over the last  several years.     Pain:  Current 5/10, worst 8/10, best 4/10   Location: left knee (medially > anteriorly > laterally)    Description: Aching and Dull and Sore.   Aggravating Factors: Standing, Walking, Night Time, Morning, and Flexing  Easing Factors:  Rest, medications    Patients goals:  1. To be able to walk and perform household activities without knee pain.      Medical History:   Past Medical History:   Diagnosis Date    Acute seasonal allergic rhinitis     Atypical ductal hyperplasia of left breast     BMI 40.0-44.9, adult     Breast cyst     Diabetes mellitus     Dysphagia     Dysplastic nevus of trunk 03/2017    moderately atypical    Fatty liver disease, nonalcoholic     Fibrocystic breast     Hiatal hernia     Hyperlipidemia     Hypertension     Hypothyroid     IGT (impaired glucose tolerance)     Irritable bowel syndrome (IBS)     Kidney stones     Left breast mass     Lumbar disc disease     Morbid obesity     Nicotine use disorder     JORDANA on CPAP     PAD (peripheral artery disease)     Personal history of colonic polyps 05/27/2009    PVD (peripheral vascular disease)     Renal angiomyolipoma     Rosacea     Squamous cell carcinoma 12/2017    in situ mid scalp    Venous insufficiency     Vitamin D deficiency disease        Surgical History:   Verenice Mirza  has a past surgical history that includes Hysterectomy; Bladder suspension; Lithotripsy; Cystoscopy; Oophorectomy; Colonoscopy (N/A, 5/24/2017); Esophagogastroduodenoscopy (N/A, 12/5/2018); Excisional biopsy (Left, 8/19/2019); Breast biopsy; Knee arthroscopy w/ meniscectomy (Right, 12/8/2021); Arthroscopic chondroplasty of knee joint (Right, 12/8/2021); Colonoscopy (N/A, 7/28/2022); Knee arthroscopy w/ meniscectomy (Left, 4/26/2023); and Arthroscopic chondroplasty of knee joint (Left, 4/26/2023).    Medications:   Verenice has a current medication list which includes the following prescription(s): albuterol, aspirin, atorvastatin, b complex  vitamins, blood sugar diagnostic, calcium carbonate, co-enzyme q-10, finasteride, inhalation spacing device, ketoconazole, lactobacillus 3/fos/pantethine, lancets, levothyroxine, meloxicam, metoprolol succinate, nystatin, omega-3 acid ethyl esters, omeprazole, oxycodone-acetaminophen, promethazine, raloxifene, ozempic, sour cherry extract, spironolactone, sulfasalazine, tramadol, vitamin d, and [DISCONTINUED] tamsulosin, and the following Facility-Administered Medications: fentanyl and midazolam.    Allergies:   Review of patient's allergies indicates:  No Known Allergies       OBJECTIVE       Posture:   High body mass index.   Palpation:   Mild left knee joint effusion.  Portal sites closed and healing.   Range of motion:  Left knee: 0-115 pain     Right knee: (+) 4 - 0 - 125    Quad set:  Good left  Straight leg raise quality: Good without lag but anterior knee pain  TUG:  >25 seconds (w/o AD)  30 second sit-to-stand test (without U/E support): deferred  Gait Analysis: moderate antalgic pattern without assistive device. Lack of consistent heel strike at IC phase and terminal knee extension during stance phase.     Limitation/Restriction for FOTO Knee Survey    Therapist reviewed FOTO scores for Verenice Mirza on 5/22/2023.   FOTO documents entered into [employer] - see Media section.    Limitation Score: 55% --> 45%  LEFS: 29/80         TREATMENT     Total Treatment time (time-based codes) separate from Evaluation: 25 minutes     Therapeutic exercises to develop strength, ROM, and flexibility for 15 minutes including:  Quad sets 20x  Nu-step x5' at L2  Leg press 2x15 at 4 plates    Manual therapy techniques: total time 10 minutes including:  -- grade II/III patellar mobs  -- grade II/III left knee knee passive physiological flexion/extension      PATIENT EDUCATION AND HOME EXERCISES     Education provided:   - home exercise program: quad sets, Straight leg raise, heel slides    Written Home Exercises Provided: yes.  Exercises were reviewed and Verenice was able to demonstrate them prior to the end of the session.  Verenice demonstrated good  understanding of the education provided. See EMR under Patient Instructions for exercises provided during therapy sessions.    ASSESSMENT     Verenice is a 67 y.o. female referred to outpatient Physical Therapy with a medical diagnosis of Z98.890 (ICD-10-CM) - S/P arthroscopic surgery of left knee. Procedure listed above.  Sx date: 04/26/2023.  Post-op 3 weeks (+ 5 days) (POD26).  First OPPT visit; seen by HHPT up to this point.  Mild effusion. Full passive knee extension but not demonstrating during gait.  Knee flexion >110.  Good quad activation and Straight leg raise ability.  Moderate joint irritability.  Ambulating without assistive device although with an antalgic pattern.  Will begin OPPT course.     Patient prognosis is Good.   Patient will benefit from skilled outpatient Physical Therapy to address the deficits stated above and in the chart below, provide patient /family education, and to maximize patientt's level of independence.     Plan of care discussed with patient: Yes  Patient's spiritual, cultural and educational needs considered and patient is agreeable to the plan of care and goals as stated below:     Anticipated Barriers for therapy: co-morbidities    Medical Necessity is demonstrated by the following  History  Co-morbidities and personal factors that may impact the plan of care Co-morbidities:    Arthritis, Back pain, BMI over 30, Diabetes Type I or II, High Blood Pressure,  Previous accidents, Prior Surgery    Personal Factors:   age  lifestyle     high   Examination  Body Structures and Functions, activity limitations and participation restrictions that may impact the plan of care Body Regions:   back  lower extremities    Body Systems:    ROM  strength  balance  gait  transfers  motor control  edema    Participation Restrictions:   See above    Activity limitations:  "  Learning and applying knowledge  no deficits    General Tasks and Commands  undertaking multiple tasks    Communication  no deficits    Mobility  lifting and carrying objects  walking  driving (bike, car, motorcycle)  Stair negotiation, bending, squatting    Self care  dressing    Domestic Life  shopping  cooking  doing house work (cleaning house, washing dishes, laundry)    Interactions/Relationships  no deficits    Life Areas  no deficits    Community and Social Life  community life  recreation and leisure         moderate   Clinical Presentation stable and uncomplicated low   Decision Making/ Complexity Score: low     Goals:  Short Term Goals: 2 weeks  1. Pt will be independent with HEP supplement PT in improving functional mobility.  2. Pt will improve LLE thigh/hip strength to at least 70% of RLE thigh/hip strength as measured via MicroFet handheld dynamometer in order to improve functional mobility.  3. Pt will improve L knee AROM to at least 0 - 115 without pain in order to improve gait.    Long Term Goals: 6 weeks  1. Pt will be independent with updated HEP supplement PT in improving functional mobility.  2. Pt will improve LLE thigh/hip strength to at least 80% of RLE strength as measured via MicroFet handheld dynamometer in order to improve functional mobility  3. Pt will improve L knee AROM to at least 0 - 125 degrees in order to improve gait and ability to perform ADLs.  4. Pt will improve FOTO knee survey score to </= 45% limited in order to demo improved functional mobility  5. Pt will report > 9 point improvement on LEFS score.   6. Pt will perform TUG in < 14 seconds without AD in order to demo improved gait speed.  7. Pt will perform at least 5 sit to stands without UE support on 30 second sit to stand test in order to demo improved ability to perform transfers.        TUG Cutoff Scores:        30" sit to stand Cutoff Scores:          Plan     Plan of care Certification: 5/22/2023 to " 07/07/2023.    Outpatient Physical Therapy 2-3 times weekly for 6 weeks to include the following interventions: Gait Training, Manual Therapy, Moist Heat/ Ice, Neuromuscular Re-ed, Orthotic Management and Training, Patient Education, Therapeutic Activites and Therapeutic Exercise, IASTM, Dry Needling.     Francisco Colin, PT, DPT, OCS

## 2023-05-23 ENCOUNTER — EXTERNAL HOME HEALTH (OUTPATIENT)
Dept: HOME HEALTH SERVICES | Facility: HOSPITAL | Age: 68
End: 2023-05-23
Payer: MEDICARE

## 2023-05-23 ENCOUNTER — CLINICAL SUPPORT (OUTPATIENT)
Dept: REHABILITATION | Facility: HOSPITAL | Age: 68
End: 2023-05-23
Payer: MEDICARE

## 2023-05-23 DIAGNOSIS — R26.89 IMPAIRED GAIT AND MOBILITY: ICD-10-CM

## 2023-05-23 DIAGNOSIS — M25.562 ACUTE POSTOPERATIVE PAIN OF LEFT KNEE: Primary | ICD-10-CM

## 2023-05-23 DIAGNOSIS — Z09 SURGERY FOLLOW-UP EXAMINATION: ICD-10-CM

## 2023-05-23 DIAGNOSIS — G89.18 ACUTE POSTOPERATIVE PAIN OF LEFT KNEE: Primary | ICD-10-CM

## 2023-05-23 DIAGNOSIS — Z98.890 S/P ARTHROSCOPIC SURGERY OF LEFT KNEE: ICD-10-CM

## 2023-05-23 DIAGNOSIS — M25.662 DECREASED RANGE OF MOTION (ROM) OF LEFT KNEE: ICD-10-CM

## 2023-05-23 PROCEDURE — 97140 MANUAL THERAPY 1/> REGIONS: CPT | Mod: PN

## 2023-05-23 PROCEDURE — 97110 THERAPEUTIC EXERCISES: CPT | Mod: PN

## 2023-05-23 NOTE — PROGRESS NOTES
"[facility]   Physical Therapy Treatment Note      Name: Verenice Mirza  Clinic Number: [id number]    Therapy Diagnosis:   Encounter Diagnoses   Name Primary?    Acute postoperative pain of left knee Yes    Decreased range of motion (ROM) of left knee     Impaired gait and mobility     S/P arthroscopic surgery of left knee     Surgery follow-up examination      Physician: Stan Jenkins PA-C    Visit Date: 5/23/2023     Physician Orders: PT Eval and Treat   Medical Diagnosis from Referral:   Z98.890 (ICD-10-CM) - S/P arthroscopic surgery of left knee   Z09 (ICD-10-CM) - Surgery follow-up examination     Evaluation Date: 5/22/2023  Authorization Period Expiration: 12/31/25  Plan of Care Expiration: 5/22/2023 to 07/07/2023  Progress Note Due: 6/22/23  Visit # / Visits authorized: 1/ 20 + eval FOTO: 1/5 PTA visit: 0/5      Time In: 11:00  Time Out: 11:56  Total Billable Time: 56 minutes    Precautions: Standard   DATE OF PROCEDURE:  4/26/2023 SURGEON: Bin Artis M.D.   PROCEDURES:   1. Left knee arthroscopic chondroplasty  2. Left knee arthroscopic partial medial and lateral meniscectomies       Subjective     Pt reports: tightness through the back of the left knee.  She was  compliant with home exercise program (home health).  Response to previous treatment: eval  Functional change: ongoing    Pain: 0/10 "pressure, tight, sore"   Location: left knee      Objective      Objective Measures updated at progress report unless specified.   Swelling posterior knee   123 deg flexion  Treatment   Head of bed elevated.     Verenice received the treatments listed below:      therapeutic exercises to develop strength, endurance, ROM, flexibility, posture, and core stabilization for 46 minutes including:  Quad sets 20x 5" bilateral   Straight leg raise 2x10 bilateral   Short arc quads 3x10 bilateral   Long arc quad 2x10  Leg press 5 plates 3x10   Nustep 8' L2   Standing hip abduction 3x10  Standing " "hip extension 3x10  Heel raises 2x10  Calf stretch strap 3x30"     manual therapy techniques: Joint mobilizations and Soft tissue Mobilization were applied to the: left knee for 10 minutes, including:  Patellar mobilizations   Knee stretching flexion/extension    Patient Education and Home Exercises       Education provided:   - home exercise program     Written Home Exercises Provided: Patient instructed to cont prior HEP. Exercises were reviewed and Verenice was able to demonstrate them prior to the end of the session.  Verenice demonstrated good  understanding of the education provided. See EMR under Patient Instructions for exercises provided during therapy sessions    Assessment     Pt presents for first follow up visit with minimal complaints of pain, just pressure and stiffness in posterior left knee. Some swelling palpable in posterior knee. Pt tolerated exercises well. Min quad lag with Straight leg raise. No complaints of pain with exercises. Continue to progress as tolerated.     Verenice Is progressing well towards her goals.   Pt prognosis is Good.     Pt will continue to benefit from skilled outpatient physical therapy to address the deficits listed in the problem list box on initial evaluation, provide pt/family education and to maximize pt's level of independence in the home and community environment.     Pt's spiritual, cultural and educational needs considered and pt agreeable to plan of care and goals.     Anticipated barriers to physical therapy:  co-morbidities    Goals:   Short Term Goals: 2 weeks  1. Pt will be independent with HEP supplement PT in improving functional mobility.  2. Pt will improve LLE thigh/hip strength to at least 70% of RLE thigh/hip strength as measured via MicroFet handheld dynamometer in order to improve functional mobility.  3. Pt will improve L knee AROM to at least 0 - 115 without pain in order to improve gait.     Long Term Goals: 6 weeks  1. Pt will be independent with " updated HEP supplement PT in improving functional mobility.  2. Pt will improve LLE thigh/hip strength to at least 80% of RLE strength as measured via MicroFet handheld dynamometer in order to improve functional mobility  3. Pt will improve L knee AROM to at least 0 - 125 degrees in order to improve gait and ability to perform ADLs.  4. Pt will improve FOTO knee survey score to </= 45% limited in order to demo improved functional mobility  5. Pt will report > 9 point improvement on LEFS score.   6. Pt will perform TUG in < 14 seconds without AD in order to demo improved gait speed.  7. Pt will perform at least 5 sit to stands without UE support on 30 second sit to stand test in order to demo improved ability to perform transfers.    Plan   Plan of care certification 5/22/2023 to 07/07/2023    ISSA RIVERA, PT

## 2023-05-26 NOTE — PROGRESS NOTES
CC: Left knee scope post op 6 weeks    Patient is here for her 6 week post op appointment s/p below and is doing well. Patient is doing PT at [facility] and is progressing well.   She reports moderate improvement and no issues.     DATE OF PROCEDURE:  4/26/2023      PREOPERATIVE DIAGNOSES:   1. Left knee chondromalacia  2. Left knee medial meniscus tear.   3. Left knee lateral meniscus tear     POSTOPERATIVE DIAGNOSES:   1. Left knee chondromalacia  2. Left knee medial meniscus tear.   3. Left knee lateral meniscus tear     PROCEDURES:   1. Left knee arthroscopic chondroplasty  2. Left knee arthroscopic partial medial and lateral meniscectomies     SURGEON: Bin Artis M.D.     PE:    There were no vitals taken for this visit.     Left knee:    Incisions clean/dry/intact  No sign of infection  Mild swelling  Compartments soft  Neurovascular status intact in extremity    AROM 0 to 120 degrees  Decreased quad strength  Minimal to no effusion    Assessment:  6 weeks s/p left knee arthroscopic chondroplasty and partial medial and lateral menisectomies    Plan:  1.  Continue PT    2. F/u in 6 weeks for 12 week post op .       All questions were answered. Instructed patient to call with questions or concerns in the interim.

## 2023-05-30 ENCOUNTER — OFFICE VISIT (OUTPATIENT)
Dept: SPORTS MEDICINE | Facility: CLINIC | Age: 68
End: 2023-05-30
Payer: MEDICARE

## 2023-05-30 ENCOUNTER — CLINICAL SUPPORT (OUTPATIENT)
Dept: REHABILITATION | Facility: HOSPITAL | Age: 68
End: 2023-05-30
Payer: MEDICARE

## 2023-05-30 VITALS
HEART RATE: 75 BPM | WEIGHT: 206 LBS | DIASTOLIC BLOOD PRESSURE: 77 MMHG | SYSTOLIC BLOOD PRESSURE: 113 MMHG | HEIGHT: 61 IN | BODY MASS INDEX: 38.89 KG/M2

## 2023-05-30 DIAGNOSIS — R26.89 IMPAIRED GAIT AND MOBILITY: ICD-10-CM

## 2023-05-30 DIAGNOSIS — M25.662 DECREASED RANGE OF MOTION (ROM) OF LEFT KNEE: ICD-10-CM

## 2023-05-30 DIAGNOSIS — Z98.890 S/P ARTHROSCOPIC SURGERY OF LEFT KNEE: ICD-10-CM

## 2023-05-30 DIAGNOSIS — Z98.890 S/P MENISCECTOMY: Primary | ICD-10-CM

## 2023-05-30 DIAGNOSIS — M05.9 SEROPOSITIVE RHEUMATOID ARTHRITIS: ICD-10-CM

## 2023-05-30 DIAGNOSIS — M25.562 ACUTE POSTOPERATIVE PAIN OF LEFT KNEE: Primary | ICD-10-CM

## 2023-05-30 DIAGNOSIS — G89.18 ACUTE POSTOPERATIVE PAIN OF LEFT KNEE: Primary | ICD-10-CM

## 2023-05-30 PROCEDURE — 99999 PR PBB SHADOW E&M-EST. PATIENT-LVL III: CPT | Mod: PBBFAC,,, | Performed by: ORTHOPAEDIC SURGERY

## 2023-05-30 PROCEDURE — 97110 THERAPEUTIC EXERCISES: CPT | Mod: PN

## 2023-05-30 PROCEDURE — 99999 PR PBB SHADOW E&M-EST. PATIENT-LVL III: ICD-10-PCS | Mod: PBBFAC,,, | Performed by: ORTHOPAEDIC SURGERY

## 2023-05-30 PROCEDURE — 99024 PR POST-OP FOLLOW-UP VISIT: ICD-10-PCS | Mod: POP,,, | Performed by: ORTHOPAEDIC SURGERY

## 2023-05-30 PROCEDURE — 97530 THERAPEUTIC ACTIVITIES: CPT | Mod: PN

## 2023-05-30 PROCEDURE — 99213 OFFICE O/P EST LOW 20 MIN: CPT | Mod: PBBFAC | Performed by: ORTHOPAEDIC SURGERY

## 2023-05-30 PROCEDURE — 99024 POSTOP FOLLOW-UP VISIT: CPT | Mod: POP,,, | Performed by: ORTHOPAEDIC SURGERY

## 2023-05-31 ENCOUNTER — TELEPHONE (OUTPATIENT)
Dept: RHEUMATOLOGY | Facility: CLINIC | Age: 68
End: 2023-05-31
Payer: MEDICARE

## 2023-05-31 ENCOUNTER — PATIENT MESSAGE (OUTPATIENT)
Dept: RHEUMATOLOGY | Facility: CLINIC | Age: 68
End: 2023-05-31
Payer: MEDICARE

## 2023-05-31 DIAGNOSIS — Z79.899 HIGH RISK MEDICATION USE: Primary | ICD-10-CM

## 2023-05-31 DIAGNOSIS — M05.9 SEROPOSITIVE RHEUMATOID ARTHRITIS: ICD-10-CM

## 2023-05-31 RX ORDER — SULFASALAZINE 500 MG/1
1500 TABLET ORAL 2 TIMES DAILY
Qty: 540 TABLET | Refills: 0 | OUTPATIENT
Start: 2023-05-31 | End: 2023-05-31

## 2023-05-31 RX ORDER — SULFASALAZINE 500 MG/1
1500 TABLET ORAL 2 TIMES DAILY
Qty: 540 TABLET | Refills: 0 | Status: SHIPPED | OUTPATIENT
Start: 2023-05-31 | End: 2023-08-29

## 2023-05-31 NOTE — PROGRESS NOTES
"[facility]   Physical Therapy Treatment Note      Name: Vereince Mirza  Clinic Number: [id number]    Therapy Diagnosis:   Encounter Diagnoses   Name Primary?    Acute postoperative pain of left knee Yes    Decreased range of motion (ROM) of left knee     Impaired gait and mobility      Physician: Stan Jenkins, ANNA    Visit Date: 5/30/2023     Physician Orders: PT Eval and Treat   Medical Diagnosis from Referral:   Z98.890 (ICD-10-CM) - S/P arthroscopic surgery of left knee   Z09 (ICD-10-CM) - Surgery follow-up examination     Evaluation Date: 5/22/2023  Authorization Period Expiration: 12/31/25  Plan of Care Expiration: 5/22/2023 to 07/07/2023  Visit # / Visits authorized: 2/ 20 + eval FOTO: 2/5 PTA visit: 0/5    Time In: 1300  Time Out: 1355  Total Billable Time: 30 minutes (1 TE, 1 RALPH)    Precautions: Standard   DATE OF PROCEDURE:  4/26/2023   PROCEDURES:   1. Left knee arthroscopic chondroplasty  2. Left knee arthroscopic partial medial and lateral meniscectomies       Subjective     Pt reports: no new complaints.   She was  compliant with home exercise program (home health).  Response to previous treatment: eval  Functional change: ongoing    Pain: 0/10 "pressure, tight, sore"   Location: left knee      Objective      Objective Measures updated at progress report unless specified.   Swelling posterior knee   123 deg flexion    Treatment   Head of bed elevated.     Therapeutic exercises to develop strength, endurance, ROM, flexibility, posture, and core stabilization for 10 minutes with PT 1:1 including:  Quad sets 20x 5" bilateral  --> Straight leg raise 2x10 bilateral   Short arc quads 3x10 bilateral --> Long arc quad 2x10  Nustep 8' L2   Calf stretch strap 3x30"     Therapeutic activities: total time 10 minutes with PT 1:1, including:  Leg press 5 plates dL 2x15  Heel raises 2x15  Mini-squats ballet bar 1x10    Manual therapy techniques: 10 minutes, including:  -- Patellar " mobilizations   -- Passive physiological knee flexion/extension grade III/IV    Patient Education and Home Exercises       Education provided:   - home exercise program     Written Home Exercises Provided: Patient instructed to cont prior HEP. Exercises were reviewed and Verenice was able to demonstrate them prior to the end of the session.  Verenice demonstrated good  understanding of the education provided. See EMR under Patient Instructions for exercises provided during therapy sessions    Assessment   A: Verenice is a 67 y.o. female referred to outpatient Physical Therapy with a medical diagnosis of Z98.890 (ICD-10-CM) - S/P arthroscopic surgery of left knee. Procedure listed above.  Sx date: 04/26/2023.  Post-op 4 weeks (+ 6 days) (POD34).  2nd OPPT visit.  Good visit with Ortho team yesterday.  Continues with antalgic gait. Mild effusion. Full passive knee extension but not demonstrating during gait.  Knee flexion >110.  Good quad activation and Straight leg raise ability.  Moderate joint irritability.  No incr'd pain post-session today.     Verenice Is progressing well towards her goals.   Pt prognosis is Good.     Pt will continue to benefit from skilled outpatient physical therapy to address the deficits listed in the problem list box on initial evaluation, provide pt/family education and to maximize pt's level of independence in the home and community environment.   Pt's spiritual, cultural and educational needs considered and pt agreeable to plan of care and goals.     Anticipated barriers to physical therapy:  co-morbidities    Goals:   Short Term Goals: 2 weeks  1. Pt will be independent with HEP supplement PT in improving functional mobility. Progressing towards  2. Pt will improve LLE thigh/hip strength to at least 70% of RLE thigh/hip strength as measured via MicroFet handheld dynamometer in order to improve functional mobility. Progressing towards  3. Pt will improve L knee AROM to at least 0 - 115 without pain  in order to improve gait. Progressing towards     Long Term Goals: 6 weeks  1. Pt will be independent with updated HEP supplement PT in improving functional mobility.  Progressing towards  2. Pt will improve LLE thigh/hip strength to at least 80% of RLE strength as measured via MicroFet handheld dynamometer in order to improve functional mobility. Progressing towards  3. Pt will improve L knee AROM to at least 0 - 125 degrees in order to improve gait and ability to perform ADLs. Progressing towards  4. Pt will improve FOTO knee survey score to </= 45% limited in order to demo improved functional mobility. Progressing towards  5. Pt will report > 9 point improvement on LEFS score. Progressing towards  6. Pt will perform TUG in < 14 seconds without AD in order to demo improved gait speed. Progressing towards  7. Pt will perform at least 5 sit to stands without UE support on 30 second sit to stand test in order to demo improved ability to perform transfers. Progressing towards    Plan   Gait normalization  Joint health    Francisco Colin, PT, DPT, OCS

## 2023-05-31 NOTE — TELEPHONE ENCOUNTER
----- Message from Yahaira Marroquin RN sent at 5/31/2023  2:17 PM CDT -----  Please put lab orders in epic

## 2023-06-01 ENCOUNTER — CLINICAL SUPPORT (OUTPATIENT)
Dept: REHABILITATION | Facility: HOSPITAL | Age: 68
End: 2023-06-01
Payer: MEDICARE

## 2023-06-01 ENCOUNTER — PATIENT MESSAGE (OUTPATIENT)
Dept: INTERNAL MEDICINE | Facility: CLINIC | Age: 68
End: 2023-06-01
Payer: MEDICARE

## 2023-06-01 ENCOUNTER — LAB VISIT (OUTPATIENT)
Dept: LAB | Facility: HOSPITAL | Age: 68
End: 2023-06-01
Attending: INTERNAL MEDICINE
Payer: MEDICARE

## 2023-06-01 DIAGNOSIS — R26.89 IMPAIRED GAIT AND MOBILITY: ICD-10-CM

## 2023-06-01 DIAGNOSIS — G89.18 ACUTE POSTOPERATIVE PAIN OF LEFT KNEE: Primary | ICD-10-CM

## 2023-06-01 DIAGNOSIS — M25.662 DECREASED RANGE OF MOTION (ROM) OF LEFT KNEE: ICD-10-CM

## 2023-06-01 DIAGNOSIS — M05.9 SEROPOSITIVE RHEUMATOID ARTHRITIS: ICD-10-CM

## 2023-06-01 DIAGNOSIS — M25.562 ACUTE POSTOPERATIVE PAIN OF LEFT KNEE: Primary | ICD-10-CM

## 2023-06-01 LAB
ALBUMIN SERPL BCP-MCNC: 3.9 G/DL (ref 3.5–5.2)
ALP SERPL-CCNC: 95 U/L (ref 55–135)
ALT SERPL W/O P-5'-P-CCNC: 25 U/L (ref 10–44)
ANION GAP SERPL CALC-SCNC: 10 MMOL/L (ref 8–16)
AST SERPL-CCNC: 22 U/L (ref 10–40)
BILIRUB SERPL-MCNC: 0.3 MG/DL (ref 0.1–1)
BUN SERPL-MCNC: 12 MG/DL (ref 8–23)
CALCIUM SERPL-MCNC: 9.5 MG/DL (ref 8.7–10.5)
CHLORIDE SERPL-SCNC: 107 MMOL/L (ref 95–110)
CO2 SERPL-SCNC: 22 MMOL/L (ref 23–29)
CREAT SERPL-MCNC: 0.8 MG/DL (ref 0.5–1.4)
CRP SERPL-MCNC: 1.4 MG/L (ref 0–8.2)
ERYTHROCYTE [SEDIMENTATION RATE] IN BLOOD BY PHOTOMETRIC METHOD: 8 MM/HR (ref 0–36)
EST. GFR  (NO RACE VARIABLE): >60 ML/MIN/1.73 M^2
GLUCOSE SERPL-MCNC: 94 MG/DL (ref 70–110)
POTASSIUM SERPL-SCNC: 4.2 MMOL/L (ref 3.5–5.1)
PROT SERPL-MCNC: 6.9 G/DL (ref 6–8.4)
SODIUM SERPL-SCNC: 139 MMOL/L (ref 136–145)

## 2023-06-01 PROCEDURE — 80053 COMPREHEN METABOLIC PANEL: CPT | Performed by: INTERNAL MEDICINE

## 2023-06-01 PROCEDURE — 36415 COLL VENOUS BLD VENIPUNCTURE: CPT | Mod: PO | Performed by: INTERNAL MEDICINE

## 2023-06-01 PROCEDURE — 97530 THERAPEUTIC ACTIVITIES: CPT | Mod: PN,CQ

## 2023-06-01 PROCEDURE — 85652 RBC SED RATE AUTOMATED: CPT | Performed by: INTERNAL MEDICINE

## 2023-06-01 PROCEDURE — 86140 C-REACTIVE PROTEIN: CPT | Performed by: INTERNAL MEDICINE

## 2023-06-01 PROCEDURE — 97110 THERAPEUTIC EXERCISES: CPT | Mod: PN,CQ

## 2023-06-01 NOTE — PROGRESS NOTES
"[facility]   Physical Therapy Treatment Note      Name: Verenice Mirza  Clinic Number: [id number]    Therapy Diagnosis:   Encounter Diagnoses   Name Primary?    Acute postoperative pain of left knee Yes    Decreased range of motion (ROM) of left knee     Impaired gait and mobility      Physician: Stan Jenkins, ANNA    Visit Date: 6/1/2023     Physician Orders: PT Eval and Treat   Medical Diagnosis from Referral:   Z98.890 (ICD-10-CM) - S/P arthroscopic surgery of left knee   Z09 (ICD-10-CM) - Surgery follow-up examination     Evaluation Date: 5/22/2023  Authorization Period Expiration: 12/31/25  Plan of Care Expiration: 5/22/2023 to 07/07/2023  Visit # / Visits authorized: 2/ 20 + eval FOTO: 2/5 PTA visit: 0/5    Time In: 1300  Time Out: 1355  Total Billable Time: 30 minutes (1 TE, 1 RALPH)    Precautions: Standard   DATE OF PROCEDURE:  4/26/2023   PROCEDURES:   1. Left knee arthroscopic chondroplasty  2. Left knee arthroscopic partial medial and lateral meniscectomies       Subjective     Pt reports: no new complaints.   She was  compliant with home exercise program (home health).  Response to previous treatment: eval  Functional change: ongoing    Pain: 0/10 "pressure, tight, sore"   Location: left knee      Objective      Objective Measures updated at progress report unless specified.   Swelling posterior knee   123 deg flexion    Treatment   Head of bed elevated.     Therapeutic exercises to develop strength, endurance, ROM, flexibility, posture, and core stabilization for 10 minutes with PT 1:1 including:  Quad sets 20x 5" bilateral  --> Straight leg raise 2x10 bilateral   Short arc quads 3x10 bilateral --> Long arc quad 2x10  Nustep 8' L2   Calf stretch strap 3x30"   +TKE 5" x 15 red band towel around knee for comfort     Therapeutic activities: total time 10 minutes with PT 1:1, including:  Leg press 5 plates dL 2x15  Heel raises 2x15  Mini-squats ballet bar 1x10    Manual therapy " techniques: 10 minutes, including:  -- Patellar mobilizations   -- Passive physiological knee flexion/extension grade III/IV    Patient Education and Home Exercises       Education provided:   - home exercise program     Written Home Exercises Provided: Patient instructed to cont prior HEP. Exercises were reviewed and Verenice was able to demonstrate them prior to the end of the session.  Verenice demonstrated good  understanding of the education provided. See EMR under Patient Instructions for exercises provided during therapy sessions    Assessment   A: Verenice is a 67 y.o. female referred to outpatient Physical Therapy with a medical diagnosis of Z98.890 (ICD-10-CM) - S/P arthroscopic surgery of left knee. Procedure listed above.  Sx date: 04/26/2023.  Post-op 5 weeks (+ 1 days) (POD36).  Continues with antalgic gait. Mild effusion. Full passive knee extension but not demonstrating during gait.  Knee flexion >110.  Good quad activation and Straight leg raise ability. Added standing TKE to help with quad activation during weightbearing. Good tolerance. No increased pain just moderate fatigue post session. Continue to progress as appropriate.     Verenice Is progressing well towards her goals.   Pt prognosis is Good.     Pt will continue to benefit from skilled outpatient physical therapy to address the deficits listed in the problem list box on initial evaluation, provide pt/family education and to maximize pt's level of independence in the home and community environment.   Pt's spiritual, cultural and educational needs considered and pt agreeable to plan of care and goals.     Anticipated barriers to physical therapy:  co-morbidities    Goals:   Short Term Goals: 2 weeks  1. Pt will be independent with HEP supplement PT in improving functional mobility. Progressing towards  2. Pt will improve LLE thigh/hip strength to at least 70% of RLE thigh/hip strength as measured via MicroFet handheld dynamometer in order to improve  functional mobility. Progressing towards  3. Pt will improve L knee AROM to at least 0 - 115 without pain in order to improve gait. Progressing towards     Long Term Goals: 6 weeks  1. Pt will be independent with updated HEP supplement PT in improving functional mobility.  Progressing towards  2. Pt will improve LLE thigh/hip strength to at least 80% of RLE strength as measured via MicroFet handheld dynamometer in order to improve functional mobility. Progressing towards  3. Pt will improve L knee AROM to at least 0 - 125 degrees in order to improve gait and ability to perform ADLs. Progressing towards  4. Pt will improve FOTO knee survey score to </= 45% limited in order to demo improved functional mobility. Progressing towards  5. Pt will report > 9 point improvement on LEFS score. Progressing towards  6. Pt will perform TUG in < 14 seconds without AD in order to demo improved gait speed. Progressing towards  7. Pt will perform at least 5 sit to stands without UE support on 30 second sit to stand test in order to demo improved ability to perform transfers. Progressing towards    Plan   Gait normalization  Joint health    Isabell Crystal, PTA

## 2023-06-06 ENCOUNTER — CLINICAL SUPPORT (OUTPATIENT)
Dept: REHABILITATION | Facility: HOSPITAL | Age: 68
End: 2023-06-06
Payer: MEDICARE

## 2023-06-06 DIAGNOSIS — M25.562 ACUTE POSTOPERATIVE PAIN OF LEFT KNEE: Primary | ICD-10-CM

## 2023-06-06 DIAGNOSIS — R26.89 IMPAIRED GAIT AND MOBILITY: ICD-10-CM

## 2023-06-06 DIAGNOSIS — G89.18 ACUTE POSTOPERATIVE PAIN OF LEFT KNEE: Primary | ICD-10-CM

## 2023-06-06 DIAGNOSIS — M25.662 DECREASED RANGE OF MOTION (ROM) OF LEFT KNEE: ICD-10-CM

## 2023-06-06 PROCEDURE — 97530 THERAPEUTIC ACTIVITIES: CPT | Mod: PN

## 2023-06-06 PROCEDURE — 97110 THERAPEUTIC EXERCISES: CPT | Mod: PN

## 2023-06-06 NOTE — PROGRESS NOTES
"[facility]   Physical Therapy Treatment Note      Name: Verenice Mirza  Clinic Number: [id number]    Therapy Diagnosis:   Encounter Diagnoses   Name Primary?    Acute postoperative pain of left knee Yes    Decreased range of motion (ROM) of left knee     Impaired gait and mobility      Physician: Stan Jenkins, ANNA    Visit Date: 6/6/2023     Physician Orders: PT Eval and Treat   Medical Diagnosis from Referral:   Z98.890 (ICD-10-CM) - S/P arthroscopic surgery of left knee   Z09 (ICD-10-CM) - Surgery follow-up examination     Evaluation Date: 5/22/2023  Authorization Period Expiration: 12/31/25  Plan of Care Expiration: 5/22/2023 to 07/07/2023  Visit # / Visits authorized: 4/ 20 + eval FOTO: 5/5 PTA visit: 0/5    Time In: 1200  Time Out: 1255  Total Billable Time: 30 minutes (1 TE, 1 RALPH)    Precautions: Standard   DATE OF PROCEDURE:  4/26/2023   PROCEDURES:   1. Left knee arthroscopic chondroplasty  2. Left knee arthroscopic partial medial and lateral meniscectomies       Subjective     Pt reports: left knee doing well.  Having more problems with right knee.   She was  compliant with home exercise program (home health).  Response to previous treatment: no adverse effects.   Functional change: ongoing    Pain: 0/10 "pressure, tight, sore"   Location: left knee      Objective      Objective Measures updated at progress report unless specified.   Swelling posterior knee   123 deg flexion    Treatment   Head of bed elevated.     Therapeutic exercises to develop strength, endurance, ROM, flexibility, posture, and core stabilization for 20 minutes with PT 1:1 including:  Quad sets 20x 5" bilateral  --> Straight leg raise 2x10 bilateral   Short arc quads 3x10 bilateral --> Long arc quad 2x10  Nustep 8' L2   Calf stretch strap 3x30"   TKE 5" x 15 red band towel around knee for comfort     Therapeutic activities: total time 10 minutes with PT 1:1, including:  Leg press 5 plates dL 2x15  Heel " raises 2x15  Mini-squats ballet bar 1x10    Manual therapy techniques: 0 minutes, including:  -- Patellar mobilizations   -- Passive physiological knee flexion/extension grade III/IV    Patient Education and Home Exercises       Education provided:   - home exercise program     Written Home Exercises Provided: Patient instructed to cont prior HEP. Exercises were reviewed and Verenice was able to demonstrate them prior to the end of the session.  Verenice demonstrated good  understanding of the education provided. See EMR under Patient Instructions for exercises provided during therapy sessions    Assessment   A: Verenice is a 67 y.o. female referred to outpatient Physical Therapy with a medical diagnosis of Z98.890 (ICD-10-CM) - S/P arthroscopic surgery of left knee. Procedure listed above.  Sx date: 04/26/2023.  Post-op 5 weeks (+ 6 days) (POD41).  Continues with antalgic gait. Mild effusion. Full passive knee extension but not demonstrating during gait.  Knee flexion >110.  Good quad activation and Straight leg raise ability. More right knee pain. Will shift focus to right knee.     Verenice Is progressing well towards her goals.   Pt prognosis is Good.     Pt will continue to benefit from skilled outpatient physical therapy to address the deficits listed in the problem list box on initial evaluation, provide pt/family education and to maximize pt's level of independence in the home and community environment.   Pt's spiritual, cultural and educational needs considered and pt agreeable to plan of care and goals.     Anticipated barriers to physical therapy:  co-morbidities    Goals:   Short Term Goals: 2 weeks  1. Pt will be independent with HEP supplement PT in improving functional mobility. Progressing towards  2. Pt will improve LLE thigh/hip strength to at least 70% of RLE thigh/hip strength as measured via MicroFet handheld dynamometer in order to improve functional mobility. Progressing towards  3. Pt will improve L knee  AROM to at least 0 - 115 without pain in order to improve gait. Progressing towards     Long Term Goals: 6 weeks  1. Pt will be independent with updated HEP supplement PT in improving functional mobility.  Progressing towards  2. Pt will improve LLE thigh/hip strength to at least 80% of RLE strength as measured via MicroFet handheld dynamometer in order to improve functional mobility. Progressing towards  3. Pt will improve L knee AROM to at least 0 - 125 degrees in order to improve gait and ability to perform ADLs. Progressing towards  4. Pt will improve FOTO knee survey score to </= 45% limited in order to demo improved functional mobility. Progressing towards  5. Pt will report > 9 point improvement on LEFS score. Progressing towards  6. Pt will perform TUG in < 14 seconds without AD in order to demo improved gait speed. Progressing towards  7. Pt will perform at least 5 sit to stands without UE support on 30 second sit to stand test in order to demo improved ability to perform transfers. Progressing towards    Plan   Gait normalization  Joint health    Francisco Colin, PT, DPT, OCS

## 2023-06-09 NOTE — PROGRESS NOTES
Satisfactory Subjective:       Patient ID: Verenice Mirza is a 62 y.o. female.    Chief Complaint: Annual Exam    HPI 62-year-old female presents to clinic today for annual physical exam recently on mammogram she had dense tissue seen she's contact her insurance and they will cover an ultrasound which she like to get.  She also reports recently having an urgent care visit for respiratory infection she was detected wheezing and they recommended a pulmonology consultation she reports having periodic symptoms typically seasonally induced.  She also reports some toenail trauma after hitting it on a door.  Happened around a month ago.  No significant pain.  Additionally she had a little tenderness in the left jaw around jaw line that is now resolved.  Review of Systems  otherwise negative  Objective:      Physical Exam  General: Well-appearing, well-nourished.  No distress  HEENT: conjunctivae are normal.  Pupils are equal and reative to light.  TM's are clear and intact bilaterally.  Hearing is grossly normal.  Nasopharynx is clear.  Oropharynx is clear.  Neck: Supple.  No thyroid megaly.  No bruits.  Lymph: No cervical or supraclavicular adenopathy.  Heart: Regular rate and rhythm, without murmur, rub or gallop.  Lungs: Clear to auscultation; respiratory effort normal.  Abdomen: Soft, nontender, nondistended.  Normoactive bowel sounds.  No hepatomegaly.  No masses.  Extremities: Good distal pulses.  No edema.  Psych: Oriented to time person place.  Judgment and insight seem unimpaired.  Mood and affect are appropriate.  Assessment:       1. Preventative health care    2. Dense breast tissue on mammogram    3. Essential hypertension    4. Hyperlipidemia, unspecified hyperlipidemia type    5. Need for immunization against influenza    6. Wheezing    7. Morbid obesity with BMI of 40.0-44.9, adult      8.  Nail trauma continued observation and surveillance no significant pain and no signs of infection counseling provided.  Plan:        Verenice was seen today for annual exam.    Diagnoses and all orders for this visit:    Preventative health care  Health care maintenance - age-appropriate healthcare screening and prevention performed today.      Dense breast tissue on mammogram  -     US Breast Bilateral Complete; Future  Reviewed and patient discussed with her insurance  Essential hypertension  Controlled.  Continue current medical regimen.  Prescription refills addressed.  Followup advised. See after visit summary.  Hyperlipidemia, unspecified hyperlipidemia type  Controlled.  Continue current medical regimen.  Prescription refills addressed.  Followup advised. See after visit summary.  Need for immunization against influenza  -     Influenza - Quadrivalent (3 years & older)    Wheezing  -     Complete PFT with bronchodilator; Future  -     PULSE OXIMETRY WITH REST - PULM; Future    Morbid obesity with BMI of 40.0-44.9, adult  Recommend continued efforts at weight loss.  Discussed surgical intervention options

## 2023-06-12 ENCOUNTER — PATIENT MESSAGE (OUTPATIENT)
Dept: INTERNAL MEDICINE | Facility: CLINIC | Age: 68
End: 2023-06-12
Payer: MEDICARE

## 2023-06-13 ENCOUNTER — CLINICAL SUPPORT (OUTPATIENT)
Dept: REHABILITATION | Facility: HOSPITAL | Age: 68
End: 2023-06-13
Payer: MEDICARE

## 2023-06-13 DIAGNOSIS — R26.89 IMPAIRED GAIT AND MOBILITY: ICD-10-CM

## 2023-06-13 DIAGNOSIS — G89.18 ACUTE POSTOPERATIVE PAIN OF LEFT KNEE: Primary | ICD-10-CM

## 2023-06-13 DIAGNOSIS — M25.662 DECREASED RANGE OF MOTION (ROM) OF LEFT KNEE: ICD-10-CM

## 2023-06-13 DIAGNOSIS — M25.562 ACUTE POSTOPERATIVE PAIN OF LEFT KNEE: Primary | ICD-10-CM

## 2023-06-13 PROCEDURE — 97110 THERAPEUTIC EXERCISES: CPT | Mod: PN,CQ

## 2023-06-13 PROCEDURE — 97530 THERAPEUTIC ACTIVITIES: CPT | Mod: PN,CQ

## 2023-06-13 NOTE — PROGRESS NOTES
"[facility]   Physical Therapy Treatment Note      Name: Verenice Mirza  Clinic Number: [id number]    Therapy Diagnosis:   Encounter Diagnoses   Name Primary?    Acute postoperative pain of left knee Yes    Decreased range of motion (ROM) of left knee     Impaired gait and mobility      Physician: Stan Jenkins, ANNA    Visit Date: 6/13/2023     Physician Orders: PT Eval and Treat   Medical Diagnosis from Referral:   Z98.890 (ICD-10-CM) - S/P arthroscopic surgery of left knee   Z09 (ICD-10-CM) - Surgery follow-up examination     Evaluation Date: 5/22/2023  Authorization Period Expiration: 12/31/25  Plan of Care Expiration: 5/22/2023 to 07/07/2023  Visit # / Visits authorized: 4/ 20 + eval FOTO: 5/5 PTA visit: 0/5    Time In: 1110  Time Out: 1150  Total Billable Time: 25 minutes 1:1 (1 TE, 1 RALPH)    Precautions: Standard   DATE OF PROCEDURE:  4/26/2023   PROCEDURES:   1. Left knee arthroscopic chondroplasty  2. Left knee arthroscopic partial medial and lateral meniscectomies       Subjective     Pt reports: Still feeling tired from a virus she had last week.   She was  compliant with home exercise program (home health).  Response to previous treatment: no adverse effects.   Functional change: ongoing    Pain: 0/10 "pressure, tight, sore"   Location: left knee      Objective      Objective Measures updated at progress report unless specified.   Swelling posterior knee   123 deg flexion    Treatment   Head of bed elevated.     Therapeutic exercises to develop strength, endurance, ROM, flexibility, posture, and core stabilization for 15 minutes with PT 1:1 including:  Quad sets 20x 5" bilateral  --> Straight leg raise 2x10 bilateral   Short arc quads 3x10 bilateral --> Long arc quad 2x10  Nustep 8' L2   Calf stretch strap 3x30"   TKE 5" x 15 red band towel around knee for comfort not today    Therapeutic activities: total time 10 minutes with PT 1:1, including:  Leg press 5 plates dL " 2x15  Heel raises 2x15  Mini-squats ballet bar 1x10    Manual therapy techniques: 0 minutes, including:  -- Patellar mobilizations   -- Passive physiological knee flexion/extension grade III/IV    Patient Education and Home Exercises       Education provided:   - home exercise program     Written Home Exercises Provided: Patient instructed to cont prior HEP. Exercises were reviewed and Verenice was able to demonstrate them prior to the end of the session.  Verenice demonstrated good  understanding of the education provided. See EMR under Patient Instructions for exercises provided during therapy sessions    Assessment   A: Verenice is a 67 y.o. female referred to outpatient Physical Therapy with a medical diagnosis of Z98.890 (ICD-10-CM) - S/P arthroscopic surgery of left knee. Procedure listed above.  Sx date: 04/26/2023.  Post-op 6 weeks (+ 6 days) (POD48).  Continues with antalgic gait. Mild effusion. Full passive knee extension but not demonstrating during gait.  Knee flexion >110.  Good quad activation and Straight leg raise ability. Increased general fatigue and malaise following recent viral infection. Unable to complete full session as a result. Will monitor and progress at next session if appropriate.     Verenice Is progressing well towards her goals.   Pt prognosis is Good.     Pt will continue to benefit from skilled outpatient physical therapy to address the deficits listed in the problem list box on initial evaluation, provide pt/family education and to maximize pt's level of independence in the home and community environment.   Pt's spiritual, cultural and educational needs considered and pt agreeable to plan of care and goals.     Anticipated barriers to physical therapy:  co-morbidities    Goals:   Short Term Goals: 2 weeks  1. Pt will be independent with HEP supplement PT in improving functional mobility. Progressing towards  2. Pt will improve LLE thigh/hip strength to at least 70% of RLE thigh/hip strength  as measured via MicroFet handheld dynamometer in order to improve functional mobility. Progressing towards  3. Pt will improve L knee AROM to at least 0 - 115 without pain in order to improve gait. Progressing towards     Long Term Goals: 6 weeks  1. Pt will be independent with updated HEP supplement PT in improving functional mobility.  Progressing towards  2. Pt will improve LLE thigh/hip strength to at least 80% of RLE strength as measured via MicroFet handheld dynamometer in order to improve functional mobility. Progressing towards  3. Pt will improve L knee AROM to at least 0 - 125 degrees in order to improve gait and ability to perform ADLs. Progressing towards  4. Pt will improve FOTO knee survey score to </= 45% limited in order to demo improved functional mobility. Progressing towards  5. Pt will report > 9 point improvement on LEFS score. Progressing towards  6. Pt will perform TUG in < 14 seconds without AD in order to demo improved gait speed. Progressing towards  7. Pt will perform at least 5 sit to stands without UE support on 30 second sit to stand test in order to demo improved ability to perform transfers. Progressing towards    Plan   Gait normalization  Joint health    Isabell Crystal, PTA

## 2023-06-15 ENCOUNTER — CLINICAL SUPPORT (OUTPATIENT)
Dept: REHABILITATION | Facility: HOSPITAL | Age: 68
End: 2023-06-15
Payer: MEDICARE

## 2023-06-15 DIAGNOSIS — R26.89 IMPAIRED GAIT AND MOBILITY: ICD-10-CM

## 2023-06-15 DIAGNOSIS — G89.18 ACUTE POSTOPERATIVE PAIN OF LEFT KNEE: Primary | ICD-10-CM

## 2023-06-15 DIAGNOSIS — M25.562 ACUTE POSTOPERATIVE PAIN OF LEFT KNEE: Primary | ICD-10-CM

## 2023-06-15 DIAGNOSIS — M25.662 DECREASED RANGE OF MOTION (ROM) OF LEFT KNEE: ICD-10-CM

## 2023-06-15 PROCEDURE — 97110 THERAPEUTIC EXERCISES: CPT | Mod: PN,CQ

## 2023-06-15 PROCEDURE — 97530 THERAPEUTIC ACTIVITIES: CPT | Mod: PN,CQ

## 2023-06-15 NOTE — PROGRESS NOTES
"[facility]   Physical Therapy Treatment Note      Name: Verenice Mirza  Clinic Number: [id number]    Therapy Diagnosis:   Encounter Diagnoses   Name Primary?    Acute postoperative pain of left knee Yes    Decreased range of motion (ROM) of left knee     Impaired gait and mobility      Physician: Stan Jenkins PA-C    Visit Date: 6/15/2023     Physician Orders: PT Eval and Treat   Medical Diagnosis from Referral:   Z98.890 (ICD-10-CM) - S/P arthroscopic surgery of left knee   Z09 (ICD-10-CM) - Surgery follow-up examination     Evaluation Date: 5/22/2023  Authorization Period Expiration: 12/31/25  Plan of Care Expiration: 5/22/2023 to 07/07/2023  Visit # / Visits authorized: 6/ 20 + eval FOTO: 5/5 PTA visit: 2/5    Time In: 1110  Time Out: 1150  Total Billable Time: 30 minutes 1:1 (1 TE, 1 RALPH)    Precautions: Standard   DATE OF PROCEDURE:  4/26/2023   PROCEDURES:   1. Left knee arthroscopic chondroplasty  2. Left knee arthroscopic partial medial and lateral meniscectomies       Subjective     Pt reports: "I'm feeling a lot better today"   She was  compliant with home exercise program (home health).  Response to previous treatment: no adverse effects.   Functional change: ongoing    Pain: 0/10 "pressure, tight, sore"   Location: left knee      Objective      Objective Measures updated at progress report unless specified.   Swelling posterior knee   123 deg flexion    Treatment   Head of bed elevated.     Therapeutic exercises to develop strength, endurance, ROM, flexibility, posture, and core stabilization for 15 minutes with PT 1:1 including:  Quad sets 20 x 5" bilateral  --> Straight leg raise 3x10 bilateral   Short arc quads 3x10 bilateral --> Long arc quad 2x10  Nustep 8' L2   Calf stretch strap 3x30"     Therapeutic activities: total time 15 minutes with PT 1:1, including:  Leg press 5 plates dL 2x15  Heel raises 2x15  Mini-squats ballet bar 1x10    Manual therapy techniques: 0 " minutes, including:  -- Patellar mobilizations   -- Passive physiological knee flexion/extension grade III/IV    Patient Education and Home Exercises       Education provided:   - home exercise program     Written Home Exercises Provided: Patient instructed to cont prior HEP. Exercises were reviewed and Verenice was able to demonstrate them prior to the end of the session.  Verenice demonstrated good  understanding of the education provided. See EMR under Patient Instructions for exercises provided during therapy sessions    Assessment   A: Verenice is a 67 y.o. female referred to outpatient Physical Therapy with a medical diagnosis of Z98.890 (ICD-10-CM) - S/P arthroscopic surgery of left knee. Procedure listed above.  Sx date: 04/26/2023.  Post-op 7 weeks (+ 1 days) (POD50).  Continues with antalgic gait. Mild effusion. Full passive knee extension but not demonstrating during gait.  Knee flexion >110.  Good quad activation and Straight leg raise ability. Able to resume full session today following recent illness. Moderate fatigue post session. Continue to progress as appropriate.     Verenice Is progressing well towards her goals.   Pt prognosis is Good.     Pt will continue to benefit from skilled outpatient physical therapy to address the deficits listed in the problem list box on initial evaluation, provide pt/family education and to maximize pt's level of independence in the home and community environment.   Pt's spiritual, cultural and educational needs considered and pt agreeable to plan of care and goals.     Anticipated barriers to physical therapy:  co-morbidities    Goals:   Short Term Goals: 2 weeks  1. Pt will be independent with HEP supplement PT in improving functional mobility. Progressing towards  2. Pt will improve LLE thigh/hip strength to at least 70% of RLE thigh/hip strength as measured via MicroFet handheld dynamometer in order to improve functional mobility. Progressing towards  3. Pt will improve L  knee AROM to at least 0 - 115 without pain in order to improve gait. Progressing towards     Long Term Goals: 6 weeks  1. Pt will be independent with updated HEP supplement PT in improving functional mobility.  Progressing towards  2. Pt will improve LLE thigh/hip strength to at least 80% of RLE strength as measured via MicroFet handheld dynamometer in order to improve functional mobility. Progressing towards  3. Pt will improve L knee AROM to at least 0 - 125 degrees in order to improve gait and ability to perform ADLs. Progressing towards  4. Pt will improve FOTO knee survey score to </= 45% limited in order to demo improved functional mobility. Progressing towards  5. Pt will report > 9 point improvement on LEFS score. Progressing towards  6. Pt will perform TUG in < 14 seconds without AD in order to demo improved gait speed. Progressing towards  7. Pt will perform at least 5 sit to stands without UE support on 30 second sit to stand test in order to demo improved ability to perform transfers. Progressing towards    Plan   Gait normalization  Joint health    Isabell Crystal, PTA

## 2023-06-20 ENCOUNTER — TELEPHONE (OUTPATIENT)
Dept: FAMILY MEDICINE | Facility: CLINIC | Age: 68
End: 2023-06-20
Payer: MEDICARE

## 2023-06-22 ENCOUNTER — CLINICAL SUPPORT (OUTPATIENT)
Dept: REHABILITATION | Facility: HOSPITAL | Age: 68
End: 2023-06-22
Payer: MEDICARE

## 2023-06-22 DIAGNOSIS — M25.562 ACUTE POSTOPERATIVE PAIN OF LEFT KNEE: Primary | ICD-10-CM

## 2023-06-22 DIAGNOSIS — G89.18 ACUTE POSTOPERATIVE PAIN OF LEFT KNEE: Primary | ICD-10-CM

## 2023-06-22 DIAGNOSIS — M25.662 DECREASED RANGE OF MOTION (ROM) OF LEFT KNEE: ICD-10-CM

## 2023-06-22 DIAGNOSIS — R26.89 IMPAIRED GAIT AND MOBILITY: ICD-10-CM

## 2023-06-22 PROCEDURE — 97110 THERAPEUTIC EXERCISES: CPT | Mod: PN

## 2023-06-22 PROCEDURE — 97530 THERAPEUTIC ACTIVITIES: CPT | Mod: PN

## 2023-06-22 NOTE — PROGRESS NOTES
"[facility]   Physical Therapy Treatment Note      Name: Verenice Mirza  Clinic Number: [id number]    Therapy Diagnosis:   Encounter Diagnoses   Name Primary?    Acute postoperative pain of left knee Yes    Decreased range of motion (ROM) of left knee     Impaired gait and mobility      Physician: Stan Jenkins, ANNA    Visit Date: 6/22/2023     Physician Orders: PT Eval and Treat   Medical Diagnosis from Referral:   Z98.890 (ICD-10-CM) - S/P arthroscopic surgery of left knee   Z09 (ICD-10-CM) - Surgery follow-up examination     Evaluation Date: 5/22/2023  Authorization Period Expiration: 12/31/25  Plan of Care Expiration: 5/22/2023 to 07/07/2023  Visit # / Visits authorized: 7/ 20 + eval FOTO: 6/5 PTA visit: 2/5    Time In: 1:00  Time Out: 1:53  Total Billable Time: 53 minutes 1:1 (2 TE, 2 RALPH)    Precautions: Standard   DATE OF PROCEDURE:  4/26/2023   PROCEDURES:   1. Left knee arthroscopic chondroplasty  2. Left knee arthroscopic partial medial and lateral meniscectomies       Subjective     Pt reports: feeling good today, just stiff.   She was  compliant with home exercise program (home health).  Response to previous treatment: no adverse effects.   Functional change: ongoing    Pain: 0/10 "pressure, tight, sore"   Location: left knee      Objective      Objective Measures updated at progress report unless specified.   Swelling posterior knee   123 deg flexion    Treatment   Head of bed elevated.     Therapeutic exercises to develop strength, endurance, ROM, flexibility, posture, and core stabilization for 23 minutes with PT 1:1 including:  Quad sets 20 x 5" bilateral  --> Straight leg raise 3x10 bilateral   Short arc quads 3x10 2# bilateral --> Long arc quad 3x10 2#  Nustep 8' L2   Calf stretch strap 3x30"   +Bridge 2x10    Therapeutic activities: total time 30 minutes with PT 1:1, including:  Leg press 5 plates dL 2x15  Heel raises 2x15  Mini-squats ballet bar 2x10  +Standing hip " abductions 2x10     Manual therapy techniques: 0 minutes, including:  -- Patellar mobilizations   -- Passive physiological knee flexion/extension grade III/IV    Patient Education and Home Exercises       Education provided:   - home exercise program     Written Home Exercises Provided: Patient instructed to cont prior HEP. Exercises were reviewed and Verenice was able to demonstrate them prior to the end of the session.  Verenice demonstrated good  understanding of the education provided. See EMR under Patient Instructions for exercises provided during therapy sessions    Assessment   A: Verenice is a 67 y.o. female referred to outpatient Physical Therapy with a medical diagnosis of Z98.890 (ICD-10-CM) - S/P arthroscopic surgery of left knee. Procedure listed above.  Sx date: 04/26/2023.  Post-op 8 weeks (+ 2 days) (POD57).  Continues with antalgic gait. Mild effusion. Full passive knee extension but not demonstrating during gait.  Knee flexion >120.  Good quad activation and Straight leg raise ability. Pt able to tolerate increased repetitions of multiple exercises and addition of standing activities today without increased pain. Complaints of greater stiffness in non-surgical limb than surgical. Moderate fatigue post session. Continue to progress as appropriate.     Verenice Is progressing well towards her goals.   Pt prognosis is Good.     Pt will continue to benefit from skilled outpatient physical therapy to address the deficits listed in the problem list box on initial evaluation, provide pt/family education and to maximize pt's level of independence in the home and community environment.   Pt's spiritual, cultural and educational needs considered and pt agreeable to plan of care and goals.     Anticipated barriers to physical therapy:  co-morbidities    Goals:   Short Term Goals: 2 weeks  1. Pt will be independent with HEP supplement PT in improving functional mobility. Progressing towards  2. Pt will improve LLE  thigh/hip strength to at least 70% of RLE thigh/hip strength as measured via MicroFet handheld dynamometer in order to improve functional mobility. Progressing towards  3. Pt will improve L knee AROM to at least 0 - 115 without pain in order to improve gait. Met     Long Term Goals: 6 weeks  1. Pt will be independent with updated HEP supplement PT in improving functional mobility.  Progressing towards  2. Pt will improve LLE thigh/hip strength to at least 80% of RLE strength as measured via MicroFet handheld dynamometer in order to improve functional mobility. Progressing towards  3. Pt will improve L knee AROM to at least 0 - 125 degrees in order to improve gait and ability to perform ADLs. Progressing towards  4. Pt will improve FOTO knee survey score to </= 45% limited in order to demo improved functional mobility. Progressing towards  5. Pt will report > 9 point improvement on LEFS score. Progressing towards  6. Pt will perform TUG in < 14 seconds without AD in order to demo improved gait speed. Progressing towards  7. Pt will perform at least 5 sit to stands without UE support on 30 second sit to stand test in order to demo improved ability to perform transfers. Progressing towards    Plan   Gait normalization  Joint health    ISSA RIVERA, PT

## 2023-06-27 ENCOUNTER — CLINICAL SUPPORT (OUTPATIENT)
Dept: REHABILITATION | Facility: HOSPITAL | Age: 68
End: 2023-06-27
Payer: MEDICARE

## 2023-06-27 DIAGNOSIS — R26.89 IMPAIRED GAIT AND MOBILITY: ICD-10-CM

## 2023-06-27 DIAGNOSIS — M25.662 DECREASED RANGE OF MOTION (ROM) OF LEFT KNEE: ICD-10-CM

## 2023-06-27 DIAGNOSIS — M25.562 ACUTE POSTOPERATIVE PAIN OF LEFT KNEE: Primary | ICD-10-CM

## 2023-06-27 DIAGNOSIS — G89.18 ACUTE POSTOPERATIVE PAIN OF LEFT KNEE: Primary | ICD-10-CM

## 2023-06-27 PROCEDURE — 97110 THERAPEUTIC EXERCISES: CPT | Mod: PN

## 2023-06-27 PROCEDURE — 97530 THERAPEUTIC ACTIVITIES: CPT | Mod: PN

## 2023-06-27 NOTE — PROGRESS NOTES
"[facility]   Physical Therapy Treatment Note      Name: Verenice Mirza  Clinic Number: [id number]    Therapy Diagnosis:   Encounter Diagnoses   Name Primary?    Acute postoperative pain of left knee Yes    Decreased range of motion (ROM) of left knee     Impaired gait and mobility        Physician: Stan Jenkins, ANNA    Visit Date: 6/27/2023     Physician Orders: PT Eval and Treat   Medical Diagnosis from Referral:   Z98.890 (ICD-10-CM) - S/P arthroscopic surgery of left knee   Z09 (ICD-10-CM) - Surgery follow-up examination     Evaluation Date: 5/22/2023  Authorization Period Expiration: 12/31/25  Plan of Care Expiration: 5/22/2023 to 07/07/2023  Visit # / Visits authorized: 8/ 20 + eval   FOTO: 6/5 complete 6/27/23  PTA visit: 2/5    Time In: 11:00  Time Out: 12:00  Total Billable Time: 35 minutes 1:1 (1 TE, 1 RALPH)    Precautions: Standard   DATE OF PROCEDURE:  4/26/2023   PROCEDURES:   1. Left knee arthroscopic chondroplasty  2. Left knee arthroscopic partial medial and lateral meniscectomies       Subjective     Pt reports: tired and sore. Both Knees bothering her today. She stubbed her toe on armoire and feels she may have broke it.    She was  compliant with home exercise program (home health).  Response to previous treatment: no adverse effects.   Functional change: ongoing    Pain: 4/10 "pressure, tight, sore"   Location: left knee      Objective      Objective Measures updated at progress report unless specified.   Swelling posterior knee   130 deg flexion  Tenderness at pes anserine bilateral     Treatment   Head of bed elevated.     Therapeutic exercises to develop strength, endurance, ROM, flexibility, posture, and core stabilization for 20 minutes with PT 1:1 including:  Quad sets 20 x 5" bilateral  --> Straight leg raise 3x10 bilateral   Short arc quads 3x10 2# bilateral --> Long arc quad 3x10 2#  Nustep 8' L2 hills  Calf stretch strap 3x30"   Bridge 2x15    Therapeutic " activities: total time 15  minutes with PT 1:1, including:  Leg press 6 plates dL 2x15  Heel raises 2x15  Mini-squats ballet bar 2x10  Standing hip abductions 2x10   Standing TKE green theraband with towel 2x10 bilateral     Manual therapy techniques: 3 minutes, including:  -- Patellar mobilizations   -- Passive physiological knee flexion/extension grade III/IV    Patient Education and Home Exercises       Education provided:   - home exercise program     Written Home Exercises Provided: Patient instructed to cont prior HEP. Exercises were reviewed and Verenice was able to demonstrate them prior to the end of the session.  Verenice demonstrated good  understanding of the education provided. See EMR under Patient Instructions for exercises provided during therapy sessions    Assessment   A: Verenice is a 67 y.o. female referred to outpatient Physical Therapy with a medical diagnosis of Z98.890 (ICD-10-CM) - S/P arthroscopic surgery of left knee. Procedure listed above.  Sx date: 04/26/2023.  Post-op 9 weeks (POD 62).  Pt demos full knee flexion to 130 today. Increased swelling at posterior knee, possible bakers cyst, limiting TKE during gait. Added TKE exercise to work on closed chain extension. Recent increase in pain and stiffness in non-op limb. Performed all exercises bilaterally. Good tolerance for progressions. Continue to progress as tolerated.     Verenice Is progressing well towards her goals.   Pt prognosis is Good.     Pt will continue to benefit from skilled outpatient physical therapy to address the deficits listed in the problem list box on initial evaluation, provide pt/family education and to maximize pt's level of independence in the home and community environment.   Pt's spiritual, cultural and educational needs considered and pt agreeable to plan of care and goals.     Anticipated barriers to physical therapy:  co-morbidities    Goals:   Short Term Goals: 2 weeks  1. Pt will be independent with HEP supplement  PT in improving functional mobility. Progressing towards  2. Pt will improve LLE thigh/hip strength to at least 70% of RLE thigh/hip strength as measured via MicroFet handheld dynamometer in order to improve functional mobility. Progressing towards  3. Pt will improve L knee AROM to at least 0 - 115 without pain in order to improve gait. Met     Long Term Goals: 6 weeks  1. Pt will be independent with updated HEP supplement PT in improving functional mobility.  Progressing towards  2. Pt will improve LLE thigh/hip strength to at least 80% of RLE strength as measured via MicroFet handheld dynamometer in order to improve functional mobility. Progressing towards  3. Pt will improve L knee AROM to at least 0 - 125 degrees in order to improve gait and ability to perform ADLs. Progressing towards  4. Pt will improve FOTO knee survey score to </= 45% limited in order to demo improved functional mobility. Progressing towards  5. Pt will report > 9 point improvement on LEFS score. Progressing towards  6. Pt will perform TUG in < 14 seconds without AD in order to demo improved gait speed. Progressing towards  7. Pt will perform at least 5 sit to stands without UE support on 30 second sit to stand test in order to demo improved ability to perform transfers. Progressing towards    Plan   Gait normalization  Joint health    ISSA RIVERA, PT

## 2023-06-29 ENCOUNTER — CLINICAL SUPPORT (OUTPATIENT)
Dept: REHABILITATION | Facility: HOSPITAL | Age: 68
End: 2023-06-29
Payer: MEDICARE

## 2023-06-29 DIAGNOSIS — M25.562 ACUTE POSTOPERATIVE PAIN OF LEFT KNEE: Primary | ICD-10-CM

## 2023-06-29 DIAGNOSIS — R26.89 IMPAIRED GAIT AND MOBILITY: ICD-10-CM

## 2023-06-29 DIAGNOSIS — M25.662 DECREASED RANGE OF MOTION (ROM) OF LEFT KNEE: ICD-10-CM

## 2023-06-29 DIAGNOSIS — G89.18 ACUTE POSTOPERATIVE PAIN OF LEFT KNEE: Primary | ICD-10-CM

## 2023-06-29 PROCEDURE — 97530 THERAPEUTIC ACTIVITIES: CPT | Mod: PN

## 2023-06-29 PROCEDURE — 97110 THERAPEUTIC EXERCISES: CPT | Mod: PN

## 2023-06-29 NOTE — PROGRESS NOTES
"[facility]   Physical Therapy Treatment Note / Discharge     Name: Verenice Mirza  Clinic Number: [id number]    Therapy Diagnosis:   Encounter Diagnoses   Name Primary?    Acute postoperative pain of left knee Yes    Decreased range of motion (ROM) of left knee     Impaired gait and mobility        Physician: Stan Jenkins, ANNA    Visit Date: 6/29/2023     Physician Orders: PT Eval and Treat   Medical Diagnosis from Referral:   Z98.890 (ICD-10-CM) - S/P arthroscopic surgery of left knee   Z09 (ICD-10-CM) - Surgery follow-up examination     Evaluation Date: 5/22/2023  Authorization Period Expiration: 12/31/25  Plan of Care Expiration: 5/22/2023 to 07/07/2023  Visit # / Visits authorized: 10/20 (+ eval)  FOTO: today PTA visit: 0/5    Time In: 1300  Time Out: 1355  Total Billable Time: 30 minutes (1 TE, 1 RALPH)    Precautions: Standard   DATE OF PROCEDURE:  4/26/2023   PROCEDURES:   1. Left knee arthroscopic chondroplasty  2. Left knee arthroscopic partial medial and lateral meniscectomies       Subjective     Pt reports: no new complaints.  Voices her bursitis below her knee joint line bilateral is hurting but not her knees.   She was  compliant with home exercise program (home health).  Response to previous treatment: no adverse effects.   Functional change: ongoing    Pain: 4/10 "pressure, tight, sore"   Location: left knee      Objective      Palpation: Swelling posterior knee, Tenderness at pes anserine bilateral   Left knee Range of motion: (+) 3 - 0 - 130 deg flexion  Strength: Microfit testing:  Right: quad 30.1 kg, hamstring 18.8 kg  Left: quad 23.2 kg, hamstring 13.3 kg    Treatment   Head of bed elevated.     Therapeutic exercises to develop strength, endurance, ROM, flexibility, posture, and core stabilization for 10 minutes with PT 1:1 including:  Nustep 8' L2 hills  Straight leg raise 2x15 bilateral   Long arc quad 3x10 4#  Bridge 2x15  Standing hip flexion " 2x15/each    Therapeutic activities: total time 15  minutes with PT 1:1, including:  Leg press 7 plates dL 2x15  Heel raises 2x15  STS high chair 2x10    Manual therapy techniques: 5 minutes, including:  -- Patellar mobilizations   -- Passive physiological knee flexion/extension grade III/IV    Patient Education and Home Exercises       Education provided:   - home exercise program:  STS high chair, hip flexion marching, Straight leg raise, bridges, foot bike.     Written Home Exercises Provided: Patient instructed to cont prior HEP. Exercises were reviewed and Verenice was able to demonstrate them prior to the end of the session.  Verenice demonstrated good  understanding of the education provided. See EMR under Patient Instructions for exercises provided during therapy sessions    Assessment   A: Verenice is a 67 y.o. female referred to outpatient Physical Therapy with a medical diagnosis of Z98.890 (ICD-10-CM) - S/P arthroscopic surgery of left knee. Procedure listed above.  Sx date: 04/26/2023.  Post-op 9 weeks (POD 64).  In regards to left knee post-op, doing very well.  Does have bilateral knee pain at pes anserine but no joint pain.  Mild morning stiffness bilateral knees.  Back to performing all activities of daily living, pre-op household chores, and community ambulation without issue.  Great quad strength.  Body mass index is a factor with tolerance to standing, walking, etc.  Overall doing well; ready for DC from [facility].     Verenice Is progressing well towards her goals.   Pt prognosis is Good.     Pt will continue to benefit from skilled outpatient physical therapy to address the deficits listed in the problem list box on initial evaluation, provide pt/family education and to maximize pt's level of independence in the home and community environment.   Pt's spiritual, cultural and educational needs considered and pt agreeable to plan of care and goals.     Anticipated barriers to physical therapy:   co-morbidities    Goals:   Short Term Goals: 2 weeks  1. Pt will be independent with HEP supplement PT in improving functional mobility. Progressing towards  2. Pt will improve LLE thigh/hip strength to at least 70% of RLE thigh/hip strength as measured via MicroFet handheld dynamometer in order to improve functional mobility. Met 6/29/2023  3. Pt will improve L knee AROM to at least 0 - 115 without pain in order to improve gait. Met     Long Term Goals: 6 weeks  1. Pt will be independent with updated HEP supplement PT in improving functional mobility.  Met 6/29/2023  2. Pt will improve LLE thigh/hip strength to at least 80% of RLE strength as measured via MicroFet handheld dynamometer in order to improve functional mobility. Met 6/29/2023  3. Pt will improve L knee AROM to at least 0 - 125 degrees in order to improve gait and ability to perform ADLs. Met 6/29/2023  4. Pt will improve FOTO knee survey score to </= 45% limited in order to demo improved functional mobility. Met 6/29/2023  5. Pt will report > 9 point improvement on LEFS score.Met 6/29/2023  6. Pt will perform TUG in < 14 seconds without AD in order to demo improved gait speed. Met 6/29/2023  7. Pt will perform at least 5 sit to stands without UE support on 30 second sit to stand test in order to demo improved ability to perform transfers. Met 6/29/2023    Plan   CYNTHIA Colin, PT, DPT, OCS    [facility]  Discharge Note    Name: Verenice SULLIVAN Hermes  Clinic Number: [id number]    Therapy Diagnosis:   Encounter Diagnoses   Name Primary?    Acute postoperative pain of left knee Yes    Decreased range of motion (ROM) of left knee     Impaired gait and mobility      Physician: Stan Jenkins, ANNA    Physician Orders: PT Eval and Treat   Medical Diagnosis from Referral:   Z98.890 (ICD-10-CM) - S/P arthroscopic surgery of left knee   Z09 (ICD-10-CM) - Surgery follow-up examination     Evaluation Date: 5/22/2023  Date of Last  visit: 6/29/2023  Total Visits Received: 11    ASSESSMENT      Discharge reason: Patient has completed the physician's prescription, Patient has met all of his/her goals, and Patient has reached the maximum rehab potential for the present time    Discharge FOTO Score: see Media    Goals: see above    PLAN   This patient is discharged from Physical Therapy    Francisco Colin, PT, DPT, OCS

## 2023-07-04 DIAGNOSIS — E78.5 DYSLIPIDEMIA ASSOCIATED WITH TYPE 2 DIABETES MELLITUS: ICD-10-CM

## 2023-07-04 DIAGNOSIS — E11.69 DYSLIPIDEMIA ASSOCIATED WITH TYPE 2 DIABETES MELLITUS: ICD-10-CM

## 2023-07-04 RX ORDER — SEMAGLUTIDE 2.68 MG/ML
2 INJECTION, SOLUTION SUBCUTANEOUS
Qty: 9 ML | Refills: 0 | Status: SHIPPED | OUTPATIENT
Start: 2023-07-04 | End: 2023-08-31 | Stop reason: SDUPTHER

## 2023-07-04 NOTE — TELEPHONE ENCOUNTER
Care Due:                  Date            Visit Type   Department     Provider  --------------------------------------------------------------------------------                                EP -                              PRIMARY      [facility] INTERNAL  Last Visit: 03-      CARE ([facility])   MEDICINE       Areli Crespo                              ESTABLISHED                              PATIENT -    [facility] INTERNAL  Next Visit: 09-      VIRTUAL      MEDICINE       Areli Crespo                                                            Last  Test          Frequency    Reason                     Performed    Due Date  --------------------------------------------------------------------------------    HBA1C.......  6 months...  semaglutide..............  03- 09-    Health [facility] Embedded Care Due Messages. Reference number: [id number].   7/04/2023 11:12:48 AM CDT

## 2023-07-11 ENCOUNTER — HOSPITAL ENCOUNTER (OUTPATIENT)
Dept: RADIOLOGY | Facility: CLINIC | Age: 68
Discharge: HOME | End: 2023-07-11
Attending: INTERNAL MEDICINE
Payer: MEDICARE

## 2023-07-11 ENCOUNTER — HOSPITAL ENCOUNTER (OUTPATIENT)
Dept: RADIOLOGY | Facility: HOSPITAL | Age: 68
Discharge: HOME | End: 2023-07-11
Attending: INTERNAL MEDICINE
Payer: MEDICARE

## 2023-07-11 VITALS — WEIGHT: 207 LBS | BODY MASS INDEX: 38.09 KG/M2 | HEIGHT: 62 IN

## 2023-07-11 DIAGNOSIS — Z12.31 ENCOUNTER FOR SCREENING MAMMOGRAM FOR HIGH-RISK PATIENT: ICD-10-CM

## 2023-07-11 DIAGNOSIS — Z78.0 ASYMPTOMATIC MENOPAUSAL STATE: ICD-10-CM

## 2023-07-11 DIAGNOSIS — Z12.31 ENCOUNTER FOR SCREENING MAMMOGRAM FOR MALIGNANT NEOPLASM OF BREAST: ICD-10-CM

## 2023-07-11 DIAGNOSIS — M85.80 OSTEOPENIA, UNSPECIFIED LOCATION: ICD-10-CM

## 2023-07-11 DIAGNOSIS — N60.92 ATYPICAL DUCTAL HYPERPLASIA OF LEFT BREAST: ICD-10-CM

## 2023-07-11 PROCEDURE — 77067 SCR MAMMO BI INCL CAD: CPT | Mod: 26,,, | Performed by: RADIOLOGY

## 2023-07-11 PROCEDURE — 77063 MAMMO DIGITAL SCREENING BILAT WITH TOMO: ICD-10-PCS | Mod: 26,,, | Performed by: RADIOLOGY

## 2023-07-11 PROCEDURE — 77067 MAMMO DIGITAL SCREENING BILAT WITH TOMO: ICD-10-PCS | Mod: 26,,, | Performed by: RADIOLOGY

## 2023-07-11 PROCEDURE — 77063 BREAST TOMOSYNTHESIS BI: CPT | Mod: 26,,, | Performed by: RADIOLOGY

## 2023-07-11 PROCEDURE — 77080 DXA BONE DENSITY AXIAL: CPT | Mod: 26,,, | Performed by: INTERNAL MEDICINE

## 2023-07-11 PROCEDURE — 77080 DXA BONE DENSITY AXIAL: CPT | Mod: TC

## 2023-07-11 PROCEDURE — 77067 SCR MAMMO BI INCL CAD: CPT | Mod: TC

## 2023-07-11 PROCEDURE — 77080 DXA BONE DENSITY AXIAL SKELETON 1 OR MORE SITES: ICD-10-PCS | Mod: 26,,, | Performed by: INTERNAL MEDICINE

## 2023-07-13 ENCOUNTER — PATIENT MESSAGE (OUTPATIENT)
Dept: SPORTS MEDICINE | Facility: CLINIC | Age: 68
End: 2023-07-13
Payer: MEDICARE

## 2023-07-13 ENCOUNTER — PATIENT MESSAGE (OUTPATIENT)
Dept: INTERNAL MEDICINE | Facility: CLINIC | Age: 68
End: 2023-07-13
Payer: MEDICARE

## 2023-07-27 ENCOUNTER — OFFICE VISIT (OUTPATIENT)
Dept: HEMATOLOGY/ONCOLOGY | Facility: CLINIC | Age: 68
End: 2023-07-27
Payer: MEDICARE

## 2023-07-27 DIAGNOSIS — N60.92 ATYPICAL DUCTAL HYPERPLASIA OF LEFT BREAST: Primary | ICD-10-CM

## 2023-07-27 PROCEDURE — 99213 PR OFFICE/OUTPT VISIT, EST, LEVL III, 20-29 MIN: ICD-10-PCS | Mod: 95,,, | Performed by: INTERNAL MEDICINE

## 2023-07-27 PROCEDURE — 99213 OFFICE O/P EST LOW 20 MIN: CPT | Mod: 95,,, | Performed by: INTERNAL MEDICINE

## 2023-07-27 RX ORDER — RALOXIFENE HYDROCHLORIDE 60 MG/1
60 TABLET, FILM COATED ORAL DAILY
Qty: 90 TABLET | Refills: 3 | Status: SHIPPED | OUTPATIENT
Start: 2023-07-27 | End: 2023-08-09 | Stop reason: SDUPTHER

## 2023-07-27 NOTE — PROGRESS NOTES
Subjective:       Patient ID: Verenice Mirza is a 68 y.o. female.    Chief Complaint: No chief complaint on file.    HPI 69 Y/O female with history of ADH on chemoprevention with raloxifene.    The patient location is: home  The chief complaint leading to consultation is: ADH    Visit type: audiovisual    Face to Face time with patient: 5  10 minutes of total time spent on the encounter, which includes face to face time and non-face to face time preparing to see the patient (eg, review of tests), Obtaining and/or reviewing separately obtained history, Documenting clinical information in the electronic or other health record, Independently interpreting results (not separately reported) and communicating results to the patient/family/caregiver, or Care coordination (not separately reported).         Each patient to whom he or she provides medical services by telemedicine is:  (1) informed of the relationship between the physician and patient and the respective role of any other health care provider with respect to management of the patient; and (2) notified that he or she may decline to receive medical services by telemedicine and may withdraw from such care at any time.    Notes:    Doing well with no new issues.  Recent mammogram was normal.      Breast History:  Mammogram from July 1, 2019 showed architectural distortion left breast 2:00 position.  There was nothing seen by ultrasound.    On July 8, 2019 a needle biopsy was performed which showed benign breast tissue with apocrine adenosis, calcifications but no atypia.    Excisional biopsy on August 19, 2019 showed atypical ductal hyperplasia.    She started raloxifene in January 2020.    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    Mammogram 7/11/23  -negative  Review of Systems   Constitutional:  Negative for appetite change and unexpected weight change.   HENT:  Negative for mouth sores.    Eyes:  Negative for visual  disturbance.   Respiratory:  Negative for cough and shortness of breath.    Cardiovascular:  Negative for chest pain.   Gastrointestinal:  Negative for abdominal pain and diarrhea.   Musculoskeletal:  Positive for arthralgias. Negative for back pain.   Integumentary:  Negative for rash.   Neurological:  Negative for headaches.   Hematological:  Negative for adenopathy.   Psychiatric/Behavioral:  The patient is not nervous/anxious.        Objective:      Physical Exam  Constitutional:       General: She is not in acute distress.     Appearance: Normal appearance.   Neurological:      Mental Status: She is alert.   Psychiatric:         Mood and Affect: Mood normal.         Behavior: Behavior normal.         Thought Content: Thought content normal.         Judgment: Judgment normal.       Assessment:     Mammogram - 7/11/23  -negative  Problem List Items Addressed This Visit       Atypical ductal hyperplasia of left breast - Primary       Plan:     Continue Raloxifen  RTC 6 M with MRI breast      Route Chart for Scheduling    Med Onc Chart Routing      Follow up with physician 6 months.   Follow up with MEERA    Infusion scheduling note    Injection scheduling note needs IV started before MRI   Labs None   Scheduling:  Preferred lab:  Lab interval:     Imaging MRI   mid morning appt   Pharmacy appointment    Other referrals     No additional referrals needed

## 2023-08-09 ENCOUNTER — PATIENT MESSAGE (OUTPATIENT)
Dept: HEMATOLOGY/ONCOLOGY | Facility: CLINIC | Age: 68
End: 2023-08-09
Payer: MEDICARE

## 2023-08-09 DIAGNOSIS — N60.92 ATYPICAL DUCTAL HYPERPLASIA OF LEFT BREAST: ICD-10-CM

## 2023-08-09 RX ORDER — RALOXIFENE HYDROCHLORIDE 60 MG/1
60 TABLET, FILM COATED ORAL DAILY
Qty: 90 TABLET | Refills: 3 | Status: SHIPPED | OUTPATIENT
Start: 2023-08-09

## 2023-08-17 ENCOUNTER — HOSPITAL ENCOUNTER (OUTPATIENT)
Dept: RADIOLOGY | Facility: HOSPITAL | Age: 68
Discharge: HOME | End: 2023-08-17
Attending: PHYSICIAN ASSISTANT
Payer: MEDICARE

## 2023-08-17 ENCOUNTER — PATIENT MESSAGE (OUTPATIENT)
Dept: SPORTS MEDICINE | Facility: CLINIC | Age: 68
End: 2023-08-17

## 2023-08-17 ENCOUNTER — OFFICE VISIT (OUTPATIENT)
Dept: SPORTS MEDICINE | Facility: CLINIC | Age: 68
End: 2023-08-17
Payer: MEDICARE

## 2023-08-17 ENCOUNTER — PATIENT MESSAGE (OUTPATIENT)
Dept: RHEUMATOLOGY | Facility: CLINIC | Age: 68
End: 2023-08-17
Payer: MEDICARE

## 2023-08-17 VITALS — HEIGHT: 62 IN | BODY MASS INDEX: 37.24 KG/M2 | WEIGHT: 202.38 LBS

## 2023-08-17 DIAGNOSIS — M25.511 ACUTE PAIN OF RIGHT SHOULDER: ICD-10-CM

## 2023-08-17 DIAGNOSIS — M25.512 ACUTE PAIN OF LEFT SHOULDER: Primary | ICD-10-CM

## 2023-08-17 PROCEDURE — 99214 OFFICE O/P EST MOD 30 MIN: CPT | Mod: PBBFAC,25 | Performed by: PHYSICIAN ASSISTANT

## 2023-08-17 PROCEDURE — 20610 DRAIN/INJ JOINT/BURSA W/O US: CPT | Mod: S$PBB,LT,, | Performed by: PHYSICIAN ASSISTANT

## 2023-08-17 PROCEDURE — 73030 X-RAY EXAM OF SHOULDER: CPT | Mod: TC,LT

## 2023-08-17 PROCEDURE — 99214 OFFICE O/P EST MOD 30 MIN: CPT | Mod: 25,S$PBB,, | Performed by: PHYSICIAN ASSISTANT

## 2023-08-17 PROCEDURE — 99999PBSHW PR PBB SHADOW TECHNICAL ONLY FILED TO HB: ICD-10-PCS | Mod: PBBFAC,,,

## 2023-08-17 PROCEDURE — 99999 PR PBB SHADOW E&M-EST. PATIENT-LVL IV: CPT | Mod: PBBFAC,,, | Performed by: PHYSICIAN ASSISTANT

## 2023-08-17 PROCEDURE — 20610 LARGE JOINT ASPIRATION/INJECTION: L SUBACROMIAL BURSA: ICD-10-PCS | Mod: S$PBB,LT,, | Performed by: PHYSICIAN ASSISTANT

## 2023-08-17 PROCEDURE — 99214 PR OFFICE/OUTPT VISIT, EST, LEVL IV, 30-39 MIN: ICD-10-PCS | Mod: 25,S$PBB,, | Performed by: PHYSICIAN ASSISTANT

## 2023-08-17 PROCEDURE — 99999 PR PBB SHADOW E&M-EST. PATIENT-LVL IV: ICD-10-PCS | Mod: PBBFAC,,, | Performed by: PHYSICIAN ASSISTANT

## 2023-08-17 PROCEDURE — 20610 DRAIN/INJ JOINT/BURSA W/O US: CPT | Mod: PBBFAC | Performed by: PHYSICIAN ASSISTANT

## 2023-08-17 PROCEDURE — 73030 X-RAY EXAM OF SHOULDER: CPT | Mod: 26,LT,, | Performed by: RADIOLOGY

## 2023-08-17 PROCEDURE — 73030 XR SHOULDER COMPLETE 2 OR MORE VIEWS LEFT: ICD-10-PCS | Mod: 26,LT,, | Performed by: RADIOLOGY

## 2023-08-17 PROCEDURE — 99999PBSHW PR PBB SHADOW TECHNICAL ONLY FILED TO HB: Mod: PBBFAC,,,

## 2023-08-17 RX ORDER — TRIAMCINOLONE ACETONIDE 40 MG/ML
40 INJECTION, SUSPENSION INTRA-ARTICULAR; INTRAMUSCULAR
Status: DISCONTINUED | OUTPATIENT
Start: 2023-08-17 | End: 2023-08-17 | Stop reason: HOSPADM

## 2023-08-17 RX ADMIN — TRIAMCINOLONE ACETONIDE 40 MG: 40 INJECTION, SUSPENSION INTRA-ARTICULAR; INTRAMUSCULAR at 01:08

## 2023-08-17 NOTE — PROCEDURES
Large Joint Aspiration/Injection: L subacromial bursa    Date/Time: 8/17/2023 1:00 PM    Performed by: Stan Jenkins PA-C  Authorized by: Stan Jenkins PA-C    Consent Done?:  Yes (Verbal)  Indications:  Pain and arthritis  Site marked: the procedure site was marked    Timeout: prior to procedure the correct patient, procedure, and site was verified    Prep: patient was prepped and draped in usual sterile fashion    Local anesthesia used?: No      Details:  Needle Size:  22 G  Ultrasonic Guidance for needle placement?: No    Approach:  Posterior  Location:  Shoulder  Site:  L subacromial bursa  Medications:  40 mg triamcinolone acetonide 40 mg/mL  Patient tolerance:  Patient tolerated the procedure well with no immediate complications    Injection Procedure  A time out was performed, including verification of patient ID, procedure, site and side, availability of information and equipment, review of safety issues, and agreement with consent, the procedure site was marked.    After time out was performed, the patient was prepped aseptically with povidone-iodine swabsticks. A diagnostic and therapeutic injection of 1:3cc Kenalog/Marcaine was given under sterile technique using a 22g x 1.5 needle from the Posterior  aspect of the left Subacromial in the sitting position.      Verenice Mirza had no adverse reactions to the medication. Pain decreased. She was instructed to apply ice to the joint for 20 minutes and avoid strenuous activities for 24-36 hours following the injection. She was warned of possible blood sugar and/or blood pressure changes during that time. Following that time, she can resume regular activities.    She was reminded to call the clinic immediately for any adverse side effects as explained in clinic today.

## 2023-08-17 NOTE — PROGRESS NOTES
CC: LEFT shoulder pain    Patient is a 68-year-old female who presents today for initial evaluation of left shoulder pain.  She has a history of bilateral knee arthroscopy with Dr. Artis, and she reports that both her knees are doing well.  Patient states that she awoke this morning at around 1:00 a.m. to severe pain of her left shoulder.  She does not recall any mechanism of injury or trauma to the left shoulder yesterday or in the days leading up to this.  Pain is described as a soreness with occasional bursts of sharp pain that occurs with any movement of the left shoulder.  She denies any associated numbness or tingling of the left upper extremity.  She feels some mild weakness of left shoulder as well, but feels this is primarily from guarding due to pain.  She has had a similar episode with her right shoulder in the years past which responded very well to a corticosteroid injection.  Thus far treatment has included meloxicam, rest, and ice compresses with little relief.    Is affecting ADLs.  Pain is 7/10 at it's worst.      Past Medical History:   Diagnosis Date    Acute seasonal allergic rhinitis     Atypical ductal hyperplasia of left breast     BMI 40.0-44.9, adult     Breast cyst     Diabetes mellitus     Dysphagia     Dysplastic nevus of trunk 03/2017    moderately atypical    Fatty liver disease, nonalcoholic     Fibrocystic breast     Hiatal hernia     Hyperlipidemia     Hypertension     Hypothyroid     IGT (impaired glucose tolerance)     Irritable bowel syndrome (IBS)     Kidney stones     Left breast mass     Lumbar disc disease     Morbid obesity     Nicotine use disorder     JORDANA on CPAP     PAD (peripheral artery disease)     Personal history of colonic polyps 05/27/2009    PVD (peripheral vascular disease)     Renal angiomyolipoma     Rosacea     Squamous cell carcinoma 12/2017    in situ mid scalp    Venous insufficiency     Vitamin D deficiency disease        Past Surgical History:    Procedure Laterality Date    ARTHROSCOPIC CHONDROPLASTY OF KNEE JOINT Right 12/8/2021    Procedure: ARTHROSCOPY, KNEE, WITH CHONDROPLASTY;  Surgeon: Bin Artis MD;  Location: [facility];  Service: Orthopedics;  Laterality: Right;    ARTHROSCOPIC CHONDROPLASTY OF KNEE JOINT Left 4/26/2023    Procedure: ARTHROSCOPY, KNEE, WITH CHONDROPLASTY;  Surgeon: Bin Artis MD;  Location: [facility] OR;  Service: Orthopedics;  Laterality: Left;    BLADDER SUSPENSION      BREAST BIOPSY      COLONOSCOPY N/A 5/24/2017    Procedure: COLONOSCOPY;  Surgeon: Georgina Bunch MD;  Location: [facility];  Service: Endoscopy;  Laterality: N/A;    COLONOSCOPY N/A 7/28/2022    Procedure: COLONOSCOPY Suprep;  Surgeon: Cedrick Hernandez MD;  Location: [facility];  Service: Endoscopy;  Laterality: N/A;    CYSTOSCOPY      ESOPHAGOGASTRODUODENOSCOPY N/A 12/5/2018    Procedure: ESOPHAGOGASTRODUODENOSCOPY (EGD);  Surgeon: Georgina Bunch MD;  Location: [facility];  Service: Endoscopy;  Laterality: N/A;  1000 am arrival time, has transportation set up    EXCISIONAL BIOPSY Left 8/19/2019    Procedure: EXCISIONAL BIOPSY LEFT BREAST with SEED LOCALIZATION;  Surgeon: Ramon Bass MD;  Location: [address];  Service: General;  Laterality: Left;    HYSTERECTOMY      KNEE ARTHROSCOPY W/ MENISCECTOMY Right 12/8/2021    Procedure: ARTHROSCOPY, KNEE, WITH MENISCECTOMY;  Surgeon: Bin Artis MD;  Location: [facility];  Service: Orthopedics;  Laterality: Right;  medial & lateral    KNEE ARTHROSCOPY W/ MENISCECTOMY Left 4/26/2023    Procedure: ARTHROSCOPY, KNEE, WITH PARTIAL MEDIAL AND LATERAL MENISCECTOMY;  Surgeon: Bin Artis MD;  Location: [facility];  Service: Orthopedics;  Laterality: Left;  regional w/o catheter (adductor)    LITHOTRIPSY      OOPHORECTOMY         Family History   Problem Relation Age of Onset    Alzheimer's disease Mother     Breast cancer Mother     Skin cancer Sister     Diabetes type II  Maternal Grandfather     Depression Maternal Grandfather     Breast cancer Paternal Aunt     Lupus Neg Hx     Psoriasis Neg Hx     Melanoma Neg Hx          Current Outpatient Medications:     albuterol (PROVENTIL/VENTOLIN HFA) 90 mcg/actuation inhaler, Inhale 2 puffs into the lungs every 6 (six) hours as needed for Wheezing., Disp: 18 g, Rfl: 11    aspirin (ECOTRIN) 81 MG EC tablet, Take 81 mg by mouth 2 (two) times daily. Takes the first dose after lunch, Disp: , Rfl:     atorvastatin (LIPITOR) 40 MG tablet, Take 1 tablet (40 mg total) by mouth once daily., Disp: 90 tablet, Rfl: 3    b complex vitamins tablet, Take 1 tablet by mouth after lunch. , Disp: , Rfl:     blood sugar diagnostic (TRUE METRIX GLUCOSE TEST STRIP) Strp, 1 strip by Misc.(Non-Drug; Combo Route) route once daily., Disp: 100 strip, Rfl: 3    calcium carbonate (TUMS) 200 mg calcium (500 mg) chewable tablet, Take 1 tablet by mouth as needed., Disp: , Rfl:     co-enzyme Q-10 30 mg capsule, Take 30 mg by mouth 2 (two) times daily. , Disp: , Rfl:     finasteride (PROSCAR) 5 mg tablet, Take 1 tablet (5 mg total) by mouth every evening., Disp: 90 tablet, Rfl: 3    inhalation spacing device (MICROSPACER), Use as directed for inhalation., Disp: 1 Device, Rfl: 0    ketoconazole (NIZORAL) 2 % cream, APPLY  THIN LAYER TOPICALLY ONCE DAILY AT BEDTIME UNDER  BREAST  AS  NEEDED  FOR  FLARE, Disp: 60 g, Rfl: 6    LACTOBAC CMB #3/FOS/PANTETHINE (PROBIOTIC & ACIDOPHILUS ORAL), Take 1 tablet by mouth after lunch. , Disp: , Rfl:     lancets (ONETOUCH ULTRASOFT LANCETS) Misc, 1 lancet by Misc.(Non-Drug; Combo Route) route once daily., Disp: 100 each, Rfl: 3    levothyroxine (SYNTHROID) 88 MCG tablet, Take 1 tablet (88 mcg total) by mouth before breakfast., Disp: 90 tablet, Rfl: 3    meloxicam (MOBIC) 7.5 MG tablet, Take 1 tablet (7.5 mg total) by mouth daily as needed for Pain., Disp: 90 tablet, Rfl: 0    metoprolol succinate (TOPROL-XL) 50 MG 24 hr tablet, Take 1  tablet (50 mg total) by mouth every evening., Disp: 90 tablet, Rfl: 3    nystatin (MYCOSTATIN) powder, aaa qam prn flare, Disp: 120 g, Rfl: 6    omega-3 acid ethyl esters (LOVAZA) 1 gram capsule, Take 2 capsules (2 g total) by mouth 2 (two) times daily., Disp: 360 capsule, Rfl: 1    raloxifene (EVISTA) 60 mg tablet, Take 1 tablet (60 mg total) by mouth once daily., Disp: 90 tablet, Rfl: 3    semaglutide (OZEMPIC) 2 mg/dose (8 mg/3 mL) PnIj, Inject 2 mg into the skin every 7 days., Disp: 9 mL, Rfl: 0    sour cherry extract (TART CHERRY EXTRACT ORAL), Take by mouth., Disp: , Rfl:     spironolactone (ALDACTONE) 100 MG tablet, Take 1 tablet (100 mg total) by mouth once daily., Disp: 90 tablet, Rfl: 3    sulfaSALAzine (AZULFIDINE) 500 mg Tab, Take 3 tablets (1,500 mg total) by mouth 2 (two) times daily., Disp: 540 tablet, Rfl: 0    vitamin D 1000 units Tab, Take 185 mg by mouth after lunch. , Disp: , Rfl:     omeprazole (PRILOSEC) 40 MG capsule, Take 1 capsule (40 mg total) by mouth once daily., Disp: 90 capsule, Rfl: 1  No current facility-administered medications for this visit.    Facility-Administered Medications Ordered in Other Visits:     fentaNYL 50 mcg/mL injection  mcg,  mcg, Intravenous, Q5 Min PRN, Omar Gresham MD    midazolam (VERSED) 1 mg/mL injection 0.5-4 mg, 0.5-4 mg, Intravenous, PRN, Omar Gresham MD    Review of patient's allergies indicates:  No Known Allergies       REVIEW OF SYSTEMS:  Constitution: Negative. Negative for chills, fever and night sweats.   HENT: Negative for congestion and headaches.    Eyes: Negative for blurred vision, left vision loss and right vision loss.   Cardiovascular: Negative for chest pain and syncope.   Respiratory: Negative for cough and shortness of breath.    Endocrine: Negative for polydipsia, polyphagia and polyuria.   Hematologic/Lymphatic: Negative for bleeding problem. Does not bruise/bleed easily.   Skin: Negative for dry skin, itching and  "rash.   Musculoskeletal: Negative for falls.  Positive for left shoulder pain and muscle weakness.   Gastrointestinal: Negative for abdominal pain and bowel incontinence.   Genitourinary: Negative for bladder incontinence and nocturia.   Neurological: Negative for disturbances in coordination, loss of balance and seizures.   Psychiatric/Behavioral: Negative for depression. The patient does not have insomnia.    Allergic/Immunologic: Negative for hives and persistent infections.      PHYSICAL EXAMINATION:  Vitals:  Ht 5' 2" (1.575 m)   Wt 91.8 kg (202 lb 6.1 oz)   BMI 37.02 kg/m²    General: The patient is alert and oriented x 3.  Mood is pleasant.  Observation of ears, eyes and nose reveal no gross abnormalities.  No labored breathing observed.  Gait is coordinated. Patient can toe walk and heel walk without difficulty.      LEFT Shoulder / Upper Extremity Exam    OBSERVATION:     Swelling  none  Deformity  none   Discoloration  none   Scapular winging none   Scars   none  Atrophy  none    TENDERNESS / CREPITUS (T/C):          T/C      T/C   Clavicle   -/-  SUPRAspinatus    +/-     AC Jt.    -/-  INFRAspinatus  +/-    SC Jt.    -/-  Deltoid    -/-      G. Tuberosity  -/-  LH BICEP groove  +/-   Acromion:  -/-  Midline Neck   -/-     Scapular Spine -/-  Trapezium   -/-   SMA Scapula  -/-  GH jt. line - post  -/-     Scapulothoracic  -/-         ROM: (* = with pain)  Right shoulder   Left shoulder        AROM (PROM)   AROM (PROM)   FE    170° (175°)     110°* (120°*)     ER at 0°    60°  (65°)    50°*  (55°*)   ER at 90° ABD  90°  (90°)    NT     IR at 90°  ABD   NA  (40°)     NT     IR (spine level)   T10     Back pocket*    STRENGTH: (* = with pain) Right shoulder   Left shoulder    SCAPTION   5/5    3/5*    IR    5/5    4/5*   ER    5/5    4/5*   BICEPS   5/5    4/5*   Deltoid    5/5    5/5     SIGNS:  Painful side       NEER   -    OSIMS  +    BUSTAMANTE   +    SPEEDS  +     DROP ARM   -   BELLY " PRESS neg   Superior escape none    LIFT-OFF  neg   X-Body ADD    +    MOVING VALGUS neg        STABILITY TESTING    Right shoulder   Left shoulder    Translation     Anterior  up face    up face    Posterior  up face    up face    Sulcus   < 10mm   < 10 mm     Signs   Apprehension   neg      neg       Relocation   no change     no change      Jerk test  neg     neg    EXTREMITY NEURO-VASCULAR EXAM:    Sensation grossly intact to light touch all dermatomal regions.    DTR 2+ Biceps, Triceps, BR and Negative Nakuls sign   Grossly intact motor function at Elbow, Wrist and Hand   Distal pulses radial and ulnar 2+, brisk cap refill, symmetric.      NECK:  Painless FROM and spinous processes non-tender. Negative Spurlings sign.      OTHER FINDINGS:  - scapular dyskinesia    XRAYS left shoulder (8/17/2023):  Xrays including AP, Outlet and Axillary Lateral of Left shoulder are ordered / images reviewed by me:  No acute fracture or dislocation.  Glenohumeral and acromioclavicular joints appear well aligned with mild arthritis.  Soft tissues appear normal without findings suggestive of calcific tendinitis.          ASSESSMENT:     Acute left shoulder pain        PLAN:      I made the decision to obtain old records of the patient including previous notes and imaging. New imaging was ordered today of the extremity or extremities evaluated. I independently reviewed and interpreted the radiographs and/or MRIs today as well as prior imaging, if available.    We discussed at length different treatment options including conservative vs surgical management. These include anti-inflammatories, acetaminophen, rest, ice, heat, formal physical therapy including strengthening and stretching exercises, home exercise programs, dry needling, corticosteroid injections, and finally surgical intervention.      Left shoulder subacromial corticosteroid injection performed today.  See procedure note for details.    Recommend she continue to  rest and ice the left shoulder and take meloxicam once daily as needed for pain.    Follow-up as scheduled with Dr. Artis.      All questions were answered, patient will contact us for questions or concerns in the interim.      Medical Dictation software was used during the dictation of portions or the entirety of this medical record.  Phonetic or grammatic errors may exist due to the use of this software. For clarification, refer to the author of the document.

## 2023-08-22 ENCOUNTER — OFFICE VISIT (OUTPATIENT)
Dept: SPORTS MEDICINE | Facility: CLINIC | Age: 68
End: 2023-08-22
Payer: MEDICARE

## 2023-08-22 VITALS
HEIGHT: 62 IN | DIASTOLIC BLOOD PRESSURE: 72 MMHG | BODY MASS INDEX: 37.32 KG/M2 | SYSTOLIC BLOOD PRESSURE: 120 MMHG | WEIGHT: 202.81 LBS | HEART RATE: 70 BPM

## 2023-08-22 DIAGNOSIS — Z98.890 S/P ARTHROSCOPIC SURGERY OF LEFT KNEE: Primary | ICD-10-CM

## 2023-08-22 PROCEDURE — 99999 PR PBB SHADOW E&M-EST. PATIENT-LVL IV: ICD-10-PCS | Mod: PBBFAC,,, | Performed by: ORTHOPAEDIC SURGERY

## 2023-08-22 PROCEDURE — 99999 PR PBB SHADOW E&M-EST. PATIENT-LVL IV: CPT | Mod: PBBFAC,,, | Performed by: ORTHOPAEDIC SURGERY

## 2023-08-22 PROCEDURE — 99024 POSTOP FOLLOW-UP VISIT: CPT | Mod: POP,,, | Performed by: ORTHOPAEDIC SURGERY

## 2023-08-22 PROCEDURE — 99024 PR POST-OP FOLLOW-UP VISIT: ICD-10-PCS | Mod: POP,,, | Performed by: ORTHOPAEDIC SURGERY

## 2023-08-22 PROCEDURE — 99214 OFFICE O/P EST MOD 30 MIN: CPT | Mod: PBBFAC | Performed by: ORTHOPAEDIC SURGERY

## 2023-08-22 NOTE — PROGRESS NOTES
"CC: Left knee scope post op 12 weeks    Patient is here for her 12 week post op appointment s/p below and is doing well. Patient has been discharged from PT and has progressed to an HEP.       DATE OF PROCEDURE:  4/26/2023      PREOPERATIVE DIAGNOSES:   1. Left knee chondromalacia  2. Left knee medial meniscus tear.   3. Left knee lateral meniscus tear     POSTOPERATIVE DIAGNOSES:   1. Left knee chondromalacia  2. Left knee medial meniscus tear.   3. Left knee lateral meniscus tear     PROCEDURES:   1. Left knee arthroscopic chondroplasty  2. Left knee arthroscopic partial medial and lateral meniscectomies     SURGEON: Bin Artis M.D.     PE:    /72   Pulse 70   Ht 5' 2" (1.575 m)   Wt 92 kg (202 lb 13.2 oz)   BMI 37.10 kg/m²      Left knee:    Incisions clean/dry/intact  No sign of infection  Mild swelling  Compartments soft  Neurovascular status intact in extremity    AROM 0 to 120 degrees  Decreased quad strength  Minimal to no effusion    Assessment:  12 weeks s/p left knee arthroscopic chondroplasty and partial medial and lateral menisectomies    Plan:  1.  Continue HEP    2. F/u PRN       All questions were answered. Instructed patient to call with questions or concerns in the interim.         "

## 2023-08-24 ENCOUNTER — PATIENT MESSAGE (OUTPATIENT)
Dept: INTERNAL MEDICINE | Facility: CLINIC | Age: 68
End: 2023-08-24
Payer: MEDICARE

## 2023-08-24 DIAGNOSIS — R06.02 SOB (SHORTNESS OF BREATH): ICD-10-CM

## 2023-08-24 DIAGNOSIS — M79.89 LEG SWELLING: Primary | ICD-10-CM

## 2023-08-28 ENCOUNTER — PATIENT MESSAGE (OUTPATIENT)
Dept: RHEUMATOLOGY | Facility: CLINIC | Age: 68
End: 2023-08-28
Payer: MEDICARE

## 2023-08-28 DIAGNOSIS — M05.9 SEROPOSITIVE RHEUMATOID ARTHRITIS: ICD-10-CM

## 2023-08-28 PROBLEM — Z09 SURGERY FOLLOW-UP EXAMINATION: Status: RESOLVED | Noted: 2023-05-23 | Resolved: 2023-08-28

## 2023-08-28 PROBLEM — M25.562 ACUTE POSTOPERATIVE PAIN OF LEFT KNEE: Status: RESOLVED | Noted: 2023-05-23 | Resolved: 2023-08-28

## 2023-08-28 PROBLEM — G89.18 ACUTE POSTOPERATIVE PAIN OF LEFT KNEE: Status: RESOLVED | Noted: 2023-05-23 | Resolved: 2023-08-28

## 2023-08-29 RX ORDER — SULFASALAZINE 500 MG/1
1500 TABLET ORAL 2 TIMES DAILY
Qty: 540 TABLET | Refills: 0 | Status: SHIPPED | OUTPATIENT
Start: 2023-08-29 | End: 2023-09-22 | Stop reason: SDUPTHER

## 2023-08-31 DIAGNOSIS — E11.69 DYSLIPIDEMIA ASSOCIATED WITH TYPE 2 DIABETES MELLITUS: ICD-10-CM

## 2023-08-31 DIAGNOSIS — E78.5 DYSLIPIDEMIA ASSOCIATED WITH TYPE 2 DIABETES MELLITUS: ICD-10-CM

## 2023-08-31 RX ORDER — SEMAGLUTIDE 2.68 MG/ML
2 INJECTION, SOLUTION SUBCUTANEOUS
Qty: 9 ML | Refills: 0 | Status: SHIPPED | OUTPATIENT
Start: 2023-08-31 | End: 2023-12-01

## 2023-08-31 NOTE — TELEPHONE ENCOUNTER
No care due was identified.  [facility] Embedded Care Due Messages. Reference number: [id number].   8/31/2023 12:55:10 PM CDT

## 2023-08-31 NOTE — TELEPHONE ENCOUNTER
Refill Decision Note   Verenice Mirza  is requesting a refill authorization.  Brief Assessment and Rationale for Refill:  Approve     Medication Therapy Plan:       Medication Reconciliation Completed: No   Comments:     No Care Gaps recommended.     Note composed:6:18 PM 08/31/2023

## 2023-09-08 ENCOUNTER — HOSPITAL ENCOUNTER (OUTPATIENT)
Dept: CARDIOLOGY | Facility: HOSPITAL | Age: 68
Discharge: HOME | End: 2023-09-08
Attending: INTERNAL MEDICINE
Payer: MEDICARE

## 2023-09-08 VITALS — HEIGHT: 62 IN | BODY MASS INDEX: 37.17 KG/M2 | WEIGHT: 202 LBS

## 2023-09-08 DIAGNOSIS — M79.89 LEG SWELLING: ICD-10-CM

## 2023-09-08 DIAGNOSIS — R06.02 SOB (SHORTNESS OF BREATH): ICD-10-CM

## 2023-09-08 LAB
ASCENDING AORTA: 2.83 CM
AV MEAN GRADIENT: 4 MMHG
AV PEAK GRADIENT: 8 MMHG
BSA FOR ECHO PROCEDURE: 2 M2
CV ECHO LV RWT: 0.48 CM
DOP CALC AO PEAK VEL: 1.37 M/S
DOP CALC AO VTI: 31 CM
DOP CALC LVOT AREA: 3.1 CM2
DOP CALC LVOT DIAMETER: 2 CM
DOP CALC MV VTI: 22.4 CM
E WAVE DECELERATION TIME: 275 MSEC
E/A RATIO: 0.73
E/E' RATIO: 11 M/S
ECHO LV POSTERIOR WALL: 0.93 CM (ref 0.6–1.1)
FRACTIONAL SHORTENING: 47 % (ref 28–44)
INTERVENTRICULAR SEPTUM: 1.09 CM (ref 0.6–1.1)
IVC DIAMETER: 1.69 CM
LA MAJOR: 3.48 CM
LA MINOR: 3.07 CM
LA WIDTH: 2.5 CM
LEFT ATRIUM SIZE: 2.7 CM
LEFT ATRIUM VOLUME INDEX MOD: 10.2 ML/M2
LEFT ATRIUM VOLUME INDEX: 9.7 ML/M2
LEFT ATRIUM VOLUME MOD: 19.55 CM3
LEFT ATRIUM VOLUME: 18.72 CM3
LEFT INTERNAL DIMENSION IN SYSTOLE: 2.03 CM (ref 2.1–4)
LEFT VENTRICLE DIASTOLIC VOLUME INDEX: 33.48 ML/M2
LEFT VENTRICLE DIASTOLIC VOLUME: 64.28 ML
LEFT VENTRICLE MASS INDEX: 63 G/M2
LEFT VENTRICLE SYSTOLIC VOLUME INDEX: 6.8 ML/M2
LEFT VENTRICLE SYSTOLIC VOLUME: 13.14 ML
LEFT VENTRICULAR INTERNAL DIMENSION IN DIASTOLE: 3.86 CM (ref 3.5–6)
LEFT VENTRICULAR MASS: 121.89 G
LV LATERAL E/E' RATIO: 11 M/S
LV SEPTAL E/E' RATIO: 11 M/S
MV MEAN GRADIENT: 1 MMHG
MV PEAK A VEL: 0.9 M/S
MV PEAK E VEL: 0.66 M/S
MV PEAK GRADIENT: 4 MMHG
MV STENOSIS PRESSURE HALF TIME: 79.75 MS
MV VALVE AREA P 1/2 METHOD: 2.76 CM2
OHS LV EJECTION FRACTION SIMPSONS BIPLANE MOD: 66 %
PISA TR MAX VEL: 2.17 M/S
RA MAJOR: 3.16 CM
RA PRESSURE ESTIMATED: 8 MMHG
RA WIDTH: 2.52 CM
RIGHT VENTRICULAR END-DIASTOLIC DIMENSION: 3.04 CM
RV TB RVSP: 10 MMHG
RV TISSUE DOPPLER FREE WALL SYSTOLIC VELOCITY 1 (APICAL 4 CHAMBER VIEW): 10.29 CM/S
SINUS: 3.11 CM
STJ: 2.59 CM
TDI LATERAL: 0.06 M/S
TDI SEPTAL: 0.06 M/S
TDI: 0.06 M/S
TR MAX PG: 19 MMHG
TRICUSPID ANNULAR PLANE SYSTOLIC EXCURSION: 1.77 CM
TV REST PULMONARY ARTERY PRESSURE: 27 MMHG
Z-SCORE OF LEFT VENTRICULAR DIMENSION IN END DIASTOLE: -3.39
Z-SCORE OF LEFT VENTRICULAR DIMENSION IN END SYSTOLE: -3.93

## 2023-09-08 PROCEDURE — 93306 ECHO (CUPID ONLY): ICD-10-PCS | Mod: 26,,, | Performed by: INTERNAL MEDICINE

## 2023-09-08 PROCEDURE — 93306 TTE W/DOPPLER COMPLETE: CPT | Mod: 26,,, | Performed by: INTERNAL MEDICINE

## 2023-09-08 PROCEDURE — 93306 TTE W/DOPPLER COMPLETE: CPT

## 2023-09-18 ENCOUNTER — LAB VISIT (OUTPATIENT)
Dept: LAB | Facility: HOSPITAL | Age: 68
End: 2023-09-18
Attending: INTERNAL MEDICINE
Payer: MEDICARE

## 2023-09-18 DIAGNOSIS — Z79.899 HIGH RISK MEDICATION USE: ICD-10-CM

## 2023-09-18 DIAGNOSIS — E03.9 HYPOTHYROIDISM, UNSPECIFIED TYPE: ICD-10-CM

## 2023-09-18 DIAGNOSIS — I15.2 HYPERTENSION ASSOCIATED WITH DIABETES: ICD-10-CM

## 2023-09-18 DIAGNOSIS — E78.5 DYSLIPIDEMIA ASSOCIATED WITH TYPE 2 DIABETES MELLITUS: ICD-10-CM

## 2023-09-18 DIAGNOSIS — E11.69 DYSLIPIDEMIA ASSOCIATED WITH TYPE 2 DIABETES MELLITUS: ICD-10-CM

## 2023-09-18 DIAGNOSIS — E11.59 HYPERTENSION ASSOCIATED WITH DIABETES: ICD-10-CM

## 2023-09-18 LAB
ALBUMIN SERPL BCP-MCNC: 4 G/DL (ref 3.5–5.2)
ALBUMIN SERPL BCP-MCNC: 4 G/DL (ref 3.5–5.2)
ALP SERPL-CCNC: 83 U/L (ref 55–135)
ALP SERPL-CCNC: 83 U/L (ref 55–135)
ALT SERPL W/O P-5'-P-CCNC: 27 U/L (ref 10–44)
ALT SERPL W/O P-5'-P-CCNC: 27 U/L (ref 10–44)
ANION GAP SERPL CALC-SCNC: 7 MMOL/L (ref 8–16)
ANION GAP SERPL CALC-SCNC: 7 MMOL/L (ref 8–16)
AST SERPL-CCNC: 20 U/L (ref 10–40)
AST SERPL-CCNC: 20 U/L (ref 10–40)
BASOPHILS # BLD AUTO: 0.04 K/UL (ref 0–0.2)
BASOPHILS NFR BLD: 0.7 % (ref 0–1.9)
BILIRUB SERPL-MCNC: 0.4 MG/DL (ref 0.1–1)
BILIRUB SERPL-MCNC: 0.4 MG/DL (ref 0.1–1)
BUN SERPL-MCNC: 22 MG/DL (ref 8–23)
BUN SERPL-MCNC: 22 MG/DL (ref 8–23)
CALCIUM SERPL-MCNC: 9.8 MG/DL (ref 8.7–10.5)
CALCIUM SERPL-MCNC: 9.8 MG/DL (ref 8.7–10.5)
CHLORIDE SERPL-SCNC: 109 MMOL/L (ref 95–110)
CHLORIDE SERPL-SCNC: 109 MMOL/L (ref 95–110)
CHOLEST SERPL-MCNC: 169 MG/DL (ref 120–199)
CHOLEST/HDLC SERPL: 3.6 {RATIO} (ref 2–5)
CO2 SERPL-SCNC: 22 MMOL/L (ref 23–29)
CO2 SERPL-SCNC: 22 MMOL/L (ref 23–29)
CREAT SERPL-MCNC: 1 MG/DL (ref 0.5–1.4)
CREAT SERPL-MCNC: 1 MG/DL (ref 0.5–1.4)
CRP SERPL-MCNC: 0.8 MG/L (ref 0–8.2)
DIFFERENTIAL METHOD: ABNORMAL
EOSINOPHIL # BLD AUTO: 0.1 K/UL (ref 0–0.5)
EOSINOPHIL NFR BLD: 1.2 % (ref 0–8)
ERYTHROCYTE [DISTWIDTH] IN BLOOD BY AUTOMATED COUNT: 15.2 % (ref 11.5–14.5)
ERYTHROCYTE [SEDIMENTATION RATE] IN BLOOD BY PHOTOMETRIC METHOD: 4 MM/HR (ref 0–36)
EST. GFR  (NO RACE VARIABLE): >60 ML/MIN/1.73 M^2
EST. GFR  (NO RACE VARIABLE): >60 ML/MIN/1.73 M^2
ESTIMATED AVG GLUCOSE: 97 MG/DL (ref 68–131)
GLUCOSE SERPL-MCNC: 118 MG/DL (ref 70–110)
GLUCOSE SERPL-MCNC: 118 MG/DL (ref 70–110)
HBA1C MFR BLD: 5 % (ref 4–5.6)
HCT VFR BLD AUTO: 45 % (ref 37–48.5)
HDLC SERPL-MCNC: 47 MG/DL (ref 40–75)
HDLC SERPL: 27.8 % (ref 20–50)
HGB BLD-MCNC: 14.1 G/DL (ref 12–16)
IMM GRANULOCYTES # BLD AUTO: 0.02 K/UL (ref 0–0.04)
IMM GRANULOCYTES NFR BLD AUTO: 0.4 % (ref 0–0.5)
LDLC SERPL CALC-MCNC: 99.6 MG/DL (ref 63–159)
LYMPHOCYTES # BLD AUTO: 1.8 K/UL (ref 1–4.8)
LYMPHOCYTES NFR BLD: 31.8 % (ref 18–48)
MCH RBC QN AUTO: 30.9 PG (ref 27–31)
MCHC RBC AUTO-ENTMCNC: 31.3 G/DL (ref 32–36)
MCV RBC AUTO: 99 FL (ref 82–98)
MONOCYTES # BLD AUTO: 0.4 K/UL (ref 0.3–1)
MONOCYTES NFR BLD: 6.2 % (ref 4–15)
NEUTROPHILS # BLD AUTO: 3.4 K/UL (ref 1.8–7.7)
NEUTROPHILS NFR BLD: 59.7 % (ref 38–73)
NONHDLC SERPL-MCNC: 122 MG/DL
NRBC BLD-RTO: 0 /100 WBC
PLATELET # BLD AUTO: 249 K/UL (ref 150–450)
PMV BLD AUTO: 10.8 FL (ref 9.2–12.9)
POTASSIUM SERPL-SCNC: 3.9 MMOL/L (ref 3.5–5.1)
POTASSIUM SERPL-SCNC: 3.9 MMOL/L (ref 3.5–5.1)
PROT SERPL-MCNC: 6.9 G/DL (ref 6–8.4)
PROT SERPL-MCNC: 6.9 G/DL (ref 6–8.4)
RBC # BLD AUTO: 4.56 M/UL (ref 4–5.4)
SODIUM SERPL-SCNC: 138 MMOL/L (ref 136–145)
SODIUM SERPL-SCNC: 138 MMOL/L (ref 136–145)
T4 FREE SERPL-MCNC: 1.01 NG/DL (ref 0.71–1.51)
TRIGL SERPL-MCNC: 112 MG/DL (ref 30–150)
TSH SERPL DL<=0.005 MIU/L-ACNC: 4.05 UIU/ML (ref 0.4–4)
WBC # BLD AUTO: 5.63 K/UL (ref 3.9–12.7)

## 2023-09-18 PROCEDURE — 85652 RBC SED RATE AUTOMATED: CPT | Performed by: INTERNAL MEDICINE

## 2023-09-18 PROCEDURE — 84439 ASSAY OF FREE THYROXINE: CPT | Performed by: INTERNAL MEDICINE

## 2023-09-18 PROCEDURE — 83036 HEMOGLOBIN GLYCOSYLATED A1C: CPT | Performed by: INTERNAL MEDICINE

## 2023-09-18 PROCEDURE — 85025 COMPLETE CBC W/AUTO DIFF WBC: CPT | Performed by: INTERNAL MEDICINE

## 2023-09-18 PROCEDURE — 80053 COMPREHEN METABOLIC PANEL: CPT | Performed by: INTERNAL MEDICINE

## 2023-09-18 PROCEDURE — 86140 C-REACTIVE PROTEIN: CPT | Performed by: INTERNAL MEDICINE

## 2023-09-18 PROCEDURE — 36415 COLL VENOUS BLD VENIPUNCTURE: CPT | Mod: PO | Performed by: INTERNAL MEDICINE

## 2023-09-18 PROCEDURE — 84443 ASSAY THYROID STIM HORMONE: CPT | Performed by: INTERNAL MEDICINE

## 2023-09-18 PROCEDURE — 80061 LIPID PANEL: CPT | Performed by: INTERNAL MEDICINE

## 2023-09-20 ENCOUNTER — OFFICE VISIT (OUTPATIENT)
Dept: INTERNAL MEDICINE | Facility: CLINIC | Age: 68
End: 2023-09-20
Payer: MEDICARE

## 2023-09-20 VITALS
BODY MASS INDEX: 36.07 KG/M2 | WEIGHT: 196 LBS | DIASTOLIC BLOOD PRESSURE: 72 MMHG | HEIGHT: 62 IN | SYSTOLIC BLOOD PRESSURE: 120 MMHG

## 2023-09-20 DIAGNOSIS — I10 ESSENTIAL HYPERTENSION: ICD-10-CM

## 2023-09-20 DIAGNOSIS — L65.8 FEMALE PATTERN BALDNESS: ICD-10-CM

## 2023-09-20 DIAGNOSIS — E78.5 DYSLIPIDEMIA ASSOCIATED WITH TYPE 2 DIABETES MELLITUS: ICD-10-CM

## 2023-09-20 DIAGNOSIS — K21.9 GASTROESOPHAGEAL REFLUX DISEASE, UNSPECIFIED WHETHER ESOPHAGITIS PRESENT: ICD-10-CM

## 2023-09-20 DIAGNOSIS — Z12.2 SCREENING FOR LUNG CANCER: ICD-10-CM

## 2023-09-20 DIAGNOSIS — G89.29 CHRONIC PAIN OF RIGHT KNEE: ICD-10-CM

## 2023-09-20 DIAGNOSIS — Z87.891 PERSONAL HISTORY OF NICOTINE DEPENDENCE: ICD-10-CM

## 2023-09-20 DIAGNOSIS — E03.4 HYPOTHYROIDISM DUE TO ACQUIRED ATROPHY OF THYROID: ICD-10-CM

## 2023-09-20 DIAGNOSIS — L30.4 INTERTRIGO: ICD-10-CM

## 2023-09-20 DIAGNOSIS — E78.5 HYPERLIPIDEMIA, UNSPECIFIED HYPERLIPIDEMIA TYPE: ICD-10-CM

## 2023-09-20 DIAGNOSIS — E03.9 HYPOTHYROIDISM, UNSPECIFIED TYPE: ICD-10-CM

## 2023-09-20 DIAGNOSIS — E66.9 OBESITY, CLASS II, BMI 35-39.9, ISOLATED: ICD-10-CM

## 2023-09-20 DIAGNOSIS — M25.561 CHRONIC PAIN OF RIGHT KNEE: ICD-10-CM

## 2023-09-20 DIAGNOSIS — E11.59 HYPERTENSION ASSOCIATED WITH DIABETES: Primary | ICD-10-CM

## 2023-09-20 DIAGNOSIS — E11.69 DYSLIPIDEMIA ASSOCIATED WITH TYPE 2 DIABETES MELLITUS: ICD-10-CM

## 2023-09-20 DIAGNOSIS — I15.2 HYPERTENSION ASSOCIATED WITH DIABETES: Primary | ICD-10-CM

## 2023-09-20 PROBLEM — E66.812 OBESITY, CLASS II, BMI 35-39.9, ISOLATED: Status: ACTIVE | Noted: 2017-04-12

## 2023-09-20 PROCEDURE — 99214 PR OFFICE/OUTPT VISIT, EST, LEVL IV, 30-39 MIN: ICD-10-PCS | Mod: 95,,, | Performed by: INTERNAL MEDICINE

## 2023-09-20 PROCEDURE — 99214 OFFICE O/P EST MOD 30 MIN: CPT | Mod: 95,,, | Performed by: INTERNAL MEDICINE

## 2023-09-20 RX ORDER — OMEGA-3-ACID ETHYL ESTERS 1 G/1
2 CAPSULE, LIQUID FILLED ORAL 2 TIMES DAILY
Qty: 360 CAPSULE | Refills: 3 | Status: SHIPPED | OUTPATIENT
Start: 2023-09-20 | End: 2024-01-06 | Stop reason: SDUPTHER

## 2023-09-20 RX ORDER — ATORVASTATIN CALCIUM 40 MG/1
40 TABLET, FILM COATED ORAL DAILY
Qty: 90 TABLET | Refills: 3 | Status: SHIPPED | OUTPATIENT
Start: 2023-09-20 | End: 2024-03-20 | Stop reason: SDUPTHER

## 2023-09-20 RX ORDER — OMEPRAZOLE 40 MG/1
40 CAPSULE, DELAYED RELEASE ORAL DAILY
Qty: 90 CAPSULE | Refills: 3 | Status: SHIPPED | OUTPATIENT
Start: 2023-09-20 | End: 2024-03-20 | Stop reason: SDUPTHER

## 2023-09-20 RX ORDER — METOPROLOL SUCCINATE 50 MG/1
50 TABLET, EXTENDED RELEASE ORAL NIGHTLY
Qty: 90 TABLET | Refills: 3 | Status: SHIPPED | OUTPATIENT
Start: 2023-09-20 | End: 2024-01-06

## 2023-09-20 RX ORDER — LEVOTHYROXINE SODIUM 88 UG/1
88 TABLET ORAL
Qty: 90 TABLET | Refills: 3 | Status: SHIPPED | OUTPATIENT
Start: 2023-09-20 | End: 2024-03-20 | Stop reason: SDUPTHER

## 2023-09-20 RX ORDER — SPIRONOLACTONE 100 MG/1
100 TABLET, FILM COATED ORAL DAILY
Qty: 90 TABLET | Refills: 3 | Status: SHIPPED | OUTPATIENT
Start: 2023-09-20 | End: 2024-03-20 | Stop reason: SDUPTHER

## 2023-09-20 RX ORDER — FINASTERIDE 5 MG/1
5 TABLET, FILM COATED ORAL NIGHTLY
Qty: 90 TABLET | Refills: 3 | Status: SHIPPED | OUTPATIENT
Start: 2023-09-20 | End: 2024-03-20 | Stop reason: SDUPTHER

## 2023-09-20 RX ORDER — MELOXICAM 7.5 MG/1
7.5 TABLET ORAL DAILY PRN
Qty: 90 TABLET | Refills: 3 | Status: SHIPPED | OUTPATIENT
Start: 2023-09-20 | End: 2024-03-20 | Stop reason: SDUPTHER

## 2023-09-20 RX ORDER — KETOCONAZOLE 20 MG/G
CREAM TOPICAL
Qty: 60 G | Refills: 6 | Status: SHIPPED | OUTPATIENT
Start: 2023-09-20

## 2023-09-22 ENCOUNTER — OFFICE VISIT (OUTPATIENT)
Dept: RHEUMATOLOGY | Facility: CLINIC | Age: 68
End: 2023-09-22
Payer: MEDICARE

## 2023-09-22 VITALS
WEIGHT: 204.13 LBS | HEIGHT: 62 IN | DIASTOLIC BLOOD PRESSURE: 77 MMHG | HEART RATE: 72 BPM | BODY MASS INDEX: 37.56 KG/M2 | SYSTOLIC BLOOD PRESSURE: 115 MMHG

## 2023-09-22 DIAGNOSIS — M05.9 SEROPOSITIVE RHEUMATOID ARTHRITIS: Primary | ICD-10-CM

## 2023-09-22 DIAGNOSIS — Z79.899 DRUG-INDUCED IMMUNODEFICIENCY: ICD-10-CM

## 2023-09-22 DIAGNOSIS — D84.821 DRUG-INDUCED IMMUNODEFICIENCY: ICD-10-CM

## 2023-09-22 PROCEDURE — 99999 PR PBB SHADOW E&M-EST. PATIENT-LVL IV: CPT | Mod: PBBFAC,,, | Performed by: INTERNAL MEDICINE

## 2023-09-22 PROCEDURE — 99999 PR PBB SHADOW E&M-EST. PATIENT-LVL IV: ICD-10-PCS | Mod: PBBFAC,,, | Performed by: INTERNAL MEDICINE

## 2023-09-22 PROCEDURE — 99214 PR OFFICE/OUTPT VISIT, EST, LEVL IV, 30-39 MIN: ICD-10-PCS | Mod: S$PBB,,, | Performed by: INTERNAL MEDICINE

## 2023-09-22 PROCEDURE — 99214 OFFICE O/P EST MOD 30 MIN: CPT | Mod: PBBFAC | Performed by: INTERNAL MEDICINE

## 2023-09-22 PROCEDURE — 99214 OFFICE O/P EST MOD 30 MIN: CPT | Mod: S$PBB,,, | Performed by: INTERNAL MEDICINE

## 2023-09-22 RX ORDER — SULFASALAZINE 500 MG/1
1500 TABLET ORAL 2 TIMES DAILY
Qty: 540 TABLET | Refills: 1 | Status: SHIPPED | OUTPATIENT
Start: 2023-09-22 | End: 2024-01-11 | Stop reason: SDUPTHER

## 2023-09-22 ASSESSMENT — ROUTINE ASSESSMENT OF PATIENT INDEX DATA (RAPID3)
AM STIFFNESS SCORE: 0, NO
PSYCHOLOGICAL DISTRESS SCORE: 0
MDHAQ FUNCTION SCORE: 0.3
FATIGUE SCORE: 0
TOTAL RAPID3 SCORE: 0.33
PAIN SCORE: 0
PATIENT GLOBAL ASSESSMENT SCORE: 0

## 2023-09-22 NOTE — PROGRESS NOTES
9/19/2023     7:51 AM   Rapid3 Question Responses and Scores   MDHAQ Score 0.3   Psychologic Score 0   Pain Score 0   When you awakened in the morning OVER THE LAST WEEK, did you feel stiff? No   Fatigue Score 0   Global Health Score 0   RAPID3 Score 0.33        Answers submitted by the patient for this visit:  Rheumatology Questionnaire (Submitted on 9/19/2023)  fever: No  eye redness: No  mouth sores: No  headaches: No  shortness of breath: Yes  chest pain: No  trouble swallowing: No  diarrhea: No  constipation: No  unexpected weight change: No  genital sore: No  dysuria: No  During the last 3 days, have you had a skin rash?: No  Bruises or bleeds easily: Yes  cough: No

## 2023-09-22 NOTE — PROGRESS NOTES
"Subjective:       Patient ID: Verenice Mirza is a 68 y.o. female.    Chief Complaint: Disease Management    HPI    Fell early 2021; R hand swelled after fall but never got better  Dr. Erickson dx RA  Orencia weekly x 3 (April) helped but then she got diverticulitis  June 2021 started Sulfasalazine up to 3 bid     Feb 2021 RF 92,      Previous hx of plantar fasciitis, achilles tendinitis, R knee pain, and lumbar pain  Hx gel shots in R knee  Hx PT for knee  A lot of pain around the knee; no cane   Meloxicam      Hx DM2  Steatosis/ fatty liver  HTN metoprolol   HLD  Hypothyroid  PVD  PAD  Rosacea  JORDANA  Raloxifene since 2019 for atypical ductal hyperplasia  Fam hx crohn's grandmother     Limits carbs and sugars  Losing weight on Ozempic     Had surgery knee Dec 2021    Returns today with no new c/o  Sulfasalazine 3 bid  No meloxicam needed for couple months    Has follow up with PCP in March  Says weight down about 30 lb on Ozempic  Taking tumeric    Review of Systems   Constitutional:  Negative for fatigue, fever and unexpected weight change.   HENT:  Negative for mouth sores and trouble swallowing.    Eyes:  Negative for redness.   Respiratory:  Positive for shortness of breath. Negative for cough.    Cardiovascular:  Negative for chest pain.   Gastrointestinal:  Negative for constipation and diarrhea.   Genitourinary:  Negative for dysuria and genital sores.   Skin:  Negative for rash.   Neurological:  Negative for headaches.   Hematological:  Bruises/bleeds easily.           9/19/2023     7:51 AM   Rapid3 Question Responses and Scores   MDHAQ Score 0.3   Psychologic Score 0   Pain Score 0   When you awakened in the morning OVER THE LAST WEEK, did you feel stiff? No   Fatigue Score 0   Global Health Score 0   RAPID3 Score 0.33      Objective:   /77   Pulse 72   Ht 5' 2" (1.575 m)   Wt 92.6 kg (204 lb 2.3 oz)   BMI 37.34 kg/m²      Physical Exam      10/18/2021 9/13/2022 3/24/2023 9/22/2023   Tender " (HERNANDEZ-28) 2 / 28  0 / 28  0 / 28  0 / 28    Swollen (HERNANDEZ-28) 1 / 28 2 / 28  0 / 28  0 / 28    Provider Global 5 mm 10 mm 10 mm 0 mm   Patient Global 5 mm 10 mm 10 mm 0 mm   ESR 12 mm/hr 30 mm/hr 4 mm/hr 4 mm/hr   CRP 1.3 mg/L 10.6 mg/L 1.7 mg/L 0.8 mg/L   HERNANDEZ-28 (ESR) 2.88 (Low disease activity) 2.92 (Low disease activity) 1.11 (Remission) 0.97 (Remission)   HERNANDEZ-28 (CRP) 2.4 (Remission) 2.38 (Remission) 1.46 (Remission) 1.17 (Remission)   CDAI Score 4  4  2  0      Lab Results   Component Value Date    SEDRATE 4 09/18/2023          Assessment:       1. Seropositive rheumatoid arthritis    2. Drug-induced immunodeficiency            Plan:       Problem List Items Addressed This Visit          Active Problems    Seropositive rheumatoid arthritis - Primary     rheumatoid arthritis in remission on sulfasalazine with no adverse effects    Macrocytosis without anemia is typical of sulfasalazine and not clinically significant    Continue sulfasalazine  RTC me 6 mo with lab         Relevant Medications    sulfaSALAzine (AZULFIDINE) 500 mg Tab    Drug-induced immunodeficiency

## 2023-09-22 NOTE — ASSESSMENT & PLAN NOTE
rheumatoid arthritis in remission on sulfasalazine with no adverse effects    Macrocytosis without anemia is typical of sulfasalazine and not clinically significant    Continue sulfasalazine  RTC me 6 mo with lab

## 2023-09-25 NOTE — PROGRESS NOTES
Subjective     Patient ID: Verenice Mirza is a 68 y.o. female.    Chief Complaint: Follow-up and Diabetes    [facility] for virtual follow-up of hypertension 68-year-old female presents to clinic today for follow-up of hypertension associated with diabetes dyslipidemia social diabetes obesity patient has had successful weight loss with Ozempic denies any significant side effects  Review of Systems     Otherwise negative  Objective     Physical Exam virtual  General: Well-appearing, well-nourished.  No distress  HEENT: conjunctivae are normal.   Hearing is grossly normal.  Nasopharynx yobany congestion  Oropharynx is clear.  Neck: Supple.  Visually No thyroid megaly.   Heart: Regular rate   Lungs:  respiratory effort normal.  Abdomen:  nontender  Extremities:   No edema.  Psych: Oriented to time person place.  Judgment and insight seem unimpaired.  Mood and affect are appropriate.     Assessment and Plan     1. Hypertension associated with diabetes  -     Comprehensive Metabolic Panel; Standing; Expected date: 09/20/2023  -     Hemoglobin A1C; Standing; Expected date: 09/20/2023  -     Lipid Panel; Standing; Expected date: 09/20/2023  -     CBC Auto Differential; Future; Expected date: 09/20/2023  -     TSH; Future; Expected date: 09/20/2023  -     Microalbumin/Creatinine Ratio, Urine; Standing    2. Obesity, Class II, BMI 35-39.9, isolated    3. Dyslipidemia associated with type 2 diabetes mellitus  -     Comprehensive Metabolic Panel; Standing; Expected date: 09/20/2023  -     Hemoglobin A1C; Standing; Expected date: 09/20/2023  -     Lipid Panel; Standing; Expected date: 09/20/2023  -     CBC Auto Differential; Future; Expected date: 09/20/2023  -     TSH; Future; Expected date: 09/20/2023  -     Microalbumin/Creatinine Ratio, Urine; Standing    4. Hypothyroidism, unspecified type  -     Comprehensive Metabolic Panel; Standing; Expected date: 09/20/2023  -     Hemoglobin A1C; Standing; Expected date: 09/20/2023  -     Lipid  Panel; Standing; Expected date: 09/20/2023  -     CBC Auto Differential; Future; Expected date: 09/20/2023  -     TSH; Future; Expected date: 09/20/2023  -     Microalbumin/Creatinine Ratio, Urine; Standing    5. Screening for lung cancer  -     CT Chest Lung Screening Low Dose; Future; Expected date: 12/20/2023    6. Personal history of nicotine dependence  -     CT Chest Lung Screening Low Dose; Future; Expected date: 12/20/2023    7. Gastroesophageal reflux disease, unspecified whether esophagitis present  -     omeprazole (PRILOSEC) 40 MG capsule; Take 1 capsule (40 mg total) by mouth once daily.  Dispense: 90 capsule; Refill: 3    8. Hyperlipidemia, unspecified hyperlipidemia type  -     omega-3 acid ethyl esters (LOVAZA) 1 gram capsule; Take 2 capsules (2 g total) by mouth 2 (two) times daily.  Dispense: 360 capsule; Refill: 3  -     atorvastatin (LIPITOR) 40 MG tablet; Take 1 tablet (40 mg total) by mouth once daily.  Dispense: 90 tablet; Refill: 3    9. Essential hypertension  -     metoprolol succinate (TOPROL-XL) 50 MG 24 hr tablet; Take 1 tablet (50 mg total) by mouth every evening.  Dispense: 90 tablet; Refill: 3    10. Hypothyroidism due to acquired atrophy of thyroid  -     levothyroxine (SYNTHROID) 88 MCG tablet; Take 1 tablet (88 mcg total) by mouth before breakfast.  Dispense: 90 tablet; Refill: 3    11. Chronic pain of right knee  -     meloxicam (MOBIC) 7.5 MG tablet; Take 1 tablet (7.5 mg total) by mouth daily as needed for Pain.  Dispense: 90 tablet; Refill: 3    12. 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  Dispense: 90 tablet; Refill: 3  -     finasteride (PROSCAR) 5 mg tablet; Take 1 tablet (5 mg total) by mouth every evening.  Dispense: 90 tablet; Refill: 3    13. Intertrigo  -     ketoconazole (NIZORAL) 2 % cream; APPLY  THIN LAYER TOPICALLY ONCE DAILY AT BEDTIME UNDER  BREAST  AS  NEEDED  FOR  FLARE   Dispense: 60 g; Refill: 6        Verenice was seen today for follow-up and diabetes.    Diagnoses and all orders for this visit:    Hypertension associated with diabetes  -     Comprehensive Metabolic Panel; Standing  -     Hemoglobin A1C; Standing  -     Lipid Panel; Standing  -     CBC Auto Differential; Future  -     TSH; Future  -     Microalbumin/Creatinine Ratio, Urine; Standing  Controlled.  Continue current medical regimen.  Prescription refills addressed.  Followup advised. See after visit summary.  Obesity, Class II, BMI 35-39.9, isolated  Positive reinforcement given on her success continued  Dyslipidemia associated with type 2 diabetes mellitus  -     Comprehensive Metabolic Panel; Standing  -     Hemoglobin A1C; Standing  -     Lipid Panel; Standing  -     CBC Auto Differential; Future  -     TSH; Future  -     Microalbumin/Creatinine Ratio, Urine; Standing  Controlled.  Continue current medical regimen.  Prescription refills addressed.  Followup advised. See after visit summary.  Hypothyroidism, unspecified type  -     Comprehensive Metabolic Panel; Standing  -     Hemoglobin A1C; Standing  -     Lipid Panel; Standing  -     CBC Auto Differential; Future  -     TSH; Future  Controlled.  Continue current medical regimen.  Prescription refills addressed.  Followup advised. See after visit summary.-     Microalbumin/Creatinine Ratio, Urine; Standing    Screening for lung cancer  -     CT Chest Lung Screening Low Dose; Future    Personal history of nicotine dependence  -     CT Chest Lung Screening Low Dose; Future  Patient requests CT for lung cancer surveillance due to former history smoking  Gastroesophageal reflux disease, unspecified whether esophagitis present  -     omeprazole (PRILOSEC) 40 MG capsule; Take 1 capsule (40 mg total) by mouth once daily.  Controlled.  Continue current medical regimen.  Prescription refills addressed.  Followup advised. See after visit summary.  Hyperlipidemia, unspecified  hyperlipidemia type  -     omega-3 acid ethyl esters (LOVAZA) 1 gram capsule; Take 2 capsules (2 g total) by mouth 2 (two) times daily.  -     atorvastatin (LIPITOR) 40 MG tablet; Take 1 tablet (40 mg total) by mouth once daily.    Essential hypertension  -     metoprolol succinate (TOPROL-XL) 50 MG 24 hr tablet; Take 1 tablet (50 mg total) by mouth every evening.    Hypothyroidism due to acquired atrophy of thyroid  -     levothyroxine (SYNTHROID) 88 MCG tablet; Take 1 tablet (88 mcg total) by mouth before breakfast.  Controlled.  Continue current medical regimen.  Prescription refills addressed.  Followup advised. See after visit summary.  Chronic pain of right knee  -     meloxicam (MOBIC) 7.5 MG tablet; Take 1 tablet (7.5 mg total) by mouth daily as needed for Pain.  Controlled.  Continue current medical regimen.  Prescription refills addressed.  Followup advised. See after visit summary.  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  -     finasteride (PROSCAR) 5 mg tablet; Take 1 tablet (5 mg total) by mouth every evening.  Previously evaluated and treated by Dermatology requested continue treatment  Intertrigo  -     ketoconazole (NIZORAL) 2 % cream; APPLY  THIN LAYER TOPICALLY ONCE DAILY AT BEDTIME UNDER  BREAST  AS  NEEDED  FOR  FLARE     The patient location is:  [facility]  The chief complaint leading to consultation is:  Hypertension diabetes dyslipidemia knee pain female pattern baldness intertrigo obesity    Visit type: audiovisual    Face to Face time with patient:  25  30 minutes of total time spent on the encounter, which includes face to face time and non-face to face time preparing to see the patient (eg, review of tests), Obtaining and/or reviewing separately obtained history, Documenting clinical information in the electronic or other health record, Independently interpreting results (not separately reported) and  communicating results to the patient/family/caregiver, or Care coordination (not separately reported).         Each patient to whom he or she provides medical services by telemedicine is:  (1) informed of the relationship between the physician and patient and the respective role of any other health care provider with respect to management of the patient; and (2) notified that he or she may decline to receive medical services by telemedicine and may withdraw from such care at any time.    Notes:            Follow up in about 6 months (around 3/20/2024) for physical/diabetes Cbc,tsh, cmp lipid, hba1c+/- PSA.

## 2023-10-30 ENCOUNTER — PATIENT MESSAGE (OUTPATIENT)
Dept: HEMATOLOGY/ONCOLOGY | Facility: CLINIC | Age: 68
End: 2023-10-30
Payer: MEDICARE

## 2023-10-31 ENCOUNTER — TELEPHONE (OUTPATIENT)
Dept: HEMATOLOGY/ONCOLOGY | Facility: CLINIC | Age: 68
End: 2023-10-31
Payer: MEDICARE

## 2023-10-31 DIAGNOSIS — Z12.31 ENCOUNTER FOR SCREENING MAMMOGRAM FOR MALIGNANT NEOPLASM OF BREAST: ICD-10-CM

## 2023-10-31 DIAGNOSIS — N60.92 ATYPICAL DUCTAL HYPERPLASIA OF LEFT BREAST: Primary | ICD-10-CM

## 2023-10-31 NOTE — TELEPHONE ENCOUNTER
Called and spoke with Verenice, she stated she knew she was supposed to follow up with Saúl, but it had slipped her mind. She reports having had at least 1 colonoscopy since her last visit which showed polyps([facility]). She requested a virtual follow up, which is scheduled for Monday, 11/6 at 11am.    ----- Message from Monica Heath MA sent at 10/31/2023 10:36 AM CDT -----  Regarding: FW: Patient advice  Contact: Pt [phone]    ----- Message -----  From: Lorin Neely  Sent: 10/31/2023   9:42 AM CDT  To: [facility] Genetics Clinical Support Staff  Subject: Patient advice                                             Name of Caller: Verenice     Contact Preference:   [phone]    Nature of Call: Called to find out if updated genetic testing is needed would like to discuss

## 2023-11-06 ENCOUNTER — OFFICE VISIT (OUTPATIENT)
Dept: HEMATOLOGY/ONCOLOGY | Facility: CLINIC | Age: 68
End: 2023-11-06
Payer: MEDICARE

## 2023-11-06 DIAGNOSIS — R41.3 MEMORY DIFFICULTY: ICD-10-CM

## 2023-11-06 DIAGNOSIS — Z71.83 ENCOUNTER FOR NONPROCREATIVE GENETIC COUNSELING: Primary | ICD-10-CM

## 2023-11-06 PROCEDURE — 99417 PR PROLONGED SVC, OUTPT, W/WO DIRECT PT CONTACT,  EA ADDTL 15 MIN: ICD-10-PCS | Mod: 95,,, | Performed by: NURSE PRACTITIONER

## 2023-11-06 PROCEDURE — 99215 PR OFFICE/OUTPT VISIT, EST, LEVL V, 40-54 MIN: ICD-10-PCS | Mod: 95,,, | Performed by: NURSE PRACTITIONER

## 2023-11-06 PROCEDURE — 99215 OFFICE O/P EST HI 40 MIN: CPT | Mod: 95,,, | Performed by: NURSE PRACTITIONER

## 2023-11-06 PROCEDURE — 99417 PROLNG OP E/M EACH 15 MIN: CPT | Mod: 95,,, | Performed by: NURSE PRACTITIONER

## 2023-11-06 NOTE — Clinical Note
When you mail paper copy of 11/6/23 clinic note to pt, please be sure that the Patient Instructions from the After-Visit Summary are included.  Thanks again!

## 2023-11-06 NOTE — Clinical Note
"Hi Belinda,  I have a couple questions regarding this patient: 1.  She stated that her mom ( at age 89) had "female pattern baldness" and that patient's own hair has been thinning (more so as she's on raloxifene given history of breast atypia).  She wants to know if there's any genetic testing that is available related to this, and, if so, would there be anything that could be done to help manage her hair thinning based on having a genetic predisposition? 2.  She has a significant personal medical history outside of the realm of cancer genetics and wants to know if she should meet with General Genetics to discuss potential genetic testing.  Her non-cancer genetics-related Past Medical History and Problem List are up to date as of her visit with me today; would you mind taking a look at these when you have a chance and letting me know if you recommend a General Genetics consult?  Thanks for taking the time to assist with this!  Saúl"

## 2023-11-06 NOTE — Clinical Note
Hi there,  Pt reports memory difficulty.  Can you please contact her to schedule an appt?  Referral is in.  Thanks, Saúl Mcclelland NP Cancer Genetics

## 2023-11-06 NOTE — PROGRESS NOTES
"Cancer Genetics  Department of Hematology and Oncology  [facility]  [facility]  [facility]    Date of Service:  23  Visit Provider:  Saúl Mcclelland DNP  Collaborating Physician:  Lakisha Vigil MD    Patient ID  Name: Verenice Mirza    : 1955    MRN: [id number]      Referring Provider  No referring provider defined for this encounter.    Televisit Information  The patient location is: [place], [place].    The chief complaint leading to consultation is:  As below.    Visit type: audiovisual.    Face-to-face time with patient:  Approximately 65 minutes.    Approximately 85 minutes in total were spent on this encounter, which includes face-to-face time and non-face-to-face time preparing to see the patient (e.g., review of tests), obtaining and/or reviewing separately obtained history, documenting clinical information in the electronic or other health record, independently interpreting results (not separately reported) and communicating results to the patient/family/caregiver, or care coordination (not separately reported).  Each patient to whom he or she provides medical services by telemedicine is:  (1) informed of the relationship between the physician and patient and the respective role of any other health care provider with respect to management of the patient; and (2) notified that he or she may decline to receive medical services by telemedicine and may withdraw from such care at any time.  Notes:  As below.    SUBJECTIVE      Chief Complaint: Follow-up    History of Present Illness (HPI):  Verenice Mirza ("Verenice"), 68 y.o., assigned female sex at birth, is established with the [facility] (Dr. Rashel Zamorano is her medical oncologist, and Verenice most recently met with Dr. Zamorano on 23), and Verenice initially and most recently met with me nearly 4 years ago, on 19.  She returns today to discuss " "whether updated genetic testing is indicated.     Focused Medical History    Genetic testing:  Yes, once:    This is a summary of the report.  The complete report can be found in the patient's medical record.    Proband:  Verenice Mirza ( 1955)  Ordering Provider:  Saúl Mcclelland DNP   Test Name:  [facility]  Genetics Lab:  [facility]  Specimen Type:  Blood  Collection Date:  2019  Report Date:  2019 ([facility] # [id number])   Variants Reported: VUS = variant of uncertain significance    MLH1 c.1633A>G (p.Fow556Rpo), heterozygous, VUS  RAD51D c.911G>A (p.Ekc126Pdx), heterozygous, VUS  VHL c.3G>T (p.Met1?), heterozygous, VUS   Genes Analyzed: AIP, ALK, APC, JOSE, AXIN2, BAP1, BARD1, BLM, BMPR1A, BRCA1, BRCA2, BRIP1, CASR, CDC73, CDH1, CDK4, CDKN1B, CDKN1C, CDKN2A (p14ARF), CDKN2A (l57WMN2o), CEBPA, CHEK2, CTNNA1, DICER1, DIS3L2, EGFR, EPCAM, FH, FLCN, GATA2, GPC3, GREM1, HOXB13, HRAS, KIT, MAX, MEN1, MET, MITF, MLH1, MSH2, MSH3, MSH6, MUTYH, NBN, NF1, NF2, NTHL1, PALB2, PDGFRA, PHOX2B, PMS2, POLD1, POLE, POT1, UMRTS9H, PTCH1, PTEN, RAD50, RAD51C, RAD51D, RB1, RECQL4, RET, RUNX1, SDHA, SDHAF2, SDHB, SDHC, SDHD, SMAD4, SMARCA4, SMARCB1, SMARCE1, STK11, SUFU, TERC, TERT, XHWH210, TP53, TSC1, TSC2, VHL, WRN, WT1     Cancer:  Yes  Actinic keratosis with follicular involvement and focal transition to squamous cell carcinoma (SCC) in situ, of the skin of the mid scalp, diagnosed at age 62 via shave biopsy performed on 17; s/p Efudex therapy; s/p shave biopsy 18, with pathology indicating "Skin with features of treatment effect. Negative for carcinoma in planes of section examined."    Colon polyp:  Yes  2004 colonoscopy (age 48; Dr. Douglas Crespo [facility]):  No polyps  2009 colonoscopy (age 54; Dr. Henrry Hernandez [facility]):    (1) sessile tubular adenoma in the ascending colon, small in size, removed  (3) sessile polyps in the sigmoid colon (x1) and descending colon (x2), medium " "in size, removed, with pathology indicating presence of 2 tubular adenomas  05/08/2012 colonoscopy (age 56; Dr. Aderonke Akingbola, [facility]):    (1) sessile polyp in the rectum, diminutive in size, removed, with pathology indicating "COLONIC MUCOSA WITH FOCAL ARCHITECTURAL DISTORTION. NO MICROSCOPIC COLITIS OR DYSPLASIA IDENTIFIED."  05/24/2017 colonoscopy (age 61; Dr. Akingbola, [facility]):    (1) sessile tubular adenoma in the hepatic flexure, diminutive in size, removed  07/28/2022 colonoscopy (age 67; Dr. Cowley, [facility]):    (1) sessile hyperplastic polyp, negative for dysplasia, in the rectum, 2-3 mm in size, removed  (1) sessile tubular adenoma, negative for high-grade dysplasia, in the ascending colon, 2-3 mm in size, removed  History of colonoscopy in addition to those abovementioned?  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, s/p shave biopsy 1/23/17 and re-excision on 3/6/17  Lipoma in the ascending colon on 5/27/09 colonoscopy (not removed)  Left breast core biopsy on 7/8/19, with pathology indicating "Benign breast tissue with microcysts, columnar cell change and apocrine adenosis." "Microcalcifications are present." "Negative for atypia or malignancy"; s/p excisional biopsy on 8/19/19, with pathology revealing atypical duct hyperplasia (ADH); was prescribed raloxifene therapy, on which patient remains (No other history of breast biopsy)  Trichilemmoma of skin of left upper neck, s/p shave biopsy on 2/10/2020  Verrucous keratosis identified on 2/10/2020 shave biopsy of trichilemmoma of skin of left upper neck   Verrucous keratosis on 4/27/22 shave biopsy of skin of mid upper chest) (per pathology report: " " "The lesion is characterized by papillomatosis, hyperkeratosis, and acanthosis.  Significant cytologic pleomorphism of keratinocytes is not identified."  Verruca vulgaris of skin of right forehead (1/23/17)  No pertinent others recalled by patient or located in EMR    Pancreatitis or pancreatic cyst:  No  Blood disorder:  No  Intellectual disability:  No  Thyroid lesions:  No  Vascular anomalies:  None other than PAD/PVD  Radiation therapy:  No    Past Medical History:   Diagnosis Date    Acute seasonal allergic rhinitis     Atypical ductal hyperplasia of left breast     BMI 40.0-44.9, adult     Breast cyst     Diabetes mellitus     Dysphagia     Dysplastic nevus of trunk 03/2017    moderately atypical    Endometriosis, unspecified     Fatty liver disease, nonalcoholic     Fibrocystic breast     Hiatal hernia     Hyperlipidemia     Hypertension     Hypothyroid     IGT (impaired glucose tolerance)     Irritable bowel syndrome (IBS)     Kidney stones     Left breast mass     Lumbar disc disease     Morbid obesity     Nicotine use disorder     JORDANA on CPAP     PAD (peripheral artery disease)     Personal history of colonic polyps 05/27/2009    PVD (peripheral vascular disease)     Renal angiomyolipoma     Rosacea     Squamous cell carcinoma 12/2017    in situ mid scalp    Venous insufficiency     Vitamin D deficiency disease      Patient Active Problem List    Diagnosis Date Noted    Drug-induced immunodeficiency 09/22/2023    Decreased range of motion (ROM) of left knee 05/23/2023    Impaired gait and mobility 05/23/2023    S/P arthroscopic surgery of left knee 05/23/2023    Medial knee pain, right 09/19/2022    Lateral pain of right hip 09/19/2022    Antalgic gait 09/19/2022    Primary osteoarthritis of both knees     Decreased range of motion of right knee 12/09/2021    Gait abnormality 12/09/2021    Hard to intubate 12/08/2021    Nonrheumatic mitral valve regurgitation 12/02/2021    Seropositive rheumatoid " "arthritis 2021    Dyslipidemia associated with type 2 diabetes mellitus 2021    Irritable bowel syndrome with diarrhea 2021    Atypical ductal hyperplasia of left breast 2019    Personal history of skin cancer 2019    History of dysplastic nevus 2019    Hiatal hernia 10/22/2018    Dysphagia 2017    Obesity, Class II, BMI 35-39.9, isolated 2017    Venous insufficiency of both lower extremities 2016    JORDANA on CPAP 2016    Lumbar disc disease 2016    Hypothyroid     Hypertension associated with diabetes 2012     Focused Surgical History  S/p hysterectomy/BSO at age 49 (on 6/10/04)    Gynecologic History  Age at menarche:  does not recall   Age at first live childbirth:  Nulliparous ()  Menopausal status:  postmenopausal  Age at menopause, if applicable:  does not recall (no later than age 49y)   Hormone replacement therapy use history:  never    Focused Social History  Patient states she began smoking in college and quit smoking in ; she mainly smoked "socially" and was "never a chain smoker" and maybe 2-3 times/week smoked 0.5 packs in a day as a maximum -- Patient undergoes annual low-dose CT of chest/lungs, which are ordered by her PCP, Dr. Areli Crespo    Ancestry  Ashkenazi Pentecostal:  No   or Latin:  No    Family Oncologic History  Consanguinity:  No  Genetic testing in blood relatives:  No  Other than noted, no known history of cancer in relatives depicted in the pedigree or in:  paternal first cousins; extended family.  Other than noted, no known family history of benign tumor/mass/lesion, pancreatitis, or colon polyps.  Limited knowledge of medical history of:  paternal family, paternal first cousins.    ONCOLOGY PEDIGREE  ** If this pedigree appears small/illegible on your screen, expand this note window horizontally. **      Review of Systems  See HPI.    +Memory difficulty (for several years).  +Hair thinning.    OBJECTIVE " "    Physical Exam  Significantly limited secondary to the inherent nature of a virtual visit.  Very pleasant patient.  Constitutional      Appearance:  Appears well developed and well nourished. No distress.   Neurological     Mental Status:  Alert and oriented.  Psychiatric         Mood and Affect:  Normal.     Thought Content:  Normal.     Speech:  Normal.     Behavior:  Normal.     Judgment:  Normal.  Genetics-specific     It is my assessment that the patient is ready to proceed with cancer genetic testing from a psychosocial perspective.    Gene Variant Review    Genetic Variant Classification per ClinVar as of 12/30/2023  Reference   MLH1 c.1633A>G  Uncertain significance National Center for Biotechnology Information. ClinVar; [WAE814019988.43], https://www.ncbi.nlm.nih.gov/clinvar/variation/OAJ110971860.43 (accessed Nov. 6, 2023).   RAD51D c.911G>A Uncertain significance National Center for Biotechnology Information. ClinVar; [[id number]], https://www.ncbi.nlm.nih.gov/clinvar/variation/ABH664752861.18 (accessed Nov. 6, 2023).   VHL c.3G>T  Conflicting interpretations of pathogenicity  Uncertain significance(7); Likely benign(1) National Center for Biotechnology Information. ClinVar; [[id number]], https://www.ncbi.nlm.nih.gov/clinvar/variation/SKT924030525.17 (accessed Nov. 6, 2023).      ASSESSMENT / PLAN      Verenice Mirza ("Verenice"), 68 y.o., assigned female sex at birth, is established with the [facility] (Dr. Rashel Zamorano is her medical oncologist, and Verenice most recently met with Dr. Zmaorano on 7/27/23), and Verenice initially and most recently met with me nearly 4 years ago, on 11/25/19.  She returns today to discuss whether updated genetic testing is indicated.       Cancer-genetic risk assessment was conducted.      Assessment/Discussion    We can consider testing the GALNT12 gene (limited evidence of 5%-10% absolute risk of colorectal cancer, no polyposis) and the " RPS20 gene (limited evidence of associated risk of colorectal polyp/colorectal cancer).     As DNA analysis of the 84 above-listed genes was conducted in 2019, we can consider performing RNA analysis on certain genes relevant to Verenice's clinical history, which may include PTEN, FH, FLCN, TSC1, TSC2, and NF1 and other breast cancer susceptibility genes.  We discussed the potential yield of RNA analysis, including potential for identification of harmful mutations not identifiable on DNA-only analysis, as well as potential for reclassification of variants of uncertain significance (VUSs).    We discussed the meaning of a VUS, as VUSs were identified on Verenice's hereditary cancer-genetic testing in 2019.     We discussed genetic discrimination -- Specifically:  Genetic discrimination occurs when individuals are discriminated against on the basis of their genetic information.  The Genetic Information Nondiscrimination Act of 2008 (ALMA) is U.[place]. federal legislation that provides some protections against use of an individual's genetic information by their health insurer and by their employer.  Title I of ALMA prohibits most health insurers from utilizing an individual's genetic information to make decisions regarding insurance eligibility or premium charges; this protection does not apply to health insurance obtained through a job with the [employer], and it may not apply to health insurance obtained through the Federal Employees Health Benefits Plan.  Title II of ALMA prohibits covered entities, in many cases, from requesting or requiring the genetic information of employees and applicants and from using said information to make employment decisions; this protection does not apply to employers with fewer than 15 employees or to the .  ALMA does not protect individuals from genetic discrimination by any other type of policy or entity, including but not limited to life insurance, disability insurance, long-term care  "insurance,  benefits, and  Health Services benefits.    We discussed that an outside laboratory would perform the testing after a blood sample is collected at [facility] and that there is a potential for the patient to incur out-of-pocket costs related to genetic testing.      Plan    Offered Verenice options of proceeding with hereditary cancer susceptibility gene testing at this time versus delaying/declining such.  She stated she would think about it and message me if she desires to proceed.  If I have not heard back from her prior, she is to follow up with me for re-discussion in 2.5 years.    Given Verenice's personal history, I have reached out to one of my colleagues for their input regarding potential satisfaction by Verenice of clinical diagnostic criteria (per Daniella as of 2023) for Cowden syndrome, if Verenice has macrocephaly (which is not established at this time).  I will await colleague's response and move forward as indicated.        ICD-10-CM ICD-9-CM   1. Encounter for nonprocreative genetic counseling  Z71.83 V26.33   2. Memory difficulty  R41.3 780.93     1. Encounter for nonprocreative genetic counseling    2. Memory difficulty  - Ambulatory referral/consult to Neurology; Future       From: Saúl Mcclelland DNP On: 2023 12:37 PM   To: Emmie Sierra CGC   Priority: Routine   Routing Comments:   Andrei Trevino,    I have a couple questions regarding this patient:  1.  She stated that her mom ( at age 89) had "female pattern baldness" and that patient's own hair has been thinning (more so as she's on raloxifene given history of breast atypia).  She wants to know if there's any genetic testing that is available related to this, and, if so, would there be anything that could be done to help manage her hair thinning based on having a genetic predisposition?  2.  She has a significant personal medical history outside of the realm of cancer genetics and wants to know if she should " meet with General Genetics to discuss potential genetic testing.  Her non-cancer genetics-related Past Medical History and Problem List are up to date as of her visit with me today; would you mind taking a look at these when you have a chance and letting me know if you recommend a General Genetics consult?    Thanks for taking the time to assist with this!    Saúl       Follow-up:  Follow up in about 40 months (around 3/6/2027) and as needed/desired sooner.    Questions were encouraged and answered to the patient's satisfaction, and she verbalized understanding of the information and agreement with the plan.           Saúl Mcclelland, DNP, APRN, FNP-BC, AOCNP, CGRA  Nurse Practitioner, Cancer Genetics  Department of Hematology and Oncology  [facility]  [facility]

## 2023-11-06 NOTE — Clinical Note
Lorin, once I sign my clinic note from 11/06/2023, can you please mail pt a paper copy of the note per her request?  Thanks! Saúl

## 2023-11-08 ENCOUNTER — PATIENT MESSAGE (OUTPATIENT)
Dept: HEMATOLOGY/ONCOLOGY | Facility: CLINIC | Age: 68
End: 2023-11-08
Payer: MEDICARE

## 2023-11-13 NOTE — TELEPHONE ENCOUNTER
Ann Mccormick was seen and treated in our emergency department on 11/13/2023. Diagnosis: URI with cough    Genia  . She may return on this date: 11/15/2023         If you have any questions or concerns, please don't hesitate to call.       Dandy Hooper MD    ______________________________           _______________          _______________  Hospital Representative                              Date                                Time This Rx Request does not qualify for assessment with the OR.    Please review protocol details and the Care Due Message for extra clinical information    Reasons Rx Request may be deferred:  1. Labs/Vitals overdue  2. Labs/Vitals abnormal  3. Patient has been seen in the ED/Hospital since the last PCP visit  4. Medication is non-delegated  5. Provider is a non-participating provider

## 2023-11-14 ENCOUNTER — PATIENT MESSAGE (OUTPATIENT)
Dept: INTERNAL MEDICINE | Facility: CLINIC | Age: 68
End: 2023-11-14
Payer: MEDICARE

## 2023-11-14 DIAGNOSIS — R41.3 MEMORY DIFFICULTIES: Primary | ICD-10-CM

## 2023-11-17 ENCOUNTER — TELEPHONE (OUTPATIENT)
Dept: NEUROLOGY | Facility: CLINIC | Age: 68
End: 2023-11-17
Payer: MEDICARE

## 2023-11-17 NOTE — TELEPHONE ENCOUNTER
"Radha Conde Staff  Good afternoon  Patient with 2 Referrals to Neurology.  One from Dr Areli Crespo and one from  Dr Mcclelland.  Diagnosis "memory disorders"  Please assist scheduling patient  Thanks    Radha"

## 2023-11-29 ENCOUNTER — TELEPHONE (OUTPATIENT)
Dept: INTERNAL MEDICINE | Facility: CLINIC | Age: 68
End: 2023-11-29
Payer: MEDICARE

## 2023-11-29 DIAGNOSIS — E11.59 HYPERTENSION ASSOCIATED WITH DIABETES: Primary | ICD-10-CM

## 2023-11-29 DIAGNOSIS — I15.2 HYPERTENSION ASSOCIATED WITH DIABETES: Primary | ICD-10-CM

## 2023-11-29 NOTE — TELEPHONE ENCOUNTER
Please inform patient that neuropsychiatric testing takes hours and I can see how this can get backed up fairly easily.  Another option would be to go and see 1 of the neurologist in the memory clinic initially.  Please let me know how you would wish to proceed.

## 2023-11-29 NOTE — TELEPHONE ENCOUNTER
----- Message from Ines Gan sent at 11/29/2023 10:15 AM CST -----  Good morning Dr. Crespo,  This is from the  for neuropsychology. I just wanted to forward to you for you to be aware. I will call the patient and discuss with her as well.    Thanks!   ----- Message -----  From: Maritza Lang  Sent: 11/28/2023   2:56 PM CST  To: Ines Gan    Hi there!   Please let all patients know if they have a REF47 referral in the chart, we will call them in referral date order. We have a large influx of referrals and calls and trying to get everyone in asap. It could be 3-6 months before a call and appointment will be made.   Thank you,     ----- Message -----  From: Ines Gan  Sent: 11/28/2023   2:49 PM CST  To: Maritza Lang    Good afternoon,  Patient has a new referral for neuropsych. Can you call her and get her scheduled?    Thank you!!     :)

## 2023-11-29 NOTE — TELEPHONE ENCOUNTER
I understand that the protocol would be nice to have a neuropsychological test prior to seeing a specialist.  However, if patient has a medical concern having her not see a specialist at this time for 6-12 months awaiting testing could prolonged diagnosis of other possible etiology or concerns.  I think rather than having patient wait to 6-12 months, she needs to be offered neurology consultation.  If this is the mandatory requirement with this physician then we can provide patients with alternatives both internal and external if she wants to be evaluated sooner than 6-12 months out.  We may need to also revisit this with the neurologist.

## 2023-11-30 ENCOUNTER — TELEPHONE (OUTPATIENT)
Dept: NEUROLOGY | Facility: CLINIC | Age: 68
End: 2023-11-30
Payer: MEDICARE

## 2023-11-30 NOTE — TELEPHONE ENCOUNTER
"----- Message from Radha Conde sent at 11/30/2023  2:57 PM CST -----  Good afternoon  I sent a message about this patient before.  I understand Dr Ramírez is recommending patient has NeuroPsychology evaluation first.  Referral was made and now we have to wait probably couple month before patient is reach to schedule the test.  I explained this to Dr Crespo -referring provider and this was her answer (please read below)    "I understand that the protocol would be nice to have a neuropsychological test prior to seeing a specialist.  However, if patient has a medical concern having her not see a specialist at this time for 6-12 months awaiting testing could prolonged diagnosis of other possible etiology or concerns.  I think rather than having patient wait to 6-12 months, she needs to be offered neurology consultation.  If this is the mandatory requirement with this physician then we can provide patients with alternatives both internal and external if she wants to be evaluated sooner than 6-12 months out.  We may need to also revisit this with the neurologist."    Would you mind asking Dr Ramírez if he would reconsider this patient before her test.    Thanks in advance    Radha"

## 2023-12-01 RX ORDER — TIRZEPATIDE 2.5 MG/.5ML
2.5 INJECTION, SOLUTION SUBCUTANEOUS
Qty: 4 PEN | Refills: 3 | Status: SHIPPED | OUTPATIENT
Start: 2023-12-01 | End: 2023-12-28

## 2023-12-01 NOTE — TELEPHONE ENCOUNTER
Inform patient that I can see her for her memory concerns on Wednesday morning.  Please blocker at that time.  We can go ahead and do the blood work on that day.  Also inform her that I change the Ozempic to the Mounjaro.

## 2023-12-05 NOTE — TELEPHONE ENCOUNTER
Jaleel Ramírez MD  You22 hours ago (10:54 AM)     JR  Good Afternoon,    I appreciate your concerns and the desire to expedite the evaluation process for the patient. However, I'd like to provide some additional context regarding the recommended approach.    Per chart, cognitive concerns have been documented in the electronic medical record since 2013. During that time the patient has had two appointments with memory focused specialists for which she did not follow through. It's worth noting that over this prolonged period, there has been no significant progression in these concerns, as evidenced by the patient's functional status documented in the chart. This suggests that there may be other non-neurological factors contributing to the cognitive issues.    With this in mind, a comprehensive evaluation by a Neuropsychologist (NPSY) is considered the most appropriate initial step to determine the extent to which cognitive concerns are neurological in nature.    Regarding the wait times for neurology consultations, it's unfortunate that this has become an increasing problem in recent years. Nationally, 6-month wait times for neurology appointments are now the average and are expected to worsen.    If the estimated wait time for the Neuropsychology evaluation is deemed too long to address the patient's 10-year history of cognitive concerns, there are alternative options available. One option is for the patient to initiate their evaluation with a general neurologist or consider the resident clinic to expedite the process. If these options are not preferable, there are external resources such as [facility] and [facility] that can also provide evaluations for memory disorders. However, it's important to note that these external options may have similar protocols for managing such cases, as we do in our clinic.    If you have any further questions or concerns, please feel free to reach out to me, and we can arrange a time to discuss  this further.    Kind Regards,  Jaleel Ramírez

## 2023-12-06 ENCOUNTER — LAB VISIT (OUTPATIENT)
Dept: LAB | Facility: HOSPITAL | Age: 68
End: 2023-12-06
Attending: INTERNAL MEDICINE
Payer: MEDICARE

## 2023-12-06 ENCOUNTER — OFFICE VISIT (OUTPATIENT)
Dept: INTERNAL MEDICINE | Facility: CLINIC | Age: 68
End: 2023-12-06
Payer: MEDICARE

## 2023-12-06 VITALS
SYSTOLIC BLOOD PRESSURE: 116 MMHG | OXYGEN SATURATION: 98 % | BODY MASS INDEX: 37.73 KG/M2 | DIASTOLIC BLOOD PRESSURE: 78 MMHG | HEART RATE: 69 BPM | WEIGHT: 205 LBS | HEIGHT: 62 IN

## 2023-12-06 DIAGNOSIS — R41.3 MEMORY LOSS: ICD-10-CM

## 2023-12-06 DIAGNOSIS — E78.5 DYSLIPIDEMIA ASSOCIATED WITH TYPE 2 DIABETES MELLITUS: ICD-10-CM

## 2023-12-06 DIAGNOSIS — I15.2 HYPERTENSION ASSOCIATED WITH DIABETES: Primary | ICD-10-CM

## 2023-12-06 DIAGNOSIS — Z23 FLU VACCINE NEED: ICD-10-CM

## 2023-12-06 DIAGNOSIS — E11.69 DYSLIPIDEMIA ASSOCIATED WITH TYPE 2 DIABETES MELLITUS: ICD-10-CM

## 2023-12-06 DIAGNOSIS — R41.3 OTHER AMNESIA: ICD-10-CM

## 2023-12-06 DIAGNOSIS — E11.59 HYPERTENSION ASSOCIATED WITH DIABETES: Primary | ICD-10-CM

## 2023-12-06 LAB
25(OH)D3+25(OH)D2 SERPL-MCNC: 23 NG/ML (ref 30–96)
TSH SERPL DL<=0.005 MIU/L-ACNC: 1.87 UIU/ML (ref 0.4–4)
VIT B12 SERPL-MCNC: 211 PG/ML (ref 210–950)

## 2023-12-06 PROCEDURE — 99215 PR OFFICE/OUTPT VISIT, EST, LEVL V, 40-54 MIN: ICD-10-PCS | Mod: S$PBB,,, | Performed by: INTERNAL MEDICINE

## 2023-12-06 PROCEDURE — 86592 SYPHILIS TEST NON-TREP QUAL: CPT | Performed by: INTERNAL MEDICINE

## 2023-12-06 PROCEDURE — 99999PBSHW FLU VACCINE - QUADRIVALENT - ADJUVANTED: Mod: PBBFAC,,,

## 2023-12-06 PROCEDURE — 84443 ASSAY THYROID STIM HORMONE: CPT | Performed by: INTERNAL MEDICINE

## 2023-12-06 PROCEDURE — 99999 PR PBB SHADOW E&M-EST. PATIENT-LVL V: CPT | Mod: PBBFAC,,, | Performed by: INTERNAL MEDICINE

## 2023-12-06 PROCEDURE — 99215 OFFICE O/P EST HI 40 MIN: CPT | Mod: PBBFAC,PO,25 | Performed by: INTERNAL MEDICINE

## 2023-12-06 PROCEDURE — G0008 ADMIN INFLUENZA VIRUS VAC: HCPCS | Mod: PBBFAC,PO

## 2023-12-06 PROCEDURE — 99999PBSHW FLU VACCINE - QUADRIVALENT - ADJUVANTED: ICD-10-PCS | Mod: PBBFAC,,,

## 2023-12-06 PROCEDURE — 82306 VITAMIN D 25 HYDROXY: CPT | Performed by: INTERNAL MEDICINE

## 2023-12-06 PROCEDURE — 82607 VITAMIN B-12: CPT | Performed by: INTERNAL MEDICINE

## 2023-12-06 PROCEDURE — 36415 COLL VENOUS BLD VENIPUNCTURE: CPT | Mod: PO | Performed by: INTERNAL MEDICINE

## 2023-12-06 PROCEDURE — 99999 PR PBB SHADOW E&M-EST. PATIENT-LVL V: ICD-10-PCS | Mod: PBBFAC,,, | Performed by: INTERNAL MEDICINE

## 2023-12-06 PROCEDURE — 99215 OFFICE O/P EST HI 40 MIN: CPT | Mod: S$PBB,,, | Performed by: INTERNAL MEDICINE

## 2023-12-06 NOTE — PROGRESS NOTES
Total visit total visit time 50 minutes counseling time 45 Subjective     Patient ID: Verenice Mirza is a 68 y.o. female.    Chief Complaint: memory follow up    HPI 68-year-old female presents to clinic today she reports concerns with her memory she has had problems with short-term memory recall she states she really has always taken notes and does so on everything now so she does not forget.  She noted that last year she switch some money out of a bank account and then thought it was stolen and missing when they reminded her that she actually change the accounts.  She also was given a nice Alyssa present by her niece and nephew last year and can not located in the house.  These types of things she finds this frustrating.  Her mother and grandfather had dementia.  She was diagnosed with mild cognitive impairment by a neurologist in 2013 although her mental status testing was normal.  She is been referred for neuropsych testing and neurology consultation but both of these appointments have not been able to be scheduled due to prolonged wait and access issues.  Review of Systems  Otherwise negative     Objective     Physical Exam  Gen. well-appearing, well-nourished, no apparent distress  HEENT conjunctiva is normal pupils equal reactive to light TMs are clear and intact bilaterally hearing is grossly normal nasopharynx clear oropharynx is clear  Neck supple no thyromegaly no bruit  Lymph no cervical or supraclavicular adenopathy  Heart regular rate and rhythm without murmur rub or gallop  Lungs clear to auscultation respiratory effort normal  Abdomen soft nontender nondistended normoactive bowel sounds no hepatosplenomegaly.  No masses  Extremities good distal pulses no edema  Psychiatric oriented to time person place.  Judgment and insight seem unimpaired mood and affect are appropriate.  [place] 27/30     Assessment and Plan     1. Hypertension associated with diabetes    2. Dyslipidemia associated with type 2  diabetes mellitus    3. Other amnesia  -     MRI Brain Without Contrast; Future; Expected date: 12/06/2023    4. Memory loss  -     MRI Brain Without Contrast; Future; Expected date: 12/06/2023  -     TSH; Future; Expected date: 12/06/2023  -     Vitamin D; Future; Expected date: 12/06/2023  -     RPR; Future; Expected date: 12/06/2023  -     Vitamin B12; Future; Expected date: 12/06/2023    5. Flu vaccine need    Other orders  -     Influenza (FLUAD) - Quadrivalent (Adjuvanted) *Preferred* (65+) (PF)        Verenice was seen today for memory follow up.    Diagnoses and all orders for this visit:    Hypertension associated with diabetes  Controlled.  Continue current medical regimen.  Prescription refills addressed.  Followup advised. See after visit summary.  Dyslipidemia associated with type 2 diabetes mellitus  Controlled.  Continue current medical regimen.  Prescription refills addressed.  Followup advised. See after visit summary.  Memory loss  -     MRI Brain Without Contrast; Future  -     TSH; Future  -     Vitamin D; Future  -     RPR; Future  -     Vitamin B12; Future  Proceed with additional workup [place] evaluation 27/30 counseling provided to patient.  Will proceed still with some neuropsych testing and neurology consultation she will look at external facilities in terms of access.  Discussed cognitive brain exercises  Flu vaccine need    Other orders  -     Influenza (FLUAD) - Quadrivalent (Adjuvanted) *Preferred* (65+) (PF)              No follow-ups on file.

## 2023-12-06 NOTE — PATIENT INSTRUCTIONS
Check with [facility] for a memory clinic  Neuropsychological testing and a neurology consult with a memory neurologist

## 2023-12-07 LAB — RPR SER QL: NORMAL

## 2023-12-28 ENCOUNTER — PATIENT MESSAGE (OUTPATIENT)
Dept: INTERNAL MEDICINE | Facility: CLINIC | Age: 68
End: 2023-12-28
Payer: MEDICARE

## 2023-12-28 DIAGNOSIS — E66.9 OBESITY, CLASS II, BMI 35-39.9, ISOLATED: Primary | ICD-10-CM

## 2023-12-28 RX ORDER — TIRZEPATIDE 5 MG/.5ML
5 INJECTION, SOLUTION SUBCUTANEOUS
Qty: 6 PEN | Refills: 0 | Status: SHIPPED | OUTPATIENT
Start: 2023-12-28 | End: 2024-02-12

## 2023-12-29 NOTE — TELEPHONE ENCOUNTER
Diagnoses and all orders for this visit:    Obesity, Class II, BMI 35-39.9, isolated  -     tirzepatide (MOUNJARO) 5 mg/0.5 mL PnIj; Inject 5 mg into the skin every 7 days.

## 2024-01-01 ENCOUNTER — PATIENT MESSAGE (OUTPATIENT)
Dept: INTERNAL MEDICINE | Facility: CLINIC | Age: 69
End: 2024-01-01
Payer: MEDICARE

## 2024-01-06 ENCOUNTER — OFFICE VISIT (OUTPATIENT)
Dept: INTERNAL MEDICINE | Facility: CLINIC | Age: 69
End: 2024-01-06
Payer: MEDICARE

## 2024-01-06 VITALS
WEIGHT: 206.13 LBS | HEIGHT: 62 IN | SYSTOLIC BLOOD PRESSURE: 90 MMHG | OXYGEN SATURATION: 98 % | BODY MASS INDEX: 37.93 KG/M2 | DIASTOLIC BLOOD PRESSURE: 64 MMHG | HEART RATE: 64 BPM

## 2024-01-06 DIAGNOSIS — R11.10 VOMITING, UNSPECIFIED VOMITING TYPE, UNSPECIFIED WHETHER NAUSEA PRESENT: ICD-10-CM

## 2024-01-06 DIAGNOSIS — R10.9 ABDOMINAL PAIN, UNSPECIFIED ABDOMINAL LOCATION: Primary | ICD-10-CM

## 2024-01-06 DIAGNOSIS — E78.5 HYPERLIPIDEMIA, UNSPECIFIED HYPERLIPIDEMIA TYPE: ICD-10-CM

## 2024-01-06 PROCEDURE — 99214 OFFICE O/P EST MOD 30 MIN: CPT | Mod: S$PBB,,, | Performed by: INTERNAL MEDICINE

## 2024-01-06 PROCEDURE — 99215 OFFICE O/P EST HI 40 MIN: CPT | Mod: PBBFAC,PO | Performed by: INTERNAL MEDICINE

## 2024-01-06 PROCEDURE — 99999 PR PBB SHADOW E&M-EST. PATIENT-LVL V: CPT | Mod: PBBFAC,,, | Performed by: INTERNAL MEDICINE

## 2024-01-06 RX ORDER — OMEGA-3-ACID ETHYL ESTERS 1 G/1
2 CAPSULE, LIQUID FILLED ORAL 2 TIMES DAILY
Qty: 360 CAPSULE | Refills: 3 | Status: SHIPPED | OUTPATIENT
Start: 2024-01-06

## 2024-01-06 NOTE — PROGRESS NOTES
Subjective     Patient ID: Verenice Mirza is a 68 y.o. female.    Chief Complaint: Abdominal Pain    HPI 68-year-old female presents to clinic today that she developed likely on new year's day abdominal pain she states that it was more midepigastric with some radiation down words she had nausea and vomiting 1 bad episode but then no recurrent.  She had 2 bowel movements that day but not diarrhea.  This occurred shortly after eating.  Also coincidentally she had an increase in her Mounjaro from 2.5 mg to 5 mg it sounds like the day before.  She denies any back pain.  Pain initially more intense 8-10 and now decreased to a 2 with mild cramping.  Review of Systems  Otherwise negative no fever chills.  No persistent vomiting.     Objective     Physical Exam  General: Well-appearing, well-nourished.  No distress  HEENT: conjunctivae are normal.  Pupils are equal and reative to light.  TM's are clear and intact bilaterally.  Hearing is grossly normal.  Nasopharynx is clear.  Oropharynx is clear.  Neck: Supple.  No thyroid megaly.  No bruits.  Lymph: No cervical or supraclavicular adenopathy.  Heart: Regular rate and rhythm, without murmur, rub or gallop.  Lungs: Clear to auscultation; respiratory effort normal.  Abdomen: Soft, mildly tender in the right lower quadrant, nondistended.  Normoactive bowel sounds.  No hepatomegaly.  No masses.  Extremities: Good distal pulses.  No edema.  Psych: Oriented to time person place.  Judgment and insight seem unimpaired.  Mood and affect are appropriate.     Assessment and Plan     1. Abdominal pain, unspecified abdominal location    Differential includes possible intolerance to Mounjaro pancreatitis  versus proceed with evaluation for possible diverticulitis.  Symptoms seem less consistent with an appendicitis at this point based on examination and clinical improvement with time.  ED precautions given.  -     CT Abdomen Pelvis W Wo Contrast; Future; Expected date: 01/06/2024  -      CBC Auto Differential; Future; Expected date: 01/06/2024  -     Comprehensive Metabolic Panel; Future; Expected date: 01/06/2024  -     Lipase; Future; Expected date: 01/06/2024    2. Vomiting, unspecified vomiting type, unspecified whether nausea present  -     CT Abdomen Pelvis W Wo Contrast; Future; Expected date: 01/06/2024  -     CBC Auto Differential; Future; Expected date: 01/06/2024  -     Comprehensive Metabolic Panel; Future; Expected date: 01/06/2024  -     Lipase; Future; Expected date: 01/06/2024  Vomiting has resolved.  3. Hyperlipidemia, unspecified hyperlipidemia type  -     omega-3 acid ethyl esters (LOVAZA) 1 gram capsule; Take 2 capsules (2 g total) by mouth 2 (two) times daily.  Dispense: 360 capsule; Refill: 3                 No follow-ups on file.

## 2024-01-10 ENCOUNTER — PATIENT MESSAGE (OUTPATIENT)
Dept: RHEUMATOLOGY | Facility: CLINIC | Age: 69
End: 2024-01-10
Payer: MEDICARE

## 2024-01-10 ENCOUNTER — HOSPITAL ENCOUNTER (OUTPATIENT)
Dept: RADIOLOGY | Facility: HOSPITAL | Age: 69
Discharge: HOME | End: 2024-01-10
Attending: INTERNAL MEDICINE
Payer: MEDICARE

## 2024-01-10 ENCOUNTER — LAB VISIT (OUTPATIENT)
Dept: LAB | Facility: HOSPITAL | Age: 69
End: 2024-01-10
Attending: INTERNAL MEDICINE
Payer: MEDICARE

## 2024-01-10 ENCOUNTER — PATIENT MESSAGE (OUTPATIENT)
Dept: OPTOMETRY | Facility: CLINIC | Age: 69
End: 2024-01-10
Payer: MEDICARE

## 2024-01-10 ENCOUNTER — PATIENT MESSAGE (OUTPATIENT)
Dept: INTERNAL MEDICINE | Facility: CLINIC | Age: 69
End: 2024-01-10
Payer: MEDICARE

## 2024-01-10 DIAGNOSIS — R10.9 ABDOMINAL PAIN, UNSPECIFIED ABDOMINAL LOCATION: ICD-10-CM

## 2024-01-10 DIAGNOSIS — R11.10 VOMITING, UNSPECIFIED VOMITING TYPE, UNSPECIFIED WHETHER NAUSEA PRESENT: ICD-10-CM

## 2024-01-10 DIAGNOSIS — M05.9 SEROPOSITIVE RHEUMATOID ARTHRITIS: ICD-10-CM

## 2024-01-10 DIAGNOSIS — N60.92 ATYPICAL DUCTAL HYPERPLASIA OF LEFT BREAST: ICD-10-CM

## 2024-01-10 DIAGNOSIS — K76.9 LIVER DISEASE, UNSPECIFIED: Primary | ICD-10-CM

## 2024-01-10 DIAGNOSIS — Z12.31 ENCOUNTER FOR SCREENING MAMMOGRAM FOR MALIGNANT NEOPLASM OF BREAST: ICD-10-CM

## 2024-01-10 LAB
ALBUMIN SERPL BCP-MCNC: 3.9 G/DL (ref 3.5–5.2)
ALBUMIN SERPL BCP-MCNC: 3.9 G/DL (ref 3.5–5.2)
ALP SERPL-CCNC: 81 U/L (ref 55–135)
ALP SERPL-CCNC: 81 U/L (ref 55–135)
ALT SERPL W/O P-5'-P-CCNC: 18 U/L (ref 10–44)
ALT SERPL W/O P-5'-P-CCNC: 18 U/L (ref 10–44)
ANION GAP SERPL CALC-SCNC: 11 MMOL/L (ref 8–16)
ANION GAP SERPL CALC-SCNC: 11 MMOL/L (ref 8–16)
AST SERPL-CCNC: 18 U/L (ref 10–40)
AST SERPL-CCNC: 18 U/L (ref 10–40)
BASOPHILS # BLD AUTO: 0.04 K/UL (ref 0–0.2)
BASOPHILS NFR BLD: 0.7 % (ref 0–1.9)
BILIRUB SERPL-MCNC: 0.4 MG/DL (ref 0.1–1)
BILIRUB SERPL-MCNC: 0.4 MG/DL (ref 0.1–1)
BUN SERPL-MCNC: 20 MG/DL (ref 8–23)
BUN SERPL-MCNC: 20 MG/DL (ref 8–23)
CALCIUM SERPL-MCNC: 9.6 MG/DL (ref 8.7–10.5)
CALCIUM SERPL-MCNC: 9.6 MG/DL (ref 8.7–10.5)
CHLORIDE SERPL-SCNC: 102 MMOL/L (ref 95–110)
CHLORIDE SERPL-SCNC: 102 MMOL/L (ref 95–110)
CO2 SERPL-SCNC: 22 MMOL/L (ref 23–29)
CO2 SERPL-SCNC: 22 MMOL/L (ref 23–29)
CREAT SERPL-MCNC: 0.9 MG/DL (ref 0.5–1.4)
DIFFERENTIAL METHOD BLD: NORMAL
EOSINOPHIL # BLD AUTO: 0.1 K/UL (ref 0–0.5)
EOSINOPHIL NFR BLD: 2.4 % (ref 0–8)
ERYTHROCYTE [DISTWIDTH] IN BLOOD BY AUTOMATED COUNT: 14 % (ref 11.5–14.5)
EST. GFR  (NO RACE VARIABLE): >60 ML/MIN/1.73 M^2
EST. GFR  (NO RACE VARIABLE): >60 ML/MIN/1.73 M^2
GLUCOSE SERPL-MCNC: 106 MG/DL (ref 70–110)
GLUCOSE SERPL-MCNC: 106 MG/DL (ref 70–110)
HCT VFR BLD AUTO: 41.6 % (ref 37–48.5)
HGB BLD-MCNC: 13.4 G/DL (ref 12–16)
IMM GRANULOCYTES # BLD AUTO: 0.02 K/UL (ref 0–0.04)
IMM GRANULOCYTES NFR BLD AUTO: 0.4 % (ref 0–0.5)
LIPASE SERPL-CCNC: 25 U/L (ref 4–60)
LYMPHOCYTES # BLD AUTO: 1.6 K/UL (ref 1–4.8)
LYMPHOCYTES NFR BLD: 28.7 % (ref 18–48)
MCH RBC QN AUTO: 31 PG (ref 27–31)
MCHC RBC AUTO-ENTMCNC: 32.2 G/DL (ref 32–36)
MCV RBC AUTO: 96 FL (ref 82–98)
MONOCYTES # BLD AUTO: 0.4 K/UL (ref 0.3–1)
MONOCYTES NFR BLD: 7.8 % (ref 4–15)
NEUTROPHILS # BLD AUTO: 3.3 K/UL (ref 1.8–7.7)
NEUTROPHILS NFR BLD: 60 % (ref 38–73)
NRBC BLD-RTO: 0 /100 WBC
PLATELET # BLD AUTO: 244 K/UL (ref 150–450)
PMV BLD AUTO: 11.3 FL (ref 9.2–12.9)
POTASSIUM SERPL-SCNC: 4.4 MMOL/L (ref 3.5–5.1)
POTASSIUM SERPL-SCNC: 4.4 MMOL/L (ref 3.5–5.1)
PROT SERPL-MCNC: 6.9 G/DL (ref 6–8.4)
PROT SERPL-MCNC: 6.9 G/DL (ref 6–8.4)
RBC # BLD AUTO: 4.32 M/UL (ref 4–5.4)
SAMPLE: NORMAL
SODIUM SERPL-SCNC: 135 MMOL/L (ref 136–145)
SODIUM SERPL-SCNC: 135 MMOL/L (ref 136–145)
WBC # BLD AUTO: 5.5 K/UL (ref 3.9–12.7)

## 2024-01-10 PROCEDURE — 85025 COMPLETE CBC W/AUTO DIFF WBC: CPT | Performed by: INTERNAL MEDICINE

## 2024-01-10 PROCEDURE — 25500020 PHARM REV CODE 255: Performed by: INTERNAL MEDICINE

## 2024-01-10 PROCEDURE — 74178 CT ABD&PLV WO CNTR FLWD CNTR: CPT | Mod: TC

## 2024-01-10 PROCEDURE — 36415 COLL VENOUS BLD VENIPUNCTURE: CPT | Mod: PO | Performed by: INTERNAL MEDICINE

## 2024-01-10 PROCEDURE — 83690 ASSAY OF LIPASE: CPT | Performed by: INTERNAL MEDICINE

## 2024-01-10 PROCEDURE — 74178 CT ABD&PLV WO CNTR FLWD CNTR: CPT | Mod: 26,,, | Performed by: RADIOLOGY

## 2024-01-10 PROCEDURE — 80053 COMPREHEN METABOLIC PANEL: CPT | Performed by: INTERNAL MEDICINE

## 2024-01-10 RX ADMIN — IOHEXOL 100 ML: 350 INJECTION, SOLUTION INTRAVENOUS at 01:01

## 2024-01-11 ENCOUNTER — TELEPHONE (OUTPATIENT)
Dept: OPTOMETRY | Facility: CLINIC | Age: 69
End: 2024-01-11
Payer: MEDICARE

## 2024-01-11 ENCOUNTER — PATIENT MESSAGE (OUTPATIENT)
Dept: SPORTS MEDICINE | Facility: CLINIC | Age: 69
End: 2024-01-11

## 2024-01-11 ENCOUNTER — INFUSION (OUTPATIENT)
Dept: INFUSION THERAPY | Facility: HOSPITAL | Age: 69
End: 2024-01-11
Payer: MEDICARE

## 2024-01-11 ENCOUNTER — HOSPITAL ENCOUNTER (OUTPATIENT)
Dept: RADIOLOGY | Facility: HOSPITAL | Age: 69
Discharge: HOME | End: 2024-01-11
Attending: INTERNAL MEDICINE
Payer: MEDICARE

## 2024-01-11 ENCOUNTER — TELEPHONE (OUTPATIENT)
Dept: SPORTS MEDICINE | Facility: CLINIC | Age: 69
End: 2024-01-11
Payer: MEDICARE

## 2024-01-11 ENCOUNTER — OFFICE VISIT (OUTPATIENT)
Dept: SPORTS MEDICINE | Facility: CLINIC | Age: 69
End: 2024-01-11
Payer: MEDICARE

## 2024-01-11 VITALS
HEIGHT: 62 IN | HEART RATE: 82 BPM | DIASTOLIC BLOOD PRESSURE: 79 MMHG | SYSTOLIC BLOOD PRESSURE: 125 MMHG | WEIGHT: 201.06 LBS | BODY MASS INDEX: 37 KG/M2

## 2024-01-11 DIAGNOSIS — R41.3 OTHER AMNESIA: ICD-10-CM

## 2024-01-11 DIAGNOSIS — R41.3 MEMORY LOSS: ICD-10-CM

## 2024-01-11 DIAGNOSIS — M25.512 ACUTE PAIN OF LEFT SHOULDER: Primary | ICD-10-CM

## 2024-01-11 DIAGNOSIS — Z00.00 ENCOUNTER FOR MEDICARE ANNUAL WELLNESS EXAM: ICD-10-CM

## 2024-01-11 DIAGNOSIS — M62.838 MUSCLE SPASM OF LEFT SHOULDER AREA: ICD-10-CM

## 2024-01-11 DIAGNOSIS — N60.92 ATYPICAL DUCTAL HYPERPLASIA OF LEFT BREAST: ICD-10-CM

## 2024-01-11 DIAGNOSIS — Z87.891 PERSONAL HISTORY OF NICOTINE DEPENDENCE: ICD-10-CM

## 2024-01-11 DIAGNOSIS — Z12.2 SCREENING FOR LUNG CANCER: ICD-10-CM

## 2024-01-11 PROCEDURE — 99999PBSHW PR PBB SHADOW TECHNICAL ONLY FILED TO HB: Mod: PBBFAC,,,

## 2024-01-11 PROCEDURE — 20610 DRAIN/INJ JOINT/BURSA W/O US: CPT | Mod: S$PBB,LT,, | Performed by: PHYSICIAN ASSISTANT

## 2024-01-11 PROCEDURE — 70551 MRI BRAIN STEM W/O DYE: CPT | Mod: TC

## 2024-01-11 PROCEDURE — 70551 MRI BRAIN STEM W/O DYE: CPT | Mod: 26,,, | Performed by: STUDENT IN AN ORGANIZED HEALTH CARE EDUCATION/TRAINING PROGRAM

## 2024-01-11 PROCEDURE — 20610 DRAIN/INJ JOINT/BURSA W/O US: CPT | Mod: PBBFAC,LT | Performed by: PHYSICIAN ASSISTANT

## 2024-01-11 PROCEDURE — 99214 OFFICE O/P EST MOD 30 MIN: CPT | Mod: 25,S$PBB,, | Performed by: PHYSICIAN ASSISTANT

## 2024-01-11 PROCEDURE — 99214 OFFICE O/P EST MOD 30 MIN: CPT | Mod: PBBFAC | Performed by: PHYSICIAN ASSISTANT

## 2024-01-11 PROCEDURE — 25500020 PHARM REV CODE 255: Performed by: INTERNAL MEDICINE

## 2024-01-11 PROCEDURE — C8937 CAD BREAST MRI: HCPCS | Mod: TC

## 2024-01-11 PROCEDURE — A9577 INJ MULTIHANCE: HCPCS | Performed by: INTERNAL MEDICINE

## 2024-01-11 PROCEDURE — 77049 MRI BREAST C-+ W/CAD BI: CPT | Mod: 26,,, | Performed by: RADIOLOGY

## 2024-01-11 PROCEDURE — 71271 CT THORAX LUNG CANCER SCR C-: CPT | Mod: 26,,, | Performed by: RADIOLOGY

## 2024-01-11 PROCEDURE — 99999 PR PBB SHADOW E&M-EST. PATIENT-LVL IV: CPT | Mod: PBBFAC,,, | Performed by: PHYSICIAN ASSISTANT

## 2024-01-11 PROCEDURE — 71271 CT THORAX LUNG CANCER SCR C-: CPT | Mod: TC

## 2024-01-11 RX ORDER — SULFASALAZINE 500 MG/1
1500 TABLET ORAL 2 TIMES DAILY
Qty: 540 TABLET | Refills: 1 | Status: SHIPPED | OUTPATIENT
Start: 2024-01-11 | End: 2024-04-03 | Stop reason: SDUPTHER

## 2024-01-11 RX ORDER — TRIAMCINOLONE ACETONIDE 40 MG/ML
40 INJECTION, SUSPENSION INTRA-ARTICULAR; INTRAMUSCULAR
Status: DISCONTINUED | OUTPATIENT
Start: 2024-01-11 | End: 2024-01-11 | Stop reason: HOSPADM

## 2024-01-11 RX ORDER — METHOCARBAMOL 500 MG/1
500 TABLET, FILM COATED ORAL 4 TIMES DAILY
Qty: 40 TABLET | Refills: 0 | Status: SHIPPED | OUTPATIENT
Start: 2024-01-11 | End: 2024-01-21

## 2024-01-11 RX ADMIN — TRIAMCINOLONE ACETONIDE 40 MG: 40 INJECTION, SUSPENSION INTRA-ARTICULAR; INTRAMUSCULAR at 08:01

## 2024-01-11 RX ADMIN — GADOBENATE DIMEGLUMINE 20 ML: 529 INJECTION, SOLUTION INTRAVENOUS at 02:01

## 2024-01-11 NOTE — TELEPHONE ENCOUNTER
Called patient and notified her that Stan can see her ASAP. She states she is on her way.    Hi Stan,  Last saw you on 23 for a cortisone injection in left arm.  I am scheduled for a iv insertion at 1:15 & a breast mri this afternoon at 2:15, had a CT yesterday and had to hold my arms up and that triggered my arm again. Sever pain.  No way can I get into position on my stomach etc. for mri main campus with the pain that has been continous all night.  I am desperate, can you or other staff please give me a injection this morning?  I have 2 other appts. this  morning that I will cancel if you can fit me in. My first appt.brain mri at  10am, 2nd.appt. 11am CT lung scan, both at [facility].  It's important that I keep the breast  mri this afternoon. Can you help me?  Thank you,  Verenice Mirza

## 2024-01-11 NOTE — PROGRESS NOTES
CC: LEFT shoulder pain    Patient presents today for follow-up evaluation of left shoulder pain.  Previously seen by me for her left shoulder last summer.  Patient states that yesterday she was having a CT of her abdomen which required her to lie with her arms over her head for an extended period of time.  After this CT scan, she noticed severe pain in the left shoulder this seemed to gradually worsen throughout the evening.  She woke this morning to continued pain and decreased mobility/stiffness.  She has been unable to raise the arm past shoulder level due to pain.  She tried taking meloxicam and icing the shoulder which provided little relief.  She is here for a same-day appointment, and is requesting a corticosteroid injection to help with pain.  No recent falls, injuries, or trauma to the left shoulder.  No associated numbness/tingling or radicular-type symptoms of the left upper extremity.  She does have full but somewhat painful range of motion of the left elbow.    HPI (08/17/2023):  Patient is a 68-year-old female who presents today for initial evaluation of left shoulder pain.  She has a history of bilateral knee arthroscopy with Dr. Artis, and she reports that both her knees are doing well.  Patient states that she awoke this morning at around 1:00 a.m. to severe pain of her left shoulder.  She does not recall any mechanism of injury or trauma to the left shoulder yesterday or in the days leading up to this.  Pain is described as a soreness with occasional bursts of sharp pain that occurs with any movement of the left shoulder.  She denies any associated numbness or tingling of the left upper extremity.  She feels some mild weakness of left shoulder as well, but feels this is primarily from guarding due to pain.  She has had a similar episode with her right shoulder in the years past which responded very well to a corticosteroid injection.  Thus far treatment has included meloxicam, rest, and ice  compresses with little relief.    Is affecting ADLs.  Pain is 7/10 at it's worst.      Past Medical History:   Diagnosis Date    Acute seasonal allergic rhinitis     Atypical ductal hyperplasia of left breast     BMI 40.0-44.9, adult     Breast cyst     Diabetes mellitus     Dysphagia     Dysplastic nevus of trunk 03/2017    moderately atypical    Endometriosis, unspecified     Fatty liver disease, nonalcoholic     Fibrocystic breast     Hiatal hernia     Hyperlipidemia     Hypertension     Hypothyroid     IGT (impaired glucose tolerance)     Irritable bowel syndrome (IBS)     Kidney stones     Left breast mass     Lumbar disc disease     Morbid obesity     Nicotine use disorder     JORDANA on CPAP     PAD (peripheral artery disease)     Personal history of colonic polyps 05/27/2009    PVD (peripheral vascular disease)     Renal angiomyolipoma     Rosacea     Squamous cell carcinoma 12/2017    in situ mid scalp    Venous insufficiency     Vitamin D deficiency disease        Past Surgical History:   Procedure Laterality Date    ARTHROSCOPIC CHONDROPLASTY OF KNEE JOINT Right 12/08/2021    Procedure: ARTHROSCOPY, KNEE, WITH CHONDROPLASTY;  Surgeon: Bin Artis MD;  Location: [facility];  Service: Orthopedics;  Laterality: Right;    ARTHROSCOPIC CHONDROPLASTY OF KNEE JOINT Left 04/26/2023    Procedure: ARTHROSCOPY, KNEE, WITH CHONDROPLASTY;  Surgeon: Bin Artis MD;  Location: [facility] OR;  Service: Orthopedics;  Laterality: Left;    BLADDER SUSPENSION      BREAST BIOPSY      COLONOSCOPY N/A 05/24/2017    Procedure: COLONOSCOPY;  Surgeon: Georgina Bunch MD;  Location: [facility];  Service: Endoscopy;  Laterality: N/A;    COLONOSCOPY N/A 07/28/2022    Procedure: COLONOSCOPY Suprep;  Surgeon: Cedrick Hernandez MD;  Location: [facility];  Service: Endoscopy;  Laterality: N/A;    CYSTOSCOPY      ESOPHAGOGASTRODUODENOSCOPY N/A 12/05/2018    Procedure: ESOPHAGOGASTRODUODENOSCOPY (EGD);  Surgeon: Georgina DIEZ  "MD Alivia;  Location: [facility] ENDO;  Service: Endoscopy;  Laterality: N/A;  1000 am arrival time, has transportation set up    EXCISIONAL BIOPSY Left 08/19/2019    Procedure: EXCISIONAL BIOPSY LEFT BREAST with SEED LOCALIZATION;  Surgeon: Ramon Bass MD;  Location: [address];  Service: General;  Laterality: Left;    HYSTERECTOMY      KNEE ARTHROSCOPY W/ MENISCECTOMY Right 12/08/2021    Procedure: ARTHROSCOPY, KNEE, WITH MENISCECTOMY;  Surgeon: Bin Artis MD;  Location: [facility] OR;  Service: Orthopedics;  Laterality: Right;  medial & lateral    KNEE ARTHROSCOPY W/ MENISCECTOMY Left 04/26/2023    Procedure: ARTHROSCOPY, KNEE, WITH PARTIAL MEDIAL AND LATERAL MENISCECTOMY;  Surgeon: Bin Artis MD;  Location: [facility];  Service: Orthopedics;  Laterality: Left;  regional w/o catheter (adductor)    LAPAROSCOPY      related to endometriosis (prior to hysterectomy)    LITHOTRIPSY      OOPHORECTOMY         Family History   Problem Relation Age of Onset    Dementia Mother         subtype not determined    Breast cancer Mother 82        unilat    Skin cancer Mother         pt believes (SCC?)    Alopecia Mother         "female pattern baldness"    Other Sister 58        skin cancer vs non-cancer on face    Rashes / Skin problems Sister         biopsies w/ noncancerous pathology    Crohn's disease Maternal Grandmother         had a colostomy    Dementia Maternal Grandfather     Diabetes Maternal Grandfather     Diabetes type II Maternal Grandfather     Depression Maternal Grandfather     Lung cancer Maternal Uncle         dx 60s (heavy smoker)    Breast cancer Paternal Aunt         dx early 50s (unk laterality/other details)    Cancer Paternal Aunt         abdominal ("pretty sure stomach") (dx 50s?)    Other Paternal Uncle         "maybe had some type of cancer, but I can't remember for sure"    Other Maternal Cousin         MAYBE had breast cancer    Cancer Other         pt thinks (type?)    Lupus " Neg Hx     Psoriasis Neg Hx     Melanoma Neg Hx          Current Outpatient Medications:     albuterol (PROVENTIL/VENTOLIN HFA) 90 mcg/actuation inhaler, Inhale 2 puffs into the lungs every 6 (six) hours as needed for Wheezing., Disp: 18 g, Rfl: 11    aspirin (ECOTRIN) 81 MG EC tablet, Take 81 mg by mouth 2 (two) times daily. Takes the first dose after lunch, Disp: , Rfl:     atorvastatin (LIPITOR) 40 MG tablet, Take 1 tablet (40 mg total) by mouth once daily., Disp: 90 tablet, Rfl: 3    b complex vitamins tablet, Take 1 tablet by mouth after lunch. , Disp: , Rfl:     blood sugar diagnostic (TRUE METRIX GLUCOSE TEST STRIP) Strp, 1 strip by Misc.(Non-Drug; Combo Route) route once daily., Disp: 100 strip, Rfl: 3    calcium carbonate (TUMS) 200 mg calcium (500 mg) chewable tablet, Take 1 tablet by mouth as needed., Disp: , Rfl:     co-enzyme Q-10 30 mg capsule, Take 30 mg by mouth 2 (two) times daily. , Disp: , Rfl:     finasteride (PROSCAR) 5 mg tablet, Take 1 tablet (5 mg total) by mouth every evening., Disp: 90 tablet, Rfl: 3    inhalation spacing device (MICROSPACER), Use as directed for inhalation., Disp: 1 Device, Rfl: 0    ketoconazole (NIZORAL) 2 % cream, APPLY  THIN LAYER TOPICALLY ONCE DAILY AT BEDTIME UNDER  BREAST  AS  NEEDED  FOR  FLARE, Disp: 60 g, Rfl: 6    LACTOBAC CMB #3/FOS/PANTETHINE (PROBIOTIC & ACIDOPHILUS ORAL), Take 1 tablet by mouth after lunch. , Disp: , Rfl:     lancets (ONETOUCH ULTRASOFT LANCETS) Misc, 1 lancet by Misc.(Non-Drug; Combo Route) route once daily., Disp: 100 each, Rfl: 3    levothyroxine (SYNTHROID) 88 MCG tablet, Take 1 tablet (88 mcg total) by mouth before breakfast., Disp: 90 tablet, Rfl: 3    meloxicam (MOBIC) 7.5 MG tablet, Take 1 tablet (7.5 mg total) by mouth daily as needed for Pain., Disp: 90 tablet, Rfl: 3    nystatin (MYCOSTATIN) powder, aaa qam prn flare, Disp: 120 g, Rfl: 6    omega-3 acid ethyl esters (LOVAZA) 1 gram capsule, Take 2 capsules (2 g total) by mouth 2  (two) times daily., Disp: 360 capsule, Rfl: 3    raloxifene (EVISTA) 60 mg tablet, Take 1 tablet (60 mg total) by mouth once daily., Disp: 90 tablet, Rfl: 3    sour cherry extract (TART CHERRY EXTRACT ORAL), Take by mouth., Disp: , Rfl:     spironolactone (ALDACTONE) 100 MG tablet, Take 1 tablet (100 mg total) by mouth once daily., Disp: 90 tablet, Rfl: 3    sulfaSALAzine (AZULFIDINE) 500 mg Tab, Take 3 tablets (1,500 mg total) by mouth 2 (two) times daily., Disp: 540 tablet, Rfl: 1    tirzepatide (MOUNJARO) 5 mg/0.5 mL PnIj, Inject 5 mg into the skin every 7 days., Disp: 6 Pen, Rfl: 0    vitamin D 1000 units Tab, Take 185 mg by mouth after lunch. , Disp: , Rfl:     methocarbamoL (ROBAXIN) 500 MG Tab, Take 1 tablet (500 mg total) by mouth 4 (four) times daily. for 10 days, Disp: 40 tablet, Rfl: 0    omeprazole (PRILOSEC) 40 MG capsule, Take 1 capsule (40 mg total) by mouth once daily., Disp: 90 capsule, Rfl: 3  No current facility-administered medications for this visit.    Facility-Administered Medications Ordered in Other Visits:     fentaNYL 50 mcg/mL injection  mcg,  mcg, Intravenous, Q5 Min PRN, Omar Gresham MD    midazolam (VERSED) 1 mg/mL injection 0.5-4 mg, 0.5-4 mg, Intravenous, PRN, Omar Gresham MD    Review of patient's allergies indicates:  No Known Allergies       REVIEW OF SYSTEMS:  Constitution: Negative. Negative for chills, fever and night sweats.   HENT: Negative for congestion and headaches.    Eyes: Negative for blurred vision, left vision loss and right vision loss.   Cardiovascular: Negative for chest pain and syncope.   Respiratory: Negative for cough and shortness of breath.    Endocrine: Negative for polydipsia, polyphagia and polyuria.   Hematologic/Lymphatic: Negative for bleeding problem. Does not bruise/bleed easily.   Skin: Negative for dry skin, itching and rash.   Musculoskeletal: Negative for falls.  Positive for left shoulder pain and muscle weakness.  "  Gastrointestinal: Negative for abdominal pain and bowel incontinence.   Genitourinary: Negative for bladder incontinence and nocturia.   Neurological: Negative for disturbances in coordination, loss of balance and seizures.   Psychiatric/Behavioral: Negative for depression. The patient does not have insomnia.    Allergic/Immunologic: Negative for hives and persistent infections.      PHYSICAL EXAMINATION:  Vitals:  /79   Pulse 82   Ht 5' 2" (1.575 m)   Wt 91.2 kg (201 lb 1 oz)   BMI 36.77 kg/m²    General: The patient is alert and oriented x 3.  Mood is pleasant.  Observation of ears, eyes and nose reveal no gross abnormalities.  No labored breathing observed.  Gait is coordinated. Patient can toe walk and heel walk without difficulty.      LEFT Shoulder / Upper Extremity Exam    OBSERVATION:     Swelling  none  Deformity  none   Discoloration  none   Scapular winging none   Scars   none  Atrophy  none    TENDERNESS / CREPITUS (T/C):          T/C      T/C   Clavicle   -/-  SUPRAspinatus    +/-     AC Jt.    -/-  INFRAspinatus  +/-    SC Jt.    -/-  Deltoid    -/-      G. Tuberosity  -/-  LH BICEP groove  +/-   Acromion:  -/-  Midline Neck   -/-     Scapular Spine -/-  Trapezium   -/-   SMA Scapula  -/-  GH jt. line - post  +/-     Scapulothoracic  +/-         ROM: (* = with pain)  Right shoulder   Left shoulder        AROM (PROM)   AROM (PROM)   FE    170° (175°)     90°* (120°*)     ER at 0°    60°  (65°)    30°*  (40°*)   ER at 90° ABD  90°  (90°)    NT     IR at 90°  ABD   NA  (40°)     NT     IR (spine level)   T10     Back pocket*    STRENGTH: (* = with pain) Right shoulder   Left shoulder    SCAPTION   5/5    3/5*    IR    5/5    4/5*   ER    5/5    4/5*   BICEPS   5/5    4/5*   Deltoid    5/5    5/5     SIGNS:  Painful side       NEER   +    OSIMS  +    BUSTAMANTE   +    SPEEDS  +     DROP ARM   -   BELLY PRESS neg   Superior escape none    LIFT-OFF  neg   X-Body ADD    +    MOVING " VALGUS neg        STABILITY TESTING    Right shoulder   Left shoulder    Translation     Anterior  up face    up face    Posterior  up face   up face    Sulcus   < 10mm   < 10 mm     Signs   Apprehension   neg      neg       Relocation   no change     no change      Jerk test  neg     neg    EXTREMITY NEURO-VASCULAR EXAM:    Sensation grossly intact to light touch all dermatomal regions.    DTR 2+ Biceps, Triceps, BR and Negative Nakuls sign   Grossly intact motor function at Elbow, Wrist and Hand   Distal pulses radial and ulnar 2+, brisk cap refill, symmetric.      NECK:  Painless FROM and spinous processes non-tender. Negative Spurlings sign.      OTHER FINDINGS:  + trapezius and periscapular muscle spasms    XRAYS left shoulder (8/17/2023):  Xrays including AP, Outlet and Axillary Lateral of Left shoulder are ordered / images reviewed by me:  No acute fracture or dislocation.  Glenohumeral and acromioclavicular joints appear well aligned with mild arthritis.  Soft tissues appear normal without findings suggestive of calcific tendinitis.          ASSESSMENT:     Acute left shoulder pain  Muscle spasms of left shoulder region      PLAN:      Prior imaging reviewed and discussed with patient in detail.    We discussed at length different treatment options including conservative vs surgical management. These include anti-inflammatories, acetaminophen, rest, ice, heat, formal physical therapy including strengthening and stretching exercises, home exercise programs, dry needling, corticosteroid injections, and finally surgical intervention.      Left shoulder subacromial corticosteroid injection performed today.  See procedure note for details.    Recommend she continue to rest and ice the left shoulder and take meloxicam once daily as needed for pain.  Prescription for Robaxin 500 mg 1 p.o. q.i.d. p.r.n. pain/muscle spasm provided today.    Follow-up as needed.  She will reach out to us in 1-2 weeks if symptoms  have worsened or fail to improve, and may consider an MRI at that time.      All questions were answered, patient will contact us for questions or concerns in the interim.      Medical Dictation software was used during the dictation of portions or the entirety of this medical record.  Phonetic or grammatic errors may exist due to the use of this software. For clarification, refer to the author of the document.

## 2024-01-11 NOTE — NURSING
1310 pt here for iv start for MRI, #22 catheter started to right wrist area with good blood return, flushes easily, pt to MRI

## 2024-01-11 NOTE — PROCEDURES
Large Joint Aspiration/Injection: L subacromial bursa    Date/Time: 1/11/2024 8:00 AM    Performed by: Stan Jenkins PA-C  Authorized by: Stan Jenkins PA-C    Consent Done?:  Yes (Verbal)  Indications:  Pain  Site marked: the procedure site was marked    Timeout: prior to procedure the correct patient, procedure, and site was verified    Prep: patient was prepped and draped in usual sterile fashion    Local anesthesia used?: No      Details:  Needle Size:  22 G  Ultrasonic Guidance for needle placement?: No    Approach:  Posterior  Location:  Shoulder  Site:  L subacromial bursa  Medications:  40 mg triamcinolone acetonide 40 mg/mL  Patient tolerance:  Patient tolerated the procedure well with no immediate complications    Injection Procedure  A time out was performed, including verification of patient ID, procedure, site and side, availability of information and equipment, review of safety issues, and agreement with consent, the procedure site was marked.    After time out was performed, the patient was prepped aseptically with povidone-iodine swabsticks. A diagnostic and therapeutic injection of 1:3cc Kenalog/Marcaine was given under sterile technique using a 22g x 1.5 needle from the Posterior  aspect of the left Shoulder Joint in the sitting position.      Verenice Mirza had no adverse reactions to the medication. Pain decreased. She was instructed to apply ice to the joint for 20 minutes and avoid strenuous activities for 24-36 hours following the injection. She was warned of possible blood sugar and/or blood pressure changes during that time. Following that time, she can resume regular activities.    She was reminded to call the clinic immediately for any adverse side effects as explained in clinic today.

## 2024-01-12 ENCOUNTER — PATIENT MESSAGE (OUTPATIENT)
Dept: INTERNAL MEDICINE | Facility: CLINIC | Age: 69
End: 2024-01-12
Payer: MEDICARE

## 2024-01-12 DIAGNOSIS — R10.9 ABDOMINAL PAIN, UNSPECIFIED ABDOMINAL LOCATION: Primary | ICD-10-CM

## 2024-01-12 RX ORDER — HYOSCYAMINE SULFATE 0.125 MG
125 TABLET ORAL EVERY 6 HOURS PRN
Qty: 30 TABLET | Refills: 3 | Status: SHIPPED | OUTPATIENT
Start: 2024-01-12

## 2024-01-12 NOTE — TELEPHONE ENCOUNTER
Please schedule patient for MRI of her liver abdomen with contrast.  Patient has had a difficult IV access situation in the past and has required going to the [facility] for IV access can we see if we can try to facilitate this process with her scheduling of her MRI with contrast.

## 2024-01-23 ENCOUNTER — TELEPHONE (OUTPATIENT)
Dept: HEMATOLOGY/ONCOLOGY | Facility: CLINIC | Age: 69
End: 2024-01-23
Payer: MEDICARE

## 2024-01-23 ENCOUNTER — PATIENT MESSAGE (OUTPATIENT)
Dept: HEMATOLOGY/ONCOLOGY | Facility: CLINIC | Age: 69
End: 2024-01-23
Payer: MEDICARE

## 2024-01-23 NOTE — TELEPHONE ENCOUNTER
Called patient back and changed visit to virtual----- Message from Lorin Neely sent at 1/23/2024  3:59 PM CST -----  Regarding: Appt  Contact: Pt  [phone]          Current Appt date: 01/24/24     Type of Appt: Ep     Physician: Saúl Mcclelland DNP    Reason for rescheduling: Due to a fall in pain  will not be able to make appt     Caller:  Verenice     Contact Preference: [phone]

## 2024-01-24 ENCOUNTER — TELEPHONE (OUTPATIENT)
Dept: HEMATOLOGY/ONCOLOGY | Facility: CLINIC | Age: 69
End: 2024-01-24
Payer: MEDICARE

## 2024-01-24 NOTE — TELEPHONE ENCOUNTER
Called patient to reschedule virtual appointment with Saúl Mcclelland DNP as provider wants in office visit. Patient could not make in office visit on 1/24/24 due to a fall she fell on knees, head was not injured.

## 2024-02-01 ENCOUNTER — HOSPITAL ENCOUNTER (OUTPATIENT)
Dept: RADIOLOGY | Facility: HOSPITAL | Age: 69
Discharge: HOME | End: 2024-02-01
Attending: INTERNAL MEDICINE
Payer: MEDICARE

## 2024-02-01 ENCOUNTER — INFUSION (OUTPATIENT)
Dept: INFUSION THERAPY | Facility: HOSPITAL | Age: 69
End: 2024-02-01
Payer: MEDICARE

## 2024-02-01 DIAGNOSIS — K76.9 LIVER DISEASE, UNSPECIFIED: ICD-10-CM

## 2024-02-01 PROCEDURE — 74182 MRI ABDOMEN W/CONTRAST: CPT | Mod: 26,,, | Performed by: STUDENT IN AN ORGANIZED HEALTH CARE EDUCATION/TRAINING PROGRAM

## 2024-02-01 PROCEDURE — A9585 GADOBUTROL INJECTION: HCPCS | Performed by: INTERNAL MEDICINE

## 2024-02-01 PROCEDURE — 74182 MRI ABDOMEN W/CONTRAST: CPT | Mod: TC

## 2024-02-01 PROCEDURE — 25500020 PHARM REV CODE 255: Performed by: INTERNAL MEDICINE

## 2024-02-01 RX ORDER — GADOBUTROL 604.72 MG/ML
10 INJECTION INTRAVENOUS
Status: COMPLETED | OUTPATIENT
Start: 2024-02-01 | End: 2024-02-01

## 2024-02-01 RX ADMIN — GADOBUTROL 10 ML: 604.72 INJECTION INTRAVENOUS at 03:02

## 2024-02-01 NOTE — PLAN OF CARE
Established 22G IVheploc to r distal FA for MRI w/ contrast, unable to access there. Blood aspirated and flushed w/ 10ml NS, taped and secured. Ambulated off unit w/o event, via self, to [facility], pleased.

## 2024-02-07 ENCOUNTER — TELEPHONE (OUTPATIENT)
Dept: HEMATOLOGY/ONCOLOGY | Facility: CLINIC | Age: 69
End: 2024-02-07
Payer: MEDICARE

## 2024-02-07 ENCOUNTER — OFFICE VISIT (OUTPATIENT)
Dept: HEMATOLOGY/ONCOLOGY | Facility: CLINIC | Age: 69
End: 2024-02-07
Payer: MEDICARE

## 2024-02-07 DIAGNOSIS — L98.9 SKIN LESION OF FACE: Primary | ICD-10-CM

## 2024-02-07 DIAGNOSIS — Z86.018 HISTORY OF UTERINE FIBROID: ICD-10-CM

## 2024-02-07 DIAGNOSIS — Z86.018 HISTORY OF LIPOMA: ICD-10-CM

## 2024-02-07 DIAGNOSIS — L98.9 SKIN LESION OF LOWER EXTREMITY: ICD-10-CM

## 2024-02-07 PROCEDURE — 99999 PR PBB SHADOW E&M-EST. PATIENT-LVL III: CPT | Mod: PBBFAC,,, | Performed by: NURSE PRACTITIONER

## 2024-02-07 PROCEDURE — 99213 OFFICE O/P EST LOW 20 MIN: CPT | Mod: S$PBB,,, | Performed by: NURSE PRACTITIONER

## 2024-02-07 PROCEDURE — 99213 OFFICE O/P EST LOW 20 MIN: CPT | Mod: PBBFAC | Performed by: NURSE PRACTITIONER

## 2024-02-07 NOTE — Clinical Note
Hi Dr. Nascimento,  I am evaluating our mutual patient, Verenice, to see if she has a clinical diagnosis of Cowden syndrome, which if so would have implications for cancer screenings for her.    I recently performed a partial skin exam and found that she has numerous raised facial papules that have the appearance, to my eye, of trichilemmomas, and she also has 2 skin lesions to her lower extremities that I'm not sure what they may be (photos in my clinic note from 2/7/24).  I am hoping you can assist me here by doing a thorough, total-body skin check, specifically to assess for trichilemmomas of the face; acral keratoses; palmoplantar keratoses; and fibromas -- and then letting me know your findings.  Please let me know.  She currently does not have any appointment upcoming with you.  Thanks, Saúl Mcclelland Cancer Genetics

## 2024-02-07 NOTE — PROGRESS NOTES
"Cancer Genetics  Department of Hematology and Oncology  [facility]    Date of Service:  24  Visit Provider:  Saúl Mcclelland DNP  Collaborating Physician:  Lakisha Vigil MD    Patient ID  Name: Verenice Mirza    : 1955    MRN: [id number]      Referring Provider  No referring provider defined for this encounter.    SUBJECTIVE      Chief Complaint: Follow-up    History of Present Illness (HPI):  Verenice Mirza ("Verenice"), 68 y.o., assigned female sex at birth, is a returning patient.    2019:  Verenice initially presented, for cancer genetic risk assessment.  At the conclusion of the visit, she opted to proceed with hereditary cancer genetic testing via the [employer] Multi-Cancer Panel, and a sample was collected for that test.    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    2023:  Verenice returned via virtual visit to discuss whether updated hereditary cancer genetic testing was indicated for her at that time.  She opted to delay any additional such testing at that visit.  Post-visit, I consulted with my Cancer Genetics colleague, and we agreed that an in-person visit was warranted, to further assess Verenice to see if she meets clinical diagnostic criteria for Cowden syndrome.    Verenice opted not to pursue additional hereditary cancer genetic testing.    Today, 2024:  Verenice returns for in-person assessment to see if she meets clinical diagnostic criteria for Cowden syndrome.    She denies interval changes to her personal/family history other than as reflected below.      Most recent total-body skin exam was 2023.  Verenice notes that Dr. Genevieve Nascimento has monitored a red spot on her scalp, for which Dr. Nascimento had also referred patient " "to Mohs clinic, but the Mohs clinic prescribed topical treatment rather than Mohs procedure.      Other medical history of Verenice's, pertinent to this visit, is noted in the Objective portion of this note.    Family Cancer History   Problem Relation Age of Onset    Breast cancer Mother 82        unilat    Skin cancer Mother         pt believes (SCC?)    Other Sister 58        skin cancer vs non-cancer on face    Rashes / Skin problems Sister         biopsies w/ noncancerous pathology    Lung cancer Maternal Uncle         dx 60s (heavy smoker)    Breast cancer Paternal Aunt         dx early 50s (unk laterality/other details)    Cancer Paternal Aunt         abdominal ("pretty sure stomach") (dx 50s?)    Other Paternal Uncle         "maybe had some type of cancer, but I can't remember for sure"    Other Maternal Cousin         MAYBE had breast cancer    Cancer Other         pt thinks (type?)       Review of Systems  See HPI.       OBJECTIVE     Physical Exam  Very pleasant patient.  Unaccompanied.  Vitals signs:  Reviewed:  Vitals:    02/07/24 1159   PainSc: 0-No pain     (Pain is rated on scale of 0-10 on which 10=worst.)  Distress score:  Reviewed: (0/10, on scale of 0-10 on which 10=worst).  HENT:     I tried but was unable to locate the "red spot" the patient mentioned is located on her scalp.  Constitutional      Appearance:  Appears well developed and well nourished. No distress.   Pulmonary     Effort:  Normal.  Neurological     Mental Status:  Alert and oriented.     Coordination:  Normal.   Psychiatric         Mood and Affect:  Normal.     Thought Content:  Normal.     Speech:  Normal.     Behavior:  Normal.     Judgment:  Normal.    Cowden syndrome-specific (per physical exam unless otherwise noted):  Exam limited to:  Face (limited by makeup); back and trunk (limited by brassiere in place); anterior legs; and hands and feet.  Patient opted to have the non-visualized areas of her skin assessed by her " dermatologist.         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     Facial trichilemmomas:  As directly above     Oral mucosal papillomatosis:  I did not see any evidence of oral mucosal papillomatosis      Acral keratosis:  None noted      Palmoplantar keratoses:  I noted keratin overgrowth to the heels but did not note any individual palmoplantar keratoses          History of breast cancer:  No (per chart review/discussion with patient)     History of epithelial (non-medullary) thyroid cancer:  No (per chart review/discussion with patient)     History of endometrial cancer:  No (per chart review/discussion with patient)     Macrocephaly (occipital frontal circumference ?97th percentile, i.e., at least 58 cm for a female):  No -- just under 54 cm          History of thyroid lesions:  No (per chart review/discussion with patient)     Intellectual disability (IQ ?75):  None apparent nor per chart review/discussion with patient     Hamartomatous intestinal polyps:  No (per chart review/discussion with patient)     History of fibrocystic disease of the breast:  No (per chart review/discussion with patient)     History of lipomas:  Yes, history of 1, in ascending colon (per chart review/discussion with patient)     History of fibromas:  No (per chart review/discussion with patient); however, I recommend a comprehensive dermatologic exam to further evaluate this, and the patient does currently have a pencil eraser-sized lump on the side (medial aspect) of her left heel (photo #4 below) and a 0.41-dkdw-dvfb red/purple skin lesion to lower leg (photo #7 below)     History of genitourinary tumors (especially renal cell carcinoma):  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) (per chart review/discussion with patient); however, the  report from patient's 02/01/2024 MRI abdomen indicates kidneys are without a mass, as does the report from patient's 01/10/2024 CT AP     History of genitourinary malformation:  No (per chart review/discussion with patient)     History of uterine fibroids:  Yes (per chart review/discussion with patient)       Cowden Syndrome Clinical Diagnosis Checklist    Check off all of the below that apply to the patient:      Pathognomonic mucocutaneous lesions:   [] ?6 facial papules, ?3 of which are trichilemmomas    [] Oral mucosal papillomatosis -and- cutaneous facial papules   [] Oral mucosal papillomatosis -and- acral keratosis   [] ?6 palmoplantar keratoses    Major criteria:   [] Breast cancer   [] Epithelial thyroid cancer (non-medullary), especially follicular thyroid cancer    [] Macrocephaly (occipital frontal circumference ?97th percentile, i.e., at least 58 cm for a female)    [] Endometrial carcinoma     Minor criteria:   [] Thyroid lesions (e.g., adenoma, multinodular goiter)   [] Intellectual disability (IQ ?75)   [] Hamartomatous intestinal polyps   [] Fibrocystic disease of the breast   [x] Lipomas   [] Fibromas   [x] Genitourinary tumors (especially renal cell carcinoma)   [] Genitourinary malformation   [x] Uterine fibroids    A clinical diagnosis of Cowden syndrome is made if an individual meets any one of the following criteria:    ?1 pathognomonic mucocutaneous lesion?   [] Yes   [] No    ?2 major criteria?   [] Yes   [x] No    ?1 major criteria -and- ?3 minor criteria?   [] Yes   [x] No    ?4 minor criteria?   [] Yes   [] No      Clinical Photographs    #1 - upper face      #2 - right aspect of upper face      #3 - left aspect of face      #4 - medial aspect of heel portion of left foot      #5 - plantar aspect of right foot      #6 - plantar aspect of left foot      #7 - red/purple lesion to skin of lower leg, 0.25 inches in width      #8 - dorsal aspect of bilateral hands      #9 - palmar aspect of  bilateral hands        ASSESSMENT / PLAN        ICD-10-CM ICD-9-CM   1. Skin lesion of face  L98.9 709.9   2. Skin lesion of lower extremity  L98.9 709.9   3. History of lipoma  Z86.018 V13.89   4. 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  Consensus clinical diagnostic criteria for Cowden syndrome have been developed, and an individual can have a clinical diagnosis of Cowden syndrome without a genetic diagnosis of such.    See Physical Exam and Cowden Syndrome Clinical Diagnosis Checklist in the Objective portion of this note, above.  Based on my discussion with the patient, chart review, and physical exam, it is possible but not definite that she meets criteria for clinical diagnosis of Cowden syndrome based on her possibly having at least 1 pathognomonic mucocutaneous lesion or if she has any fibromas noted on comprehensive dermatologic exam; however, if only the latter applies (i.e., skin fibromas), whether she meets the clinical diagnostic criteria by having at least 4 minor criteria is questionable, as she no longer has an angiomyolipoma reported on renal imaging.    The next step is for Verenice to undergo a comprehensive, total-body dermatologic exam with Dr. Genevieve Nascimento.  I will reach out to Dr. Nascimento to communicate the specific lesions that need to be assessed for to determine whether Verenice meets clinical diagnostic criteria for Cowden syndrome.     Follow-up:  Follow up in about 6 months (around 8/7/2024).  Follow up sooner as needed/desired.    Questions were encouraged and answered to the patient's satisfaction, and she verbalized understanding of the information and agreement with the plan.           Approximately 23 minutes  were spent face-to-face with the patient.  Approximately 23 minutes in total were spent on this encounter, which includes face-to-face time and non-face-to-face time preparing to see the patient (e.g., review of tests), obtaining and/or reviewing separately obtained history, documenting clinical information in the electronic or other health record, independently interpreting results (not separately reported) and communicating results to the patient/family/caregiver, or care coordination (not separately reported).         Saúl Mcclelland, DNP, APRN, FNP-BC, AOCNP, CGRA  Nurse Practitioner, Cancer Genetics  Department of Hematology and Oncology  [facility]        Portions of the record may have been created with voice-recognition software. Occasional wrong-word or sound-a-like substitutions may have occurred due to the inherent limitations of voice-recognition software. Read the chart carefully and recognize, using context, where substitutions have occurred.       Routed to Cancer Genetics Staff:  Please place recall for around 8/7/24.  Thanks!

## 2024-02-12 ENCOUNTER — PATIENT MESSAGE (OUTPATIENT)
Dept: INTERNAL MEDICINE | Facility: CLINIC | Age: 69
End: 2024-02-12
Payer: MEDICARE

## 2024-02-12 DIAGNOSIS — I15.2 HYPERTENSION ASSOCIATED WITH DIABETES: Primary | ICD-10-CM

## 2024-02-12 DIAGNOSIS — E11.59 HYPERTENSION ASSOCIATED WITH DIABETES: Primary | ICD-10-CM

## 2024-02-23 ENCOUNTER — PATIENT MESSAGE (OUTPATIENT)
Dept: HEMATOLOGY/ONCOLOGY | Facility: CLINIC | Age: 69
End: 2024-02-23
Payer: MEDICARE

## 2024-03-01 PROBLEM — L71.9 ROSACEA: Status: ACTIVE | Noted: 2024-03-01

## 2024-03-04 ENCOUNTER — PATIENT MESSAGE (OUTPATIENT)
Dept: HEMATOLOGY/ONCOLOGY | Facility: CLINIC | Age: 69
End: 2024-03-04
Payer: MEDICARE

## 2024-03-04 ENCOUNTER — TELEPHONE (OUTPATIENT)
Dept: DERMATOLOGY | Facility: CLINIC | Age: 69
End: 2024-03-04
Payer: MEDICARE

## 2024-03-04 NOTE — TELEPHONE ENCOUNTER
Spoke to pt. Pt verbally agreed and confirmed date and time given. Pt thanked me.     ----- Message from Genevieve Nascimento MD sent at 3/4/2024 10:32 AM CST -----  TBSE next available. thanks  ----- Message -----  From: Saúl Mcclelland DNP  Sent: 3/1/2024   7:59 PM CST  To: Genevieve Nascimento MD; Saúl Mcclelland DNP    Hi Dr. Nascimento,    I am evaluating our mutual patient, Verenice, to see if she has a clinical diagnosis of Cowden syndrome, which if so would have implications for cancer screenings for her.      I recently performed a partial skin exam and found that she has numerous raised facial papules that have the appearance, to my eye, of trichilemmomas, and she also has 2 skin lesions to her lower extremities that I'm not sure what they may be (photos in my clinic note from 2/7/24).    I am hoping you can assist me here by doing a thorough, total-body skin check, specifically to assess for trichilemmomas of the face; acral keratoses; palmoplantar keratoses; and fibromas -- and then letting me know your findings.    Please let me know.    She currently does not have any appointment upcoming with you.    Thanks,  Saúl Mcclelland  Cancer Genetics

## 2024-03-07 ENCOUNTER — PATIENT MESSAGE (OUTPATIENT)
Dept: INTERNAL MEDICINE | Facility: CLINIC | Age: 69
End: 2024-03-07
Payer: MEDICARE

## 2024-03-07 DIAGNOSIS — E11.59 HYPERTENSION ASSOCIATED WITH DIABETES: ICD-10-CM

## 2024-03-07 DIAGNOSIS — I15.2 HYPERTENSION ASSOCIATED WITH DIABETES: ICD-10-CM

## 2024-03-13 ENCOUNTER — PATIENT MESSAGE (OUTPATIENT)
Dept: HEMATOLOGY/ONCOLOGY | Facility: CLINIC | Age: 69
End: 2024-03-13
Payer: MEDICARE

## 2024-03-14 ENCOUNTER — LAB VISIT (OUTPATIENT)
Dept: LAB | Facility: HOSPITAL | Age: 69
End: 2024-03-14
Attending: INTERNAL MEDICINE
Payer: MEDICARE

## 2024-03-14 DIAGNOSIS — E11.59 HYPERTENSION ASSOCIATED WITH DIABETES: ICD-10-CM

## 2024-03-14 DIAGNOSIS — I15.2 HYPERTENSION ASSOCIATED WITH DIABETES: ICD-10-CM

## 2024-03-14 DIAGNOSIS — E03.9 HYPOTHYROIDISM, UNSPECIFIED TYPE: ICD-10-CM

## 2024-03-14 DIAGNOSIS — E78.5 DYSLIPIDEMIA ASSOCIATED WITH TYPE 2 DIABETES MELLITUS: ICD-10-CM

## 2024-03-14 DIAGNOSIS — Z79.899 HIGH RISK MEDICATION USE: ICD-10-CM

## 2024-03-14 DIAGNOSIS — E11.69 DYSLIPIDEMIA ASSOCIATED WITH TYPE 2 DIABETES MELLITUS: ICD-10-CM

## 2024-03-14 LAB
ALBUMIN SERPL BCP-MCNC: 3.8 G/DL (ref 3.5–5.2)
ALBUMIN/CREAT UR: 8.5 UG/MG (ref 0–30)
ALP SERPL-CCNC: 83 U/L (ref 55–135)
ALT SERPL W/O P-5'-P-CCNC: 15 U/L (ref 10–44)
ANION GAP SERPL CALC-SCNC: 11 MMOL/L (ref 8–16)
AST SERPL-CCNC: 17 U/L (ref 10–40)
BASOPHILS # BLD AUTO: 0.06 K/UL (ref 0–0.2)
BASOPHILS # BLD AUTO: 0.06 K/UL (ref 0–0.2)
BASOPHILS NFR BLD: 1 % (ref 0–1.9)
BASOPHILS NFR BLD: 1 % (ref 0–1.9)
BILIRUB SERPL-MCNC: 0.3 MG/DL (ref 0.1–1)
BUN SERPL-MCNC: 20 MG/DL (ref 8–23)
CALCIUM SERPL-MCNC: 9.4 MG/DL (ref 8.7–10.5)
CHLORIDE SERPL-SCNC: 108 MMOL/L (ref 95–110)
CHOLEST SERPL-MCNC: 154 MG/DL (ref 120–199)
CHOLEST/HDLC SERPL: 3.1 {RATIO} (ref 2–5)
CO2 SERPL-SCNC: 19 MMOL/L (ref 23–29)
CREAT SERPL-MCNC: 0.8 MG/DL (ref 0.5–1.4)
CREAT UR-MCNC: 284 MG/DL (ref 15–325)
CRP SERPL-MCNC: 4.4 MG/L (ref 0–8.2)
DIFFERENTIAL METHOD BLD: NORMAL
DIFFERENTIAL METHOD BLD: NORMAL
EOSINOPHIL # BLD AUTO: 0.1 K/UL (ref 0–0.5)
EOSINOPHIL # BLD AUTO: 0.1 K/UL (ref 0–0.5)
EOSINOPHIL NFR BLD: 2.3 % (ref 0–8)
EOSINOPHIL NFR BLD: 2.3 % (ref 0–8)
ERYTHROCYTE [DISTWIDTH] IN BLOOD BY AUTOMATED COUNT: 14.3 % (ref 11.5–14.5)
ERYTHROCYTE [DISTWIDTH] IN BLOOD BY AUTOMATED COUNT: 14.3 % (ref 11.5–14.5)
ERYTHROCYTE [SEDIMENTATION RATE] IN BLOOD BY PHOTOMETRIC METHOD: 8 MM/HR (ref 0–36)
EST. GFR  (NO RACE VARIABLE): >60 ML/MIN/1.73 M^2
ESTIMATED AVG GLUCOSE: 103 MG/DL (ref 68–131)
GLUCOSE SERPL-MCNC: 99 MG/DL (ref 70–110)
HBA1C MFR BLD: 5.2 % (ref 4–5.6)
HCT VFR BLD AUTO: 44.6 % (ref 37–48.5)
HCT VFR BLD AUTO: 44.6 % (ref 37–48.5)
HDLC SERPL-MCNC: 49 MG/DL (ref 40–75)
HDLC SERPL: 31.8 % (ref 20–50)
HGB BLD-MCNC: 14.3 G/DL (ref 12–16)
HGB BLD-MCNC: 14.3 G/DL (ref 12–16)
IMM GRANULOCYTES # BLD AUTO: 0.01 K/UL (ref 0–0.04)
IMM GRANULOCYTES # BLD AUTO: 0.01 K/UL (ref 0–0.04)
IMM GRANULOCYTES NFR BLD AUTO: 0.2 % (ref 0–0.5)
IMM GRANULOCYTES NFR BLD AUTO: 0.2 % (ref 0–0.5)
LDLC SERPL CALC-MCNC: 87 MG/DL (ref 63–159)
LYMPHOCYTES # BLD AUTO: 1.6 K/UL (ref 1–4.8)
LYMPHOCYTES # BLD AUTO: 1.6 K/UL (ref 1–4.8)
LYMPHOCYTES NFR BLD: 26 % (ref 18–48)
LYMPHOCYTES NFR BLD: 26 % (ref 18–48)
MCH RBC QN AUTO: 30.2 PG (ref 27–31)
MCH RBC QN AUTO: 30.2 PG (ref 27–31)
MCHC RBC AUTO-ENTMCNC: 32.1 G/DL (ref 32–36)
MCHC RBC AUTO-ENTMCNC: 32.1 G/DL (ref 32–36)
MCV RBC AUTO: 94 FL (ref 82–98)
MCV RBC AUTO: 94 FL (ref 82–98)
MICROALBUMIN UR DL<=1MG/L-MCNC: 24 UG/ML
MONOCYTES # BLD AUTO: 0.4 K/UL (ref 0.3–1)
MONOCYTES # BLD AUTO: 0.4 K/UL (ref 0.3–1)
MONOCYTES NFR BLD: 7.1 % (ref 4–15)
MONOCYTES NFR BLD: 7.1 % (ref 4–15)
NEUTROPHILS # BLD AUTO: 3.8 K/UL (ref 1.8–7.7)
NEUTROPHILS # BLD AUTO: 3.8 K/UL (ref 1.8–7.7)
NEUTROPHILS NFR BLD: 63.4 % (ref 38–73)
NEUTROPHILS NFR BLD: 63.4 % (ref 38–73)
NONHDLC SERPL-MCNC: 105 MG/DL
NRBC BLD-RTO: 0 /100 WBC
NRBC BLD-RTO: 0 /100 WBC
PLATELET # BLD AUTO: 265 K/UL (ref 150–450)
PLATELET # BLD AUTO: 265 K/UL (ref 150–450)
PMV BLD AUTO: 10.6 FL (ref 9.2–12.9)
PMV BLD AUTO: 10.6 FL (ref 9.2–12.9)
POTASSIUM SERPL-SCNC: 3.9 MMOL/L (ref 3.5–5.1)
PROT SERPL-MCNC: 6.7 G/DL (ref 6–8.4)
RBC # BLD AUTO: 4.74 M/UL (ref 4–5.4)
RBC # BLD AUTO: 4.74 M/UL (ref 4–5.4)
SODIUM SERPL-SCNC: 138 MMOL/L (ref 136–145)
TRIGL SERPL-MCNC: 90 MG/DL (ref 30–150)
TSH SERPL DL<=0.005 MIU/L-ACNC: 3.5 UIU/ML (ref 0.4–4)
WBC # BLD AUTO: 6.05 K/UL (ref 3.9–12.7)
WBC # BLD AUTO: 6.05 K/UL (ref 3.9–12.7)

## 2024-03-14 PROCEDURE — 80061 LIPID PANEL: CPT | Performed by: INTERNAL MEDICINE

## 2024-03-14 PROCEDURE — 80053 COMPREHEN METABOLIC PANEL: CPT | Performed by: INTERNAL MEDICINE

## 2024-03-14 PROCEDURE — 36415 COLL VENOUS BLD VENIPUNCTURE: CPT | Mod: PO | Performed by: INTERNAL MEDICINE

## 2024-03-14 PROCEDURE — 85652 RBC SED RATE AUTOMATED: CPT | Performed by: INTERNAL MEDICINE

## 2024-03-14 PROCEDURE — 85025 COMPLETE CBC W/AUTO DIFF WBC: CPT | Performed by: INTERNAL MEDICINE

## 2024-03-14 PROCEDURE — 86140 C-REACTIVE PROTEIN: CPT | Performed by: INTERNAL MEDICINE

## 2024-03-14 PROCEDURE — 83036 HEMOGLOBIN GLYCOSYLATED A1C: CPT | Performed by: INTERNAL MEDICINE

## 2024-03-14 PROCEDURE — 82043 UR ALBUMIN QUANTITATIVE: CPT | Performed by: INTERNAL MEDICINE

## 2024-03-14 PROCEDURE — 84443 ASSAY THYROID STIM HORMONE: CPT | Performed by: INTERNAL MEDICINE

## 2024-03-15 ENCOUNTER — OFFICE VISIT (OUTPATIENT)
Dept: RHEUMATOLOGY | Facility: CLINIC | Age: 69
End: 2024-03-15
Payer: MEDICARE

## 2024-03-15 ENCOUNTER — PATIENT MESSAGE (OUTPATIENT)
Dept: RHEUMATOLOGY | Facility: CLINIC | Age: 69
End: 2024-03-15

## 2024-03-15 DIAGNOSIS — M05.9 SEROPOSITIVE RHEUMATOID ARTHRITIS: ICD-10-CM

## 2024-03-15 PROCEDURE — 99499 UNLISTED E&M SERVICE: CPT | Mod: 95,,, | Performed by: INTERNAL MEDICINE

## 2024-03-15 ASSESSMENT — ROUTINE ASSESSMENT OF PATIENT INDEX DATA (RAPID3)
MDHAQ FUNCTION SCORE: 0.5
FATIGUE SCORE: 2
PATIENT GLOBAL ASSESSMENT SCORE: 1.5
PSYCHOLOGICAL DISTRESS SCORE: 0
PAIN SCORE: 2.5
TOTAL RAPID3 SCORE: 1.89

## 2024-03-15 NOTE — PROGRESS NOTES
3/13/2024     1:30 PM   Rapid3 Question Responses and Scores   MDHAQ Score 0.5   Psychologic Score 0   Pain Score 2.5   When you awakened in the morning OVER THE LAST WEEK, did you feel stiff? No   Fatigue Score 2   Global Health Score 1.5   RAPID3 Score 1.89     Answers submitted by the patient for this visit:  Rheumatology Questionnaire (Submitted on 3/13/2024)  fever: No  eye redness: No  mouth sores: No  headaches: No  shortness of breath: No  chest pain: No  trouble swallowing: No  diarrhea: No  constipation: No  unexpected weight change: No  genital sore: No  dysuria: No  During the last 3 days, have you had a skin rash?: No  Bruises or bleeds easily: Yes  cough: No

## 2024-03-15 NOTE — PROGRESS NOTES
Subjective:       Patient ID: Verenice Mirza is a 68 y.o. female.    Chief Complaint: Disease Management      Fell early 2021; R hand swelled after fall but never got better  Dr. Erickson dx rheumatoid arthritis - seropositive  Orencia weekly x 3 (April) helped but then she got diverticulitis  June 2021 started Sulfasalazine up to 3 bid     Feb 2021 RF 92,      Previous hx of plantar fasciitis, achilles tendinitis, R knee pain, and lumbar pain  Hx gel shots in R knee  Hx PT for knee  A lot of pain around the knee; no cane   Meloxicam      Hx DM2  Steatosis/ fatty liver  HTN metoprolol   HLD  Hypothyroid  PVD  PAD  Rosacea  JORDANA  Raloxifene since 2019 for atypical ductal hyperplasia  Fam hx crohn's grandmother     Limits carbs and sugars  Losing weight on Ozempic     Had surgery knee Dec 2021      Patient was unable to log onto system for virtual visit so visit rescheduled for in-person     3/14 lab all normal CBC/CMP/CRP/sedimentation rate     Review of Systems   Constitutional:  Negative for fever and unexpected weight change.   HENT:  Negative for mouth sores and trouble swallowing.    Eyes:  Negative for redness.   Respiratory:  Negative for cough and shortness of breath.    Cardiovascular:  Negative for chest pain.   Gastrointestinal:  Negative for constipation and diarrhea.   Genitourinary:  Negative for dysuria and genital sores.   Skin:  Negative for rash.   Neurological:  Negative for headaches.   Hematological:  Bruises/bleeds easily.           3/13/2024     1:30 PM   Rapid3 Question Responses and Scores   MDHAQ Score 0.5   Psychologic Score 0   Pain Score 2.5   When you awakened in the morning OVER THE LAST WEEK, did you feel stiff? No   Fatigue Score 2   Global Health Score 1.5   RAPID3 Score 1.89      Objective:   There were no vitals taken for this visit.     Physical Exam      Assessment:       1. Seropositive rheumatoid arthritis            Plan:       Problem List Items Addressed This Visit           Active Problems    Seropositive rheumatoid arthritis

## 2024-03-17 ENCOUNTER — PATIENT MESSAGE (OUTPATIENT)
Dept: RHEUMATOLOGY | Facility: CLINIC | Age: 69
End: 2024-03-17
Payer: MEDICARE

## 2024-03-18 ENCOUNTER — TELEPHONE (OUTPATIENT)
Dept: HEMATOLOGY/ONCOLOGY | Facility: CLINIC | Age: 69
End: 2024-03-18
Payer: MEDICARE

## 2024-03-18 ENCOUNTER — PATIENT MESSAGE (OUTPATIENT)
Dept: HEMATOLOGY/ONCOLOGY | Facility: CLINIC | Age: 69
End: 2024-03-18
Payer: MEDICARE

## 2024-03-18 DIAGNOSIS — N60.92 ATYPICAL DUCTAL HYPERPLASIA OF LEFT BREAST: Primary | ICD-10-CM

## 2024-03-18 DIAGNOSIS — Z12.31 ENCOUNTER FOR SCREENING MAMMOGRAM FOR HIGH-RISK PATIENT: ICD-10-CM

## 2024-03-18 NOTE — TELEPHONE ENCOUNTER
----- Message from Lorin Neely sent at 3/18/2024 10:14 AM CDT -----  Regarding: Appt  Contact: Pt  [phone]              Current Appt date:  04/04/24     Type of Appt:  Ep     Physician:   Rashel Zamorano MD     Reason for rescheduling:  Would like to change appt to  virtual    Caller: Verenice     Contact Preference:  [phone]

## 2024-03-20 ENCOUNTER — OFFICE VISIT (OUTPATIENT)
Dept: INTERNAL MEDICINE | Facility: CLINIC | Age: 69
End: 2024-03-20
Payer: MEDICARE

## 2024-03-20 VITALS
BODY MASS INDEX: 37.56 KG/M2 | SYSTOLIC BLOOD PRESSURE: 118 MMHG | WEIGHT: 204.13 LBS | HEIGHT: 62 IN | DIASTOLIC BLOOD PRESSURE: 80 MMHG | HEART RATE: 73 BPM | OXYGEN SATURATION: 96 %

## 2024-03-20 DIAGNOSIS — E66.01 MORBID (SEVERE) OBESITY DUE TO EXCESS CALORIES: ICD-10-CM

## 2024-03-20 DIAGNOSIS — E03.4 HYPOTHYROIDISM DUE TO ACQUIRED ATROPHY OF THYROID: ICD-10-CM

## 2024-03-20 DIAGNOSIS — L65.8 FEMALE PATTERN BALDNESS: ICD-10-CM

## 2024-03-20 DIAGNOSIS — K21.9 GASTROESOPHAGEAL REFLUX DISEASE, UNSPECIFIED WHETHER ESOPHAGITIS PRESENT: ICD-10-CM

## 2024-03-20 DIAGNOSIS — E11.59 HYPERTENSION ASSOCIATED WITH DIABETES: Primary | ICD-10-CM

## 2024-03-20 DIAGNOSIS — I15.2 HYPERTENSION ASSOCIATED WITH DIABETES: Primary | ICD-10-CM

## 2024-03-20 DIAGNOSIS — E78.5 HYPERLIPIDEMIA, UNSPECIFIED HYPERLIPIDEMIA TYPE: ICD-10-CM

## 2024-03-20 DIAGNOSIS — Z79.899 DRUG-INDUCED IMMUNODEFICIENCY: ICD-10-CM

## 2024-03-20 DIAGNOSIS — M17.0 PRIMARY OSTEOARTHRITIS OF BOTH KNEES: ICD-10-CM

## 2024-03-20 DIAGNOSIS — M25.561 CHRONIC PAIN OF RIGHT KNEE: ICD-10-CM

## 2024-03-20 DIAGNOSIS — D18.00 MULTIPLE HEMANGIOMAS: ICD-10-CM

## 2024-03-20 DIAGNOSIS — E78.5 DYSLIPIDEMIA ASSOCIATED WITH TYPE 2 DIABETES MELLITUS: ICD-10-CM

## 2024-03-20 DIAGNOSIS — E11.69 DYSLIPIDEMIA ASSOCIATED WITH TYPE 2 DIABETES MELLITUS: ICD-10-CM

## 2024-03-20 DIAGNOSIS — M05.9 SEROPOSITIVE RHEUMATOID ARTHRITIS: ICD-10-CM

## 2024-03-20 DIAGNOSIS — D84.821 DRUG-INDUCED IMMUNODEFICIENCY: ICD-10-CM

## 2024-03-20 DIAGNOSIS — G89.29 CHRONIC PAIN OF RIGHT KNEE: ICD-10-CM

## 2024-03-20 PROCEDURE — 99214 OFFICE O/P EST MOD 30 MIN: CPT | Mod: PBBFAC,PO | Performed by: INTERNAL MEDICINE

## 2024-03-20 PROCEDURE — 99999 PR PBB SHADOW E&M-EST. PATIENT-LVL IV: CPT | Mod: PBBFAC,,, | Performed by: INTERNAL MEDICINE

## 2024-03-20 PROCEDURE — 99397 PER PM REEVAL EST PAT 65+ YR: CPT | Mod: S$PBB,GZ,, | Performed by: INTERNAL MEDICINE

## 2024-03-20 RX ORDER — SPIRONOLACTONE 100 MG/1
100 TABLET, FILM COATED ORAL DAILY
Qty: 90 TABLET | Refills: 3 | Status: SHIPPED | OUTPATIENT
Start: 2024-03-20

## 2024-03-20 RX ORDER — FINASTERIDE 5 MG/1
5 TABLET, FILM COATED ORAL NIGHTLY
Qty: 90 TABLET | Refills: 3 | Status: SHIPPED | OUTPATIENT
Start: 2024-03-20

## 2024-03-20 RX ORDER — OMEPRAZOLE 40 MG/1
40 CAPSULE, DELAYED RELEASE ORAL DAILY
Qty: 90 CAPSULE | Refills: 3 | Status: SHIPPED | OUTPATIENT
Start: 2024-03-20 | End: 2024-04-19

## 2024-03-20 RX ORDER — LEVOTHYROXINE SODIUM 88 UG/1
88 TABLET ORAL
Qty: 90 TABLET | Refills: 3 | Status: SHIPPED | OUTPATIENT
Start: 2024-03-20

## 2024-03-20 RX ORDER — ATORVASTATIN CALCIUM 40 MG/1
40 TABLET, FILM COATED ORAL DAILY
Qty: 90 TABLET | Refills: 3 | Status: SHIPPED | OUTPATIENT
Start: 2024-03-20

## 2024-03-20 RX ORDER — MELOXICAM 7.5 MG/1
7.5 TABLET ORAL DAILY PRN
Qty: 90 TABLET | Refills: 3 | Status: SHIPPED | OUTPATIENT
Start: 2024-03-20 | End: 2024-04-03

## 2024-03-20 NOTE — PROGRESS NOTES
Patient ID: Verenice Mirza is a 68 y.o. female    Chief Complaint: Annual Exam and Diabetes    History of Present Illness  The patient is a 68-year-old female who is coming in today for her physical and her diabetes follow-up.    She was proud of her A1c at 5.2. She goes up and down a pound and can not get back on whatever. She is doing keto diet. She is taking Mounjaro 7.5. She does not think it works as well as the Ozempic. She still gets hungry, but when she gets hungry, she will just drink water or eat some celery. She denies any side effects with Mounjaro. She did not have any problems with Ozempic. She thinks she should have lost more weight. She eats no bread. She makes her own bread and crackers. She makes a little egg in the morning with a little bit of mocha and 1 tablespoon of heavy cream. For lunch, she has a salad or tuna. She is trying to lose weight. Yesterday, she worked in her yard and had a half a cup of cottage cheese and a fourth of blueberries for breakfast. For lunch, she did not eat until 2 p.m. because she was working. She came in and had 3 tablespoons of tuna with celery and pickle realish.    She has an eye exam in 05/2024. Her mammogram is scheduled for 07/2024. Her knee was doing well for a while, but now it is back to stiff. She has arthritis. Cortisone helps, but it lasts for about 6 months. She had seen Dr. Jose L Harry for her knee. She has not been back to anyone because she has been doing really well, but then all of a sudden, it is acting up again. She does not want a knee replacement. Dr. Harry did her first injection in her knee. She had a second injection with Dr. Yohan Chamberlain's PA. She has had 2 meniscus surgeries in both knees. The most pain is in her left knee. It is hard to get around when she first gets up in the morning, but after she moves it, it is okay.    She had an MRI of her abdomen on 02/01/2024 and it showed a hemangioma in the liver. She read some up on it and  it is a cluster of blood vessels. She inquired if they could ever burst.    Supplemental Information  She has sleep apnea.    ROS: Otherwise Negative     Physical Exam  General: Well-appearing, well-nourished.  No distress  HEENT: conjunctivae are normal.  Pupils are equal and reative to light.  TM's are clear and intact bilaterally.  Hearing is grossly normal.  Nasopharynx is clear.  Oropharynx is clear.  Neck: Supple.  No thyroid megaly.  No bruits.  Lymph: No cervical or supraclavicular adenopathy.  Heart: Regular rate and rhythm, without murmur, rub or gallop.  Lungs: Clear to auscultation; respiratory effort normal.  Abdomen: Soft, nontender, nondistended.  Normoactive bowel sounds.  No hepatomegaly.  No masses.  Extremities: Good distal pulses.  No edema.  Psych: Oriented to time person place.  Judgment and insight seem unimpaired.  Mood and affect are appropriate.    Results  Laboratory Studies  Labs were reviewed with the patient.    Imaging  MRI of the abdomen from 02/01/2024 was reviewed with the patient.    Assessment & Plan  1. Diabetes.  Her sugar control has been great for more than a year. She will continue Mounjaro 7.5.    2. Health maintenance.  She is due for an eye exam. Her mammogram is scheduled for 07/2024.    3. Knee pain.    4. Hemangioma.  There are 2 very small enhancing lesions in the right lobe that favor hemangiomas. The lesion has not significantly changed from 2021. We will continue to monitor.    5. Weight gain.  Her metabolism is poor. I discussed with her that Mounjaro helps with her insulin and sugar regulation. I discussed with her that Mounjaro 10 mg or 12.5 mg is available. I discussed with her to do more vigorous exercise. I discussed with her that intermittent fasting is not sustainable.    Follow-up  The patient will follow up as needed.      Follow up in about 6 months (around 9/20/2024) for diabetes cmp hba1c lipids.    Verenice was seen today for annual exam and  diabetes.    Diagnoses and all orders for this visit:    Hypertension associated with diabetes  -     tirzepatide 7.5 mg/0.5 mL PnIj; Inject 7.5 mg into the skin every 7 days.  -     Comprehensive Metabolic Panel; Standing  -     Hemoglobin A1C; Standing  -     Lipid Panel; Standing  Patient is having difficulty with manufacture shortages may need to transition to 10 mg or 12.5 mg based on availability or transition her change back to higher dose Ozempic  Hyperlipidemia, unspecified hyperlipidemia type  -     atorvastatin (LIPITOR) 40 MG tablet; Take 1 tablet (40 mg total) by mouth once daily.    Hypothyroidism due to acquired atrophy of thyroid  -     levothyroxine (SYNTHROID) 88 MCG tablet; Take 1 tablet (88 mcg total) by mouth before breakfast.  -     TSH; Standing  Controlled.  Continue current medical regimen.  Prescription refills addressed.  Followup advised. See after visit summary.  Chronic pain of right knee  -     meloxicam (MOBIC) 7.5 MG tablet; Take 1 tablet (7.5 mg total) by mouth daily as needed for Pain.  Chronic stable followed by Orthopedics receives periodic injections  Gastroesophageal reflux disease, unspecified whether esophagitis present  -     omeprazole (PRILOSEC) 40 MG capsule; Take 1 capsule (40 mg total) by mouth once daily.    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  -     finasteride (PROSCAR) 5 mg tablet; Take 1 tablet (5 mg total) by mouth every evening.  Controlled.  Continue current medical regimen.  Prescription refills addressed.  Followup advised. See after visit summary.  Dyslipidemia associated with type 2 diabetes mellitus  -     Comprehensive Metabolic Panel; Standing  -     Hemoglobin A1C; Standing  -     Lipid Panel; Standing  Controlled.  Continue current medical regimen.  Prescription refills addressed.  Followup advised. See after visit summary.  Primary osteoarthritis of both  knees    Morbid (severe) obesity due to excess calories  Extensive counseling today regarding weight loss options  Seropositive rheumatoid arthritis  Followed and treated by Rheumatology  Drug-induced immunodeficiency    Multiple hemangiomas  Counseling reassurance provided       This note was generated with the assistance of ambient listening technology. Verbal consent was obtained by the patient and accompanying visitor(s) for the recording of patient appointment to facilitate this note. I attest to having reviewed and edited the generated note for accuracy, though some syntax or spelling errors may persist. Please contact the author of this note for any clarification.

## 2024-04-03 ENCOUNTER — OFFICE VISIT (OUTPATIENT)
Dept: RHEUMATOLOGY | Facility: CLINIC | Age: 69
End: 2024-04-03
Payer: MEDICARE

## 2024-04-03 DIAGNOSIS — M05.9 SEROPOSITIVE RHEUMATOID ARTHRITIS: ICD-10-CM

## 2024-04-03 DIAGNOSIS — M17.0 PRIMARY OSTEOARTHRITIS OF BOTH KNEES: ICD-10-CM

## 2024-04-03 DIAGNOSIS — Z98.890 S/P ARTHROSCOPIC SURGERY OF LEFT KNEE: Primary | ICD-10-CM

## 2024-04-03 PROCEDURE — 99214 OFFICE O/P EST MOD 30 MIN: CPT | Mod: 95,,, | Performed by: INTERNAL MEDICINE

## 2024-04-03 RX ORDER — SULFASALAZINE 500 MG/1
1500 TABLET ORAL 2 TIMES DAILY
Qty: 540 TABLET | Refills: 1 | Status: SHIPPED | OUTPATIENT
Start: 2024-04-03

## 2024-04-03 RX ORDER — CELECOXIB 200 MG/1
200 CAPSULE ORAL DAILY PRN
Qty: 30 CAPSULE | Refills: 2 | Status: SHIPPED | OUTPATIENT
Start: 2024-04-03 | End: 2024-04-23 | Stop reason: SDUPTHER

## 2024-04-03 ASSESSMENT — ROUTINE ASSESSMENT OF PATIENT INDEX DATA (RAPID3)
FATIGUE SCORE: 0
MDHAQ FUNCTION SCORE: 0.3
PAIN SCORE: 0
PSYCHOLOGICAL DISTRESS SCORE: 0
PATIENT GLOBAL ASSESSMENT SCORE: 0
TOTAL RAPID3 SCORE: 0.33

## 2024-04-03 NOTE — Clinical Note
Schedule her to see me 4/23 at 9 am for knee injection , and add my labs to PCP's labs schedule 9/16, and schedule recall follow up with me Sept after labs

## 2024-04-03 NOTE — ASSESSMENT & PLAN NOTE
Rheumatoid arthritis doing well on sulfasalazine monotherapy with only one swollen MCP that is not painful and full Range of Motion hands    She has had recurrent knee pain that is mechanical and sounds like previously dx'd osteoarthritis    Will try celecoxib 200 mg/d for 2 weeks for knees  Will schedule for knee injections if celecoxib does not work  Will add my lab to Dr Crespo' labs scheduled 9/16/24 and follow up visit then

## 2024-04-03 NOTE — PROGRESS NOTES
3/28/2024     5:14 PM   Rapid3 Question Responses and Scores   MDHAQ Score 0.3   Psychologic Score 0   Pain Score 0   When you awakened in the morning OVER THE LAST WEEK, did you feel stiff? No   Fatigue Score 0   Global Health Score 0   RAPID3 Score 0.33     Answers submitted by the patient for this visit:  Rheumatology Questionnaire (Submitted on 3/28/2024)  fever: No  eye redness: No  mouth sores: No  headaches: No  shortness of breath: No  chest pain: No  trouble swallowing: No  diarrhea: No  constipation: No  unexpected weight change: No  genital sore: No  dysuria: No  During the last 3 days, have you had a skin rash?: No  Bruises or bleeds easily: Yes  cough: No       Just spoke to nurse navigator Rosa Lake, regarding Norberto not getting his feeding pump.  Stated that she will follow up with Norberto's nurse navigator Kristy Freeman

## 2024-04-03 NOTE — PROGRESS NOTES
Subjective:       Patient ID: Verenice Mirza is a 68 y.o. female.    Chief Complaint: Disease Management    Fell early 2021; R hand swelled after fall but never got better  Dr. Erickson dx rheumatoid arthritis - seropositive  Orencia weekly x 3 (April) helped but then she got diverticulitis  June 2021 started Sulfasalazine up to 3 bid     Feb 2021 RF 92,      Previous hx of plantar fasciitis, achilles tendinitis, R knee pain, and lumbar pain  Hx gel shots in R knee  Hx PT for knee, Meloxicam      Hx DM2  Steatosis/ fatty liver  HTN metoprolol   HLD  Hypothyroid  PVD  PAD  Rosacea  JORDANA  Raloxifene since 2019 for atypical ductal hyperplasia  Fam hx crohn's grandmother      Had surgery knee Dec 2021        The patient location is: Home; [facility]  The chief complaint leading to consultation is: RAmgt and knee pain    Visit type: audiovisual    Face to Face time with patient: 24 min  30 minutes of total time spent on the encounter, which includes face to face time and non-face to face time preparing to see the patient (eg, review of tests), Obtaining and/or reviewing separately obtained history, Documenting clinical information in the electronic or other health record, Independently interpreting results (not separately reported) and communicating results to the patient/family/caregiver, or Care coordination (not separately reported).         Each patient to whom he or she provides medical services by telemedicine is:  (1) informed of the relationship between the physician and patient and the respective role of any other health care provider with respect to management of the patient; and (2) notified that he or she may decline to receive medical services by telemedicine and may withdraw from such care at any time.    Notes:      Last virtual visit did not work    Hands are doing well; a little swelling R 2nd MCP; not painful; has been pulling weeds in yard    Knees are not  L knee felicita more painful; worse when first rises  after sitting or in a.m, then better after a few steps  History meniscal surgeries and had osteoarthritis dx previously and injections by    and by Darin Eh     Has lost 40 lb     Has meloxicam but has not used it  Mother took Celebrex but she has not taken it     Friend had robotic knee replacement    March 14 lab showed no med toxicity    Review of Systems   Constitutional:  Negative for fever and unexpected weight change.   HENT:  Negative for mouth sores and trouble swallowing.    Eyes:  Negative for redness.   Respiratory:  Negative for cough and shortness of breath.    Cardiovascular:  Negative for chest pain.   Gastrointestinal:  Negative for constipation and diarrhea.   Genitourinary:  Negative for dysuria and genital sores.   Skin:  Negative for rash.   Neurological:  Negative for headaches.   Hematological:  Bruises/bleeds easily.           3/28/2024     5:14 PM   Rapid3 Question Responses and Scores   MDHAQ Score 0.3   Psychologic Score 0   Pain Score 0   When you awakened in the morning OVER THE LAST WEEK, did you feel stiff? No   Fatigue Score 0   Global Health Score 0   RAPID3 Score 0.33      Objective:   There were no vitals taken for this visit.     Physical Exam      Assessment:       1. S/P arthroscopic surgery of left knee    2. Seropositive rheumatoid arthritis    3. Primary osteoarthritis of both knees            Plan:       Problem List Items Addressed This Visit          Active Problems    Seropositive rheumatoid arthritis     Rheumatoid arthritis doing well on sulfasalazine monotherapy with only one swollen MCP that is not painful and full Range of Motion hands    She has had recurrent knee pain that is mechanical and sounds like previously dx'd osteoarthritis    Will try celecoxib 200 mg/d for 2 weeks for knees  Will schedule for knee injections if celecoxib does not work  Will add my lab to Dr Crespo' labs scheduled 9/16/24 and follow up visit then         Primary osteoarthritis of  both knees     As noted above         S/P arthroscopic surgery of left knee - Primary     Now with pain consistent with ongoing osteoarthritis ; discussed NSAID, injection, PT, and eventual TKP

## 2024-04-03 NOTE — ASSESSMENT & PLAN NOTE
Now with pain consistent with ongoing osteoarthritis ; discussed NSAID, injection, PT, and eventual TKP

## 2024-04-04 ENCOUNTER — OFFICE VISIT (OUTPATIENT)
Dept: HEMATOLOGY/ONCOLOGY | Facility: CLINIC | Age: 69
End: 2024-04-04
Payer: MEDICARE

## 2024-04-04 DIAGNOSIS — N60.92 ATYPICAL DUCTAL HYPERPLASIA OF LEFT BREAST: Primary | ICD-10-CM

## 2024-04-04 PROCEDURE — 99212 OFFICE O/P EST SF 10 MIN: CPT | Mod: 95,,, | Performed by: INTERNAL MEDICINE

## 2024-04-04 RX ORDER — RALOXIFENE HYDROCHLORIDE 60 MG/1
60 TABLET, FILM COATED ORAL DAILY
Qty: 90 TABLET | Refills: 3 | Status: SHIPPED | OUTPATIENT
Start: 2024-04-04

## 2024-04-04 NOTE — PROGRESS NOTES
Subjective:       Patient ID: Verenice Mirza is a 68 y.o. female.    Chief Complaint: No chief complaint on file.    HPI 69 Y/O female with history of ADH on chemoprevention with raloxifene.      The patient location is: home  The chief complaint leading to consultation is: increase breast cancer risk - ADH    Visit type: audiovisual    Face to Face time with patient: 5 minutes  10 minutes of total time spent on the encounter, which includes face to face time and non-face to face time preparing to see the patient (eg, review of tests), Obtaining and/or reviewing separately obtained history, Documenting clinical information in the electronic or other health record, Independently interpreting results (not separately reported) and communicating results to the patient/family/caregiver, or Care coordination (not separately reported).         Each patient to whom he or she provides medical services by telemedicine is:  (1) informed of the relationship between the physician and patient and the respective role of any other health care provider with respect to management of the patient; and (2) notified that he or she may decline to receive medical services by telemedicine and may withdraw from such care at any time.    Notes:   She is feeling well with no breast pain.      Breast History:  Mammogram from July 1, 2019 showed architectural distortion left breast 2:00 position.  There was nothing seen by ultrasound.    On July 8, 2019 a needle biopsy was performed which showed benign breast tissue with apocrine adenosis, calcifications but no atypia.    Excisional biopsy on August 19, 2019 showed atypical ductal hyperplasia.    She started raloxifene in January 2020.    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    Mammogram 7/11/23  -negative  Review of Systems   Constitutional:  Negative for appetite change and unexpected weight change.   HENT:  Negative for mouth sores.    Eyes:   Negative for visual disturbance.   Respiratory:  Negative for cough and shortness of breath.    Cardiovascular:  Negative for chest pain.   Gastrointestinal:  Negative for abdominal pain and diarrhea.   Genitourinary:  Negative for frequency.   Musculoskeletal:  Positive for arthralgias. Negative for back pain.   Integumentary:  Negative for rash.   Neurological:  Negative for headaches.   Hematological:  Negative for adenopathy.   Psychiatric/Behavioral:  The patient is not nervous/anxious.          Objective:      Physical Exam  Constitutional:       General: She is not in acute distress.     Appearance: Normal appearance.   Neurological:      Mental Status: She is alert.   Psychiatric:         Mood and Affect: Mood normal.         Behavior: Behavior normal.         Thought Content: Thought content normal.         Judgment: Judgment normal.       Assessment:     MRI breast in January was negative  Problem List Items Addressed This Visit       Atypical ductal hyperplasia of left breast - Primary       Plan:     Continue Raloxifen  RTC July with mammogram      Route Chart for Scheduling    Med Onc Chart Routing      Follow up with physician . July after mammogram - virtual   Follow up with MEERA    Infusion scheduling note    Injection scheduling note    Labs None   Scheduling:  Preferred lab:  Lab interval:     Imaging Mammogram      Pharmacy appointment No pharmacy appointment needed      Other referrals no referral to [facility] needed -  no Massage appointment needed    No additional referrals needed

## 2024-04-16 ENCOUNTER — PATIENT MESSAGE (OUTPATIENT)
Dept: RHEUMATOLOGY | Facility: CLINIC | Age: 69
End: 2024-04-16
Payer: MEDICARE

## 2024-04-16 DIAGNOSIS — M05.9 SEROPOSITIVE RHEUMATOID ARTHRITIS: ICD-10-CM

## 2024-04-16 DIAGNOSIS — M17.0 PRIMARY OSTEOARTHRITIS OF BOTH KNEES: Primary | ICD-10-CM

## 2024-04-17 ENCOUNTER — HOSPITAL ENCOUNTER (OUTPATIENT)
Dept: RADIOLOGY | Facility: HOSPITAL | Age: 69
Discharge: HOME | End: 2024-04-17
Attending: INTERNAL MEDICINE
Payer: MEDICARE

## 2024-04-17 DIAGNOSIS — M05.9 SEROPOSITIVE RHEUMATOID ARTHRITIS: ICD-10-CM

## 2024-04-17 DIAGNOSIS — M17.0 PRIMARY OSTEOARTHRITIS OF BOTH KNEES: ICD-10-CM

## 2024-04-17 PROCEDURE — 73562 X-RAY EXAM OF KNEE 3: CPT | Mod: TC,50,FY

## 2024-04-17 PROCEDURE — 73562 X-RAY EXAM OF KNEE 3: CPT | Mod: 26,50,, | Performed by: RADIOLOGY

## 2024-04-20 ENCOUNTER — PATIENT MESSAGE (OUTPATIENT)
Dept: HEMATOLOGY/ONCOLOGY | Facility: CLINIC | Age: 69
End: 2024-04-20
Payer: MEDICARE

## 2024-04-23 ENCOUNTER — OFFICE VISIT (OUTPATIENT)
Dept: RHEUMATOLOGY | Facility: CLINIC | Age: 69
End: 2024-04-23
Payer: MEDICARE

## 2024-04-23 VITALS
DIASTOLIC BLOOD PRESSURE: 85 MMHG | HEIGHT: 62 IN | BODY MASS INDEX: 37 KG/M2 | HEART RATE: 87 BPM | SYSTOLIC BLOOD PRESSURE: 131 MMHG | WEIGHT: 201.06 LBS

## 2024-04-23 DIAGNOSIS — Z79.899 DRUG-INDUCED IMMUNODEFICIENCY: ICD-10-CM

## 2024-04-23 DIAGNOSIS — G89.29 CENTRAL SENSITIZATION TO PAIN: ICD-10-CM

## 2024-04-23 DIAGNOSIS — M05.9 SEROPOSITIVE RHEUMATOID ARTHRITIS: Primary | ICD-10-CM

## 2024-04-23 DIAGNOSIS — M17.0 PRIMARY OSTEOARTHRITIS OF BOTH KNEES: ICD-10-CM

## 2024-04-23 DIAGNOSIS — E66.9 OBESITY, CLASS II, BMI 35-39.9, ISOLATED: ICD-10-CM

## 2024-04-23 DIAGNOSIS — D84.821 DRUG-INDUCED IMMUNODEFICIENCY: ICD-10-CM

## 2024-04-23 DIAGNOSIS — Z98.890 S/P ARTHROSCOPIC SURGERY OF LEFT KNEE: ICD-10-CM

## 2024-04-23 PROBLEM — E66.01 MORBID (SEVERE) OBESITY DUE TO EXCESS CALORIES: Status: RESOLVED | Noted: 2024-03-20 | Resolved: 2024-04-23

## 2024-04-23 PROBLEM — M25.561 MEDIAL KNEE PAIN, RIGHT: Status: RESOLVED | Noted: 2022-09-19 | Resolved: 2024-04-23

## 2024-04-23 PROBLEM — M25.551 LATERAL PAIN OF RIGHT HIP: Status: RESOLVED | Noted: 2022-09-19 | Resolved: 2024-04-23

## 2024-04-23 PROCEDURE — 99999 PR PBB SHADOW E&M-EST. PATIENT-LVL IV: CPT | Mod: PBBFAC,,, | Performed by: INTERNAL MEDICINE

## 2024-04-23 PROCEDURE — 99214 OFFICE O/P EST MOD 30 MIN: CPT | Mod: PBBFAC | Performed by: INTERNAL MEDICINE

## 2024-04-23 PROCEDURE — 99999PBSHW PR PBB SHADOW TECHNICAL ONLY FILED TO HB: Mod: PBBFAC,,,

## 2024-04-23 PROCEDURE — 99214 OFFICE O/P EST MOD 30 MIN: CPT | Mod: 25,S$PBB,, | Performed by: INTERNAL MEDICINE

## 2024-04-23 PROCEDURE — 20610 DRAIN/INJ JOINT/BURSA W/O US: CPT | Mod: PBBFAC | Performed by: INTERNAL MEDICINE

## 2024-04-23 RX ORDER — CELECOXIB 200 MG/1
200 CAPSULE ORAL DAILY PRN
Qty: 30 CAPSULE | Refills: 2 | Status: SHIPPED | OUTPATIENT
Start: 2024-04-23

## 2024-04-23 RX ORDER — METHOCARBAMOL 500 MG/1
500 TABLET, FILM COATED ORAL 4 TIMES DAILY PRN
Qty: 40 TABLET | Refills: 2 | Status: SHIPPED | OUTPATIENT
Start: 2024-04-23

## 2024-04-23 RX ORDER — GABAPENTIN 300 MG/1
300 CAPSULE ORAL 2 TIMES DAILY
Qty: 60 CAPSULE | Refills: 11 | Status: SHIPPED | OUTPATIENT
Start: 2024-06-01 | End: 2025-06-01

## 2024-04-23 RX ADMIN — TRIAMCINOLONE ACETONIDE 40 MG: 40 INJECTION, SUSPENSION INTRA-ARTICULAR; INTRAMUSCULAR at 09:04

## 2024-04-23 ASSESSMENT — ROUTINE ASSESSMENT OF PATIENT INDEX DATA (RAPID3)
PATIENT GLOBAL ASSESSMENT SCORE: 4.5
MDHAQ FUNCTION SCORE: 0.9
PSYCHOLOGICAL DISTRESS SCORE: 1.1
WHEN YOU AWAKENED IN THE MORNING OVER THE LAST WEEK, PLEASE INDICATE THE AMOUNT OF TIME IT TAKES UNTIL YOU ARE AS LIMBER AS YOU WILL BE FOR THE DAY: 1
TOTAL RAPID3 SCORE: 4.17
PAIN SCORE: 5
AM STIFFNESS SCORE: 1, YES
FATIGUE SCORE: 0

## 2024-04-23 NOTE — PROGRESS NOTES
Subjective:       Patient ID: Verenice Mirza is a 68 y.o. female.    Chief Complaint: Disease Management    HPI    Fell early 2021; R hand swelled after fall but never got better  Dr. Erickson dx rheumatoid arthritis - seropositive  Orencia weekly x 3 (April) helped but then she got diverticulitis  June 2021 started Sulfasalazine up to 3 bid     Feb 2021 RF 92,      Previous hx of plantar fasciitis, achilles tendinitis, R knee pain, and lumbar pain  Hx gel shots in R knee  Hx PT for knee  A lot of pain around the knee; no cane   Meloxicam      Hx DM2  Steatosis/ fatty liver  HTN metoprolol   HLD  Hypothyroid  PVD  PAD  Rosacea  JORDANA  Raloxifene since 2019 for atypical ductal hyperplasia  Fam hx crohn's grandmother    Long history knee pains  X-rays show osteoarthritis, medial joints  Some L genu varus  History injection L shoulder and 2 injections L knee, 1 R knee; Stacey Harry and Chandra  History meniscectomy both knees (L knee after a fall and MRI Dr Harry)    Now also pain L wrist and arm    Celecoxib and methocarbamol have helped LE pain  L hamstring still tight      Review of Systems   Constitutional:  Negative for fever and unexpected weight change.   HENT:  Negative for mouth sores and trouble swallowing.    Eyes:  Negative for redness.   Respiratory:  Negative for cough and shortness of breath.    Cardiovascular:  Negative for chest pain.   Gastrointestinal:  Negative for constipation and diarrhea.   Genitourinary:  Negative for dysuria and genital sores.   Skin:  Negative for rash.   Neurological:  Negative for headaches.   Hematological:  Bruises/bleeds easily.           4/16/2024    10:00 AM   Rapid3 Question Responses and Scores   MDHAQ Score 0.9   Psychologic Score 1.1   Pain Score 5   When you awakened in the morning OVER THE LAST WEEK, did you feel stiff? Yes   If Yes, please indicate the number of hours until you are as limber as you will be for the day 1   Fatigue Score 0   Global Health Score  "4.5   RAPID3 Score 4.17      Objective:   /85   Pulse 87   Ht 5' 2" (1.575 m)   Wt 91.2 kg (201 lb 1 oz)   BMI 36.77 kg/m²      Physical Exam      L knee diffusely swollen, tender; not red     9/13/2022 3/24/2023 9/22/2023 4/23/2024   Tender (HERNANDEZ-28) 0 / 28  0 / 28  0 / 28 2 / 28    Swollen (HERNANDEZ-28) 2 / 28  0 / 28  0 / 28 2 / 28    Provider Global 10 mm 10 mm 0 mm 10 mm   Patient Global 10 mm 10 mm 0 mm 45 mm   ESR 30 mm/hr 4 mm/hr 4 mm/hr 8 mm/hr   CRP 10.6 mg/L 1.7 mg/L 0.8 mg/L 4.4 mg/L   HERNANDEZ-28 (ESR) 2.92 (Low disease activity) 1.11 (Remission) 0.97 (Remission) 3.27 (Moderate disease activity)   HERNANDEZ-28 (CRP) 2.38 (Remission) 1.46 (Remission) 1.17 (Remission) 3.39 (Moderate disease activity)   CDAI Score 4  2  0  9.5       Lab Results   Component Value Date    SEDRATE 8 03/14/2024      Lab Results   Component Value Date    CRP 4.4 03/14/2024      Assessment:       1. Seropositive rheumatoid arthritis    2. Primary osteoarthritis of both knees    3. 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 S/P arthroscopic surgery of left knee    5. Obesity, Class II, BMI 35-39.9, isolated    6. Drug-induced immunodeficiency            Plan:       Problem List Items Addressed This Visit          Active Problems    Seropositive rheumatoid arthritis - Primary     Rheumatoid arthritis is currently stable on sulfasalazine with minimal R hand synovitis    Osteoarthritis L >R knee has flared with pain so will address that    For rheumatoid arthritis continue sulfasalazine and plan lab and follow up Sept          Primary osteoarthritis of both knees     R knee osteoarthritis is symptomatically worse    Rec:  Lose weight; this is in progress on Mounjaro  PT vs home exercises; she has exercises she will do and will let me know if needs referral back to PT  Injection; will inject L knee today to give some relief  NSAID; she tolerates celecoxib and will continue this for now and wean off when knee responds to injection  Methocarbamol; " this has also helped her control pain and she will continue for osteoarthritis flare then wean off    Central sensitization; she has persistent pain below knees and increases in sensitivity L wrist and other muscles so will start gabapentin 300 bid after acute exacerbation subsides          Relevant Medications    methocarbamoL (ROBAXIN) 500 MG Tab    celecoxib (CELEBREX) 200 MG capsule    Obesity, Class II, BMI 35-39.9, isolated     Working on this with help of Mounjaro         S/P arthroscopic surgery of left knee     Underlying osteoarthritis; will eventually need TKP            Resolved Problems    RESOLVED: Drug-induced immunodeficiency     She is on sulfasalazine for rheumatoid arthritis which is not immunosuppressive so will resolve  this problem          Other Visit Diagnoses       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

## 2024-04-23 NOTE — PROGRESS NOTES
4/16/2024    10:00 AM   Rapid3 Question Responses and Scores   MDHAQ Score 0.9   Psychologic Score 1.1   Pain Score 5   When you awakened in the morning OVER THE LAST WEEK, did you feel stiff? Yes   If Yes, please indicate the number of hours until you are as limber as you will be for the day 1   Fatigue Score 0   Global Health Score 4.5   RAPID3 Score 4.17        Answers submitted by the patient for this visit:  Rheumatology Questionnaire (Submitted on 4/16/2024)  fever: No  eye redness: No  mouth sores: No  headaches: No  shortness of breath: No  chest pain: No  trouble swallowing: No  diarrhea: No  constipation: No  unexpected weight change: No  genital sore: No  dysuria: No  During the last 3 days, have you had a skin rash?: No  Bruises or bleeds easily: Yes  cough: No

## 2024-04-23 NOTE — ASSESSMENT & PLAN NOTE
She is on sulfasalazine for rheumatoid arthritis which is not immunosuppressive so will resolve  this problem

## 2024-04-23 NOTE — ASSESSMENT & PLAN NOTE
Rheumatoid arthritis is currently stable on sulfasalazine with minimal R hand synovitis    Osteoarthritis L >R knee has flared with pain so will address that    For rheumatoid arthritis continue sulfasalazine and plan lab and follow up Sept

## 2024-04-23 NOTE — ASSESSMENT & PLAN NOTE
R knee osteoarthritis is symptomatically worse    Rec:  Lose weight; this is in progress on Mounjaro  PT vs home exercises; she has exercises she will do and will let me know if needs referral back to PT  Injection; will inject L knee today to give some relief  NSAID; she tolerates celecoxib and will continue this for now and wean off when knee responds to injection  Methocarbamol; this has also helped her control pain and she will continue for osteoarthritis flare then wean off    Central sensitization; she has persistent pain below knees and increases in sensitivity L wrist and other muscles so will start gabapentin 300 bid after acute exacerbation subsides

## 2024-04-24 RX ORDER — TRIAMCINOLONE ACETONIDE 40 MG/ML
40 INJECTION, SUSPENSION INTRA-ARTICULAR; INTRAMUSCULAR
Status: DISCONTINUED | OUTPATIENT
Start: 2024-04-23 | End: 2024-04-24 | Stop reason: HOSPADM

## 2024-04-24 NOTE — PROCEDURES
Large Joint Aspiration/Injection: L knee    Date/Time: 4/23/2024 9:00 AM    Performed by: Emory Arrington MD  Authorized by: Emory Arrington MD    Consent Done?:  Yes (Verbal)  Indications:  Pain    Local anesthesia used?: Yes    Anesthesia:  Local infiltration  Local anesthetic:  Lidocaine 1% with epinephrine  Anesthetic total (ml):  2      Details:  Needle Size:  22 G  Ultrasonic Guidance for needle placement?: No    Approach:  Lateral  Location:  Knee  Site:  L knee  Medications:  40 mg triamcinolone acetonide 40 mg/mL  Aspirate amount (mL):  0  Patient tolerance:  Patient tolerated the procedure well with no immediate complications

## 2024-04-27 ENCOUNTER — PATIENT MESSAGE (OUTPATIENT)
Dept: RHEUMATOLOGY | Facility: CLINIC | Age: 69
End: 2024-04-27
Payer: MEDICARE

## 2024-04-30 ENCOUNTER — PATIENT MESSAGE (OUTPATIENT)
Dept: HEMATOLOGY/ONCOLOGY | Facility: CLINIC | Age: 69
End: 2024-04-30
Payer: MEDICARE

## 2024-05-01 ENCOUNTER — OFFICE VISIT (OUTPATIENT)
Dept: DERMATOLOGY | Facility: CLINIC | Age: 69
End: 2024-05-01
Payer: MEDICARE

## 2024-05-01 DIAGNOSIS — D23.9 DERMATOFIBROMA: ICD-10-CM

## 2024-05-01 DIAGNOSIS — L82.1 SK (SEBORRHEIC KERATOSIS): ICD-10-CM

## 2024-05-01 DIAGNOSIS — D22.9 NEVUS: ICD-10-CM

## 2024-05-01 DIAGNOSIS — D48.5 NEOPLASM OF UNCERTAIN BEHAVIOR OF SKIN: Primary | ICD-10-CM

## 2024-05-01 DIAGNOSIS — L64.9 ANDROGENETIC ALOPECIA: ICD-10-CM

## 2024-05-01 DIAGNOSIS — Z85.828 PERSONAL HISTORY OF SKIN CANCER: ICD-10-CM

## 2024-05-01 DIAGNOSIS — L91.8 SKIN TAG: ICD-10-CM

## 2024-05-01 DIAGNOSIS — D18.01 CHERRY ANGIOMA: ICD-10-CM

## 2024-05-01 DIAGNOSIS — L73.8 SEBACEOUS GLAND HYPERPLASIA: ICD-10-CM

## 2024-05-01 DIAGNOSIS — L81.4 LENTIGO: ICD-10-CM

## 2024-05-01 PROCEDURE — 11102 TANGNTL BX SKIN SINGLE LES: CPT | Mod: S$PBB,,, | Performed by: DERMATOLOGY

## 2024-05-01 PROCEDURE — 88342 IMHCHEM/IMCYTCHM 1ST ANTB: CPT | Mod: 26,,, | Performed by: PATHOLOGY

## 2024-05-01 PROCEDURE — 11102 TANGNTL BX SKIN SINGLE LES: CPT | Mod: PBBFAC,PO | Performed by: DERMATOLOGY

## 2024-05-01 PROCEDURE — 99214 OFFICE O/P EST MOD 30 MIN: CPT | Mod: PBBFAC,PO,25 | Performed by: DERMATOLOGY

## 2024-05-01 PROCEDURE — 99999 PR PBB SHADOW E&M-EST. PATIENT-LVL IV: CPT | Mod: PBBFAC,,, | Performed by: DERMATOLOGY

## 2024-05-01 PROCEDURE — 88342 IMHCHEM/IMCYTCHM 1ST ANTB: CPT | Performed by: PATHOLOGY

## 2024-05-01 PROCEDURE — 88305 TISSUE EXAM BY PATHOLOGIST: CPT | Mod: 26,,, | Performed by: PATHOLOGY

## 2024-05-01 PROCEDURE — 88305 TISSUE EXAM BY PATHOLOGIST: CPT | Performed by: PATHOLOGY

## 2024-05-01 PROCEDURE — 99214 OFFICE O/P EST MOD 30 MIN: CPT | Mod: 25,S$PBB,, | Performed by: DERMATOLOGY

## 2024-05-01 RX ORDER — MINOXIDIL 2.5 MG/1
TABLET ORAL
Qty: 10 TABLET | Refills: 3 | Status: SHIPPED | OUTPATIENT
Start: 2024-05-01

## 2024-05-01 NOTE — PATIENT INSTRUCTIONS
Discussed side affects of  minoxidil:  Side effects that you should report to your doctor or health care professional as soon as possible:  chest pain, fast or irregular heartbeat, palpitations  difficulty breathing  dizziness or fainting spells  redness, blistering, peeling or loosening of the skin, including inside the mouth  skin rash or itching  stiff or swollen joints  sudden weight gain  swelling of the feet or legs  unusual weakness  Side effects that usually do not require medical attention (report to your doctor or health care professional if they continue or are bothersome):  headache  unusual hair growth, on the face, arm, and back    Handout given in AVS, patient allowed to ask questions and voiced understanding.    Rogaine- can use 5% to affected area 1x per day. Must use consistently and if you stop using, the hair it stimulated to grow may fall out over time. Generic acceptable    There are multiple over the counter hair supplements, few of which have proven efficacy in controlled clinical trials. However, anecdotal reports have indicated a benefit for these vitamins.  Handout reviewing active ingredients, allergies, and proper use were provided for Nutrafol and Viviscal. The patient can elect to take at his/her discretion. If taking biotin, patient should refrain from taking it 1 week before lab work.     Rosemary oil 3x per week     Zenegen shampoo     Pumpkin seed oil 400 mg daily

## 2024-05-01 NOTE — PROGRESS NOTES
"  Subjective:      Patient ID:  Verenice Mirza is a 68 y.o. female who presents for   Chief Complaint   Patient presents with    Skin Check     tbse    Lesion     L breast  L ankle  R flank      Patient is here today for a "mole" check.   Pt has a history of normal sun exposure in the past.   Pt recalls several blistering sunburns in the past- yes  Pt has history of tanning bed use- yes  Pt has had moles removed in the past- yes, benign per pt  Pt has history of melanoma in first degree relatives- no     Patient with new are of concern:   Location: L breast  Present for years per pt  Larger in size  Previous treatments: none    Patient with new are of concern:   Location: L ankle  Present for years per pt  No pain  Previous treatments: none    Patient with new are of concern:   Location: R flank  Present for 4-5mos  No symp  Previous treatments: none    Pt also has questions about hair loss, currently tx with finasteride, has questions about dutasteride  Pt also has questions about "VC-11 type collagen"  Pt also states she was told she may have Cowdens by KANDY Mcclelland          Lesion      Review of Systems   Skin:  Positive for daily sunscreen use and activity-related sunscreen use. Negative for tendency to form keloidal scars and recent sunburn.   Hematologic/Lymphatic: Bruises/bleeds easily (bruise).       Objective:   Physical Exam   Constitutional: She appears well-developed and well-nourished. She is obese.  No distress.   Neurological: She is alert and oriented to person, place, and time. She is not disoriented.   Psychiatric: She has a normal mood and affect.   Skin:   Areas Examined (abnormalities noted in diagram):   Scalp / Hair Palpated and Inspected  Head / Face Inspection Performed  Neck Inspection Performed  Chest / Axilla Inspection Performed  Abdomen Inspection Performed  Genitals / Buttocks / Groin Inspection Performed  Back Inspection Performed  RUE Inspected  LUE Inspection Performed  RLE " Inspected  LLE Inspection Performed  Nails and Digits Inspection Performed                         Diagram Legend     Erythematous scaling macule/papule c/w actinic keratosis       Vascular papule c/w angioma      Pigmented verrucoid papule/plaque c/w seborrheic keratosis      Yellow umbilicated papule c/w sebaceous hyperplasia      Irregularly shaped tan macule c/w lentigo     1-2 mm smooth white papules consistent with Milia      Movable subcutaneous cyst with punctum c/w epidermal inclusion cyst      Subcutaneous movable cyst c/w pilar cyst      Firm pink to brown papule c/w dermatofibroma      Pedunculated fleshy papule(s) c/w skin tag(s)      Evenly pigmented macule c/w junctional nevus     Mildly variegated pigmented, slightly irregular-bordered macule c/w mildly atypical nevus      Flesh colored to evenly pigmented papule c/w intradermal nevus       Pink pearly papule/plaque c/w basal cell carcinoma      Erythematous hyperkeratotic cursted plaque c/w SCC      Surgical scar with no sign of skin cancer recurrence      Open and closed comedones      Inflammatory papules and pustules      Verrucoid papule consistent consistent with wart     Erythematous eczematous patches and plaques     Dystrophic onycholytic nail with subungual debris c/w onychomycosis     Umbilicated papule    Erythematous-base heme-crusted tan verrucoid plaque consistent with inflamed seborrheic keratosis     Erythematous Silvery Scaling Plaque c/w Psoriasis     See annotation      Assessment / Plan:      Pathology Orders:       Normal Orders This Visit    Specimen to Pathology, Dermatology     Comments:    Number of Specimens:->1  ------------------------->-------------------------  Spec 1 Procedure:->Biopsy  Spec 1 Clinical Impression:->r/o inflamed keratosis  Spec 1 Source:->mid chin    Questions:    Procedure Type: Dermatology and skin neoplasms    Number of Specimens: 1    ------------------------: -------------------------    Spec 1  Procedure: Biopsy    Spec 1 Clinical Impression: r/o inflamed keratosis    Spec 1 Source: mid chin    Release to patient:           Neoplasm of uncertain behavior of skin  Shave biopsy procedure note:    Shave biopsy performed after verbal consent including risk of infection, scar, recurrence, need for additional treatment of site. Area prepped with alcohol, anesthetized with approximately 1.0cc of 1% lidocaine with epinephrine. Lesional tissue shaved with razor blade. Hemostasis achieved with application of aluminum chloride followed by hyfrecation. No complications. Dressing applied. Wound care explained.    -     Specimen to Pathology, Dermatology    Skin tag  Reassurance given to patient. No treatment is necessary.   Treatment of benign, asymptomatic lesions may be considered cosmetic.    Lentigo  This is a benign hyperpigmented sun induced lesion. Recommend daily sun protection/avoidance and use of at least SPF 30, broad spectrum sunscreen (OTC drug) will reduce the number of new lesions. Treatment of these benign lesions are considered cosmetic.  The nature of sun-induced photo-aging and skin cancers is discussed.  Sun avoidance, protective clothing, and the use of 30-SPF sunscreens is advised. Observe closely for skin damage/changes, and call if such occurs.    Nevus  Discussed ABCDE's of nevi.  Monitor for new mole or moles that are becoming bigger, darker, irritated, or developing irregular borders. Brochure provided. Instructed patient to observe lesion(s) for changes and follow up in clinic if changes are noted. Patient to monitor skin at home for new or changing lesions.     SK (seborrheic keratosis)  These are benign inherited growths without a malignant potential. Reassurance given to patient. No treatment is necessary.     Dermatofibroma  These are benign bundles of scar tissue that can arise spontaneously or after trauma from a bug bite or nicking yourself shaving, or other minimal trauma.   They are  very common in middle aged adults and commonly found on the lower legs, arms above the elbows, and trunk.   Removal of lesions is not recommended as the lesion is just replaced with an additional scar, however if lesion is symptomatic, removal can be considered. Lesions can recur.     Cherry angioma  These are benign vascular lesions that are inherited.  Treatment is not necessary.    Sebaceous gland hyperplasia  This is a common condition representing benign enlargement of the sebaceous lobule. It typically occurs in adulthood. Reassurance given to patient.     Androgenetic alopecia  -     minoxidiL (LONITEN) 2.5 MG tablet; Sig 1/4 tablet po qd  Dispense: 10 tablet; Refill: 3  Discussed side affects of  minoxidil:  Side effects that you should report to your doctor or health care professional as soon as possible:  chest pain, fast or irregular heartbeat, palpitations  difficulty breathing  dizziness or fainting spells  redness, blistering, peeling or loosening of the skin, including inside the mouth  skin rash or itching  stiff or swollen joints  sudden weight gain  swelling of the feet or legs  unusual weakness  Side effects that usually do not require medical attention (report to your doctor or health care professional if they continue or are bothersome):  headache  unusual hair growth, on the face, arm, and back    Handout given in AVS, patient allowed to ask questions and voiced understanding.    Rogaine- can use 5% to affected area 1x per day. Must use consistently and if you stop using, the hair it stimulated to grow may fall out over time. Generic acceptable    There are multiple over the counter hair supplements, few of which have proven efficacy in controlled clinical trials. However, anecdotal reports have indicated a benefit for these vitamins.  Handout reviewing active ingredients, allergies, and proper use were provided for Nutrafol and Viviscal. The patient can elect to take at his/her discretion. If  taking biotin, patient should refrain from taking it 1 week before lab work.     Rosemary oil 3x per week     ZenegFood on the Table shampoo     Pumpkin seed oil 400 mg daily     Personal history of skin cancer  Pt with history of non melanoma skin cancer  Total body skin examination performed today including at least 12 points as noted in physical examination. No suspicious lesions noted.Monitor for new or changing lesions as discussed such as pink scaly patches that are occasionally tender or not healing, 'pimples' that never go away, lesions that are not healing or bleeding.     THere are no dermatologic manifestations of Cowdens that I can appreciated today on physical exam              Follow up in about 1 year (around 5/1/2025) for 3 months hair loss f/u with erick and 1 year TBSE.

## 2024-05-02 ENCOUNTER — PATIENT MESSAGE (OUTPATIENT)
Dept: DERMATOLOGY | Facility: CLINIC | Age: 69
End: 2024-05-02
Payer: MEDICARE

## 2024-05-08 LAB
FINAL PATHOLOGIC DIAGNOSIS: NORMAL
GROSS: NORMAL
Lab: NORMAL
MICROSCOPIC EXAM: NORMAL

## 2024-05-09 ENCOUNTER — PATIENT MESSAGE (OUTPATIENT)
Dept: DERMATOLOGY | Facility: CLINIC | Age: 69
End: 2024-05-09
Payer: MEDICARE

## 2024-05-09 DIAGNOSIS — L57.0 AK (ACTINIC KERATOSIS): Primary | ICD-10-CM

## 2024-05-09 RX ORDER — FLUOROURACIL 50 MG/G
CREAM TOPICAL
Qty: 40 G | Refills: 1 | Status: SHIPPED | OUTPATIENT
Start: 2024-05-09

## 2024-05-16 ENCOUNTER — PATIENT MESSAGE (OUTPATIENT)
Dept: DERMATOLOGY | Facility: CLINIC | Age: 69
End: 2024-05-16
Payer: MEDICARE

## 2024-05-22 ENCOUNTER — OFFICE VISIT (OUTPATIENT)
Dept: OPTOMETRY | Facility: CLINIC | Age: 69
End: 2024-05-22
Payer: MEDICARE

## 2024-05-22 DIAGNOSIS — Z13.5 GLAUCOMA SCREENING: ICD-10-CM

## 2024-05-22 DIAGNOSIS — E11.9 TYPE 2 DIABETES MELLITUS WITHOUT RETINOPATHY: Primary | ICD-10-CM

## 2024-05-22 DIAGNOSIS — H25.13 NUCLEAR SCLEROSIS, BILATERAL: ICD-10-CM

## 2024-05-22 PROCEDURE — 92014 COMPRE OPH EXAM EST PT 1/>: CPT | Mod: S$PBB,,, | Performed by: OPTOMETRIST

## 2024-05-22 PROCEDURE — 99212 OFFICE O/P EST SF 10 MIN: CPT | Mod: PBBFAC,PO | Performed by: OPTOMETRIST

## 2024-05-22 PROCEDURE — 99999 PR PBB SHADOW E&M-EST. PATIENT-LVL II: CPT | Mod: PBBFAC,,, | Performed by: OPTOMETRIST

## 2024-05-22 NOTE — PROGRESS NOTES
HPI    Presents today for Diabetic Eye Exam.   Pt does not want refraction today.   VA OU no changes for one year. Had one episode of left lid area sore. Now   resolved.  Eye meds: None  Last edited by Elizabeth Garcia on 5/22/2024  9:23 AM.            Assessment /Plan     For exam results, see Encounter Report.    Type 2 diabetes mellitus without retinopathy    Glaucoma screening    Nuclear sclerosis, bilateral      1. [place] OU--pt happy w otc readers--wishes to defer refraction  2. DM- WITHOUT RETINOPATHY.  Advised yearly DFE  3. Resolving hordeolum LLL--discussed hot cx    PLAN:    rtc 1 yr

## 2024-06-09 ENCOUNTER — PATIENT MESSAGE (OUTPATIENT)
Dept: HEMATOLOGY/ONCOLOGY | Facility: CLINIC | Age: 69
End: 2024-06-09
Payer: MEDICARE

## 2024-06-11 ENCOUNTER — DOCUMENTATION ONLY (OUTPATIENT)
Dept: HEMATOLOGY/ONCOLOGY | Facility: CLINIC | Age: 69
End: 2024-06-11
Payer: MEDICARE

## 2024-06-11 NOTE — PROGRESS NOTES
I received fax indicating that [facility] was unable to process records request as patient did not have medical records there.  Notified LINDA Black with my office that I believe this was in reference to records we'd requested from the office of Dr. Connor Lucas, which appears to have been in [place], but the aforementioned fax came from [facility], and that it looks like Dr. Lucas is/was located at [address] [place] [address]; asked LINDA Black to see if there's another office for Dr. Lucas that the records request can be faxed to (besides the one to which it was previously sent) and to keep me advised.  LINDA Black advised me that the phone number for the [address] office is no longer in service and subsequently advised me that she found on the that internet Dr. Lucas practiced at [facility] and therefore she was going to fax there.  Asked LINDA Black to keep me advised.

## 2024-06-16 ENCOUNTER — DOCUMENTATION ONLY (OUTPATIENT)
Dept: HEMATOLOGY/ONCOLOGY | Facility: CLINIC | Age: 69
End: 2024-06-16
Payer: MEDICARE

## 2024-06-16 NOTE — PROGRESS NOTES
The below message I received is in reference to request for medical records my office had sent to [facility].    ----- Message -----  From: Xiomy Black MA  Sent: 6/11/2024   3:02 PM CDT  To: Saúl Mcclelland DNP  Subject: [facility] Saúl,     [place] [facility]     Thanks,     Xiomy

## 2024-06-16 NOTE — PROGRESS NOTES
"===View-only below this line===  ----- Message -----  From: Roma Squiers, [facility]  Sent: 11/7/2023   2:21 PM CDT  To: CALVIN Aldana,    I think cutaneous lipomas are part of the criteria, not necessarily a colon lipoma, so that wouldn't count towards her criteria. But given she's had a trichilemmoma and papillomatosis on some parts of her body, I would definitely advise an in-person assessment of the following (whether with you or in general genetics):  - head circumference (like you mentioned)  - presence of oral papillomatosis  - does she have thyroid disease? I saw a mention of that in her chart but the only thyroid US I could find from 2014 was normal    I would also ask her dermatologist whether there was suspicion of additional trichilemmomas on the face, as those would count as pathognomonic criteria if pathologically confirmed.    Hope this helps!  Roma  ----- Message -----  From: Saúl Mcclelland DNP  Sent: 11/6/2023   4:52 PM CST  To: Saúl Mcclelland DNP; Roma Brandon,    I apologize for the length of this message but would appreciate your insight when you have a chance.    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  She met with me today for f/u.    Although DNA analysis of her PTEN gene yielded negative results, I am questioning whether she meets criteria for clinical diagnosis of Cowden (based on the criteria listed on GeneReviews).   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    (Family cancer history includes sister with facial skin cancer vs. noncancerous lesion and other skin biopsies with noncancerous pathology; mom with breast cancer (age 82) and likely skin cancer, and a maternal first cousin (female) possibly with breast cancer; paternal family cancer history less well known as father  in a MVA at age 41, but one paternal aunt had breast cancer (early 50s), another paternal aunt might have had an abdominal cancer, and paternal uncle and his son may have had cancer.)    According to Daniella, clinical diagnosis of Cowden syndrome is established if the individual has 1 major and at least 3 minor criteria.  If she has macrocephaly (I would need to see her in person to assess, which I can do), that would be the 1 major criterion, and for the 3 minor she has had uterine fibroids, 1 lipoma (Daniella reads "lipomas" plural), and fibrocystic disease of the breast.    What are your thoughts on this?  IF she has macrocephaly, does she technically meet this clinical diagnostic criteria, or would we really be just meeting it by the skin of our teeth if at all?    Thanks in advance,  Saúl"

## 2024-06-19 ENCOUNTER — PATIENT MESSAGE (OUTPATIENT)
Dept: INTERNAL MEDICINE | Facility: CLINIC | Age: 69
End: 2024-06-19
Payer: MEDICARE

## 2024-07-07 ENCOUNTER — PATIENT MESSAGE (OUTPATIENT)
Dept: INTERNAL MEDICINE | Facility: CLINIC | Age: 69
End: 2024-07-07
Payer: MEDICARE

## 2024-07-08 DIAGNOSIS — I15.2 HYPERTENSION ASSOCIATED WITH DIABETES: ICD-10-CM

## 2024-07-08 DIAGNOSIS — E11.59 HYPERTENSION ASSOCIATED WITH DIABETES: ICD-10-CM

## 2024-07-08 NOTE — TELEPHONE ENCOUNTER
Care Due:                  Date            Visit Type   Department     Provider  --------------------------------------------------------------------------------                                EP -                              PRIMARY      [facility] INTERNAL  Last Visit: 03-      CARE ([facility])   MEDICINE       Areli Crespo  Next Visit: None Scheduled  None         None Found                                                            Last  Test          Frequency    Reason                     Performed    Due Date  --------------------------------------------------------------------------------    HBA1C.......  6 months...  tirzepatide..............  03-   09-    [facility] Embedded Care Due Messages. Reference number: [id number].   7/08/2024 9:13:10 AM CDT

## 2024-07-09 ENCOUNTER — PATIENT MESSAGE (OUTPATIENT)
Dept: INTERNAL MEDICINE | Facility: CLINIC | Age: 69
End: 2024-07-09
Payer: MEDICARE

## 2024-07-09 NOTE — TELEPHONE ENCOUNTER
Provider Staff:  Action required for this patient       Please see care gap opportunities below in Care Due Message.    Thanks!  [facility]     Appointments      Date Provider   Last Visit   3/20/2024 Areli Crespo MD   Next Visit   9/20/2024 Areli Crespo MD     Refill Decision Note   Verenice Mirza  is requesting a refill authorization.  Brief Assessment and Rationale for Refill:  Approve     Medication Therapy Plan: FLOS      Comments:     Note composed:11:40 PM 07/08/2024

## 2024-07-11 ENCOUNTER — PATIENT MESSAGE (OUTPATIENT)
Dept: INTERNAL MEDICINE | Facility: CLINIC | Age: 69
End: 2024-07-11
Payer: MEDICARE

## 2024-07-12 ENCOUNTER — TELEPHONE (OUTPATIENT)
Dept: INTERNAL MEDICINE | Facility: CLINIC | Age: 69
End: 2024-07-12
Payer: MEDICARE

## 2024-07-12 ENCOUNTER — HOSPITAL ENCOUNTER (OUTPATIENT)
Dept: RADIOLOGY | Facility: HOSPITAL | Age: 69
Discharge: HOME | End: 2024-07-12
Attending: INTERNAL MEDICINE
Payer: MEDICARE

## 2024-07-12 DIAGNOSIS — E11.69 DYSLIPIDEMIA ASSOCIATED WITH TYPE 2 DIABETES MELLITUS: ICD-10-CM

## 2024-07-12 DIAGNOSIS — Z12.31 ENCOUNTER FOR SCREENING MAMMOGRAM FOR HIGH-RISK PATIENT: ICD-10-CM

## 2024-07-12 DIAGNOSIS — E11.59 HYPERTENSION ASSOCIATED WITH DIABETES: Primary | ICD-10-CM

## 2024-07-12 DIAGNOSIS — E78.5 DYSLIPIDEMIA ASSOCIATED WITH TYPE 2 DIABETES MELLITUS: ICD-10-CM

## 2024-07-12 DIAGNOSIS — I15.2 HYPERTENSION ASSOCIATED WITH DIABETES: Primary | ICD-10-CM

## 2024-07-12 PROCEDURE — 77067 SCR MAMMO BI INCL CAD: CPT | Mod: 26,,, | Performed by: RADIOLOGY

## 2024-07-12 PROCEDURE — 77063 BREAST TOMOSYNTHESIS BI: CPT | Mod: TC

## 2024-07-12 PROCEDURE — 77067 SCR MAMMO BI INCL CAD: CPT | Mod: TC

## 2024-07-12 PROCEDURE — 77063 BREAST TOMOSYNTHESIS BI: CPT | Mod: 26,,, | Performed by: RADIOLOGY

## 2024-07-12 RX ORDER — TIRZEPATIDE 12.5 MG/.5ML
12.5 INJECTION, SOLUTION SUBCUTANEOUS
Qty: 2 ML | Refills: 3 | Status: SHIPPED | OUTPATIENT
Start: 2024-07-12

## 2024-07-12 NOTE — TELEPHONE ENCOUNTER
Attempted to call Pt regarding Monjouro Refill. Left message for Pt to call office back. Called to verify what Dose Pt would like to be changed to. Dr Crespo will send order today. Dr Crespo wanted to verify dose before sending.

## 2024-07-12 NOTE — TELEPHONE ENCOUNTER
Spoke with Pt regarding Refill on Monjouro. She is currently on 10mg. She will like to increase dosage to 12.5mg. Will relay to Dr Crespo. Order will be submitted today. Relayed to Pt. She verbalized understanding. Thanked Dr Crespo.

## 2024-07-16 ENCOUNTER — OFFICE VISIT (OUTPATIENT)
Dept: HEMATOLOGY/ONCOLOGY | Facility: CLINIC | Age: 69
End: 2024-07-16
Payer: MEDICARE

## 2024-07-16 DIAGNOSIS — N60.92 ATYPICAL DUCTAL HYPERPLASIA OF LEFT BREAST: Primary | ICD-10-CM

## 2024-07-16 PROCEDURE — 99212 OFFICE O/P EST SF 10 MIN: CPT | Mod: 95,,, | Performed by: INTERNAL MEDICINE

## 2024-07-16 NOTE — PROGRESS NOTES
Subjective:       Patient ID: Verenice Mirza is a 69 y.o. female.    Chief Complaint: No chief complaint on file.    HPI 70 Y/O female with history of ADH on chemoprevention with raloxifene.      The patient location is: home  The chief complaint leading to consultation is: increase breast cancer risk - ADH    Visit type: audiovisual    Face to Face time with patient: 5 minutes  10 minutes of total time spent on the encounter, which includes face to face time and non-face to face time preparing to see the patient (eg, review of tests), Obtaining and/or reviewing separately obtained history, Documenting clinical information in the electronic or other health record, Independently interpreting results (not separately reported) and communicating results to the patient/family/caregiver, or Care coordination (not separately reported).         Each patient to whom he or she provides medical services by telemedicine is:  (1) informed of the relationship between the physician and patient and the respective role of any other health care provider with respect to management of the patient; and (2) notified that he or she may decline to receive medical services by telemedicine and may withdraw from such care at any time.    Notes:   She is feeling well with no breast pain. Her biggest issue has been her rheumatoid arthritis in her hands.      Breast History:  Mammogram from July 1, 2019 showed architectural distortion left breast 2:00 position.  There was nothing seen by ultrasound.    On July 8, 2019 a needle biopsy was performed which showed benign breast tissue with apocrine adenosis, calcifications but no atypia.    Excisional biopsy on August 19, 2019 showed atypical ductal hyperplasia.    She started raloxifene in January 2020.    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    Mammogram 7/12/24   -negative  Review of Systems   Constitutional:  Negative for appetite change and  unexpected weight change.   HENT:  Negative for mouth sores.    Eyes:  Negative for visual disturbance.   Respiratory:  Negative for cough and shortness of breath.    Cardiovascular:  Negative for chest pain.   Gastrointestinal:  Negative for abdominal pain and diarrhea.   Genitourinary:  Negative for frequency.   Musculoskeletal:  Positive for arthralgias (hands and wrists). Negative for back pain.   Integumentary:  Negative for rash.   Neurological:  Negative for headaches.   Hematological:  Negative for adenopathy.   Psychiatric/Behavioral:  The patient is not nervous/anxious.          Objective:      Physical Exam  Constitutional:       General: She is not in acute distress.     Appearance: Normal appearance.   Neurological:      Mental Status: She is alert and oriented to person, place, and time.   Psychiatric:         Mood and Affect: Mood normal.         Behavior: Behavior normal.         Thought Content: Thought content normal.         Judgment: Judgment normal.       Assessment:       Problem List Items Addressed This Visit       Atypical ductal hyperplasia of left breast - Primary       Plan:     Continue Raloxifen  RTC January with MRI - completes Raloxifene at that time      Route Chart for Scheduling    Med Onc Chart Routing      Follow up with physician 6 months.   Follow up with MEERA    Infusion scheduling note    Injection scheduling note needs her IV started for her mri   Labs None   Scheduling:  Preferred lab:  Lab interval:     Imaging MRI and PET scan   BREAST - 6 m   Pharmacy appointment No pharmacy appointment needed      Other referrals no referral to Oncology Primary Care needed -  no Massage appointment needed    No additional referrals needed

## 2024-07-26 ENCOUNTER — PATIENT MESSAGE (OUTPATIENT)
Dept: RHEUMATOLOGY | Facility: CLINIC | Age: 69
End: 2024-07-26
Payer: MEDICARE

## 2024-07-30 NOTE — PROGRESS NOTES
See Initial Evaluation in 'Notes' section of EMR.  
Quality 431: Preventive Care And Screening: Unhealthy Alcohol Use - Screening: Patient not identified as an unhealthy alcohol user when screened for unhealthy alcohol use using a systematic screening method
Quality 110: Preventive Care And Screening: Influenza Immunization: Influenza immunization was not ordered or administered, reason not given
Detail Level: Detailed
Quality 226: Preventive Care And Screening: Tobacco Use: Screening And Cessation Intervention: Patient screened for tobacco use and is an ex/non-smoker
Quality 130: Documentation Of Current Medications In The Medical Record: Current Medications Documented

## 2024-08-01 ENCOUNTER — PATIENT MESSAGE (OUTPATIENT)
Dept: INTERNAL MEDICINE | Facility: CLINIC | Age: 69
End: 2024-08-01
Payer: MEDICARE

## 2024-08-01 ENCOUNTER — OFFICE VISIT (OUTPATIENT)
Dept: DERMATOLOGY | Facility: CLINIC | Age: 69
End: 2024-08-01
Payer: MEDICARE

## 2024-08-01 DIAGNOSIS — L64.9 ANDROGENETIC ALOPECIA: ICD-10-CM

## 2024-08-01 DIAGNOSIS — L57.0 ACTINIC KERATOSIS: Primary | ICD-10-CM

## 2024-08-01 DIAGNOSIS — I86.8 VARICOSE VEINS OF BOTH UPPER EXTREMITIES: ICD-10-CM

## 2024-08-01 PROCEDURE — 99999 PR PBB SHADOW E&M-EST. PATIENT-LVL III: CPT | Mod: PBBFAC,,, | Performed by: PHYSICIAN ASSISTANT

## 2024-08-01 PROCEDURE — 99214 OFFICE O/P EST MOD 30 MIN: CPT | Mod: 25,S$PBB,, | Performed by: PHYSICIAN ASSISTANT

## 2024-08-01 PROCEDURE — 17000 DESTRUCT PREMALG LESION: CPT | Mod: S$PBB,,, | Performed by: PHYSICIAN ASSISTANT

## 2024-08-01 PROCEDURE — 99213 OFFICE O/P EST LOW 20 MIN: CPT | Mod: PBBFAC | Performed by: PHYSICIAN ASSISTANT

## 2024-08-01 PROCEDURE — 17000 DESTRUCT PREMALG LESION: CPT | Mod: PBBFAC | Performed by: PHYSICIAN ASSISTANT

## 2024-08-01 RX ORDER — MINOXIDIL 2.5 MG/1
TABLET ORAL
Qty: 45 TABLET | Refills: 1 | Status: SHIPPED | OUTPATIENT
Start: 2024-08-01

## 2024-08-01 NOTE — PATIENT INSTRUCTIONS
Actinic keratosis- left nose  Cryosurgery Procedure Note    Verbal consent from the patient is obtained including, but not limited to, risk of hypopigmentation/hyperpigmentation, scar, recurrence of lesion. The patient is aware of the precancerous quality and need for treatment of these lesions. Liquid nitrogen cryosurgery is applied to the 1 actinic keratoses, as detailed in the physical exam, to produce a freeze injury. The patient is aware that blisters may form and is instructed on wound care with gentle cleansing and use of vaseline ointment to keep moist until healed. The patient is supplied a handout on cryosurgery and is instructed to call if lesions do not completely resolve.      Androgenetic alopecia  -     minoxidiL (LONITEN) 2.5 MG tablet; Sig 1/2 tablet po qd  Dispense: 45 tablet; Refill: 1  -     Okay to increase to 1/2 tablet of 2.5 mg minoxidil daily. Monitor for side effects below and go back to 1/4 tablet daily if you experience any side effects and let us know if you experience any side effects as well.    Discussed side affects of  minoxidil:  Side effects that you should report to your doctor or health care professional as soon as possible:  chest pain, fast or irregular heartbeat, palpitations  difficulty breathing  dizziness or fainting spells  redness, blistering, peeling or loosening of the skin, including inside the mouth  skin rash or itching  stiff or swollen joints  sudden weight gain  swelling of the feet or legs  unusual weakness  Side effects that usually do not require medical attention (report to your doctor or health care professional if they continue or are bothersome):  headache  unusual hair growth, on the face, arm, and back     Handout given in AVS, patient allowed to ask questions and voiced understanding.     Rogaine- can use 5% to affected area 1x per day. Must use consistently and if you stop using, the hair it stimulated to grow may fall out over time. Generic  acceptable     There are multiple over the counter hair supplements, few of which have proven efficacy in controlled clinical trials. However, anecdotal reports have indicated a benefit for these vitamins.  Handout reviewing active ingredients, allergies, and proper use were provided for Nutrafol and Viviscal. The patient can elect to take at his/her discretion. If taking biotin, patient should refrain from taking it 1 week before lab work.      Rosemary oil 3x per week      Zenegen shampoo      Pumpkin seed oil 400 mg daily       Varicose veins of both upper extremities  -Discussed that they most likely look prominent due to natural thinning of skin that occurs with again  -Reassurance provided     CRYOSURGERY      Your doctor has used a method called cryosurgery to treat your skin condition. Cryosurgery refers to the use of very cold substances to treat a variety of skin conditions such as warts, pre-skin cancers, molluscum contagiosum, sun spots, and several benign growths. The substance we use in cryosurgery is liquid nitrogen and is so cold (-195 degrees Celsius) that is burns when administered.     Following treatment in the office, the skin may immediately burn and become red. You may find the area around the lesion is affected as well. It is sometimes necessary to treat not only the lesion, but a small area of the surrounding normal skin to achieve a good response.     A blister, and even a blood filled blister, may form after treatment.   This is a normal response. If the blister is painful, it is acceptable to sterilize a needle and with rubbing alcohol and gently pop the blister. It is important that you gently wash the area with soap and warm water as the blister fluid may contain wart virus if a wart was treated. Do no remove the roof of the blister.     The area treated can take anywhere from 1-3 weeks to heal. Healing time depends on the kind skin lesion treated, the location, and how aggressively the  lesion was treated. It is recommended that the areas treated are covered with Vaseline or bacitracin ointment and a band-aid. If a band-aid is not practical, just ointment applied several times per day will do. Keeping these areas moist will speed the healing time.    Treatment with liquid nitrogen can leave a scar. In dark skin, it may be a light or dark scar, in light skin it may be a white or pink scar. These will generally fade with time, but may never go away completely.     If you have any concerns after your treatment, please feel free to call the office.       [facility] [place] [address]/ [phone] FAX/ [email or web address]       Return in 6 months for hair loss and to recheck left nose (return sooner if spot doesn't go away)

## 2024-08-01 NOTE — PROGRESS NOTES
Subjective:      Patient ID:  Verenice Mirza is a 69 y.o. female who presents for   Chief Complaint   Patient presents with    Hair Loss     Pt present for hair loss & spot on left nasal. The lesion on her nose has been there for about two weeks. She does report picking at it as it scabs up and odesn't go away.    She reports using the Efudex BID for 7 days on her mid-chin and that the spot has resolved.     She reports increased hair growth after being on 1/4 of the 2.5 mg minoxidil tablet. She also reports seeing Dr. Sierra diego for hair loss and being on finasteride 5 mg. She uses a hair coloring spray to color her scalp.        Review of Systems   Constitutional:  Positive for fatigue (baseline due to RA).   Skin:  Positive for daily sunscreen use and activity-related sunscreen use. Negative for tendency to form keloidal scars and recent sunburn.   Hematologic/Lymphatic: Bruises/bleeds easily (bruise).       Objective:   Physical Exam   Constitutional: She appears well-developed and well-nourished. No distress.   Neurological: She is alert and oriented to person, place, and time. She is not disoriented.   Psychiatric: She has a normal mood and affect.   Skin:   Areas Examined (abnormalities noted in diagram):   Scalp / Hair Palpated and Inspected  Head / Face Inspection Performed  RUE Inspected  LUE Inspection Performed                                          Diagram Legend     Erythematous scaling macule/papule c/w actinic keratosis       Vascular papule c/w angioma      Pigmented verrucoid papule/plaque c/w seborrheic keratosis      Yellow umbilicated papule c/w sebaceous hyperplasia      Irregularly shaped tan macule c/w lentigo     1-2 mm smooth white papules consistent with Milia      Movable subcutaneous cyst with punctum c/w epidermal inclusion cyst      Subcutaneous movable cyst c/w pilar cyst      Firm pink to brown papule c/w dermatofibroma      Pedunculated fleshy papule(s) c/w skin tag(s)       Evenly pigmented macule c/w junctional nevus     Mildly variegated pigmented, slightly irregular-bordered macule c/w mildly atypical nevus      Flesh colored to evenly pigmented papule c/w intradermal nevus       Pink pearly papule/plaque c/w basal cell carcinoma      Erythematous hyperkeratotic cursted plaque c/w SCC      Surgical scar with no sign of skin cancer recurrence      Open and closed comedones      Inflammatory papules and pustules      Verrucoid papule consistent consistent with wart     Erythematous eczematous patches and plaques     Dystrophic onycholytic nail with subungual debris c/w onychomycosis     Umbilicated papule    Erythematous-base heme-crusted tan verrucoid plaque consistent with inflamed seborrheic keratosis     Erythematous Silvery Scaling Plaque c/w Psoriasis     See annotation      Assessment / Plan:        Actinic keratosis- left nose  Cryosurgery Procedure Note    Verbal consent from the patient is obtained including, but not limited to, risk of hypopigmentation/hyperpigmentation, scar, recurrence of lesion. The patient is aware of the precancerous quality and need for treatment of these lesions. Liquid nitrogen cryosurgery is applied to the 1 actinic keratoses, as detailed in the physical exam, to produce a freeze injury. The patient is aware that blisters may form and is instructed on wound care with gentle cleansing and use of vaseline ointment to keep moist until healed. The patient is supplied a handout on cryosurgery and is instructed to call if lesions do not completely resolve.      Androgenetic alopecia  -     minoxidiL (LONITEN) 2.5 MG tablet; Sig 1/2 tablet po qd  Dispense: 45 tablet; Refill: 1  -     Okay to increase to 1/2 tablet of 2.5 mg minoxidil daily. Monitor for side effects below and go back to 1/4 tablet daily if you experience any side effects and let us know if you experience any side effects as well.    Discussed side affects of  minoxidil:  Side effects that  you should report to your doctor or health care professional as soon as possible:  chest pain, fast or irregular heartbeat, palpitations  difficulty breathing  dizziness or fainting spells  redness, blistering, peeling or loosening of the skin, including inside the mouth  skin rash or itching  stiff or swollen joints  sudden weight gain  swelling of the feet or legs  unusual weakness  Side effects that usually do not require medical attention (report to your doctor or health care professional if they continue or are bothersome):  headache  unusual hair growth, on the face, arm, and back     Handout given in AVS, patient allowed to ask questions and voiced understanding.     Rogaine- can use 5% to affected area 1x per day. Must use consistently and if you stop using, the hair it stimulated to grow may fall out over time. Generic acceptable     There are multiple over the counter hair supplements, few of which have proven efficacy in controlled clinical trials. However, anecdotal reports have indicated a benefit for these vitamins.  Handout reviewing active ingredients, allergies, and proper use were provided for Nutrafol and Viviscal. The patient can elect to take at his/her discretion. If taking biotin, patient should refrain from taking it 1 week before lab work.      Rosemary oil 3x per week      Allen shampoo      Pumpkin seed oil 400 mg daily       Varicose veins of both upper extremities  -Discussed that they most likely look prominent due to natural thinning of skin that occurs with again  -Reassurance provided            Follow up in about 6 months (around 2/1/2025) for Recheck left nose (or sooner if spot doesn't go away) and hair loss.

## 2024-08-07 ENCOUNTER — LAB VISIT (OUTPATIENT)
Dept: LAB | Facility: HOSPITAL | Age: 69
End: 2024-08-07
Attending: INTERNAL MEDICINE
Payer: MEDICARE

## 2024-08-07 ENCOUNTER — PATIENT MESSAGE (OUTPATIENT)
Dept: RHEUMATOLOGY | Facility: CLINIC | Age: 69
End: 2024-08-07

## 2024-08-07 ENCOUNTER — CLINICAL SUPPORT (OUTPATIENT)
Dept: RHEUMATOLOGY | Facility: CLINIC | Age: 69
End: 2024-08-07
Payer: MEDICARE

## 2024-08-07 DIAGNOSIS — M05.9 SEROPOSITIVE RHEUMATOID ARTHRITIS: ICD-10-CM

## 2024-08-07 DIAGNOSIS — M05.9 SEROPOSITIVE RHEUMATOID ARTHRITIS: Primary | ICD-10-CM

## 2024-08-07 PROCEDURE — 86480 TB TEST CELL IMMUN MEASURE: CPT | Performed by: INTERNAL MEDICINE

## 2024-08-07 PROCEDURE — 99214 OFFICE O/P EST MOD 30 MIN: CPT | Mod: 95,,, | Performed by: INTERNAL MEDICINE

## 2024-08-07 RX ORDER — TRAMADOL HYDROCHLORIDE 50 MG/1
50-100 TABLET ORAL EVERY 8 HOURS PRN
Qty: 60 TABLET | Refills: 2 | Status: SHIPPED | OUTPATIENT
Start: 2024-08-07 | End: 2024-08-07

## 2024-08-07 RX ORDER — ACETAMINOPHEN 325 MG/1
650 TABLET ORAL
OUTPATIENT
Start: 2024-08-08

## 2024-08-07 RX ORDER — IPRATROPIUM BROMIDE AND ALBUTEROL SULFATE 2.5; .5 MG/3ML; MG/3ML
3 SOLUTION RESPIRATORY (INHALATION)
OUTPATIENT
Start: 2024-08-08

## 2024-08-07 RX ORDER — EPINEPHRINE 0.3 MG/.3ML
0.3 INJECTION SUBCUTANEOUS ONCE AS NEEDED
OUTPATIENT
Start: 2024-08-08

## 2024-08-07 RX ORDER — HYDROXYCHLOROQUINE SULFATE 200 MG/1
200 TABLET, FILM COATED ORAL 2 TIMES DAILY
Qty: 60 TABLET | Refills: 2 | Status: SHIPPED | OUTPATIENT
Start: 2024-08-07

## 2024-08-07 RX ORDER — METHYLPREDNISOLONE SOD SUCC 125 MG
40 VIAL (EA) INJECTION
OUTPATIENT
Start: 2024-08-08

## 2024-08-07 RX ORDER — SODIUM CHLORIDE 0.9 % (FLUSH) 0.9 %
10 SYRINGE (ML) INJECTION
OUTPATIENT
Start: 2024-08-08

## 2024-08-07 RX ORDER — HEPARIN 100 UNIT/ML
500 SYRINGE INTRAVENOUS
OUTPATIENT
Start: 2024-08-08

## 2024-08-07 RX ORDER — TRAMADOL HYDROCHLORIDE 50 MG/1
50-100 TABLET ORAL EVERY 8 HOURS PRN
Qty: 60 TABLET | Refills: 2 | Status: SHIPPED | OUTPATIENT
Start: 2024-08-07

## 2024-08-07 RX ORDER — DIPHENHYDRAMINE HYDROCHLORIDE 50 MG/ML
50 INJECTION INTRAMUSCULAR; INTRAVENOUS ONCE AS NEEDED
OUTPATIENT
Start: 2024-08-08

## 2024-08-08 ENCOUNTER — PATIENT MESSAGE (OUTPATIENT)
Dept: RHEUMATOLOGY | Facility: CLINIC | Age: 69
End: 2024-08-08
Payer: MEDICARE

## 2024-08-08 LAB
GAMMA INTERFERON BACKGROUND BLD IA-ACNC: 0 IU/ML
M TB IFN-G CD4+ BCKGRND COR BLD-ACNC: 0.01 IU/ML
M TB IFN-G CD4+ BCKGRND COR BLD-ACNC: 0.01 IU/ML
MITOGEN IGNF BCKGRD COR BLD-ACNC: 10 IU/ML
TB GOLD PLUS: NEGATIVE

## 2024-08-10 NOTE — PROGRESS NOTES
"Subjective:       Patient ID: Verenice Mirza is a 67 y.o. female.    Chief Complaint: Disease Management    HPI  Fell early 2021; R hand swelled after fall but never got better  Dr. Erickson dx RA  Orencia weekly x 3 (April) helped but then she got diverticulitis  June 2021 started Sulfasalazine up to 3 bid     Feb 2021 RF 92,      Previous hx of plantar fasciitis, achilles tendinitis, R knee pain, and lumbar pain  Hx gel shots in R knee  Hx PT for knee  A lot of pain around the knee; no cane   Meloxicam      Hx DM2  Steatosis/ fatty liver  HTN metoprolol   HLD  Hypothyroid  PVD  PAD  Rosacea  JORDANA  Raloxifene since 2019 for atypical ductal hyperplasia  Fam hx crohn's grandmother     Limits carbs and sugars  Losing weight on Ozempic     Had surgery knee Dec 2021    Some interval pain R shoulder and L elbow but none now  Knee pain and planning to get scope    Review of Systems   Constitutional:  Negative for fever and unexpected weight change.   HENT:  Negative for mouth sores and trouble swallowing.    Eyes:  Negative for redness.   Respiratory:  Negative for cough and shortness of breath.    Cardiovascular:  Negative for chest pain.   Gastrointestinal:  Negative for constipation and diarrhea.   Genitourinary:  Negative for dysuria and genital sores.   Skin:  Negative for rash.   Neurological:  Negative for headaches.   Hematological:  Bruises/bleeds easily.       Rapid3 Question Responses and Scores 3/17/2023   MDHAQ Score 0.7   Psychologic Score 1.1   Pain Score 0.5   When you awakened in the morning OVER THE LAST WEEK, did you feel stiff? No   If Yes, please indicate the number of hours until you are as limber as you will be for the day -   Fatigue Score 1.5   Global Health Score 1   RAPID3 Score 1.28      Objective:   /81   Pulse 70   Ht 5' 1" (1.549 m)   Wt 98.1 kg (216 lb 4.3 oz)   BMI 40.86 kg/m²      Physical Exam      10/18/2021 9/13/2022 3/24/2023   Tender (HERNANDEZ-28) 2 / 28 0 / 28 0 / 28  "   Swollen (HERNANDEZ-28) 1 / 28 2 / 28 0 / 28    Provider Global 5 mm 10 mm 10 mm   Patient Global 5 mm 10 mm 10 mm   ESR 12 mm/hr 30 mm/hr 4 mm/hr   CRP 1.3 mg/L 10.6 mg/L 1.7 mg/L   HERNANDEZ-28 (ESR) 2.88 (Low disease activity) 2.92 (Low disease activity) 1.11 (Remission)   HERNANDEZ-28 (CRP) 2.4 (Remission) 2.38 (Remission) 1.46 (Remission)   CDAI Score 4  4  2      Lab Results   Component Value Date    SEDRATE 4 03/16/2023          Assessment:       1. Seropositive rheumatoid arthritis    2. Morbid obesity with BMI of 40.0-44.9, adult            Plan:       Problem List Items Addressed This Visit          Active Problems    Seropositive rheumatoid arthritis     RA doing well on sulfasalazine monotherapy with negligible stiffness and minimal pain/ swelling    Has OA of knees with some symptoms    Continues to improve nutrition     Sulfasalazine has been well tolerated so far with no significant clinical symptoms and labs show no leukopenia, macrocytosis, or liver toxicity, so will continue to monitor for safety    Will continue sulfasalazine and monitor to assure continued control of chronic synovitis and safety of medical management    RTC me 6 mo         Relevant Medications    sulfaSALAzine (AZULFIDINE) 500 mg Tab    Morbid obesity with BMI of 40.0-44.9, adult     She is losing weight on Ozempic and is improving diet and nutrition and we reviewed anti-inflammatory, Mediterranean style diet                 Yes

## 2024-08-12 ENCOUNTER — PATIENT MESSAGE (OUTPATIENT)
Dept: RHEUMATOLOGY | Facility: CLINIC | Age: 69
End: 2024-08-12
Payer: MEDICARE

## 2024-08-12 ENCOUNTER — TELEPHONE (OUTPATIENT)
Dept: RHEUMATOLOGY | Facility: CLINIC | Age: 69
End: 2024-08-12
Payer: MEDICARE

## 2024-08-12 NOTE — TELEPHONE ENCOUNTER
Patient messages inquiring about her infusion (Infliximab) being scheduled.     She is endorsing pain d/t recent flare and is anxiously awaiting to get infused. Informed patient that her infusion is pending authorization. Checking with [employer] on 8/13. After auth, pt will be scheduled.    Answered pt's questions regarding infusions. Patient expressed understanding. Thanked patient for their time.

## 2024-08-27 ENCOUNTER — PATIENT MESSAGE (OUTPATIENT)
Dept: DERMATOLOGY | Facility: CLINIC | Age: 69
End: 2024-08-27
Payer: MEDICARE

## 2024-08-28 ENCOUNTER — TELEPHONE (OUTPATIENT)
Dept: DERMATOLOGY | Facility: CLINIC | Age: 69
End: 2024-08-28
Payer: MEDICARE

## 2024-08-28 ENCOUNTER — PATIENT MESSAGE (OUTPATIENT)
Dept: DERMATOLOGY | Facility: CLINIC | Age: 69
End: 2024-08-28
Payer: MEDICARE

## 2024-08-29 ENCOUNTER — INFUSION (OUTPATIENT)
Dept: INFECTIOUS DISEASES | Facility: HOSPITAL | Age: 69
End: 2024-08-29
Attending: INTERNAL MEDICINE
Payer: MEDICARE

## 2024-08-29 VITALS
WEIGHT: 183.44 LBS | HEIGHT: 62 IN | BODY MASS INDEX: 33.76 KG/M2 | HEART RATE: 86 BPM | OXYGEN SATURATION: 95 % | TEMPERATURE: 98 F | DIASTOLIC BLOOD PRESSURE: 65 MMHG | SYSTOLIC BLOOD PRESSURE: 127 MMHG | RESPIRATION RATE: 20 BRPM

## 2024-08-29 DIAGNOSIS — M05.9 SEROPOSITIVE RHEUMATOID ARTHRITIS: Primary | ICD-10-CM

## 2024-08-29 PROCEDURE — 96375 TX/PRO/DX INJ NEW DRUG ADDON: CPT

## 2024-08-29 PROCEDURE — 96365 THER/PROPH/DIAG IV INF INIT: CPT

## 2024-08-29 PROCEDURE — 25000003 PHARM REV CODE 250: Performed by: INTERNAL MEDICINE

## 2024-08-29 PROCEDURE — 63600175 PHARM REV CODE 636 W HCPCS: Mod: JZ,JG | Performed by: INTERNAL MEDICINE

## 2024-08-29 PROCEDURE — 96366 THER/PROPH/DIAG IV INF ADDON: CPT

## 2024-08-29 RX ORDER — EPINEPHRINE 0.3 MG/.3ML
0.3 INJECTION SUBCUTANEOUS ONCE AS NEEDED
Status: DISCONTINUED | OUTPATIENT
Start: 2024-08-29 | End: 2024-08-29 | Stop reason: HOSPADM

## 2024-08-29 RX ORDER — IPRATROPIUM BROMIDE AND ALBUTEROL SULFATE 2.5; .5 MG/3ML; MG/3ML
3 SOLUTION RESPIRATORY (INHALATION)
OUTPATIENT
Start: 2024-10-24

## 2024-08-29 RX ORDER — SODIUM CHLORIDE 0.9 % (FLUSH) 0.9 %
10 SYRINGE (ML) INJECTION
Status: DISCONTINUED | OUTPATIENT
Start: 2024-08-29 | End: 2024-08-29 | Stop reason: HOSPADM

## 2024-08-29 RX ORDER — ACETAMINOPHEN 325 MG/1
650 TABLET ORAL
OUTPATIENT
Start: 2024-10-24

## 2024-08-29 RX ORDER — ACETAMINOPHEN 325 MG/1
650 TABLET ORAL
Status: COMPLETED | OUTPATIENT
Start: 2024-08-29 | End: 2024-08-29

## 2024-08-29 RX ORDER — EPINEPHRINE 0.3 MG/.3ML
0.3 INJECTION SUBCUTANEOUS ONCE AS NEEDED
OUTPATIENT
Start: 2024-10-24

## 2024-08-29 RX ORDER — HEPARIN 100 UNIT/ML
500 SYRINGE INTRAVENOUS
Status: DISCONTINUED | OUTPATIENT
Start: 2024-08-29 | End: 2024-08-29 | Stop reason: HOSPADM

## 2024-08-29 RX ORDER — DIPHENHYDRAMINE HYDROCHLORIDE 50 MG/ML
50 INJECTION INTRAMUSCULAR; INTRAVENOUS ONCE AS NEEDED
OUTPATIENT
Start: 2024-10-24

## 2024-08-29 RX ORDER — HEPARIN 100 UNIT/ML
500 SYRINGE INTRAVENOUS
OUTPATIENT
Start: 2024-10-24

## 2024-08-29 RX ORDER — SODIUM CHLORIDE 0.9 % (FLUSH) 0.9 %
10 SYRINGE (ML) INJECTION
OUTPATIENT
Start: 2024-10-24

## 2024-08-29 RX ORDER — DIPHENHYDRAMINE HYDROCHLORIDE 50 MG/ML
50 INJECTION INTRAMUSCULAR; INTRAVENOUS ONCE AS NEEDED
Status: DISCONTINUED | OUTPATIENT
Start: 2024-08-29 | End: 2024-08-29 | Stop reason: HOSPADM

## 2024-08-29 RX ORDER — METHYLPREDNISOLONE SOD SUCC 125 MG
40 VIAL (EA) INJECTION
OUTPATIENT
Start: 2024-10-24

## 2024-08-29 RX ADMIN — ACETAMINOPHEN 650 MG: 325 TABLET ORAL at 11:08

## 2024-08-29 RX ADMIN — SODIUM CHLORIDE 400 MG: 9 INJECTION, SOLUTION INTRAVENOUS at 12:08

## 2024-08-29 RX ADMIN — METHYLPREDNISOLONE SODIUM SUCCINATE 40 MG: 40 INJECTION, POWDER, FOR SOLUTION INTRAMUSCULAR; INTRAVENOUS at 11:08

## 2024-08-29 NOTE — PROGRESS NOTES
Arrived for first inflectra infusion. Pre medicated with tylenol 650mg po and methylprednisolone 40mg.ivp  .Consented prior to infusion. Given inflectra as ordered , Tolerated well.    Limited head-to-toe assessment due to privacy issues and visit reason though the opportunity was given for patient to express any concerns     
Statement Selected

## 2024-09-09 ENCOUNTER — PATIENT MESSAGE (OUTPATIENT)
Dept: RHEUMATOLOGY | Facility: CLINIC | Age: 69
End: 2024-09-09
Payer: MEDICARE

## 2024-09-09 ENCOUNTER — PATIENT MESSAGE (OUTPATIENT)
Dept: DERMATOLOGY | Facility: CLINIC | Age: 69
End: 2024-09-09
Payer: MEDICARE

## 2024-09-10 ENCOUNTER — INFUSION (OUTPATIENT)
Dept: INFECTIOUS DISEASES | Facility: HOSPITAL | Age: 69
End: 2024-09-10
Attending: INTERNAL MEDICINE
Payer: MEDICARE

## 2024-09-10 VITALS
SYSTOLIC BLOOD PRESSURE: 124 MMHG | HEIGHT: 62 IN | OXYGEN SATURATION: 98 % | RESPIRATION RATE: 18 BRPM | WEIGHT: 186.38 LBS | TEMPERATURE: 98 F | DIASTOLIC BLOOD PRESSURE: 59 MMHG | HEART RATE: 76 BPM | BODY MASS INDEX: 34.3 KG/M2

## 2024-09-10 DIAGNOSIS — M05.9 SEROPOSITIVE RHEUMATOID ARTHRITIS: Primary | ICD-10-CM

## 2024-09-10 PROCEDURE — 96365 THER/PROPH/DIAG IV INF INIT: CPT

## 2024-09-10 PROCEDURE — 96366 THER/PROPH/DIAG IV INF ADDON: CPT

## 2024-09-10 PROCEDURE — 96375 TX/PRO/DX INJ NEW DRUG ADDON: CPT

## 2024-09-10 PROCEDURE — 25000003 PHARM REV CODE 250: Performed by: INTERNAL MEDICINE

## 2024-09-10 PROCEDURE — 63600175 PHARM REV CODE 636 W HCPCS: Performed by: INTERNAL MEDICINE

## 2024-09-10 RX ORDER — EPINEPHRINE 0.3 MG/.3ML
0.3 INJECTION SUBCUTANEOUS ONCE AS NEEDED
OUTPATIENT
Start: 2024-10-24

## 2024-09-10 RX ORDER — METHYLPREDNISOLONE SOD SUCC 125 MG
40 VIAL (EA) INJECTION
OUTPATIENT
Start: 2024-10-24

## 2024-09-10 RX ORDER — ACETAMINOPHEN 325 MG/1
650 TABLET ORAL
OUTPATIENT
Start: 2024-10-24

## 2024-09-10 RX ORDER — HEPARIN 100 UNIT/ML
500 SYRINGE INTRAVENOUS
OUTPATIENT
Start: 2024-10-24

## 2024-09-10 RX ORDER — EPINEPHRINE 0.3 MG/.3ML
0.3 INJECTION SUBCUTANEOUS ONCE AS NEEDED
Status: DISCONTINUED | OUTPATIENT
Start: 2024-09-10 | End: 2024-09-10 | Stop reason: HOSPADM

## 2024-09-10 RX ORDER — IPRATROPIUM BROMIDE AND ALBUTEROL SULFATE 2.5; .5 MG/3ML; MG/3ML
3 SOLUTION RESPIRATORY (INHALATION)
OUTPATIENT
Start: 2024-10-24

## 2024-09-10 RX ORDER — HEPARIN 100 UNIT/ML
500 SYRINGE INTRAVENOUS
Status: DISCONTINUED | OUTPATIENT
Start: 2024-09-10 | End: 2024-09-10 | Stop reason: HOSPADM

## 2024-09-10 RX ORDER — ACETAMINOPHEN 325 MG/1
650 TABLET ORAL
Status: COMPLETED | OUTPATIENT
Start: 2024-09-10 | End: 2024-09-10

## 2024-09-10 RX ORDER — DIPHENHYDRAMINE HYDROCHLORIDE 50 MG/ML
50 INJECTION INTRAMUSCULAR; INTRAVENOUS ONCE AS NEEDED
Status: DISCONTINUED | OUTPATIENT
Start: 2024-09-10 | End: 2024-09-10 | Stop reason: HOSPADM

## 2024-09-10 RX ORDER — SODIUM CHLORIDE 0.9 % (FLUSH) 0.9 %
10 SYRINGE (ML) INJECTION
Status: DISCONTINUED | OUTPATIENT
Start: 2024-09-10 | End: 2024-09-10 | Stop reason: HOSPADM

## 2024-09-10 RX ORDER — METHYLPREDNISOLONE SOD SUCC 125 MG
40 VIAL (EA) INJECTION
Status: COMPLETED | OUTPATIENT
Start: 2024-09-10 | End: 2024-09-10

## 2024-09-10 RX ORDER — DIPHENHYDRAMINE HYDROCHLORIDE 50 MG/ML
50 INJECTION INTRAMUSCULAR; INTRAVENOUS ONCE AS NEEDED
OUTPATIENT
Start: 2024-10-24

## 2024-09-10 RX ORDER — SODIUM CHLORIDE 0.9 % (FLUSH) 0.9 %
10 SYRINGE (ML) INJECTION
OUTPATIENT
Start: 2024-10-24

## 2024-09-10 RX ADMIN — ACETAMINOPHEN 650 MG: 325 TABLET ORAL at 11:09

## 2024-09-10 RX ADMIN — METHYLPREDNISOLONE SODIUM SUCCINATE 40 MG: 125 INJECTION, POWDER, FOR SOLUTION INTRAMUSCULAR; INTRAVENOUS at 11:09

## 2024-09-10 RX ADMIN — SODIUM CHLORIDE 400 MG: 9 INJECTION, SOLUTION INTRAVENOUS at 12:09

## 2024-09-10 NOTE — PROGRESS NOTES
Arrived for inflectra infusion. Pre medicated with tylenol 650mg po and methylprednisolone 40mg.ivp  .Consented prior to infusion. Given inflectra as ordered , Tolerated well.    Limited head-to-toe assessment due to privacy issues and visit reason though the opportunity was given for patient to express any concerns

## 2024-09-16 ENCOUNTER — LAB VISIT (OUTPATIENT)
Dept: LAB | Facility: HOSPITAL | Age: 69
End: 2024-09-16
Attending: INTERNAL MEDICINE
Payer: MEDICARE

## 2024-09-16 DIAGNOSIS — E78.5 DYSLIPIDEMIA ASSOCIATED WITH TYPE 2 DIABETES MELLITUS: ICD-10-CM

## 2024-09-16 DIAGNOSIS — I15.2 HYPERTENSION ASSOCIATED WITH DIABETES: ICD-10-CM

## 2024-09-16 DIAGNOSIS — Z79.899 HIGH RISK MEDICATION USE: ICD-10-CM

## 2024-09-16 DIAGNOSIS — E11.69 DYSLIPIDEMIA ASSOCIATED WITH TYPE 2 DIABETES MELLITUS: ICD-10-CM

## 2024-09-16 DIAGNOSIS — E11.59 HYPERTENSION ASSOCIATED WITH DIABETES: ICD-10-CM

## 2024-09-16 DIAGNOSIS — E03.4 HYPOTHYROIDISM DUE TO ACQUIRED ATROPHY OF THYROID: ICD-10-CM

## 2024-09-16 LAB
ALBUMIN SERPL BCP-MCNC: 3.9 G/DL (ref 3.5–5.2)
ALP SERPL-CCNC: 75 U/L (ref 55–135)
ALT SERPL W/O P-5'-P-CCNC: 14 U/L (ref 10–44)
ANION GAP SERPL CALC-SCNC: 9 MMOL/L (ref 8–16)
AST SERPL-CCNC: 15 U/L (ref 10–40)
BASOPHILS # BLD AUTO: 0.07 K/UL (ref 0–0.2)
BASOPHILS NFR BLD: 1.4 % (ref 0–1.9)
BILIRUB SERPL-MCNC: 0.4 MG/DL (ref 0.1–1)
BUN SERPL-MCNC: 17 MG/DL (ref 8–23)
CALCIUM SERPL-MCNC: 9.5 MG/DL (ref 8.7–10.5)
CHLORIDE SERPL-SCNC: 110 MMOL/L (ref 95–110)
CHOLEST SERPL-MCNC: 155 MG/DL (ref 120–199)
CHOLEST/HDLC SERPL: 3.2 {RATIO} (ref 2–5)
CO2 SERPL-SCNC: 20 MMOL/L (ref 23–29)
CREAT SERPL-MCNC: 0.9 MG/DL (ref 0.5–1.4)
CRP SERPL-MCNC: 0.5 MG/L (ref 0–8.2)
DIFFERENTIAL METHOD BLD: ABNORMAL
EOSINOPHIL # BLD AUTO: 0.2 K/UL (ref 0–0.5)
EOSINOPHIL NFR BLD: 2.9 % (ref 0–8)
ERYTHROCYTE [DISTWIDTH] IN BLOOD BY AUTOMATED COUNT: 14.7 % (ref 11.5–14.5)
ERYTHROCYTE [SEDIMENTATION RATE] IN BLOOD BY PHOTOMETRIC METHOD: <2 MM/HR (ref 0–36)
EST. GFR  (NO RACE VARIABLE): >60 ML/MIN/1.73 M^2
ESTIMATED AVG GLUCOSE: 91 MG/DL (ref 68–131)
GLUCOSE SERPL-MCNC: 104 MG/DL (ref 70–110)
HBA1C MFR BLD: 4.8 % (ref 4–5.6)
HCT VFR BLD AUTO: 42.3 % (ref 37–48.5)
HDLC SERPL-MCNC: 48 MG/DL (ref 40–75)
HDLC SERPL: 31 % (ref 20–50)
HGB BLD-MCNC: 13.5 G/DL (ref 12–16)
IMM GRANULOCYTES # BLD AUTO: 0.01 K/UL (ref 0–0.04)
IMM GRANULOCYTES NFR BLD AUTO: 0.2 % (ref 0–0.5)
LDLC SERPL CALC-MCNC: 86.6 MG/DL (ref 63–159)
LYMPHOCYTES # BLD AUTO: 2 K/UL (ref 1–4.8)
LYMPHOCYTES NFR BLD: 38.8 % (ref 18–48)
MCH RBC QN AUTO: 30.9 PG (ref 27–31)
MCHC RBC AUTO-ENTMCNC: 31.9 G/DL (ref 32–36)
MCV RBC AUTO: 97 FL (ref 82–98)
MONOCYTES # BLD AUTO: 0.4 K/UL (ref 0.3–1)
MONOCYTES NFR BLD: 6.8 % (ref 4–15)
NEUTROPHILS # BLD AUTO: 2.6 K/UL (ref 1.8–7.7)
NEUTROPHILS NFR BLD: 49.9 % (ref 38–73)
NONHDLC SERPL-MCNC: 107 MG/DL
NRBC BLD-RTO: 0 /100 WBC
PLATELET # BLD AUTO: 239 K/UL (ref 150–450)
PMV BLD AUTO: 11.3 FL (ref 9.2–12.9)
POTASSIUM SERPL-SCNC: 3.7 MMOL/L (ref 3.5–5.1)
PROT SERPL-MCNC: 6.6 G/DL (ref 6–8.4)
RBC # BLD AUTO: 4.37 M/UL (ref 4–5.4)
SODIUM SERPL-SCNC: 139 MMOL/L (ref 136–145)
TRIGL SERPL-MCNC: 102 MG/DL (ref 30–150)
TSH SERPL DL<=0.005 MIU/L-ACNC: 3.09 UIU/ML (ref 0.4–4)
WBC # BLD AUTO: 5.16 K/UL (ref 3.9–12.7)

## 2024-09-16 PROCEDURE — 85652 RBC SED RATE AUTOMATED: CPT | Performed by: INTERNAL MEDICINE

## 2024-09-16 PROCEDURE — 86140 C-REACTIVE PROTEIN: CPT | Performed by: INTERNAL MEDICINE

## 2024-09-16 PROCEDURE — 80053 COMPREHEN METABOLIC PANEL: CPT | Performed by: INTERNAL MEDICINE

## 2024-09-16 PROCEDURE — 80061 LIPID PANEL: CPT | Performed by: INTERNAL MEDICINE

## 2024-09-16 PROCEDURE — 85025 COMPLETE CBC W/AUTO DIFF WBC: CPT | Performed by: INTERNAL MEDICINE

## 2024-09-16 PROCEDURE — 83036 HEMOGLOBIN GLYCOSYLATED A1C: CPT | Performed by: INTERNAL MEDICINE

## 2024-09-16 PROCEDURE — 84443 ASSAY THYROID STIM HORMONE: CPT | Performed by: INTERNAL MEDICINE

## 2024-09-23 ENCOUNTER — OFFICE VISIT (OUTPATIENT)
Dept: RHEUMATOLOGY | Facility: CLINIC | Age: 69
End: 2024-09-23
Payer: MEDICARE

## 2024-09-23 VITALS
SYSTOLIC BLOOD PRESSURE: 120 MMHG | HEIGHT: 62 IN | DIASTOLIC BLOOD PRESSURE: 83 MMHG | HEART RATE: 86 BPM | WEIGHT: 184.31 LBS | BODY MASS INDEX: 33.92 KG/M2

## 2024-09-23 DIAGNOSIS — M17.0 PRIMARY OSTEOARTHRITIS OF BOTH KNEES: ICD-10-CM

## 2024-09-23 DIAGNOSIS — M05.9 SEROPOSITIVE RHEUMATOID ARTHRITIS: ICD-10-CM

## 2024-09-23 PROCEDURE — 99999 PR PBB SHADOW E&M-EST. PATIENT-LVL V: CPT | Mod: PBBFAC,,, | Performed by: INTERNAL MEDICINE

## 2024-09-23 PROCEDURE — 99215 OFFICE O/P EST HI 40 MIN: CPT | Mod: PBBFAC | Performed by: INTERNAL MEDICINE

## 2024-09-23 PROCEDURE — 99214 OFFICE O/P EST MOD 30 MIN: CPT | Mod: S$PBB,,, | Performed by: INTERNAL MEDICINE

## 2024-09-23 RX ORDER — SULFASALAZINE 500 MG/1
1500 TABLET ORAL 2 TIMES DAILY
Qty: 540 TABLET | Refills: 3 | Status: SHIPPED | OUTPATIENT
Start: 2024-09-23

## 2024-09-23 RX ORDER — CELECOXIB 200 MG/1
200 CAPSULE ORAL DAILY PRN
Qty: 30 CAPSULE | Refills: 5 | Status: SHIPPED | OUTPATIENT
Start: 2024-09-23

## 2024-09-23 RX ORDER — HYDROXYCHLOROQUINE SULFATE 200 MG/1
200 TABLET, FILM COATED ORAL 2 TIMES DAILY
Qty: 60 TABLET | Refills: 4 | Status: SHIPPED | OUTPATIENT
Start: 2024-09-23

## 2024-09-23 ASSESSMENT — ROUTINE ASSESSMENT OF PATIENT INDEX DATA (RAPID3)
FATIGUE SCORE: 1.5
TOTAL RAPID3 SCORE: 0.61
PAIN SCORE: 0.5
MDHAQ FUNCTION SCORE: 0.1
PATIENT GLOBAL ASSESSMENT SCORE: 1
PSYCHOLOGICAL DISTRESS SCORE: 1.1

## 2024-09-23 NOTE — ASSESSMENT & PLAN NOTE
Some knee tenderness and recent symptoms; she will resume exercise now that rheumatoid arthritis is improving    And take celecoxib

## 2024-09-23 NOTE — PROGRESS NOTES
9/16/2024    10:24 AM   Rapid3 Question Responses and Scores   MDHAQ Score 0.1   Psychologic Score 1.1   Pain Score 0.5   When you awakened in the morning OVER THE LAST WEEK, did you feel stiff? No   Fatigue Score 1.5   Global Health Score 1   RAPID3 Score 0.61        Answers submitted by the patient for this visit:  Rheumatology Questionnaire (Submitted on 9/16/2024)  fever: No  eye redness: No  mouth sores: No  headaches: No  shortness of breath: No  chest pain: No  trouble swallowing: No  diarrhea: No  constipation: No  unexpected weight change: No  genital sore: No  dysuria: No  During the last 3 days, have you had a skin rash?: No  Bruises or bleeds easily: Yes  cough: No

## 2024-09-23 NOTE — PROGRESS NOTES
Subjective:       Patient ID: Verenice Mirza is a 69 y.o. female.    Chief Complaint: Disease Management    HPI    Fell early 2021; R hand swelled after fall but never got better  Dr. Erickson dx rheumatoid arthritis - seropositive  Orencia weekly x 3 (April) helped but then she got diverticulitis  June 2021 started Sulfasalazine up to 3 bid     Feb 2021 RF 92,      Previous hx of plantar fasciitis, achilles tendinitis, R knee pain, and lumbar pain  Hx gel shots in R knee  Hx PT for knee  A lot of pain around the knee; no cane   Meloxicam      Hx DM2  Steatosis/ fatty liver  HTN metoprolol   HLD  Hypothyroid  PVD  PAD  Rosacea  JORDANA  Raloxifene since 2019 for atypical ductal hyperplasia  Fam hx crohn's grandmother    Long history knee pains  X-rays show osteoarthritis, medial joints  Some L genu varus  History injection L shoulder and 2 injections L knee, 1 R knee; Stacey Harry and Chandra  History meniscectomy both knees (L knee after a fall and MRI Dr Harry)      Much better now, after being in a very bad way before started Inflectra     Got Inflectra 5 mg/kg on Aug 29, Sept 10 and is scheduled for Oct the [id number]    Still fatigue  Some headache    ESR now <2    Review of Systems   Constitutional:  Positive for fatigue. Negative for fever and unexpected weight change.   HENT:  Negative for mouth sores and trouble swallowing.    Eyes:  Negative for redness.   Respiratory:  Negative for cough and shortness of breath.    Cardiovascular:  Negative for chest pain.   Gastrointestinal:  Negative for constipation and diarrhea.   Genitourinary:  Negative for dysuria and genital sores.   Skin:  Negative for rash.   Neurological:  Positive for headaches.   Hematological:  Bruises/bleeds easily.           9/16/2024    10:24 AM   Rapid3 Question Responses and Scores   MDHAQ Score 0.1   Psychologic Score 1.1   Pain Score 0.5   When you awakened in the morning OVER THE LAST WEEK, did you feel stiff? No   Fatigue Score 1.5  "  Global Health Score 1   RAPID3 Score 0.61      Objective:   /83   Pulse 86   Ht 5' 2" (1.575 m)   Wt 83.6 kg (184 lb 4.9 oz)   BMI 33.71 kg/m²      Physical Exam      3/24/2023 9/22/2023 4/23/2024 9/23/2024   Tender (HERNANDEZ-28) 0 / 28  0 / 28  2 / 28 6 / 28    Swollen (HERNANDEZ-28) 0 / 28  0 / 28 2 / 28 2 / 28    Provider Global 10 mm 0 mm 10 mm 15 mm   Patient Global 10 mm 0 mm 45 mm 10 mm   ESR 4 mm/hr 4 mm/hr 8 mm/hr 1 mm/hr   CRP 1.7 mg/L 0.8 mg/L 4.4 mg/L 0.5 mg/L   HERNANDEZ-28 (ESR) 1.11 (Remission) 0.97 (Remission) 3.27 (Moderate disease activity) 1.91 (Remission)   HERNANDEZ-28 (CRP) 1.46 (Remission) 1.17 (Remission) 3.39 (Moderate disease activity) 3.01 (Low disease activity)   CDAI Score 2  0  9.5  10.5      Lab Results   Component Value Date    SEDRATE <2 09/16/2024          Assessment:       1. Seropositive rheumatoid arthritis    2. Primary osteoarthritis of both knees            Plan:       Problem List Items Addressed This Visit          Active Problems    Seropositive rheumatoid arthritis     Rheumatoid arthritis much better after starting infliximab approximately 4 weeks ago; has had first 2 infusions with significant decrease in swelling, pain, joint count, and RAPID3 from 4.17 to 0.61    Will continue Remicade 5 mg/kg   Continue hydroxychloroquine and sulfasalazine; will taper these after full induction of infliximab    Plan return to clinic me 4 mo with lab         Relevant Medications    hydroxychloroquine (PLAQUENIL) 200 mg tablet    sulfaSALAzine (AZULFIDINE) 500 mg Tab    Primary osteoarthritis of both knees     Some knee tenderness and recent symptoms; she will resume exercise now that rheumatoid arthritis is improving    And take celecoxib         Relevant Medications    celecoxib (CELEBREX) 200 MG capsule           "

## 2024-09-23 NOTE — ASSESSMENT & PLAN NOTE
Rheumatoid arthritis much better after starting infliximab approximately 4 weeks ago; has had first 2 infusions with significant decrease in swelling, pain, joint count, and RAPID3 from 4.17 to 0.61    Will continue Remicade 5 mg/kg   Continue hydroxychloroquine and sulfasalazine; will taper these after full induction of infliximab    Plan return to clinic me 4 mo with lab

## 2024-09-25 ENCOUNTER — PATIENT OUTREACH (OUTPATIENT)
Dept: INTERNAL MEDICINE | Facility: CLINIC | Age: 69
End: 2024-09-25

## 2024-09-25 ENCOUNTER — OFFICE VISIT (OUTPATIENT)
Dept: INTERNAL MEDICINE | Facility: CLINIC | Age: 69
End: 2024-09-25
Payer: MEDICARE

## 2024-09-25 VITALS
HEART RATE: 73 BPM | DIASTOLIC BLOOD PRESSURE: 68 MMHG | BODY MASS INDEX: 33.55 KG/M2 | SYSTOLIC BLOOD PRESSURE: 112 MMHG | WEIGHT: 183.44 LBS

## 2024-09-25 DIAGNOSIS — E11.69 DYSLIPIDEMIA ASSOCIATED WITH TYPE 2 DIABETES MELLITUS: ICD-10-CM

## 2024-09-25 DIAGNOSIS — E78.5 DYSLIPIDEMIA ASSOCIATED WITH TYPE 2 DIABETES MELLITUS: ICD-10-CM

## 2024-09-25 DIAGNOSIS — I15.2 HYPERTENSION ASSOCIATED WITH DIABETES: ICD-10-CM

## 2024-09-25 DIAGNOSIS — M05.9 SEROPOSITIVE RHEUMATOID ARTHRITIS: ICD-10-CM

## 2024-09-25 DIAGNOSIS — E11.59 HYPERTENSION ASSOCIATED WITH DIABETES: ICD-10-CM

## 2024-09-25 DIAGNOSIS — D18.03 HEMANGIOMA OF INTRA-ABDOMINAL STRUCTURE: ICD-10-CM

## 2024-09-25 DIAGNOSIS — Z23 NEED FOR VACCINATION: Primary | ICD-10-CM

## 2024-09-25 DIAGNOSIS — Z00.00 PREVENTATIVE HEALTH CARE: ICD-10-CM

## 2024-09-25 PROCEDURE — 99215 OFFICE O/P EST HI 40 MIN: CPT | Mod: S$PBB,,, | Performed by: INTERNAL MEDICINE

## 2024-09-25 PROCEDURE — 90653 IIV ADJUVANT VACCINE IM: CPT | Mod: PBBFAC,PO

## 2024-09-25 PROCEDURE — G0008 ADMIN INFLUENZA VIRUS VAC: HCPCS | Mod: PBBFAC,PO

## 2024-09-25 PROCEDURE — 99213 OFFICE O/P EST LOW 20 MIN: CPT | Mod: PBBFAC,PO | Performed by: INTERNAL MEDICINE

## 2024-09-25 PROCEDURE — 99999 PR PBB SHADOW E&M-EST. PATIENT-LVL III: CPT | Mod: PBBFAC,,, | Performed by: INTERNAL MEDICINE

## 2024-09-25 PROCEDURE — 99999PBSHW PR PBB SHADOW TECHNICAL ONLY FILED TO HB: Mod: PBBFAC,,,

## 2024-09-25 RX ADMIN — INFLUENZA A VIRUS A/VICTORIA/4897/2022 IVR-238 (H1N1) ANTIGEN (FORMALDEHYDE INACTIVATED), INFLUENZA A VIRUS A/THAILAND/8/2022 IVR-237 (H3N2) ANTIGEN (FORMALDEHYDE INACTIVATED), INFLUENZA B VIRUS B/AUSTRIA/1359417/2021 BVR-26 ANTIGEN (FORMALDEHYDE INACTIVATED) 0.5 ML: 15; 15; 15 INJECTION, SUSPENSION INTRAMUSCULAR at 12:09

## 2024-09-25 NOTE — PROGRESS NOTES
Patient ID: Verenice Mirza is a 69 y.o. female    Chief Complaint: Diabetes    History of Present Illness  The patient is a 69-year-old female who presents today for a follow-up.    She reports feeling well overall, with her diabetes under control. Her most recent A1c level was 4.8, which she attributes to her current medication regimen of Mounjaro 12.5 mg  She acknowledges having insulin resistance and metabolic syndrome and wonders if these conditions are interconnected or hormone-related. Despite increased physical activity, she has not noticed significant weight loss.    She mentions an increase in her rheumatoid arthritis symptoms, despite avoiding pasta, bread, and carbohydrates for several months. She is currently receiving Remicade infusions and started taking hydroxychloroquine in mid-June 2024 due to severe hand pain that prevented her from performing daily activities. She has received two Remicade infusions so far, with the third scheduled for October 8, 2024, and plans to continue them every two months thereafter. She continues to take Plaquenil and has discussed dietary inflammation with Dr. Arrington, who emphasized the importance of diet but suggested there may be other factors contributing to her symptoms.    She has been researching the impact of processed foods and bioengineering on health and is curious about potential heavy metal exposure, as indicated by dark circles under her eyes. She experiences fatigue and mild headaches after busy mornings, but Dr. Arrington does not believe these symptoms are side effects of her medications.    ROS: Otherwise Negative     Physical Exam  General: Well-appearing, well-nourished.  No distress  HEENT: conjunctivae are normal.  Pupils are equal and reative to light.  TM's are clear and intact bilaterally.  Hearing is grossly normal.  Nasopharynx is clear.  Oropharynx is clear.  Neck: Supple.  No thyroid megaly.  No bruits.  Lymph: No cervical or supraclavicular  adenopathy.  Heart: Regular rate and rhythm, without murmur, rub or gallop.  Lungs: Clear to auscultation; respiratory effort normal.  Abdomen: Soft, nontender, nondistended.  Normoactive bowel sounds.  No hepatomegaly.  No masses.  Extremities: Good distal pulses.  No edema.  Psych: Oriented to time person place.  Judgment and insight seem unimpaired.  Mood and affect are appropriate.    Results  Laboratory Studies  A1c is 4.8.    Assessment & Plan  1. Diabetes Mellitus.  Her blood glucose levels are within the non-diabetic range, indicating effective management of her condition. She is currently on a dose of 12.5 mg of her diabetes medication. She is advised to continue her current regimen and maintain a diet free of processed foods, pasta, bread, and carbs. She should also continue walking more and engaging in physical activities.    2. Rheumatoid Arthritis.  Her RA symptoms have worsened despite dietary changes. She is currently on hydroxychloroquine and receiving Remicade infusions. She had two infusions and is scheduled for her third on October 8, 2024, after which she will continue with infusions every 2 months. She should continue her current medications and follow up with her rheumatologist as planned.    3. Fatigue and Headaches.  She reports feeling exhausted in the afternoons and experiencing headaches. Dr. Arrington has evaluated her and does not believe these symptoms are due to her current medications. Further evaluation may be needed if symptoms persist.    4. Concerns about Heavy Metals and Gut Health.  She has concerns about heavy metals and bad bacteria or parasites in her gut. She is advised that these issues are unlikely but may consider consulting a naturopathic physician for further evaluation if she wishes to explore these concerns further.          Follow up in about 6 months (around 3/25/2025) for physical/diabetes Cbc,tsh, cmp lipid, hba1c+/- PSA.      Verenice was seen today for  diabetes.    Diagnoses and all orders for this visit:    Need for vaccination  -     influenza (adjuvanted) (Fluad) 45 mcg/0.5 mL IM vaccine (> or = 66 yo) 0.5 mL    Seropositive rheumatoid arthritis  Followed and treated by Rheumatology  Hemangioma of intra-abdominal structure  Counseling provided from previous CT results  Preventative health care  -     Comprehensive Metabolic Panel; Standing  -     Hemoglobin A1C; Standing  -     Lipid Panel; Standing  -     CBC Auto Differential; Future  -     TSH; Future  Scheduled.  Discuss dietary behavioral modifications to try to promote better metabolism  Dyslipidemia associated with type 2 diabetes mellitus  -     Comprehensive Metabolic Panel; Standing  -     Hemoglobin A1C; Standing  -     Lipid Panel; Standing  -     CBC Auto Differential; Future  -     TSH; Future  Controlled.  Continue current medical regimen.  Prescription refills addressed.  Followup advised. See after visit summary.  Hypertension associated with diabetes  -     Comprehensive Metabolic Panel; Standing  -     Hemoglobin A1C; Standing  -     Lipid Panel; Standing  -     CBC Auto Differential; Future  -     TSH; Future  Controlled.  Continue current medical regimen.  Prescription refills addressed.  Followup advised. See after visit summary.     I spent a total of 45 minutes on the day of the visit.This includes face to face time and non-face to face time preparing to see the patient (eg, review of tests), obtaining and/or reviewing separately obtained history, documenting clinical information in the electronic or other health record, independently interpreting results and communicating results to the patient/family/caregiver, or care coordinator.   This note was generated with the assistance of ambient listening technology. Verbal consent was obtained by the patient and accompanying visitor(s) for the recording of patient appointment to facilitate this note. I attest to having reviewed and edited the  generated note for accuracy, though some syntax or spelling errors may persist. Please contact the author of this note for any clarification.

## 2024-09-26 ENCOUNTER — OFFICE VISIT (OUTPATIENT)
Dept: DERMATOLOGY | Facility: CLINIC | Age: 69
End: 2024-09-26
Payer: MEDICARE

## 2024-09-26 DIAGNOSIS — D48.5 NEOPLASM OF UNCERTAIN BEHAVIOR OF SKIN: Primary | ICD-10-CM

## 2024-09-26 DIAGNOSIS — L57.0 AK (ACTINIC KERATOSIS): ICD-10-CM

## 2024-09-26 PROCEDURE — 99212 OFFICE O/P EST SF 10 MIN: CPT | Mod: 25,S$PBB,, | Performed by: DERMATOLOGY

## 2024-09-26 PROCEDURE — 11104 PUNCH BX SKIN SINGLE LESION: CPT | Mod: S$PBB,,, | Performed by: DERMATOLOGY

## 2024-09-26 PROCEDURE — 99214 OFFICE O/P EST MOD 30 MIN: CPT | Mod: PBBFAC,PO,25 | Performed by: DERMATOLOGY

## 2024-09-26 PROCEDURE — 99999 PR PBB SHADOW E&M-EST. PATIENT-LVL IV: CPT | Mod: PBBFAC,,, | Performed by: DERMATOLOGY

## 2024-09-26 PROCEDURE — 11104 PUNCH BX SKIN SINGLE LESION: CPT | Mod: PBBFAC,PO | Performed by: DERMATOLOGY

## 2024-09-26 NOTE — PATIENT INSTRUCTIONS

## 2024-09-26 NOTE — PROGRESS NOTES
Subjective:      Patient ID:  Verenice Mirza is a 69 y.o. female who presents for   Chief Complaint   Patient presents with    Follow-up     Nose  Mid chin      Pt here today for f/u - recheck nose and mid chin. Pt was last seen with STEFFANY Talbot 8/1/2024. Nose lesion was tx with cryo.    Pt also states she would like prev bx site rechecked. Pt completed course of efudex and  feels lesion is still present.     Final Pathologic Diagnosis       Date                     Value               Ref Range           Status                05/01/2024                                                       Final             1. Skin, mid chin, shave biopsy:  - ACTINIC KERATOSIS WITH SUPERIMPOSED LICHEN SIMPLEX CHRONICUS/ PRURIGO NODULARIS.     Comment:    Interp By Troy Hdz M.D., Signed on 05/08/2024 at 12:54  ----------            Follow-up      Review of Systems   Skin:  Positive for daily sunscreen use and activity-related sunscreen use. Negative for tendency to form keloidal scars and recent sunburn.   Hematologic/Lymphatic: Bruises/bleeds easily (bruise).       Objective:   Physical Exam   Constitutional: She appears well-developed and well-nourished. No distress.   Neurological: She is alert and oriented to person, place, and time. She is not disoriented.   Psychiatric: She has a normal mood and affect.   Skin:   Areas Examined (abnormalities noted in diagram):   Head / Face Inspection Performed            Diagram Legend     Erythematous scaling macule/papule c/w actinic keratosis       Vascular papule c/w angioma      Pigmented verrucoid papule/plaque c/w seborrheic keratosis      Yellow umbilicated papule c/w sebaceous hyperplasia      Irregularly shaped tan macule c/w lentigo     1-2 mm smooth white papules consistent with Milia      Movable subcutaneous cyst with punctum c/w epidermal inclusion cyst      Subcutaneous movable cyst c/w pilar cyst      Firm pink to brown papule c/w dermatofibroma       Pedunculated fleshy papule(s) c/w skin tag(s)      Evenly pigmented macule c/w junctional nevus     Mildly variegated pigmented, slightly irregular-bordered macule c/w mildly atypical nevus      Flesh colored to evenly pigmented papule c/w intradermal nevus       Pink pearly papule/plaque c/w basal cell carcinoma      Erythematous hyperkeratotic cursted plaque c/w SCC      Surgical scar with no sign of skin cancer recurrence      Open and closed comedones      Inflammatory papules and pustules      Verrucoid papule consistent consistent with wart     Erythematous eczematous patches and plaques     Dystrophic onycholytic nail with subungual debris c/w onychomycosis     Umbilicated papule    Erythematous-base heme-crusted tan verrucoid plaque consistent with inflamed seborrheic keratosis     Erythematous Silvery Scaling Plaque c/w Psoriasis     See annotation      Assessment / Plan:      Pathology Orders:       Normal Orders This Visit    Specimen to Pathology, Dermatology     Questions:    Procedure Type: Dermatology and skin neoplasms    Number of Specimens: 1    ------------------------: -------------------------    Spec 1 Procedure: Biopsy    Spec 1 Clinical Impression: r/o scar v lsc v ak v other- in prev bx site    Spec 1 Source: mid chin    Release to patient:           Neoplasm of uncertain behavior of skin  Punch biopsy procedure note:  Punch biopsy performed after verbal consent obtained. Area marked and prepped with alcohol. Approximately 1cc of 1% lidocaine with epinephrine injected. 2 mm disposable punch used to remove lesion. Hemostasis obtained and biopsy site closed with 1 - 2 Prolene sutures. Wound care instructions reviewed with patient and handout given.    -     Specimen to Pathology, Dermatology    AK (actinic keratosis)  S/p cryo on nose and clinically resolve  Recommend [facility]            Follow up in about 1 week (around 10/3/2024).

## 2024-10-02 ENCOUNTER — PATIENT MESSAGE (OUTPATIENT)
Dept: DERMATOLOGY | Facility: CLINIC | Age: 69
End: 2024-10-02
Payer: MEDICARE

## 2024-10-03 ENCOUNTER — CLINICAL SUPPORT (OUTPATIENT)
Dept: DERMATOLOGY | Facility: CLINIC | Age: 69
End: 2024-10-03
Payer: MEDICARE

## 2024-10-03 DIAGNOSIS — Z48.02 VISIT FOR SUTURE REMOVAL: Primary | ICD-10-CM

## 2024-10-03 PROCEDURE — 99024 POSTOP FOLLOW-UP VISIT: CPT | Mod: POP,,, | Performed by: DERMATOLOGY

## 2024-10-03 NOTE — PROGRESS NOTES
Suture Removal note:  CC: 69 y.o. female patient is here for suture removal.         HPI: Patient is s/p punch biopsy of  CONSISTENT WITH LICHEN SIMPLEX CHRONICUS  from the mid chin on 9/26/24.  Patient reports no problems.    WOUND PE:  Sutures intact.  Wound healing well.  Good approximation of skin edges.  No signs or symptoms of infection.    IMPRESSION:  1. Skin, mid chin, punch biopsy:    - CONSISTENT WITH LICHEN SIMPLEX CHRONICUS     No cancer cells noted on histologic examination.  Your provider will correlate this pathology report with your clinical findings.  - margins clear.    PLAN:  Sutures removed today.  Continue wound care.    RTC: In 6 months.

## 2024-10-08 ENCOUNTER — INFUSION (OUTPATIENT)
Dept: INFECTIOUS DISEASES | Facility: HOSPITAL | Age: 69
End: 2024-10-08
Attending: INTERNAL MEDICINE
Payer: MEDICARE

## 2024-10-08 VITALS
DIASTOLIC BLOOD PRESSURE: 76 MMHG | HEIGHT: 62 IN | SYSTOLIC BLOOD PRESSURE: 118 MMHG | BODY MASS INDEX: 33.63 KG/M2 | HEART RATE: 77 BPM | TEMPERATURE: 98 F | WEIGHT: 182.75 LBS | RESPIRATION RATE: 19 BRPM | OXYGEN SATURATION: 95 %

## 2024-10-08 DIAGNOSIS — M05.9 SEROPOSITIVE RHEUMATOID ARTHRITIS: Primary | ICD-10-CM

## 2024-10-08 PROCEDURE — 96366 THER/PROPH/DIAG IV INF ADDON: CPT

## 2024-10-08 PROCEDURE — 63600175 PHARM REV CODE 636 W HCPCS: Mod: JZ,JG | Performed by: INTERNAL MEDICINE

## 2024-10-08 PROCEDURE — 96375 TX/PRO/DX INJ NEW DRUG ADDON: CPT

## 2024-10-08 PROCEDURE — 25000003 PHARM REV CODE 250: Performed by: INTERNAL MEDICINE

## 2024-10-08 PROCEDURE — 96365 THER/PROPH/DIAG IV INF INIT: CPT

## 2024-10-08 RX ORDER — HEPARIN 100 UNIT/ML
500 SYRINGE INTRAVENOUS
OUTPATIENT
Start: 2024-10-24

## 2024-10-08 RX ORDER — METHYLPREDNISOLONE SOD SUCC 125 MG
40 VIAL (EA) INJECTION
OUTPATIENT
Start: 2024-10-24

## 2024-10-08 RX ORDER — METHYLPREDNISOLONE SOD SUCC 125 MG
40 VIAL (EA) INJECTION
Status: COMPLETED | OUTPATIENT
Start: 2024-10-08 | End: 2024-10-08

## 2024-10-08 RX ORDER — ACETAMINOPHEN 325 MG/1
650 TABLET ORAL
Status: COMPLETED | OUTPATIENT
Start: 2024-10-08 | End: 2024-10-08

## 2024-10-08 RX ORDER — IPRATROPIUM BROMIDE AND ALBUTEROL SULFATE 2.5; .5 MG/3ML; MG/3ML
3 SOLUTION RESPIRATORY (INHALATION)
OUTPATIENT
Start: 2024-10-24

## 2024-10-08 RX ORDER — ACETAMINOPHEN 325 MG/1
650 TABLET ORAL
OUTPATIENT
Start: 2024-10-24

## 2024-10-08 RX ORDER — DIPHENHYDRAMINE HYDROCHLORIDE 50 MG/ML
50 INJECTION INTRAMUSCULAR; INTRAVENOUS ONCE AS NEEDED
OUTPATIENT
Start: 2024-10-24

## 2024-10-08 RX ORDER — SODIUM CHLORIDE 0.9 % (FLUSH) 0.9 %
10 SYRINGE (ML) INJECTION
OUTPATIENT
Start: 2024-10-24

## 2024-10-08 RX ORDER — EPINEPHRINE 0.3 MG/.3ML
0.3 INJECTION SUBCUTANEOUS ONCE AS NEEDED
OUTPATIENT
Start: 2024-10-24

## 2024-10-08 RX ADMIN — SODIUM CHLORIDE 400 MG: 9 INJECTION, SOLUTION INTRAVENOUS at 10:10

## 2024-10-08 RX ADMIN — METHYLPREDNISOLONE SODIUM SUCCINATE 40 MG: 125 INJECTION, POWDER, FOR SOLUTION INTRAMUSCULAR; INTRAVENOUS at 09:10

## 2024-10-08 RX ADMIN — ACETAMINOPHEN 650 MG: 325 TABLET ORAL at 09:10

## 2024-10-08 NOTE — PROGRESS NOTES
Arrived for inflectra infusion. Pre medicated with tylenol 650 mg po and methylprednisolone 40 mg IVp.Consented prior to infusion. Given inflectra as ordered , Tolerated well.    Limited head-to-toe assessment due to privacy issues and visit reason though the opportunity was given for patient to express any concerns

## 2024-10-10 ENCOUNTER — OFFICE VISIT (OUTPATIENT)
Dept: FAMILY MEDICINE | Facility: CLINIC | Age: 69
End: 2024-10-10
Payer: MEDICARE

## 2024-10-10 ENCOUNTER — PATIENT MESSAGE (OUTPATIENT)
Dept: INTERNAL MEDICINE | Facility: CLINIC | Age: 69
End: 2024-10-10
Payer: MEDICARE

## 2024-10-10 VITALS
HEART RATE: 75 BPM | HEIGHT: 62 IN | BODY MASS INDEX: 34.24 KG/M2 | DIASTOLIC BLOOD PRESSURE: 64 MMHG | WEIGHT: 186.06 LBS | TEMPERATURE: 98 F | OXYGEN SATURATION: 95 % | SYSTOLIC BLOOD PRESSURE: 110 MMHG

## 2024-10-10 DIAGNOSIS — R09.81 SINUS CONGESTION: ICD-10-CM

## 2024-10-10 DIAGNOSIS — R05.9 COUGH, UNSPECIFIED TYPE: ICD-10-CM

## 2024-10-10 DIAGNOSIS — J32.9 VIRAL SINUSITIS: Primary | ICD-10-CM

## 2024-10-10 DIAGNOSIS — B97.89 VIRAL SINUSITIS: Primary | ICD-10-CM

## 2024-10-10 LAB
CTP QC/QA: YES
CTP QC/QA: YES
POC MOLECULAR INFLUENZA A AGN: NEGATIVE
POC MOLECULAR INFLUENZA B AGN: NEGATIVE
SARS-COV-2 RDRP RESP QL NAA+PROBE: NEGATIVE

## 2024-10-10 PROCEDURE — 87635 SARS-COV-2 COVID-19 AMP PRB: CPT | Mod: PBBFAC,PO

## 2024-10-10 PROCEDURE — 87502 INFLUENZA DNA AMP PROBE: CPT | Mod: PBBFAC,PO

## 2024-10-10 PROCEDURE — 99215 OFFICE O/P EST HI 40 MIN: CPT | Mod: PBBFAC,PO

## 2024-10-10 PROCEDURE — 99999PBSHW: Mod: PBBFAC,,,

## 2024-10-10 PROCEDURE — 99999 PR PBB SHADOW E&M-EST. PATIENT-LVL V: CPT | Mod: PBBFAC,,,

## 2024-10-10 PROCEDURE — 99999PBSHW POCT INFLUENZA A/B MOLECULAR: Mod: PBBFAC,,,

## 2024-10-10 RX ORDER — BENZONATATE 100 MG/1
100 CAPSULE ORAL 3 TIMES DAILY PRN
Qty: 30 CAPSULE | Refills: 0 | Status: SHIPPED | OUTPATIENT
Start: 2024-10-10 | End: 2024-10-20

## 2024-10-10 RX ORDER — FLUTICASONE PROPIONATE 50 MCG
1 SPRAY, SUSPENSION (ML) NASAL DAILY
Qty: 11.1 ML | Refills: 3 | Status: SHIPPED | OUTPATIENT
Start: 2024-10-10

## 2024-10-10 NOTE — PROGRESS NOTES
"Subjective     Patient ID: Verenice Mirza is a 69 y.o. female.    Chief Complaint: Otalgia (c), Cough, and Sore Throat    70 y/o female with HTN, HLD, and DM presents to clinic with a 4-5 day history of earache, sore throat, cough, and sinus congestion. She has not tried to take anything for this yet. She denies sick contact and fever. No N/V, CP, SOB or wheezing.       Otalgia   Associated symptoms include coughing, rhinorrhea and a sore throat. Pertinent negatives include no diarrhea, ear discharge, headaches or vomiting.   Cough  Associated symptoms include ear pain, postnasal drip, rhinorrhea and a sore throat. Pertinent negatives include no chest pain, fever, headaches, shortness of breath or wheezing.   Sore Throat   Associated symptoms include congestion, coughing and ear pain. Pertinent negatives include no diarrhea, ear discharge, headaches, shortness of breath, trouble swallowing or vomiting.     Review of Systems   Constitutional:  Negative for fever.   HENT:  Positive for nasal congestion, ear pain, postnasal drip, rhinorrhea, sinus pressure/congestion, sneezing and sore throat. Negative for ear discharge and trouble swallowing.    Respiratory:  Positive for cough. Negative for shortness of breath and wheezing.    Cardiovascular:  Negative for chest pain and palpitations.   Gastrointestinal:  Negative for diarrhea, nausea and vomiting.   Genitourinary:  Negative for difficulty urinating and dysuria.   Neurological:  Negative for dizziness, light-headedness and headaches.          Objective     Vitals:    10/10/24 0818   BP: 110/64   Pulse: 75   Temp: 97.7 °F (36.5 °C)   TempSrc: Oral   SpO2: 95%   Weight: 84.4 kg (186 lb 1.1 oz)   Height: 5' 2" (1.575 m)   PainSc:   3   PainLoc: Chest      Physical Exam  Constitutional:       General: She is not in acute distress.  HENT:      Head: Normocephalic and atraumatic.      Right Ear: Tympanic membrane, ear canal and external ear normal. There is no impacted " cerumen.      Left Ear: Tympanic membrane, ear canal and external ear normal. There is no impacted cerumen.      Nose: Congestion and rhinorrhea present.      Mouth/Throat:      Pharynx: Posterior oropharyngeal erythema present. No oropharyngeal exudate.   Eyes:      General:         Right eye: No discharge.         Left eye: No discharge.      Conjunctiva/sclera: Conjunctivae normal.   Cardiovascular:      Rate and Rhythm: Normal rate and regular rhythm.   Pulmonary:      Effort: Pulmonary effort is normal. No respiratory distress.      Breath sounds: Normal breath sounds. No wheezing.   Abdominal:      General: Bowel sounds are normal. There is no distension.      Tenderness: There is no abdominal tenderness.   Lymphadenopathy:      Cervical: No cervical adenopathy.   Neurological:      Mental Status: She is alert and oriented to person, place, and time.              Assessment and Plan     1. Viral sinusitis  -     benzonatate (TESSALON PERLES) 100 MG capsule; Take 1 capsule (100 mg total) by mouth 3 (three) times daily as needed for Cough.  Dispense: 30 capsule; Refill: 0  -     fluticasone propionate (FLONASE) 50 mcg/actuation nasal spray; 1 spray (50 mcg total) by Each Nostril route once daily.  Dispense: 11.1 mL; Refill: 3    2. Sinus congestion  -     POCT COVID-19 Rapid Screening  -     POCT Influenza A/B Molecular  -     benzonatate (TESSALON PERLES) 100 MG capsule; Take 1 capsule (100 mg total) by mouth 3 (three) times daily as needed for Cough.  Dispense: 30 capsule; Refill: 0  -     fluticasone propionate (FLONASE) 50 mcg/actuation nasal spray; 1 spray (50 mcg total) by Each Nostril route once daily.  Dispense: 11.1 mL; Refill: 3    3. Cough, unspecified type  -     POCT COVID-19 Rapid Screening  -     POCT Influenza A/B Molecular  -     benzonatate (TESSALON PERLES) 100 MG capsule; Take 1 capsule (100 mg total) by mouth 3 (three) times daily as needed for Cough.  Dispense: 30 capsule; Refill:  0    COVID and FLU negative    Discussed diagnosis and treatment of URI.  Discussed the importance of avoiding unnecessary antibiotic therapy.  Suggested symptomatic OTC remedies. Mucinex D  Tessalon for cough   Flonase BID  Nasal saline spray for congestion. Use after warm shower. Then wait for 30 min and do Flonase.   Humidifier  Tylenol 500 mg TID or ibuprofen 600 mg with meals  Aggressive fluids  Buckwheat honey for possible cough  Follow up if symptoms aren't resolving.  Follow up with PCP as needed        No follow-ups on file.    30 minutes of total time spent on the encounter, which includes face to face time and non-face to face time preparing to see the patient (eg, review of tests), Obtaining and/or reviewing separately obtained history, Documenting clinical information in the electronic or other health record, Independently interpreting results (not separately reported) and communicating results to the patient/family/caregiver, or Care coordination (not separately reported).      Ngozi Mcclain PA-C  Family Practice/Internal Medicine   Office Phone: [phone]

## 2024-10-10 NOTE — TELEPHONE ENCOUNTER
Patient states started Sunday with sneezing and a runny nose. States this immediately went into a cough. States congested. Cough productive with green sputum. States ears are also throbbing. Denies fever. Offered appointment today and patient accepted.

## 2024-10-27 DIAGNOSIS — E78.5 DYSLIPIDEMIA ASSOCIATED WITH TYPE 2 DIABETES MELLITUS: ICD-10-CM

## 2024-10-27 DIAGNOSIS — E11.69 DYSLIPIDEMIA ASSOCIATED WITH TYPE 2 DIABETES MELLITUS: ICD-10-CM

## 2024-10-27 DIAGNOSIS — I15.2 HYPERTENSION ASSOCIATED WITH DIABETES: ICD-10-CM

## 2024-10-27 DIAGNOSIS — E11.59 HYPERTENSION ASSOCIATED WITH DIABETES: ICD-10-CM

## 2024-10-28 ENCOUNTER — PATIENT MESSAGE (OUTPATIENT)
Dept: INTERNAL MEDICINE | Facility: CLINIC | Age: 69
End: 2024-10-28
Payer: MEDICARE

## 2024-10-28 RX ORDER — TIRZEPATIDE 12.5 MG/.5ML
INJECTION, SOLUTION SUBCUTANEOUS
Qty: 12 PEN | Refills: 1 | Status: SHIPPED | OUTPATIENT
Start: 2024-10-28 | End: 2024-10-31 | Stop reason: SDUPTHER

## 2024-10-28 RX ORDER — TIRZEPATIDE 12.5 MG/.5ML
12.5 INJECTION, SOLUTION SUBCUTANEOUS
Qty: 2 ML | Refills: 3 | OUTPATIENT
Start: 2024-10-28

## 2024-10-29 DIAGNOSIS — E11.69 DYSLIPIDEMIA ASSOCIATED WITH TYPE 2 DIABETES MELLITUS: ICD-10-CM

## 2024-10-29 DIAGNOSIS — E11.59 HYPERTENSION ASSOCIATED WITH DIABETES: ICD-10-CM

## 2024-10-29 DIAGNOSIS — I15.2 HYPERTENSION ASSOCIATED WITH DIABETES: ICD-10-CM

## 2024-10-29 DIAGNOSIS — E78.5 DYSLIPIDEMIA ASSOCIATED WITH TYPE 2 DIABETES MELLITUS: ICD-10-CM

## 2024-10-29 RX ORDER — TIRZEPATIDE 12.5 MG/.5ML
12.5 INJECTION, SOLUTION SUBCUTANEOUS WEEKLY
Qty: 12 PEN | Refills: 1 | OUTPATIENT
Start: 2024-10-29

## 2024-10-30 ENCOUNTER — PATIENT MESSAGE (OUTPATIENT)
Dept: INTERNAL MEDICINE | Facility: CLINIC | Age: 69
End: 2024-10-30
Payer: MEDICARE

## 2024-10-31 DIAGNOSIS — E78.5 DYSLIPIDEMIA ASSOCIATED WITH TYPE 2 DIABETES MELLITUS: ICD-10-CM

## 2024-10-31 DIAGNOSIS — E11.59 HYPERTENSION ASSOCIATED WITH DIABETES: ICD-10-CM

## 2024-10-31 DIAGNOSIS — E11.69 DYSLIPIDEMIA ASSOCIATED WITH TYPE 2 DIABETES MELLITUS: ICD-10-CM

## 2024-10-31 DIAGNOSIS — I15.2 HYPERTENSION ASSOCIATED WITH DIABETES: ICD-10-CM

## 2024-10-31 RX ORDER — TIRZEPATIDE 12.5 MG/.5ML
12.5 INJECTION, SOLUTION SUBCUTANEOUS
Qty: 12 PEN | Refills: 1 | Status: SHIPPED | OUTPATIENT
Start: 2024-10-31

## 2024-10-31 RX ORDER — TIRZEPATIDE 12.5 MG/.5ML
12.5 INJECTION, SOLUTION SUBCUTANEOUS
Qty: 12 PEN | Refills: 1 | OUTPATIENT
Start: 2024-10-31 | End: 2025-01-29

## 2024-10-31 RX ORDER — TIRZEPATIDE 12.5 MG/.5ML
12.5 INJECTION, SOLUTION SUBCUTANEOUS
Qty: 12 PEN | Refills: 1 | OUTPATIENT
Start: 2024-10-31

## 2024-10-31 NOTE — TELEPHONE ENCOUNTER
No care due was identified.  [facility] Embedded Care Due Messages. Reference number: [id number].   10/31/2024 9:00:21 AM CDT

## 2024-10-31 NOTE — TELEPHONE ENCOUNTER
Good Afternoon Tara!    After reviewing the medication associated with this encounter, it was found that a prior authorization would be needed by the patients insurance for coverage. Unfortunately, this is not a process the [facility] can take part in or complete. However, ease see attachment below*.    Please feel free to reach out if any clarification or additional information is needed.    All the best,  Mike, MAS

## 2024-10-31 NOTE — TELEPHONE ENCOUNTER
Pharmacy is requesting that a PRIOR AUTHORIZATION be completed for the mounjaro. Please forward this request to the staff member handling PAs for your clinic. Thank you.      Note composed:3:30 PM 10/31/2024

## 2024-10-31 NOTE — TELEPHONE ENCOUNTER
Refill Decision Note   Verenice Hermes  is requesting a refill authorization.  Brief Assessment and Rationale for Refill:        Medication Therapy Plan:             Comments:     Note composed:9:47 AM 10/31/2024

## 2024-11-04 ENCOUNTER — PATIENT MESSAGE (OUTPATIENT)
Dept: HEMATOLOGY/ONCOLOGY | Facility: CLINIC | Age: 69
End: 2024-11-04
Payer: MEDICARE

## 2024-11-06 ENCOUNTER — DOCUMENTATION ONLY (OUTPATIENT)
Dept: HEMATOLOGY/ONCOLOGY | Facility: CLINIC | Age: 69
End: 2024-11-06
Payer: MEDICARE

## 2024-11-06 NOTE — PROGRESS NOTES
Message sent to Brandin Beyer RN along with JACKY Gaytan in efforts to get patient scheduled for IV placement prior to MRI on 1/30/25. Currently awaiting response .

## 2024-12-03 ENCOUNTER — INFUSION (OUTPATIENT)
Dept: INFECTIOUS DISEASES | Facility: HOSPITAL | Age: 69
End: 2024-12-03
Payer: MEDICARE

## 2024-12-03 VITALS
TEMPERATURE: 99 F | BODY MASS INDEX: 32.57 KG/M2 | RESPIRATION RATE: 18 BRPM | DIASTOLIC BLOOD PRESSURE: 73 MMHG | SYSTOLIC BLOOD PRESSURE: 129 MMHG | OXYGEN SATURATION: 98 % | WEIGHT: 177 LBS | HEART RATE: 79 BPM | HEIGHT: 62 IN

## 2024-12-03 DIAGNOSIS — M05.9 SEROPOSITIVE RHEUMATOID ARTHRITIS: Primary | ICD-10-CM

## 2024-12-03 PROCEDURE — 25000003 PHARM REV CODE 250: Performed by: INTERNAL MEDICINE

## 2024-12-03 PROCEDURE — 96413 CHEMO IV INFUSION 1 HR: CPT

## 2024-12-03 PROCEDURE — 96374 THER/PROPH/DIAG INJ IV PUSH: CPT

## 2024-12-03 PROCEDURE — 63600175 PHARM REV CODE 636 W HCPCS: Performed by: INTERNAL MEDICINE

## 2024-12-03 PROCEDURE — 96415 CHEMO IV INFUSION ADDL HR: CPT

## 2024-12-03 RX ORDER — METHYLPREDNISOLONE SOD SUCC 125 MG
40 VIAL (EA) INJECTION
Status: COMPLETED | OUTPATIENT
Start: 2024-12-03 | End: 2024-12-03

## 2024-12-03 RX ORDER — DIPHENHYDRAMINE HYDROCHLORIDE 50 MG/ML
50 INJECTION INTRAMUSCULAR; INTRAVENOUS ONCE AS NEEDED
OUTPATIENT
Start: 2024-12-17

## 2024-12-03 RX ORDER — SODIUM CHLORIDE 0.9 % (FLUSH) 0.9 %
10 SYRINGE (ML) INJECTION
Status: DISCONTINUED | OUTPATIENT
Start: 2024-12-03 | End: 2024-12-03 | Stop reason: HOSPADM

## 2024-12-03 RX ORDER — METHYLPREDNISOLONE SOD SUCC 125 MG
40 VIAL (EA) INJECTION
OUTPATIENT
Start: 2024-12-17

## 2024-12-03 RX ORDER — ACETAMINOPHEN 325 MG/1
650 TABLET ORAL
OUTPATIENT
Start: 2024-12-17

## 2024-12-03 RX ORDER — ACETAMINOPHEN 325 MG/1
650 TABLET ORAL
Status: COMPLETED | OUTPATIENT
Start: 2024-12-03 | End: 2024-12-03

## 2024-12-03 RX ORDER — HEPARIN 100 UNIT/ML
500 SYRINGE INTRAVENOUS
OUTPATIENT
Start: 2024-12-17

## 2024-12-03 RX ORDER — EPINEPHRINE 0.3 MG/.3ML
0.3 INJECTION SUBCUTANEOUS ONCE AS NEEDED
OUTPATIENT
Start: 2024-12-17

## 2024-12-03 RX ORDER — SODIUM CHLORIDE 0.9 % (FLUSH) 0.9 %
10 SYRINGE (ML) INJECTION
OUTPATIENT
Start: 2024-12-17

## 2024-12-03 RX ORDER — IPRATROPIUM BROMIDE AND ALBUTEROL SULFATE 2.5; .5 MG/3ML; MG/3ML
3 SOLUTION RESPIRATORY (INHALATION)
OUTPATIENT
Start: 2024-12-17

## 2024-12-03 RX ADMIN — METHYLPREDNISOLONE SODIUM SUCCINATE 40 MG: 125 INJECTION, POWDER, FOR SOLUTION INTRAMUSCULAR; INTRAVENOUS at 09:12

## 2024-12-03 RX ADMIN — ACETAMINOPHEN 650 MG: 325 TABLET ORAL at 09:12

## 2024-12-03 RX ADMIN — SODIUM CHLORIDE 400 MG: 9 INJECTION, SOLUTION INTRAVENOUS at 10:12

## 2024-12-03 NOTE — PROGRESS NOTES
Arrived for inflectra infusion. Pre medicated with tylenol 650 mg po and methylprednisolone 40 mg Ivp.    Limited head-to-toe assessment due to privacy issues and visit reason though the opportunity was given for patient to express any concerns

## 2025-01-02 ENCOUNTER — PATIENT MESSAGE (OUTPATIENT)
Dept: RHEUMATOLOGY | Facility: CLINIC | Age: 70
End: 2025-01-02
Payer: MEDICARE

## 2025-01-13 ENCOUNTER — LAB VISIT (OUTPATIENT)
Dept: LAB | Facility: HOSPITAL | Age: 70
End: 2025-01-13
Attending: INTERNAL MEDICINE
Payer: MEDICARE

## 2025-01-13 DIAGNOSIS — Z00.00 ENCOUNTER FOR MEDICARE ANNUAL WELLNESS EXAM: ICD-10-CM

## 2025-01-13 DIAGNOSIS — Z79.899 HIGH RISK MEDICATION USE: ICD-10-CM

## 2025-01-13 LAB
ALBUMIN SERPL BCP-MCNC: 4 G/DL (ref 3.5–5.2)
ALP SERPL-CCNC: 84 U/L (ref 40–150)
ALT SERPL W/O P-5'-P-CCNC: 14 U/L (ref 10–44)
ANION GAP SERPL CALC-SCNC: 8 MMOL/L (ref 8–16)
AST SERPL-CCNC: 15 U/L (ref 10–40)
BASOPHILS # BLD AUTO: 0.07 K/UL (ref 0–0.2)
BASOPHILS NFR BLD: 1.2 % (ref 0–1.9)
BILIRUB SERPL-MCNC: 0.3 MG/DL (ref 0.1–1)
BUN SERPL-MCNC: 13 MG/DL (ref 8–23)
CALCIUM SERPL-MCNC: 9.1 MG/DL (ref 8.7–10.5)
CHLORIDE SERPL-SCNC: 110 MMOL/L (ref 95–110)
CO2 SERPL-SCNC: 22 MMOL/L (ref 23–29)
CREAT SERPL-MCNC: 0.7 MG/DL (ref 0.5–1.4)
CRP SERPL-MCNC: <0.3 MG/L (ref 0–8.2)
DIFFERENTIAL METHOD BLD: ABNORMAL
EOSINOPHIL # BLD AUTO: 0.2 K/UL (ref 0–0.5)
EOSINOPHIL NFR BLD: 3.8 % (ref 0–8)
ERYTHROCYTE [DISTWIDTH] IN BLOOD BY AUTOMATED COUNT: 14.2 % (ref 11.5–14.5)
ERYTHROCYTE [SEDIMENTATION RATE] IN BLOOD BY PHOTOMETRIC METHOD: <2 MM/HR (ref 0–36)
EST. GFR  (NO RACE VARIABLE): >60 ML/MIN/1.73 M^2
GLUCOSE SERPL-MCNC: 88 MG/DL (ref 70–110)
HCT VFR BLD AUTO: 42.3 % (ref 37–48.5)
HGB BLD-MCNC: 13.1 G/DL (ref 12–16)
IMM GRANULOCYTES # BLD AUTO: 0.02 K/UL (ref 0–0.04)
IMM GRANULOCYTES NFR BLD AUTO: 0.3 % (ref 0–0.5)
LYMPHOCYTES # BLD AUTO: 2.1 K/UL (ref 1–4.8)
LYMPHOCYTES NFR BLD: 36.7 % (ref 18–48)
MCH RBC QN AUTO: 31.2 PG (ref 27–31)
MCHC RBC AUTO-ENTMCNC: 31 G/DL (ref 32–36)
MCV RBC AUTO: 101 FL (ref 82–98)
MONOCYTES # BLD AUTO: 0.5 K/UL (ref 0.3–1)
MONOCYTES NFR BLD: 8.3 % (ref 4–15)
NEUTROPHILS # BLD AUTO: 2.9 K/UL (ref 1.8–7.7)
NEUTROPHILS NFR BLD: 49.7 % (ref 38–73)
NRBC BLD-RTO: 0 /100 WBC
PLATELET # BLD AUTO: 241 K/UL (ref 150–450)
PMV BLD AUTO: 10.8 FL (ref 9.2–12.9)
POTASSIUM SERPL-SCNC: 3.9 MMOL/L (ref 3.5–5.1)
PROT SERPL-MCNC: 6.9 G/DL (ref 6–8.4)
RBC # BLD AUTO: 4.2 M/UL (ref 4–5.4)
SODIUM SERPL-SCNC: 140 MMOL/L (ref 136–145)
WBC # BLD AUTO: 5.75 K/UL (ref 3.9–12.7)

## 2025-01-13 PROCEDURE — 85025 COMPLETE CBC W/AUTO DIFF WBC: CPT | Performed by: INTERNAL MEDICINE

## 2025-01-13 PROCEDURE — 86140 C-REACTIVE PROTEIN: CPT | Performed by: INTERNAL MEDICINE

## 2025-01-13 PROCEDURE — 85652 RBC SED RATE AUTOMATED: CPT | Performed by: INTERNAL MEDICINE

## 2025-01-13 PROCEDURE — 36415 COLL VENOUS BLD VENIPUNCTURE: CPT | Mod: PO | Performed by: INTERNAL MEDICINE

## 2025-01-13 PROCEDURE — 80053 COMPREHEN METABOLIC PANEL: CPT | Performed by: INTERNAL MEDICINE

## 2025-01-16 ENCOUNTER — TELEPHONE (OUTPATIENT)
Dept: HEMATOLOGY/ONCOLOGY | Facility: CLINIC | Age: 70
End: 2025-01-16
Payer: MEDICARE

## 2025-01-24 ENCOUNTER — PATIENT MESSAGE (OUTPATIENT)
Dept: RHEUMATOLOGY | Facility: CLINIC | Age: 70
End: 2025-01-24
Payer: MEDICARE

## 2025-01-28 ENCOUNTER — INFUSION (OUTPATIENT)
Dept: INFECTIOUS DISEASES | Facility: HOSPITAL | Age: 70
End: 2025-01-28
Payer: MEDICARE

## 2025-01-28 VITALS
BODY MASS INDEX: 33.6 KG/M2 | WEIGHT: 182.56 LBS | HEIGHT: 62 IN | HEART RATE: 67 BPM | TEMPERATURE: 98 F | SYSTOLIC BLOOD PRESSURE: 109 MMHG | DIASTOLIC BLOOD PRESSURE: 51 MMHG | RESPIRATION RATE: 16 BRPM | OXYGEN SATURATION: 99 %

## 2025-01-28 DIAGNOSIS — M05.9 SEROPOSITIVE RHEUMATOID ARTHRITIS: Primary | ICD-10-CM

## 2025-01-28 PROCEDURE — 96413 CHEMO IV INFUSION 1 HR: CPT

## 2025-01-28 PROCEDURE — 96415 CHEMO IV INFUSION ADDL HR: CPT

## 2025-01-28 PROCEDURE — 63600175 PHARM REV CODE 636 W HCPCS: Mod: JZ,TB | Performed by: INTERNAL MEDICINE

## 2025-01-28 PROCEDURE — 25000003 PHARM REV CODE 250: Performed by: INTERNAL MEDICINE

## 2025-01-28 PROCEDURE — A4216 STERILE WATER/SALINE, 10 ML: HCPCS | Performed by: INTERNAL MEDICINE

## 2025-01-28 RX ORDER — DIPHENHYDRAMINE HYDROCHLORIDE 50 MG/ML
50 INJECTION INTRAMUSCULAR; INTRAVENOUS ONCE AS NEEDED
Status: DISCONTINUED | OUTPATIENT
Start: 2025-01-28 | End: 2025-01-28 | Stop reason: HOSPADM

## 2025-01-28 RX ORDER — HEPARIN 100 UNIT/ML
500 SYRINGE INTRAVENOUS
Status: DISCONTINUED | OUTPATIENT
Start: 2025-01-28 | End: 2025-01-28 | Stop reason: HOSPADM

## 2025-01-28 RX ORDER — EPINEPHRINE 0.3 MG/.3ML
0.3 INJECTION SUBCUTANEOUS ONCE AS NEEDED
OUTPATIENT
Start: 2025-03-25

## 2025-01-28 RX ORDER — EPINEPHRINE 0.3 MG/.3ML
0.3 INJECTION SUBCUTANEOUS ONCE AS NEEDED
Status: DISCONTINUED | OUTPATIENT
Start: 2025-01-28 | End: 2025-01-28 | Stop reason: HOSPADM

## 2025-01-28 RX ORDER — HEPARIN 100 UNIT/ML
500 SYRINGE INTRAVENOUS
OUTPATIENT
Start: 2025-03-25

## 2025-01-28 RX ORDER — IPRATROPIUM BROMIDE AND ALBUTEROL SULFATE 2.5; .5 MG/3ML; MG/3ML
3 SOLUTION RESPIRATORY (INHALATION)
OUTPATIENT
Start: 2025-03-25

## 2025-01-28 RX ORDER — SODIUM CHLORIDE 0.9 % (FLUSH) 0.9 %
10 SYRINGE (ML) INJECTION
Status: DISCONTINUED | OUTPATIENT
Start: 2025-01-28 | End: 2025-01-28 | Stop reason: HOSPADM

## 2025-01-28 RX ORDER — ACETAMINOPHEN 325 MG/1
650 TABLET ORAL
OUTPATIENT
Start: 2025-03-25

## 2025-01-28 RX ORDER — DIPHENHYDRAMINE HYDROCHLORIDE 50 MG/ML
50 INJECTION INTRAMUSCULAR; INTRAVENOUS ONCE AS NEEDED
OUTPATIENT
Start: 2025-03-25

## 2025-01-28 RX ORDER — ACETAMINOPHEN 325 MG/1
650 TABLET ORAL
Status: DISCONTINUED | OUTPATIENT
Start: 2025-01-28 | End: 2025-01-28 | Stop reason: HOSPADM

## 2025-01-28 RX ORDER — SODIUM CHLORIDE 0.9 % (FLUSH) 0.9 %
10 SYRINGE (ML) INJECTION
OUTPATIENT
Start: 2025-03-25

## 2025-01-28 RX ADMIN — Medication 10 ML: at 12:01

## 2025-01-28 RX ADMIN — SODIUM CHLORIDE 500 MG: 9 INJECTION, SOLUTION INTRAVENOUS at 10:01

## 2025-01-28 NOTE — PROGRESS NOTES
Arrived for inflectra infusion 500mg in 250ml of NS as ordered. No pre med. Per pt's request. Flush bag of NS hung following infusion.    Appt.'s through July.     Limited head-to-toe assessment due to privacy issues and visit reason though the opportunity was given for patient to express any concerns

## 2025-01-30 ENCOUNTER — HOSPITAL ENCOUNTER (OUTPATIENT)
Dept: RADIOLOGY | Facility: HOSPITAL | Age: 70
Discharge: HOME | End: 2025-01-30
Attending: INTERNAL MEDICINE
Payer: MEDICARE

## 2025-01-30 ENCOUNTER — INFUSION (OUTPATIENT)
Dept: INFUSION THERAPY | Facility: HOSPITAL | Age: 70
End: 2025-01-30
Payer: MEDICARE

## 2025-01-30 DIAGNOSIS — N60.92 ATYPICAL DUCTAL HYPERPLASIA OF LEFT BREAST: ICD-10-CM

## 2025-01-30 PROCEDURE — 77049 MRI BREAST C-+ W/CAD BI: CPT | Mod: TC

## 2025-01-30 PROCEDURE — A9577 INJ MULTIHANCE: HCPCS | Performed by: INTERNAL MEDICINE

## 2025-01-30 PROCEDURE — 25500020 PHARM REV CODE 255: Performed by: INTERNAL MEDICINE

## 2025-01-30 PROCEDURE — 36000 PLACE NEEDLE IN VEIN: CPT

## 2025-01-30 PROCEDURE — 77049 MRI BREAST C-+ W/CAD BI: CPT | Mod: 26,,, | Performed by: RADIOLOGY

## 2025-01-30 RX ADMIN — GADOBENATE DIMEGLUMINE 18 ML: 529 INJECTION, SOLUTION INTRAVENOUS at 11:01

## 2025-02-05 ENCOUNTER — PATIENT MESSAGE (OUTPATIENT)
Dept: INTERNAL MEDICINE | Facility: CLINIC | Age: 70
End: 2025-02-05
Payer: MEDICARE

## 2025-02-12 ENCOUNTER — OFFICE VISIT (OUTPATIENT)
Dept: RHEUMATOLOGY | Facility: CLINIC | Age: 70
End: 2025-02-12
Payer: MEDICARE

## 2025-02-12 VITALS
WEIGHT: 176.38 LBS | HEART RATE: 73 BPM | SYSTOLIC BLOOD PRESSURE: 127 MMHG | HEIGHT: 62 IN | BODY MASS INDEX: 32.46 KG/M2 | DIASTOLIC BLOOD PRESSURE: 86 MMHG

## 2025-02-12 DIAGNOSIS — Z79.899 HIGH RISK MEDICATION USE: ICD-10-CM

## 2025-02-12 DIAGNOSIS — M05.9 SEROPOSITIVE RHEUMATOID ARTHRITIS: ICD-10-CM

## 2025-02-12 PROCEDURE — 99999 PR PBB SHADOW E&M-EST. PATIENT-LVL III: CPT | Mod: PBBFAC,,, | Performed by: INTERNAL MEDICINE

## 2025-02-12 PROCEDURE — 99213 OFFICE O/P EST LOW 20 MIN: CPT | Mod: PBBFAC | Performed by: INTERNAL MEDICINE

## 2025-02-12 PROCEDURE — 99214 OFFICE O/P EST MOD 30 MIN: CPT | Mod: S$PBB,,, | Performed by: INTERNAL MEDICINE

## 2025-02-12 RX ORDER — SULFASALAZINE 500 MG/1
1500 TABLET ORAL 2 TIMES DAILY
Qty: 540 TABLET | Refills: 3 | Status: SHIPPED | OUTPATIENT
Start: 2025-02-12

## 2025-02-12 RX ORDER — HYDROXYCHLOROQUINE SULFATE 200 MG/1
200 TABLET, FILM COATED ORAL 2 TIMES DAILY
Qty: 60 TABLET | Refills: 4 | Status: SHIPPED | OUTPATIENT
Start: 2025-02-12

## 2025-02-12 ASSESSMENT — ROUTINE ASSESSMENT OF PATIENT INDEX DATA (RAPID3)
AM STIFFNESS SCORE: 1, YES
PATIENT GLOBAL ASSESSMENT SCORE: 4
WHEN YOU AWAKENED IN THE MORNING OVER THE LAST WEEK, PLEASE INDICATE THE AMOUNT OF TIME IT TAKES UNTIL YOU ARE AS LIMBER AS YOU WILL BE FOR THE DAY: 0
PSYCHOLOGICAL DISTRESS SCORE: 0
FATIGUE SCORE: 1.5
MDHAQ FUNCTION SCORE: 0.1
PAIN SCORE: 3.5
TOTAL RAPID3 SCORE: 2.61

## 2025-02-12 NOTE — PROGRESS NOTES
Subjective:       Patient ID: Verenice Mirza is a 69 y.o. female.    Chief Complaint: Disease Management    HPI    Fell early 2021; R hand swelled after fall but never got better  Dr. Erickson dx rheumatoid arthritis - seropositive  Orencia weekly x 3 (April) helped but then she got diverticulitis  June 2021 started Sulfasalazine up to 3 bid     Feb 2021 RF 92,      Previous hx of plantar fasciitis, achilles tendinitis, R knee pain, and lumbar pain  Hx gel shots in R knee  Hx PT for knee  A lot of pain around the knee; no cane   Meloxicam      Hx DM2  Steatosis/ fatty liver; MRI Abd 2024 had normal liver x 2 hemangiomata  HTN metoprolol   HLD  Hypothyroid  PVD  PAD  Rosacea  JORDANA  Raloxifene since 2019 for atypical ductal hyperplasia  Fam hx crohn's grandmother    Long history knee pains  X-rays show osteoarthritis, medial joints  Some L genu varus  History injection L shoulder and 2 injections L knee, 1 R knee; Stacey Harry and Chandra  History meniscectomy both knees (L knee after a fall and MRI Dr Harry)    Got Inflectra 5 mg/kg on Aug 29, Sept 10 and is scheduled for Oct then [id number]; Oct, Dec, Jan infusions [id number]  Felt great after first infusion  Has had more joint pain over recent months  Knees some stiffness and soreness  Fingers more stiff; especially R hand 3 and 4 MCP  Shoulders ok    Review of Systems   Constitutional:  Negative for fever and unexpected weight change.   HENT:  Negative for mouth sores and trouble swallowing.    Eyes:  Negative for redness.   Respiratory:  Negative for cough and shortness of breath.    Cardiovascular:  Negative for chest pain.   Gastrointestinal:  Negative for constipation and diarrhea.   Genitourinary:  Negative for dysuria and genital sores.   Skin:  Negative for rash.   Neurological:  Negative for headaches.   Hematological:  Bruises/bleeds easily.           2/5/2025     9:33 AM   Rapid3 Question Responses and Scores   MDHAQ Score 0.1   Psychologic Score 0   Pain  "Score 3.5   When you awakened in the morning OVER THE LAST WEEK, did you feel stiff? Yes   If Yes, please indicate the number of hours until you are as limber as you will be for the day 0   Fatigue Score 1.5   Global Health Score 4   RAPID3 Score 2.61      Objective:   /86 (BP Location: Right forearm, Patient Position: Sitting)   Pulse 73   Ht 5' 2" (1.575 m)   Wt 80 kg (176 lb 5.9 oz)   BMI 32.26 kg/m²      Physical Exam      9/22/2023 4/23/2024 9/23/2024 2/12/2025   Tender (HERNANDEZ-28) 0 / 28 2 / 28 6 / 28 2 / 28    Swollen (HERNANDEZ-28) 0 / 28 2 / 28 2 / 28  0 / 28    Provider Global 0 / 100 10 / 100 15 / 100 20 / 100   Patient Global 0 / 100 45 / 100 10 / 100 40 / 100   ESR 4 mm/hr 8 mm/hr 1 mm/hr 1 mm/hr   CRP 0.8 mg/L 4.4 mg/L 0.5 mg/L 0.1 mg/L   HERNANDEZ-28 (ESR) 0.97 (Remission) 3.27 (Moderate disease activity) 1.91 (Remission) 1.35 (Remission)   HERNANDEZ-28 (CRP) 1.17 (Remission) 3.39 (Moderate disease activity) 3.01 (Low disease activity) 2.35 (Remission)   CDAI Score 0  9.5  10.5  8      Lab Results   Component Value Date    SEDRATE <2 01/13/2025          Assessment:       1. Seropositive rheumatoid arthritis    2. High risk medication use            Plan:       Problem List Items Addressed This Visit          Active Problems    Seropositive rheumatoid arthritis     Rheumatoid arthritis improved with addition of infliximab to sulfasalazine and hydroxychloroquine, but she reports diminishing response to infliximab after increasing dose from 3 to 5 mg/kg    Will increase to 7.5 mg/kg infliximab q8 wk  Discussed methotrexate in place of sulfasalazine and hydroxychloroquine but she wants to defer this due to history steatosis    Will reassess after 2 more infliximab infusions, then consider other options if not improved         Relevant Medications    hydroxychloroquine (PLAQUENIL) 200 mg tablet    sulfaSALAzine (AZULFIDINE) 500 mg Tab     Other Visit Diagnoses       High risk medication use        Relevant " Orders    Comprehensive Metabolic Panel    CBC Auto Differential    Sedimentation rate    C-Reactive Protein

## 2025-02-12 NOTE — ASSESSMENT & PLAN NOTE
Rheumatoid arthritis improved with addition of infliximab to sulfasalazine and hydroxychloroquine, but she reports diminishing response to infliximab after increasing dose from 3 to 5 mg/kg    Will increase to 7.5 mg/kg infliximab q8 wk  Discussed methotrexate in place of sulfasalazine and hydroxychloroquine but she wants to defer this due to history steatosis    Will reassess after 2 more infliximab infusions, then consider other options if not improved

## 2025-02-12 NOTE — PROGRESS NOTES
2/5/2025     9:33 AM   Rapid3 Question Responses and Scores   MDHAQ Score 0.1   Psychologic Score 0   Pain Score 3.5   When you awakened in the morning OVER THE LAST WEEK, did you feel stiff? Yes   If Yes, please indicate the number of hours until you are as limber as you will be for the day 0   Fatigue Score 1.5   Global Health Score 4   RAPID3 Score 2.61        Answers submitted by the patient for this visit:  Rheumatology Questionnaire (Submitted on 2/5/2025)  fever: No  eye redness: No  mouth sores: No  headaches: No  shortness of breath: No  chest pain: No  trouble swallowing: No  diarrhea: No  constipation: No  unexpected weight change: No  genital sore: No  dysuria: No  During the last 3 days, have you had a skin rash?: No  Bruises or bleeds easily: Yes  cough: No

## 2025-02-17 DIAGNOSIS — L64.9 ANDROGENETIC ALOPECIA: ICD-10-CM

## 2025-02-18 ENCOUNTER — TELEPHONE (OUTPATIENT)
Dept: DERMATOLOGY | Facility: CLINIC | Age: 70
End: 2025-02-18
Payer: MEDICARE

## 2025-02-18 RX ORDER — MINOXIDIL 2.5 MG/1
TABLET ORAL
Qty: 45 TABLET | Refills: 1 | OUTPATIENT
Start: 2025-02-18

## 2025-02-25 ENCOUNTER — OFFICE VISIT (OUTPATIENT)
Dept: SLEEP MEDICINE | Facility: CLINIC | Age: 70
End: 2025-02-25
Attending: PSYCHIATRY & NEUROLOGY
Payer: MEDICARE

## 2025-02-25 VITALS
HEIGHT: 62 IN | DIASTOLIC BLOOD PRESSURE: 63 MMHG | BODY MASS INDEX: 32.92 KG/M2 | WEIGHT: 178.88 LBS | HEART RATE: 74 BPM | SYSTOLIC BLOOD PRESSURE: 130 MMHG

## 2025-02-25 DIAGNOSIS — G47.33 OSA (OBSTRUCTIVE SLEEP APNEA): Primary | ICD-10-CM

## 2025-02-25 PROCEDURE — 99999 PR PBB SHADOW E&M-EST. PATIENT-LVL II: CPT | Mod: PBBFAC,,, | Performed by: PSYCHIATRY & NEUROLOGY

## 2025-02-25 PROCEDURE — 99212 OFFICE O/P EST SF 10 MIN: CPT | Mod: PBBFAC | Performed by: PSYCHIATRY & NEUROLOGY

## 2025-02-25 PROCEDURE — 99214 OFFICE O/P EST MOD 30 MIN: CPT | Mod: S$PBB,,, | Performed by: PSYCHIATRY & NEUROLOGY
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
## 2025-03-18 ENCOUNTER — OFFICE VISIT (OUTPATIENT)
Dept: HEMATOLOGY/ONCOLOGY | Facility: CLINIC | Age: 70
End: 2025-03-18
Payer: MEDICARE

## 2025-03-18 VITALS
SYSTOLIC BLOOD PRESSURE: 133 MMHG | HEIGHT: 62 IN | BODY MASS INDEX: 32.49 KG/M2 | RESPIRATION RATE: 18 BRPM | DIASTOLIC BLOOD PRESSURE: 76 MMHG | WEIGHT: 176.56 LBS | OXYGEN SATURATION: 99 % | HEART RATE: 80 BPM | TEMPERATURE: 98 F

## 2025-03-18 DIAGNOSIS — Z12.31 ENCOUNTER FOR SCREENING MAMMOGRAM FOR HIGH-RISK PATIENT: ICD-10-CM

## 2025-03-18 DIAGNOSIS — N60.92 ATYPICAL DUCTAL HYPERPLASIA OF LEFT BREAST: Primary | ICD-10-CM

## 2025-03-18 PROCEDURE — 99215 OFFICE O/P EST HI 40 MIN: CPT | Mod: PBBFAC | Performed by: INTERNAL MEDICINE

## 2025-03-18 PROCEDURE — 99213 OFFICE O/P EST LOW 20 MIN: CPT | Mod: S$PBB,,, | Performed by: INTERNAL MEDICINE

## 2025-03-18 PROCEDURE — 99999 PR PBB SHADOW E&M-EST. PATIENT-LVL V: CPT | Mod: PBBFAC,,, | Performed by: INTERNAL MEDICINE

## 2025-03-18 NOTE — PROGRESS NOTES
Subjective:       Patient ID: Verenice Mirza is a 69 y.o. female.    Chief Complaint: No chief complaint on file.    HPI 68 Y/O female with history of ADH on chemoprevention with raloxifene.    Her biggest issue continues to be her rheumatoid arthritis.  She has no breast complaints.      Breast History:  Mammogram from July 1, 2019 showed architectural distortion left breast 2:00 position.  There was nothing seen by ultrasound.    On July 8, 2019 a needle biopsy was performed which showed benign breast tissue with apocrine adenosis, calcifications but no atypia.    Excisional biopsy on August 19, 2019 showed atypical ductal hyperplasia.    She started raloxifene in January 2020.    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    Mammogram 7/12/24   -negative    MRI 1/30/25 - negative  Review of Systems   Constitutional:  Negative for appetite change and unexpected weight change.   HENT:  Negative for mouth sores.    Eyes:  Negative for visual disturbance.   Respiratory:  Negative for cough and shortness of breath.    Cardiovascular:  Negative for chest pain.   Gastrointestinal:  Negative for abdominal pain and diarrhea.   Genitourinary:  Negative for frequency.   Musculoskeletal:  Positive for arthralgias (hands and wrists). Negative for back pain.   Integumentary:  Negative for rash.   Neurological:  Negative for headaches.   Hematological:  Negative for adenopathy.   Psychiatric/Behavioral:  The patient is not nervous/anxious.          Objective:      Physical Exam  Constitutional:       General: She is not in acute distress.     Appearance: Normal appearance.   Cardiovascular:      Rate and Rhythm: Normal rate and regular rhythm.   Pulmonary:      Effort: Pulmonary effort is normal. No respiratory distress.      Breath sounds: Normal breath sounds. No wheezing or rales.   Chest:   Breasts:     Right: Normal.      Left: Normal.   Lymphadenopathy:      Cervical: No cervical  adenopathy.      Upper Body:      Right upper body: No supraclavicular or axillary adenopathy.      Left upper body: No supraclavicular or axillary adenopathy.   Neurological:      Mental Status: She is alert and oriented to person, place, and time.   Psychiatric:         Mood and Affect: Mood normal.         Behavior: Behavior normal.         Thought Content: Thought content normal.         Judgment: Judgment normal.       Assessment:       Problem List Items Addressed This Visit       Atypical ductal hyperplasia of left breast - Primary       Plan:     Completed  Raloxifen  RTC 6 M with mammograms        Route Chart for Scheduling    Med Onc Chart Routing      Follow up with physician 6 months. Virtual   Follow up with MEERA    Infusion scheduling note    Injection scheduling note    Labs None   Scheduling:  Preferred lab:  Lab interval:     Imaging Mammogram   End of July or early August   Pharmacy appointment No pharmacy appointment needed      Other referrals no referral to Oncology Primary Care needed -  no Massage appointment needed    No additional referrals needed             Therapy Plan Information  RHEU/GI INFLIXIMAB (REMICADE/BIOSIMILARS) for Seropositive rheumatoid arthritis, noted on 7/9/2021  Medications  inFLIXimab-dyyb (INFLECTRA) in 0.9% NaCl 250 mL IVPB  7.5 mg/kg = 600 mg, Intravenous, Every 8 weeks  PRN Medications  acetaminophen tablet 650 mg  650 mg, Oral, PRN  albuterol-ipratropium 2.5 mg-0.5 mg/3 mL nebulizer solution 3 mL  3 mL, Nebulization, PRN  Anaphylaxis/Hypersensitivity  EPINEPHrine (EPIPEN) 0.3 mg/0.3 mL pen injection 0.3 mg  0.3 mg, Intramuscular, Every visit  diphenhydrAMINE injection 50 mg  50 mg, Intravenous, Every visit  hydrocortisone sodium succinate injection 100 mg  100 mg, Intravenous, Every visit  Flushes  0.9% NaCl 250 mL flush bag  Intravenous, Every visit  sodium chloride 0.9% flush 10 mL  10 mL, Intravenous, Every visit  heparin, porcine (PF) 100 unit/mL injection flush  500 Units  500 Units, Intravenous, Every visit  alteplase injection 2 mg  2 mg, Intra-Catheter, Every visit      No therapy plan of the specified type found.    No therapy plan of the specified type found.

## 2025-03-21 ENCOUNTER — LAB VISIT (OUTPATIENT)
Dept: LAB | Facility: HOSPITAL | Age: 70
End: 2025-03-21
Attending: INTERNAL MEDICINE
Payer: MEDICARE

## 2025-03-21 DIAGNOSIS — E11.59 HYPERTENSION ASSOCIATED WITH DIABETES: ICD-10-CM

## 2025-03-21 DIAGNOSIS — Z00.00 PREVENTATIVE HEALTH CARE: ICD-10-CM

## 2025-03-21 DIAGNOSIS — E78.5 DYSLIPIDEMIA ASSOCIATED WITH TYPE 2 DIABETES MELLITUS: ICD-10-CM

## 2025-03-21 DIAGNOSIS — E11.69 DYSLIPIDEMIA ASSOCIATED WITH TYPE 2 DIABETES MELLITUS: ICD-10-CM

## 2025-03-21 DIAGNOSIS — I15.2 HYPERTENSION ASSOCIATED WITH DIABETES: ICD-10-CM

## 2025-03-21 LAB
ALBUMIN SERPL BCP-MCNC: 4.2 G/DL (ref 3.5–5.2)
ALP SERPL-CCNC: 102 U/L (ref 40–150)
ALT SERPL W/O P-5'-P-CCNC: 13 U/L (ref 10–44)
ANION GAP SERPL CALC-SCNC: 12 MMOL/L (ref 8–16)
AST SERPL-CCNC: 20 U/L (ref 10–40)
BASOPHILS # BLD AUTO: 0.07 K/UL (ref 0–0.2)
BASOPHILS NFR BLD: 1.4 % (ref 0–1.9)
BILIRUB SERPL-MCNC: 0.4 MG/DL (ref 0.1–1)
BUN SERPL-MCNC: 19 MG/DL (ref 8–23)
CALCIUM SERPL-MCNC: 9.5 MG/DL (ref 8.7–10.5)
CHLORIDE SERPL-SCNC: 108 MMOL/L (ref 95–110)
CHOLEST SERPL-MCNC: 197 MG/DL (ref 120–199)
CHOLEST/HDLC SERPL: 3.5 {RATIO} (ref 2–5)
CO2 SERPL-SCNC: 17 MMOL/L (ref 23–29)
CREAT SERPL-MCNC: 0.8 MG/DL (ref 0.5–1.4)
DIFFERENTIAL METHOD BLD: ABNORMAL
EOSINOPHIL # BLD AUTO: 0.2 K/UL (ref 0–0.5)
EOSINOPHIL NFR BLD: 4.7 % (ref 0–8)
ERYTHROCYTE [DISTWIDTH] IN BLOOD BY AUTOMATED COUNT: 14.1 % (ref 11.5–14.5)
EST. GFR  (NO RACE VARIABLE): >60 ML/MIN/1.73 M^2
ESTIMATED AVG GLUCOSE: 77 MG/DL (ref 68–131)
GLUCOSE SERPL-MCNC: 94 MG/DL (ref 70–110)
HBA1C MFR BLD: 4.3 % (ref 4–5.6)
HCT VFR BLD AUTO: 41.5 % (ref 37–48.5)
HDLC SERPL-MCNC: 56 MG/DL (ref 40–75)
HDLC SERPL: 28.4 % (ref 20–50)
HGB BLD-MCNC: 13.1 G/DL (ref 12–16)
IMM GRANULOCYTES # BLD AUTO: 0.01 K/UL (ref 0–0.04)
IMM GRANULOCYTES NFR BLD AUTO: 0.2 % (ref 0–0.5)
LDLC SERPL CALC-MCNC: 123 MG/DL (ref 63–159)
LYMPHOCYTES # BLD AUTO: 1.9 K/UL (ref 1–4.8)
LYMPHOCYTES NFR BLD: 36.9 % (ref 18–48)
MCH RBC QN AUTO: 31.2 PG (ref 27–31)
MCHC RBC AUTO-ENTMCNC: 31.6 G/DL (ref 32–36)
MCV RBC AUTO: 99 FL (ref 82–98)
MONOCYTES # BLD AUTO: 0.4 K/UL (ref 0.3–1)
MONOCYTES NFR BLD: 7.8 % (ref 4–15)
NEUTROPHILS # BLD AUTO: 2.5 K/UL (ref 1.8–7.7)
NEUTROPHILS NFR BLD: 49 % (ref 38–73)
NONHDLC SERPL-MCNC: 141 MG/DL
NRBC BLD-RTO: 0 /100 WBC
PLATELET # BLD AUTO: 259 K/UL (ref 150–450)
PMV BLD AUTO: 10.7 FL (ref 9.2–12.9)
POTASSIUM SERPL-SCNC: 3.8 MMOL/L (ref 3.5–5.1)
PROT SERPL-MCNC: 6.9 G/DL (ref 6–8.4)
RBC # BLD AUTO: 4.2 M/UL (ref 4–5.4)
SODIUM SERPL-SCNC: 137 MMOL/L (ref 136–145)
TRIGL SERPL-MCNC: 90 MG/DL (ref 30–150)
TSH SERPL DL<=0.005 MIU/L-ACNC: 3.28 UIU/ML (ref 0.4–4)
WBC # BLD AUTO: 5.15 K/UL (ref 3.9–12.7)

## 2025-03-21 PROCEDURE — 80061 LIPID PANEL: CPT | Performed by: INTERNAL MEDICINE

## 2025-03-21 PROCEDURE — 84443 ASSAY THYROID STIM HORMONE: CPT | Performed by: INTERNAL MEDICINE

## 2025-03-21 PROCEDURE — 80053 COMPREHEN METABOLIC PANEL: CPT | Performed by: INTERNAL MEDICINE

## 2025-03-21 PROCEDURE — 83036 HEMOGLOBIN GLYCOSYLATED A1C: CPT | Performed by: INTERNAL MEDICINE

## 2025-03-21 PROCEDURE — 85025 COMPLETE CBC W/AUTO DIFF WBC: CPT | Performed by: INTERNAL MEDICINE

## 2025-03-25 ENCOUNTER — INFUSION (OUTPATIENT)
Dept: INFECTIOUS DISEASES | Facility: HOSPITAL | Age: 70
End: 2025-03-25
Payer: MEDICARE

## 2025-03-25 VITALS
TEMPERATURE: 98 F | RESPIRATION RATE: 18 BRPM | SYSTOLIC BLOOD PRESSURE: 106 MMHG | WEIGHT: 174.63 LBS | HEART RATE: 70 BPM | BODY MASS INDEX: 32.14 KG/M2 | HEIGHT: 62 IN | DIASTOLIC BLOOD PRESSURE: 64 MMHG

## 2025-03-25 DIAGNOSIS — M05.9 SEROPOSITIVE RHEUMATOID ARTHRITIS: Primary | ICD-10-CM

## 2025-03-25 PROCEDURE — 25000003 PHARM REV CODE 250: Performed by: INTERNAL MEDICINE

## 2025-03-25 PROCEDURE — 63600175 PHARM REV CODE 636 W HCPCS: Mod: TB | Performed by: INTERNAL MEDICINE

## 2025-03-25 PROCEDURE — 96413 CHEMO IV INFUSION 1 HR: CPT

## 2025-03-25 PROCEDURE — 96415 CHEMO IV INFUSION ADDL HR: CPT

## 2025-03-25 RX ORDER — HEPARIN 100 UNIT/ML
500 SYRINGE INTRAVENOUS
OUTPATIENT
Start: 2025-05-20

## 2025-03-25 RX ORDER — EPINEPHRINE 0.3 MG/.3ML
0.3 INJECTION SUBCUTANEOUS ONCE AS NEEDED
OUTPATIENT
Start: 2025-05-20

## 2025-03-25 RX ORDER — IPRATROPIUM BROMIDE AND ALBUTEROL SULFATE 2.5; .5 MG/3ML; MG/3ML
3 SOLUTION RESPIRATORY (INHALATION)
OUTPATIENT
Start: 2025-05-20

## 2025-03-25 RX ORDER — SODIUM CHLORIDE 0.9 % (FLUSH) 0.9 %
10 SYRINGE (ML) INJECTION
OUTPATIENT
Start: 2025-05-20

## 2025-03-25 RX ORDER — DIPHENHYDRAMINE HYDROCHLORIDE 50 MG/ML
50 INJECTION, SOLUTION INTRAMUSCULAR; INTRAVENOUS ONCE AS NEEDED
OUTPATIENT
Start: 2025-05-20

## 2025-03-25 RX ORDER — ACETAMINOPHEN 325 MG/1
650 TABLET ORAL
OUTPATIENT
Start: 2025-05-20

## 2025-03-25 RX ADMIN — SODIUM CHLORIDE 590 MG: 9 INJECTION, SOLUTION INTRAVENOUS at 10:03

## 2025-03-25 RX ADMIN — SODIUM CHLORIDE: 9 INJECTION, SOLUTION INTRAVENOUS at 12:03

## 2025-03-25 NOTE — PROGRESS NOTES
Arrived for inflectra infusion 590. No pre med. Per pt's request. Flush bag of NS hung following infusion.      Limited head-to-toe assessment due to privacy issues and visit reason though the opportunity was given for patient to express any concerns

## 2025-04-01 ENCOUNTER — PATIENT MESSAGE (OUTPATIENT)
Dept: RHEUMATOLOGY | Facility: CLINIC | Age: 70
End: 2025-04-01
Payer: MEDICARE

## 2025-04-01 DIAGNOSIS — M05.9 SEROPOSITIVE RHEUMATOID ARTHRITIS: ICD-10-CM

## 2025-04-01 RX ORDER — HYDROXYCHLOROQUINE SULFATE 200 MG/1
200 TABLET, FILM COATED ORAL 2 TIMES DAILY
Qty: 180 TABLET | Refills: 1 | Status: SHIPPED | OUTPATIENT
Start: 2025-04-01

## 2025-04-01 NOTE — TELEPHONE ENCOUNTER
Medication refill requested for plaquenil, 90 day supply.  Last office visit on 2/12/25 with labs.  Last eye exam on 5/22/24.

## 2025-04-04 ENCOUNTER — OFFICE VISIT (OUTPATIENT)
Dept: INTERNAL MEDICINE | Facility: CLINIC | Age: 70
End: 2025-04-04
Payer: MEDICARE

## 2025-04-04 VITALS
DIASTOLIC BLOOD PRESSURE: 82 MMHG | SYSTOLIC BLOOD PRESSURE: 118 MMHG | OXYGEN SATURATION: 97 % | BODY MASS INDEX: 32.37 KG/M2 | HEART RATE: 75 BPM | HEIGHT: 62 IN | WEIGHT: 175.94 LBS

## 2025-04-04 DIAGNOSIS — E03.4 HYPOTHYROIDISM DUE TO ACQUIRED ATROPHY OF THYROID: ICD-10-CM

## 2025-04-04 DIAGNOSIS — K21.9 GASTROESOPHAGEAL REFLUX DISEASE, UNSPECIFIED WHETHER ESOPHAGITIS PRESENT: ICD-10-CM

## 2025-04-04 DIAGNOSIS — Z00.00 PREVENTATIVE HEALTH CARE: Primary | ICD-10-CM

## 2025-04-04 DIAGNOSIS — E11.69 DYSLIPIDEMIA ASSOCIATED WITH TYPE 2 DIABETES MELLITUS: ICD-10-CM

## 2025-04-04 DIAGNOSIS — E11.59 HYPERTENSION ASSOCIATED WITH DIABETES: ICD-10-CM

## 2025-04-04 DIAGNOSIS — L65.8 FEMALE PATTERN BALDNESS: ICD-10-CM

## 2025-04-04 DIAGNOSIS — L64.9 ANDROGENETIC ALOPECIA: ICD-10-CM

## 2025-04-04 DIAGNOSIS — E78.5 DYSLIPIDEMIA ASSOCIATED WITH TYPE 2 DIABETES MELLITUS: ICD-10-CM

## 2025-04-04 DIAGNOSIS — I15.2 HYPERTENSION ASSOCIATED WITH DIABETES: ICD-10-CM

## 2025-04-04 DIAGNOSIS — E78.5 HYPERLIPIDEMIA, UNSPECIFIED HYPERLIPIDEMIA TYPE: ICD-10-CM

## 2025-04-04 DIAGNOSIS — E66.811 OBESITY (BMI 30.0-34.9): ICD-10-CM

## 2025-04-04 PROCEDURE — 99215 OFFICE O/P EST HI 40 MIN: CPT | Mod: PBBFAC,PO | Performed by: INTERNAL MEDICINE

## 2025-04-04 PROCEDURE — 99999 PR PBB SHADOW E&M-EST. PATIENT-LVL V: CPT | Mod: PBBFAC,,, | Performed by: INTERNAL MEDICINE

## 2025-04-04 RX ORDER — MINOXIDIL 2.5 MG/1
TABLET ORAL
Qty: 45 TABLET | Refills: 1 | Status: SHIPPED | OUTPATIENT
Start: 2025-04-04

## 2025-04-04 RX ORDER — SPIRONOLACTONE 100 MG/1
100 TABLET, FILM COATED ORAL DAILY
Qty: 90 TABLET | Refills: 3 | Status: SHIPPED | OUTPATIENT
Start: 2025-04-04

## 2025-04-04 RX ORDER — TIRZEPATIDE 12.5 MG/.5ML
12.5 INJECTION, SOLUTION SUBCUTANEOUS
Qty: 12 PEN | Refills: 3 | Status: SHIPPED | OUTPATIENT
Start: 2025-04-04

## 2025-04-04 RX ORDER — FINASTERIDE 5 MG/1
5 TABLET, FILM COATED ORAL NIGHTLY
Qty: 90 TABLET | Refills: 3 | Status: SHIPPED | OUTPATIENT
Start: 2025-04-04

## 2025-04-04 RX ORDER — ATORVASTATIN CALCIUM 40 MG/1
40 TABLET, FILM COATED ORAL DAILY
Qty: 90 TABLET | Refills: 3 | Status: SHIPPED | OUTPATIENT
Start: 2025-04-04

## 2025-04-04 RX ORDER — TIRZEPATIDE 12.5 MG/.5ML
12.5 INJECTION, SOLUTION SUBCUTANEOUS
Qty: 12 PEN | Refills: 1 | Status: CANCELLED | OUTPATIENT
Start: 2025-04-04

## 2025-04-04 RX ORDER — LEVOTHYROXINE SODIUM 88 UG/1
88 TABLET ORAL
Qty: 90 TABLET | Refills: 3 | Status: SHIPPED | OUTPATIENT
Start: 2025-04-04

## 2025-04-04 RX ORDER — ESOMEPRAZOLE MAGNESIUM 40 MG/1
40 CAPSULE, DELAYED RELEASE ORAL
Qty: 90 CAPSULE | Refills: 1 | Status: SHIPPED | OUTPATIENT
Start: 2025-04-04 | End: 2025-04-09

## 2025-04-07 NOTE — PROGRESS NOTES
"Patient ID: Verenice Mirza is a 69 y.o. female    Chief Complaint: Annual Exam and Diabetes    History of Present Illness    CHIEF COMPLAINT:  Patient presents for an annual wellness visit and to discuss recent lab results.    HPI:  Patient reports no specific symptoms or complaints during this visit. Her A1C level have improved significantly. She has been on Mounjaro (previously Ozempic) 12.5 mg for approximately a year for weight management and diabetes control. Patient has lost about 30 lbs over the past year but desires to lose more weight. She reports difficulty losing additional weight despite following a low-carb diet and attempting intermittent fasting. Patient expresses concern about her current weight of around 180 lbs at 5'2" height. She inquires about potential links between long-term use of omeprazole for heartburn and dementia risk.    Patient denies any specific symptoms, health concerns, or diagnosed medical conditions beyond those already being managed (diabetes, weight management, and heartburn).    MEDICATIONS:  Patient is on Mounjaro 12.5 mg weekly injection for diabetes and weight loss. She takes Omeprazole for heartburn, which was changed to Nexium (Esomeprazole) during this visit. For hair loss, she is on Minoxidil (half a pill daily), Finasteride (1 pill daily), and Spironolactone. She takes a Statin every third day for cholesterol and a B12 supplement. Patient was previously on Ozempic but switched to Mounjaro at some point in the past.    MEDICAL HISTORY:  Patient has a history of insulin resistance and high cholesterol.    TEST RESULTS:  Patient's recent A1C was 4.3. Her complete metabolic panel was normal. CO2 was slightly low, attributed to fasting. MCV (Mean Corpuscular Volume) was on the lower side of normal, noted as typical for this patient. Both Hemoglobin and Hematocrit were normal. Cholesterol was slightly increased compared to previous results. Patient underwent two [place] " "Cognitive Assessment (MoCA) tests. In December 2023, she scored 27/30. During the current visit, her score improved to 28/30.          ROS: Otherwise Negative     Physical Exam    General: Well-appearing. Well-nourished. No distress.  HEENT: Conjunctivae normal. Pupils are equal and reactive to light. TMs are clear and intact bilaterally. Hearing grossly normal. Nasopharynx clear. Oropharynx clear.  Neck: Supple. No thyromegaly. No bruits.  Lymph: No cervical adenopathy. No supraclavicular adenopathy.  Heart: Regular rate and rhythm. No murmur. No rub. No gallop.  Lungs: Clear to auscultation. Respiratory effort normal.  Abdomen: Soft. Nontender. Nondistended. Normoactive bowel sounds. No hepatomegaly. No masses.  Extremities: Good distal pulses. No edema.  Psych: Oriented to time person place. Judgment unimpaired. Insight unimpaired. Mood appropriate. Affect appropriate.  Vitals: Blood pressure: 118/80.          Assessment & Plan    TYPE 2 DIABETES MELLITUS:  - Noted significant improvement in the patient's A1C level, which is currently 4.3, considered excellent.  - Attributed the improved A1C to the patient's reported low carbohydrate intake.  - Continued Mounjaro (tirzepatide) 12.5mg weekly for diabetes management.  - Recommend maintaining the current dose of Mounjaro.  - Continued long-term use of Mounjaro (tirzepatide) 12.5mg weekly, an insulin analog.    HYPERLIPIDEMIA:  - Continued statin medication every third day.  - Discussed the relationship between statins and dementia, clarifying the lack of proven causal association.  - Educated the patient on the challenges of interpreting studies linking medications to dementia due to confounding factors.  - Noted the patient's report of increased cholesterol levels.    OVERWEIGHT AND WEIGHT MANAGEMENT:  - Documented weight decrease from 182 lbs in December to current weight of 175 lbs at a height of 5'2".  - Noted total weight loss of 30 lbs over the past year and " 50 lbs over five years.  - Assessed that weight loss has slowed down.  - Recommend increasing physical activity to promote further weight loss.  - Continued Mounjaro (tirzepatide) 12.5mg weekly for weight management.  - Advised maintaining the current dose of Mounjaro.  - Recommend increasing physical activity, specifically starting walking and using the treadmill.    GASTROESOPHAGEAL REFLUX DISEASE:  - Changed prescription from Omeprazole to Esomeprazole (Nexium) 20mg due to cost considerations.  - Discussed the relationship between long-term PPI therapy and potential health concerns, including kidney, calcium, and bone issues.  - Clarified the lack of proven causal association between PPI use and dementia.  - Educated the patient on the challenges of interpreting studies linking medications to dementia due to confounding factors.    HAIR LOSS:  - Refilled prescriptions for minoxidil (half tablet), finasteride (whole tablet), and spironolactone for hair loss management.            Follow up in about 6 months (around 10/4/2025) for diabetes cmp hba1c lipids, urine microalbumin.    Verenice was seen today for annual exam and diabetes.    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  -     finasteride (PROSCAR) 5 mg tablet; Take 1 tablet (5 mg total) by mouth every evening.    Hypothyroidism due to acquired atrophy of thyroid  -     levothyroxine (SYNTHROID) 88 MCG tablet; Take 1 tablet (88 mcg total) by mouth before breakfast.    Hyperlipidemia, unspecified hyperlipidemia type  -     atorvastatin (LIPITOR) 40 MG tablet; Take 1 tablet (40 mg total) by mouth once daily.  -     Comprehensive Metabolic Panel; Standing  -     Hemoglobin A1C; Standing  -     Lipid Panel; Standing  -     Microalbumin/Creatinine Ratio, Urine; Standing    Gastroesophageal reflux disease,  unspecified whether esophagitis present  -     esomeprazole (NEXIUM) 40 MG capsule; Take 1 capsule (40 mg total) by mouth before breakfast.    Hypertension associated with diabetes  -     tirzepatide (MOUNJARO) 12.5 mg/0.5 mL PnIj; Inject 12.5 mg into the skin every 7 days.  -     Comprehensive Metabolic Panel; Standing  -     Hemoglobin A1C; Standing  -     Lipid Panel; Standing  -     Microalbumin/Creatinine Ratio, Urine; Standing    Dyslipidemia associated with type 2 diabetes mellitus  -     tirzepatide (MOUNJARO) 12.5 mg/0.5 mL PnIj; Inject 12.5 mg into the skin every 7 days.    Androgenetic alopecia  -     minoxidiL (LONITEN) 2.5 MG tablet; Sig 1/2 tablet po qd    Obesity (BMI 30.0-34.9)    Other orders  The following orders have not been finalized:  -     Cancel: tirzepatide (MOUNJARO) 12.5 mg/0.5 mL PnIj         This note was generated with the assistance of ambient listening technology. Verbal consent was obtained by the patient and accompanying visitor(s) for the recording of patient appointment to facilitate this note. I attest to having reviewed and edited the generated note for accuracy, though some syntax or spelling errors may persist. Please contact the author of this note for any clarification.

## 2025-04-09 ENCOUNTER — TELEPHONE (OUTPATIENT)
Dept: INTERNAL MEDICINE | Facility: CLINIC | Age: 70
End: 2025-04-09
Payer: MEDICARE

## 2025-04-09 DIAGNOSIS — K21.9 GASTROESOPHAGEAL REFLUX DISEASE, UNSPECIFIED WHETHER ESOPHAGITIS PRESENT: Primary | ICD-10-CM

## 2025-04-09 RX ORDER — OMEPRAZOLE 40 MG/1
40 CAPSULE, DELAYED RELEASE ORAL DAILY
Qty: 90 CAPSULE | Refills: 3 | Status: SHIPPED | OUTPATIENT
Start: 2025-04-09 | End: 2026-04-09

## 2025-04-09 NOTE — TELEPHONE ENCOUNTER
----- Message from Nurse Emmie sent at 4/8/2025 10:17 AM CDT -----  Regarding: FW: MRN [id number]  Please submit new order for Pantoprazole or Omeprazole. Esomeprazole not covered by insurance. Thank you  ----- Message -----  From: Verenice Yanez RN  Sent: 4/8/2025   9:53 AM CDT  To: Emmie Lion LPN  Subject: MRN [id number]                                        ----- Message -----  From: Gladys Shaw  Sent: 4/8/2025   9:50 AM CDT  To: Zak RIVERA Staff    Type:  Pharmacy Calling to Clarify an RXPharmacy Name:[address] [place] [id number] [place] [id number]: [phone] Fax: [phone]: Not open 24 hoursPrescription Name:esomeprazole (NEXIUM) 40 MG capsuleWhat do they need to clarify?:not covered would office like to send in alternative lansoprazole,pantoprazole ,or omeprazoleBest Call Back Number:[phone] Information:

## 2025-04-28 ENCOUNTER — TELEPHONE (OUTPATIENT)
Dept: INTERNAL MEDICINE | Facility: CLINIC | Age: 70
End: 2025-04-28
Payer: MEDICARE

## 2025-04-28 DIAGNOSIS — E11.59 HYPERTENSION ASSOCIATED WITH DIABETES: Primary | ICD-10-CM

## 2025-04-28 DIAGNOSIS — I15.2 HYPERTENSION ASSOCIATED WITH DIABETES: Primary | ICD-10-CM

## 2025-05-14 ENCOUNTER — OFFICE VISIT (OUTPATIENT)
Dept: DERMATOLOGY | Facility: CLINIC | Age: 70
End: 2025-05-14
Payer: MEDICARE

## 2025-05-14 DIAGNOSIS — D23.9 DERMATOFIBROMA: ICD-10-CM

## 2025-05-14 DIAGNOSIS — L81.4 LENTIGO: ICD-10-CM

## 2025-05-14 DIAGNOSIS — L91.8 SKIN TAG: ICD-10-CM

## 2025-05-14 DIAGNOSIS — D69.2 SENILE PURPURA: Primary | ICD-10-CM

## 2025-05-14 DIAGNOSIS — Z85.828 PERSONAL HISTORY OF SKIN CANCER: ICD-10-CM

## 2025-05-14 DIAGNOSIS — I86.8 VARICOSE VEINS OF BOTH UPPER EXTREMITIES: ICD-10-CM

## 2025-05-14 DIAGNOSIS — L82.1 SK (SEBORRHEIC KERATOSIS): ICD-10-CM

## 2025-05-14 DIAGNOSIS — D22.9 NEVUS: ICD-10-CM

## 2025-05-14 PROCEDURE — 99999 PR PBB SHADOW E&M-EST. PATIENT-LVL IV: CPT | Mod: PBBFAC,,, | Performed by: DERMATOLOGY

## 2025-05-14 PROCEDURE — 99214 OFFICE O/P EST MOD 30 MIN: CPT | Mod: PBBFAC,PO | Performed by: DERMATOLOGY

## 2025-05-14 NOTE — PROGRESS NOTES
"  Subjective:      Patient ID:  Verenice Mirza is a 69 y.o. female who presents for   Chief Complaint   Patient presents with    Skin Check     tbse     Patient is here today for a "mole" check.   Pt has a history of normal sun exposure in the past.   Pt recalls several blistering sunburns in the past- yes  Pt has history of tanning bed use- yes  Pt has had moles removed in the past- yes, benign per pt  Pt has history of melanoma in first degree relatives- no     Pt  c/o spider veins on legs  Also has dark areas on forearms that she would like to improve  Also bx site on chin still feels scaly.     You have been prescribed a biologic or high risk medication for your condition.  It is very important that you keep your follow up appointments.  There are labs that must be monitored to ensure your safety on the medication. Furthermore, it is often necessary for clinical improvement to be documented in order for the medication to be continued to be covered by your insurance company.  We strive to follow the standard of care at [facility] and routine follow ups for refills on these mediations are mandated and best for your healthcare.         Review of Systems   Skin:  Positive for daily sunscreen use and activity-related sunscreen use. Negative for tendency to form keloidal scars and recent sunburn.   Hematologic/Lymphatic: Bruises/bleeds easily (bruise).       Objective:   Physical Exam   Constitutional: She appears well-developed and well-nourished. She is obese.  No distress.   Neurological: She is alert and oriented to person, place, and time. She is not disoriented.   Psychiatric: She has a normal mood and affect.   Skin:   Areas Examined (abnormalities noted in diagram):   Scalp / Hair Palpated and Inspected  Head / Face Inspection Performed  Neck Inspection Performed  Chest / Axilla Inspection Performed  Abdomen Inspection Performed  Genitals / Buttocks / Groin Inspection Performed  Back Inspection Performed  RUE " Inspected  LUE Inspection Performed  RLE Inspected  LLE Inspection Performed  Nails and Digits Inspection Performed                         Diagram Legend     Erythematous scaling macule/papule c/w actinic keratosis       Vascular papule c/w angioma      Pigmented verrucoid papule/plaque c/w seborrheic keratosis      Yellow umbilicated papule c/w sebaceous hyperplasia      Irregularly shaped tan macule c/w lentigo     1-2 mm smooth white papules consistent with Milia      Movable subcutaneous cyst with punctum c/w epidermal inclusion cyst      Subcutaneous movable cyst c/w pilar cyst      Firm pink to brown papule c/w dermatofibroma      Pedunculated fleshy papule(s) c/w skin tag(s)      Evenly pigmented macule c/w junctional nevus     Mildly variegated pigmented, slightly irregular-bordered macule c/w mildly atypical nevus      Flesh colored to evenly pigmented papule c/w intradermal nevus       Pink pearly papule/plaque c/w basal cell carcinoma      Erythematous hyperkeratotic cursted plaque c/w SCC      Surgical scar with no sign of skin cancer recurrence      Open and closed comedones      Inflammatory papules and pustules      Verrucoid papule consistent consistent with wart     Erythematous eczematous patches and plaques     Dystrophic onycholytic nail with subungual debris c/w onychomycosis     Umbilicated papule    Erythematous-base heme-crusted tan verrucoid plaque consistent with inflamed seborrheic keratosis     Erythematous Silvery Scaling Plaque c/w Psoriasis     See annotation      Assessment / Plan:        Senile purpura  Reassurance. Secondary to sun damage  and age causing weakening of the support around the blood vessels.   Can be exacerbated by blood thinners as well as Vitamin E and Fish Oil supplements   Can use [employer] Body repair    Varicose veins of both upper extremities  Consider vascular referral   Pt to think about it     Skin tag  Reassurance given to patient. No treatment is necessary.    Treatment of benign, asymptomatic lesions may be considered cosmetic.    Lentigo  This is a benign hyperpigmented sun induced lesion. Recommend daily sun protection/avoidance and use of at least SPF 30, broad spectrum sunscreen (OTC drug) will reduce the number of new lesions. Treatment of these benign lesions are considered cosmetic.  The nature of sun-induced photo-aging and skin cancers is discussed.  Sun avoidance, protective clothing, and the use of 30-SPF sunscreens is advised. Observe closely for skin damage/changes, and call if such occurs.      SK (seborrheic keratosis)  These are benign inherited growths without a malignant potential. Reassurance given to patient. No treatment is necessary.     Dermatofibroma  These are benign bundles of scar tissue that can arise spontaneously or after trauma from a bug bite or nicking yourself shaving, or other minimal trauma.   They are very common in middle aged adults and commonly found on the lower legs, arms above the elbows, and trunk.   Removal of lesions is not recommended as the lesion is just replaced with an additional scar, however if lesion is symptomatic, removal can be considered. Lesions can recur.     Nevus  Discussed ABCDE's of nevi.  Monitor for new mole or moles that are becoming bigger, darker, irritated, or developing irregular borders. Brochure provided. Instructed patient to observe lesion(s) for changes and follow up in clinic if changes are noted. Patient to monitor skin at home for new or changing lesions.     Personal history of skin cancer  Pt with history of non melanoma skin cancer  Total body skin examination performed today including at least 12 points as noted in physical examination. No suspicious lesions noted.Monitor for new or changing lesions as discussed such as pink scaly patches that are occasionally tender or not healing, 'pimples' that never go away, lesions that are not healing or bleeding.              Follow up in about 1  year (around 5/14/2026) for TBSE.

## 2025-05-20 ENCOUNTER — INFUSION (OUTPATIENT)
Dept: INFECTIOUS DISEASES | Facility: HOSPITAL | Age: 70
End: 2025-05-20
Payer: MEDICARE

## 2025-05-20 VITALS
HEIGHT: 62 IN | RESPIRATION RATE: 18 BRPM | SYSTOLIC BLOOD PRESSURE: 115 MMHG | DIASTOLIC BLOOD PRESSURE: 71 MMHG | BODY MASS INDEX: 31.89 KG/M2 | TEMPERATURE: 98 F | WEIGHT: 173.31 LBS | HEART RATE: 74 BPM | OXYGEN SATURATION: 98 %

## 2025-05-20 DIAGNOSIS — M05.9 SEROPOSITIVE RHEUMATOID ARTHRITIS: Primary | ICD-10-CM

## 2025-05-20 PROCEDURE — 25000003 PHARM REV CODE 250: Performed by: INTERNAL MEDICINE

## 2025-05-20 PROCEDURE — 96413 CHEMO IV INFUSION 1 HR: CPT

## 2025-05-20 PROCEDURE — 96415 CHEMO IV INFUSION ADDL HR: CPT

## 2025-05-20 PROCEDURE — 63600175 PHARM REV CODE 636 W HCPCS: Mod: JZ,TB | Performed by: INTERNAL MEDICINE

## 2025-05-20 RX ORDER — ACETAMINOPHEN 325 MG/1
650 TABLET ORAL
OUTPATIENT
Start: 2025-07-15

## 2025-05-20 RX ORDER — IPRATROPIUM BROMIDE AND ALBUTEROL SULFATE 2.5; .5 MG/3ML; MG/3ML
3 SOLUTION RESPIRATORY (INHALATION)
OUTPATIENT
Start: 2025-07-15

## 2025-05-20 RX ORDER — SODIUM CHLORIDE 0.9 % (FLUSH) 0.9 %
10 SYRINGE (ML) INJECTION
Status: DISCONTINUED | OUTPATIENT
Start: 2025-05-20 | End: 2025-05-20 | Stop reason: HOSPADM

## 2025-05-20 RX ORDER — IPRATROPIUM BROMIDE AND ALBUTEROL SULFATE 2.5; .5 MG/3ML; MG/3ML
3 SOLUTION RESPIRATORY (INHALATION)
Status: DISPENSED | OUTPATIENT
Start: 2025-05-20 | End: 2025-05-20

## 2025-05-20 RX ORDER — SODIUM CHLORIDE 0.9 % (FLUSH) 0.9 %
10 SYRINGE (ML) INJECTION
OUTPATIENT
Start: 2025-07-15

## 2025-05-20 RX ORDER — ACETAMINOPHEN 325 MG/1
650 TABLET ORAL
Status: ACTIVE | OUTPATIENT
Start: 2025-05-20 | End: 2025-05-20

## 2025-05-20 RX ORDER — DIPHENHYDRAMINE HYDROCHLORIDE 50 MG/ML
50 INJECTION, SOLUTION INTRAMUSCULAR; INTRAVENOUS ONCE AS NEEDED
OUTPATIENT
Start: 2025-07-15

## 2025-05-20 RX ORDER — EPINEPHRINE 0.3 MG/.3ML
0.3 INJECTION SUBCUTANEOUS ONCE AS NEEDED
OUTPATIENT
Start: 2025-07-15

## 2025-05-20 RX ORDER — HEPARIN 100 UNIT/ML
500 SYRINGE INTRAVENOUS
OUTPATIENT
Start: 2025-07-15

## 2025-05-20 RX ADMIN — SODIUM CHLORIDE 590 MG: 9 INJECTION, SOLUTION INTRAVENOUS at 10:05

## 2025-05-20 NOTE — PROGRESS NOTES
Arrived for inflectra infusion 590. No pre med. Per pt's request. Flush bag of NS hung following infusion.    Limited head-to-toe assessment due to privacy issues and visit reason though the opportunity was given for patient to express any concerns    Pt has future appointments scheduled through November 2025 and pt aware.

## 2025-05-21 ENCOUNTER — PATIENT MESSAGE (OUTPATIENT)
Dept: RHEUMATOLOGY | Facility: CLINIC | Age: 70
End: 2025-05-21
Payer: MEDICARE

## 2025-05-23 ENCOUNTER — PATIENT MESSAGE (OUTPATIENT)
Dept: FAMILY MEDICINE | Facility: CLINIC | Age: 70
End: 2025-05-23
Payer: MEDICARE

## 2025-05-28 ENCOUNTER — OFFICE VISIT (OUTPATIENT)
Dept: OPTOMETRY | Facility: CLINIC | Age: 70
End: 2025-05-28
Payer: MEDICARE

## 2025-05-28 DIAGNOSIS — Z13.5 GLAUCOMA SCREENING: ICD-10-CM

## 2025-05-28 DIAGNOSIS — H25.13 NUCLEAR SCLEROSIS, BILATERAL: ICD-10-CM

## 2025-05-28 DIAGNOSIS — E11.9 TYPE 2 DIABETES MELLITUS WITHOUT RETINOPATHY: Primary | ICD-10-CM

## 2025-05-28 PROCEDURE — 99213 OFFICE O/P EST LOW 20 MIN: CPT | Mod: PBBFAC,PO | Performed by: OPTOMETRIST

## 2025-05-28 PROCEDURE — 92014 COMPRE OPH EXAM EST PT 1/>: CPT | Mod: S$PBB,,, | Performed by: OPTOMETRIST

## 2025-05-28 PROCEDURE — 99999 PR PBB SHADOW E&M-EST. PATIENT-LVL III: CPT | Mod: PBBFAC,,, | Performed by: OPTOMETRIST

## 2025-05-28 NOTE — PROGRESS NOTES
HPI    Patient here routine eye exam for diabetes   Pt sts she has no complaints  She knows she has dry eyes but not using anything in her eyes regularly  No blurry VA at distance , using OTC readers +1.50 sph near.   Last edited by Kortney Goldstein on 5/28/2025 10:14 AM.            Assessment /Plan     For exam results, see Encounter Report.    Type 2 diabetes mellitus without retinopathy    Nuclear sclerosis, bilateral    Glaucoma screening      1. [place] OU--pt happy w otc readers--wishes to defer refraction  2. DM- WITHOUT RETINOPATHY.  Advised yearly DFE  3. MAT--advised SYSTANE COMPLETE ATs TID+    PLAN:    rtc 1 yr

## 2025-06-16 ENCOUNTER — RESEARCH ENCOUNTER (OUTPATIENT)
Dept: RESEARCH | Facility: HOSPITAL | Age: 70
End: 2025-06-16
Payer: MEDICARE

## 2025-06-16 ENCOUNTER — APPOINTMENT (OUTPATIENT)
Dept: LAB | Facility: HOSPITAL | Age: 70
End: 2025-06-16
Attending: INTERNAL MEDICINE
Payer: MEDICARE

## 2025-06-16 NOTE — PROGRESS NOTES
Study Title: ELIO: Real-world Evidence to Advance Multi-Cancer Early Detection Health Equity (REACH/Galleri-Medicare study)    Protocol IRB #: [id number]     Sponsor: PAN    : Jhony Gonzalez MD    Patient eligibility was checked prior to enrollment in the study. Patient met the following inclusion and exclusion criteria:     INCLUSION CRITERIA  Participant age is 50 years or older with Medicare coverage at the time of signing the Informed Consent form  Participant is eligible to receive the Galleri test, based on a determination by the study investigator or designee  Participant is capable of giving signed Informed Consent that is legally effective (consent provided by LAR is not permitted)  Participant is able to comprehend and respond to questions in participant questionnaires.    EXCLUSION CRITERIA  Participant having had a previous Galleri test not associated with this study  Participant is undergoing or referred for diagnostic evaluation due to clinical suspicion for cancer  Participant has a personal history of invasive or hematologic malignancy, diagnosed within the last 3 years prior to expected enrollment date or diagnosed greater than 3 years prior to expected enrollment date and never treated  Participant has had definitive treatment for invasive or hematologic malignancy within the 3 years prior to expected enrollment date  Participant is not able to comply with protocol procedures  Participant is not a current patient at a participating center  Participant is currently enrolled or was previously enrolled in another [facility]-sponsored study  Participant is current or previous employee/contractor of [employer]  Participant is currently pregnant (by participant's self-report of pregnancy status)    DOCUMENTATION OF INFORMED CONSENT    Prior to the Informed Consent (IC) being signed, or any study protocol required data collection, testing, procedure, or intervention being performed, the  following was done and/or discussed:  Patient was given a copy of the IC for review   Purpose of the study and qualifications to participate   Study design, Follow up schedule, and tests or procedures done at each visit  Confidentiality and HIPAA Authorization for Release of Medical Records for the research trial/ subject's rights/research related injury  Risk, Benefits, Alternative Treatments, Compensation and Costs  Participation in the research trial is voluntary and patient may withdraw at anytime  Contact information for study related questions    Patient verbalizes understanding of the above: Yes  Contact information for CRC and PI given to patient: Yes  Patient able to adequately summarize: the purpose of the study, the risks associated with the study, and all procedures, testing, and follow-ups associated with the study: Yes    Patient signed the informed consent form for the research study with an IRB approval date of 02 JUL 2024. Each page of the consent form was reviewed with patient and all questions answered satisfactorily. Patient received a copy of the consent form. The original consent was scanned into electronic medical records.    INSURANCE VERIFICATION    Thoroughly discussed with patient that the study team does not expect them to be billed for this test. However, by participating in this trial, we cannot guarantee that they will not receive a bill, as cost ultimately depends on the details of their Medicare coverage. Patient voiced understanding.      BLOOD DRAW    Following IC being signed and prior to blood draw, patient completed all baseline/pretest questionnaires. The following specimens were collected from the pt at the time of this encounter via peripheral blood draw.    Blood draw location: Right Hand  Needle used: 21 gauge butterfly needle  Blood draw amount: 10 ml  Blood draw time: 10:47 AM

## 2025-07-08 ENCOUNTER — TELEPHONE (OUTPATIENT)
Dept: RESEARCH | Facility: HOSPITAL | Age: 70
End: 2025-07-08
Payer: MEDICARE

## 2025-07-08 NOTE — TELEPHONE ENCOUNTER
Study Title: ELIO: Real-world Evidence to Advance Multi-Cancer Early Detection Health Equity (ELIO/Jennie-Medicare study)  Protocol IRB #: [id number]  IRB Approval Date: 02 July 2024  Sponsor: PAN  : Jhony Gonzalez MD     [facility] ID: [id number]     Results of the Pan [facility] Multi Cancer Early Detection test and were reviewed by PI Dr Gonzalez. Patient was called and notified of test results, there was no signal detected.     Patient reminded, this was a screening test, so we still highly encourage them to continue other normal health screenings  and to continue to adhere to guideline-recommended cancer screenings.     Patient was asked about completing follow-up questionnaire online and was agreeable.

## 2025-07-15 ENCOUNTER — INFUSION (OUTPATIENT)
Dept: INFECTIOUS DISEASES | Facility: HOSPITAL | Age: 70
End: 2025-07-15
Payer: MEDICARE

## 2025-07-15 VITALS
HEART RATE: 74 BPM | HEIGHT: 62 IN | SYSTOLIC BLOOD PRESSURE: 138 MMHG | TEMPERATURE: 98 F | DIASTOLIC BLOOD PRESSURE: 60 MMHG | OXYGEN SATURATION: 99 % | RESPIRATION RATE: 20 BRPM | BODY MASS INDEX: 31.72 KG/M2 | WEIGHT: 172.38 LBS

## 2025-07-15 DIAGNOSIS — M05.9 SEROPOSITIVE RHEUMATOID ARTHRITIS: Primary | ICD-10-CM

## 2025-07-15 PROCEDURE — 96413 CHEMO IV INFUSION 1 HR: CPT

## 2025-07-15 PROCEDURE — 25000003 PHARM REV CODE 250: Performed by: INTERNAL MEDICINE

## 2025-07-15 PROCEDURE — 96415 CHEMO IV INFUSION ADDL HR: CPT

## 2025-07-15 PROCEDURE — 63600175 PHARM REV CODE 636 W HCPCS: Mod: JW,TB | Performed by: INTERNAL MEDICINE

## 2025-07-15 RX ORDER — ACETAMINOPHEN 325 MG/1
650 TABLET ORAL
OUTPATIENT
Start: 2025-09-09

## 2025-07-15 RX ORDER — HEPARIN 100 UNIT/ML
500 SYRINGE INTRAVENOUS
Status: DISCONTINUED | OUTPATIENT
Start: 2025-07-15 | End: 2025-07-15 | Stop reason: HOSPADM

## 2025-07-15 RX ORDER — SODIUM CHLORIDE 0.9 % (FLUSH) 0.9 %
10 SYRINGE (ML) INJECTION
OUTPATIENT
Start: 2025-09-09

## 2025-07-15 RX ORDER — SODIUM CHLORIDE 0.9 % (FLUSH) 0.9 %
10 SYRINGE (ML) INJECTION
Status: DISCONTINUED | OUTPATIENT
Start: 2025-07-15 | End: 2025-07-15 | Stop reason: HOSPADM

## 2025-07-15 RX ORDER — DIPHENHYDRAMINE HYDROCHLORIDE 50 MG/ML
50 INJECTION, SOLUTION INTRAMUSCULAR; INTRAVENOUS ONCE AS NEEDED
OUTPATIENT
Start: 2025-09-09

## 2025-07-15 RX ORDER — DIPHENHYDRAMINE HYDROCHLORIDE 50 MG/ML
50 INJECTION, SOLUTION INTRAMUSCULAR; INTRAVENOUS ONCE AS NEEDED
Status: DISCONTINUED | OUTPATIENT
Start: 2025-07-15 | End: 2025-07-15 | Stop reason: HOSPADM

## 2025-07-15 RX ORDER — EPINEPHRINE 0.3 MG/.3ML
0.3 INJECTION SUBCUTANEOUS ONCE AS NEEDED
OUTPATIENT
Start: 2025-09-09

## 2025-07-15 RX ORDER — EPINEPHRINE 0.3 MG/.3ML
0.3 INJECTION SUBCUTANEOUS ONCE AS NEEDED
Status: DISCONTINUED | OUTPATIENT
Start: 2025-07-15 | End: 2025-07-15 | Stop reason: HOSPADM

## 2025-07-15 RX ORDER — HEPARIN 100 UNIT/ML
500 SYRINGE INTRAVENOUS
OUTPATIENT
Start: 2025-09-09

## 2025-07-15 RX ORDER — IPRATROPIUM BROMIDE AND ALBUTEROL SULFATE 2.5; .5 MG/3ML; MG/3ML
3 SOLUTION RESPIRATORY (INHALATION)
OUTPATIENT
Start: 2025-09-09

## 2025-07-15 RX ADMIN — SODIUM CHLORIDE 590 MG: 9 INJECTION, SOLUTION INTRAVENOUS at 12:07

## 2025-07-15 NOTE — PROGRESS NOTES
Arrived for inflectra as directed. Frequent vital signs during infusion. Flushed with NS following infusion. Consented.    Limited head-to-toe assessment due to privacy issues and visit reason though the opportunity was given for patient to express any concerns

## 2025-07-16 ENCOUNTER — OFFICE VISIT (OUTPATIENT)
Dept: RHEUMATOLOGY | Facility: CLINIC | Age: 70
End: 2025-07-16
Payer: MEDICARE

## 2025-07-16 ENCOUNTER — PATIENT MESSAGE (OUTPATIENT)
Dept: RHEUMATOLOGY | Facility: CLINIC | Age: 70
End: 2025-07-16

## 2025-07-16 DIAGNOSIS — M05.9 SEROPOSITIVE RHEUMATOID ARTHRITIS: Primary | ICD-10-CM

## 2025-07-16 DIAGNOSIS — Z79.899 HIGH RISK MEDICATION USE: ICD-10-CM

## 2025-07-16 PROCEDURE — 98007 SYNCH AUDIO-VIDEO EST HI 40: CPT | Mod: 95,,, | Performed by: INTERNAL MEDICINE

## 2025-07-16 NOTE — PROGRESS NOTES
The patient location is: [place]  The chief complaint leading to consultation is:   Subjective:       Patient ID: Verenice Mirza is a 70 y.o. female.    Chief Complaint: Disease Management    HPI    Fell early 2021; R hand swelled after fall but never got better  Dr. Erickson dx rheumatoid arthritis - seropositive  Orencia weekly x 3 (April) helped but then she got diverticulitis  June 2021 started Sulfasalazine up to 3 bid     Feb 2021 RF 92,      Previous hx of plantar fasciitis, achilles tendinitis, R knee pain, and lumbar pain  Hx gel shots in R knee  Hx PT for knee  A lot of pain around the knee; no cane   Meloxicam      Hx DM2  Steatosis/ fatty liver; MRI Abd 2024 had normal liver x 2 hemangiomata  HTN metoprolol   HLD  Hypothyroid  PVD  PAD  Rosacea  JORDANA  Raloxifene since 2019 for atypical ductal hyperplasia  Fam hx crohn's grandmother    Long history knee pains  X-rays show osteoarthritis, medial joints  Some L genu varus  History injection L shoulder and 2 injections L knee, 1 R knee; Stacey Harry and Chandra  History meniscectomy both knees (L knee after a fall and MRI Dr Harry)    Ongoing infliximab infusions , received yesterday, with better relief of joint pain and stiffness  Hydroxychloroquine and sulfasalazine are working  Had EYE exam 5/28 with no hydroxychloroquine toxicity    Lab 6/16/25 normal    Reels on [employer] have her concerned about microbiome and toxins and heavy metals  Has thinning hairs, eyebrows.    Review of Systems   Constitutional:  Negative for fever and unexpected weight change.   HENT:  Negative for mouth sores and trouble swallowing.    Eyes:  Negative for redness.   Respiratory:  Negative for cough and shortness of breath.    Cardiovascular:  Negative for chest pain.   Gastrointestinal:  Negative for constipation and diarrhea.   Genitourinary:  Negative for dysuria and genital sores.   Skin:  Negative for rash.   Neurological:  Negative for headaches.   Hematological:   Bruises/bleeds easily.         Objective:   There were no vitals taken for this visit.     Physical Exam      Assessment:       1. Seropositive rheumatoid arthritis    2. High risk medication use            Plan:       Problem List Items Addressed This Visit          Active Problems    Seropositive rheumatoid arthritis - Primary    Rheumatoid arthritis now stable on combination Rx with hydroxychloroquine, sulfasalazine, and infliximab.     No interval infections or adverse effects reported; will continue current regimen and return to me with repeat labs in 4 months          Other Visit Diagnoses         High risk medication use                    Visit type: audiovisual    Face to Face time with patient: 16 min  25  minutes of total time spent on the encounter, which includes face to face time and non-face to face time preparing to see the patient (eg, review of tests), Obtaining and/or reviewing separately obtained history, Documenting clinical information in the electronic or other health record, Independently interpreting results (not separately reported) and communicating results to the patient/family/caregiver, or Care coordination (not separately reported).         Each patient to whom he or she provides medical services by telemedicine is:  (1) informed of the relationship between the physician and patient and the respective role of any other health care provider with respect to management of the patient; and (2) notified that he or she may decline to receive medical services by telemedicine and may withdraw from such care at any time.    Notes:

## 2025-07-16 NOTE — ASSESSMENT & PLAN NOTE
Rheumatoid arthritis now stable on combination Rx with hydroxychloroquine, sulfasalazine, and infliximab.     No interval infections or adverse effects reported; will continue current regimen and return to me with repeat labs in 4 months

## 2025-07-28 ENCOUNTER — HOSPITAL ENCOUNTER (OUTPATIENT)
Dept: RADIOLOGY | Facility: HOSPITAL | Age: 70
Discharge: HOME | End: 2025-07-28
Attending: INTERNAL MEDICINE
Payer: MEDICARE

## 2025-07-28 VITALS — WEIGHT: 170 LBS | BODY MASS INDEX: 31.09 KG/M2

## 2025-07-28 DIAGNOSIS — N60.92 ATYPICAL DUCTAL HYPERPLASIA OF LEFT BREAST: ICD-10-CM

## 2025-07-28 DIAGNOSIS — Z12.31 ENCOUNTER FOR SCREENING MAMMOGRAM FOR HIGH-RISK PATIENT: ICD-10-CM

## 2025-07-28 PROCEDURE — 77067 SCR MAMMO BI INCL CAD: CPT | Mod: TC

## 2025-08-01 ENCOUNTER — PATIENT MESSAGE (OUTPATIENT)
Dept: HEMATOLOGY/ONCOLOGY | Facility: CLINIC | Age: 70
End: 2025-08-01
Payer: MEDICARE

## 2025-08-05 ENCOUNTER — PATIENT MESSAGE (OUTPATIENT)
Dept: HEMATOLOGY/ONCOLOGY | Facility: CLINIC | Age: 70
End: 2025-08-05
Payer: MEDICARE

## 2025-08-18 ENCOUNTER — TELEPHONE (OUTPATIENT)
Dept: RESEARCH | Facility: HOSPITAL | Age: 70
End: 2025-08-18
Payer: MEDICARE

## 2025-08-25 ENCOUNTER — OFFICE VISIT (OUTPATIENT)
Facility: CLINIC | Age: 70
End: 2025-08-25
Attending: PSYCHIATRY & NEUROLOGY
Payer: MEDICARE

## 2025-08-25 VITALS
HEART RATE: 76 BPM | SYSTOLIC BLOOD PRESSURE: 131 MMHG | DIASTOLIC BLOOD PRESSURE: 83 MMHG | WEIGHT: 166.81 LBS | HEIGHT: 62 IN | BODY MASS INDEX: 30.69 KG/M2

## 2025-08-25 DIAGNOSIS — G47.30 SLEEP APNEA, UNSPECIFIED TYPE: Primary | ICD-10-CM

## 2025-08-25 PROCEDURE — 99999 PR PBB SHADOW E&M-EST. PATIENT-LVL III: CPT | Mod: PBBFAC,,, | Performed by: PSYCHIATRY & NEUROLOGY

## 2025-08-25 PROCEDURE — 99214 OFFICE O/P EST MOD 30 MIN: CPT | Mod: S$PBB,,, | Performed by: PSYCHIATRY & NEUROLOGY

## 2025-08-25 PROCEDURE — 99213 OFFICE O/P EST LOW 20 MIN: CPT | Mod: PBBFAC,PO | Performed by: PSYCHIATRY & NEUROLOGY

## 2025-08-26 ENCOUNTER — TELEPHONE (OUTPATIENT)
Dept: SLEEP MEDICINE | Facility: OTHER | Age: 70
End: 2025-08-26
Payer: MEDICARE

## 2025-09-04 ENCOUNTER — PATIENT MESSAGE (OUTPATIENT)
Dept: RHEUMATOLOGY | Facility: CLINIC | Age: 70
End: 2025-09-04
Payer: MEDICARE

## (undated) DEVICE — TUBE SET INFLOW/OUTFLOW

## (undated) DEVICE — SUT MCRYL PLUS 4-0 PS2 27IN

## (undated) DEVICE — GAUZE SPONGE 4X4 12PLY

## (undated) DEVICE — STRIP MEDI WND CLSR 1/2X4IN

## (undated) DEVICE — SOL 9P NACL IRR PIC IL

## (undated) DEVICE — SOL NACL IRR 1000ML BTL

## (undated) DEVICE — PAD KNEE POLAR XL

## (undated) DEVICE — PAD ELECTRODE STER 1.5X3

## (undated) DEVICE — CONTAINER SPECIMEN STRL 4OZ

## (undated) DEVICE — SPONGE LAP 18X18 PREWASHED

## (undated) DEVICE — BLADE SHAVER LANZA 4.2X13CM

## (undated) DEVICE — Device

## (undated) DEVICE — ELECTRODE BLADE INSULATED 1 IN

## (undated) DEVICE — UNDERGLOVES BIOGEL PI SIZE 7.5

## (undated) DEVICE — NDL 18GA X1 1/2 REG BEVEL

## (undated) DEVICE — DRAPE ARTHSCP FLD CTRL POUCH

## (undated) DEVICE — GOWN SMARTGOWN LVL4 X-LONG XL

## (undated) DEVICE — PAD COLD THERAPY KNEE WRAP ON

## (undated) DEVICE — PAD ABD 8X10 STERILE

## (undated) DEVICE — ELECTRODE 90 DEGREE ANGLE

## (undated) DEVICE — PAD ABDOMINAL STERILE 8X10IN

## (undated) DEVICE — ADHESIVE MASTISOL VIAL 48/BX

## (undated) DEVICE — GLOVE BIOGEL SKINSENSE PI 8.0

## (undated) DEVICE — BRA POST SURGICAL WHT 48-50IN

## (undated) DEVICE — ADHESIVE DERMABOND ADVANCED

## (undated) DEVICE — SUT MONOCRYL 4-0 PS-2

## (undated) DEVICE — WRAP KNEE ACCU THERM GEL PACK

## (undated) DEVICE — TRAY MINOR GEN SURG

## (undated) DEVICE — SUT 2/0 30IN SILK BLK BRAI

## (undated) DEVICE — TOURNIQUET SB QC DP 34X4IN

## (undated) DEVICE — DRESSING XEROFORM FOIL PK 1X8

## (undated) DEVICE — DRAPE STERI U-SHAPED 47X51IN

## (undated) DEVICE — SYS LABEL CORRECT MED

## (undated) DEVICE — DRAPE STERI INSTRUMENT 1018

## (undated) DEVICE — DRESSING XEROFORM NONADH 1X8IN

## (undated) DEVICE — SEE MEDLINE ITEM 157150

## (undated) DEVICE — SEE MEDLINE ITEM 146417

## (undated) DEVICE — GAUGE FLUFF X-SUPER 36X36 2PLY

## (undated) DEVICE — GAUZE FLUFF XXLG 36X36 2 PLY

## (undated) DEVICE — SOL NACL IRR 3000ML

## (undated) DEVICE — SOL IRR NACL .9% 3000ML

## (undated) DEVICE — UNDERGLOVES BIOGEL PI SIZE 8

## (undated) DEVICE — SEE MEDLINE ITEM 157169

## (undated) DEVICE — GOWN ECLIPSE REINF LV4 XLNG XL

## (undated) DEVICE — CLOSURE SKIN STERI STRIP 1/2X4

## (undated) DEVICE — SOL 0.9% NACL IRRI.IN STERIL

## (undated) DEVICE — DRAPE TOP 53X102IN

## (undated) DEVICE — PACK UNIVERSAL SPLIT II

## (undated) DEVICE — COVER PROBE NL STRL 3.6X96IN

## (undated) DEVICE — PAD CAST SPECIALIST STRL 6

## (undated) DEVICE — NDL HYPO REG 25G X 1 1/2

## (undated) DEVICE — ELECTRODE REM PLYHSV RETURN 9